# Patient Record
Sex: FEMALE | Race: WHITE | NOT HISPANIC OR LATINO | Employment: OTHER | URBAN - METROPOLITAN AREA
[De-identification: names, ages, dates, MRNs, and addresses within clinical notes are randomized per-mention and may not be internally consistent; named-entity substitution may affect disease eponyms.]

---

## 2017-10-26 ENCOUNTER — APPOINTMENT (EMERGENCY)
Dept: CT IMAGING | Facility: HOSPITAL | Age: 82
DRG: 066 | End: 2017-10-26
Payer: COMMERCIAL

## 2017-10-26 ENCOUNTER — APPOINTMENT (INPATIENT)
Dept: MRI IMAGING | Facility: HOSPITAL | Age: 82
DRG: 066 | End: 2017-10-26
Payer: COMMERCIAL

## 2017-10-26 ENCOUNTER — APPOINTMENT (EMERGENCY)
Dept: RADIOLOGY | Facility: HOSPITAL | Age: 82
DRG: 066 | End: 2017-10-26
Payer: COMMERCIAL

## 2017-10-26 ENCOUNTER — HOSPITAL ENCOUNTER (INPATIENT)
Facility: HOSPITAL | Age: 82
LOS: 4 days | Discharge: HOME | DRG: 066 | End: 2017-10-30
Attending: EMERGENCY MEDICINE | Admitting: INTERNAL MEDICINE
Payer: COMMERCIAL

## 2017-10-26 DIAGNOSIS — R29.810 FACIAL DROOP: Primary | ICD-10-CM

## 2017-10-26 DIAGNOSIS — R55 SYNCOPE: ICD-10-CM

## 2017-10-26 DIAGNOSIS — I63.81 ACUTE LACUNAR STROKE (HCC): ICD-10-CM

## 2017-10-26 PROBLEM — R53.1 ASTHENIA: Status: ACTIVE | Noted: 2017-10-26

## 2017-10-26 PROBLEM — I65.23 CAROTID ARTERY CALCIFICATION, BILATERAL: Status: ACTIVE | Noted: 2017-10-26

## 2017-10-26 LAB
ALBUMIN SERPL BCP-MCNC: 4.2 G/DL (ref 3.5–5)
ALP SERPL-CCNC: 86 U/L (ref 46–116)
ALT SERPL W P-5'-P-CCNC: 19 U/L (ref 12–78)
ANION GAP BLD CALC-SCNC: 18 MMOL/L (ref 4–13)
ANION GAP SERPL CALCULATED.3IONS-SCNC: 11 MMOL/L (ref 4–13)
APTT PPP: 28 SECONDS (ref 23–35)
AST SERPL W P-5'-P-CCNC: 16 U/L (ref 5–45)
BACTERIA UR QL AUTO: ABNORMAL /HPF
BASOPHILS # BLD AUTO: 0.02 THOUSANDS/ΜL (ref 0–0.1)
BASOPHILS NFR BLD AUTO: 0 % (ref 0–1)
BILIRUB SERPL-MCNC: 0.76 MG/DL (ref 0.2–1)
BILIRUB UR QL STRIP: NEGATIVE
BUN BLD-MCNC: 13 MG/DL (ref 5–25)
BUN SERPL-MCNC: 15 MG/DL (ref 5–25)
CA-I BLD-SCNC: 1.15 MMOL/L (ref 1.12–1.32)
CALCIUM SERPL-MCNC: 9.5 MG/DL (ref 8.3–10.1)
CHLORIDE BLD-SCNC: 98 MMOL/L (ref 100–108)
CHLORIDE SERPL-SCNC: 97 MMOL/L (ref 100–108)
CK SERPL-CCNC: 32 U/L (ref 26–192)
CLARITY UR: CLEAR
CO2 SERPL-SCNC: 26 MMOL/L (ref 21–32)
COLOR UR: YELLOW
CREAT BLD-MCNC: 1.1 MG/DL (ref 0.6–1.3)
CREAT SERPL-MCNC: 1.2 MG/DL (ref 0.6–1.3)
EOSINOPHIL # BLD AUTO: 0.01 THOUSAND/ΜL (ref 0–0.61)
EOSINOPHIL NFR BLD AUTO: 0 % (ref 0–6)
ERYTHROCYTE [DISTWIDTH] IN BLOOD BY AUTOMATED COUNT: 13.1 % (ref 11.6–15.1)
FINE GRAN CASTS URNS QL MICRO: ABNORMAL /LPF
GFR SERPL CREATININE-BSD FRML MDRD: 42 ML/MIN/1.73SQ M
GFR SERPL CREATININE-BSD FRML MDRD: 46 ML/MIN/1.73SQ M
GLUCOSE SERPL-MCNC: 159 MG/DL (ref 65–140)
GLUCOSE SERPL-MCNC: 160 MG/DL (ref 65–140)
GLUCOSE UR STRIP-MCNC: NEGATIVE MG/DL
HCT VFR BLD AUTO: 41.9 % (ref 34.8–46.1)
HCT VFR BLD CALC: 45 % (ref 34.8–46.1)
HGB BLD-MCNC: 14.5 G/DL (ref 11.5–15.4)
HGB BLDA-MCNC: 15.3 G/DL (ref 11.5–15.4)
HGB UR QL STRIP.AUTO: ABNORMAL
HYALINE CASTS #/AREA URNS LPF: ABNORMAL /LPF
INR PPP: 1.05 (ref 0.86–1.16)
KETONES UR STRIP-MCNC: NEGATIVE MG/DL
LEUKOCYTE ESTERASE UR QL STRIP: NEGATIVE
LYMPHOCYTES # BLD AUTO: 2.08 THOUSANDS/ΜL (ref 0.6–4.47)
LYMPHOCYTES NFR BLD AUTO: 25 % (ref 14–44)
MAGNESIUM SERPL-MCNC: 2 MG/DL (ref 1.6–2.6)
MCH RBC QN AUTO: 30 PG (ref 26.8–34.3)
MCHC RBC AUTO-ENTMCNC: 34.6 G/DL (ref 31.4–37.4)
MCV RBC AUTO: 87 FL (ref 82–98)
MONOCYTES # BLD AUTO: 0.88 THOUSAND/ΜL (ref 0.17–1.22)
MONOCYTES NFR BLD AUTO: 11 % (ref 4–12)
NEUTROPHILS # BLD AUTO: 5.31 THOUSANDS/ΜL (ref 1.85–7.62)
NEUTS SEG NFR BLD AUTO: 64 % (ref 43–75)
NITRITE UR QL STRIP: NEGATIVE
NON-SQ EPI CELLS URNS QL MICRO: ABNORMAL /HPF
PCO2 BLD: 25 MMOL/L (ref 21–32)
PH UR STRIP.AUTO: 7 [PH] (ref 4.5–8)
PLATELET # BLD AUTO: 275 THOUSANDS/UL (ref 149–390)
PMV BLD AUTO: 10.6 FL (ref 8.9–12.7)
POTASSIUM BLD-SCNC: 3.7 MMOL/L (ref 3.5–5.3)
POTASSIUM SERPL-SCNC: 3.7 MMOL/L (ref 3.5–5.3)
PROT SERPL-MCNC: 8.2 G/DL (ref 6.4–8.2)
PROT UR STRIP-MCNC: ABNORMAL MG/DL
PROTHROMBIN TIME: 13.7 SECONDS (ref 12.1–14.4)
RBC # BLD AUTO: 4.83 MILLION/UL (ref 3.81–5.12)
RBC #/AREA URNS AUTO: ABNORMAL /HPF
SODIUM BLD-SCNC: 135 MMOL/L (ref 136–145)
SODIUM SERPL-SCNC: 134 MMOL/L (ref 136–145)
SP GR UR STRIP.AUTO: 1.01 (ref 1–1.03)
SPECIMEN SOURCE: ABNORMAL
SPECIMEN SOURCE: NORMAL
TROPONIN I BLD-MCNC: 0.02 NG/ML (ref 0–0.08)
UROBILINOGEN UR QL STRIP.AUTO: 0.2 E.U./DL
WBC # BLD AUTO: 8.3 THOUSAND/UL (ref 4.31–10.16)
WBC #/AREA URNS AUTO: ABNORMAL /HPF

## 2017-10-26 PROCEDURE — 93005 ELECTROCARDIOGRAM TRACING: CPT | Performed by: EMERGENCY MEDICINE

## 2017-10-26 PROCEDURE — 82550 ASSAY OF CK (CPK): CPT | Performed by: EMERGENCY MEDICINE

## 2017-10-26 PROCEDURE — 96360 HYDRATION IV INFUSION INIT: CPT

## 2017-10-26 PROCEDURE — 99285 EMERGENCY DEPT VISIT HI MDM: CPT

## 2017-10-26 PROCEDURE — 70551 MRI BRAIN STEM W/O DYE: CPT

## 2017-10-26 PROCEDURE — 71020 HB CHEST X-RAY 2VW FRONTAL&LATL: CPT

## 2017-10-26 PROCEDURE — 70450 CT HEAD/BRAIN W/O DYE: CPT

## 2017-10-26 PROCEDURE — 80047 BASIC METABLC PNL IONIZED CA: CPT

## 2017-10-26 PROCEDURE — 85025 COMPLETE CBC W/AUTO DIFF WBC: CPT | Performed by: EMERGENCY MEDICINE

## 2017-10-26 PROCEDURE — 83735 ASSAY OF MAGNESIUM: CPT | Performed by: EMERGENCY MEDICINE

## 2017-10-26 PROCEDURE — 72125 CT NECK SPINE W/O DYE: CPT

## 2017-10-26 PROCEDURE — 80053 COMPREHEN METABOLIC PANEL: CPT | Performed by: EMERGENCY MEDICINE

## 2017-10-26 PROCEDURE — 81002 URINALYSIS NONAUTO W/O SCOPE: CPT | Performed by: EMERGENCY MEDICINE

## 2017-10-26 PROCEDURE — 84484 ASSAY OF TROPONIN QUANT: CPT

## 2017-10-26 PROCEDURE — 85610 PROTHROMBIN TIME: CPT | Performed by: EMERGENCY MEDICINE

## 2017-10-26 PROCEDURE — 81001 URINALYSIS AUTO W/SCOPE: CPT

## 2017-10-26 PROCEDURE — 85014 HEMATOCRIT: CPT

## 2017-10-26 PROCEDURE — 36415 COLL VENOUS BLD VENIPUNCTURE: CPT | Performed by: EMERGENCY MEDICINE

## 2017-10-26 PROCEDURE — 85730 THROMBOPLASTIN TIME PARTIAL: CPT | Performed by: EMERGENCY MEDICINE

## 2017-10-26 RX ORDER — ACETAMINOPHEN 325 MG/1
650 TABLET ORAL EVERY 4 HOURS PRN
Status: DISCONTINUED | OUTPATIENT
Start: 2017-10-26 | End: 2017-10-30 | Stop reason: HOSPADM

## 2017-10-26 RX ORDER — HEPARIN SODIUM 5000 [USP'U]/ML
5000 INJECTION, SOLUTION INTRAVENOUS; SUBCUTANEOUS EVERY 8 HOURS SCHEDULED
Status: DISCONTINUED | OUTPATIENT
Start: 2017-10-26 | End: 2017-10-29

## 2017-10-26 RX ORDER — CIPROFLOXACIN 250 MG/1
250 TABLET, FILM COATED ORAL DAILY
COMMUNITY
End: 2018-09-24 | Stop reason: HOSPADM

## 2017-10-26 RX ORDER — ATORVASTATIN CALCIUM 20 MG/1
20 TABLET, FILM COATED ORAL DAILY
COMMUNITY

## 2017-10-26 RX ORDER — DONEPEZIL HYDROCHLORIDE 5 MG/1
10 TABLET, FILM COATED ORAL
Status: DISCONTINUED | OUTPATIENT
Start: 2017-10-26 | End: 2017-10-30 | Stop reason: HOSPADM

## 2017-10-26 RX ORDER — CALCIUM CARBONATE 200(500)MG
1000 TABLET,CHEWABLE ORAL DAILY PRN
Status: DISCONTINUED | OUTPATIENT
Start: 2017-10-26 | End: 2017-10-30 | Stop reason: HOSPADM

## 2017-10-26 RX ORDER — MEMANTINE HYDROCHLORIDE 10 MG/1
10 TABLET ORAL 2 TIMES DAILY
COMMUNITY

## 2017-10-26 RX ORDER — CIPROFLOXACIN 250 MG/1
250 TABLET, FILM COATED ORAL DAILY
Status: DISCONTINUED | OUTPATIENT
Start: 2017-10-26 | End: 2017-10-30 | Stop reason: HOSPADM

## 2017-10-26 RX ORDER — HYDRALAZINE HYDROCHLORIDE 20 MG/ML
10 INJECTION INTRAMUSCULAR; INTRAVENOUS EVERY 6 HOURS PRN
Status: DISCONTINUED | OUTPATIENT
Start: 2017-10-26 | End: 2017-10-27

## 2017-10-26 RX ORDER — MEMANTINE HYDROCHLORIDE 5 MG/1
10 TABLET ORAL 2 TIMES DAILY
Status: DISCONTINUED | OUTPATIENT
Start: 2017-10-26 | End: 2017-10-30 | Stop reason: HOSPADM

## 2017-10-26 RX ORDER — DONEPEZIL HYDROCHLORIDE 10 MG/1
10 TABLET, FILM COATED ORAL
COMMUNITY

## 2017-10-26 RX ORDER — ASPIRIN 81 MG/1
81 TABLET, CHEWABLE ORAL DAILY
Status: DISCONTINUED | OUTPATIENT
Start: 2017-10-26 | End: 2017-10-30 | Stop reason: HOSPADM

## 2017-10-26 RX ORDER — ATORVASTATIN CALCIUM 40 MG/1
40 TABLET, FILM COATED ORAL
Status: DISCONTINUED | OUTPATIENT
Start: 2017-10-26 | End: 2017-10-30 | Stop reason: HOSPADM

## 2017-10-26 RX ORDER — ONDANSETRON 2 MG/ML
4 INJECTION INTRAMUSCULAR; INTRAVENOUS EVERY 6 HOURS PRN
Status: DISCONTINUED | OUTPATIENT
Start: 2017-10-26 | End: 2017-10-27

## 2017-10-26 RX ADMIN — MEMANTINE 10 MG: 5 TABLET ORAL at 18:06

## 2017-10-26 RX ADMIN — SODIUM CHLORIDE 1000 ML: 0.9 INJECTION, SOLUTION INTRAVENOUS at 11:12

## 2017-10-26 RX ADMIN — ASPIRIN 81 MG 81 MG: 81 TABLET ORAL at 16:24

## 2017-10-26 RX ADMIN — DONEPEZIL HYDROCHLORIDE 10 MG: 5 TABLET, FILM COATED ORAL at 22:09

## 2017-10-26 RX ADMIN — HEPARIN SODIUM 5000 UNITS: 5000 INJECTION, SOLUTION INTRAVENOUS; SUBCUTANEOUS at 22:08

## 2017-10-26 RX ADMIN — HEPARIN SODIUM 5000 UNITS: 5000 INJECTION, SOLUTION INTRAVENOUS; SUBCUTANEOUS at 16:24

## 2017-10-26 RX ADMIN — ATORVASTATIN CALCIUM 40 MG: 40 TABLET, FILM COATED ORAL at 16:24

## 2017-10-26 RX ADMIN — CIPROFLOXACIN HYDROCHLORIDE 250 MG: 250 TABLET, FILM COATED ORAL at 16:24

## 2017-10-26 NOTE — PLAN OF CARE
DISCHARGE PLANNING     Discharge to home or other facility with appropriate resources Progressing        INFECTION - ADULT     Absence or prevention of progression during hospitalization Progressing     Absence of fever/infection during neutropenic period Progressing        Knowledge Deficit     Patient/family/caregiver demonstrates understanding of disease process, treatment plan, medications, and discharge instructions Progressing        Neurological Deficit     Neurological status is stable or improving Progressing        PAIN - ADULT     Verbalizes/displays adequate comfort level or baseline comfort level Progressing        Potential for Falls     Patient will remain free of falls Progressing        SAFETY ADULT     Maintain or return to baseline ADL function Progressing     Maintain or return mobility status to optimal level Progressing     Patient will remain free of falls Progressing

## 2017-10-26 NOTE — H&P
History and Physical - [facility] Internal Medicine    Patient Information: Alejandra Chris 80 y o  female MRN: [id number]  Unit/Bed#: ED 18 Encounter: [id number]  Admitting Physician: Marta Khan PA-C  PCP: Best Mills DO  Date of Admission:  10/26/17    Assessment/Plan  Patient is a pleasantly demented 28-year-old female who lives with her family however there is multiple cameras in the home to keep an eye on her  The patient was last seen normal at 5:30 a m  when she received her morning meds  A family member chest in honor and video at 8:30 a m  and she was not seen in her room in her bed was only USP made  They thought she was in the bathroom  45 minutes later she was not seen again and they went to the house and found her laying on the floor  She was initially difficult to arouse and when they did so she seemed weak on her right side and had a right facial droop  She was brought to the ER by EMS    Upon arrival in the ER the patient's vital signs were stable  Her labs were notable for sodium of 134 random glucose of 160, total CK 32, CBC within normal limits  Troponin negative  CT of the head did not show any acute intracranial however there is chronic lacunar right basal ganglia and chronic infarct in the left frontal region  CT of the C-spine did not show any fracture or malalignment, however there was sclerotic calcifications seen within the right carotid artery and moderate to severe atherosclerotic calcification in the left carotid artery EKG was 70 beats per minute normal sinus rhythm      Hospital Problem List:     Principal Problem:    Facial droop  Active Problems:    History of stroke    Alzheimer disease    Dyslipidemia    Asthenia    Carotid artery calcification, bilateral      Plan for the Primary Problem(s):  1  Right facial droop-resolving there is still some right facial a stream a tree with her smile however forehead is preserved    CT of the head did not show any acute intracranial abnormality or signs of a stroke there are evidence of chronic infarcts  She also does have bilateral carotid calcification  Will follow the stroke pathway with an MRI get an echocardiogram and a carotid ultrasound  The patient is on an aspirin check a lipid profile and add a statin  Also check a TSH and A1c  Consult Neurology  Plan for Additional Problems:   2  Alzheimer's disease-continue Aricept and Namenda  3  Asthenia-PT OT consult  4  Chronic urinary tract infections-the patient is on suppressive Cipro and cranberry since August 5   Hypertensive urgency-on my exam the patient's blood pressure was 642 systolic to 710 systolic  Patient had complained of a frontal headache  Will add hydralazine p r n     The patient is not on any antihypertensive pre-hospital   Allow permissive for the 1st 24-48 hours      VTE Prophylaxis: Heparin   Code Status:  Full code    Anticipated Length of Stay:  Patient will be admitted on an Inpatient basis with an anticipated length of stay of  Greater than 2 midnights  Total Time for Visit, including Counseling / Coordination of Care: 45 minutes  Greater than 50% of this total time spent on direct patient counseling and coordination of care  Chief Complaint:     Found down, R facial droop    History of Present Illness:    David Vazquez is a 80 y o  female who presents after an episode of being found down for at least an hour and a half  The patient was last seen at 5:30 a m  and was not seen and total after 9:00 a m  Radha Brandt She was found down by her bed  She was not initially conscious  And when she did come through she had facial asymmetry  The patient is poorly verbal at baseline  She does comprehend and can follow commands  She did complain of a frontal headache  But she had no other acute complaints  Review of systems was limited secondary to the patient's baseline dementia    The patient's daughter at bedside tells me that she was in her usual state of health yesterday    Review of Systems:  A 12-point review of systems was done  Please see the HPI for the full details  All other systems negative  Past Medical and Surgical History:     Past Medical History:   Diagnosis Date    Alzheimer disease     Diverticulosis     Dyslipidemia     History of stroke     UTI (urinary tract infection)        Past Surgical History:   Procedure Laterality Date    CHOLECYSTECTOMY         Meds/Allergies:    Current Facility-Administered Medications   Medication Dose Route Frequency Provider Last Rate Last Dose    sodium chloride 0 9 % bolus 1,000 mL  1,000 mL Intravenous Once Carmita De Jesus DO 1,000 mL/hr at 10/26/17 1112 1,000 mL at 10/26/17 1112     Current Outpatient Prescriptions   Medication Sig Dispense Refill    atorvastatin (LIPITOR) 20 mg tablet Take 20 mg by mouth daily      ciprofloxacin (CIPRO) 250 mg tablet Take 500 mg by mouth daily      Cranberry 250 MG TABS Take 500 mg by mouth daily      donepezil (ARICEPT) 10 mg tablet Take 10 mg by mouth daily at bedtime      memantine (NAMENDA) 10 mg tablet Take 10 mg by mouth 2 (two) times a day       No Known Allergies  History:     Marital Status:      Substance Use History:   History   Alcohol Use No     History   Smoking Status    Former Smoker   Smokeless Tobacco    Never Used     History   Drug Use No       Family History:    History reviewed  No pertinent family history      Physical Exam:   Vitals:   Blood Pressure: 138/59 (10/26/17 1115)  Pulse: 66 (10/26/17 1115)  Temperature: (!) 96 6 °F (35 9 °C) (10/26/17 1035)  Temp Source: Temporal (10/26/17 1035)  Respirations: 12 (10/26/17 1115)  Weight - Scale: 71 5 kg (157 lb 10 1 oz) (10/26/17 1027)  SpO2: 99 % (10/26/17 1115)    General Appearance:    Alert, cooperative, no distress, appears older than stated age   HEENT:    Atraumatic, EOMs intact, Mucous membranes dry without   exudates   Neck:   Supple, symmetrical, trachea midline, no adenopathy;     thyroid:  no enlargement/tenderness/nodules; no carotid    bruit or JVD   Lungs:     Clear to auscultation bilaterally, respirations unlabored   Chest Wall:    No tenderness or deformity    Heart:    Regular rate and rhythm, S1 and S2 normal, no murmur, rub    or gallop   Abdomen:     Soft, non-tender, bowel sounds active all four quadrants,     no masses, no organomegaly   Extremities:   Extremities normal, atraumatic, no cyanosis or edema   Pulses:   2+ and symmetric all extremities   Skin:   Pale dry poor turgor good cap refill, no rashes or lesions   Lymph nodes:   Cervical, supraclavicular, and axillary nodes normal   Neurologic:   CNII-XII intact except  R facial droop/smile asmetry,  4/5 strength throughout, cerebellar intact to heel shin/finger nose       Lab Results: I have personally reviewed pertinent reports  Results from last 7 days  Lab Units 10/26/17  1029 10/26/17  1019   WBC Thousand/uL  --  8 30   HEMOGLOBIN g/dL  --  14 5   I STAT HEMOGLOBIN g/dl 15 3  --    HEMATOCRIT %  --  41 9   PLATELETS Thousands/uL  --  275   NEUTROS PCT %  --  64   LYMPHS PCT %  --  25   MONOS PCT %  --  11   EOS PCT %  --  0       Results from last 7 days  Lab Units 10/26/17  1029 10/26/17  1019   SODIUM mmol/L  --  134*   POTASSIUM mmol/L  --  3 7   CHLORIDE mmol/L  --  97*   CO2 mmol/L  --  26   BUN mg/dL  --  15   CREATININE mg/dL  --  1 20   CALCIUM mg/dL  --  9 5   TOTAL PROTEIN g/dL  --  8 2   BILIRUBIN TOTAL mg/dL  --  0 76   ALK PHOS U/L  --  86   ALT U/L  --  19   AST U/L  --  16   GLUCOSE RANDOM mg/dL  --  160*   GLUCOSE, ISTAT mg/dl 159*  --        Results from last 7 days  Lab Units 10/26/17  1019   INR  1 05     Trop 0 02    Imaging: I have personally reviewed pertinent reports  Xr Chest 2 Views    Result Date: 10/26/2017  Narrative: CHEST INDICATION:  Syncope  COMPARISON:  Chest radiographs November 12, 2012   VIEWS:  Frontal and lateral projections IMAGES:  2 FINDINGS: Heart shadow appears unremarkable  Atherosclerotic vascular calcifications are noted  Lung markings are crowded secondary to low lung volumes  Within limitations of this examination there is no focal airspace opacity to suggest pneumonia  No pneumothorax or pleural effusion  Age-appropriate degenerative changes are noted in the spine  Osseous structures appear otherwise within normal limits  Impression: No active pulmonary disease on examination which is somewhat limited secondary to low lung volumes  Workstation performed: [id number]     Ct Head Without Contrast    Result Date: 10/26/2017  Narrative: CT BRAIN - WITHOUT CONTRAST INDICATION:  Headache right facial droop COMPARISON:  August 16, 2015 TECHNIQUE:  CT examination of the brain was performed  In addition to axial images, coronal reformatted images were created and submitted for interpretation  Radiation dose length product (DLP) for this visit:  1026 mGy-cm   This examination, like all CT scans performed in the [facility], was performed utilizing techniques to minimize radiation dose exposure, including the use of iterative reconstruction and automated exposure control  IMAGE QUALITY:  Diagnostic  FINDINGS:  PARENCHYMA:  No intracranial mass, mass effect or midline shift  No CT signs of acute infarction  There is no parenchymal hemorrhage  Moderate to severe periventricular and white matter hypodensity seen likely due to chronic small vessel ischemic changes A chronic lacunae seen in the right globus pallidus  Chronic infarct noted in the left frontal region VENTRICLES AND EXTRA-AXIAL SPACES:  Mild dilation of the ventricle system, unchanged from the previous study VISUALIZED ORBITS AND PARANASAL SINUSES:  Mild the course of thickening in the left maxillary sinus    Soft tissue density seen in the both external auditory canals may be due to cerumen CALVARIUM AND EXTRACRANIAL SOFT TISSUES:   Normal      Impression: No acute intracranial hemorrhage seen Chronic lacune in the right basal ganglionic region and chronic infarct left frontal region No CT signs of acute territorial infarction Workstation performed: [id number]     Ct Spine Cervical Without Contrast    Result Date: 10/26/2017  Narrative: CT CERVICAL SPINE - WITHOUT CONTRAST INDICATION: Neck pain COMPARISON: None  TECHNIQUE:  CT examination of the cervical spine was performed without intravenous contrast   Contiguous axial images were obtained  Sagittal and coronal reconstructions were performed  Radiation dose length product (DLP) for this visit:  375 mGy-cm   This examination, like all CT scans performed in the [facility], was performed utilizing techniques to minimize radiation dose exposure, including the use of iterative reconstruction and automated exposure control  IMAGE QUALITY:  Diagnostic  FINDINGS: ALIGNMENT:  Normal alignment of the cervical spine  No subluxation  VERTEBRAL BODIES:  No acute compression collapse of the vertebra seen DEGENERATIVE CHANGES:  C2-3 level demonstrates minimal protrusion with no significant central canal narrowing of foraminal narrowing C3-4 level demonstrates sufficient joint disease with uncovertebral spurring with mild left and no significant right foraminal narrowing C4-5 demonstrates sufficient joint disease with mild left and no significant right foraminal narrowing At C5-6 level there is a bridging with the mild bilateral foraminal narrowing and no significant central canal narrowing C6-7 level demonstrates uncovertebral spurring with mild left and no significant right foraminal narrowing C7-T1 level appear unremarkable PREVERTEBRAL AND PARASPINAL SOFT TISSUES: Normal         THORACIC INLET:  Normal   Atherosclerotic calcification seen within the right carotid artery    Moderate to severe atherosclerotic calcification seen within the left carotid artery     Impression: No acute compression collapse of the vertebra seen Sclerotic calcification is seen within the right carotid artery and moderate to severe atherosclerotic calcification  seen within the left carotid artery  Ultrasound of the carotids may be considered to evaluate for carotid stenosis  Workstation performed: [id number]       EKG, Pathology, and Other Studies Reviewed on Admission:   70bpm NSR no st changes    Allscripts Records Reviewed: Yes     This note has been constructed using a voice recognition system

## 2017-10-26 NOTE — ED PROVIDER NOTES
History  Chief Complaint   Patient presents with    CVA/TIA-like Symptoms     Pt with h/o Alzheimer's and CVA  Last seen at 5:30 am (5 hours ago)  Daughter was watching for her on a video monitor and didn't see her for a few hours so she went to check on her  She found pt on the floor unconscious, but came to on her own  Pt unable to give history 2/2 Alzheimer which daughter states is baseline  Daughter and EMS noticed a right facial droop and generalized weakness  Pt is complaining of a frontal HA  Pt does not take blood thinners and daughter notes they are [employer] witnesses and she can't receive blood  History provided by:  Relative  History limited by:  Dementia   used: No        Prior to Admission Medications   Prescriptions Last Dose Informant Patient Reported? Taking? Cranberry 250 MG TABS 10/25/2017 at Unknown time  Yes Yes   Sig: Take 500 mg by mouth daily   atorvastatin (LIPITOR) 20 mg tablet 10/25/2017 at Unknown time  Yes Yes   Sig: Take 20 mg by mouth daily   ciprofloxacin (CIPRO) 250 mg tablet 10/26/2017 at Unknown time  Yes Yes   Sig: Take 500 mg by mouth daily   donepezil (ARICEPT) 10 mg tablet 10/25/2017 at Unknown time  Yes Yes   Sig: Take 10 mg by mouth daily at bedtime   memantine (NAMENDA) 10 mg tablet 10/26/2017 at Unknown time  Yes Yes   Sig: Take 10 mg by mouth 2 (two) times a day      Facility-Administered Medications: None       Past Medical History:   Diagnosis Date    Alzheimer disease     Diverticulosis     Dyslipidemia     History of stroke     UTI (urinary tract infection)        Past Surgical History:   Procedure Laterality Date    CHOLECYSTECTOMY         History reviewed  No pertinent family history  I have reviewed and agree with the history as documented      Social History   Substance Use Topics    Smoking status: Former Smoker    Smokeless tobacco: Never Used    Alcohol use No        Review of Systems   Unable to perform ROS: Dementia       Physical Exam  ED Triage Vitals   Temperature Pulse Respirations Blood Pressure SpO2   10/26/17 1035 10/26/17 1027 10/26/17 1027 10/26/17 1027 10/26/17 1027   (!) 96 6 °F (35 9 °C) 77 (!) 24 135/61 99 %      Temp Source Heart Rate Source Patient Position - Orthostatic VS BP Location FiO2 (%)   10/26/17 1035 10/26/17 1218 10/26/17 1027 10/26/17 1027 --   Temporal Monitor Lying Right arm       Pain Score       10/26/17 1027       8           Orthostatic Vital Signs  Vitals:    10/26/17 1027 10/26/17 1115 10/26/17 1218   BP: 135/61 138/59 169/72   Pulse: 77 66 74   Patient Position - Orthostatic VS: Lying  Lying       Physical Exam   Constitutional: Vital signs are normal  She appears well-developed and well-nourished  Non-toxic appearance  She does not have a sickly appearance  She does not appear ill  No distress  HENT:   Head: Normocephalic and atraumatic  Right Ear: Tympanic membrane normal    Left Ear: Tympanic membrane normal    Nose: Nose normal    Mouth/Throat: Oropharynx is clear and moist and mucous membranes are normal  No oropharyngeal exudate  Eyes: Conjunctivae and EOM are normal  Pupils are equal, round, and reactive to light  Neck: Normal range of motion and full passive range of motion without pain  Neck supple  No neck rigidity  No Brudzinski's sign and no Kernig's sign noted  Cardiovascular: Normal rate, regular rhythm, normal heart sounds and intact distal pulses  Exam reveals no gallop and no friction rub  No murmur heard  Pulmonary/Chest: Effort normal and breath sounds normal  No respiratory distress  She has no wheezes  She has no rales  Abdominal: Soft  Bowel sounds are normal  She exhibits no distension  There is no tenderness  There is no rigidity, no rebound and no guarding  Lymphadenopathy:     She has no cervical adenopathy  Neurological: She is alert  She has normal strength  No cranial nerve deficit or sensory deficit     Pt generally weak in all extremities  Skin: Skin is warm and dry  No ecchymosis, no petechiae and no rash noted  She is not diaphoretic  No pallor  Nursing note and vitals reviewed        ED Medications  Medications   sodium chloride 0 9 % bolus 1,000 mL (0 mL Intravenous Stopped 10/26/17 1305)       Diagnostic Studies  Results Reviewed     Procedure Component Value Units Date/Time    Urine Microscopic [[id number]]  (Abnormal) Collected:  10/26/17 1336    Lab Status:  Final result Specimen:  Urine from Urine, Other Updated:  10/26/17 1338     RBC, UA 0-1 (A) /hpf      WBC, UA 1-2 (A) /hpf      Epithelial Cells Occasional /hpf      Bacteria, UA Moderate (A) /hpf      Hyaline Casts, UA 2-4 (A) /lpf      Fine granular casts 2-3 /lpf     POCT urinalysis dipstick [[id number]]  (Abnormal) Resulted:  10/26/17 1323    Lab Status:  Final result Specimen:  Urine Updated:  10/26/17 1323    ED Urine Macroscopic [[id number]]  (Abnormal) Collected:  10/26/17 1336    Lab Status:  Final result Specimen:  Urine Updated:  10/26/17 1322     Color, UA Yellow     Clarity, UA Clear     pH, UA 7 0     Leukocytes, UA Negative     Nitrite, UA Negative     Protein,  (2+) (A) mg/dl      Glucose, UA Negative mg/dl      Ketones, UA Negative mg/dl      Urobilinogen, UA 0 2 E U /dl      Bilirubin, UA Negative     Blood, UA Trace (A)     Specific [place], UA 1 015    Narrative:       CLINITEK RESULT    Comprehensive metabolic panel [[id number]]  (Abnormal) Collected:  10/26/17 1019    Lab Status:  Final result Specimen:  Blood from Line, Venous Updated:  10/26/17 1111     Sodium 134 (L) mmol/L      Potassium 3 7 mmol/L      Chloride 97 (L) mmol/L      CO2 26 mmol/L      Anion Gap 11 mmol/L      BUN 15 mg/dL      Creatinine 1 20 mg/dL      Glucose 160 (H) mg/dL      Calcium 9 5 mg/dL      AST 16 U/L      ALT 19 U/L      Alkaline Phosphatase 86 U/L      Total Protein 8 2 g/dL      Albumin 4 2 g/dL      Total Bilirubin 0 76 mg/dL      eGFR 42 ml/min/1 73sq m     Narrative: National Kidney Disease Education Program recommendations are as follows:  GFR calculation is accurate only with a steady state creatinine  Chronic Kidney disease less than 60 ml/min/1 73 sq  meters  Kidney failure less than 15 ml/min/1 73 sq  meters  CK Total with Reflex CKMB [[id number]]  (Normal) Collected:  10/26/17 1019    Lab Status:  Final result Specimen:  Blood from Line, Venous Updated:  10/26/17 1111     Total CK 32 U/L     Magnesium [[id number]]  (Normal) Collected:  10/26/17 1019    Lab Status:  Final result Specimen:  Blood from Line, Venous Updated:  10/26/17 1111     Magnesium 2 0 mg/dL     Protime-INR [[id number]]  (Normal) Collected:  10/26/17 1019    Lab Status:  Final result Specimen:  Blood from Arm, Left Updated:  10/26/17 1104     Protime 13 7 seconds      INR 1 05    APTT [[id number]]  (Normal) Collected:  10/26/17 1019    Lab Status:  Final result Specimen:  Blood from Arm, Left Updated:  10/26/17 1104     PTT 28 seconds     Narrative:          Therapeutic Heparin Range = 60-90 seconds    CBC and differential [[id number]]  (Normal) Collected:  10/26/17 1019    Lab Status:  Final result Specimen:  Blood from Line, Venous Updated:  10/26/17 1047     WBC 8 30 Thousand/uL      RBC 4 83 Million/uL      Hemoglobin 14 5 g/dL      Hematocrit 41 9 %      MCV 87 fL      MCH 30 0 pg      MCHC 34 6 g/dL      RDW 13 1 %      MPV 10 6 fL      Platelets 705 Thousands/uL      Neutrophils Relative 64 %      Lymphocytes Relative 25 %      Monocytes Relative 11 %      Eosinophils Relative 0 %      Basophils Relative 0 %      Neutrophils Absolute 5 31 Thousands/µL      Lymphocytes Absolute 2 08 Thousands/µL      Monocytes Absolute 0 88 Thousand/µL      Eosinophils Absolute 0 01 Thousand/µL      Basophils Absolute 0 02 Thousands/µL     POCT troponin [[id number]]  (Normal) Collected:  10/26/17 1027    Lab Status:  Final result Updated:  10/26/17 1040     POC Troponin I 0 02 ng/ml      Specimen Type VENOUS Narrative:         [employer] i-Stat handheld analyzer 99% cutoff is > 0 08ng/mL in network Emergency Departments    o cTnI 99% cutoff is useful only when applied to patients in the clinical setting of myocardial ischemia  o cTnI 99% cutoff should be interpreted in the context of clinical history, ECG findings and possibly cardiac imaging to establish correct diagnosis  o cTnI 99% cutoff may be suggestive but clearly not indicative of a coronary event without the clinical setting of myocardial ischemia  POCT Chem 8+ [[id number]]  (Abnormal) Collected:  10/26/17 1029    Lab Status:  Final result Updated:  10/26/17 1034     SODIUM, I-STAT 135 (L) mmol/l      Potassium, i-STAT 3 7 mmol/L      Chloride, istat 98 (L) mmol/L      CO2, i-STAT 25 mmol/L      Anion Gap, Istat 18 (H) mmol/L      Calcium, Ionized i-STAT 1 15 mmol/L      BUN, I-STAT 13 mg/dl      Creatinine, i-STAT 1 1 mg/dl      eGFR 46 ml/min/1 73sq m      Glucose, i-STAT 159 (H) mg/dl      Hct, i-STAT 45 %      Hgb, i-STAT 15 3 g/dl      Specimen Type VENOUS                 CT spine cervical without contrast   Final Result by Stephanie Blanton MD (10/26 1129)      No acute compression collapse of the vertebra seen      Sclerotic calcification is seen within the right carotid artery and moderate to severe atherosclerotic calcification  seen within the left carotid artery    Ultrasound of the carotids may be considered to evaluate for carotid stenosis                      Workstation performed: [id number]         CT head without contrast   Final Result by Stephanie Blanton MD (10/26 1120)      No acute intracranial hemorrhage seen      Chronic lacune in the right basal ganglionic region and chronic infarct left frontal region      No CT signs of acute territorial infarction         Workstation performed: [id number]         XR chest 2 views   ED Interpretation by Kimi Rogel 24, DO (10/26 1113)   No acute abnormalities      Final Result by Mickey Gamez DO (10/26 1100)      No active pulmonary disease on examination which is somewhat limited secondary to low lung volumes  Workstation performed: [id number]                    Procedures  ECG 12 Lead Documentation  Date/Time: 10/26/2017 10:36 AM  Performed by: Shilpa Roblero by: Georgia Mosquera     Indications / Diagnosis:  Syncope/stroke  ECG reviewed by me, the ED Provider: yes    Patient location:  Bedside  Previous ECG:     Previous ECG:  Compared to current    Comparison ECG info:  3/3/2010  Rate:     ECG rate:  70    ECG rate assessment: normal    Rhythm:     Rhythm: sinus rhythm    Ectopy:     Ectopy: none    QRS:     QRS axis:  Normal    QRS intervals:  Normal  ST segments:     ST segments:  Normal  T waves:     T waves: normal             Phone Contacts  ED Phone Contact    ED Course  ED Course as of Oct 26 1347   Thu Oct 26, 2017   1140 Went to speak with daughter, who is no longer in room, to inform her of results  SLIM paged for admission  MDM  CritCare Time    Disposition  Final diagnoses:   Facial droop - Right   Syncope - Possible     Time reflects when diagnosis was documented in both MDM as applicable and the Disposition within this note     Time User Action Codes Description Comment    10/26/2017 11:34 AM Carmita De Jesus Add [R29 810] Facial droop     10/26/2017 11:34 AM Neal, [facility] Modify [R29 810] Facial droop Right    10/26/2017 11:34 AM Carmita De Jesus Add [R55] Syncope     10/26/2017 11:34 AM Carmita De Jesus Modify [R55] Syncope Possible      ED Disposition     ED Disposition Condition Comment    Admit  Case was discussed with Laura Do and the patient's admission status was agreed to be Admission Status: inpatient status to the service of Dr Kavitha Finnegan  Follow-up Information    None       Patient's Medications   Discharge Prescriptions    No medications on file     No discharge procedures on file      ED Provider  Electronically Signed by           Kimi Rogel 24, DO  10/26/17 8776

## 2017-10-26 NOTE — ED NOTES
Daughter reports found pt  unresponsive laying on the floor  Pt  lives alone, last seen by a family member was at 36 today       Gage Rudd RN  10/26/17 5851

## 2017-10-27 ENCOUNTER — APPOINTMENT (INPATIENT)
Dept: NON INVASIVE DIAGNOSTICS | Facility: HOSPITAL | Age: 82
DRG: 066 | End: 2017-10-27
Payer: COMMERCIAL

## 2017-10-27 LAB
ANION GAP SERPL CALCULATED.3IONS-SCNC: 10 MMOL/L (ref 4–13)
ATRIAL RATE: 170 BPM
BUN SERPL-MCNC: 21 MG/DL (ref 5–25)
CALCIUM SERPL-MCNC: 8.8 MG/DL (ref 8.3–10.1)
CHLORIDE SERPL-SCNC: 103 MMOL/L (ref 100–108)
CHOLEST SERPL-MCNC: 107 MG/DL (ref 50–200)
CO2 SERPL-SCNC: 23 MMOL/L (ref 21–32)
CREAT SERPL-MCNC: 0.73 MG/DL (ref 0.6–1.3)
EST. AVERAGE GLUCOSE BLD GHB EST-MCNC: 111 MG/DL
GFR SERPL CREATININE-BSD FRML MDRD: 76 ML/MIN/1.73SQ M
GLUCOSE SERPL-MCNC: 97 MG/DL (ref 65–140)
HBA1C MFR BLD: 5.5 % (ref 4.2–6.3)
HDLC SERPL-MCNC: 40 MG/DL (ref 40–60)
LDLC SERPL CALC-MCNC: 57 MG/DL (ref 0–100)
PLATELET # BLD AUTO: 212 THOUSANDS/UL (ref 149–390)
PMV BLD AUTO: 10.9 FL (ref 8.9–12.7)
POTASSIUM SERPL-SCNC: 3.3 MMOL/L (ref 3.5–5.3)
QRS AXIS: 46 DEGREES
QRSD INTERVAL: 78 MS
QT INTERVAL: 424 MS
QTC INTERVAL: 457 MS
SODIUM SERPL-SCNC: 136 MMOL/L (ref 136–145)
T WAVE AXIS: 88 DEGREES
TRIGL SERPL-MCNC: 48 MG/DL
TSH SERPL DL<=0.05 MIU/L-ACNC: 1.45 UIU/ML (ref 0.36–3.74)
VENTRICULAR RATE: 70 BPM

## 2017-10-27 PROCEDURE — G8989 SELF CARE D/C STATUS: HCPCS

## 2017-10-27 PROCEDURE — 80048 BASIC METABOLIC PNL TOTAL CA: CPT | Performed by: PHYSICIAN ASSISTANT

## 2017-10-27 PROCEDURE — 85049 AUTOMATED PLATELET COUNT: CPT | Performed by: PHYSICIAN ASSISTANT

## 2017-10-27 PROCEDURE — G8988 SELF CARE GOAL STATUS: HCPCS

## 2017-10-27 PROCEDURE — G8978 MOBILITY CURRENT STATUS: HCPCS

## 2017-10-27 PROCEDURE — G8987 SELF CARE CURRENT STATUS: HCPCS

## 2017-10-27 PROCEDURE — 83036 HEMOGLOBIN GLYCOSYLATED A1C: CPT | Performed by: PHYSICIAN ASSISTANT

## 2017-10-27 PROCEDURE — 97163 PT EVAL HIGH COMPLEX 45 MIN: CPT

## 2017-10-27 PROCEDURE — 97166 OT EVAL MOD COMPLEX 45 MIN: CPT

## 2017-10-27 PROCEDURE — 80061 LIPID PANEL: CPT | Performed by: PHYSICIAN ASSISTANT

## 2017-10-27 PROCEDURE — 93306 TTE W/DOPPLER COMPLETE: CPT

## 2017-10-27 PROCEDURE — 84443 ASSAY THYROID STIM HORMONE: CPT | Performed by: PHYSICIAN ASSISTANT

## 2017-10-27 PROCEDURE — 93880 EXTRACRANIAL BILAT STUDY: CPT

## 2017-10-27 PROCEDURE — G8979 MOBILITY GOAL STATUS: HCPCS

## 2017-10-27 RX ORDER — WARFARIN SODIUM 5 MG/1
5 TABLET ORAL
Status: DISCONTINUED | OUTPATIENT
Start: 2017-10-27 | End: 2017-10-30 | Stop reason: HOSPADM

## 2017-10-27 RX ADMIN — ASPIRIN 81 MG 81 MG: 81 TABLET ORAL at 09:40

## 2017-10-27 RX ADMIN — CIPROFLOXACIN HYDROCHLORIDE 250 MG: 250 TABLET, FILM COATED ORAL at 10:13

## 2017-10-27 RX ADMIN — ATORVASTATIN CALCIUM 40 MG: 40 TABLET, FILM COATED ORAL at 17:40

## 2017-10-27 RX ADMIN — HEPARIN SODIUM 5000 UNITS: 5000 INJECTION, SOLUTION INTRAVENOUS; SUBCUTANEOUS at 05:33

## 2017-10-27 RX ADMIN — HEPARIN SODIUM 5000 UNITS: 5000 INJECTION, SOLUTION INTRAVENOUS; SUBCUTANEOUS at 13:09

## 2017-10-27 RX ADMIN — HEPARIN SODIUM 5000 UNITS: 5000 INJECTION, SOLUTION INTRAVENOUS; SUBCUTANEOUS at 21:33

## 2017-10-27 RX ADMIN — DONEPEZIL HYDROCHLORIDE 10 MG: 5 TABLET, FILM COATED ORAL at 21:32

## 2017-10-27 RX ADMIN — MEMANTINE 10 MG: 5 TABLET ORAL at 17:40

## 2017-10-27 RX ADMIN — MEMANTINE 10 MG: 5 TABLET ORAL at 09:40

## 2017-10-27 RX ADMIN — WARFARIN SODIUM 5 MG: 5 TABLET ORAL at 17:40

## 2017-10-27 NOTE — CONSULTS
Consultation - Neurology   Kamala Powell 80 y o  female MRN: [id number]  Unit/Bed#: E4 -01 Encounter: [id number]    Assessment/Plan   1)  Right frontal region of restriction, consistent with acute stroke - etiology most likely secondary to cardioembolic with history of atrial fibrillation and not on anticoagulation, alternate diagnosis this is from a vessel to vessel atheroma  2)  History of dementia at baseline     3)  History of chronic urinary tract infections    4)  Hypertension    5)  Hx of prior strokes    -  Stroke protocol   -  MRI of brain with out contrast reviewed as below  -  Carotid ultrasound pending  -  Will review with attending need for CTA of head and neck, will await carotid ultrasound findings  -  Echocardiogram ordered  -  Await further testing and telemetry findings  -  Lipid panel wnl, hemoglobin A1c pending  -  Secondary stroke risk factor modification  -- High dose statin medication initiated for secondary stroke prevention  -  Continue Antiplatelet - ASA  -  With hx of atrial fibrillation need to weigh risk benefit of anticoagulation, with hx of GI bleed and unable to get blood products  Chads vascular score calculated to be 7 points 11 2% stroke risk, has - bled score 8 9%  The patient's coumadin was stopped secondary to GI bleed in past from diverticulosis  We may need to have GI weigh in on decision, with inability to recieve blood products as factor  Family appears interested in considering anticoagulation in the event of this new stroke     -  On Namenda and Aricept  -  Permissive blood pressures post acute stoke, goal blood pressure [phone] systolically  -  Keep euvolemic, normothermic  -  Treat high blood sugars  -  Dysphagia screen  -  Therapies to include PT/OT/ST  -  PMR consultation  -  DVT prevention  -  Frequent neurological check and notify neurology with any changes in neurological condition  -  Continue to monitor for infectious and metabolic derangements and treat as arises  -  Attending to see and advise    History of Present Illness     Reason for Consult / Principal Problem: Stroke    HPI: Nathan Olivo is a 80 y o  female who presents with a found laying on the floor by her family  Patient's history of prior stroke dementia presumed Alzheimer's, she cannot provide much history  Per record review, apparently family has multiple cameras in the home to watch over her, she was not seen on the camera  A family member came to the house found her lying on the floor  There is reports that she was difficult to arouse and appeared the patient may been weaker on the right side with right facial droop  Family member in room, endorses above  She reports patient does have a history of atrial fibrillation and stroke, was on Coumadin aspirin at one point, however, this was stopped secondary to GI bleed  She reports diverticulitis  The patient is unable to get blood products secondary to [place]  At that point the patient was not resumed on anticoagulation or antiplatelet, knowing the risk of stroke per family  The patient has history of dementia - they report she was seen at [facility] 3 years ago and was diagnosed with possible vascular dementia, but unsure  The patient has been on Aricept and Namenda  They report this has been fairly stable over the last few years  Patient has history of prior strokes - followed with Neurology  Neurology was asked to assess the patient regards this, MRI of the brain confirmed diffusion restriction in the right frontal region, with severe periventricular FLAIR changes most likely related to small vessel disease      Inpatient consult to Neurology  Consult performed by: Yobany Albarran ordered by: Carlos Castellon        Review of Systems   Unable to perform ROS: Dementia   She denies complaints    Historical Information   Past Medical History:   Diagnosis Date    Alzheimer disease     Diverticulosis     Dyslipidemia     History of stroke     UTI (urinary tract infection)      Past Surgical History:   Procedure Laterality Date    CHOLECYSTECTOMY       Social History   History   Alcohol Use No     History   Drug Use No     History   Smoking Status    Former Smoker   Smokeless Tobacco    Never Used     Family History: History reviewed  No pertinent family history  Meds/Allergies   all current active meds have been reviewed, current meds:   Current Facility-Administered Medications   Medication Dose Route Frequency    acetaminophen (TYLENOL) tablet 650 mg  650 mg Oral Q4H PRN    aspirin chewable tablet 81 mg  81 mg Oral Daily    atorvastatin (LIPITOR) tablet 40 mg  40 mg Oral Daily With Dinner    calcium carbonate (TUMS) chewable tablet 1,000 mg  1,000 mg Oral Daily PRN    ciprofloxacin (CIPRO) tablet 250 mg  250 mg Oral Daily    donepezil (ARICEPT) tablet 10 mg  10 mg Oral HS    heparin (porcine) subcutaneous injection 5,000 Units  5,000 Units Subcutaneous Q8H [address]    hydrALAZINE (APRESOLINE) injection 10 mg  10 mg Intravenous Q6H PRN    memantine (NAMENDA) tablet 10 mg  10 mg Oral BID    ondansetron (ZOFRAN) injection 4 mg  4 mg Intravenous Q6H PRN    and PTA meds:   Prior to Admission Medications   Prescriptions Last Dose Informant Patient Reported? Taking?    Cranberry 250 MG TABS 10/25/2017 at Unknown time  Yes Yes   Sig: Take 500 mg by mouth daily   atorvastatin (LIPITOR) 20 mg tablet 10/25/2017 at Unknown time  Yes Yes   Sig: Take 20 mg by mouth daily   ciprofloxacin (CIPRO) 250 mg tablet 10/26/2017 at Unknown time  Yes Yes   Sig: Take 500 mg by mouth daily   donepezil (ARICEPT) 10 mg tablet 10/25/2017 at Unknown time  Yes Yes   Sig: Take 10 mg by mouth daily at bedtime   memantine (NAMENDA) 10 mg tablet 10/26/2017 at Unknown time  Yes Yes   Sig: Take 10 mg by mouth 2 (two) times a day      Facility-Administered Medications: None       No Known Allergies    Objective   Vitals:Blood pressure 152/78, pulse 70, temperature 97 9 °F (36 6 °C), temperature source Temporal, resp  rate 18, height 5' 3" (1 6 m), weight 70 3 kg (155 lb), SpO2 98 %  ,Body mass index is 27 46 kg/m²  Intake/Output Summary (Last 24 hours) at 10/27/17 0978  Last data filed at 10/26/17 1305   Gross per 24 hour   Intake             1000 ml   Output                0 ml   Net             1000 ml     Invasive Devices: Invasive Devices          No matching active lines, drains, or airways        Physical Exam   Constitutional: She appears well-developed and well-nourished  No distress  HENT:   Head: Normocephalic and atraumatic  Eyes: Conjunctivae and EOM are normal  Pupils are equal, round, and reactive to light  Right eye exhibits no discharge  Left eye exhibits no discharge  No scleral icterus  Neck: Normal range of motion  Neck supple  Cardiovascular: Normal rate and regular rhythm  Exam reveals no friction rub  No murmur heard  Pulmonary/Chest: Effort normal  No respiratory distress  She has no wheezes  Abdominal: Soft  She exhibits no distension  There is no tenderness  Musculoskeletal: She exhibits no edema  Neurological: She has normal strength  She has a normal Finger-Nose-Finger Test and a normal Heel to [employer]    Reflex Scores:       Tricep reflexes are 1+ on the right side and 1+ on the left side  Bicep reflexes are 1+ on the right side and 1+ on the left side  Brachioradialis reflexes are 1+ on the right side and 1+ on the left side  Patellar reflexes are 1+ on the right side and 1+ on the left side  Skin: No rash noted  She is not diaphoretic  Psychiatric: Her speech is normal      Neurologic Exam     Mental Status   Registration: recalls 3 of 3 objects  Recall of objects at 5 minutes: 0/3  Attention: decreased  Concentration: decreased  Speech: speech is normal   Level of consciousness: alert  She is awake and alert, there is minimal verbal output unless spoke too    She does track to examiner or pay attention to examiner when in room, talking with daughter  There is no evidence of dysarthria, as stated before she had limited verbal output  She was able to read, she is able to name objects, he is able to do simple calculations, she was able to repeat, she was unable to tell me the season, year, president, exact hospital location - but able to me in hospital, she was able to tell me her name  She was able follow simple commands however had more problems with complex commands  She had 3 of 3 immediate recall 0 of 3 remote recall  Cranial Nerves     CN II   Visual fields full to confrontation  CN III, IV, VI   Pupils are equal, round, and reactive to light  Extraocular motions are normal    Nystagmus: none   Diplopia: none  Ophthalmoparesis: none    CN V   Facial sensation intact  CN VII   Facial expression full, symmetric  Left facial weakness: central    CN VIII   Hearing: impaired    CN IX, X   CN IX normal    CN X normal      CN XI   CN XI normal      CN XII   CN XII normal    Extraocular movements intact except there was restricted upgaze     Motor Exam   Muscle bulk: normal  Overall muscle tone: normal (Mild paratonia)  Right arm pronator drift: absent  Left arm pronator drift: absent    Strength   Strength 5/5 throughout       Sensory Exam   Light touch normal    Vibration normal    Normal sensation to cold in the uppers and lower extremities without any lateralizing features     Gait, Coordination, and Reflexes     Gait  Gait: (Not tested)    Coordination   Finger to nose coordination: normal  Heel to shin coordination: normal    Tremor   Resting tremor: absent  Intention tremor: absent    Reflexes   Right brachioradialis: 1+  Left brachioradialis: 1+  Right biceps: 1+  Left biceps: 1+  Right triceps: 1+  Left triceps: 1+  Right patellar: 1+  Left patellar: 1+  Right plantar: normal  Left plantar: normal  Right Serna: absent  Left Serna: absent  Right ankle clonus: absent  Left ankle clonus: absent    Lab Results:    Recent Results (from the past 24 hour(s))   CBC and differential    Collection Time: 10/26/17 10:19 AM   Result Value Ref Range    WBC 8 30 4 31 - 10 16 Thousand/uL    RBC 4 83 3 81 - 5 12 Million/uL    Hemoglobin 14 5 11 5 - 15 4 g/dL    Hematocrit 41 9 34 8 - 46 1 %    MCV 87 82 - 98 fL    MCH 30 0 26 8 - 34 3 pg    MCHC 34 6 31 4 - 37 4 g/dL    RDW 13 1 11 6 - 15 1 %    MPV 10 6 8 9 - 12 7 fL    Platelets 795 296 - 594 Thousands/uL    Neutrophils Relative 64 43 - 75 %    Lymphocytes Relative 25 14 - 44 %    Monocytes Relative 11 4 - 12 %    Eosinophils Relative 0 0 - 6 %    Basophils Relative 0 0 - 1 %    Neutrophils Absolute 5 31 1 85 - 7 62 Thousands/µL    Lymphocytes Absolute 2 08 0 60 - 4 47 Thousands/µL    Monocytes Absolute 0 88 0 17 - 1 22 Thousand/µL    Eosinophils Absolute 0 01 0 00 - 0 61 Thousand/µL    Basophils Absolute 0 02 0 00 - 0 10 Thousands/µL   Comprehensive metabolic panel    Collection Time: 10/26/17 10:19 AM   Result Value Ref Range    Sodium 134 (L) 136 - 145 mmol/L    Potassium 3 7 3 5 - 5 3 mmol/L    Chloride 97 (L) 100 - 108 mmol/L    CO2 26 21 - 32 mmol/L    Anion Gap 11 4 - 13 mmol/L    BUN 15 5 - 25 mg/dL    Creatinine 1 20 0 60 - 1 30 mg/dL    Glucose 160 (H) 65 - 140 mg/dL    Calcium 9 5 8 3 - 10 1 mg/dL    AST 16 5 - 45 U/L    ALT 19 12 - 78 U/L    Alkaline Phosphatase 86 46 - 116 U/L    Total Protein 8 2 6 4 - 8 2 g/dL    Albumin 4 2 3 5 - 5 0 g/dL    Total Bilirubin 0 76 0 20 - 1 00 mg/dL    eGFR 42 ml/min/1 73sq m   CK Total with Reflex CKMB    Collection Time: 10/26/17 10:19 AM   Result Value Ref Range    Total CK 32 26 - 192 U/L   Magnesium    Collection Time: 10/26/17 10:19 AM   Result Value Ref Range    Magnesium 2 0 1 6 - 2 6 mg/dL   Protime-INR    Collection Time: 10/26/17 10:19 AM   Result Value Ref Range    Protime 13 7 12 1 - 14 4 seconds    INR 1 05 0 86 - 1 16   APTT    Collection Time: 10/26/17 10:19 AM Result Value Ref Range    PTT 28 23 - 35 seconds   POCT troponin    Collection Time: 10/26/17 10:27 AM   Result Value Ref Range    POC Troponin I 0 02 0 00 - 0 08 ng/ml    Specimen Type VENOUS    POCT Chem 8+    Collection Time: 10/26/17 10:29 AM   Result Value Ref Range    SODIUM, I-STAT 135 (L) 136 - 145 mmol/l    Potassium, i-STAT 3 7 3 5 - 5 3 mmol/L    Chloride, istat 98 (L) 100 - 108 mmol/L    CO2, i-STAT 25 21 - 32 mmol/L    Anion Gap, Istat 18 (H) 4 - 13 mmol/L    Calcium, Ionized i-STAT 1 15 1 12 - 1 32 mmol/L    BUN, I-STAT 13 5 - 25 mg/dl    Creatinine, i-STAT 1 1 0 6 - 1 3 mg/dl    eGFR 46 ml/min/1 73sq m    Glucose, i-STAT 159 (H) 65 - 140 mg/dl    Hct, i-STAT 45 34 8 - 46 1 %    Hgb, i-STAT 15 3 11 5 - 15 4 g/dl    Specimen Type VENOUS    ED Urine Macroscopic    Collection Time: 10/26/17  1:36 PM   Result Value Ref Range    Color, UA Yellow     Clarity, UA Clear     pH, UA 7 0 4 5 - 8 0    Leukocytes, UA Negative Negative    Nitrite, UA Negative Negative    Protein,  (2+) (A) Negative mg/dl    Glucose, UA Negative Negative mg/dl    Ketones, UA Negative Negative mg/dl    Urobilinogen, UA 0 2 0 2, 1 0 E U /dl E U /dl    Bilirubin, UA Negative Negative    Blood, UA Trace (A) Negative    Specific Gravity, UA 1 015 1 003 - 1 030   Urine Microscopic    Collection Time: 10/26/17  1:36 PM   Result Value Ref Range    RBC, UA 0-1 (A) None Seen, 0-5 /hpf    WBC, UA 1-2 (A) None Seen, 0-5, 5-55, 5-65 /hpf    Epithelial Cells Occasional None Seen, Occasional /hpf    Bacteria, UA Moderate (A) None Seen, Occasional /hpf    Hyaline Casts, UA 2-4 (A) (none) /lpf    Fine granular casts 2-3 /lpf   Platelet count    Collection Time: 10/27/17  5:35 AM   Result Value Ref Range    Platelets 969 779 - 244 Thousands/uL    MPV 10 9 8 9 - 12 7 fL   Lipid Panel with Direct LDL reflex    Collection Time: 10/27/17  5:35 AM   Result Value Ref Range    Cholesterol 107 50 - 200 mg/dL    Triglycerides 48 <=150 mg/dL    HDL, Direct 40 40 - 60 mg/dL    LDL Calculated 57 0 - 100 mg/dL   Basic metabolic panel    Collection Time: 10/27/17  5:35 AM   Result Value Ref Range    Sodium 136 136 - 145 mmol/L    Potassium 3 3 (L) 3 5 - 5 3 mmol/L    Chloride 103 100 - 108 mmol/L    CO2 23 21 - 32 mmol/L    Anion Gap 10 4 - 13 mmol/L    BUN 21 5 - 25 mg/dL    Creatinine 0 73 0 60 - 1 30 mg/dL    Glucose 97 65 - 140 mg/dL    Calcium 8 8 8 3 - 10 1 mg/dL    eGFR 76 ml/min/1 73sq m   TSH, 3rd generation with T4 reflex    Collection Time: 10/27/17  5:35 AM   Result Value Ref Range    TSH 3RD GENERATON 1 446 0 358 - 3 740 uIU/mL     Imaging Studies:    Per radiology interpretation -    "  Xr Chest 2 Views    Result Date: 10/26/2017  Narrative: CHEST INDICATION:  Syncope  COMPARISON:  Chest radiographs November 12, 2012  VIEWS:  Frontal and lateral projections IMAGES:  2 FINDINGS:     Heart shadow appears unremarkable  Atherosclerotic vascular calcifications are noted  Lung markings are crowded secondary to low lung volumes  Within limitations of this examination there is no focal airspace opacity to suggest pneumonia  No pneumothorax or pleural effusion  Age-appropriate degenerative changes are noted in the spine  Osseous structures appear otherwise within normal limits  Impression: No active pulmonary disease on examination which is somewhat limited secondary to low lung volumes  Workstation performed: [id number]     Ct Head Without Contrast    Result Date: 10/26/2017  Narrative: CT BRAIN - WITHOUT CONTRAST INDICATION:  Headache right facial droop COMPARISON:  August 16, 2015 TECHNIQUE:  CT examination of the brain was performed  In addition to axial images, coronal reformatted images were created and submitted for interpretation  Radiation dose length product (DLP) for this visit:  1026 mGy-cm     This examination, like all CT scans performed in the [facility], was performed utilizing techniques to minimize radiation dose exposure, including the use of iterative reconstruction and automated exposure control  IMAGE QUALITY:  Diagnostic  FINDINGS:  PARENCHYMA:  No intracranial mass, mass effect or midline shift  No CT signs of acute infarction  There is no parenchymal hemorrhage  Moderate to severe periventricular and white matter hypodensity seen likely due to chronic small vessel ischemic changes A chronic lacunae seen in the right globus pallidus  Chronic infarct noted in the left frontal region VENTRICLES AND EXTRA-AXIAL SPACES:  Mild dilation of the ventricle system, unchanged from the previous study VISUALIZED ORBITS AND PARANASAL SINUSES:  Mild the course of thickening in the left maxillary sinus  Soft tissue density seen in the both external auditory canals may be due to cerumen CALVARIUM AND EXTRACRANIAL SOFT TISSUES:   Normal      Impression: No acute intracranial hemorrhage seen Chronic lacune in the right basal ganglionic region and chronic infarct left frontal region No CT signs of acute territorial infarction Workstation performed: [id number]     Ct Spine Cervical Without Contrast    Result Date: 10/26/2017  Narrative: CT CERVICAL SPINE - WITHOUT CONTRAST INDICATION: Neck pain COMPARISON: None  TECHNIQUE:  CT examination of the cervical spine was performed without intravenous contrast   Contiguous axial images were obtained  Sagittal and coronal reconstructions were performed  Radiation dose length product (DLP) for this visit:  375 mGy-cm   This examination, like all CT scans performed in the [facility], was performed utilizing techniques to minimize radiation dose exposure, including the use of iterative reconstruction and automated exposure control  IMAGE QUALITY:  Diagnostic  FINDINGS: ALIGNMENT:  Normal alignment of the cervical spine  No subluxation   VERTEBRAL BODIES:  No acute compression collapse of the vertebra seen DEGENERATIVE CHANGES:  C2-3 level demonstrates minimal protrusion with no significant central canal narrowing of foraminal narrowing C3-4 level demonstrates sufficient joint disease with uncovertebral spurring with mild left and no significant right foraminal narrowing C4-5 demonstrates sufficient joint disease with mild left and no significant right foraminal narrowing At C5-6 level there is a bridging with the mild bilateral foraminal narrowing and no significant central canal narrowing C6-7 level demonstrates uncovertebral spurring with mild left and no significant right foraminal narrowing C7-T1 level appear unremarkable PREVERTEBRAL AND PARASPINAL SOFT TISSUES: Normal         THORACIC INLET:  Normal   Atherosclerotic calcification seen within the right carotid artery  Moderate to severe atherosclerotic calcification seen within the left carotid artery     Impression: No acute compression collapse of the vertebra seen Sclerotic calcification is seen within the right carotid artery and moderate to severe atherosclerotic calcification  seen within the left carotid artery  Ultrasound of the carotids may be considered to evaluate for carotid stenosis  Workstation performed: [id number]     Mri Brain Wo Contrast    Result Date: 10/27/2017  Narrative: MRI BRAIN WITHOUT CONTRAST INDICATION:  Right facial drop COMPARISON:   November 13, 2012 TECHNIQUE:  Sagittal T1, axial T2, axial FLAIR, axial T1, axial [place] and axial diffusion imaging  IMAGE QUALITY:  Diagnostic  FINDINGS: BRAIN PARENCHYMA:  There is a focal area of diffusion flexion in the right frontal region in the precentral gyrus suggestive small lacune     There are severe periventricular and white matter T2 hyperintensities related to chronic small vessel ischemic changes A chronic lacunae seen in the posterior limb of the right internal capsule Chronic infarct left frontal region seen There is no focal area of foot hemorrhage VENTRICLES:  Moderate atrophy seen SELLA AND PITUITARY GLAND:  Normal  ORBITS:  Normal  PARANASAL SINUSES:  Mild pleural thickening seen in the ethmoidal air cells and left medially sinus VASCULATURE:  Evaluation of the major intracranial vasculature demonstrates appropriate flow voids  CALVARIUM AND SKULL BASE:  Normal  EXTRACRANIAL SOFT TISSUES:  Normal      Impression: Small 5 mm focal area of diffusion restriction in the right frontal region in the precentral gyrus compatible with small acute lacune Severe periventricular and white matter T2 hyperintensities related to chronic small vessel ischemic changes ##phoslh##phoslh ##cfslh I personally discussed this result with Dr Giorgi Decker on 10/27/2017 8:36 AM  ##  Workstation performed: [id number]   "  VTE Prophylaxis: Heparin    Code Status: Level 1 - Full Code    Counseling / Coordination of Care  Total time spent today 60 minutes  Greater than 50% of total time was spent with the patient and / or family counseling and / or coordination of care  A description of the counseling / coordination of care: floor time,reviewing records, discussing with family, decision making

## 2017-10-27 NOTE — PROGRESS NOTES
Progress Note - Renate Nicole 80 y o  female MRN: [id number]    Unit/Bed#: E4 -01 Encounter: [id number]    Assessment/Plan:    Acute CVA   MRI reveals focal area of diffusion flexion in the right frontal region suggest ends acute small lacunar infarct right frontal, reviewed Neurology consult recommendation warfarin, with possible right frontal embolic CVA, and continue statin    Hypertension   blood pressure currently stable 271 systolic status post stroke    Dyslipidemia   continue statin for LDL control    Alzheimer's dementia  continue Namenda and Aricept    Chronic UTIs   continue cranberry supplement and Cipro    Hypokalemia   provide 40 milliequivalents of potassium p  o  to supplement    Subjective:   Feels good just feels lightheaded no other specifics able to provide but denies chest pain shortness of breath nausea vomiting diarrhea no fevers chills appetite good    Objective:     Vitals: Blood pressure 152/78, pulse 70, temperature 97 9 °F (36 6 °C), temperature source Temporal, resp  rate 18, height 5' 3" (1 6 m), weight 70 3 kg (155 lb), SpO2 98 %  ,Body mass index is 27 46 kg/m²          Results from last 7 days  Lab Units 10/27/17  0535 10/26/17  1029 10/26/17  1019   WBC Thousand/uL  --   --  8 30   HEMOGLOBIN g/dL  --   --  14 5   I STAT HEMOGLOBIN g/dl  --  15 3  --    HEMATOCRIT %  --   --  41 9   PLATELETS Thousands/uL 212  --  275   INR   --   --  1 05       Results from last 7 days  Lab Units 10/27/17  0535  10/26/17  1019   SODIUM mmol/L 136  --  134*   POTASSIUM mmol/L 3 3*  --  3 7   CHLORIDE mmol/L 103  --  97*   CO2 mmol/L 23  --  26   BUN mg/dL 21  --  15   CREATININE mg/dL 0 73  --  1 20   CALCIUM mg/dL 8 8  --  9 5   TOTAL PROTEIN g/dL  --   --  8 2   BILIRUBIN TOTAL mg/dL  --   --  0 76   ALK PHOS U/L  --   --  86   ALT U/L  --   --  19   AST U/L  --   --  16   GLUCOSE RANDOM mg/dL 97  --  160*   GLUCOSE, ISTAT   --   < >  --    < > = values in this interval not displayed  Scheduled Meds:    aspirin 81 mg Oral Daily   atorvastatin 40 mg Oral Daily With Dinner   ciprofloxacin 250 mg Oral Daily   donepezil 10 mg Oral HS   heparin (porcine) 5,000 Units Subcutaneous Q8H [facility]   memantine 10 mg Oral BID       Continuous Infusions:     Physical exam:  General appearance:  Alert no distress interaction appropriate   Head/Eyes:  Nonicteric PERRL EOMI  Neck:  Supple  Lungs: CTA bilateral no wheezing rhonchi or rales  Heart: normal S1 S2 regular  Abdomen: Soft nontender with bowel sounds  Extremities: no edema  Skin: no rash    Invasive Devices          No matching active lines, drains, or airways            VTE Pharmacologic Prophylaxis:  heparin   VTE Mechanical Prophylaxis:  SCDs                     Counseling / Coordination of Care  Total floor / unit time spent today  30   minutes  Greater than 50% of total time was spent with the patient and / or family counseling and / or coordination of care    A description of the counseling / coordination of care:

## 2017-10-27 NOTE — PHYSICAL THERAPY NOTE
PT EVALUATION  Patient Active Problem List   Diagnosis    History of stroke    Alzheimer disease    Dyslipidemia    Facial droop    Asthenia    Carotid artery calcification, bilateral     Past Medical History:   Diagnosis Date    Alzheimer disease     Diverticulosis     Dyslipidemia     History of stroke     UTI (urinary tract infection)      Past Surgical History:   Procedure Laterality Date    CHOLECYSTECTOMY          10/27/17 1041   Note Type   Note type Eval only   Pain Assessment   Pain Assessment No/denies pain   Home Living   Type of Home Apartment  (in-law apt; attached to son's house )   [facility] to live on main level with bedroom/bathroom;Stairs to enter with rails   Home Equipment Other (Comment)  (none)   Prior Function   Level of [place] Independent with ADLs and functional mobility   Lives With [employer] Help From Family   ADL Assistance Independent   Falls in the last 6 months 1 to 4  (2x)   Comments pt home alone during the day but has video monitoring from home   Restrictions/Precautions   Weight Bearing Precautions Per Order No   Other Precautions Fall Risk;Cognitive;Telemetry   General   Additional Pertinent History baseline alzheimer's   Family/Caregiver Present Yes  (daughter)   Cognition   Overall Cognitive Status Impaired   Arousal/Participation Alert   Attention Attends with cues to redirect   Orientation Level Oriented to person   Following Commands Follows one step commands without difficulty   RUE Assessment   RUE Assessment (defer to OT)   LUE Assessment   LUE Assessment (defer to OT)   RLE Assessment   RLE Assessment WFL   Strength RLE   RLE Overall Strength 4+/5   LLE Assessment   LLE Assessment WFL   Strength LLE   LLE Overall Strength 4+/5   Bed Mobility   Additional Comments unable to assess as pt OOB w/ OT upon my arrival; please see OT eval for details   Transfers   Sit to Stand 5  Supervision   Stand to Sit 5  Supervision   Ambulation/Elevation Gait pattern Decreased foot clearance   Gait Assistance 4  Minimal assist   Additional items Assist x 1   Assistive Device None   Distance 80'x2   Balance   Static Sitting Normal   Static Standing Fair +   Ambulatory Fair -   Endurance Deficit   Endurance Deficit Yes   Endurance Deficit Description dizziness   Activity Tolerance   Activity Tolerance Other (Comment)  (limited to dizziness)   Nurse Made Aware yes   Assessment   Prognosis Good   Problem List Impaired balance;Decreased cognition; Impaired judgement;Decreased safety awareness   Assessment Pt  84 y  o female presented after syncopal episode at home  Initially noted w/ R facial droop but has now resolved  MRI showed small 5 mm focal area of diffusion restriction in the right frontal region in the precentral gyrus compatible with small acute lacune  Pt referred to PT for mobility assessment & D/C planning  Pt has baseline Alzheimer's hence all info gathered from daughter, who was present during PT eval  PTA, pt's daughter reports pt being fairly I w/o AD; family monitors pt through video cameras t/o home  On eval, pt demonstrate dec balance, endurance & amb possibly due to dec activity here in hospital  Pt require S for bed mobility (per OT) & transfers however require minAx1 for amb w/o AD for safety as pt reports dizziness during amb  Dec foot clearance during amb but no gross LOB noted  BP as follows: 162/73 sitting/ after initial amb; 148/77 sitting/end of session  Pt's daughter feels that pt is back to her normal baseline function  Will continue skilled PT to improve function & safety  At this time, will anticipate good progress in PT for safe D/C to home  Offered HHPT at D/C but daughter declined  Pt tolerated OOB in chair at end of session w/o issues  Call bell in reach  Nsg & family may ambulate in unit as tolerated to prevent further decline in function  Nsg notified      Barriers to Discharge Decreased caregiver support   Goals   Patient Goals go home   STG Expiration Date 11/03/17   Short Term Goal #1 Goals to be met in 7 days: 1) inc strength & balance by 1/2 grade; 2) inc functional mobility to I; 3) inc amb approx   >80' w/ S; 4) inc barthel score to 80; 5) negotiate stairs w/ S; 6) pt/caregiver ed   Treatment Day 0   Plan   Treatment/Interventions Endurance training;Patient/family training;Gait training;Spoke to nursing;OT;Family   PT Frequency 2-3x/wk   Recommendation   Recommendation Home with family support   PT - OK to Discharge Yes  (when medically cleared)   Barthel Index   Feeding 10   Bathing 0   Grooming Score 5   Dressing Score 5   Bladder Score 5   Bowels Score 10   Toilet Use Score 5   Transfers (Bed/Chair) Score 10   Mobility (Level Surface) Score 10   Stairs Score 0   Barthel Index Score 60   Hx/personal factors: co-morbidities; fall risk; currently functioning below baseline; inaccessible home; dec caregiver; telemetry; baseline dementia  Examination: dec balance, dec endurance, dec amb, high risk for falls, dec cognition  Clinical: unpredictable (ongoing medical status; abnormal lab values; fall risk)  Complexity: high    Ivan Grout, PT

## 2017-10-27 NOTE — PROGRESS NOTES
Pts IV dressing was changed because it was bloody  Pt asked the IV be taken out completely and was told that if it was taken out a new one needed to be put in  Pt was adamant about the removal of the IV and refused a new one  Pt takes all meds PO with exception of PRN hydralazine   RRNP was notified and asked to be notified if BP is above 624'Y systolic

## 2017-10-27 NOTE — SPEECH THERAPY NOTE
Assessment/Plan  Patient is a pleasantly demented 80-year-old female who lives with her family however there is multiple cameras in the home to keep an eye on her  The patient was last seen normal at 5:30 a m  when she received her morning meds  A family member chest in honor and video at 8:30 a m  and she was not seen in her room in her bed was only retirement made  They thought she was in the bathroom  45 minutes later she was not seen again and they went to the house and found her laying on the floor  She was initially difficult to arouse and when they did so she seemed weak on her right side and had a right facial droop  She was brought to the ER by EMS  Upon arrival in the ER the patient's vital signs were stable  Her labs were notable for sodium of 134 random glucose of 160, total CK 32, CBC within normal limits  Troponin negative  CT of the head did not show any acute intracranial however there is chronic lacunar right basal ganglia and chronic infarct in the left frontal region  CT of the C-spine did not show any fracture or malalignment, however there was sclerotic calcifications seen within the right carotid artery and moderate to severe atherosclerotic calcification in the left carotid artery EKG was 70 beats per minute normal sinus rhythm  MRI:  IMPRESSION:  Small 5 mm focal area of diffusion restriction in the right frontal region in the precentral gyrus compatible with small acute lacune  Severe periventricular and white matter T2 hyperintensities related to chronic small vessel ischemic changes    Pt screened  No ST needs identified  15:26  D/c ST

## 2017-10-27 NOTE — PROGRESS NOTES
Patient was reported this am to have no IV access and was refusing to allow nursing to replace the IV  Discussed the issue with Dr Leida Quintana who [address] leaving it out at this time

## 2017-10-27 NOTE — CASE MANAGEMENT
Initial Clinical Review    St  [address] [facility] in the [facility] by Lupillo Zaidi for 2017  Network Utilization Review Department  Phone: [phone]; Fax [phone]  ATTENTION: The Network Utilization Review Department is now centralized for our 7 Facilities  Make a note that we have a new phone and fax numbers for our Department  Please call with any questions or concerns to [phone] and carefully follow the prompts so that you are directed to the right person  All voicemails are confidential  Fax any determinations, approvals, denials, and requests for initial or continue stay review clinical to [phone]  Due to HIGH CALL volume, it would be easier if you could please send faxed requests to expedite your requests and in part, help us provide discharge notifications faster  Admission: Date/Time/Statement: 10/26/17 @ 1144     Orders Placed This Encounter   Procedures    Inpatient Admission (expected length of stay for this patient is greater than two midnights)     Standing Status:   Standing     Number of Occurrences:   1     Order Specific Question:   Admitting Physician     Answer:   Monet Rodas     Order Specific Question:   Level of Care     Answer:   Med Surg [16]     Order Specific Question:   Estimated length of stay     Answer:   More than 2 Midnights     Order Specific Question:   Certification     Answer:   I certify that inpatient services are medically necessary for this patient for a duration of greater than two midnights  See H&P and MD Progress Notes for additional information about the patient's course of treatment         ED: Date/Time/Mode of Arrival:   ED Arrival Information     Expected Arrival Acuity Means of Arrival Escorted By Service Admission Type    - 10/26/2017 10:20 Emergent Ambulance 2020 [facility] Emergency    Arrival Complaint    SYNCOPE          Chief Complaint:   Chief Complaint Patient presents with    CVA/TIA-like Symptoms       History of Illness: 80-year-old female who lives with her family however there is multiple cameras in the home to keep an eye on her  The patient was last seen normal at 5:30 a m  when she received her morning meds  A family member checked in on her and video at 8:30 a m  and she was not seen in her room & her bed was only intermediate made  They thought she was in the bathroom  45 minutes later she was not seen again and they went to the house and found her laying on the floor  She was initially difficult to arouse and when they did so she seemed weak on her right side and had a right facial droop  She was brought to the ER by [facility]    ED Vital Signs:   ED Triage Vitals   Temperature Pulse Respirations Blood Pressure SpO2   10/26/17 1035 10/26/17 1027 10/26/17 1027 10/26/17 1027 10/26/17 1027   (!) 96 6 °F (35 9 °C) 77 (!) 24 135/61 99 %      Temp Source Heart Rate Source Patient Position - Orthostatic VS BP Location FiO2 (%)   10/26/17 1035 10/26/17 1218 10/26/17 1027 10/26/17 1027 --   Temporal Monitor Lying Right arm       Pain Score       10/26/17 1027       8        Wt Readings from Last 1 Encounters:   10/26/17 70 3 kg (155 lb)       Vital Signs:  10/26 0701  10/27 0700 10/27 0701  10/27 0938  Most Recent     Temperature (°F) 96 699 5    97 9 (36 6)    Pulse 6683    70    Respirations 1224    18    Blood Pressure 135/61181/69    152/78    SpO2 (%) 9499    98        Abnormal Labs/Diagnostic Test Results: Sodium 134, Chloride 97, Glucose 160  CXR -- No active pulmonary disease on examination which is somewhat limited secondary to low lung volumes  Ct head -- No acute intracranial hemorrhage seen  Chronic lacune in the right basal ganglionic region and chronic infarct left frontal region No CT signs of acute territorial infarction     CT C-spine -- No acute compression collapse of the vertebra seen Sclerotic calcification is seen within the right carotid artery and moderate to severe atherosclerotic calcification seen within the left carotid artery  Ultrasound of the carotids may be considered to evaluate for carotid stenosis  EKG -- NSR, no ST changes      ED Treatment:   Medication Administration from 10/26/2017 1020 to 10/26/2017 1715       Date/Time Order Dose Route Action Action by Comments                10/26/2017 1112 sodium chloride 0 9 % bolus 1,000 mL 1,000 mL Intravenous New Bag Gerri Roland RN      10/26/2017 1624 ciprofloxacin (CIPRO) tablet 250 mg 250 mg Oral Given Tiana Tim RN      10/26/2017 1624 atorvastatin (LIPITOR) tablet 40 mg 40 mg Oral Given Tiana Tim RN      10/26/2017 1624 heparin (porcine) subcutaneous injection 5,000 Units 5,000 Units Subcutaneous Given Tiana Tim RN      10/26/2017 1624 aspirin chewable tablet 81 mg 81 mg Oral Given Tiana Tim RN           Past Medical/Surgical History:   Past Medical History:   Diagnosis Date    Alzheimer disease     Diverticulosis     Dyslipidemia     History of stroke     UTI (urinary tract infection)        Admitting Diagnosis: Syncope [R55]  Facial droop [R29 810]    Age/Sex: 80 y o  female    Assessment/Plan:   Principal Problem:    Facial droop  Active Problems:    History of stroke    Alzheimer disease    Dyslipidemia    Asthenia    Carotid artery calcification, bilateral        Plan for the Primary Problem(s):  1  Right facial droop-resolving there is still some right facial asymmetry with her smile however forehead is preserved  CT of the head did not show any acute intracranial abnormality or signs of a stroke, there are evidence of chronic infarcts  She also does have bilateral carotid calcification  Will follow the stroke pathway with an MRI get an echocardiogram and a carotid ultrasound  The patient is on an aspirin check a lipid profile and add a statin  Also check a TSH and A1c  Consult Neurology      Plan for Additional Problems:   2  Alzheimer's disease-continue Aricept and Namenda  3  Dysthymia-PT OT consult  4  Chronic urinary tract infections-the patient is on suppressive Cipro and cranberry since August 5   Hypertensive urgency-on my exam the patient's blood pressure was 177 systolic to 074 systolic  Patient had complained of a frontal headache  Will add hydralazine p r n     The patient is not on any antihypertensive pre-hospital   Allow permissive for the 1st 24-48 hours       Anticipated Length of Stay:  Patient will be admitted on an Inpatient basis with an anticipated length of stay of  Greater than 2 midnights             Admission Orders:  M/S/Tele unit  Telem  Consult neurology  Nursing dysphagia assessment prior to po ---> cardiac diet  PT/OT/Speech evals  Neuro checks q 4h  Echo  MRI brain  Incentive spirometry q1h [facility] barb      Scheduled Meds:   aspirin 81 mg Oral Daily   atorvastatin 40 mg Oral Daily With Dinner   ciprofloxacin 250 mg Oral Daily   donepezil 10 mg Oral HS   heparin (porcine) 5,000 Units Subcutaneous Q8H [address]   memantine 10 mg Oral BID     Continuous Infusions:    PRN Meds:   acetaminophen    calcium carbonate    hydrALAZINE    ondansetron

## 2017-10-27 NOTE — CONSULTS
Patient is not meeting needs per interview with daughter  She ate <25% of her dinner last night consisting of a sandwich with chicken noodle soup and saltines  While I was interviewing the patients daughter, I observed her breakfast tray which consisted of prune juice, pancakes, banana bread, and a banana  Daughter stated that patient sometimes drinks Boost at home  I told her daughter that if they decided they would like to add a supplement to the patients diet to help her meet her energy needs, that we can add Ensure  We will continue to monitor the patients intake during her stay in the hospital to make sure she is meeting her energy needs

## 2017-10-27 NOTE — OCCUPATIONAL THERAPY NOTE
OccupationalTherapy Evaluation(time=3955-1040)     Patient Name: Glenroy Durant  Today's Date: 10/27/2017  Problem List  Patient Active Problem List   Diagnosis    History of stroke    Alzheimer disease    Dyslipidemia    Facial droop    Asthenia    Carotid artery calcification, bilateral     Past Medical History  Past Medical History:   Diagnosis Date    Alzheimer disease     Diverticulosis     Dyslipidemia     History of stroke     UTI (urinary tract infection)      Past Surgical History  Past Surgical History:   Procedure Laterality Date    CHOLECYSTECTOMY             10/27/17 1040   Note Type   Note type Eval only  (Simultaneous filing  User may not have seen previous data )   Restrictions/Precautions   Weight Bearing Precautions Per Order No   Other Precautions Fall Risk;Cognitive; Bed Alarm  (Simultaneous filing  User may not have seen previous data )   Pain Assessment   Pain Assessment No/denies pain   Pain Score No Pain   Home Living   Type of Home Apartment  (in-law apt; attached to son's house Simultaneous filing  User may not have seen previous data )   Home Layout Two level; Able to live on main level with bedroom/bathroom  (Simultaneous filing  User may not have seen previous data )   Home Equipment (denies)   Prior Function   Level of [place] Independent with ADLs and functional mobility   Lives With Alone  (Simultaneous filing   User may not have seen previous data )   Receives Help From Family   ADL Assistance Independent   Falls in the last 6 months 1 to 4  (2x)   Comments pt home alone during the day but has video monitoring from home   Lifestyle   Aut[employer] PTA family(i e dtr) states pt was indepenedent with her ADLs, transfers, ambulation--w/o device; supervision with showers; +falls=2   Reciprocal Relationships 3 children   Service to Others worked as a    Intrinsic Gratification spending time with family   Psychosocial   Psychosocial (WDL) WDL   Subjective Subjective "I don't know why I am here "   ADL   Where Assessed Edge of bed   Eating Assistance 5  Supervision/Setup   Grooming Assistance 5  Supervision/Setup   UB Bathing Assistance 5  Supervision/Setup   LB Bathing Assistance 5  Supervision/Setup   UB Dressing Assistance 5  Supervision/Setup   LB Dressing Assistance 5  Supervision/Setup   Bed Mobility   Rolling R 5  Supervision   Rolling L 5  Supervision   Supine to Sit 5  Supervision   Transfers   Sit to Stand 5  Supervision   Stand to Sit 5  Supervision   Functional Mobility   Functional Mobility 5  Supervision   Additional items (w/o device)   Balance   Static Sitting Normal   Dynamic Sitting Good   Static Standing Fair +   Dynamic Standing Fair   Activity Tolerance   Activity Tolerance Patient limited by fatigue   Medical Staff Made Aware nsg, P T     RUE Assessment   RUE Assessment WFL  (Simultaneous filing  User may not have seen previous data )   RUE Strength   RUE Overall Strength Within Functional Limits - able to perform ADL tasks with strength   LUE Assessment   LUE Assessment WFL  (Simultaneous filing   User may not have seen previous data )   LUE Strength   LUE Overall Strength Within Functional Limits - able to perform ADL tasks with strength   Hand Function   Gross Motor Coordination Functional   Fine Motor Coordination Functional   Sensation   Light Touch No apparent deficits   Proprioception   Proprioception No apparent deficits   Vision-Basic Assessment   Current Vision (glasses)   Vision - Complex Assessment   Acuity Able to read clock/calendar on wall without difficulty   Perception   Inattention/Neglect Appears intact   Cognition   Overall Cognitive Status Impaired   Arousal/Participation Alert   Attention Attends with cues to redirect   Orientation Level Oriented to person   Memory Decreased short term memory;Decreased recall of recent events;Decreased recall of precautions   Following Commands Follows one step commands without difficulty Comments hx Alzheimer's   Assessment   Limitation Decreased ADL status; Decreased cognition;Decreased endurance;Decreased high-level ADLs   Prognosis Fair   Assessment Pt is a 81y/o female admitted to the hospital after being found on the floor in her apt  Pt noted with R sided weakness and facial droop  Pt + acute lacune infarct R frontal region  PTA family(i e dtr) states pt was indepenedent with her ADLs, transfers, ambulation--w/o device; supervision with showers; +falls=2  During initial eval, pt demonstrated deficits with her functional balance, functional mobility, ADL status, activity tolerance(currently fair=15-20mins), and cognition(i e memory, orientation, problem-solving, judgement/safety)  Per family(i e dtr) pt is currently at her functional and physical baseline and states no concerns about her going back to her prior environment  Curently, pt would best benefit from an environment that provides 24hr supervision 2* cognitive/home-safety deficits  Acute OT tx not indicated at this time 2* pt's limited deviation from her functional baseline      Goals   Patient Goals "to go home "   Plan   OT Frequency Eval only   Recommendation   OT Discharge Recommendation 24 hour supervision/assist   Barthel Index   Feeding 10   Bathing 0   Grooming Score 0   Dressing Score 5   Bladder Score 5   Bowels Score 5   Toilet Use Score 5   Transfers (Bed/Chair) Score 10   Mobility (Level Surface) Score 10   Stairs Score 0   Barthel Index Score 50   Glenna Quintana, OT

## 2017-10-27 NOTE — PLAN OF CARE
Problem: PHYSICAL THERAPY ADULT  Goal: Performs mobility at highest level of function for planned discharge setting  See evaluation for individualized goals  Treatment/Interventions: Endurance training, Patient/family training, Gait training, Spoke to nursing, OT, Family          See flowsheet documentation for full assessment, interventions and recommendations  Outcome: Progressing  Prognosis: Good  Problem List: Impaired balance, Decreased cognition, Impaired judgement, Decreased safety awareness  Assessment: Pt  80 y  o female presented after syncopal episode at home  Initially noted w/ R facial droop but has now resolved  MRI showed small 5 mm focal area of diffusion restriction in the right frontal region in the precentral gyrus compatible with small acute lacune  Pt referred to PT for mobility assessment & D/C planning  Pt has baseline Alzheimer's hence all info gathered from daughter, who was present during PT eval  PTA, pt's daughter reports pt being fairly I w/o AD; family monitors pt through video cameras t/o home  On eval, pt demonstrate dec balance, endurance & amb possibly due to dec activity here in hospital  Pt require S for bed mobility (per OT) & transfers however require minAx1 for amb w/o AD for safety as pt reports dizziness during amb  Dec foot clearance during amb but no gross LOB noted  BP as follows: 162/73 sitting/ after initial amb; 148/77 sitting/end of session  Pt's daughter feels that pt is back to her normal baseline function  Will continue skilled PT to improve function & safety  At this time, will anticipate good progress in PT for safe D/C to home  Offered HHPT at D/C but daughter declined  Pt tolerated OOB in chair at end of session w/o issues  Call bell in reach  Nsg & family may ambulate in unit as tolerated to prevent further decline in function  Nsg notified     Barriers to Discharge: Decreased caregiver support     Recommendation: Home with family support     PT - OK to Discharge: Yes (when medically cleared)    See flowsheet documentation for full assessment

## 2017-10-28 LAB
INR PPP: 1.07 (ref 0.86–1.16)
PROTHROMBIN TIME: 13.9 SECONDS (ref 12.1–14.4)

## 2017-10-28 PROCEDURE — 85610 PROTHROMBIN TIME: CPT | Performed by: INTERNAL MEDICINE

## 2017-10-28 RX ADMIN — HEPARIN SODIUM 5000 UNITS: 5000 INJECTION, SOLUTION INTRAVENOUS; SUBCUTANEOUS at 13:25

## 2017-10-28 RX ADMIN — MEMANTINE 10 MG: 5 TABLET ORAL at 08:36

## 2017-10-28 RX ADMIN — ASPIRIN 81 MG 81 MG: 81 TABLET ORAL at 08:35

## 2017-10-28 RX ADMIN — ATORVASTATIN CALCIUM 40 MG: 40 TABLET, FILM COATED ORAL at 17:12

## 2017-10-28 RX ADMIN — CIPROFLOXACIN HYDROCHLORIDE 250 MG: 250 TABLET, FILM COATED ORAL at 08:36

## 2017-10-28 RX ADMIN — MEMANTINE 10 MG: 5 TABLET ORAL at 17:12

## 2017-10-28 RX ADMIN — WARFARIN SODIUM 5 MG: 5 TABLET ORAL at 17:12

## 2017-10-28 RX ADMIN — HEPARIN SODIUM 5000 UNITS: 5000 INJECTION, SOLUTION INTRAVENOUS; SUBCUTANEOUS at 05:03

## 2017-10-28 NOTE — PROGRESS NOTES
Progress Note - Gudelia Rousseau 80 y o  female MRN: [id number]    Unit/Bed#: E4 -01 Encounter: [id number]    Assessment/Plan:    Acute CVA   possible embolic per Neurology recommendations started warfarin  with history AFib, continue blood pressure control and statin  Continue working with PT OT reviewed echo and carotid artery duplex no significant stenosis     Hypertension   maintain systolics less than 022 status post acute CVA    Dyslipidemia   continue statin for LDL control    Dementia   continue Namenda and Aricept with family support    Chronic UTI   continue Cipro and   cranberry supplements    Hyponatremia   Mild resolved with IVF    Subjective:   Feels good offers no complaints denies chest pain shortness of breath nausea vomiting diarrhea no fevers chills appetite is okay    Objective:     Vitals: Blood pressure 152/78, pulse 66, temperature (!) 97 2 °F (36 2 °C), temperature source Tympanic, resp  rate 18, height 5' 3" (1 6 m), weight 70 3 kg (155 lb), SpO2 97 %  ,Body mass index is 27 46 kg/m²  Results from last 7 days  Lab Units 10/28/17  0530 10/27/17  0535 10/26/17  1029 10/26/17  1019   WBC Thousand/uL  --   --   --  8 30   HEMOGLOBIN g/dL  --   --   --  14 5   I STAT HEMOGLOBIN g/dl  --   --  15 3  --    HEMATOCRIT %  --   --   --  41 9   PLATELETS Thousands/uL  --  212  --  275   INR  1 07  --   --  1 05       Results from last 7 days  Lab Units 10/27/17  0535  10/26/17  1019   SODIUM mmol/L 136  --  134*   POTASSIUM mmol/L 3 3*  --  3 7   CHLORIDE mmol/L 103  --  97*   CO2 mmol/L 23  --  26   BUN mg/dL 21  --  15   CREATININE mg/dL 0 73  --  1 20   CALCIUM mg/dL 8 8  --  9 5   TOTAL PROTEIN g/dL  --   --  8 2   BILIRUBIN TOTAL mg/dL  --   --  0 76   ALK PHOS U/L  --   --  86   ALT U/L  --   --  19   AST U/L  --   --  16   GLUCOSE RANDOM mg/dL 97  --  160*   GLUCOSE, ISTAT   --   < >  --    < > = values in this interval not displayed      Scheduled Meds:    aspirin 81 mg Oral Daily atorvastatin 40 mg Oral Daily With Dinner   ciprofloxacin 250 mg Oral Daily   donepezil 10 mg Oral HS   heparin (porcine) 5,000 Units Subcutaneous Q8H [facility]   memantine 10 mg Oral BID   warfarin 5 mg Oral Daily (warfarin)       Continuous Infusions:     Physical exam:  General appearance:  Alert no distress interaction appropriate   Head/Eyes:  Nonicteric PERRL EOMI  Neck:  Supple  Lungs: CTA bilateral no wheezing rhonchi or rales  Heart: normal S1 S2 regular  Abdomen: Soft nontender with bowel sounds  Extremities: no edema  Skin: no rash    Invasive Devices          No matching active lines, drains, or airways            VTE Pharmacologic Prophylaxis:  warfarin   VTE Mechanical Prophylaxis:  warfarin                     Counseling / Coordination of Care  Total floor / unit time spent today  30   minutes  Greater than 50% of total time was spent with the patient and / or family counseling and / or coordination of care    A description of the counseling / coordination of care:  Discussed with daughter

## 2017-10-28 NOTE — PROGRESS NOTES
Patient removed her telemetry monitor and refused to allow nursing to replace it  Patient was educated that because of her history of Afib and recent CVA she should be on the monitor  Discussed this with the RRNP who explained that because of her noncompliance, she should be monitored closely  Pts room is near nurses station and will continue to monitor

## 2017-10-28 NOTE — PLAN OF CARE
DISCHARGE PLANNING     Discharge to home or other facility with appropriate resources Progressing        INFECTION - ADULT     Absence or prevention of progression during hospitalization Progressing     Absence of fever/infection during neutropenic period Progressing        Knowledge Deficit     Patient/family/caregiver demonstrates understanding of disease process, treatment plan, medications, and discharge instructions Progressing        Neurological Deficit     Neurological status is stable or improving Progressing        Nutrition/Hydration-ADULT     Nutrient/Hydration intake appropriate for improving, restoring or maintaining nutritional needs Progressing        PAIN - ADULT     Verbalizes/displays adequate comfort level or baseline comfort level Progressing        Potential for Falls     Patient will remain free of falls Progressing        SAFETY ADULT     Maintain or return to baseline ADL function Progressing     Maintain or return mobility status to optimal level Progressing     Patient will remain free of falls Progressing

## 2017-10-29 PROBLEM — I48.0 PAROXYSMAL ATRIAL FIBRILLATION (HCC): Status: ACTIVE | Noted: 2017-10-29

## 2017-10-29 PROBLEM — I63.81 ACUTE LACUNAR STROKE (HCC): Status: ACTIVE | Noted: 2017-10-26

## 2017-10-29 LAB
ANION GAP SERPL CALCULATED.3IONS-SCNC: 10 MMOL/L (ref 4–13)
BUN SERPL-MCNC: 12 MG/DL (ref 5–25)
CALCIUM SERPL-MCNC: 8.9 MG/DL (ref 8.3–10.1)
CHLORIDE SERPL-SCNC: 103 MMOL/L (ref 100–108)
CO2 SERPL-SCNC: 23 MMOL/L (ref 21–32)
CREAT SERPL-MCNC: 0.67 MG/DL (ref 0.6–1.3)
GFR SERPL CREATININE-BSD FRML MDRD: 81 ML/MIN/1.73SQ M
GLUCOSE SERPL-MCNC: 104 MG/DL (ref 65–140)
INR PPP: 1.48 (ref 0.86–1.16)
POTASSIUM SERPL-SCNC: 4.4 MMOL/L (ref 3.5–5.3)
PROTHROMBIN TIME: 18 SECONDS (ref 12.1–14.4)
SODIUM SERPL-SCNC: 136 MMOL/L (ref 136–145)

## 2017-10-29 PROCEDURE — 85610 PROTHROMBIN TIME: CPT | Performed by: INTERNAL MEDICINE

## 2017-10-29 PROCEDURE — 80048 BASIC METABOLIC PNL TOTAL CA: CPT | Performed by: INTERNAL MEDICINE

## 2017-10-29 RX ADMIN — METOPROLOL TARTRATE 12.5 MG: 25 TABLET ORAL at 18:03

## 2017-10-29 RX ADMIN — ATORVASTATIN CALCIUM 40 MG: 40 TABLET, FILM COATED ORAL at 17:19

## 2017-10-29 RX ADMIN — HEPARIN SODIUM 5000 UNITS: 5000 INJECTION, SOLUTION INTRAVENOUS; SUBCUTANEOUS at 05:45

## 2017-10-29 RX ADMIN — MEMANTINE 10 MG: 5 TABLET ORAL at 17:20

## 2017-10-29 RX ADMIN — CIPROFLOXACIN HYDROCHLORIDE 250 MG: 250 TABLET, FILM COATED ORAL at 08:58

## 2017-10-29 RX ADMIN — DONEPEZIL HYDROCHLORIDE 10 MG: 5 TABLET, FILM COATED ORAL at 22:30

## 2017-10-29 RX ADMIN — WARFARIN SODIUM 5 MG: 5 TABLET ORAL at 17:20

## 2017-10-29 RX ADMIN — MEMANTINE 10 MG: 5 TABLET ORAL at 08:58

## 2017-10-29 RX ADMIN — ASPIRIN 81 MG 81 MG: 81 TABLET ORAL at 08:57

## 2017-10-29 NOTE — PROGRESS NOTES
[address] [facility] Progress Note  Patient: Rita Pickett 80 y o  female   MRN: [id number]  PCP: Cristal Bynum DO  Unit/Bed#: E4 -01 Encounter: [id number]  Date Of Visit: 10/29/17    Assessment  Principal Problem:    Acute lacunar stroke [facility])  Active Problems:    Alzheimer disease    Dyslipidemia    Asthenia    Carotid artery calcification, bilateral    Paroxysmal atrial fibrillation ([address] )  Resolved Problems:    * No resolved hospital problems  *        Plan  1  Acute right lacunar stroke  Thought to be cardioembolic per Neurology given history of paroxysmal atrial fibrillation  Symptoms have resolved  Previously on warfarin which was held due to ?gastrointestinal bleeding  Continue to monitor hemoglobin while warfarin has been restarted in-house  Reviewed physical therapy notes, no needs at time of discharge  2  Alzheimer's dementia  Stable on donepezil and memantine  3  Paroxysmal atrial fibrillation  Warfarin restarted  4  Chronic urinary tract infections  Ciprofloxacin  5  Dyslipidemia  Statin      VTE Prophylaxis:  Will discontinue heparin as anticipate warfarin to be therapeutic next 24 dash 48 hours  Education and Discussions:  Discussed with patient's daughter  Discharge Plan:  Hopefully next 24 hours  Time Spent for Care:  30 Mins  More than 50% of total time spent on counseling and coordination of care as described above    ______________________________________________________________________________    Subjective:   Patient seen and examined  No new complaints  Daughter at bedside  Objective:   Vitals: Blood pressure 150/66, pulse 64, temperature 98 3 °F (36 8 °C), temperature source Temporal, resp  rate 17, height 5' 3" (1 6 m), weight 70 3 kg (155 lb), SpO2 97 %      Physical Exam:   General appearance: alert, appears stated age and cooperative  Head: Normocephalic, without obvious abnormality, atraumatic  Lungs: clear to auscultation bilaterally  Heart: regular rate and rhythm  Abdomen: soft, non-tender; bowel sounds normal; no masses,  no organomegaly  Back: negative, range of motion normal  Extremities: extremities normal, atraumatic, no cyanosis or edema  Neurologic: Grossly normal    Additional Data:   Labs:    Results from last 7 days  Lab Units 10/29/17  0621 10/28/17  0530 10/27/17  0535 10/26/17  1029 10/26/17  1019   WBC Thousand/uL  --   --   --   --  8 30   HEMOGLOBIN g/dL  --   --   --   --  14 5   I STAT HEMOGLOBIN g/dl  --   --   --  15 3  --    HEMATOCRIT %  --   --   --   --  41 9   MCV fL  --   --   --   --  87   PLATELETS Thousands/uL  --   --  212  --  275   INR  1 48* 1 07  --   --  1 05       Results from last 7 days  Lab Units 10/29/17  0621 10/27/17  0535 10/26/17  1029 10/26/17  1019   SODIUM mmol/L 136 136  --  134*   POTASSIUM mmol/L 4 4 3 3*  --  3 7   CHLORIDE mmol/L 103 103  --  97*   CO2 mmol/L 23 23  --  26   ANION GAP mmol/L 10 10  --  11   BUN mg/dL 12 21  --  15   CREATININE mg/dL 0 67 0 73  --  1 20   CALCIUM mg/dL 8 9 8 8  --  9 5   ALBUMIN g/dL  --   --   --  4 2   BILIRUBIN TOTAL mg/dL  --   --   --  0 76   ALK PHOS U/L  --   --   --  86   ALT U/L  --   --   --  19   AST U/L  --   --   --  16   EGFR ml/min/1 73sq m 81 76 46 42   GLUCOSE RANDOM mg/dL 104 97  --  160*   GLUCOSE, ISTAT mg/dl  --   --  159*  --        Results from last 7 days  Lab Units 10/26/17  1019   MAGNESIUM mg/dL 2 0       Results from last 7 days  Lab Units 10/26/17  1019   CK TOTAL U/L 32                    Results from last 7 days  Lab Units 10/27/17  0535   HEMOGLOBIN A1C % 5 5       Results from last 7 days  Lab Units 10/27/17  0535   TSH 3RD GENERATON uIU/mL 1 446     * I Have Reviewed All Lab Data Listed Above      Cultures:           Imaging:  Imaging Reports Reviewed Today Include:       Mri Brain Wo Contrast  Result Date: 10/27/2017  Impression: Small 5 mm focal area of diffusion restriction in the right frontal region in the precentral gyrus compatible with small acute lacune Severe periventricular and white matter T2 hyperintensities related to chronic small vessel ischemic changes ##phoslh##phoslh ##cfslh I personally discussed this result with Dr Mike Bella on 10/27/2017 8:36 AM  ##  Workstation performed: [id number]     Vas Carotid Complete Study  Result Date: 10/27/2017  RIGHT: There is <50% stenosis noted in the internal carotid artery  Plaque is heterogenous  Vertebral artery flow is antegrade  There is no significant subclavian artery disease  LEFT: There is <50% stenosis noted in the internal carotid artery  Plaque is heterogenous  Vertebral artery flow is antegrade  There is no significant subclavian artery disease  In comparison to the study of 11/13/2012, there is no significant interval change in the disease process bilaterally  Internal carotid artery stenosis determination by consensus criteria from: oziel Abbott al  Carotid Artery Stenosis: Gray-Scale and Doppler US Diagnosis - Society of Radiologists in [address] [place] [address], Radiology 2003; [phone]      SIGNATURE: Electronically Signed by: Jacklyn Perez MD on 2017-10-27 09:29:07 PM      Scheduled Meds:  aspirin 81 mg Oral Daily   atorvastatin 40 mg Oral Daily With Dinner   ciprofloxacin 250 mg Oral Daily   donepezil 10 mg Oral HS   heparin (porcine) 5,000 Units Subcutaneous Q8H [address]   memantine 10 mg Oral BID   warfarin 5 mg Oral Daily (warfarin)     Continuous Infusions:   PRN Meds:    acetaminophen    calcium carbonate     Pat Aschoff, DO

## 2017-10-30 VITALS
HEART RATE: 65 BPM | TEMPERATURE: 97.7 F | WEIGHT: 155 LBS | RESPIRATION RATE: 18 BRPM | SYSTOLIC BLOOD PRESSURE: 156 MMHG | HEIGHT: 63 IN | OXYGEN SATURATION: 95 % | DIASTOLIC BLOOD PRESSURE: 65 MMHG | BODY MASS INDEX: 27.46 KG/M2

## 2017-10-30 LAB
ANION GAP SERPL CALCULATED.3IONS-SCNC: 7 MMOL/L (ref 4–13)
BUN SERPL-MCNC: 10 MG/DL (ref 5–25)
CALCIUM SERPL-MCNC: 8.6 MG/DL (ref 8.3–10.1)
CHLORIDE SERPL-SCNC: 103 MMOL/L (ref 100–108)
CO2 SERPL-SCNC: 26 MMOL/L (ref 21–32)
CREAT SERPL-MCNC: 0.75 MG/DL (ref 0.6–1.3)
ERYTHROCYTE [DISTWIDTH] IN BLOOD BY AUTOMATED COUNT: 13.2 % (ref 11.6–15.1)
GFR SERPL CREATININE-BSD FRML MDRD: 73 ML/MIN/1.73SQ M
GLUCOSE SERPL-MCNC: 100 MG/DL (ref 65–140)
HCT VFR BLD AUTO: 35.9 % (ref 34.8–46.1)
HGB BLD-MCNC: 12.3 G/DL (ref 11.5–15.4)
INR PPP: 2.32 (ref 0.86–1.16)
MCH RBC QN AUTO: 29.9 PG (ref 26.8–34.3)
MCHC RBC AUTO-ENTMCNC: 34.3 G/DL (ref 31.4–37.4)
MCV RBC AUTO: 87 FL (ref 82–98)
PLATELET # BLD AUTO: 223 THOUSANDS/UL (ref 149–390)
PMV BLD AUTO: 10.9 FL (ref 8.9–12.7)
POTASSIUM SERPL-SCNC: 3.8 MMOL/L (ref 3.5–5.3)
PROTHROMBIN TIME: 25.7 SECONDS (ref 12.1–14.4)
RBC # BLD AUTO: 4.11 MILLION/UL (ref 3.81–5.12)
SODIUM SERPL-SCNC: 136 MMOL/L (ref 136–145)
WBC # BLD AUTO: 6.05 THOUSAND/UL (ref 4.31–10.16)

## 2017-10-30 PROCEDURE — 85027 COMPLETE CBC AUTOMATED: CPT | Performed by: INTERNAL MEDICINE

## 2017-10-30 PROCEDURE — 80048 BASIC METABOLIC PNL TOTAL CA: CPT | Performed by: INTERNAL MEDICINE

## 2017-10-30 PROCEDURE — 85610 PROTHROMBIN TIME: CPT | Performed by: INTERNAL MEDICINE

## 2017-10-30 RX ORDER — WARFARIN SODIUM 3 MG/1
3 TABLET ORAL
Qty: 30 TABLET | Refills: 1 | Status: SHIPPED | OUTPATIENT
Start: 2017-10-30 | End: 2019-07-05 | Stop reason: ALTCHOICE

## 2017-10-30 RX ADMIN — ASPIRIN 81 MG 81 MG: 81 TABLET ORAL at 08:49

## 2017-10-30 RX ADMIN — CIPROFLOXACIN HYDROCHLORIDE 250 MG: 250 TABLET, FILM COATED ORAL at 08:50

## 2017-10-30 RX ADMIN — METOPROLOL TARTRATE 12.5 MG: 25 TABLET ORAL at 08:49

## 2017-10-30 RX ADMIN — MEMANTINE 10 MG: 5 TABLET ORAL at 08:50

## 2017-10-30 NOTE — DISCHARGE SUMMARY
Discharge Summary - [address] [facility]    Patient Information: Heidi Johnson 80 y o  female MRN: [id number]  Unit/Bed#: E4 -01 Encounter: [id number]    Discharging Physician / Practitioner: Darrin Vigil MD  PCP: Bruna Canseco DO  Admission Date: 10/26/2017  Discharge Date: 10/30/17    Reason for Admission:  Found down     Discharge Diagnoses:     Principal Problem:    Acute lacunar stroke [facility])  Active Problems:    Alzheimer disease    Dyslipidemia    Asthenia    Carotid artery calcification, bilateral    Paroxysmal atrial fibrillation ([address] )  Resolved Problems:    * No resolved hospital problems  *      Consultations During Hospital Stay:  · Neurology Dr Sima Santiago     Procedures Performed:      · None    Significant Findings:     · MRI brain-5 mm acute right frontal lacunar stroke    Incidental Findings:   · Chronic lacune in the right basal ganglia and chronic infarct left frontal region    Test Results Pending at Discharge (will require follow up): · None     Outpatient Tests Requested:  · INR in 2 days     Complications:  none    Hospital Course:     Heidi Johnson is a 80 y o  female patient who originally presented to the hospital on 10/26/2017 due to being found down at home for at least an hour and a half  She has underlying dementia and is constantly monitored by her family with in-house cameras  The patient was last seen at 5:30 a m  and was not seen until after 9:00 a m  on the day of admission  Review of the in house camera showed that she walked to her bathroom at around 8:30 a m  and did not come back  When family checked up on her she was noted to have right-sided weakness and right facial droop  The patient was admitted for suspected stroke and underwent a stroke pathway  Initial CT of her brain showed no acute infarct and no intracranial hemorrhage  CT of the spine showed no fracture    Confirmatory MRI showed small acute lacunar stroke in the right frontal region and chronic lacune in the basal ganglia and left frontal region  She has underlying paroxysmal atrial fibrillation and her acute stroke was felt to be embolic in origin due to this  She was started on warfarin  Her INR was 2 3 on discharge and she will be discharged on 3 mg daily  I spoke with Dr Lucas Trores and discussed her diagnosis and plan of care  She will have a repeat INR on 11/01/2017  She also was started on low-dose metoprolol for blood pressure control  She was seen by Physical therapy who recommended home with family support  I spoke with her daughter and discussed her condition and plan of care       Condition at Discharge: good     Discharge Day Visit / Exam:     Subjective:  I feel fine  I do not know why I am still here  Edwina Gerardo to go home  Vitals: Blood Pressure: 156/65 (10/30/17 0753)  Pulse: 65 (10/30/17 0753)  Temperature: 97 7 °F (36 5 °C) (10/30/17 0753)  Temp Source: Temporal (10/30/17 0753)  Respirations: 18 (10/30/17 0753)  Height: 5' 3" (160 cm) (10/26/17 1500)  Weight - Scale: 70 3 kg (155 lb) (10/26/17 1500)  SpO2: 95 % (10/30/17 0753)  Exam:   Physical Exam   Constitutional: She appears well-developed  No distress  HENT:   Head: Normocephalic  Mouth/Throat: No oropharyngeal exudate  Eyes: No scleral icterus  Neck: No JVD present  Cardiovascular:   Irregular heart rate   Pulmonary/Chest: Effort normal and breath sounds normal  No stridor  No respiratory distress  She has no wheezes  Abdominal: Soft  She exhibits no distension  There is no tenderness  Musculoskeletal: She exhibits no edema  Neurological: She is alert  Skin: Skin is warm and dry  She is not diaphoretic  Psychiatric: She has a normal mood and affect  Discharge instructions/Information to patient and family:   See after visit summary for information provided to patient and family        Provisions for Follow-Up Care:  See after visit summary for information related to follow-up care and any pertinent home health orders  Disposition:     Home    For Discharges to [facility] SNF:   · Not Applicable to this Patient - Not Applicable to this Patient    Planned Readmission:  None     Discharge Statement:  I spent >30 minutes discharging the patient  This time was spent on the day of discharge  I had direct contact with the patient on the day of discharge  Greater than 50% of the total time was spent examining patient, answering all patient questions, arranging and discussing plan of care with patient as well as directly providing post-discharge instructions  Additional time then spent on discharge activities  Discharge Medications:  See after visit summary for reconciled discharge medications provided to patient and family  ** Please Note: Dragon 360 Dictation voice to text software may have been used in the creation of this document   **

## 2017-10-30 NOTE — SOCIAL WORK
LOS 4 days, pt is not a bundle or readmission  Pt is retired and  lives in [place] [address] alone in apartment with 2 steps to enter  Pt's son and family live next door  Pt is alert independent with ADLs and amb with no devices  Pt no longer drives a car  Pt was not open with [facility] prior to this admission  Pt stated her family get her meds and she did not know what pharmacy they use  Pt has no MH or D& A hx  Pt does not have a POA/AD and did not want information  Pt stated her family will transport her home when discharge   is dtr Paulette Kinsey at [phone]  PT stated patient refused home PT   PT is now recommending home with family support  TC to dtr Keri at [phone] to inform her about PT recommendation and she was not home  CM Will f/u with dtr about PT recommendation and what pharmacy pt uses

## 2017-10-30 NOTE — PLAN OF CARE
Problem: DISCHARGE PLANNING - CARE MANAGEMENT  Goal: Discharge to post-acute care or home with appropriate resources  INTERVENTIONS:  - Conduct assessment to determine patient/family and health care team treatment goals, and need for post-acute services based on payer coverage, community resources, and patient preferences, and barriers to discharge  - Address psychosocial, clinical, and financial barriers to discharge as identified in assessment in conjunction with the patient/family and health care team  - Arrange appropriate level of post-acute services according to patients   needs and preference and payer coverage in collaboration with the physician and health care team  - Communicate with and update the patient/family, physician, and health care team regarding progress on the discharge plan  - Arrange appropriate transportation to post-acute venues  Outcome: Progressing  LOS 4 days, pt is not a bundle or readmission  Pt is retired and  lives in [place] alone in apartment with 2 steps to enter  Pt's son and family live next door  Pt is alert independent with ADLs and amb with no devices  Pt no longer drives a car  Pt was not open with [facility] prior to this admission  Pt stated her family get her meds and she did not know what pharmacy they use  Pt has no MH or D& A hx  Pt does not have a POA/AD and did not want information  Pt stated her family will transport her home when discharge   is dtr Adore Briceño at [phone]  PT stated patient refused home PT   PT is now recommending home with family support  TC to dtr Keri at [phone] to inform her about PT recommendation and she was not home  CM Will f/u with dtr about PT recommendation and what pharmacy pt uses

## 2017-10-30 NOTE — DISCHARGE INSTRUCTIONS
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 with a small stroke in the right frontal brain   Take warfarin 3mg daily   Repeat INR in 3 days  Return to the ER if you develop any bleeding or black stools

## 2017-10-30 NOTE — PROGRESS NOTES
Neurology Progress Note    Following patient for: stroke     Overnight Events: None    Subjective: Patient feels  About the same today, doesn't know why she is in the hospital      Scheduled Meds:  aspirin 81 mg Oral Daily   atorvastatin 40 mg Oral Daily With Dinner   ciprofloxacin 250 mg Oral Daily   donepezil 10 mg Oral HS   memantine 10 mg Oral BID   metoprolol tartrate 12 5 mg Oral Q12H [facility]   warfarin 5 mg Oral Daily (warfarin)     Continuous Infusions:   PRN Meds:   acetaminophen    calcium carbonate      Objective  /65   Pulse 65   Temp 97 7 °F (36 5 °C) (Temporal)   Resp 18   Ht 5' 3" (1 6 m)   Wt 70 3 kg (155 lb)   SpO2 95%   BMI 27 46 kg/m²   GEN: Comfortable, NAD  HEENT: NC/AT  Anicteric  PPC  MMM   CVS: RRR  S1S2  No M/R/G    LUNGS: B/L entry, no wheezes, rhonchi or rales  EXT: B/L pulses, no edema  Mental Status: The patient was awake, alert, attentive, oriented to person but baseline demented and cannot tell me the month, year or why she is in the hospital  Simple one step commands OK  Cranial Nerves:   I: smell Not tested   II: Pupils equal, round, reactive to light with normal accomodation  III,IV,VI: extraocular muscles EOMI, no nystagmus   V: masseter and pterygoid strength  Sensation in the V1 through V3 distributions intact to pinprick and light touch bilaterally  VII: Face is symmetric with no weakness noted  VIII: Audition intact to finger rub bilaterally  IX/X: Uvula midline  Soft palate elevation symmetric  XI: Trapezius and SCM strength 5/5 B/L  XII: Tongue midline with no atrophy or fasciculations with appropriate movement  Motor Examination:   Full strength throughout  Reflexes:   1+ throughout  Toes down b/l  Coordination: No appendicular dysmetria noted with finger to nose testing and rapid foot tapping  Sensory: Normal sensation to light touch, pin prick and vibratory sensation throughout           Recent Results (from the past 24 hour(s))   CBC    Collection Time: 10/30/17  5:06 AM   Result Value Ref Range    WBC 6 05 4 31 - 10 16 Thousand/uL    RBC 4 11 3 81 - 5 12 Million/uL    Hemoglobin 12 3 11 5 - 15 4 g/dL    Hematocrit 35 9 34 8 - 46 1 %    MCV 87 82 - 98 fL    MCH 29 9 26 8 - 34 3 pg    MCHC 34 3 31 4 - 37 4 g/dL    RDW 13 2 11 6 - 15 1 %    Platelets 313 173 - 136 Thousands/uL    MPV 10 9 8 9 - 12 7 fL   Basic metabolic panel    Collection Time: 10/30/17  5:06 AM   Result Value Ref Range    Sodium 136 136 - 145 mmol/L    Potassium 3 8 3 5 - 5 3 mmol/L    Chloride 103 100 - 108 mmol/L    CO2 26 21 - 32 mmol/L    Anion Gap 7 4 - 13 mmol/L    BUN 10 5 - 25 mg/dL    Creatinine 0 75 0 60 - 1 30 mg/dL    Glucose 100 65 - 140 mg/dL    Calcium 8 6 8 3 - 10 1 mg/dL    eGFR 73 ml/min/1 73sq m   Protime-INR    Collection Time: 10/30/17  5:06 AM   Result Value Ref Range    Protime 25 7 (H) 12 1 - 14 4 seconds    INR 2 32 (H) 0 86 - 1 16         A/P  80 y o  demented female with cardioembolic stroke in the setting of A fib off anticoagulation  Already placed on coumadin and if stable on this may continue on coumadin  Stop aspirin given INR is in range today  Plan OK for now, if GI risk remains significant concern would transition to Eliquis given lower GI bleed risk  Outpatient f/u with vascular neurology  Please call with questions

## 2017-10-30 NOTE — PLAN OF CARE
Problem: DISCHARGE PLANNING - CARE MANAGEMENT  Goal: Discharge to post-acute care or home with appropriate resources  INTERVENTIONS:  - Conduct assessment to determine patient/family and health care team treatment goals, and need for post-acute services based on payer coverage, community resources, and patient preferences, and barriers to discharge  - Address psychosocial, clinical, and financial barriers to discharge as identified in assessment in conjunction with the patient/family and health care team  - Arrange appropriate level of post-acute services according to patients   needs and preference and payer coverage in collaboration with the physician and health care team  - Communicate with and update the patient/family, physician, and health care team regarding progress on the discharge plan  - Arrange appropriate transportation to post-acute venues   Outcome: Adequate for Discharge  Met with dtr Noel Ramachandran and informed her PT is recommending home with PT   Noel Ramachandran stated pt lives in apartment attached to son's house  Pt has family support daily with someone in and out  the home  Dtr also refused home PT   Dtr stated pt uses [facility] [address] and could afford meds  The discharge plan is home with family support  Dtr had no other issues or concerns

## 2017-10-31 NOTE — CASE MANAGEMENT
Notification of Discharge  This is a Notification of Discharge from our facility [address]  Please be advised that this patient has been discharge from our facility  Below you will find the admission and discharge date and time including the patients disposition  PRESENTATION DATE: 10/26/2017 10:22 AM  IP ADMISSION DATE: 10/26/17 1144  DISCHARGE DATE: 10/30/2017  1:00 PM  DISPOSITION: Home/Self Care    [facility] in the [facility] by Lupillo Zaidi for 2017  Network Utilization Review Department  Phone: [phone]; Fax [phone]  ATTENTION: The Network Utilization Review Department is now centralized for our 7 Facilities  Make a note that we have a new phone and fax numbers for our Department  Please call with any questions or concerns to [phone] and carefully follow the prompts so that you are directed to the right person  All voicemails are confidential  Fax any determinations, approvals, denials, and requests for initial or continue stay review clinical to [phone]  Due to HIGH CALL volume, it would be easier if you could please send faxed requests to expedite your requests and in part, help us provide discharge notifications faster

## 2017-10-31 NOTE — CASE MANAGEMENT
ATTN:  CLINICAL REVIEW  Leslee Szymanski RN Registered Nurse Signed   Case Management Date of Service: 10/27/2017  9:34 AM         []Hide copied text  Initial Clinical Review     St  [facility] in the [employer] by Lupillo Zaidi for 2017  Network Utilization Review Department  Phone: [phone]; Fax [phone]  ATTENTION: The Network Utilization Review Department is now centralized for our 7 Facilities  Make a note that we have a new phone and fax numbers for our Department  Please call with any questions or concerns to [phone] and carefully follow the prompts so that you are directed to the right person  All voicemails are confidential  Fax any determinations, approvals, denials, and requests for initial or continue stay review clinical to [phone]  Due to HIGH CALL volume, it would be easier if you could please send faxed requests to expedite your requests and in part, help us provide discharge notifications faster         Admission: Date/Time/Statement: 10/26/17 @ 1144            Orders Placed This Encounter   Procedures    Inpatient Admission (expected length of stay for this patient is greater than two midnights)       Standing Status:   Standing       Number of Occurrences:   1       Order Specific Question:   Admitting Physician       Answer:   Tiera Clancy       Order Specific Question:   Level of Care       Answer:   Med Surg [16]       Order Specific Question:   Estimated length of stay       Answer:   More than 2 Midnights       Order Specific Question:   Certification       Answer:   I certify that inpatient services are medically necessary for this patient for a duration of greater than two midnights   See H&P and MD Progress Notes for additional information about the patient's course of treatment          ED: Date/Time/Mode of Arrival:             ED Arrival Information      Expected Arrival Acuity Means of Arrival Escorted By Service Admission Type     - 10/26/2017 10:20 Emergent Ambulance [facility] General Medicine Emergency     Arrival Complaint     SYNCOPE             Chief Complaint:   Chief Complaint   Patient presents with    CVA/TIA-like Symptoms         History of Illness: 25-year-old female who lives with her family however there is multiple cameras in the home to keep an eye on her  Vinny Blades patient was last seen normal at 5:30 a m  when she received her morning meds   A family member checked in on her and video at 8:30 a m  and she was not seen in her room & her bed was only detention made  Tom Grimm thought she was in the bathroom   45 minutes later she was not seen again and they went to the house and found her laying on the floor   She was initially difficult to arouse and when they did so she seemed weak on her right side and had a right facial droop   She was brought to the ER by [facility]     ED Vital Signs:            ED Triage Vitals   Temperature Pulse Respirations Blood Pressure SpO2   10/26/17 1035 10/26/17 1027 10/26/17 1027 10/26/17 1027 10/26/17 1027   (!) 96 6 °F (35 9 °C) 77 (!) 24 135/61 99 %       Temp Source Heart Rate Source Patient Position - Orthostatic VS BP Location FiO2 (%)   10/26/17 1035 10/26/17 1218 10/26/17 1027 10/26/17 1027 --   Temporal Monitor Lying Right arm         Pain Score           10/26/17 1027           8                Wt Readings from Last 1 Encounters:   10/26/17 70 3 kg (155 lb)         Vital Signs:  10/26 0701  10/27 0700 10/27 0701  10/27 0938   Most Recent       Temperature (°F) 96 699 5     97 9 (36 6)     Pulse 6683     70     Respirations 1224     18     Blood Pressure 135/61181/69     152/78     SpO2 (%) 9499     98           Abnormal Labs/Diagnostic Test Results: Sodium 134, Chloride 97, Glucose 160  CXR -- No active pulmonary disease on examination which is somewhat limited secondary to low lung volumes  Ct head -- No acute intracranial hemorrhage seen   Chronic lacune in the right basal ganglionic region and chronic infarct left frontal region No CT signs of acute territorial infarction    CT C-spine -- No acute compression collapse of the vertebra seen Sclerotic calcification is seen within the right carotid artery and moderate to severe atherosclerotic calcification seen within the left carotid artery   Ultrasound of the carotids may be considered to evaluate for carotid stenosis  EKG -- NSR, no ST changes       ED Treatment:              Medication Administration from 10/26/2017 1020 to 10/26/2017 1715        Date/Time Order Dose Route Action Action by Comments                           10/26/2017 1112 sodium chloride 0 9 % bolus 1,000 mL 1,000 mL Intravenous New Bag Michelle Granados, FELICITA         10/26/2017 1624 ciprofloxacin (CIPRO) tablet 250 mg 250 mg Oral Given Tez Lopez RN         10/26/2017 1624 atorvastatin (LIPITOR) tablet 40 mg 40 mg Oral Given Tez Lopez RN         10/26/2017 1624 heparin (porcine) subcutaneous injection 5,000 Units 5,000 Units Subcutaneous Given Tez Lopez RN         10/26/2017 1624 aspirin chewable tablet 81 mg 81 mg Oral Given Tez Lopez RN               Past Medical/Surgical History:        Past Medical History:   Diagnosis Date    Alzheimer disease      Diverticulosis      Dyslipidemia      History of stroke      UTI (urinary tract infection)           Admitting Diagnosis: Syncope [R55]  Facial droop [R29 810]     Age/Sex: 80 y o  female     Assessment/Plan:   Principal Problem:    Facial droop  Active Problems:    History of stroke    Alzheimer disease    Dyslipidemia    Asthenia    Carotid artery calcification, bilateral        Plan for the Primary Problem(s):  1   Right facial droop-resolving there is still some right facial asymmetry with her smile however forehead is preserved   CT of the head did not show any acute intracranial abnormality or signs of a stroke, there are evidence of chronic infarcts   She also does have bilateral carotid calcification   Will follow the stroke pathway with an MRI get an echocardiogram and a carotid ultrasound   The patient is on an aspirin check a lipid profile and add a statin   Also check a TSH and A1c   Consult Neurology      Plan for Additional Problems:   2   Alzheimer's disease-continue Aricept and Namenda  3   Dysthymia-PT OT consult  4   Chronic urinary tract infections-the patient is on suppressive Cipro and cranberry since August 5   Hypertensive urgency-on my exam the patient's blood pressure was 625 systolic to 702 systolic   Patient had complained of a frontal headache   Will add hydralazine p r n  Carey Laureano patient is not on any antihypertensive pre-hospital   Allow permissive for the 1st 24-48 hours       Anticipated Length of Stay: Shen Conley will be admitted on an Inpatient basis with an anticipated length of stay of  Greater than 2 midnights               Admission Orders:  M/S/Tele unit  Telem  Consult neurology  Nursing dysphagia assessment prior to po ---> cardiac diet  PT/OT/Speech evals  Neuro checks q 4h  Echo  MRI brain  Incentive spirometry q1h [facility]        Scheduled Meds:   aspirin 81 mg Oral Daily   atorvastatin 40 mg Oral Daily With Dinner   ciprofloxacin 250 mg Oral Daily   donepezil 10 mg Oral HS   heparin (porcine) 5,000 Units Subcutaneous Q8H [facility]   memantine 10 mg Oral BID      Continuous Infusions:    PRN Meds:   acetaminophen    calcium carbonate    hydrALAZINE    ondansetron

## 2017-11-30 ENCOUNTER — ALLSCRIPTS OFFICE VISIT (OUTPATIENT)
Dept: OTHER | Facility: OTHER | Age: 82
End: 2017-11-30

## 2017-12-12 NOTE — PROGRESS NOTES
Assessment    1  Lacunar infarction (434 91) (I63 9)   2  Hypertension (401 9) (I10)   3  Hyperlipidemia (272 4) (E78 5)   4  AF (paroxysmal atrial fibrillation) (427 31) (I48 0)   5  Alzheimer's dementia (331 0) (G30 9)    Plan  AF (paroxysmal atrial fibrillation), Lacunar infarction    · Neurology Follow Up Evaluation and Treatment  Follow-up  Status: Hold For - Scheduling Requested for: [id number]   Ordered;AF (paroxysmal atrial fibrillation), Lacunar infarction; Ordered By: Eben Reynaga Performed:  Due: [id number]    Discussion/Summary  Discussion Summary:   1) Right frontal region of restriction, consistent with acute lacunar stroke - etiology most likely secondary to cardioembolic with history of atrial fibrillation and not on anticoagulation  symptoms resolved, patient doing well and at baseline  now on coumadin, goal INR 2-3, being monitored by PCP  On statin  no ASA, Hx of GI bleed  BP stable  nonsmoker, not diabeticHistory of dementia at baselineAricept/ Namenda  Hypertension, controlled on metoprololHx of prior strokesMRI of brain with out contrast reviewed as belowCarotid ultrasound stable-Lipid panel wnl, on statin medication for secondary stroke prevention  family has requested to be followed PRN, difficult to get pt out, Closely followed by PCP  I did ask that they follow up with her cardiologist as well  Signs and symptoms of a further stroke were reviewed, should she have any symptoms, she should return to the emergency room  Counseling Documentation With Imm: The patient, patient's family was counseled regarding diagnostic results,-- instructions for management,-- risk factor reductions,-- prognosis,-- patient and family education,-- impressions,-- risks and benefits of treatment options,-- importance of compliance with treatment  total time of encounter was 55 minutes-- and-- >50% minutes was spent counseling  Patient's Capacity to Self-Care: Patient is able to Self-Care   Patient agrees and allows to involve family/caregiver in development of care plan:   Medication SE Review and Pt Understands Tx: Possible side effects of new medications were reviewed with the patient/guardian today  The treatment plan was reviewed with the patient/guardian  The patient/guardian understands and agrees with the treatment plan   Patient Guardian understands agrees: The treatment plan was reviewed with the patient/guardian  The patient/guardian understands and agrees with the treatment plan   Stroke Patient Family Education [facility]:  The patient and patient's family was educated regarding: Goals: Exercise: 30 minutes of moderate-intensity physical exercise most days or supervised therapeutic exercise program if disabled, but-- Waist Circumference: Female<35 Male<40  Patient/Family was also educated regarding: Stroke Diagnosis (TIA,Ischemic Stroke, ICH, SAH),-- Need For Medical Follow Up,-- Medication Adherence,-- Anticoagulation,-- Personal Stroke/Cardiovascular Risk Factors-- and-- Signs And Symptoms Of Stroke/Call 911  Chief Complaint  Chief Complaint Free Text Note Form: Patient is here for follow up stroke  Hospital Follow up      History of Present Illness  HPI: Alejandra Chris is a 80 y o  female with past medical history significant for PAF, carotid artery stenosis, stroke, dementia  she is here for hospital follow-up from recent stroke  Unfortunately, she is unable to provide any significant history due to her presumed Alzheimer's  She is accompanied today by her daughter, who helped with her information  Per her daughter, she lives in part of the house with her son    She is constantly being monitored, the family has multiple cameras in the home to watch over her  The patient was last seen at 5:30 a m  on Oct 26 and was not seen until after 9:00 a m  on the day of admission  Review of the in house camera showed that she walked to her bathroom at around 8:30 a m  and did not come back   When family checked up on her she was on the floor of her bedroom, noted to have right-sided weakness and right facial droop  She was difficult to arouse  she was subsequently taken to the hospital, her blood pressure in the ER was noted at 135/61  Her labs were notable for sodium of 134 random glucose of 160, total CK 32, CBC within normal limits  Troponin negative  CT of the head did not show any acute intracranial however there is chronic lacunar right basal ganglia and chronic infarct in the left frontal region  CT of the C-spine did not show any fracture or malalignment, however there was sclerotic calcifications seen within the right carotid artery and moderate to severe atherosclerotic calcification in the left carotid artery EKG was 70 beats per minute normal sinus rhythm  Neurology was consulted, she subsequently underwent MRI showed small acute lacunar stroke in the right frontal region and chronic lacune in the basal ganglia and left frontal region  She has underlying paroxysmal atrial fibrillation and her acute stroke was felt to be embolic in origin due to this  The daughter reports patient does have a history of atrial fibrillation and stroke, was on Coumadin/ aspirin at one point, however, this was stopped about 2 years ago secondary to GI bleed  She reports diverticulitis  The patient is unable to get blood products secondary to Yazdanism  At that point the patient was not resumed on anticoagulation or antiplatelet  She had carotid ultrasound, There is <50% stenosis noted in the internal carotid arteries bilaterally  In comparison to the study of 11/13/2012, there is no significant interval change in the disease process bilaterally  her symptoms had resolved within 2 hours  She was totally at her baseline  She remained stable throughout her hospitalization  It was felt the risk for stroke higher than bleed  , cta head/neck in 2013 did not show significant atheroscelrosis  No stenting hx   after discussion with her family, the decision was made to place her back on Coumadin, and statin therapy    The patient has history of dementia - they report she was seen at [facility] 3 years ago and was diagnosed with possible vascular dementia, but unsure  The patient has been on Aricept and Namenda  They report this has been fairly stable over the last few years  patient presents today for follow-up  She was accompanied today by her daughter  Overall she has been quite stable since her hospitalization  According to her daughter she is at baseline  Patient is unable once again to provide any information  at present she has no complaints of pain  Her daughter denies any weakness, no difficulty with her speech  Patient denies any headaches, shortness of breath or swallowing issues  She continues on Coumadin, INRs are being monitored by her family doctor  Her last reported INR was 3 0, her Coumadin dose has been subsequently altered  She is on low-dose statin therapy  There been no signs and symptoms suggestive of a GI bleed  cognitively, she has remained unchanged, she continues on Aricept and Namenda  In speaking with her daughter, her P AF was apparently picked up several years ago on a Holter monitor  Last seen by cardiology 2014  to her knowledge, there has been no cardiac issues otherwise  She lives in close proximity to her family who check on her consistently, with again there are in home cameras  is quite difficult for them to get her to her appointments and doctor visits  Patient today has no significant complaints, she denies headaches, issues swallowing or chewing, no changes in her vision  She feels that her right side is at her baseline     ROS, SH, SAH, meds were all reviewed      Review of Systems  Neurological ROS:  Constitutional: no fever, no chills, no recent weight gain, no recent weight loss, no complaints of feeling tired, no changes in appetite    HEENT:  no sinus problems, not feeling congested, no blurred vision, no dryness of the eyes, no eye pain, no hearing loss, no tinnitus, no mouth sores, no sore throat, no hoarseness, no dysphagia, no masses, no bleeding  Cardiovascular:  no chest pain or pressure, no palpitations present, the heart rate was not rapid or irregular, no swelling in the arms or legs, no poor circulation  Respiratory:  no unusual or persistant cough, no shortness of breath with or without exertion  Gastrointestinal:  no nausea, no vomiting, no diarrhea, no abdominal pain, no changes in bowel habits, no melena, no loss of bowel control  Genitourinary:  no incontinence, no feelings of urinary urgency, no increase in frequency, no urinary hesitancy, no dysuria, no hematuria  Musculoskeletal:  no arthralgias, no myalgias, no immobility or loss of function, no head/neck/back pain, no pain while walking  Integumentary  no masses, no rash, no skin lesions, no livedo reticularis  Psychiatric:  no anxiety, no depression, no mood swings, no psychiatric hospitalizations, no sleep problems  Endocrine  no unusual weight loss or gain, no excessive urination, no excessive thirst, no hair loss or gain, no hot or cold intolerance, no menstrual period change or irregularity, no loss of sexual ability or drive, no erection difficulty, no nipple discharge  Hematologic/Lymphatic:  no unusual bleeding, no tendency for easy bruising, no clotting skin or lumps  Neurological General:  no headache, no nausea or vomiting, no lightheadedness, no convulsions, no blackouts, no syncope, no trauma, no photopsia, no increased sleepiness, no trouble falling asleep, no snoring, no awakening at night  Neurological Mental Status:  no confusion, no mood swings, no alteration or loss of consciousness, no difficulty expressing/understanding speech, no memory problems    Neurological Cranial Nerves:  no blurry or double vision, no loss of vision, no face drooping, no facial numbness or weakness, no taste or smell loss/changes, no hearing loss or ringing, no vertigo or dizziness, no dysphagia, no slurred speech  Neurological Motor findings include:  no tremor, no twitching, no cramping(pre/post exercise), no atrophy  Neurological Coordination:  no unsteadiness, no vertigo or dizziness, no clumsiness, no problems reaching for objects  Neurological Sensory:  no numbness, no pain, no tingling, does not fall when eyes closed or taking a shower  Neurological Gait:  no difficulty walking, not falling to one side, no sensation of being pushed, has not had falls  Active Problems  1  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)   2  Hyperlipidemia (272 4) (E78 5)   3  Hypertension (401 9) (I10)   4  Mitral regurgitation (424 0) (I34 0)   5  Pre-operative cardiovascular examination (V72 81) (Z01 810)   6  Stroke Syndrome (436)    Past Medical History  1  History of gastroesophageal reflux (GERD) (V12 79) (Z87 19)   2  History of Hyponatremia (276 1) (E87 1)   3  History of Urinary Tract Infection (V13 02)    Surgical History  1  History of Bladder Surgery   2  History of Cholecystectomy   3  History of Uterine Surgery    Family History  Father    1  Family history of Prior Myocardial Infarction  Brother    2  Family history of Prior Myocardial Infarction   3  Family history of Prior Myocardial Infarction    Social History     · Denied: History of Alcohol Use (History)   · Former smoker (V15 82) (B48 669)    Current Meds   1  Calcium Plus Vitamin D3 CAPS; 2 TABLETS DAILY; Therapy: (Recorded:09Ueh9708) to Recorded   2  Coumadin 3 MG Oral Tablet; Take 1 tablet daily Recorded   3  [employer] Aspirin 81 MG TBEC; Therapy: ([place]) to Recorded   4  Donepezil HCl - 10 MG Oral Tablet; Therapy: (Recorded:06Vis0572) to Recorded   5  Folic Acid 1 MG Oral Tablet; Take 1 tablet daily Recorded   6  Iron 325 (65 Fe) MG Oral Tablet; Therapy: (Recorded:07Tfi5001) to Recorded   7  Lipitor 20 MG Oral Tablet; TAKE 1 TABLET AT BEDTIME; Therapy: ([place]) to Recorded   8  Lipitor 20 MG Oral Tablet; Therapy: (Recorded:30Nov2017) to Recorded   9  Metoprolol Tartrate 25 MG Oral Tablet; TAKE 1 TABLET TWICE DAILY; Therapy: 29Aug2013 to (Evaluate:27Nov2013) Recorded   10  Namenda 10 MG Oral Tablet; Therapy: (Recorded:30Nov2017) to Recorded   11  NexIUM 40 MG Oral Capsule Delayed Release; TAKE 1 CAPSULE Daily Recorded   12  Omega 3 CAPS; TAKE 1 CAPSULE Daily Recorded   13  Vitamin B12 TABS; Therapy: (Recorded:06Efu1612) to Recorded    Allergies    1  No Known Drug Allergies    Vitals  Signs   Recorded: 25SNV3189 12:17PM   Heart Rate: 68  Systolic: 283  Diastolic: 72  Height: 5 ft 1 in  Weight: 148 lb 8 oz  BMI Calculated: 28 06  BSA Calculated: 1 66  O2 Saturation: 97    Physical Exam   Constitutional  General Appearance: Appears appropriate for age, healthy, well developed, appropriately groomed and appropriately dressed   Head and Face  Head and face: Atraumatic on inspection  Facial strength normal   Eyes  Ophthalmoscopic examination: Vision is grossly normal  Gross visual field testing by confrontation shows no abnormalities  EOMI in both eyes  Conjunctivae clear  Eyelids normal palpebral fissures equal  Orbits exhibit normal position  No discharge from the eyes  PERRL  -- difficulty to visualize fundi  Neck  Neck and thyroid: Normal to inspection and palpation  Pulmonary  Respiratory effort: Lungs are clear bilaterally  Cardiovascular  Auscultation of heart: Rate is regular  Rhythm is regular  The heart rate was normal  The rhythm was regular  Carotid pulses: Normal, 2+ bilaterally  no bruit heard over the right carotid-- and-- no bruit heard over the left carotid  Peripheral vascular exam: Normal pulses throughout, no tenderness, erythema or swelling  Musculoskeletal  Gait and Station: Walks with normal gait   Tandem walk test is normal  Romberg's test is negative -- (Ambulate independently, mild disequilibrium was noted, able to arise without assistance)  Muscle strength: Normal strength throughout  -- (Well preserved throughout)  Motor Strength:  no pronator drift on the right  -- no pronator drift on the left  Strength examination:  Muscle tone: No atrophy, abnormal movements, flaccidity, cogwheeling or spasticity  Neurologic  Orientation to person, place, and time: Abnormal  -- (She followed simple commands  She was unable to identify current month with out clues, she was aware of the year, she could not tell me her birth date) Mental Status: disoriented to person-- and-- disoriented to time, but-- no difficulty naming common objects  Recent and remote memory: Abnormal    Attention span and concentration: Normal thought process and attention span  Language: Names objects, able to repeat phrases and speaks spontaneously  Sensation: Intact sensation to pinprick, temperature, vibration, and proprioception in all four extremities  Reflexes: DTR's are normal and symmetric bilaterally  Babinski's reflex is negative bilaterally  No pathologic ankle clonus  -- ( diminished throughout)  Coordination: Cerebellum function intact  No involuntary movement or psychomotor activity  Judgment and insight: Abnormal    Motor System: No pronator drift  Cranial Nerve Exam  II: Normal with no deficit  III,IV, VI: Normal with no deficit  V: Normal with no deficit  VII: Normal with no deficit  VIII: Normal with no deficit  XI: Normal with no deficit  Mood and affect: Normal        Results/Data  Diagnostic Studies Reviewed: I personally reviewed the films/images/results in the office today  My interpretation follows  Diagnostic Review MRI Brain 10/26/17:Small 5 mm focal area of diffusion restriction in the right frontal region in the precentral gyrus compatible with small acute lacune Severe periventricular and white matter T2 hyperintensities related to chronic small vessel ischemic changes  USThere is <50% stenosis noted in the internal carotid artery   Plaque is heterogenous  Vertebral artery flow is antegrade  There is no significant subclavian artery disease  There is <50% stenosis noted in the internal carotid artery  Plaque isVertebral artery flow is antegrade  There is no significant subclavian artery disease  comparison to the study of 11/13/2012, there is no significant intervalin the disease process bilaterally  VENTRICLE: Systolic function was normal  Ejection fraction was estimated to be 60 %  There were no regional wall motion abnormalities  thickness was mildly increased  ATRIUM: The atrium was mildly dilated  VALVE:There was moderate annular calcification  There was mild to moderate regurgitation  VALVE: There was mild regurgitation  VALVE:There was mild to moderate regurgitation  VALVE: There was mild regurgitation  HEPATIC VEINS: Respirophasic changes were blunted (less than 50% variation)  triglycerides = 48, HDL = 40, LDL = 57,Hemoglobin A1C=5 5              Signatures   Electronically signed by : Yosef Fiore; Dec 10 2017  7:31PM EST                       (Author)    Electronically signed by : Trang Luna MD; Dec 11 2017 10:26AM EST                       (Co-author)

## 2018-01-13 VITALS
SYSTOLIC BLOOD PRESSURE: 163 MMHG | HEIGHT: 61 IN | HEART RATE: 68 BPM | WEIGHT: 148.5 LBS | OXYGEN SATURATION: 97 % | DIASTOLIC BLOOD PRESSURE: 72 MMHG | BODY MASS INDEX: 28.04 KG/M2

## 2018-02-13 ENCOUNTER — HOSPITAL ENCOUNTER (OUTPATIENT)
Facility: HOSPITAL | Age: 83
Setting detail: OBSERVATION
Discharge: HOME | End: 2018-02-15
Attending: EMERGENCY MEDICINE | Admitting: INTERNAL MEDICINE
Payer: COMMERCIAL

## 2018-02-13 ENCOUNTER — APPOINTMENT (EMERGENCY)
Dept: CT IMAGING | Facility: HOSPITAL | Age: 83
End: 2018-02-13
Payer: COMMERCIAL

## 2018-02-13 DIAGNOSIS — R55 SYNCOPE: ICD-10-CM

## 2018-02-13 DIAGNOSIS — I10 HTN (HYPERTENSION): ICD-10-CM

## 2018-02-13 DIAGNOSIS — R51.9 HEADACHE: Primary | ICD-10-CM

## 2018-02-13 DIAGNOSIS — R79.1 ELEVATED INR: ICD-10-CM

## 2018-02-13 LAB
ANION GAP BLD CALC-SCNC: 17 MMOL/L (ref 4–13)
ANION GAP SERPL CALCULATED.3IONS-SCNC: 12 MMOL/L (ref 4–13)
APTT PPP: 42 SECONDS (ref 23–35)
ATRIAL RATE: 75 BPM
BASOPHILS # BLD AUTO: 0.03 THOUSANDS/ΜL (ref 0–0.1)
BASOPHILS NFR BLD AUTO: 0 % (ref 0–1)
BUN BLD-MCNC: 13 MG/DL (ref 5–25)
BUN SERPL-MCNC: 13 MG/DL (ref 5–25)
CA-I BLD-SCNC: 1.11 MMOL/L (ref 1.12–1.32)
CALCIUM SERPL-MCNC: 9.3 MG/DL (ref 8.3–10.1)
CHLORIDE BLD-SCNC: 99 MMOL/L (ref 100–108)
CHLORIDE SERPL-SCNC: 100 MMOL/L (ref 100–108)
CO2 SERPL-SCNC: 24 MMOL/L (ref 21–32)
CREAT BLD-MCNC: 0.7 MG/DL (ref 0.6–1.3)
CREAT SERPL-MCNC: 0.72 MG/DL (ref 0.6–1.3)
EOSINOPHIL # BLD AUTO: 0.04 THOUSAND/ΜL (ref 0–0.61)
EOSINOPHIL NFR BLD AUTO: 0 % (ref 0–6)
ERYTHROCYTE [DISTWIDTH] IN BLOOD BY AUTOMATED COUNT: 13.1 % (ref 11.6–15.1)
GFR SERPL CREATININE-BSD FRML MDRD: 77 ML/MIN/1.73SQ M
GFR SERPL CREATININE-BSD FRML MDRD: 80 ML/MIN/1.73SQ M
GLUCOSE SERPL-MCNC: 101 MG/DL (ref 65–140)
GLUCOSE SERPL-MCNC: 104 MG/DL (ref 65–140)
HCT VFR BLD AUTO: 37.7 % (ref 34.8–46.1)
HCT VFR BLD CALC: 38 % (ref 34.8–46.1)
HGB BLD-MCNC: 12.8 G/DL (ref 11.5–15.4)
HGB BLDA-MCNC: 12.9 G/DL (ref 11.5–15.4)
INR PPP: 2.21 (ref 0.86–1.16)
LYMPHOCYTES # BLD AUTO: 3.25 THOUSANDS/ΜL (ref 0.6–4.47)
LYMPHOCYTES NFR BLD AUTO: 31 % (ref 14–44)
MCH RBC QN AUTO: 29.3 PG (ref 26.8–34.3)
MCHC RBC AUTO-ENTMCNC: 34 G/DL (ref 31.4–37.4)
MCV RBC AUTO: 86 FL (ref 82–98)
MONOCYTES # BLD AUTO: 0.84 THOUSAND/ΜL (ref 0.17–1.22)
MONOCYTES NFR BLD AUTO: 8 % (ref 4–12)
NEUTROPHILS # BLD AUTO: 6.36 THOUSANDS/ΜL (ref 1.85–7.62)
NEUTS SEG NFR BLD AUTO: 61 % (ref 43–75)
P AXIS: 101 DEGREES
PCO2 BLD: 26 MMOL/L (ref 21–32)
PLATELET # BLD AUTO: 276 THOUSANDS/UL (ref 149–390)
PMV BLD AUTO: 10.3 FL (ref 8.9–12.7)
POTASSIUM BLD-SCNC: 3.6 MMOL/L (ref 3.5–5.3)
POTASSIUM SERPL-SCNC: 3.6 MMOL/L (ref 3.5–5.3)
PR INTERVAL: 172 MS
PROTHROMBIN TIME: 24.8 SECONDS (ref 12.1–14.4)
QRS AXIS: 17 DEGREES
QRSD INTERVAL: 78 MS
QT INTERVAL: 400 MS
QTC INTERVAL: 446 MS
RBC # BLD AUTO: 4.37 MILLION/UL (ref 3.81–5.12)
SODIUM BLD-SCNC: 137 MMOL/L (ref 136–145)
SODIUM SERPL-SCNC: 136 MMOL/L (ref 136–145)
SPECIMEN SOURCE: ABNORMAL
T WAVE AXIS: 103 DEGREES
TROPONIN I SERPL-MCNC: <0.02 NG/ML
VENTRICULAR RATE: 75 BPM
WBC # BLD AUTO: 10.52 THOUSAND/UL (ref 4.31–10.16)

## 2018-02-13 PROCEDURE — 36415 COLL VENOUS BLD VENIPUNCTURE: CPT | Performed by: PODIATRIST

## 2018-02-13 PROCEDURE — 85730 THROMBOPLASTIN TIME PARTIAL: CPT | Performed by: PODIATRIST

## 2018-02-13 PROCEDURE — 93010 ELECTROCARDIOGRAM REPORT: CPT | Performed by: INTERNAL MEDICINE

## 2018-02-13 PROCEDURE — 93005 ELECTROCARDIOGRAM TRACING: CPT

## 2018-02-13 PROCEDURE — 85610 PROTHROMBIN TIME: CPT | Performed by: PODIATRIST

## 2018-02-13 PROCEDURE — 70498 CT ANGIOGRAPHY NECK: CPT

## 2018-02-13 PROCEDURE — 99220 PR INITIAL OBSERVATION CARE/DAY 70 MINUTES: CPT | Performed by: PHYSICIAN ASSISTANT

## 2018-02-13 PROCEDURE — 70450 CT HEAD/BRAIN W/O DYE: CPT

## 2018-02-13 PROCEDURE — 80048 BASIC METABOLIC PNL TOTAL CA: CPT | Performed by: EMERGENCY MEDICINE

## 2018-02-13 PROCEDURE — 80047 BASIC METABLC PNL IONIZED CA: CPT

## 2018-02-13 PROCEDURE — 85014 HEMATOCRIT: CPT

## 2018-02-13 PROCEDURE — 96374 THER/PROPH/DIAG INJ IV PUSH: CPT

## 2018-02-13 PROCEDURE — 85025 COMPLETE CBC W/AUTO DIFF WBC: CPT | Performed by: PODIATRIST

## 2018-02-13 PROCEDURE — 84484 ASSAY OF TROPONIN QUANT: CPT | Performed by: EMERGENCY MEDICINE

## 2018-02-13 PROCEDURE — 70496 CT ANGIOGRAPHY HEAD: CPT

## 2018-02-13 RX ORDER — ONDANSETRON 2 MG/ML
4 INJECTION INTRAMUSCULAR; INTRAVENOUS ONCE
Status: COMPLETED | OUTPATIENT
Start: 2018-02-13 | End: 2018-02-13

## 2018-02-13 RX ORDER — AMOXICILLIN 500 MG/1
500 CAPSULE ORAL EVERY 8 HOURS SCHEDULED
Status: DISCONTINUED | OUTPATIENT
Start: 2018-02-13 | End: 2018-02-15 | Stop reason: HOSPADM

## 2018-02-13 RX ORDER — WARFARIN SODIUM 3 MG/1
1.5 TABLET ORAL
Status: DISCONTINUED | OUTPATIENT
Start: 2018-02-14 | End: 2018-02-14

## 2018-02-13 RX ORDER — HYDRALAZINE HYDROCHLORIDE 20 MG/ML
5 INJECTION INTRAMUSCULAR; INTRAVENOUS EVERY 6 HOURS PRN
Status: DISCONTINUED | OUTPATIENT
Start: 2018-02-13 | End: 2018-02-15 | Stop reason: HOSPADM

## 2018-02-13 RX ORDER — DONEPEZIL HYDROCHLORIDE 5 MG/1
10 TABLET, FILM COATED ORAL
Status: DISCONTINUED | OUTPATIENT
Start: 2018-02-13 | End: 2018-02-15 | Stop reason: HOSPADM

## 2018-02-13 RX ORDER — ATORVASTATIN CALCIUM 20 MG/1
20 TABLET, FILM COATED ORAL
Status: DISCONTINUED | OUTPATIENT
Start: 2018-02-14 | End: 2018-02-15 | Stop reason: HOSPADM

## 2018-02-13 RX ORDER — MEMANTINE HYDROCHLORIDE 5 MG/1
10 TABLET ORAL 2 TIMES DAILY
Status: DISCONTINUED | OUTPATIENT
Start: 2018-02-13 | End: 2018-02-15 | Stop reason: HOSPADM

## 2018-02-13 RX ORDER — CIPROFLOXACIN 500 MG/1
500 TABLET, FILM COATED ORAL DAILY
Status: CANCELLED | OUTPATIENT
Start: 2018-02-14

## 2018-02-13 RX ORDER — SODIUM CHLORIDE 9 MG/ML
50 INJECTION, SOLUTION INTRAVENOUS CONTINUOUS
Status: DISCONTINUED | OUTPATIENT
Start: 2018-02-13 | End: 2018-02-15 | Stop reason: HOSPADM

## 2018-02-13 RX ORDER — OLANZAPINE 5 MG/1
10 TABLET, ORALLY DISINTEGRATING ORAL ONCE
Status: COMPLETED | OUTPATIENT
Start: 2018-02-13 | End: 2018-02-13

## 2018-02-13 RX ADMIN — IOHEXOL 85 ML: 350 INJECTION, SOLUTION INTRAVENOUS at 14:35

## 2018-02-13 RX ADMIN — ONDANSETRON 4 MG: 2 INJECTION INTRAMUSCULAR; INTRAVENOUS at 16:50

## 2018-02-13 RX ADMIN — MEMANTINE 10 MG: 5 TABLET ORAL at 23:24

## 2018-02-13 RX ADMIN — DONEPEZIL HYDROCHLORIDE 10 MG: 5 TABLET, FILM COATED ORAL at 23:25

## 2018-02-13 RX ADMIN — OLANZAPINE 10 MG: 5 TABLET, ORALLY DISINTEGRATING ORAL at 14:42

## 2018-02-13 RX ADMIN — SODIUM CHLORIDE 75 ML/HR: 0.9 INJECTION, SOLUTION INTRAVENOUS at 19:21

## 2018-02-13 RX ADMIN — AMOXICILLIN 500 MG: 500 CAPSULE ORAL at 23:25

## 2018-02-13 NOTE — ED ATTENDING ATTESTATION
Renaye Mcburney, MD, saw and evaluated the patient  I have discussed the patient with the resident/non-physician practitioner and agree with the resident's/non-physician practitioner's findings, Plan of Care, and MDM as documented in the resident's/non-physician practitioner's note, except where noted  All available labs and Radiology studies were reviewed  At this point I agree with the current assessment done in the Emergency Department  I have conducted an independent evaluation of this patient a history and physical is as follows:    51-year-old female presents for evaluation of sudden onset of headache after having a dental procedure done today  The sudden onset of a sharp headache that starts in her head and radiates into her neck  She rates it as severe, constant  Patient denies focal neuro deficits or weakness  Patient is inconsistent with her history secondary baseline dementia  Family is present at bedside and states she is currently her baseline mental status  Ten systems reviewed otherwise negative   On exam no acute distress, HEENT normal, neuro normal, neck normal, lungs normal, cardiac normal, abdomen normal  Medical decision making;-sudden onset of acute headache in setting hypertension-will do CTA of head and neck to rule out acute pathology, P labs, coags, p r n  pain medications, reassess  Critical Care Time  CritCare Time    Procedures

## 2018-02-13 NOTE — ED NOTES
While family was assisting patient with dressing self, patient synopsized, was unconscious for approx 2 min  Patient was pale and diaphoretic  Dr Yeimy Restrepo alerted, assessed patient, will placing additional orders  Casey Florence RN notified        Merced Bee RN  02/13/18 [id number]

## 2018-02-13 NOTE — ED NOTES
Pt's daughter refusing Zyprexa at current time  Explained the need for med and that it is different from [address]   Daughter agreed if next BP is high to allow med     Tyrell Zhong RN  02/13/18 2461

## 2018-02-13 NOTE — ED PROCEDURE NOTE
PROCEDURE  ECG 12 Lead Documentation  Date/Time: 2/13/2018 4:39 PM  Performed by: Reanna Christine by: Arlene Martínez     ECG reviewed by me, the ED Provider: yes    Patient location:  ED  Rate:     ECG rate:  75    ECG rate assessment: normal    Rhythm:     Rhythm: sinus rhythm    Ectopy:     Ectopy: none    QRS:     QRS axis:  Normal    QRS intervals:  Normal  Conduction:     Conduction: normal    ST segments:     ST segments:  Normal  T waves:     T waves: non-specific           Reba Hoffman MD  02/13/18 [id number]

## 2018-02-13 NOTE — ED PROVIDER NOTES
History  Chief Complaint   Patient presents with    Headache - New Onset or New Symptoms     pt on coumadin, developed sudden onset headache with dizziness, HTN enroute     Pt presents for evaluation of a sudden onset headache following a dental procedure today  This is constant severe, and radiates to her neck   She does take anticoagulants  She is demented at baseline  Family at bedside and states that this is her baseline  History obtained from the family in the room  Denies loss of consciousness  Prior to Admission Medications   Prescriptions Last Dose Informant Patient Reported? Taking? Cranberry 250 MG TABS   Yes Yes   Sig: Take 500 mg by mouth daily   atorvastatin (LIPITOR) 20 mg tablet   Yes Yes   Sig: Take 20 mg by mouth daily   ciprofloxacin (CIPRO) 250 mg tablet 2/13/2018 at Unknown time  Yes Yes   Sig: Take 500 mg by mouth daily   donepezil (ARICEPT) 10 mg tablet   Yes Yes   Sig: Take 10 mg by mouth daily at bedtime   memantine (NAMENDA) 10 mg tablet   Yes Yes   Sig: Take 10 mg by mouth 2 (two) times a day   metoprolol tartrate (LOPRESSOR) 25 mg tablet   No No   Sig: Take 0 5 tablets by mouth every 12 (twelve) hours for 30 days   warfarin (COUMADIN) 3 mg tablet   No Yes   Sig: Take 1 tablet by mouth daily   Patient taking differently: Take by mouth daily 1 5mg every other day  3mg every other day  Alternating       Facility-Administered Medications: None       Past Medical History:   Diagnosis Date    A-fib ([place] [address] )     Alzheimer disease     Diverticulosis     Dyslipidemia     History of stroke     Hyperlipidemia     Hypertension     Stroke ([place] [address] )     UTI (urinary tract infection)        Past Surgical History:   Procedure Laterality Date    CHOLECYSTECTOMY         History reviewed  No pertinent family history  I have reviewed and agree with the history as documented      Social History   Substance Use Topics    Smoking status: Former Smoker    Smokeless tobacco: Never Used    Alcohol use No        Review of Systems   Constitutional: Negative for activity change and fatigue  HENT: Positive for congestion  Eyes: Negative for discharge  Respiratory: Negative for chest tightness  Gastrointestinal: Negative for abdominal distention  Musculoskeletal: Positive for neck pain  Negative for joint swelling and myalgias  Neurological: Positive for dizziness and headaches  Negative for numbness  Psychiatric/Behavioral: Positive for agitation  All other systems reviewed and are negative  Physical Exam  ED Triage Vitals   Temperature Pulse Respirations Blood Pressure SpO2   02/13/18 1352 02/13/18 1345 02/13/18 1345 02/13/18 1345 02/13/18 1345   99 °F (37 2 °C) 69 (!) 26 153/88 97 %      Temp Source Heart Rate Source Patient Position - Orthostatic VS BP Location FiO2 (%)   02/13/18 1352 02/13/18 1345 02/13/18 1533 02/13/18 1459 --   Temporal Monitor Lying Right arm       Pain Score       02/13/18 1345       Worst Possible Pain           Orthostatic Vital Signs  Vitals:    02/13/18 1415 02/13/18 1445 02/13/18 1459 02/13/18 1533   BP: (!) 182/86  (!) 184/88 150/90   Pulse:  80  80   Patient Position - Orthostatic VS:    Lying       Physical Exam   Constitutional: She is oriented to person, place, and time  She appears well-developed and well-nourished  Eyes: EOM are normal  Pupils are equal, round, and reactive to light  Neck: Normal range of motion  Cardiovascular: Normal rate and regular rhythm  Pulmonary/Chest: Effort normal and breath sounds normal    Abdominal: Soft  Bowel sounds are normal  She exhibits no distension  Musculoskeletal: Normal range of motion  Neurological: She is alert and oriented to person, place, and time  No cranial nerve deficit  Nursing note and vitals reviewed        ED Medications  Medications   OLANZapine (ZyPREXA ZYDIS) dispersible tablet 10 mg (10 mg Oral Given 2/13/18 1442)   iohexol (OMNIPAQUE) 350 MG/ML injection (MULTI-DOSE) 85 mL (85 mL Intravenous Given 2/13/18 1435)       Diagnostic Studies  Results Reviewed     Procedure Component Value Units Date/Time    Protime-INR [[id number]]  (Abnormal) Collected:  02/13/18 1453    Lab Status:  Final result Specimen:  Blood from Arm, Right Updated:  02/13/18 1537     Protime 24 8 (H) seconds      INR 2 21 (H)    APTT [[id number]]  (Abnormal) Collected:  02/13/18 1453    Lab Status:  Final result Specimen:  Blood from Arm, Right Updated:  02/13/18 1537     PTT 42 (H) seconds     Narrative: Therapeutic Heparin Range = 60-90 seconds    Basic metabolic panel [[id number]] Collected:  02/13/18 1453    Lab Status:  Final result Specimen:  Blood from Arm, Right Updated:  02/13/18 1513     Sodium 136 mmol/L      Potassium 3 6 mmol/L      Chloride 100 mmol/L      CO2 24 mmol/L      Anion Gap 12 mmol/L      BUN 13 mg/dL      Creatinine 0 72 mg/dL      Glucose 101 mg/dL      Calcium 9 3 mg/dL      eGFR 77 ml/min/1 73sq m     Narrative:         National Kidney Disease Education Program recommendations are as follows:  GFR calculation is accurate only with a steady state creatinine  Chronic Kidney disease less than 60 ml/min/1 73 sq  meters  Kidney failure less than 15 ml/min/1 73 sq  meters      CBC and differential [[id number]]  (Abnormal) Collected:  02/13/18 1348    Lab Status:  Final result Specimen:  Blood from Arm, Right Updated:  02/13/18 1357     WBC 10 52 (H) Thousand/uL      RBC 4 37 Million/uL      Hemoglobin 12 8 g/dL      Hematocrit 37 7 %      MCV 86 fL      MCH 29 3 pg      MCHC 34 0 g/dL      RDW 13 1 %      MPV 10 3 fL      Platelets 561 Thousands/uL      Neutrophils Relative 61 %      Lymphocytes Relative 31 %      Monocytes Relative 8 %      Eosinophils Relative 0 %      Basophils Relative 0 %      Neutrophils Absolute 6 36 Thousands/µL      Lymphocytes Absolute 3 25 Thousands/µL      Monocytes Absolute 0 84 Thousand/µL      Eosinophils Absolute 0 04 Thousand/µL      Basophils Absolute 0 03 Thousands/µL                  CTA head and neck with and without contrast   ED Interpretation by Fracisco Loera MD (02/13 1510)   Mild atherosclerotic disease of the cervical and intracranial vasculature without significant stenosis   Unfortunately the bifurcations are slightly limited due to swallowing artifact   There appears to be mild narrowing of the distal left common carotid    artery and proximal cervical internal carotid artery   No occlusive disease  Noncontrast imaging of the brain demonstrates moderate chronic microangiopathic change more pronounced on the left   Old left frontal lobe infarctions   No hemorrhage or signs of acute territorial infarct  Mild patchy groundglass opacity within the upper lung fields with mediastinal and hilar adenopathy   Findings are nonspecific and may represent edema or inflammation  Final Result by Alexandra Osullivan DO (02/13 1504)      Mild atherosclerotic disease of the cervical and intracranial vasculature without significant stenosis  Unfortunately the bifurcations are slightly limited due to swallowing artifact  There appears to be mild narrowing of the distal left common carotid    artery and proximal cervical internal carotid artery  No occlusive disease  Noncontrast imaging of the brain demonstrates moderate chronic microangiopathic change more pronounced on the left  Old left frontal lobe infarctions  No hemorrhage or signs of acute territorial infarct  Mild patchy groundglass opacity within the upper lung fields with mediastinal and hilar adenopathy  Findings are nonspecific and may represent edema or inflammation  Workstation performed: [id number]               Procedures  Procedures      Phone Consults  ED Phone Contact    ED Course  ED Course                                MDM  Number of Diagnoses or Management Options  Headache:   Diagnosis management comments: 1   Sudden Onset headache  - in the setting of HTN,CTA of head and neck completed with baseline labs  - status improved, CTA of head and neck is negative    2  Syncope  - CT repeated of the head, remains normal  - troponin normal  - repeat labs normal  - INR in normal theraputic range  - patient remains somnolent and difficult to rouse  - will need admission for further work up       Amount and/or Complexity of Data Reviewed  Clinical lab tests: ordered and reviewed  Tests in the radiology section of CPT®: ordered and reviewed  Discuss the patient with other providers: yes      CritCare Time    Disposition  Final diagnoses:   None     ED Disposition     None      Follow-up Information    None       Patient's Medications   Discharge Prescriptions    No medications on file     No discharge procedures on file  ED Provider  Attending physically available and evaluated Amy Delgado I managed the patient along with the ED Attending      Electronically Signed by         Saman Gonsales  02/13/18 Loy Olson  02/13/18 1726       Saman Gonsales  02/13/18 1729

## 2018-02-13 NOTE — DISCHARGE INSTRUCTIONS
Acute Headache   WHAT YOU NEED TO KNOW:   An acute headache is pain or discomfort that starts suddenly and gets worse quickly  You may have an acute headache only when you feel stress or eat certain foods  Other acute headache pain can happen every day, and sometimes several times a day  DISCHARGE INSTRUCTIONS:   Return to the emergency department if:   · You have severe pain  · You have numbness or weakness on one side of your face or body  · You have a headache that occurs after a blow to the head, a fall, or other trauma  · You have a headache, are forgetful or confused, or have trouble speaking  · You have a headache, stiff neck, and a fever  Contact your healthcare provider if:   · You have a constant headache and are vomiting  · You have a headache each day that does not get better, even after treatment  · You have changes in your headaches, or new symptoms that occur when you have a headache  · You have questions or concerns about your condition or care  Medicines: You may need any of the following:  · Prescription pain medicine  may be given  The medicine your healthcare provider recommends will depend on the kind of headaches you have  You will need to take prescription headache medicines as directed to prevent a problem called rebound headache  These headaches happen with regular use of pain relievers for headache disorders  · NSAIDs , such as ibuprofen, help decrease swelling, pain, and fever  This medicine is available with or without a doctor's order  NSAIDs can cause stomach bleeding or kidney problems in certain people  If you take blood thinner medicine, always ask your healthcare provider if NSAIDs are safe for you  Always read the medicine label and follow directions  · Acetaminophen  decreases pain and fever  It is available without a doctor's order  Ask how much to take and how often to take it  Follow directions   Read the labels of all other medicines you are using to see if they also contain acetaminophen, or ask your doctor or pharmacist  Acetaminophen can cause liver damage if not taken correctly  Do not use more than 3 grams (3,000 milligrams) total of acetaminophen in one day  · Antidepressants  may be given for some kinds of headaches  · Take your medicine as directed  Contact your healthcare provider if you think your medicine is not helping or if you have side effects  Tell him or her if you are allergic to any medicine  Keep a list of the medicines, vitamins, and herbs you take  Include the amounts, and when and why you take them  Bring the list or the pill bottles to follow-up visits  Carry your medicine list with you in case of an emergency  Manage your symptoms:   · Apply heat or ice  on the headache area  Use a heat or ice pack  For an ice pack, you can also put crushed ice in a plastic bag  Cover the pack or bag with a towel before you apply it to your skin  Ice and heat both help decrease pain, and heat also helps decrease muscle spasms  Apply heat for 20 to 30 minutes every 2 hours  Apply ice for 15 to 20 minutes every hour  Apply heat or ice for as long and for as many days as directed  You may alternate heat and ice  · Relax your muscles  Lie down in a comfortable position and close your eyes  Relax your muscles slowly  Start at your toes and work your way up your body  · Keep a record of your headaches  Write down when your headaches start and stop  Include your symptoms and what you were doing when the headache began  Record what you ate or drank for 24 hours before the headache started  Describe the pain and where it hurts  Keep track of what you did to treat your headache and if it worked  Prevent an acute headache:   · Avoid anything that triggers an acute headache  Examples include exposure to chemicals, going to high altitude, or not getting enough sleep  Create a regular sleep routine   Go to sleep at the same time and wake up at the same time each day  Do not use electronic devices before bedtime  These may trigger a headache or prevent you from sleeping well  · Do not smoke  Nicotine and other chemicals in cigarettes and cigars can trigger an acute headache or make it worse  Ask your healthcare provider for information if you currently smoke and need help to quit  E-cigarettes or smokeless tobacco still contain nicotine  Talk to your healthcare provider before you use these products  · Limit alcohol as directed  Alcohol can trigger an acute headache or make it worse  If you have cluster headaches, do not drink alcohol during an episode  For other types of headaches, ask your healthcare provider if it is safe for you to drink alcohol  Ask how much is safe for you to drink, and how often  · Exercise as directed  Exercise can reduce tension and help with headache pain  Aim for 30 minutes of physical activity on most days of the week  Your healthcare provider can help you create an exercise plan  · Eat a variety of healthy foods  Healthy foods include fruits, vegetables, low-fat dairy products, lean meats, fish, whole grains, and cooked beans  Your healthcare provider or dietitian can help you create meals plans if you need to avoid foods that trigger headaches  Follow up with your healthcare provider as directed:  Bring your headache record with you when you see your healthcare provider  Write down your questions so you remember to ask them during your visits  © 2017 [facility] Information is for End User's use only and may not be sold, redistributed or otherwise used for commercial purposes  All illustrations and images included in CareNotes® are the copyrighted property of A D [employer]  or Lupillo Zaidi  The above information is an  only  It is not intended as medical advice for individual conditions or treatments   Talk to your doctor, nurse or pharmacist before following any medical regimen to see if it is safe and effective for you

## 2018-02-13 NOTE — ED NOTES
Pt failed dysphagia screen   Tolerated Orally Dissolving Tab, but failed with sip of water     Katherine Velazquez RN  02/13/18 7742

## 2018-02-14 LAB
ANION GAP SERPL CALCULATED.3IONS-SCNC: 8 MMOL/L (ref 4–13)
BUN SERPL-MCNC: 14 MG/DL (ref 5–25)
CALCIUM SERPL-MCNC: 8.5 MG/DL (ref 8.3–10.1)
CHLORIDE SERPL-SCNC: 105 MMOL/L (ref 100–108)
CO2 SERPL-SCNC: 25 MMOL/L (ref 21–32)
CREAT SERPL-MCNC: 0.72 MG/DL (ref 0.6–1.3)
ERYTHROCYTE [DISTWIDTH] IN BLOOD BY AUTOMATED COUNT: 13.7 % (ref 11.6–15.1)
GFR SERPL CREATININE-BSD FRML MDRD: 77 ML/MIN/1.73SQ M
GLUCOSE P FAST SERPL-MCNC: 110 MG/DL (ref 65–99)
GLUCOSE SERPL-MCNC: 110 MG/DL (ref 65–140)
HCT VFR BLD AUTO: 35.1 % (ref 34.8–46.1)
HGB BLD-MCNC: 11.8 G/DL (ref 11.5–15.4)
MCH RBC QN AUTO: 29.7 PG (ref 26.8–34.3)
MCHC RBC AUTO-ENTMCNC: 33.6 G/DL (ref 31.4–37.4)
MCV RBC AUTO: 88 FL (ref 82–98)
PLATELET # BLD AUTO: 237 THOUSANDS/UL (ref 149–390)
PMV BLD AUTO: 11.5 FL (ref 8.9–12.7)
POTASSIUM SERPL-SCNC: 3.6 MMOL/L (ref 3.5–5.3)
RBC # BLD AUTO: 3.97 MILLION/UL (ref 3.81–5.12)
SODIUM SERPL-SCNC: 138 MMOL/L (ref 136–145)
WBC # BLD AUTO: 11.41 THOUSAND/UL (ref 4.31–10.16)

## 2018-02-14 PROCEDURE — 80048 BASIC METABOLIC PNL TOTAL CA: CPT | Performed by: PHYSICIAN ASSISTANT

## 2018-02-14 PROCEDURE — 85027 COMPLETE CBC AUTOMATED: CPT | Performed by: PHYSICIAN ASSISTANT

## 2018-02-14 PROCEDURE — 99223 1ST HOSP IP/OBS HIGH 75: CPT | Performed by: PHYSICIAN ASSISTANT

## 2018-02-14 PROCEDURE — 99285 EMERGENCY DEPT VISIT HI MDM: CPT

## 2018-02-14 PROCEDURE — 99225 PR SBSQ OBSERVATION CARE/DAY 25 MINUTES: CPT | Performed by: INTERNAL MEDICINE

## 2018-02-14 RX ORDER — WARFARIN SODIUM 3 MG/1
3 TABLET ORAL EVERY OTHER DAY
Status: DISCONTINUED | OUTPATIENT
Start: 2018-02-14 | End: 2018-02-15 | Stop reason: HOSPADM

## 2018-02-14 RX ORDER — IBUPROFEN 600 MG/1
600 TABLET ORAL EVERY 6 HOURS PRN
Status: DISCONTINUED | OUTPATIENT
Start: 2018-02-14 | End: 2018-02-15 | Stop reason: HOSPADM

## 2018-02-14 RX ADMIN — AMOXICILLIN 500 MG: 500 CAPSULE ORAL at 09:10

## 2018-02-14 RX ADMIN — AMOXICILLIN 500 MG: 500 CAPSULE ORAL at 14:10

## 2018-02-14 RX ADMIN — WARFARIN SODIUM 3 MG: 3 TABLET ORAL at 16:55

## 2018-02-14 RX ADMIN — MEMANTINE 10 MG: 5 TABLET ORAL at 16:55

## 2018-02-14 RX ADMIN — DONEPEZIL HYDROCHLORIDE 10 MG: 5 TABLET, FILM COATED ORAL at 22:33

## 2018-02-14 RX ADMIN — SODIUM CHLORIDE 75 ML/HR: 0.9 INJECTION, SOLUTION INTRAVENOUS at 09:16

## 2018-02-14 RX ADMIN — AMOXICILLIN 500 MG: 500 CAPSULE ORAL at 23:51

## 2018-02-14 RX ADMIN — MEMANTINE 10 MG: 5 TABLET ORAL at 09:10

## 2018-02-14 RX ADMIN — ATORVASTATIN CALCIUM 20 MG: 20 TABLET, FILM COATED ORAL at 16:55

## 2018-02-14 NOTE — ED NOTES
Patient is resting comfortably  Family at bedside  VSS  No s/s of distress noted at this time       Clint Frank RN  02/13/18 1331

## 2018-02-14 NOTE — H&P
History and Physical - [facility] Internal Medicine    Patient Information: Claritza Ortiz 80 y o  female MRN: [id number]  Unit/Bed#: ED 18 Encounter: [id number]  Admitting Physician: Jeana Orellana PA-C  PCP: Stephanie Trejo DO  Date of Admission:  02/13/18      Assessment:    Vasovagal syncope    Plan:    Vasovagal syncope  · Will observe on telemetry  · One prior episode of syncope and August 2017 followed by a benign workup and discontinuation of metoprolol  · Will consult Neurology for evaluation    Paroxysmal atrial fibrillation  · Continue Coumadin 1 5 mg every other day with 3 mg every other day  · Daily INR; Today is 2 21    Alzheimer's dementia  · Continue donepezil, Namenda    History of lacunar infarct  · 2017, likely cardioembolic  · Currently anticoagulated with Coumadin    Hypertension  · Patient currently not on metoprolol  · Will add hydralazine IV p r n  during this hospital stay    Recent tooth extraction procedure  · Will continue on amoxicillin 500 mg b i d  Hyperlipidemia  · Continue statin    Asthenia  · Continue folic acid, iron, vitamin B12    HPI:   Perlita Lundberg is a 80 y o  female who underwent tooth extraction earlier today  Following the procedure, she was prescribed 600 mg of Motrin and was given a dose slightly before noon  Around the same time, she began complaining of a severe headache and her daughter reports she was screaming in pain because of the headache  She was brought to the emergency department  A CT scan of the head did not reveal any acute abnormalities  She does have a history of a lacunar infarct and a history of atrial fibrillation for which she is on chronic Coumadin  Her workup was relatively benign and as she was preparing for discharge, she had a syncopal event  Her repeat CT of the head was unchanged  She continues to complain of headache and dizziness  She denies chest pain, shortness of breath, abdominal pain      Her daughter reports that she had a syncopal episode in August of last year and the workup was relatively benign and it was attributed to her blood pressure  She was then taken off the metoprolol 12 5 mg daily  Denies: Chest pain, Shortness of Breath, Nausea, Vomiting, Diarrhea, Dysuria    ROS:  A 12-point review of systems was done  Please see the HPI for the full details  All other systems negative  PMH:  Principal Problem:    Vasovagal syncope  Active Problems:    History of stroke    Alzheimer disease    Asthenia    Carotid artery calcification, bilateral    Paroxysmal atrial fibrillation (HCC)    Hyperlipidemia    Hypertension    Headache      Past Medical History:   Diagnosis Date    A-fib ([place] [id number] )     Alzheimer disease     Diverticulosis     Dyslipidemia     History of stroke     Hyperlipidemia     Hypertension     Stroke ([place] [address] )     UTI (urinary tract infection)     Vasovagal syncope 2/13/2018     Past Surgical History:   Procedure Laterality Date    CHOLECYSTECTOMY       Social History     Social History    Marital status:      Spouse name: N/A    Number of children: N/A    Years of education: N/A     Social History Main Topics    Smoking status: Former Smoker    Smokeless tobacco: Never Used    Alcohol use No    Drug use: No    Sexual activity: Not Asked     Other Topics Concern    None     Social History Narrative    None     History reviewed  No pertinent family history      MED/ALLERGIES:  Current Facility-Administered Medications   Medication Dose Route Frequency Provider Last Rate Last Dose    sodium chloride 0 9 % infusion  75 mL/hr Intravenous Continuous Rony Randhawa DO 75 mL/hr at 02/13/18 1921 75 mL/hr at 02/13/18 1921     Current Outpatient Prescriptions   Medication Sig Dispense Refill    atorvastatin (LIPITOR) 20 mg tablet Take 20 mg by mouth daily      ciprofloxacin (CIPRO) 250 mg tablet Take 500 mg by mouth daily      Cranberry 250 MG TABS Take 500 mg by mouth daily      donepezil (ARICEPT) 10 mg tablet Take 10 mg by mouth daily at bedtime      memantine (NAMENDA) 10 mg tablet Take 10 mg by mouth 2 (two) times a day      warfarin (COUMADIN) 3 mg tablet Take 1 tablet by mouth daily (Patient taking differently: Take by mouth daily 1 5mg every other day  3mg every other day  Alternating ) 30 tablet 1    metoprolol tartrate (LOPRESSOR) 25 mg tablet Take 0 5 tablets by mouth every 12 (twelve) hours for 30 days 30 tablet 0     Allergies   Allergen Reactions    Ativan [Lorazepam]     Latuda [Lurasidone]        OBJECTIVE:    Current Vitals:   Blood Pressure: 148/64 (02/13/18 2013)  Pulse: 80 (02/13/18 2013)  Temperature: 99 °F (37 2 °C) (02/13/18 1352)  Temp Source: Temporal (02/13/18 1352)  Respirations: 12 (02/13/18 2013)  Height: 5' 1" (154 9 cm) (02/13/18 1345)  Weight - Scale: 69 kg (152 lb 1 9 oz) (02/13/18 1345)  SpO2: 96 % (02/13/18 2013)    No intake or output data in the 24 hours ending 02/13/18 2036    Invasive Devices     Peripheral Intravenous Line            Peripheral IV 02/13/18 Left Antecubital less than 1 day                  Physical Exam   Constitutional: She appears well-developed and well-nourished  She is sleeping and cooperative  No distress  HENT:   Head: Normocephalic and atraumatic  Right Ear: Hearing and external ear normal    Left Ear: Hearing and external ear normal    Nose: Nose normal    Mouth/Throat: Mucous membranes are not pale, not dry and not cyanotic  Eyes: Conjunctivae, EOM and lids are normal  No scleral icterus  Pt   Solemn not and not participatory with opening of eyes to facilitate thorough eye exam; Opens only enough to determine EOM normal   Neck: Trachea normal  Carotid bruit is not present  No edema present  No thyroid mass present  Cardiovascular: Normal rate, regular rhythm and normal heart sounds  No murmur heard  Pulmonary/Chest: Breath sounds normal  She has no decreased breath sounds  Abdominal: Soft   Bowel sounds are normal  There is no tenderness  Neurological: She is alert  She is disoriented  No cranial nerve deficit (minimal particpation from patient)  Skin: She is not diaphoretic  Lab Results:   Results from last 7 days  Lab Units 02/13/18  1349 02/13/18  1348   WBC Thousand/uL  --  10 52*   HEMOGLOBIN g/dL  --  12 8   I STAT HEMOGLOBIN g/dl 12 9  --    HEMATOCRIT %  --  37 7   PLATELETS Thousands/uL  --  276      Results from last 7 days  Lab Units 02/13/18  1453   SODIUM mmol/L 136   POTASSIUM mmol/L 3 6   CHLORIDE mmol/L 100   CO2 mmol/L 24   BUN mg/dL 13   CREATININE mg/dL 0 72   CALCIUM mg/dL 9 3   GLUCOSE RANDOM mg/dL 101     Lab Results   Component Value Date    CKTOTAL 32 10/26/2017    TROPONINI <0 02 02/13/2018         Imaging:  CT BRAIN - WITHOUT CONTRAST (following syncopal episode)     INDICATION:  80-year-old woman with syncopal episode and vomiting      COMPARISON:  Head CT 8 2/13/2018  Brain MR 10/26/2017      TECHNIQUE:  CT examination of the brain was performed  In addition to axial images, coronal reformatted images were created and submitted for interpretation        Radiation dose length product (DLP) for this visit:  1010 mGy-cm   This examination, like all CT scans performed in the [facility], was performed utilizing techniques to minimize radiation dose exposure, including the use of iterative   reconstruction and automated exposure control        IMAGE QUALITY:  Diagnostic      FINDINGS:     PARENCHYMA:  Decreased attenuation is noted in the supratentorial white matter demonstrating an appearance most consistent with severe microangiopathic change      No intracranial mass, mass effect or midline shift  No CT signs of acute infarction  There is no parenchymal hemorrhage    Intracranial vasculature is diffusely hyperdense, due to recent administration of intravenous contrast   Remote left frontal lobe   infarct is unchanged in appearance  Remote right basal ganglial lacune is unchanged in appearance      VENTRICLES AND EXTRA-AXIAL SPACES:  Enlargement of ventricles and extra-axial CSF spaces consistent with cerebral and cerebellar atrophy      VISUALIZED ORBITS AND PARANASAL SINUSES:  Unremarkable      CALVARIUM AND EXTRACRANIAL SOFT TISSUES:   Normal      IMPRESSION:  No acute intracranial abnormality  Microangiopathic changes  CTA NECK AND BRAIN WITH AND WITHOUT CONTRAST (initial study)     INDICATION: Headache  Sudden onset of headache with dizziness  Patient currently on Coumadin      COMPARISON:   10/26/2017     TECHNIQUE:  Routine CT imaging of the Brain without contrast   Post contrast imaging was performed after administration of iodinated contrast through the neck and brain  Post contrast axial 0 625 mm images timed to opacify the arterial system        3D rendering was performed on an independent workstation  MIP reconstructions performed  Coronal reconstructions were performed of the noncontrast portion of the brain        Radiation dose length product (DLP) for this visit:  0481 mGy-cm   This examination, like all CT scans performed in the [facility], was performed utilizing techniques to minimize radiation dose exposure, including the use of iterative   reconstruction and automated exposure control         IV Contrast:  85 mL of iohexol (OMNIPAQUE)     IMAGE QUALITY:   Diagnostic     FINDINGS:  NONCONTRAST BRAIN     PARENCHYMA:  Mild cerebral volume loss  Decreased attenuation is noted in the supratentorial white matter demonstrating an appearance most consistent with moderate microangiopathic change  Old infarction identified within the left posterior lateral   frontal lobe extending to the cortex  Small old left frontal lobe infarct adjacent to the frontal horn of the lateral ventricle      No intracranial mass, mass effect or midline shift  No acute intracranial hemorrhage   No CT signs of acute infarction      VENTRICLES AND EXTRA-AXIAL SPACES:  Mild ventriculomegaly consistent with the degree of cerebral volume loss and previous ischemic change  Left lateral ventricle slightly larger than right      VISUALIZED ORBITS AND PARANASAL SINUSES:  Unremarkable      CALVARIUM AND EXTRACRANIAL SOFT TISSUES:   Normal         CERVICAL VASCULATURE     AORTIC ARCH AND GREAT VESSELS:  Mild calcification of the aortic arch and proximal great vessels  Mild subclavian artery calcification bilaterally without significant stenosis      RIGHT VERTEBRAL ARTERY CERVICAL SEGMENT:  Normal origin  The vessel is normal in caliber throughout the neck      LEFT VERTEBRAL ARTERY CERVICAL SEGMENT:  Normal origin  The vessel is normal in caliber throughout the neck      RIGHT EXTRACRANIAL CAROTID SEGMENT:  Normal caliber common carotid artery  Normal bifurcation and cervical internal carotid artery  No stenosis or dissection      LEFT EXTRACRANIAL CAROTID SEGMENT:  Calcification of the distal common carotid artery and proximal cervical internal carotid artery  Unfortunately there is patient motion due to swallowing limiting evaluation of the bifurcation  There appears to be   mild, less than 50% narrowing at the origin of the internal carotid artery      NASCET criteria was used to determine the degree of internal carotid artery diameter stenosis      INTRACRANIAL VASCULATURE      INTERNAL CAROTID ARTERIES:  Slight calcification without stenosis  Normal ophthalmic artery origins      ANTERIOR CIRCULATION:  Symmetric A1 segments and anterior cerebral arteries with normal enhancement  Normal anterior communicating artery      MIDDLE CEREBRAL ARTERY CIRCULATION:  The M1 segments and middle cerebral artery branches are patent bilaterally  Suggestion of mild intracranial atherosclerotic disease      DISTAL VERTEBRAL ARTERIES:  Normal distal vertebral arteries    Posterior inferior cerebellar artery origins are normal  Normal vertebral basilar junction      BASILAR ARTERY:  Basilar artery is normal in caliber  Normal superior cerebellar arteries      POSTERIOR CEREBRAL ARTERIES: Both posterior cerebral arteries are patent  Hypoplastic left P1 segment with relatively large posterior communicating arteries      DURAL VENOUS SINUSES:  Normal         NON VASCULAR ANATOMY     BONY STRUCTURES:  No acute osseous abnormality  Osteomalacia of the maxilla and mandible  Mild cervical spondylitic degenerative change      SOFT TISSUES OF THE NECK:  Normal      THORACIC INLET:  Patchy groundglass opacity within the upper lung fields may represent edema or inflammation  Mediastinal and hilar nodes are present         IMPRESSION:     Mild atherosclerotic disease of the cervical and intracranial vasculature without significant stenosis  Unfortunately the bifurcations are slightly limited due to swallowing artifact  There appears to be mild narrowing of the distal left common carotid   artery and proximal cervical internal carotid artery  No occlusive disease      Noncontrast imaging of the brain demonstrates moderate chronic microangiopathic change more pronounced on the left  Old left frontal lobe infarctions  No hemorrhage or signs of acute territorial infarct      Mild patchy groundglass opacity within the upper lung fields with mediastinal and hilar adenopathy  Findings are nonspecific and may represent edema or inflammation  EKG - No ischemia; No ST elevation/depression; No Atrial Fib  VTE Prophylaxis: Warfarin (Coumadin) as per treatment for Parox  A  Fib  Code Status: FULL per daughter    Counseling / Coordination of Care: Total floor / unit time spent today 45 minutes  Anticipated Length of Stay will be: MORE THAN 2 (TWO) Midnights     Rosalva Greenfield PA-C    This note has been constructed using a voice recognition system

## 2018-02-14 NOTE — NURSING NOTE
Was unable to gather information on Navigator due to patients mental status  Patient is base line dementia

## 2018-02-14 NOTE — PROGRESS NOTES
[address] Internal Medicine Progress Note  Patient: Richard Mooney 80 y o  female   MRN: [id number]  PCP: Aries Taveras DO  Unit/Bed#: [facility] -01 Encounter: [id number]  Date Of Visit: 02/14/18    Assessment:    Principal Problem:    Vasovagal syncope  Active Problems:    History of stroke    Alzheimer disease    Asthenia    Carotid artery calcification, bilateral    Paroxysmal atrial fibrillation (HCC)    Hyperlipidemia    Hypertension    Headache      Plan:    · Vasovagal syncope probably precipitated by combination of headache and oral pain from recent dental extraction doubt CVA and no focal deficits noted/both presyncope in post syncope CT scans reviewed unremarkable  · Asthenia, she remains lethargic and somnolent unclear what her baseline is although family convince still not back to normal will watch overnight and have Neurology evaluate may need MRI obtain physical therapy assessment prior to home care  · Paroxysmal atrial fibrillation with rate control continue on will warfarin 1 5 mg alternating with 3 mg every other day no evidence of bleeding from recent dental extraction on oral exam  · Alzheimer's dementia continued donepezil and Namenda  · History of cardioembolic stroke she did not stop her warfarin prior to dental procedure and remains therapeutic on warfarin  · Essential hypertension on no meds prior with initial presentation of hypertension however at this has normalized will monitor      VTE Pharmacologic Prophylaxis:   Pharmacologic: Warfarin (Coumadin)  Mechanical VTE Prophylaxis in Place: Yes    Discussions with Specialists or Other Care Team Provider:  No    Time Spent for Care: 30 minutes  More than 50% of total time spent on counseling and coordination of care as described above        Subjective:   Still admits to headache although less than yesterday denies any nausea denies any visual disturbance denies any weakness although family convince she remains weak and somnolent more than usual    Objective:     Vitals:   Temp (24hrs), Av 3 °F (36 8 °C), Min:97 6 °F (36 4 °C), Max:99 °F (37 2 °C)    HR:  [68-96] 80  Resp:  [12-33] 17  BP: (138-184)/(64-94) 138/84  SpO2:  [94 %-99 %] 98 %  Body mass index is 28 74 kg/m²  Input and Output Summary (last 24 hours): Intake/Output Summary (Last 24 hours) at 18 1248  Last data filed at 18 0915   Gross per 24 hour   Intake             1000 ml   Output              250 ml   Net              750 ml       Physical Exam:     Physical Exam:  Somnolent  General appearance: alert, appears stated age and cooperative  Head: Normocephalic, without obvious abnormality, atraumatic  Lungs: clear to auscultation bilaterally  Heart: regular rate and rhythm  Abdomen: soft, non-tender; bowel sounds normal; no masses,  no organomegaly  Back: negative  Extremities: extremities normal, atraumatic, no cyanosis or edema  Neurologic: Alert and oriented X 3, normal strength and tone  Normal symmetric reflexes  Normal coordination and gait      Additional Data:     Labs:      Results from last 7 days  Lab Units 18  0523  18  1348   WBC Thousand/uL 11 41*  --  10 52*   HEMOGLOBIN g/dL 11 8  --  12 8   I STAT HEMOGLOBIN   --   < >  --    HEMATOCRIT % 35 1  --  37 7   PLATELETS Thousands/uL 237  --  276   NEUTROS PCT %  --   --  61   LYMPHS PCT %  --   --  31   MONOS PCT %  --   --  8   EOS PCT %  --   --  0   < > = values in this interval not displayed  Results from last 7 days  Lab Units 18  0523   SODIUM mmol/L 138   POTASSIUM mmol/L 3 6   CHLORIDE mmol/L 105   CO2 mmol/L 25   BUN mg/dL 14   CREATININE mg/dL 0 72   CALCIUM mg/dL 8 5   GLUCOSE RANDOM mg/dL 110       Results from last 7 days  Lab Units 18  1453   INR  2 21*       * I Have Reviewed All Lab Data Listed Above  * Additional Pertinent Lab Tests Reviewed:  [address] Admission Reviewed    Imaging:  Cta Head And Neck With And Without Contrast    Result Date: 2/13/2018  Narrative: CTA NECK AND BRAIN WITH AND WITHOUT CONTRAST INDICATION: Headache  Sudden onset of headache with dizziness  Patient currently on Coumadin  COMPARISON:   10/26/2017 TECHNIQUE:  Routine CT imaging of the Brain without contrast   Post contrast imaging was performed after administration of iodinated contrast through the neck and brain  Post contrast axial 0 625 mm images timed to opacify the arterial system  3D rendering was performed on an independent workstation  MIP reconstructions performed  Coronal reconstructions were performed of the noncontrast portion of the brain  Radiation dose length product (DLP) for this visit:  1499 mGy-cm   This examination, like all CT scans performed in the [facility], was performed utilizing techniques to minimize radiation dose exposure, including the use of iterative reconstruction and automated exposure control  IV Contrast:  85 mL of iohexol (OMNIPAQUE)  IMAGE QUALITY:   Diagnostic FINDINGS: NONCONTRAST BRAIN PARENCHYMA:  Mild cerebral volume loss  Decreased attenuation is noted in the supratentorial white matter demonstrating an appearance most consistent with moderate microangiopathic change  Old infarction identified within the left posterior lateral frontal lobe extending to the cortex  Small old left frontal lobe infarct adjacent to the frontal horn of the lateral ventricle  No intracranial mass, mass effect or midline shift  No acute intracranial hemorrhage  No CT signs of acute infarction  VENTRICLES AND EXTRA-AXIAL SPACES:  Mild ventriculomegaly consistent with the degree of cerebral volume loss and previous ischemic change  Left lateral ventricle slightly larger than right  VISUALIZED ORBITS AND PARANASAL SINUSES:  Unremarkable  CALVARIUM AND EXTRACRANIAL SOFT TISSUES:   Normal  CERVICAL VASCULATURE AORTIC ARCH AND GREAT VESSELS:  Mild calcification of the aortic arch and proximal great vessels    Mild subclavian artery calcification bilaterally without significant stenosis  RIGHT VERTEBRAL ARTERY CERVICAL SEGMENT:  Normal origin  The vessel is normal in caliber throughout the neck  LEFT VERTEBRAL ARTERY CERVICAL SEGMENT:  Normal origin  The vessel is normal in caliber throughout the neck  RIGHT EXTRACRANIAL CAROTID SEGMENT:  Normal caliber common carotid artery  Normal bifurcation and cervical internal carotid artery  No stenosis or dissection  LEFT EXTRACRANIAL CAROTID SEGMENT:  Calcification of the distal common carotid artery and proximal cervical internal carotid artery  Unfortunately there is patient motion due to swallowing limiting evaluation of the bifurcation  There appears to be mild, less than 50% narrowing at the origin of the internal carotid artery  NASCET criteria was used to determine the degree of internal carotid artery diameter stenosis  INTRACRANIAL VASCULATURE INTERNAL CAROTID ARTERIES:  Slight calcification without stenosis  Normal ophthalmic artery origins  ANTERIOR CIRCULATION:  Symmetric A1 segments and anterior cerebral arteries with normal enhancement  Normal anterior communicating artery  MIDDLE CEREBRAL ARTERY CIRCULATION:  The M1 segments and middle cerebral artery branches are patent bilaterally  Suggestion of mild intracranial atherosclerotic disease  DISTAL VERTEBRAL ARTERIES:  Normal distal vertebral arteries  Posterior inferior cerebellar artery origins are normal  Normal vertebral basilar junction  BASILAR ARTERY:  Basilar artery is normal in caliber  Normal superior cerebellar arteries  POSTERIOR CEREBRAL ARTERIES: Both posterior cerebral arteries are patent  Hypoplastic left P1 segment with relatively large posterior communicating arteries  DURAL VENOUS SINUSES:  Normal  NON VASCULAR ANATOMY BONY STRUCTURES:  No acute osseous abnormality  Osteomalacia of the maxilla and mandible  Mild cervical spondylitic degenerative change   SOFT TISSUES OF THE NECK:  Normal  THORACIC INLET:  Patchy groundglass opacity within the upper lung fields may represent edema or inflammation  Mediastinal and hilar nodes are present  Impression: Mild atherosclerotic disease of the cervical and intracranial vasculature without significant stenosis  Unfortunately the bifurcations are slightly limited due to swallowing artifact  There appears to be mild narrowing of the distal left common carotid  artery and proximal cervical internal carotid artery  No occlusive disease  Noncontrast imaging of the brain demonstrates moderate chronic microangiopathic change more pronounced on the left  Old left frontal lobe infarctions  No hemorrhage or signs of acute territorial infarct  Mild patchy groundglass opacity within the upper lung fields with mediastinal and hilar adenopathy  Findings are nonspecific and may represent edema or inflammation  Workstation performed: [id number]     Ct Head Without Contrast    Result Date: 2/13/2018  Narrative: CT BRAIN - WITHOUT CONTRAST INDICATION:  80-year-old woman with syncopal episode and vomiting  COMPARISON:  Head CT 8 2/13/2018  Brain MR 10/26/2017  TECHNIQUE:  CT examination of the brain was performed  In addition to axial images, coronal reformatted images were created and submitted for interpretation  Radiation dose length product (DLP) for this visit:  1010 mGy-cm   This examination, like all CT scans performed in the [facility], was performed utilizing techniques to minimize radiation dose exposure, including the use of iterative reconstruction and automated exposure control  IMAGE QUALITY:  Diagnostic  FINDINGS:  PARENCHYMA:  Decreased attenuation is noted in the supratentorial white matter demonstrating an appearance most consistent with severe microangiopathic change  No intracranial mass, mass effect or midline shift  No CT signs of acute infarction  There is no parenchymal hemorrhage    Intracranial vasculature is diffusely hyperdense, due to recent administration of intravenous contrast   Remote left frontal lobe infarct is unchanged in appearance  Remote right basal ganglial lacune is unchanged in appearance  VENTRICLES AND EXTRA-AXIAL SPACES:  Enlargement of ventricles and extra-axial CSF spaces consistent with cerebral and cerebellar atrophy  VISUALIZED ORBITS AND PARANASAL SINUSES:  Unremarkable  CALVARIUM AND EXTRACRANIAL SOFT TISSUES:   Normal      Impression: No acute intracranial abnormality  Microangiopathic changes  Workstation performed: [id number]     Imaging Reports Reviewed Today Include:   Imaging Personally Reviewed by Myself Includes:  CT of neck and brain reviewed  Procedure: Cta Head And Neck With And Without Contrast    Result Date: 2/13/2018  Narrative: CTA NECK AND BRAIN WITH AND WITHOUT CONTRAST INDICATION: Headache  Sudden onset of headache with dizziness  Patient currently on Coumadin  COMPARISON:   10/26/2017 TECHNIQUE:  Routine CT imaging of the Brain without contrast   Post contrast imaging was performed after administration of iodinated contrast through the neck and brain  Post contrast axial 0 625 mm images timed to opacify the arterial system  3D rendering was performed on an independent workstation  MIP reconstructions performed  Coronal reconstructions were performed of the noncontrast portion of the brain  Radiation dose length product (DLP) for this visit:  1499 mGy-cm   This examination, like all CT scans performed in the [facility], was performed utilizing techniques to minimize radiation dose exposure, including the use of iterative reconstruction and automated exposure control  IV Contrast:  85 mL of iohexol (OMNIPAQUE)  IMAGE QUALITY:   Diagnostic FINDINGS: NONCONTRAST BRAIN PARENCHYMA:  Mild cerebral volume loss  Decreased attenuation is noted in the supratentorial white matter demonstrating an appearance most consistent with moderate microangiopathic change    Old infarction identified within the left posterior lateral frontal lobe extending to the cortex  Small old left frontal lobe infarct adjacent to the frontal horn of the lateral ventricle  No intracranial mass, mass effect or midline shift  No acute intracranial hemorrhage  No CT signs of acute infarction  VENTRICLES AND EXTRA-AXIAL SPACES:  Mild ventriculomegaly consistent with the degree of cerebral volume loss and previous ischemic change  Left lateral ventricle slightly larger than right  VISUALIZED ORBITS AND PARANASAL SINUSES:  Unremarkable  CALVARIUM AND EXTRACRANIAL SOFT TISSUES:   Normal  CERVICAL VASCULATURE AORTIC ARCH AND GREAT VESSELS:  Mild calcification of the aortic arch and proximal great vessels  Mild subclavian artery calcification bilaterally without significant stenosis  RIGHT VERTEBRAL ARTERY CERVICAL SEGMENT:  Normal origin  The vessel is normal in caliber throughout the neck  LEFT VERTEBRAL ARTERY CERVICAL SEGMENT:  Normal origin  The vessel is normal in caliber throughout the neck  RIGHT EXTRACRANIAL CAROTID SEGMENT:  Normal caliber common carotid artery  Normal bifurcation and cervical internal carotid artery  No stenosis or dissection  LEFT EXTRACRANIAL CAROTID SEGMENT:  Calcification of the distal common carotid artery and proximal cervical internal carotid artery  Unfortunately there is patient motion due to swallowing limiting evaluation of the bifurcation  There appears to be mild, less than 50% narrowing at the origin of the internal carotid artery  NASCET criteria was used to determine the degree of internal carotid artery diameter stenosis  INTRACRANIAL VASCULATURE INTERNAL CAROTID ARTERIES:  Slight calcification without stenosis  Normal ophthalmic artery origins  ANTERIOR CIRCULATION:  Symmetric A1 segments and anterior cerebral arteries with normal enhancement  Normal anterior communicating artery   MIDDLE CEREBRAL ARTERY CIRCULATION:  The M1 segments and middle cerebral artery branches are patent bilaterally  Suggestion of mild intracranial atherosclerotic disease  DISTAL VERTEBRAL ARTERIES:  Normal distal vertebral arteries  Posterior inferior cerebellar artery origins are normal  Normal vertebral basilar junction  BASILAR ARTERY:  Basilar artery is normal in caliber  Normal superior cerebellar arteries  POSTERIOR CEREBRAL ARTERIES: Both posterior cerebral arteries are patent  Hypoplastic left P1 segment with relatively large posterior communicating arteries  DURAL VENOUS SINUSES:  Normal  NON VASCULAR ANATOMY BONY STRUCTURES:  No acute osseous abnormality  Osteomalacia of the maxilla and mandible  Mild cervical spondylitic degenerative change  SOFT TISSUES OF THE NECK:  Normal  THORACIC INLET:  Patchy groundglass opacity within the upper lung fields may represent edema or inflammation  Mediastinal and hilar nodes are present  Impression: Mild atherosclerotic disease of the cervical and intracranial vasculature without significant stenosis  Unfortunately the bifurcations are slightly limited due to swallowing artifact  There appears to be mild narrowing of the distal left common carotid  artery and proximal cervical internal carotid artery  No occlusive disease  Noncontrast imaging of the brain demonstrates moderate chronic microangiopathic change more pronounced on the left  Old left frontal lobe infarctions  No hemorrhage or signs of acute territorial infarct  Mild patchy groundglass opacity within the upper lung fields with mediastinal and hilar adenopathy  Findings are nonspecific and may represent edema or inflammation  Workstation performed: [id number]     Procedure: Ct Head Without Contrast    Result Date: 2/13/2018  Narrative: CT BRAIN - WITHOUT CONTRAST INDICATION:  66-year-old woman with syncopal episode and vomiting  COMPARISON:  Head CT 8 2/13/2018  Brain MR 10/26/2017  TECHNIQUE:  CT examination of the brain was performed    In addition to axial images, coronal reformatted images were created and submitted for interpretation  Radiation dose length product (DLP) for this visit:  1010 mGy-cm   This examination, like all CT scans performed in the [place], was performed utilizing techniques to minimize radiation dose exposure, including the use of iterative reconstruction and automated exposure control  IMAGE QUALITY:  Diagnostic  FINDINGS:  PARENCHYMA:  Decreased attenuation is noted in the supratentorial white matter demonstrating an appearance most consistent with severe microangiopathic change  No intracranial mass, mass effect or midline shift  No CT signs of acute infarction  There is no parenchymal hemorrhage  Intracranial vasculature is diffusely hyperdense, due to recent administration of intravenous contrast   Remote left frontal lobe infarct is unchanged in appearance  Remote right basal ganglial lacune is unchanged in appearance  VENTRICLES AND EXTRA-AXIAL SPACES:  Enlargement of ventricles and extra-axial CSF spaces consistent with cerebral and cerebellar atrophy  VISUALIZED ORBITS AND PARANASAL SINUSES:  Unremarkable  CALVARIUM AND EXTRACRANIAL SOFT TISSUES:   Normal      Impression: No acute intracranial abnormality  Microangiopathic changes   Workstation performed: [id number]        Recent Cultures (last 7 days):           Last 24 Hours Medication List:     Current Facility-Administered Medications:  amoxicillin 500 mg Oral [facility] Rosalva Shoulders, PA-C    atorvastatin 20 mg Oral Daily With [employer], PA-C    donepezil 10 mg Oral HS Rosalva Shoulders, PA-C    hydrALAZINE 5 mg Intravenous Q6H PRN Rosalva Shoulders, PA-C    memantine 10 mg Oral BID Rosalva Shoulders, PA-C    sodium chloride 75 mL/hr Intravenous Continuous Kennyth Bouche, DO Last Rate: 75 mL/hr (02/14/18 0916)   warfarin 1 5 mg Oral Daily (warfarin) Rosalva Shoulders, PA-C         Today, Patient Was Seen By: Jessica Chester MD    ** Please Note: Dragon 360 Dictation voice to text software may have been used in the creation of this document   **

## 2018-02-14 NOTE — PLAN OF CARE
CARDIOVASCULAR - ADULT     Maintains optimal cardiac output and hemodynamic stability Progressing     Absence of cardiac dysrhythmias or at baseline rhythm Progressing        DISCHARGE PLANNING     Discharge to home or other facility with appropriate resources Progressing        INFECTION - ADULT     Absence or prevention of progression during hospitalization Progressing        Knowledge Deficit     Patient/family/caregiver demonstrates understanding of disease process, treatment plan, medications, and discharge instructions Progressing        NEUROSENSORY - ADULT     Achieves stable or improved neurological status Progressing     Achieves maximal functionality and self care Progressing        Nutrition/Hydration-ADULT     Nutrient/Hydration intake appropriate for improving, restoring or maintaining nutritional needs Progressing        PAIN - ADULT     Verbalizes/displays adequate comfort level or baseline comfort level Progressing        Potential for Falls     Patient will remain free of falls Progressing        SAFETY ADULT     Maintain or return to baseline ADL function Progressing     Maintain or return mobility status to optimal level Progressing

## 2018-02-14 NOTE — CONSULTS
Consultation - Neurology   Tez Rodriguez 80 y o  female MRN: [id number]  Unit/Bed#: [facility] [address] [place] 220-01 Encounter: [id number]      Assessment/Plan   1)  Headache - resolved  Likely 2/2 to nerve irritation and muscle pain after tooth extraction   -Recommend motrin 600mg PO Q6H PRN pain  Use sparingly and with food as pt has hx of GI bleed and is on coumadin     -Recommend caution with use of narcotic pain medications in elderly patient   2)  Vasovagal syncope in setting of pain     -Recommend IV fluids and conservative management   3)  Hx of lacunar infarction - likely cardioembolic from Afib  Pt seen in Nov neurology    -Pt to follow up with neurology PRN per family request   4)  Paroxsymal Afib   -Continue Coumadin   5)  Alzheimer's Dementia    -Plan per primary team       History of Present Illness     Reason for Consult / Principal Problem: 1  Syncope  Hx and PE limited by: Baseline Dementia   HPI: Tez Rodriguez is a 80 y o   female with a PMH of PAF, carotid artery stenosis, stroke, and dementia who presents with a new onset severe bifrontal headache radiating into her neck after having 2 teeth pulled on 2/13/18  The pt was initially screaming in pt  No nausea or vomiting  The pt was evaluated in the [facility]  CTA negative for acute abnormality  The pt was about to be discharged and was being assisted with dressing when she suddenly became cold and clammy and syncopized for approx 2 min  No tongue bite, urinary incontinence, gaze deviation or tonic-clonic activity  On exam today, the pt reports that the headache has resolved  Pt denies fever, chills, CP, SOB, abdominal pain, N/V, difficulty with bowel or bladder, changes in vision, numbness, weakness, ataxia, slurred speech or difficulty with word finding  Neurological exam is non-focal with exception of some confusion from baseline dementia  Daughter in the room states that pt is at her baseline  No meningeal irritation      Inpatient consult to Neurology  Consult performed by: Marcy Gonzalez  Consult ordered by: Marylee Pin          Review of Systems   See HPI     Historical Information   Past Medical History:   Diagnosis Date    A-fib [facility])     Alzheimer disease     Diverticulosis     Dyslipidemia     History of stroke     Hyperlipidemia     Hypertension     [facility] [address] [facility]     UTI (urinary tract infection)     Vasovagal syncope 2/13/2018     Past Surgical History:   Procedure Laterality Date    CHOLECYSTECTOMY       Social History   History   Alcohol Use No     History   Drug Use No     History   Smoking Status    Former Smoker   Smokeless Tobacco    Never Used     Family History: non-contributory    Review of previous medical records was completed  Meds/Allergies   Scheduled Meds:  Current Facility-Administered Medications:  amoxicillin 500 mg Oral [facility] Pramod Patton PA-C    atorvastatin 20 mg Oral Daily With [employer], PA-CARLO    donepezil 10 mg Oral HS ASIM [place]field-CAROL    hydrALAZINE 5 mg Intravenous Q6H PRN Pramod Patton PA-C    memantine 10 mg Oral BID Prmaod Patton PA-C    sodium chloride 50 mL/hr Intravenous Continuous Brooklynn Corado MD Last Rate: 50 mL/hr (02/14/18 1314)   warfarin 1 5 mg Oral Daily (warfarin) Pramod Patton PA-C      Continuous Infusions:  sodium chloride 50 mL/hr Last Rate: 50 mL/hr (02/14/18 1314)     PRN Meds: hydrALAZINE      Allergies   Allergen Reactions    Ativan [Lorazepam]     Latuda [Lurasidone]        Objective   Vitals:Blood pressure 142/66, pulse 71, temperature 98 1 °F (36 7 °C), temperature source Tympanic, resp  rate 18, height 5' 1" (1 549 m), weight 69 kg (152 lb 1 9 oz), SpO2 96 %  ,Body mass index is 28 74 kg/m²  Intake/Output Summary (Last 24 hours) at 02/14/18 1458  Last data filed at 02/14/18 0915   Gross per 24 hour   Intake             1000 ml   Output              250 ml   Net              750 ml       Invasive Devices:    Invasive Devices     Peripheral Intravenous Line            Peripheral IV 02/13/18 Left Antecubital less than 1 day                Physical Exam   Constitutional: She appears well-developed  She appears cachectic  HENT:   Head: Normocephalic  Right Ear: External ear normal    Left Ear: External ear normal    Nose: Nose normal    Mouth/Throat: Oropharynx is clear and moist  No oropharyngeal exudate  Bruising noted L jaw and anterior neck   Eyes: Conjunctivae are normal  Right eye exhibits no discharge  Left eye exhibits no discharge  No scleral icterus  Neck: Normal range of motion  Neck supple  Pulmonary/Chest: Effort normal and breath sounds normal  No respiratory distress  She has no wheezes  She has no rales  She exhibits no tenderness  Musculoskeletal: Normal range of motion  She exhibits no edema, tenderness or deformity  Neurological: She has normal strength  She has a normal Finger-Nose-Finger Test    Reflex Scores:       Tricep reflexes are 1+ on the right side and 1+ on the left side  Bicep reflexes are 1+ on the right side and 1+ on the left side  Brachioradialis reflexes are 1+ on the right side and 1+ on the left side  Patellar reflexes are 1+ on the right side and 1+ on the left side  Achilles reflexes are 1+ on the right side and 1+ on the left side  Skin: Skin is warm and dry  No rash noted  No erythema  No pallor  Psychiatric: She has a normal mood and affect  Her speech is normal and behavior is normal    Nursing note and vitals reviewed  Neurologic Exam     Mental Status   Oriented to person  (Can state she in a hospital, unsure which one)  Disoriented to year, month and date  Follows 2 step commands  Attention: normal  Concentration: normal    Speech: speech is normal   Abnormal comprehension  Cranial Nerves   Cranial nerves II through XII intact       Could not visualize fundi      Motor Exam   Muscle bulk: normal  Overall muscle tone: normal    Strength   Strength 5/5 throughout  Strength adjusted for age      Sensory Exam   Light touch normal      Gait, Coordination, and Reflexes     Gait  Gait: (gait deferred for safety )    Coordination   Finger to nose coordination: normal    Tremor   Resting tremor: absent    Reflexes   Right brachioradialis: 1+  Left brachioradialis: 1+  Right biceps: 1+  Left biceps: 1+  Right triceps: 1+  Left triceps: 1+  Right patellar: 1+  Left patellar: 1+  Right achilles: 1+  Left achilles: 1+  Right : 1+  Left : 1+  Right plantar: normal  Left plantar: normal  Right ankle clonus: absent  Left ankle clonus: absent      Lab Results: I have personally reviewed pertinent reports       Recent Results (from the past 24 hour(s))   Troponin I    Collection Time: 02/13/18  4:30 PM   Result Value Ref Range    Troponin I <0 02 <=0 04 ng/mL   ECG 12 lead    Collection Time: 02/13/18  4:33 PM   Result Value Ref Range    Ventricular Rate 75 BPM    Atrial Rate 75 BPM    WI Interval 172 ms    QRSD Interval 78 ms    QT Interval 400 ms    QTC Interval 446 ms    P [place] 101 degrees    QRS Axis 17 degrees    T Wave Axis 103 degrees   Basic metabolic panel    Collection Time: 02/14/18  5:23 AM   Result Value Ref Range    Sodium 138 136 - 145 mmol/L    Potassium 3 6 3 5 - 5 3 mmol/L    Chloride 105 100 - 108 mmol/L    CO2 25 21 - 32 mmol/L    Anion Gap 8 4 - 13 mmol/L    BUN 14 5 - 25 mg/dL    Creatinine 0 72 0 60 - 1 30 mg/dL    Glucose 110 65 - 140 mg/dL    Glucose, Fasting 110 (H) 65 - 99 mg/dL    Calcium 8 5 8 3 - 10 1 mg/dL    eGFR 77 ml/min/1 73sq m   CBC (With Platelets)    Collection Time: 02/14/18  5:23 AM   Result Value Ref Range    WBC 11 41 (H) 4 31 - 10 16 Thousand/uL    RBC 3 97 3 81 - 5 12 Million/uL    Hemoglobin 11 8 11 5 - 15 4 g/dL    Hematocrit 35 1 34 8 - 46 1 %    MCV 88 82 - 98 fL    MCH 29 7 26 8 - 34 3 pg    MCHC 33 6 31 4 - 37 4 g/dL    RDW 13 7 11 6 - 15 1 %    Platelets 401 457 - 974 Thousands/uL    MPV 11 5 8 9 - 12 7 fL ][      Imaging Studies: I have personally reviewed pertinent reports  and I have personally reviewed pertinent films in PACS       EKG, Pathology, and Other Studies: I have personally reviewed pertinent reports      VTE Prophylaxis: Sequential compression device (Venodyne)  and Warfarin (Coumadin)    Code Status: Level 1 - Full Code  Advance Directive and Living Will:      Power of :    POLST:

## 2018-02-14 NOTE — CASE MANAGEMENT
Initial Clinical Review    Admission: Date/Time/Statement:  OBS 2/13 @ 1746    Orders Placed This Encounter   Procedures    Place in Observation (expected length of stay for this patient is less than two midnights)     Standing Status:   Standing     Number of Occurrences:   1     Order Specific Question:   Admitting Physician     Answer:   Sharee Fields [[id number]]     Order Specific Question:   Level of Care     Answer:   Med Surg [16]       ED: Date/Time/Mode of Arrival:   ED Arrival Information     Expected Arrival Acuity Means of Arrival Escorted By Service Admission Type    - 2/13/2018 13:41 Emergent Ambulance [address] [facility] Emergency    Arrival Complaint    Headache          Chief Complaint:   Chief Complaint   Patient presents with    Headache - New Onset or New Symptoms     pt on coumadin, developed sudden onset headache with dizziness, HTN enroute       History of Illness: Pt presents for evaluation of a sudden onset headache following a dental procedure today  This is constant severe, and radiates to her neck   She does take anticoagulants  She is demented at baseline  Family at bedside and states that this is her baseline  History obtained from the family in the room   Denies loss of consciousness         ED Vital Signs:   ED Triage Vitals   Temperature Pulse Respirations Blood Pressure SpO2   02/13/18 1352 02/13/18 1345 02/13/18 1345 02/13/18 1345 02/13/18 1345   99 °F (37 2 °C) 69 (!) 26 153/88 97 %      Temp Source Heart Rate Source Patient Position - Orthostatic VS BP Location FiO2 (%)   02/13/18 1352 02/13/18 1345 02/13/18 1533 02/13/18 1459 --   Temporal Monitor Lying Right arm       Pain Score       02/13/18 1345       Worst Possible Pain        Wt Readings from Last 1 Encounters:   02/13/18 69 kg (152 lb 1 9 oz)       Vital Signs (abnormal):   02/13/18 1830 -- 80 15  172/71 97 % -- EP   02/13/18 1630 -- 70 --  172/76 94 % -- EP   02/13/18 1611 -- 96 20 140/70 95 % -- KS 02/13/18 1533 -- 80 -- 150/90 98 % -- KS   02/13/18 1459 -- -- --  184/88 -- -- KS   02/13/18 1445 -- 80  33 -- 94 % -- KS   02/13/18 1415 -- -- --  182/86 -- -- KS   02/13/18 1400 -- 68  33  172/94 99 %         Abnormal Labs/Diagnostic Test Results:  PT 24 8,  PTT 42,  INR 2 21,    WBC's 10 52  CT Head:       No acute intracranial abnormality   Microangiopathic changes  CTA Head & Neck: Mild atherosclerotic disease of the cervical and intracranial vasculature without significant stenosis   Unfortunately the bifurcations are slightly limited due to swallowing artifact   There appears to be mild narrowing of the distal left common carotid   artery and proximal cervical internal carotid artery   No occlusive disease  Noncontrast imaging of the brain demonstrates moderate chronic microangiopathic change more pronounced on the left   Old left frontal lobe infarctions   No hemorrhage or signs of acute territorial infarct  Mild patchy groundglass opacity within the upper lung fields with mediastinal and hilar adenopathy   Findings are nonspecific and may represent edema or inflammation  EKG: Sinus rhythm with occasional Premature ventricular complexes   Nonspecific T wave abnormality    ED Treatment:   Medication Administration from 02/13/2018 1341 to 02/14/2018 0031       Date/Time Order Dose Route Action Action by Comments     02/13/2018 1442 OLANZapine (ZyPREXA ZYDIS) dispersible tablet 10 mg 10 mg Oral Given Anthony Barrow RN      02/13/2018 1435 iohexol (OMNIPAQUE) 350 MG/ML injection (MULTI-DOSE) 85 mL 85 mL Intravenous Given Ina Mark      02/13/2018 1650 ondansetron (ZOFRAN) injection 4 mg 4 mg Intravenous Given Anthony Barrow RN      02/13/2018 1921 sodium chloride 0 9 % infusion 75 mL/hr Intravenous New Bag Germaine Gallardo RN      02/13/2018 2325 donepezil (ARICEPT) tablet 10 mg 10 mg Oral Given Germaine Gallardo RN      02/13/2018 2324 memantine (NAMENDA) tablet 10 mg 10 mg Oral Given Cee Bui FELICITA Fields      02/13/2018 2325 amoxicillin (AMOXIL) capsule 500 mg 500 mg Oral Given Davon Rhoades RN           Past Medical/Surgical History: Active Ambulatory Problems     Diagnosis Date Noted    History of stroke     Alzheimer disease     Dyslipidemia     Acute lacunar stroke ([place] [address] ) 10/26/2017    Asthenia 10/26/2017    Carotid artery calcification, bilateral 10/26/2017    Paroxysmal atrial fibrillation ([place] [address] ) 10/29/2017    A-fib ([place] [address] )      Resolved Ambulatory Problems     Diagnosis Date Noted    No Resolved Ambulatory Problems     Past Medical History:   Diagnosis Date    A-fib ([place] [address] )     Alzheimer disease     Diverticulosis     Dyslipidemia     History of stroke     Hyperlipidemia     Hypertension     Stroke ([place] [address] )     UTI (urinary tract infection)     Vasovagal syncope 2/13/2018       Admitting Diagnosis: Syncope [R55]  HTN (hypertension) [I10]  Elevated INR [R79 1]  Headache [R51]  Headache [R51]    Age/Sex: 80 y o  female     Frank Merlos is a 80 y o  female who underwent tooth extraction earlier today  Following the procedure, she was prescribed 600 mg of Motrin and was given a dose slightly before noon  Around the same time, she began complaining of a severe headache and her daughter reports she was screaming in pain because of the headache      She was brought to the emergency department  A CT scan of the head did not reveal any acute abnormalities  She does have a history of a lacunar infarct and a history of atrial fibrillation for which she is on chronic Coumadin  Her workup was relatively benign and as she was preparing for discharge, she had a syncopal event  Her repeat CT of the head was unchanged      She continues to complain of headache and dizziness  She denies chest pain, shortness of breath, abdominal pain      Assessment/Plan:   Vasovagal syncope     Plan:  Vasovagal syncope  · Will observe on telemetry  · One prior episode of syncope and August 2017 followed by a benign workup and discontinuation of metoprolol  · Will consult Neurology for evaluation     Paroxysmal atrial fibrillation  · Continue Coumadin 1 5 mg every other day with 3 mg every other day  · Daily INR; Today is 2 21     Alzheimer's dementia  · Continue donepezil, Namenda     History of lacunar infarct  · 2017, likely cardioembolic  · Currently anticoagulated with Coumadin     Hypertension  · Patient currently not on metoprolol  · Will add hydralazine IV p r n  during this hospital stay     Recent tooth extraction procedure  · Will continue on amoxicillin 500 mg b i d      Hyperlipidemia  · Continue statin     Asthenia  · Continue folic acid, iron, vitamin B12  ·   Anticipated Length of Stay will be: MORE THAN 2 (TWO) Midnights     Admission Orders:  OBS  TELE  Up with assist  Cardiac 2 Gm NA Diet  SCD's    Scheduled Meds:   Current Facility-Administered Medications:  amoxicillin 500 mg Oral [facility] Rosalva Shoulders, PA-C    atorvastatin 20 mg Oral Daily With [employer], PA-C    donepezil 10 mg Oral HS Rosalva Shoulders, PA-C    hydrALAZINE 5 mg Intravenous Q6H PRN Rosalva Shoulders, PA-C    memantine 10 mg Oral BID Rosalva Shoulders, PA-C    sodium chloride 75 mL/hr Intravenous Continuous Belem Cherry DO Last Rate: 75 mL/hr (02/13/18 1921)   warfarin 1 5 mg Oral Daily (warfarin) Rosalva Shoulders, PA-C      Continuous Infusions:   sodium chloride 75 mL/hr Last Rate: 75 mL/hr (02/13/18 1921)     PRN Meds: hydrALAZINE    2/14:  97 6 - 80 - 17   138/84  FBS  110,   WBC's 11 41,       Thank you,  [facility] in the [facility] by Lupillo Zaidi for 2017  Network Utilization Review Department  Phone: [phone]; Fax [phone]  ATTENTION: The Network Utilization Review Department is now centralized for our 7 Facilities  Make a note that we have a new phone and fax numbers for our Department   Please call with any questions or concerns to [phone] and carefully follow the prompts so that you are directed to the right person  All voicemails are confidential  Fax any determinations, approvals, denials, and requests for initial or continue stay review clinical to [phone]  Due to HIGH CALL volume, it would be easier if you could please send faxed requests to expedite your requests and in part, help us provide discharge notifications faster

## 2018-02-15 VITALS
OXYGEN SATURATION: 98 % | HEIGHT: 61 IN | DIASTOLIC BLOOD PRESSURE: 71 MMHG | BODY MASS INDEX: 28.72 KG/M2 | TEMPERATURE: 97.2 F | WEIGHT: 152.12 LBS | SYSTOLIC BLOOD PRESSURE: 163 MMHG | HEART RATE: 90 BPM | RESPIRATION RATE: 19 BRPM

## 2018-02-15 LAB
GLUCOSE SERPL-MCNC: 107 MG/DL (ref 65–140)
INR PPP: 2.58 (ref 0.86–1.16)
PROTHROMBIN TIME: 28 SECONDS (ref 12.1–14.4)

## 2018-02-15 PROCEDURE — 85610 PROTHROMBIN TIME: CPT | Performed by: PHYSICIAN ASSISTANT

## 2018-02-15 PROCEDURE — G8978 MOBILITY CURRENT STATUS: HCPCS | Performed by: PHYSICAL THERAPIST

## 2018-02-15 PROCEDURE — G8980 MOBILITY D/C STATUS: HCPCS | Performed by: PHYSICAL THERAPIST

## 2018-02-15 PROCEDURE — 99217 PR OBSERVATION CARE DISCHARGE MANAGEMENT: CPT | Performed by: INTERNAL MEDICINE

## 2018-02-15 PROCEDURE — G8979 MOBILITY GOAL STATUS: HCPCS | Performed by: PHYSICAL THERAPIST

## 2018-02-15 PROCEDURE — 97163 PT EVAL HIGH COMPLEX 45 MIN: CPT | Performed by: PHYSICAL THERAPIST

## 2018-02-15 PROCEDURE — 82948 REAGENT STRIP/BLOOD GLUCOSE: CPT

## 2018-02-15 RX ADMIN — AMOXICILLIN 500 MG: 500 CAPSULE ORAL at 06:00

## 2018-02-15 RX ADMIN — MEMANTINE 10 MG: 5 TABLET ORAL at 09:04

## 2018-02-15 NOTE — PHYSICAL THERAPY NOTE
Physical Therapy Evaluation      Patient Active Problem List   Diagnosis    History of stroke    Alzheimer disease    Dyslipidemia    [facility]    Asthenia    Carotid artery calcification, bilateral    [facility]    Hyperlipidemia    Hypertension    A-fib ([facility] )    Headache    Vasovagal syncope       Past Medical History:   Diagnosis Date    A-fib ([facility] )     Alzheimer disease     Diverticulosis     Dyslipidemia     History of stroke     Hyperlipidemia     Hypertension     Stroke ([facility] )     UTI (urinary tract infection)     Vasovagal syncope 2/13/2018       Past Surgical History:   Procedure Laterality Date    CHOLECYSTECTOMY          02/15/18 0848   Note Type   Note type Eval only   Pain Assessment   Pain Assessment No/denies pain   Home Living   Type of Home House; Apartment  (in law suite with video monitoring)   Home Layout One level   Prior Function   Level of [place] Independent with ADLs and functional mobility; Needs assistance with IADLs   Lives With Family   Receives Help From Family   ADL Assistance Independent   IADLs Needs assistance   Falls in the last 6 months 1 to 4   Comments pt indep with out assistive device  has dementia  has home monitoring  prior syncopal episode, 2 prior falls per charting in 10/2017   Restrictions/Precautions   Other Precautions Agitated; Impulsive; Chair Alarm;Cognitive; Fall Risk  (pt is upset that she is here, more calm after session)   General   Family/Caregiver Present No   Cognition   Overall Cognitive Status Impaired   Orientation Level Oriented to person   RUE Assessment   RUE Assessment WFL   LUE Assessment   LUE Assessment WFL   RLE Assessment   RLE Assessment WNL   LLE Assessment   LLE Assessment WNL   Coordination   Movements are Fluid and Coordinated 1   Sensation WFL   Transfers   Sit to Stand 5  Supervision   Stand to Sit 5  Supervision   Stand pivot 5  Supervision   Ambulation/Elevation   Gait pattern WNL   Gait Assistance 5  Supervision   Assistive Device None   Distance 50'- pt refused farther   Balance   Static Sitting Normal   Dynamic Sitting Normal   Static Standing Normal   Dynamic Standing Good   Ambulatory Good   Higher level balance (pt able to squat and  object from floor)   Endurance Deficit   Endurance Deficit No   Activity Tolerance   Activity Tolerance Patient tolerated treatment well;Treatment limited secondary to agitation   Nurse Made Aware yes   Assessment   Assessment pt admitted with syncopal episode after having tooth pulled, felt to be related to pain  pt refered to PT  pt lives in inl suite [facility] to [facility]  pt is video monitored  pt only oriented to self and agitated that she is here, removed tele leads and per nursing ripped out IV  pt demonstrated indep amb but with poor judgement  pt was able to  item from floor without impairment  pt has functional deficits in self care, cognition, safety awareness and balance   pt will be able to return home with family, does not need skilled PT   Goals   Patient Goals go home   Recommendation   Recommendation Home with family support   PT - OK to Discharge Yes   Modified Judith Scale   Modified Judith Scale 1   Barthel Index   Feeding 10   Bathing 0   Grooming Score 5   Dressing Score 5   Bladder Score 10   Bowels Score 10   Toilet Use Score 5   Transfers (Bed/Chair) Score 10   Mobility (Level Surface) Score 10   Stairs Score 0   Barthel Index Score 65   History: co - morbidities, fall risk, , assist for adl's, cognition  Exam: impairments in locomotion, musculoskeletal, balance,cognition   Clinical: unstable/unpredictable  Complexity:high        David Cummings, PT

## 2018-02-15 NOTE — CASE MANAGEMENT
ATTN CLINICAL REVIEW:  PLEASE NOTE - INITIAL REVIEW is BELOW     Continued Stay Review  OBS    Date:  2/15/2018    Vital Signs: /71 (BP Location: Right arm)   Pulse 90   Temp (!) 97 2 °F (36 2 °C) (Tympanic)   Resp 19   Ht 5' 1" (1 549 m)   Wt 69 kg (152 lb 1 9 oz)   SpO2 98%   BMI 28 74 kg/m²     Medications:   Scheduled Meds:   Current Facility-Administered Medications:  amoxicillin 500 mg Oral [facility] Deion Up PA-C    atorvastatin 20 mg Oral Daily With [employer]MANAV    donepezil 10 mg Oral HS Deion Up PA-C    hydrALAZINE 5 mg Intravenous Q6H PRN Deion Up PA-C    ibuprofen 600 mg Oral Q6H PRN Nadeen Rosenberg PA-C    memantine 10 mg Oral BID Deion Up PA-C    sodium chloride 50 mL/hr Intravenous Continuous Terri Pennington MD Last Rate: 50 mL/hr (02/14/18 1314)   warfarin 3 mg Oral Every Other Day Terri Pennington MD      Continuous Infusions:   sodium chloride 50 mL/hr Last Rate: 50 mL/hr (02/14/18 1314)     PRN Meds: hydrALAZINE    ibuprofen    Abnormal Labs/Diagnostic Results:  None from today    Age/Sex: 80 y o  female   Still admits to headache although less than yesterday denies any nausea denies any visual disturbance denies any weakness although family convince she remains weak and somnolent more than usual  Assessment/Plan:     · Per ATTENDING NOTE  2/14:  Vasovagal syncope probably precipitated by combination of headache and oral pain from recent dental extraction doubt CVA and no focal deficits noted/both presyncope in post syncope CT scans reviewed unremarkable  · Asthenia, she remains lethargic and somnolent unclear what her baseline is although family convince still not back to normal will watch overnight and have Neurology evaluate may need MRI obtain physical therapy assessment prior to home care  · Paroxysmal atrial fibrillation with rate control continue on will warfarin 1 5 mg alternating with 3 mg every other day no evidence of bleeding from recent dental extraction on oral exam  · Alzheimer's dementia continued donepezil and Namenda  · History of cardioembolic stroke she did not stop her warfarin prior to dental procedure and remains therapeutic on warfarin  · Essential hypertension on no meds prior with initial presentation of hypertension however at this has normalized will monitor     Per Neurology:   )  Headache - resolved  Likely 2/2 to nerve irritation and muscle pain after tooth extraction              -Recommend motrin 600mg PO Q6H PRN pain  Use sparingly and with food as pt has hx of GI bleed and is on coumadin                -Recommend caution with use of narcotic pain medications in elderly patient   2)  Vasovagal syncope in setting of pain                -Recommend IV fluids and conservative management   3)  Hx of lacunar infarction - likely cardioembolic from Afib  Pt seen in Nov neurology               -Pt to follow up with neurology PRN per family request   4)  Paroxsymal Afib              -Continue Coumadin   5)  Alzheimer's Dementia               -Plan per primary team     Discharge Plan: To BE Determined    Thank you,  [facility] in the [facility] [address] for 2017  Network Utilization Review Department  Phone: [phone]; Fax [phone]  ATTENTION: The Network Utilization Review Department is now centralized for our 7 Facilities  Make a note that we have a new phone and fax numbers for our Department  Please call with any questions or concerns to [phone] and carefully follow the prompts so that you are directed to the right person  All voicemails are confidential  Fax any determinations, approvals, denials, and requests for initial or continue stay review clinical to [phone]  Due to HIGH CALL volume, it would be easier if you could please send faxed requests to expedite your requests and in part, help us provide discharge notifications faster

## 2018-02-15 NOTE — DISCHARGE SUMMARY
Discharge Summary - [facility]    Patient Information: Breanna Oh 80 y o  female MRN: [id number]  Unit/Bed#: Lucio Mullins [facility] [address] 218-01 Encounter: [id number]    Discharging Physician / Practitioner: Kal Solis MD  PCP: America Bañuelos DO  Admission Date: 2/13/2018  Discharge Date: 02/15/18    Reason for Admission:  Syncope    Discharge Diagnoses:  Vaso vagal syncope in relation to dental extraction pain    Principal Problem:    Vasovagal syncope  Active Problems:    History of stroke    Alzheimer disease    Asthenia    Carotid artery calcification, bilateral    Paroxysmal atrial fibrillation ([address] )    Hyperlipidemia    Hypertension    Headache  Resolved Problems:    * No resolved hospital problems  *      Consultations During Hospital Stay:  · Neurology    Procedures Performed:     Cta Head And Neck With And Without Contrast    Result Date: 2/13/2018  Narrative: CTA NECK AND BRAIN WITH AND WITHOUT CONTRAST INDICATION: Headache  Sudden onset of headache with dizziness  Patient currently on Coumadin  COMPARISON:   10/26/2017 TECHNIQUE:  Routine CT imaging of the Brain without contrast   Post contrast imaging was performed after administration of iodinated contrast through the neck and brain  Post contrast axial 0 625 mm images timed to opacify the arterial system  3D rendering was performed on an independent workstation  MIP reconstructions performed  Coronal reconstructions were performed of the noncontrast portion of the brain  Radiation dose length product (DLP) for this visit:  1499 mGy-cm   This examination, like all CT scans performed in the [facility], was performed utilizing techniques to minimize radiation dose exposure, including the use of iterative reconstruction and automated exposure control  IV Contrast:  85 mL of iohexol (OMNIPAQUE)  IMAGE QUALITY:   Diagnostic FINDINGS: NONCONTRAST BRAIN PARENCHYMA:  Mild cerebral volume loss    Decreased attenuation is noted in the supratentorial white matter demonstrating an appearance most consistent with moderate microangiopathic change  Old infarction identified within the left posterior lateral frontal lobe extending to the cortex  Small old left frontal lobe infarct adjacent to the frontal horn of the lateral ventricle  No intracranial mass, mass effect or midline shift  No acute intracranial hemorrhage  No CT signs of acute infarction  VENTRICLES AND EXTRA-AXIAL SPACES:  Mild ventriculomegaly consistent with the degree of cerebral volume loss and previous ischemic change  Left lateral ventricle slightly larger than right  VISUALIZED ORBITS AND PARANASAL SINUSES:  Unremarkable  CALVARIUM AND EXTRACRANIAL SOFT TISSUES:   Normal  CERVICAL VASCULATURE AORTIC ARCH AND GREAT VESSELS:  Mild calcification of the aortic arch and proximal great vessels  Mild subclavian artery calcification bilaterally without significant stenosis  RIGHT VERTEBRAL ARTERY CERVICAL SEGMENT:  Normal origin  The vessel is normal in caliber throughout the neck  LEFT VERTEBRAL ARTERY CERVICAL SEGMENT:  Normal origin  The vessel is normal in caliber throughout the neck  RIGHT EXTRACRANIAL CAROTID SEGMENT:  Normal caliber common carotid artery  Normal bifurcation and cervical internal carotid artery  No stenosis or dissection  LEFT EXTRACRANIAL CAROTID SEGMENT:  Calcification of the distal common carotid artery and proximal cervical internal carotid artery  Unfortunately there is patient motion due to swallowing limiting evaluation of the bifurcation  There appears to be mild, less than 50% narrowing at the origin of the internal carotid artery  NASCET criteria was used to determine the degree of internal carotid artery diameter stenosis  INTRACRANIAL VASCULATURE INTERNAL CAROTID ARTERIES:  Slight calcification without stenosis  Normal ophthalmic artery origins   ANTERIOR CIRCULATION:  Symmetric A1 segments and anterior cerebral arteries with normal enhancement  Normal anterior communicating artery  MIDDLE CEREBRAL ARTERY CIRCULATION:  The M1 segments and middle cerebral artery branches are patent bilaterally  Suggestion of mild intracranial atherosclerotic disease  DISTAL VERTEBRAL ARTERIES:  Normal distal vertebral arteries  Posterior inferior cerebellar artery origins are normal  Normal vertebral basilar junction  BASILAR ARTERY:  Basilar artery is normal in caliber  Normal superior cerebellar arteries  POSTERIOR CEREBRAL ARTERIES: Both posterior cerebral arteries are patent  Hypoplastic left P1 segment with relatively large posterior communicating arteries  DURAL VENOUS SINUSES:  Normal  NON VASCULAR ANATOMY BONY STRUCTURES:  No acute osseous abnormality  Osteomalacia of the maxilla and mandible  Mild cervical spondylitic degenerative change  SOFT TISSUES OF THE NECK:  Normal  THORACIC INLET:  Patchy groundglass opacity within the upper lung fields may represent edema or inflammation  Mediastinal and hilar nodes are present  Impression: Mild atherosclerotic disease of the cervical and intracranial vasculature without significant stenosis  Unfortunately the bifurcations are slightly limited due to swallowing artifact  There appears to be mild narrowing of the distal left common carotid  artery and proximal cervical internal carotid artery  No occlusive disease  Noncontrast imaging of the brain demonstrates moderate chronic microangiopathic change more pronounced on the left  Old left frontal lobe infarctions  No hemorrhage or signs of acute territorial infarct  Mild patchy groundglass opacity within the upper lung fields with mediastinal and hilar adenopathy  Findings are nonspecific and may represent edema or inflammation  Workstation performed: [id number]     Ct Head Without Contrast    Result Date: 2/13/2018  Narrative: CT BRAIN - WITHOUT CONTRAST INDICATION:  29-year-old woman with syncopal episode and vomiting   COMPARISON:  Head CT 8 2/13/2018  Brain MR 10/26/2017  TECHNIQUE:  CT examination of the brain was performed  In addition to axial images, coronal reformatted images were created and submitted for interpretation  Radiation dose length product (DLP) for this visit:  1010 mGy-cm   This examination, like all CT scans performed in the [facility], was performed utilizing techniques to minimize radiation dose exposure, including the use of iterative reconstruction and automated exposure control  IMAGE QUALITY:  Diagnostic  FINDINGS:  PARENCHYMA:  Decreased attenuation is noted in the supratentorial white matter demonstrating an appearance most consistent with severe microangiopathic change  No intracranial mass, mass effect or midline shift  No CT signs of acute infarction  There is no parenchymal hemorrhage  Intracranial vasculature is diffusely hyperdense, due to recent administration of intravenous contrast   Remote left frontal lobe infarct is unchanged in appearance  Remote right basal ganglial lacune is unchanged in appearance  VENTRICLES AND EXTRA-AXIAL SPACES:  Enlargement of ventricles and extra-axial CSF spaces consistent with cerebral and cerebellar atrophy  VISUALIZED ORBITS AND PARANASAL SINUSES:  Unremarkable  CALVARIUM AND EXTRACRANIAL SOFT TISSUES:   Normal      Impression: No acute intracranial abnormality  Microangiopathic changes  Workstation performed: [id number]         Significant Findings:     · 28-year-old female who presented with apparent episode of syncope after undergoing dental extraction on left lower mandible area  Complaining of severe bifrontal headache radiating into her neck and after procedure she became cold and clammy and syncopized approximately 2 minutes no tongue bite nor urine incontinence was noted  No tonic-clonic activity is noted or gaze deviation  · Admitted under observation status where her headache resolved she continued have dental pain but no focal deficits    She has a history of baseline dementia and had episodes of confusion during her stay presume in relation change environment  She also carries a history of chronic urinary tract infections and had been on ciprofloxacin chronically which was held at time of admission but will be reinstituted time of discharge she is also to complete a course of amoxicillin post dental extraction for another 2 days  · CT of the brain and CTA brain she noted no acute events with a history of lacunar infarcts unchanged likely cardioembolic from history of AFib she is on warfarin and properly anticoagulated  Neurology impression is vasovagal syncope in setting of pain from acute dental extraction with headache resolved with usage of Motrin which will be discontinued at time of discharge noting her chronic anticoagulation  · On interview with her daughter who is at bedside both yesterday and today he agree she is at her baseline but wanted to make sure her Cipro was continued at discharge  Incidental Findings:   · Remote right basal ganglion for lacunar infarct unchanged from previous  · Old left frontal infarct     Test Results Pending at Discharge (will require follow up): · None     Outpatient Tests Requested:  · None    Complications:  * none    Hospital Course:     Tone Garcia is a 80 y o  female patient who originally presented to the hospital on 2/13/2018 due to syncope  Please see above significant findings for hospital course and treatment plan      Condition at Discharge: good     Discharge Day Visit / Exam:     * Please refer to separate progress for these details *    Discharge instructions/Information to patient and family:   See after visit summary for information provided to patient and family  Provisions for Follow-Up Care:  See after visit summary for information related to follow-up care and any pertinent home health orders        Disposition:     Home    For Discharges to [facility] SNF:   · Not Applicable to this Patient - Not Applicable to this Patient      Discharge Statement:  I spent 45 minutes discharging the patient  This time was spent on the day of discharge  I had direct contact with the patient on the day of discharge  Greater than 50% of the total time was spent examining patient, answering all patient questions, arranging and discussing plan of care with patient as well as directly providing post-discharge instructions  Additional time then spent on discharge activities  Discharge Medications:  See after visit summary for reconciled discharge medications provided to patient and family  ** Please Note: Dragon 360 Dictation voice to text software may have been used in the creation of this document   **

## 2018-02-15 NOTE — PROGRESS NOTES
Patient extremely irritable around 0800  Patient ripped out IV and took off tele leads multiple times  Will attempt to place a new IV and tele leads when patient is less irritable

## 2018-02-15 NOTE — PLAN OF CARE
CARDIOVASCULAR - ADULT     Maintains optimal cardiac output and hemodynamic stability Adequate for Discharge     Absence of cardiac dysrhythmias or at baseline rhythm Adequate for Discharge        DISCHARGE PLANNING     Discharge to home or other facility with appropriate resources Adequate for Discharge        INFECTION - ADULT     Absence or prevention of progression during hospitalization Adequate for Discharge        Knowledge Deficit     Patient/family/caregiver demonstrates understanding of disease process, treatment plan, medications, and discharge instructions Adequate for Discharge        NEUROSENSORY - ADULT     Achieves stable or improved neurological status Adequate for Discharge     Achieves maximal functionality and self care Adequate for Discharge        Nutrition/Hydration-ADULT     Nutrient/Hydration intake appropriate for improving, restoring or maintaining nutritional needs Adequate for Discharge        PAIN - ADULT     Verbalizes/displays adequate comfort level or baseline comfort level Adequate for Discharge        Potential for Falls     Patient will remain free of falls Adequate for Discharge        SAFETY ADULT     Maintain or return to baseline ADL function Adequate for Discharge     Maintain or return mobility status to optimal level Adequate for Discharge

## 2018-09-18 ENCOUNTER — HOSPITAL ENCOUNTER (INPATIENT)
Facility: HOSPITAL | Age: 83
LOS: 6 days | Discharge: HOME | DRG: 308 | End: 2018-09-24
Attending: EMERGENCY MEDICINE | Admitting: INTERNAL MEDICINE
Payer: COMMERCIAL

## 2018-09-18 ENCOUNTER — APPOINTMENT (EMERGENCY)
Dept: CT IMAGING | Facility: HOSPITAL | Age: 83
DRG: 308 | End: 2018-09-18
Payer: COMMERCIAL

## 2018-09-18 DIAGNOSIS — N39.0 UTI (URINARY TRACT INFECTION): ICD-10-CM

## 2018-09-18 DIAGNOSIS — Z86.73 HISTORY OF STROKE: ICD-10-CM

## 2018-09-18 DIAGNOSIS — I48.91 A-FIB (HCC): ICD-10-CM

## 2018-09-18 DIAGNOSIS — I48.91 ATRIAL FIBRILLATION (HCC): ICD-10-CM

## 2018-09-18 DIAGNOSIS — R41.0 ACUTE DELIRIUM: Primary | ICD-10-CM

## 2018-09-18 DIAGNOSIS — R55 SYNCOPE: ICD-10-CM

## 2018-09-18 LAB
ALBUMIN SERPL BCP-MCNC: 3.4 G/DL (ref 3.5–5)
ALP SERPL-CCNC: 67 U/L (ref 46–116)
ALT SERPL W P-5'-P-CCNC: 19 U/L (ref 12–78)
ANION GAP SERPL CALCULATED.3IONS-SCNC: 9 MMOL/L (ref 4–13)
APTT PPP: 38 SECONDS (ref 24–36)
AST SERPL W P-5'-P-CCNC: 16 U/L (ref 5–45)
ATRIAL RATE: 120 BPM
ATRIAL RATE: 138 BPM
ATRIAL RATE: 77 BPM
BACTERIA UR QL AUTO: ABNORMAL /HPF
BASOPHILS # BLD AUTO: 0.05 THOUSANDS/ΜL (ref 0–0.1)
BASOPHILS NFR BLD AUTO: 1 % (ref 0–1)
BILIRUB SERPL-MCNC: 0.69 MG/DL (ref 0.2–1)
BILIRUB UR QL STRIP: NEGATIVE
BUN SERPL-MCNC: 12 MG/DL (ref 5–25)
CALCIUM SERPL-MCNC: 9.2 MG/DL (ref 8.3–10.1)
CHLORIDE SERPL-SCNC: 101 MMOL/L (ref 100–108)
CLARITY UR: ABNORMAL
CO2 SERPL-SCNC: 26 MMOL/L (ref 21–32)
COLOR UR: YELLOW
CREAT SERPL-MCNC: 0.76 MG/DL (ref 0.6–1.3)
EOSINOPHIL # BLD AUTO: 0.18 THOUSAND/ΜL (ref 0–0.61)
EOSINOPHIL NFR BLD AUTO: 3 % (ref 0–6)
ERYTHROCYTE [DISTWIDTH] IN BLOOD BY AUTOMATED COUNT: 13.9 % (ref 11.6–15.1)
GFR SERPL CREATININE-BSD FRML MDRD: 72 ML/MIN/1.73SQ M
GLUCOSE SERPL-MCNC: 106 MG/DL (ref 65–140)
GLUCOSE SERPL-MCNC: 139 MG/DL (ref 65–140)
GLUCOSE UR STRIP-MCNC: NEGATIVE MG/DL
HCT VFR BLD AUTO: 38.2 % (ref 34.8–46.1)
HGB BLD-MCNC: 12.8 G/DL (ref 11.5–15.4)
HGB UR QL STRIP.AUTO: NEGATIVE
IMM GRANULOCYTES # BLD AUTO: 0.02 THOUSAND/UL (ref 0–0.2)
IMM GRANULOCYTES NFR BLD AUTO: 0 % (ref 0–2)
INR PPP: 1.67 (ref 0.86–1.17)
KETONES UR STRIP-MCNC: NEGATIVE MG/DL
LEUKOCYTE ESTERASE UR QL STRIP: ABNORMAL
LYMPHOCYTES # BLD AUTO: 2.76 THOUSANDS/ΜL (ref 0.6–4.47)
LYMPHOCYTES NFR BLD AUTO: 39 % (ref 14–44)
MCH RBC QN AUTO: 28.7 PG (ref 26.8–34.3)
MCHC RBC AUTO-ENTMCNC: 33.5 G/DL (ref 31.4–37.4)
MCV RBC AUTO: 86 FL (ref 82–98)
MONOCYTES # BLD AUTO: 0.63 THOUSAND/ΜL (ref 0.17–1.22)
MONOCYTES NFR BLD AUTO: 9 % (ref 4–12)
NEUTROPHILS # BLD AUTO: 3.47 THOUSANDS/ΜL (ref 1.85–7.62)
NEUTS SEG NFR BLD AUTO: 48 % (ref 43–75)
NITRITE UR QL STRIP: POSITIVE
NON-SQ EPI CELLS URNS QL MICRO: ABNORMAL /HPF
NRBC BLD AUTO-RTO: 0 /100 WBCS
PH UR STRIP.AUTO: 7 [PH] (ref 4.5–8)
PLATELET # BLD AUTO: 263 THOUSANDS/UL (ref 149–390)
PMV BLD AUTO: 9.9 FL (ref 8.9–12.7)
POTASSIUM SERPL-SCNC: 3.6 MMOL/L (ref 3.5–5.3)
PROT SERPL-MCNC: 7.1 G/DL (ref 6.4–8.2)
PROT UR STRIP-MCNC: NEGATIVE MG/DL
PROTHROMBIN TIME: 19.8 SECONDS (ref 11.8–14.2)
QRS AXIS: 19 DEGREES
QRS AXIS: 36 DEGREES
QRS AXIS: 47 DEGREES
QRSD INTERVAL: 82 MS
QRSD INTERVAL: 84 MS
QRSD INTERVAL: 86 MS
QT INTERVAL: 330 MS
QT INTERVAL: 332 MS
QT INTERVAL: 388 MS
QTC INTERVAL: 438 MS
QTC INTERVAL: 453 MS
QTC INTERVAL: 464 MS
RBC # BLD AUTO: 4.46 MILLION/UL (ref 3.81–5.12)
RBC #/AREA URNS AUTO: ABNORMAL /HPF
SODIUM SERPL-SCNC: 136 MMOL/L (ref 136–145)
SP GR UR STRIP.AUTO: 1.01 (ref 1–1.03)
T WAVE AXIS: 108 DEGREES
T WAVE AXIS: 129 DEGREES
T WAVE AXIS: 88 DEGREES
TROPONIN I SERPL-MCNC: 0.03 NG/ML
TROPONIN I SERPL-MCNC: <0.02 NG/ML
TROPONIN I SERPL-MCNC: <0.02 NG/ML
UROBILINOGEN UR QL STRIP.AUTO: 0.2 E.U./DL
VENTRICULAR RATE: 105 BPM
VENTRICULAR RATE: 119 BPM
VENTRICULAR RATE: 82 BPM
WBC # BLD AUTO: 7.11 THOUSAND/UL (ref 4.31–10.16)
WBC #/AREA URNS AUTO: ABNORMAL /HPF

## 2018-09-18 PROCEDURE — 84484 ASSAY OF TROPONIN QUANT: CPT | Performed by: INTERNAL MEDICINE

## 2018-09-18 PROCEDURE — 93010 ELECTROCARDIOGRAM REPORT: CPT | Performed by: INTERNAL MEDICINE

## 2018-09-18 PROCEDURE — 93005 ELECTROCARDIOGRAM TRACING: CPT

## 2018-09-18 PROCEDURE — 85730 THROMBOPLASTIN TIME PARTIAL: CPT | Performed by: EMERGENCY MEDICINE

## 2018-09-18 PROCEDURE — 85025 COMPLETE CBC W/AUTO DIFF WBC: CPT | Performed by: EMERGENCY MEDICINE

## 2018-09-18 PROCEDURE — 70450 CT HEAD/BRAIN W/O DYE: CPT

## 2018-09-18 PROCEDURE — 87077 CULTURE AEROBIC IDENTIFY: CPT

## 2018-09-18 PROCEDURE — 96365 THER/PROPH/DIAG IV INF INIT: CPT

## 2018-09-18 PROCEDURE — 87186 SC STD MICRODIL/AGAR DIL: CPT

## 2018-09-18 PROCEDURE — 36415 COLL VENOUS BLD VENIPUNCTURE: CPT | Performed by: EMERGENCY MEDICINE

## 2018-09-18 PROCEDURE — 99285 EMERGENCY DEPT VISIT HI MDM: CPT

## 2018-09-18 PROCEDURE — 85610 PROTHROMBIN TIME: CPT | Performed by: EMERGENCY MEDICINE

## 2018-09-18 PROCEDURE — 81001 URINALYSIS AUTO W/SCOPE: CPT

## 2018-09-18 PROCEDURE — 87086 URINE CULTURE/COLONY COUNT: CPT

## 2018-09-18 PROCEDURE — 82948 REAGENT STRIP/BLOOD GLUCOSE: CPT

## 2018-09-18 PROCEDURE — 84484 ASSAY OF TROPONIN QUANT: CPT | Performed by: EMERGENCY MEDICINE

## 2018-09-18 PROCEDURE — 99223 1ST HOSP IP/OBS HIGH 75: CPT | Performed by: INTERNAL MEDICINE

## 2018-09-18 PROCEDURE — 80053 COMPREHEN METABOLIC PANEL: CPT | Performed by: EMERGENCY MEDICINE

## 2018-09-18 RX ORDER — CIPROFLOXACIN 250 MG/1
250 TABLET, FILM COATED ORAL DAILY
Status: DISCONTINUED | OUTPATIENT
Start: 2018-09-18 | End: 2018-09-20

## 2018-09-18 RX ORDER — GABAPENTIN 100 MG/1
100 CAPSULE ORAL EVERY 4 HOURS PRN
Status: DISCONTINUED | OUTPATIENT
Start: 2018-09-18 | End: 2018-09-18

## 2018-09-18 RX ORDER — SODIUM CHLORIDE, SODIUM LACTATE, POTASSIUM CHLORIDE, CALCIUM CHLORIDE 600; 310; 30; 20 MG/100ML; MG/100ML; MG/100ML; MG/100ML
75 INJECTION, SOLUTION INTRAVENOUS CONTINUOUS
Status: DISCONTINUED | OUTPATIENT
Start: 2018-09-18 | End: 2018-09-19

## 2018-09-18 RX ORDER — ACETAMINOPHEN 325 MG/1
650 TABLET ORAL ONCE
Status: COMPLETED | OUTPATIENT
Start: 2018-09-18 | End: 2018-09-18

## 2018-09-18 RX ORDER — ONDANSETRON 2 MG/ML
INJECTION INTRAMUSCULAR; INTRAVENOUS
Status: COMPLETED
Start: 2018-09-18 | End: 2018-09-18

## 2018-09-18 RX ORDER — ONDANSETRON 2 MG/ML
4 INJECTION INTRAMUSCULAR; INTRAVENOUS EVERY 4 HOURS PRN
Status: DISCONTINUED | OUTPATIENT
Start: 2018-09-18 | End: 2018-09-23

## 2018-09-18 RX ORDER — MEMANTINE HYDROCHLORIDE 5 MG/1
10 TABLET ORAL 2 TIMES DAILY
Status: DISCONTINUED | OUTPATIENT
Start: 2018-09-18 | End: 2018-09-24 | Stop reason: HOSPADM

## 2018-09-18 RX ORDER — WARFARIN SODIUM 3 MG/1
3 TABLET ORAL
Status: DISCONTINUED | OUTPATIENT
Start: 2018-09-18 | End: 2018-09-24 | Stop reason: HOSPADM

## 2018-09-18 RX ORDER — ACETAMINOPHEN 325 MG/1
650 TABLET ORAL 4 TIMES DAILY PRN
Status: DISCONTINUED | OUTPATIENT
Start: 2018-09-18 | End: 2018-09-24 | Stop reason: HOSPADM

## 2018-09-18 RX ORDER — ATORVASTATIN CALCIUM 10 MG/1
20 TABLET, FILM COATED ORAL
Status: DISCONTINUED | OUTPATIENT
Start: 2018-09-18 | End: 2018-09-24 | Stop reason: HOSPADM

## 2018-09-18 RX ORDER — DONEPEZIL HYDROCHLORIDE 10 MG/1
10 TABLET, FILM COATED ORAL
Status: DISCONTINUED | OUTPATIENT
Start: 2018-09-18 | End: 2018-09-24 | Stop reason: HOSPADM

## 2018-09-18 RX ADMIN — CIPROFLOXACIN HYDROCHLORIDE 250 MG: 250 TABLET, FILM COATED ORAL at 11:21

## 2018-09-18 RX ADMIN — ONDANSETRON 4 MG: 2 INJECTION INTRAMUSCULAR; INTRAVENOUS at 12:57

## 2018-09-18 RX ADMIN — MEMANTINE 10 MG: 5 TABLET ORAL at 11:21

## 2018-09-18 RX ADMIN — DILTIAZEM HYDROCHLORIDE 30 MG: 30 TABLET, FILM COATED ORAL at 11:21

## 2018-09-18 RX ADMIN — MEMANTINE 10 MG: 5 TABLET ORAL at 17:10

## 2018-09-18 RX ADMIN — ATORVASTATIN CALCIUM 20 MG: 10 TABLET, FILM COATED ORAL at 17:10

## 2018-09-18 RX ADMIN — SODIUM CHLORIDE, SODIUM LACTATE, POTASSIUM CHLORIDE, AND CALCIUM CHLORIDE 75 ML/HR: .6; .31; .03; .02 INJECTION, SOLUTION INTRAVENOUS at 13:11

## 2018-09-18 RX ADMIN — ACETAMINOPHEN 650 MG: 325 TABLET, FILM COATED ORAL at 13:12

## 2018-09-18 RX ADMIN — WARFARIN SODIUM 3 MG: 3 TABLET ORAL at 17:10

## 2018-09-18 RX ADMIN — ACETAMINOPHEN 650 MG: 325 TABLET, FILM COATED ORAL at 08:24

## 2018-09-18 RX ADMIN — CEFTRIAXONE 1000 MG: 1 INJECTION, POWDER, FOR SOLUTION INTRAMUSCULAR; INTRAVENOUS at 08:23

## 2018-09-18 NOTE — RAPID RESPONSE
Progress Note - Rapid Response   Mikael Argueta 80 y o  female MRN: [id number]    Time Called ( Time): 0124  Date Called: 9/18/2018  Level of Care: MS  Room#: 870  EFCGOPA Time ( Time): 1250  Event End Time ( Time): 1320  MEWS score at time of Rapid Response: unknown  Primary reason for call: Acute change in mental status  Interventions:  Airway/Breathing:  No Intervention  Circulation: IV Fluid Bolus  Other Treatments: EKG, zofran       Assessment:   1  Syncope possibly related to vasovagal response from pain due to headache  2  Headache  3  Nausea and vomiting    Plan:   · IV fluids at 75 ml/hr  · EKG: atrial fibrillation  · Tylenol prn for headache  · Monitor neuro status and blood pressure       HPI/Chief Complaint (Background/Situation):   Mikael Argueta is a 80y o  year old female who presented to the emergency department with headache, altered mental status and syncope  Her vitals signs, CTH and laboratory values were all within normal limits  Upon arrival to the floor the patient got up to the bathroom and became dizzy and passed out  She was placed back into the bed and became nauseous and vomited       Historical Information   Past Medical History:   Diagnosis Date    A-fib [facility])     Alzheimer disease     Diverticulosis     Dyslipidemia     History of stroke     Hyperlipidemia     Hypertension     [facility]     UTI (urinary tract infection)     Vasovagal syncope 2/13/2018     Past Surgical History:   Procedure Laterality Date    CHOLECYSTECTOMY       Social History   History   Alcohol Use No     History   Drug Use No     History   Smoking Status    Former Smoker   Smokeless Tobacco    Never Used     Family History: non-contributory    Meds/Allergies     Current Facility-Administered Medications:  acetaminophen 650 mg Oral 4x Daily PRN CARMELLA Solitario    atorvastatin 20 mg Oral After Bernabe Arriaga MD    ciprofloxacin 250 mg Oral Daily Vlad Almanzar MD diltiazem 30 mg Oral Q6H [address] Michelle Alfaro MD    donepezil 10 mg Oral HS Michelle Alfaro MD    lactated ringers 75 mL/hr Intravenous Continuous LorCARMELLA Kenney Last Rate: 75 mL/hr (09/18/18 1311)   memantine 10 mg Oral BID Michelle Alfaro MD    ondansetron 4 mg Intravenous Q4H PRN LorCARMELLA Kenney    warfarin 3 mg Oral Daily (warfarin) Michelle Alfaro MD          lactated ringers 75 mL/hr Last Rate: 75 mL/hr (09/18/18 1311)       Allergies   Allergen Reactions    Ativan [Lorazepam]     Latuda [Lurasidone]        ROS: Negative except headache, nausea, vomiting    Vitals: T 97 6 HR 82 R 20 /52 94%    Physical Exam:  Gen: mild distress  HEENT: NC/AT PERRLA EOMI oropharynx moist  Neck: supple, no JVD, trachea midline, no adenopathy  Chest: CTA  Cor: Clear S1 and S2  Abd: Soft NT/ND +BS  Ext: no edema  Neuro: alert, non-focal  Skin: W/D/I      Intake/Output Summary (Last 24 hours) at 09/18/18 1319  Last data filed at 09/18/18 0841   Gross per 24 hour   Intake               50 ml   Output              500 ml   Net             -450 ml       Respiratory    Lab Data (Last 4 hours)    None         O2/Vent Data (Last 4 hours)    None              Invasive Devices     Peripheral Intravenous Line            Peripheral IV 09/18/18 Left Antecubital less than 1 day                DIAGNOSTIC DATA:    Lab: I have personally reviewed pertinent lab results     CBC:     Results from last 7 days  Lab Units 09/18/18  0720   WBC Thousand/uL 7 11   HEMOGLOBIN g/dL 12 8   HEMATOCRIT % 38 2   PLATELETS Thousands/uL 263     CMP:     Results from last 7 days  Lab Units 09/18/18  0720   SODIUM mmol/L 136   POTASSIUM mmol/L 3 6   CHLORIDE mmol/L 101   CO2 mmol/L 26   BUN mg/dL 12   CREATININE mg/dL 0 76   CALCIUM mg/dL 9 2   ALK PHOS U/L 67   ALT U/L 19   AST U/L 16     PT/INR:   Lab Results   Component Value Date    INR 1 67 (H) 09/18/2018   ,   Magnesium: No results found for: MAG,   Phosphorous: No results found for: PHOS    Microbiology:  No results found for: Corinna Barriga SPUTUMCULTNOLA      OUTCOME:   Stayed in room   Family notified of transfer: family in room  Family member contacted:   Code Status: Level 1 - Full Code  Critical Care Time: Total Critical Care time spent 35 minutes excluding procedures, teaching and family updates

## 2018-09-18 NOTE — PLAN OF CARE
Problem: DISCHARGE PLANNING - CARE MANAGEMENT  Goal: Discharge to post-acute care or home with appropriate resources  INTERVENTIONS:  - Conduct assessment to determine patient/family and health care team treatment goals, and need for post-acute services based on payer coverage, community resources, and patient preferences, and barriers to discharge  - Address psychosocial, clinical, and financial barriers to discharge as identified in assessment in conjunction with the patient/family and health care team  -Patient d/c with appropriate resources once reaches medical stability     - Arrange appropriate level of post-acute services according to patient's   needs and preference and payer coverage in collaboration with the physician and health care team  - Communicate with and update the patient/family, physician, and health care team regarding progress on the discharge plan  - Arrange appropriate transportation to post-acute venues  Outcome: Progressing

## 2018-09-18 NOTE — NURSING NOTE
Daughter yelled for help at 1250, stating pt was unresponsive on the toilet and then began vomitting  Rapid response called, pt alert and transported to the bed  Vitals stable, blood sugar WNL  PT given 4mg of zofran, tylenol given for HA, started on LR at 75/hr  Currently on bedrest resting comfortably  Will continue to monitor closely, daughter at bed side

## 2018-09-18 NOTE — ED PROVIDER NOTES
History  Chief Complaint   Patient presents with    Headache     79 yo female brought by ambulance from her residence, which is an apartment off her son's home, for altered mental status and complaint of HA  Her daughter accompanied her to the hospital, after she went over to check on her, after apparently this morning her son found her in bed not responding normally, not able to get up, without clear description of deficit  Then apparently she was able to voice that she was experiencing a headache and was agitated  EMS was called and for them she was purposeful, agitated, c/o headache  VS reported were normal   On arrival in the ED she is alert, emotionally distressed, and does affirm headache "front to back", she will follow commands  She is verbalizing at her baseline  Per her daughter she has dementia at baseline that is manifests mainly as disorientation to time, forgetfulness, but otherwise oriented to people, place, recent events  During the interaction with triage, her daughter and myself, she seemed irritated, asked that we stop asking her questions, and delved into her native Kiswahili language  Her daughter says that is typical if she is frustrated  History provided by:  Patient and relative  History limited by:  Dementia      Prior to Admission Medications   Prescriptions Last Dose Informant Patient Reported? Taking?    Cranberry 250 MG TABS   Yes Yes   Sig: Take 500 mg by mouth daily   atorvastatin (LIPITOR) 20 mg tablet   Yes Yes   Sig: Take 20 mg by mouth daily   ciprofloxacin (CIPRO) 250 mg tablet   Yes Yes   Sig: Take 250 mg by mouth daily     donepezil (ARICEPT) 10 mg tablet   Yes Yes   Sig: Take 10 mg by mouth daily at bedtime   memantine (NAMENDA) 10 mg tablet   Yes Yes   Sig: Take 10 mg by mouth 2 (two) times a day   warfarin (COUMADIN) 3 mg tablet   No Yes   Sig: Take 1 tablet by mouth daily   Patient taking differently: Take by mouth daily 1 5mg every other day  3mg every other day  Alternating       Facility-Administered Medications: None       Past Medical History:   Diagnosis Date    A-fib ([facility] )     Alzheimer disease     Diverticulosis     Dyslipidemia     History of stroke     Hyperlipidemia     Hypertension     Stroke ([facility] )     UTI (urinary tract infection)     Vasovagal syncope 2/13/2018       Past Surgical History:   Procedure Laterality Date    CHOLECYSTECTOMY         History reviewed  No pertinent family history  I have reviewed and agree with the history as documented  Social History   Substance Use Topics    Smoking status: Former Smoker    Smokeless tobacco: Never Used    Alcohol use No        Review of Systems   Unable to perform ROS: Mental status change   Gastrointestinal: Negative for vomiting  Neurological: Positive for headaches  Physical Exam  Physical Exam   Constitutional: She is oriented to person, place, and time  Vital signs are normal  She appears well-developed and well-nourished  She appears listless  She appears distressed  HENT:   Head: Normocephalic and atraumatic  Right Ear: External ear normal    Left Ear: External ear normal    Nose: Nose normal    Mouth/Throat: Oropharynx is clear and moist    Eyes: Conjunctivae and EOM are normal  Pupils are equal, round, and reactive to light  Neck: Normal range of motion  Neck supple  Cardiovascular: Normal rate, regular rhythm and intact distal pulses  Exam reveals no decreased pulses  EKG corresponds to afib, not all beats counted by monitor   Pulmonary/Chest: Effort normal and breath sounds normal  No respiratory distress  Abdominal: Soft  She exhibits no distension  There is no tenderness  Musculoskeletal: Normal range of motion  Neurological: She is oriented to person, place, and time  She has normal strength  She appears listless  Gait normal  GCS eye subscore is 4  GCS verbal subscore is 5  GCS motor subscore is 6     No pronator drift, no LE drift, speech is mumbled, difficult to follow, but answered orientation questions, interspersed with some Chinese and according to her daughter is normal   Skin: Skin is warm and dry  Capillary refill takes less than 2 seconds  No rash noted  She is not diaphoretic  Psychiatric: Her mood appears anxious  Nursing note and vitals reviewed        Vital Signs  ED Triage Vitals   Temperature Pulse Respirations Blood Pressure SpO2   09/18/18 0706 09/18/18 0706 09/18/18 0706 09/18/18 0706 09/18/18 0706   97 5 °F (36 4 °C) (!) 50 16 126/77 99 %      Temp Source Heart Rate Source Patient Position - Orthostatic VS BP Location FiO2 (%)   09/18/18 0706 09/18/18 0800 09/18/18 0706 09/18/18 0706 --   Oral Monitor Lying Right arm       Pain Score       09/18/18 0706       5           Vitals:    09/18/18 2022 09/18/18 2304 09/18/18 2326 09/19/18 0700   BP: 101/51 (!) 96/47 (!) 96/47 129/61   Pulse: 70 62  64   Patient Position - Orthostatic VS: Lying Lying  Lying       Visual Acuity  Visual Acuity      Most Recent Value   L Pupil Size (mm)  2   R Pupil Size (mm)  2          ED Medications  Medications   atorvastatin (LIPITOR) tablet 20 mg (20 mg Oral Given 9/18/18 1710)   ciprofloxacin (CIPRO) tablet 250 mg (250 mg Oral Given 9/18/18 1121)   donepezil (ARICEPT) tablet 10 mg (10 mg Oral Not Given 9/18/18 2200)   memantine (NAMENDA) tablet 10 mg (10 mg Oral Given 9/18/18 1710)   warfarin (COUMADIN) tablet 3 mg (3 mg Oral Given 9/18/18 1710)   diltiazem (CARDIZEM) tablet 30 mg (30 mg Oral Not Given 9/19/18 0504)   lactated ringers infusion (0 mL/hr Intravenous Stopped 9/18/18 2150)   ondansetron (ZOFRAN) injection 4 mg (not administered)   acetaminophen (TYLENOL) tablet 650 mg (650 mg Oral Given 9/18/18 1312)   ceftriaxone (ROCEPHIN) 1 g/50 mL in dextrose IVPB (0 mg Intravenous Stopped 9/18/18 0841)   acetaminophen (TYLENOL) tablet 650 mg (650 mg Oral Given 9/18/18 6524)   ondansetron (ZOFRAN) 4 mg/2 mL injection **AcuDose Override Pull** (4 mg  Given 9/18/18 1257)       Diagnostic Studies  Results Reviewed     Procedure Component Value Units Date/Time    Urine culture [[id number]]  (Abnormal) Collected:  09/18/18 0752    Lab Status:  Preliminary result Specimen:  Urine from Urine, Clean Catch Updated:  09/19/18 0747     Urine Culture >100,000 cfu/ml Gram Negative Talon resembling Escherichia coli (A)    Basic metabolic panel [[id number]]     Lab Status:  No result Specimen:  Blood     CBC and differential [[id number]]     Lab Status:  No result Specimen:  Blood     Troponin I [[id number]]  (Normal) Collected:  09/18/18 1659    Lab Status:  Final result Specimen:  Blood from Arm, Right Updated:  09/18/18 1730     Troponin I 0 03 ng/mL     Troponin I [[id number]]  (Normal) Collected:  09/18/18 1100    Lab Status:  Final result Specimen:  Blood from Arm, Right Updated:  09/18/18 1210     Troponin I <0 02 ng/mL     Troponin I [[id number]]     Lab Status:  No result Specimen:  Blood     Urine Microscopic [[id number]]  (Abnormal) Collected:  09/18/18 0752    Lab Status:  Final result Specimen:  Urine from Urine, Clean Catch Updated:  09/18/18 0806     RBC, UA None Seen /hpf      WBC, UA 10-20 (A) /hpf      Epithelial Cells Occasional /hpf      Bacteria, UA Moderate (A) /hpf     APTT [[id number]]  (Abnormal) Collected:  09/18/18 0720    Lab Status:  Final result Specimen:  Blood from Arm, Left Updated:  09/18/18 0803     PTT 38 (H) seconds     Protime-INR [[id number]]  (Abnormal) Collected:  09/18/18 0720    Lab Status:  Final result Specimen:  Blood from Arm, Left Updated:  09/18/18 0803     Protime 19 8 (H) seconds      INR 1 67 (H)    Troponin I [[id number]]  (Normal) Collected:  09/18/18 0720    Lab Status:  Final result Specimen:  Blood from Arm, Left Updated:  09/18/18 0746     Troponin I <0 02 ng/mL     POCT urinalysis dipstick [[id number]]  (Abnormal) Resulted:  09/18/18 0744    Lab Status:  Final result Specimen:  Urine Updated:  09/18/18 0744    Comprehensive metabolic panel [[id number]]  (Abnormal) Collected:  09/18/18 0720    Lab Status:  Final result Specimen:  Blood from Arm, Left Updated:  09/18/18 0743     Sodium 136 mmol/L      Potassium 3 6 mmol/L      Chloride 101 mmol/L      CO2 26 mmol/L      ANION GAP 9 mmol/L      BUN 12 mg/dL      Creatinine 0 76 mg/dL      Glucose 106 mg/dL      Calcium 9 2 mg/dL      AST 16 U/L      ALT 19 U/L      Alkaline Phosphatase 67 U/L      Total Protein 7 1 g/dL      Albumin 3 4 (L) g/dL      Total Bilirubin 0 69 mg/dL      eGFR 72 ml/min/1 73sq m     Narrative:         National Kidney Disease Education Program recommendations are as follows:  GFR calculation is accurate only with a steady state creatinine  Chronic Kidney disease less than 60 ml/min/1 73 sq  meters  Kidney failure less than 15 ml/min/1 73 sq  meters      ED Urine Macroscopic [[id number]]  (Abnormal) Collected:  09/18/18 0752    Lab Status:  Final result Specimen:  Urine Updated:  09/18/18 0742     Color, UA Yellow     Clarity, UA Slightly Cloudy     pH, UA 7 0     Leukocytes, UA Small (A)     Nitrite, UA Positive (A)     Protein, UA Negative mg/dl      Glucose, UA Negative mg/dl      Ketones, UA Negative mg/dl      Urobilinogen, UA 0 2 E U /dl      Bilirubin, UA Negative     Blood, UA Negative     Specific Gravity, UA 1 010    Narrative:       CLINITEK RESULT    CBC and differential [[id number]] Collected:  09/18/18 0720    Lab Status:  Final result Specimen:  Blood from Arm, Left Updated:  09/18/18 0728     WBC 7 11 Thousand/uL      RBC 4 46 Million/uL      Hemoglobin 12 8 g/dL      Hematocrit 38 2 %      MCV 86 fL      MCH 28 7 pg      MCHC 33 5 g/dL      RDW 13 9 %      MPV 9 9 fL      Platelets 904 Thousands/uL      nRBC 0 /100 WBCs      Neutrophils Relative 48 %      Immat GRANS % 0 %      Lymphocytes Relative 39 %      Monocytes Relative 9 %      Eosinophils Relative 3 %      Basophils Relative 1 %      Neutrophils Absolute 3 47 Thousands/µL      Immature Grans Absolute 0 02 Thousand/uL      Lymphocytes Absolute 2 76 Thousands/µL      Monocytes Absolute 0 63 Thousand/µL      Eosinophils Absolute 0 18 Thousand/µL      Basophils Absolute 0 05 Thousands/µL                  CT head without contrast   Final Result by Miley Curtis MD (09/18 9558)      No acute intracranial process or significant interval change  No skull fracture  Chronic microangiopathy  Workstation performed: [id number]         MRI inpatient order    (Results Pending)              Procedures  ECG 12 Lead Documentation  Date/Time: 9/18/2018 7:15 AM  Performed by: Reji Hollingsworth  Authorized by: Reji Hollingsworth     Rate:     ECG rate:  105  Rhythm:     Rhythm: atrial fibrillation             Phone Contacts  ED Phone Contact    ED Course  ED Course as of Sep 19 0905   Tue Sep 18, 2018   0744 UA c/w UTI    0801 No hemorrhage CT head without contrast   0811 Called to bedside for acute altered mental status, staff took her to bathroom where she finished voiding, then became unresponsive as she was being transitioned, pale, diaphoretic, without loss of pulse, transitioned to bed and regained consciousness spontaneously, with tachycardia, elevated BP, repeated within minutes with return to baseline vital signs on arrival   I watched her go from pale to return of color, and she was damp  I suspect it was a vasovagal syncope  CT was just done and is negative, so with no new deficit or seizure liek activity, I do not feel it needs to be repeated at this time  ED workup is otherwise complete and she can be admitted  Initial Sepsis Screening     Row Name 09/18/18 0746                Is the patient's history suggestive of a new or worsening infection? (!)  Yes (Proceed)  -RM        Suspected source of infection urinary tract infection  -RM        Are two or more of the following signs & symptoms of infection both present and new to the patient?  No  -RM Indicate SIRS criteria          If the answer is yes to both questions, suspicion of sepsis is present          If severe sepsis is present AND tissue hypoperfusion perists in the hour after fluid resuscitation or lactate > 4, the patient meets criteria for SEPTIC SHOCK          Are any of the following organ dysfunction criteria present within 6 hours of suspected infection and SIRS criteria that are NOT considered to be chronic conditions?         Organ dysfunction          Date of presentation of severe sepsis          Time of presentation of severe sepsis          Tissue hypoperfusion persists in the hour after crystalloid fluid administration, evidenced, by either:          Was hypotension present within one hour of the conclusion of crystalloid fluid administration?         Date of presentation of septic shock          Time of presentation of septic shock            User Key  (r) = Recorded By, (t) = Taken By, (c) = Cosigned By    Initials Name Provider Type     Axel Dale MD Physician                  MDM  CritCare Time    Disposition  Final diagnoses:   Acute delirium   UTI (urinary tract infection)   Syncope   [facility] ([facility]     Time reflects when diagnosis was documented in both MDM as applicable and the Disposition within this note     Time User Action Codes Description Comment    9/18/2018  9:03 AM Chio Benders L Add [R41 0] Acute delirium     9/18/2018  9:03 AM Chio Benders L Add [N39 0] UTI (urinary tract infection)     9/18/2018  9:03 AM Chio Benders L Add [R55] Syncope     9/18/2018  9:03 AM Delona Shearing Add [I48 91] Atrial fibrillation ([facility] )     9/18/2018  5:14 PM Rei Leroy Add [Z86 73] History of stroke       ED Disposition     ED Disposition Condition Comment    Admit  Case was discussed with Dr Aston Goldman and the patient's admission status was agreed to be Admission Status: inpatient status to the service of Dr Aston Goldman           Follow-up Information    None         Current Discharge Medication List      CONTINUE these medications which have NOT CHANGED    Details   atorvastatin (LIPITOR) 20 mg tablet Take 20 mg by mouth daily      ciprofloxacin (CIPRO) 250 mg tablet Take 250 mg by mouth daily        Cranberry 250 MG TABS Take 500 mg by mouth daily      donepezil (ARICEPT) 10 mg tablet Take 10 mg by mouth daily at bedtime      memantine (NAMENDA) 10 mg tablet Take 10 mg by mouth 2 (two) times a day      warfarin (COUMADIN) 3 mg tablet Take 1 tablet by mouth daily  Qty: 30 tablet, Refills: 1           No discharge procedures on file      ED Provider  Electronically Signed by           Cody Blanc MD  09/19/18 0781

## 2018-09-18 NOTE — SOCIAL WORK
NERISSA met with patient and patient's daughter/POA for medical and finances  Keri ( who provided all information collected on this assessment  Patient is an 80years old  Who was admitted with the chief compmonty of chest pain  Patient was diagnosed with dementia 4 years ago, alert to person  Patient lives alone in a single 2 story, with 3 steps to reach main entrance  Patient's bedroom and bathroom located on the main floor  Patient's  Children are providing care, daily and up to 10 hours a day, sponsor by [employer], [facility], [place] DAVID  Keri informed this worker patient is monitor 24/7 security cameras installed on the main floor  Patient needs assistance /ssupervision dressing and bathing, totally dependent on family for meal preparation, cleaning and transportation  Patient has no DME  Patient has no psychiatric diagnosis and no use of illicit drugs and/or alcohol intake  Patient;s PCP is Dr Mere Buchanan and uses [facility] [address]    NERISSA reviewed d/c planning process including the following: identifying help at home, patient preference for d/c planning needs, Discharge Lounge, [facility] Meds to Bed program, availability of treatment team to discuss questions or concerns patient and/or family may have regarding understanding medications and recognizing signs and symptoms once discharged  SW also encouraged patient to follow up with all recommended appointments after discharge  Patient advised of importance for patient and family to participate in managing patients medical well being

## 2018-09-18 NOTE — ED NOTES
Pt  assisted to bathroom via wheelchair, upon returning from the bathroom via wheelchair, pt  had a syncopal episode, became unresponsive  Pt lifted onto the bed, vital signs checked and Dr Kt Mcclendon called to the room       Mary Beth Soler RN  09/18/18 2019

## 2018-09-18 NOTE — ED NOTES
Pt  becoming more responsive, responding to verbal stimuli       Becky Carrillo, FELICITA  09/18/18 9483

## 2018-09-18 NOTE — H&P
Unit/Bed#: E4 -01 Encounter: [id number]    Chief complaint:  Syncope    History of Present Illness     HPI:  Mikael Argueta is a 80 y o  female who presented to the emergency room with lethargy and headache  The patient has a history of dementia, stroke, and paroxysmal atrial fibrillation  She lives in an apartment adjacent to her son's home  Today, her family found that she was weak and had difficulty getting out of bed  She did seem is interactive as usual   Usually, despite her dementia, she is conversant and is able to walk with a walker  Because of her deterioration she was brought to the emergency room for further evaluation  While in the emergency room she was taken to the bathroom a wheelchair  When she returned from the bathroom she had a syncopal episode  This lasted less than a minute  She quickly regained her usual mental status  She was found to be in atrial fibrillation  At times her rate was rather rapid  She complains of headache  She had chest pain earlier in the day  Currently, she denies chest pain or difficulty breathing  She is admitted for further evaluation and care  Her daughter notes that when she had her last stroke her presenting symptoms were similar to her current symptoms           Historical Information   Past Medical History:   Diagnosis Date    A-fib ([place] [address] )     Alzheimer disease     Diverticulosis     Dyslipidemia     History of stroke     Hyperlipidemia     Hypertension     Stroke ([place] [address] )     UTI (urinary tract infection)     Vasovagal syncope 2/13/2018     Past Surgical History:   Procedure Laterality Date    CHOLECYSTECTOMY       The patient's medications at the time of admission include:   Atorvastatin 20 mg daily  Ciprofloxacin 250 mg daily  Cranberry 500 mg daily  Aricept 10 mg at bedtime  Namenda 10 mg twice daily  Warfarin 3 mg alternating with 1 5 mg every other day    The patient is allergic to latuda and lorazepam    Family History: Noncontributory    Social history reveals the patient is a   She lives alone in an apartment that is connected to her son's house  She does not smoke nor has she ever smoked  She does not imbibe ethanol or use illicit drugs  Review of Systems  A detailed 12 point review of systems was conducted and is negative apart from those mentioned in the HPI  The specificity of this effort was limited by the patient's mental status  Objective   Vitals: Blood pressure 99/69, pulse 104, temperature (!) 96 7 °F (35 9 °C), temperature source Tympanic, resp  rate 18, height 5' 3" (1 6 m), weight 67 4 kg (148 lb 9 4 oz), SpO2 93 %  Physical Exam the patient is a well-developed, well-nourished woman who appeared in no distress  Head is atraumatic and normocephalic  ENT examination is unrevealing  Eye examination showed the pupils to be equal, round, and reactive to light  Extraocular movements are intact  Neck is supple  Carotids are full without bruits  There is no lymphadenopathy or goiter  Lungs are clear to auscultation and percussion  There is no wheezing, rales, or rhonchi  Cardiac exam revealed rapid irregular rhythm  I could hear no murmur, gallop edematous off with active bowel bowel sounds  No mass, tenderness, or organomegaly  Extremities showed no clubbing, cyanosis, or edema  Peripheral pulses were  Neurologic examination revealed the patient be alert and conversant  She knew she was in the hospital though she was not sure which 1  She did not know the date  She was able to move all extremities equally      Lab Results:   Results for orders placed or performed during the hospital encounter of 09/18/18   CBC and differential   Result Value Ref Range    WBC 7 11 4 31 - 10 16 Thousand/uL    RBC 4 46 3 81 - 5 12 Million/uL    Hemoglobin 12 8 11 5 - 15 4 g/dL    Hematocrit 38 2 34 8 - 46 1 %    MCV 86 82 - 98 fL    MCH 28 7 26 8 - 34 3 pg    MCHC 33 5 31 4 - 37 4 g/dL    RDW 13 9 11 6 - 15 1 %    MPV 9 9 8 9 - 12 7 fL    Platelets 210 622 - 132 Thousands/uL    nRBC 0 /100 WBCs    Neutrophils Relative 48 43 - 75 %    Immat GRANS % 0 0 - 2 %    Lymphocytes Relative 39 14 - 44 %    Monocytes Relative 9 4 - 12 %    Eosinophils Relative 3 0 - 6 %    Basophils Relative 1 0 - 1 %    Neutrophils Absolute 3 47 1 85 - 7 62 Thousands/µL    Immature Grans Absolute 0 02 0 00 - 0 20 Thousand/uL    Lymphocytes Absolute 2 76 0 60 - 4 47 Thousands/µL    Monocytes Absolute 0 63 0 17 - 1 22 Thousand/µL    Eosinophils Absolute 0 18 0 00 - 0 61 Thousand/µL    Basophils Absolute 0 05 0 00 - 0 10 Thousands/µL   Comprehensive metabolic panel   Result Value Ref Range    Sodium 136 136 - 145 mmol/L    Potassium 3 6 3 5 - 5 3 mmol/L    Chloride 101 100 - 108 mmol/L    CO2 26 21 - 32 mmol/L    ANION GAP 9 4 - 13 mmol/L    BUN 12 5 - 25 mg/dL    Creatinine 0 76 0 60 - 1 30 mg/dL    Glucose 106 65 - 140 mg/dL    Calcium 9 2 8 3 - 10 1 mg/dL    AST 16 5 - 45 U/L    ALT 19 12 - 78 U/L    Alkaline Phosphatase 67 46 - 116 U/L    Total Protein 7 1 6 4 - 8 2 g/dL    Albumin 3 4 (L) 3 5 - 5 0 g/dL    Total Bilirubin 0 69 0 20 - 1 00 mg/dL    eGFR 72 ml/min/1 73sq m   Protime-INR   Result Value Ref Range    Protime 19 8 (H) 11 8 - 14 2 seconds    INR 1 67 (H) 0 86 - 1 17   APTT   Result Value Ref Range    PTT 38 (H) 24 - 36 seconds   Troponin I   Result Value Ref Range    Troponin I <0 02 <=0 04 ng/mL   Urine Microscopic   Result Value Ref Range    RBC, UA None Seen None Seen, 0-5 /hpf    WBC, UA 10-20 (A) None Seen, 0-5, 5-55, 5-65 /hpf    Epithelial Cells Occasional None Seen, Occasional /hpf    Bacteria, UA Moderate (A) None Seen, Occasional /hpf   Troponin I   Result Value Ref Range    Troponin I <0 02 <=0 04 ng/mL   Troponin I   Result Value Ref Range    Troponin I 0 03 <=0 04 ng/mL   ECG 12 lead   Result Value Ref Range    Ventricular Rate 105 BPM    Atrial Rate 138 BPM    CO Interval  ms    QRSD Interval 82 ms    QT Interval 332 ms    QTC Interval 438 ms    P Axis  degrees    QRS Axis 47 degrees    T Wave Axis 88 degrees   ECG 12 lead   Result Value Ref Range    Ventricular Rate 119 BPM    Atrial Rate 120 BPM    PA Interval  ms    QRSD Interval 84 ms    QT Interval 330 ms    QTC Interval 464 ms    P Axis  degrees    QRS Axis 36 degrees    T Wave Axis 129 degrees   ECG 12 lead   Result Value Ref Range    Ventricular Rate 82 BPM    Atrial Rate 77 BPM    PA Interval  ms    QRSD Interval 86 ms    QT Interval 388 ms    QTC Interval 453 ms    P Axis  degrees    QRS Axis 19 degrees    T Wave Axis 108 degrees   ED Urine Macroscopic   Result Value Ref Range    Color, UA Yellow     Clarity, UA Slightly Cloudy     pH, UA 7 0 4 5 - 8 0    Leukocytes, UA Small (A) Negative    Nitrite, UA Positive (A) Negative    Protein, UA Negative Negative mg/dl    Glucose, UA Negative Negative mg/dl    Ketones, UA Negative Negative mg/dl    Urobilinogen, UA 0 2 0 2, 1 0 E U /dl E U /dl    Bilirubin, UA Negative Negative    Blood, UA Negative Negative    Specific Gravity, UA 1 010 1 003 - 1 030   Fingerstick Glucose (POCT)   Result Value Ref Range    POC Glucose 139 65 - 140 mg/dl     Imaging:  Cranial CT showed no acute stroke  EKG rapid atrial fibrillation    Assessment/Plan     Assessment:  1  Syncope, most likely related to hemodynamic instability  2  Rapid atrial fibrillation  3  Hypertension  4  Hyperlipidemia  5  Dementia, thought to be vascular  6  Recurrent urinary tract infections  7  History of stroke    Plan:  The patient will be admitted and monitored carefully on telemetry  She will be started on low-dose diltiazem to better control her heart rate  Her blood pressure will be monitored carefully  I suspect that her syncope was related to labile blood pressure likely related to her underlying heart rhythm  She does have pyuria  A urine culture has been obtained  The patient's warfarin is subtherapeutic and her dose will be increased  Further intervention will depend on the patient's clinical course  Because of the similarity between her current symptoms and her past stroke, an MRI will be obtained  I discussed resuscitative measures with the patient and her daughter  They would like all available modalities employed on the patient's behalf in the event of a cardiac arrest   The patient's daughter does state that if the patient was in a vegetative state or badly disabled they would prefer withdrawal of aggressive measures  Considering the above information, I believe that it is evident that the patient's hospitalization will span 2 or more midnights  She is therefore admitted to inpatient status          Code Status: Level 1 - Full Code

## 2018-09-19 ENCOUNTER — APPOINTMENT (INPATIENT)
Dept: MRI IMAGING | Facility: HOSPITAL | Age: 83
DRG: 308 | End: 2018-09-19
Payer: COMMERCIAL

## 2018-09-19 PROCEDURE — 99232 SBSQ HOSP IP/OBS MODERATE 35: CPT | Performed by: INTERNAL MEDICINE

## 2018-09-19 PROCEDURE — 70551 MRI BRAIN STEM W/O DYE: CPT

## 2018-09-19 RX ADMIN — MEMANTINE 10 MG: 5 TABLET ORAL at 18:08

## 2018-09-19 RX ADMIN — ATORVASTATIN CALCIUM 20 MG: 10 TABLET, FILM COATED ORAL at 18:07

## 2018-09-19 RX ADMIN — DONEPEZIL HYDROCHLORIDE 10 MG: 10 TABLET, FILM COATED ORAL at 21:04

## 2018-09-19 RX ADMIN — DILTIAZEM HYDROCHLORIDE 30 MG: 30 TABLET, FILM COATED ORAL at 18:08

## 2018-09-19 RX ADMIN — CIPROFLOXACIN HYDROCHLORIDE 250 MG: 250 TABLET, FILM COATED ORAL at 09:08

## 2018-09-19 RX ADMIN — MEMANTINE 10 MG: 5 TABLET ORAL at 09:08

## 2018-09-19 RX ADMIN — WARFARIN SODIUM 3 MG: 3 TABLET ORAL at 18:08

## 2018-09-19 NOTE — PLAN OF CARE
CARDIOVASCULAR - ADULT     Maintains optimal cardiac output and hemodynamic stability Progressing     Absence of cardiac dysrhythmias or at baseline rhythm Progressing        CONFUSION/THOUGHT DISTURBANCE     Thought disturbances (confusion, delirium, depression, dementia or psychosis) are managed to maintain or return to baseline mental status and functional level Progressing        COPING     Pt/Family able to verbalize concerns and demonstrate effective coping strategies Progressing        DISCHARGE PLANNING     Discharge to home or other facility with appropriate resources Progressing        DISCHARGE PLANNING - CARE MANAGEMENT     Discharge to post-acute care or home with appropriate resources Progressing        GENITOURINARY - ADULT     Absence of urinary retention Progressing        INFECTION - ADULT     Absence or prevention of progression during hospitalization Progressing     Absence of fever/infection during neutropenic period Progressing        Knowledge Deficit     Patient/family/caregiver demonstrates understanding of disease process, treatment plan, medications, and discharge instructions Progressing        METABOLIC, FLUID AND ELECTROLYTES - ADULT     Electrolytes maintained within normal limits Progressing     Fluid balance maintained Progressing        MUSCULOSKELETAL - ADULT     Maintain or return mobility to safest level of function Progressing        PAIN - ADULT     Verbalizes/displays adequate comfort level or baseline comfort level Progressing        Potential for Falls     Patient will remain free of falls Progressing        Prexisting or High Potential for Compromised Skin Integrity     Skin integrity is maintained or improved Progressing        RESPIRATORY - ADULT     Achieves optimal ventilation and oxygenation Progressing        SAFETY ADULT     Maintain or return to baseline ADL function Progressing     Maintain or return mobility status to optimal level Progressing  Patient will remain free of falls Progressing        SKIN/TISSUE INTEGRITY - ADULT     Skin integrity remains intact Progressing

## 2018-09-19 NOTE — PROGRESS NOTES
Spoke to admitting SLIM MANAV about patient's combativeness, aggression and refusal of telemetry, an IV and IV fluids  Also made her aware of patient's daughter informing Charlene Garza RN about pt's adverse effects for mood stabilizing medications  No new orders placed at this time  Will monitor closely and keep bed alarm on

## 2018-09-19 NOTE — CONSULTS
Consult: pt not eating    Per pt's daughter pt has had poor po off and on over past year and worsening past couple months, but pt does crave sweets and will eat anything sweet, pt drinks boost at home but daughter explained not at each meal d/t being unsure if that was ok at each meal  Usually pt eats yogurt for B, pb&j sandwich for L and 1-2 bites of home made meal for D  Currently pt drinking 100% of ensure enlive at meals, which provides (60g pro, 1050cal), today for L ate 1/2 pb&j sandwich, 100% of pudding, 100% of ice cream and drank 100% of ensure enlive  Will continue to monitor po intake at other meals  Explained to daughter ok to give her supplements at meals to aid w/ meeting pro, griselda needs, also provided snack ideas  Continue regular diet and ensure enlive TID

## 2018-09-19 NOTE — NURSING NOTE
Pt  continues to be angry and agitated  Refuses all medical care (EKG, telemetry, blood work, medications, IV)  Yelling curse words and continuing to be combative  Pt consistently ambulates independently to the bathroom, slamming on the door, despite eduction on fall risk and our concerns over her current medical status  Attempted to call security two times with no answer  Charge nurse aware  Called HEATHER EM who was already aware of situation with recommendations to call patient's daughter  Called daughter to inform of the continued situation and will be arriving at bedside within the hour to attempt to calm the patient  Pt back in bed with bed alarm on and within close proximity to the nurse's station  Will continue to monitor closely

## 2018-09-19 NOTE — NURSING NOTE
Pt set bed alarm off  Became easily agitated and combative, throwing the call bell and hitting the staff  Pt removed IV and refused placement of a new one at this time  Daughter was called to ask if the pt takes anything at home to relax her, which daughter denies and states that any mood relaxant agitates the pt further  Daughter attempted to talk to the pt directly over the phone, and pt screamed at daughter, hung up on her, and threw the phone on the floor  Pt remains bed alarmed and within close proximity of the nurse's station  Will continue to monitor closely

## 2018-09-19 NOTE — NURSING NOTE
Pt ripped telemetry leads off stating, "my heart is fine " Nursing supervisor made aware of situation and attempted to educate pt on importance of telemetry and IV fluids  Pt refused all medical care at this time  Will continue to monitor with bed alarm on and room close to nurse's station

## 2018-09-19 NOTE — CASE MANAGEMENT
Initial Clinical Review    Admission: Date/Time/Statement: 9/18/18 @ 0904     Orders Placed This Encounter   Procedures    Inpatient Admission (expected length of stay for this patient is greater than two midnights)     Standing Status:   Standing     Number of Occurrences:   1     Order Specific Question:   Admitting Physician     Answer:   EARNESTINE FOWLER [[id number]]     Order Specific Question:   Level of Care     Answer:   Med Surg [[id number]]     Order Specific Question:   Estimated length of stay     Answer:   More than 2 Midnights     Order Specific Question:   Certification     Answer:   I certify that inpatient services are medically necessary for this patient for a duration of greater than two midnights  See H&P and MD Progress Notes for additional information about the patient's course of treatment  ED: Date/Time/Mode of Arrival:   ED Arrival Information     Expected Arrival Acuity Means of Arrival Escorted By Service Admission Type    - 9/18/2018 07:00 Urgent Ambulance [facility] General Medicine Elective    Arrival Complaint    Chest pain; headache        Chief Complaint:   Chief Complaint   Patient presents with    Headache     History of Illness: 81 yo female brought by ambulance from her residence, which is an apartment off her son's home, for altered mental status and complaint of HA  Her daughter accompanied her to the hospital, after she went over to check on her, after apparently this morning her son found her in bed not responding normally, not able to get up, without clear description of deficit  Then apparently she was able to voice that she was experiencing a headache and was agitated  EMS was called and for them she was purposeful, agitated, c/o headache  VS reported were normal   On arrival in the ED she is alert, emotionally distressed, and does affirm headache "front to back", she will follow commands  She is verbalizing at her baseline   Per her daughter she has dementia at baseline that is manifests mainly as disorientation to time, forgetfulness, but otherwise oriented to people, place, recent events  During the interaction with triage, her daughter and myself, she seemed irritated, asked that we stop asking her questions, and delved into her native [place] language  Her daughter says that is typical if she is frustrated  Episode of likely vasovagal syncope while in ED   ED Vital Signs:   ED Triage Vitals   Temperature Pulse Respirations Blood Pressure SpO2   09/18/18 0706 09/18/18 0706 09/18/18 0706 09/18/18 0706 09/18/18 0706   97 5 °F (36 4 °C) (!) 50 16 126/77 99 %      Temp Source Heart Rate Source Patient Position - Orthostatic VS BP Location FiO2 (%)   09/18/18 0706 09/18/18 0800 09/18/18 0706 09/18/18 0706 --   Oral Monitor Lying Right arm       Pain Score       09/18/18 0706       5        Wt Readings from Last 1 Encounters:   09/18/18 67 4 kg (148 lb 9 4 oz)       Vital Signs (abnormal):   09/18/18 0950 97 6 °F (36 4 °C)  106 20 104/66 94 % --    09/18/18 0915 --  130 -- 102/59 96 % -- [facility]   09/18/18 0915 -- -- -- -- -- 67 4 kg (148 lb 9 4 oz)    09/18/18 0845 97 7 °F (36 5 °C) 94 20 123/60 97 % -- [facility]   09/18/18 0804 --  127  32 161/92 100 % -- W   09/18/18 0800 --  140  32  198/115 98 % -- W   09/18/18 0706 97 5 °F (36 4 °C)  50 16 126/77 99 %         Abnormal Labs/Diagnostic Test Results:   INR 1 67  Urine:  Small leukocytes,  + nitrite,  10-20 wbc's,  Mod bacteria,  cx pending  CT Head: No acute intracranial process or significant interval change  No skull fracture  Chronic microangiopathy    EKG: Atrial fibrillation with rapid ventricular response ( vent rate 119)    ED Treatment:   Medication Administration from 09/18/2018 0700 to 09/18/2018 9901       Date/Time Order Dose Route Action Action by Comments     09/18/2018 0841 ceftriaxone (ROCEPHIN) 1 g/50 mL in dextrose IVPB 0 mg Intravenous Stopped       09/18/2018 0823 ceftriaxone (ROCEPHIN) 1 g/50 mL in dextrose IVPB 1,000 mg Intravenous New Bag       09/18/2018 0824 acetaminophen (TYLENOL) tablet 650 mg 650 mg Oral Given            Past Medical/Surgical History:   Past Medical History:   Diagnosis Date    [facility]fib ([facility] )     Alzheimer disease     Diverticulosis     Dyslipidemia     History of stroke     Hyperlipidemia     Hypertension     Stroke ([facility] )     UTI (urinary tract infection)     Vasovagal syncope 2/13/2018       Admitting Diagnosis: Atrial fibrillation (HCC) [I48 91]  Acute delirium [R41 0]  Syncope [R55]  UTI (urinary tract infection) [N39 0]  Chest pain [R07 9]    Age/Sex: 80 y o  female    Assessment/Plan: 80 y o  female who presented to the emergency room with lethargy and headache  family found that she was weak and had difficulty getting out of bed  While in the emergency room she was taken to the bathroom a wheelchair  When she returned from the bathroom she had a syncopal episode  This lasted less than a minute  She quickly regained her usual mental status  She was found to be in atrial fibrillation  At times her rate was rather rapid  She is admitted for further evaluation and care  Her daughter notes that when she had her last stroke her presenting symptoms were similar to her current symptoms     1   Syncope, most likely related to hemodynamic instability  2  Rapid atrial fibrillation  3  Hypertension  4  Hyperlipidemia  5  Dementia, thought to be vascular  6  Recurrent urinary tract infections  7  History of stroke  Monitor on Tele, start low-dose diltiazem to better control her heart rate  BP will be monitored carefully  I suspect that her syncope was related to labile blood pressure likely related to her underlying heart rhythm  She does have pyuria  A urine culture has been obtained  The patient's warfarin is subtherapeutic and her dose will be increased   Because of the similarity between her current symptoms and her past stroke, an MRI will be obtained  Admission Orders:  IP  TELE  Serial trops  CBC, BMP in am  MRI  Reg Diet    Scheduled Meds:   Current Facility-Administered Medications:          atorvastatin 20 mg Oral After Dinner     ciprofloxacin 250 mg Oral Daily     diltiazem 30 mg Oral Q6H [address]     donepezil 10 mg Oral HS             memantine 10 mg Oral BID             warfarin 3 mg Oral Daily (warfarin)       Continuous Infusions:   lactated ringers 75 mL/hr Last Rate: Stopped (09/18/18 2150)     PRN Meds:   Acetaminophen x 1    Ondansetron  98 4 - 62 - 16   96/47  easily agitated and combative  9/19  Urine cx:  >100,000 Gram neg rods resembling E Coli  MRI  pending    Thank you,  [facility]  Utilization Review Department  Phone: [phone]; Fax [phone]  ATTENTION: Please call with any questions or concerns to [phone]  and carefully follow the prompts so that you are directed to the right person  Send all requests for admission clinical reviews, approved or denied determinations and any other requests to fax [phone]   All voicemails are confidential

## 2018-09-19 NOTE — PROGRESS NOTES
Progress Note - Nathan Olivo 80 y o  female MRN: [id number]    Unit/Bed#: E4 -01 Encounter: [id number]      Subjective: The patient is feeling better  She has had no further dizziness or syncope  She has had no chest pain or shortness of breath  She denies palpitations  She slept well  Physical Exam:   Temp:  [97 1 °F (36 2 °C)-98 5 °F (36 9 °C)] 98 5 °F (36 9 °C)  HR:  [62-72] 72  Resp:  [16-18] 16  BP: ()/(47-61) 115/56    Gen:  Well-developed, well-nourished, in no distress  Neck:  Supple  No lymphadenopathy, goiter, or bruit  Heart:  Regular rhythm  No murmur, gallop, or rub  Lungs:  Clear to auscultation and percussion  No wheezing, rales, or rhonchi    Abd:  Soft with active bowel sounds  No mass, tenderness, or organomegaly  Extremities:  No clubbing, cyanosis, or edema  No calf tenderness  Neuro:  Alert and conversant  No focal sign  Skin:  Warm and dry      LABS:   No new labs  MRI revealed no stroke  Patient Active Problem List   Diagnosis    History of stroke    Alzheimer disease    [facility]    Carotid artery calcification, bilateral    Paroxysmal atrial fibrillation (HCC)    Hyperlipidemia    Hypertension    [facility]fib [facility]    Headache    Vasovagal syncope       Assessment/Plan:  1  Syncope, most likely related to rapid atrial fibrillation  2  Paroxysmal atrial fibrillation, now in sinus rhythm  3  Hypertension  4  Hyperlipidemia  5  Vascular dementia  6  History of stroke without evidence of recurrence  7  History of recurrent urinary tract infections    The patient is improved  She is back in sinus rhythm  She is tolerating diltiazem  Her urine culture grew greater than 100,000 colonies of E coli  Sensitivities are pending      VTE Pharmacologic Prophylaxis: Warfarin (Coumadin)  VTE Mechanical Prophylaxis: reason for no mechanical VTE prophylaxis Therapeutically anticoagulated

## 2018-09-20 LAB — BACTERIA UR CULT: ABNORMAL

## 2018-09-20 PROCEDURE — 99232 SBSQ HOSP IP/OBS MODERATE 35: CPT | Performed by: INTERNAL MEDICINE

## 2018-09-20 RX ORDER — TRAZODONE HYDROCHLORIDE 100 MG/1
100 TABLET ORAL ONCE
Status: COMPLETED | OUTPATIENT
Start: 2018-09-20 | End: 2018-09-20

## 2018-09-20 RX ORDER — OLANZAPINE 10 MG/1
5 INJECTION, POWDER, LYOPHILIZED, FOR SOLUTION INTRAMUSCULAR ONCE AS NEEDED
Status: DISCONTINUED | OUTPATIENT
Start: 2018-09-20 | End: 2018-09-20

## 2018-09-20 RX ORDER — CEFDINIR 300 MG/1
300 CAPSULE ORAL EVERY 12 HOURS SCHEDULED
Status: DISCONTINUED | OUTPATIENT
Start: 2018-09-20 | End: 2018-09-24 | Stop reason: HOSPADM

## 2018-09-20 RX ADMIN — DILTIAZEM HYDROCHLORIDE 30 MG: 30 TABLET, FILM COATED ORAL at 18:22

## 2018-09-20 RX ADMIN — MEMANTINE 10 MG: 5 TABLET ORAL at 18:22

## 2018-09-20 RX ADMIN — DILTIAZEM HYDROCHLORIDE 30 MG: 30 TABLET, FILM COATED ORAL at 05:26

## 2018-09-20 RX ADMIN — DILTIAZEM HYDROCHLORIDE 30 MG: 30 TABLET, FILM COATED ORAL at 00:45

## 2018-09-20 RX ADMIN — ACETAMINOPHEN 650 MG: 325 TABLET, FILM COATED ORAL at 18:21

## 2018-09-20 RX ADMIN — DILTIAZEM HYDROCHLORIDE 30 MG: 30 TABLET, FILM COATED ORAL at 23:26

## 2018-09-20 RX ADMIN — MEMANTINE 10 MG: 5 TABLET ORAL at 10:00

## 2018-09-20 RX ADMIN — TRAZODONE HYDROCHLORIDE 100 MG: 100 TABLET ORAL at 04:02

## 2018-09-20 RX ADMIN — DONEPEZIL HYDROCHLORIDE 10 MG: 10 TABLET, FILM COATED ORAL at 21:10

## 2018-09-20 RX ADMIN — CEFDINIR 300 MG: 300 CAPSULE ORAL at 21:10

## 2018-09-20 RX ADMIN — WARFARIN SODIUM 3 MG: 3 TABLET ORAL at 18:22

## 2018-09-20 RX ADMIN — ATORVASTATIN CALCIUM 20 MG: 10 TABLET, FILM COATED ORAL at 18:22

## 2018-09-20 RX ADMIN — CIPROFLOXACIN HYDROCHLORIDE 250 MG: 250 TABLET, FILM COATED ORAL at 10:00

## 2018-09-20 RX ADMIN — DILTIAZEM HYDROCHLORIDE 30 MG: 30 TABLET, FILM COATED ORAL at 14:00

## 2018-09-20 NOTE — CASE MANAGEMENT
Continued Stay Review / Additional info    Date:  9/19/2018    Vital Signs:  98 5 - 72 - 16   115/56    Medications:   Scheduled Meds:   Current Facility-Administered Medications:         atorvastatin 20 mg Oral After Dinner    ciprofloxacin 250 mg Oral Daily    diltiazem 30 mg Oral Q6H [facility]    donepezil 10 mg Oral HS    memantine 10 mg Oral BID    OLANZapine 5 mg Intramuscular Once PRN           warfarin 3 mg Oral Daily (warfarin)      Continuous Infusions:    PRN Meds:   acetaminophen    OLANZapine    ondansetron    Abnormal Labs/Diagnostic Results:   MRI revealed no stroke      Age/Sex: 80 y o  female     Tele - Sinus Rhythm  Agitated - wandering  Trazadone po x 1    Assessment/Plan:  Syncope, most likely related to rapid atrial fibrillation  Sinus Rhythm - is tolerating diltiazem  Her urine culture grew greater than 100,000 colonies of E coli  Sensitivities are pending  Discharge Plan: To be determined      Thank you,  [facility] Utilization Review Department  Phone: [phone]; Fax [phone]  ATTENTION: Please call with any questions or concerns to [phone]  and carefully follow the prompts so that you are directed to the right person  Send all requests for admission clinical reviews, approved or denied determinations and any other requests to fax [phone]   All voicemails are confidential

## 2018-09-20 NOTE — PROGRESS NOTES
Progress Note - Lindsey Martin 80 y o  female MRN: [id number]    Unit/Bed#: E4 -01 Encounter: [id number]      Subjective: The patient feels generally weak and lousy today  She is more confused than her baseline  She denies pain  She denies chest pain or shortness of breath  She has had no nausea or vomiting  Physical Exam:   Temp:  [97 5 °F (36 4 °C)-98 6 °F (37 °C)] 97 5 °F (36 4 °C)  HR:  [70-80] 75  Resp:  [16-20] 20  BP: (126-174)/(65-78) 146/65    Gen:  Well-developed, frail, in no distress  Neck:  Supple  No lymphadenopathy, goiter, or bruit  Heart:  Regular rhythm  No murmur, gallop, or rub  Lungs:  Clear to auscultation and percussion   Abd:  Soft with active bowel sounds  No mass, tenderness, or organomegaly  Extremities:  No clubbing, cyanosis, or edema  No calf tenderness  Neuro:  Not as alert today  Moves all limbs  Skin:  Warm and dry      LABS:  Urine culture grew greater than 100,000 colonies of E coli which is resistant to ciprofloxacin  Patient Active Problem List   Diagnosis    History of stroke    Alzheimer disease    [facility]    Carotid artery calcification, bilateral    Hyperlipidemia    Hypertension    [facility]    Headache    Vasovagal syncope       Assessment/Plan:  1  Syncope, likely related to hemodynamic instability from rapid atrial fibrillation  2  Paroxysmal atrial fibrillation  3  Probable urinary tract infection  4  Generalized weakness secondary to 3   5  Hypertension  6  Hypercholesterolemia  7  Vascular dementia  8  History of stroke    In light of the patient's clinical deterioration and her urine culture results, she will be started on ceftriaxone  Her current diltiazem dose will be continued  Her labs will be re-evaluated tomorrow        VTE Pharmacologic Prophylaxis: Warfarin (Coumadin)  VTE Mechanical Prophylaxis: sequential compression device

## 2018-09-21 ENCOUNTER — APPOINTMENT (INPATIENT)
Dept: RADIOLOGY | Facility: HOSPITAL | Age: 83
DRG: 308 | End: 2018-09-21
Payer: COMMERCIAL

## 2018-09-21 LAB
ANION GAP SERPL CALCULATED.3IONS-SCNC: 7 MMOL/L (ref 4–13)
BASOPHILS # BLD AUTO: 0.03 THOUSANDS/ΜL (ref 0–0.1)
BASOPHILS NFR BLD AUTO: 1 % (ref 0–1)
BUN SERPL-MCNC: 13 MG/DL (ref 5–25)
CALCIUM SERPL-MCNC: 8.7 MG/DL (ref 8.3–10.1)
CHLORIDE SERPL-SCNC: 103 MMOL/L (ref 100–108)
CO2 SERPL-SCNC: 26 MMOL/L (ref 21–32)
CREAT SERPL-MCNC: 0.75 MG/DL (ref 0.6–1.3)
EOSINOPHIL # BLD AUTO: 0.28 THOUSAND/ΜL (ref 0–0.61)
EOSINOPHIL NFR BLD AUTO: 4 % (ref 0–6)
ERYTHROCYTE [DISTWIDTH] IN BLOOD BY AUTOMATED COUNT: 14.3 % (ref 11.6–15.1)
GFR SERPL CREATININE-BSD FRML MDRD: 73 ML/MIN/1.73SQ M
GLUCOSE SERPL-MCNC: 98 MG/DL (ref 65–140)
HCT VFR BLD AUTO: 33 % (ref 34.8–46.1)
HGB BLD-MCNC: 10.8 G/DL (ref 11.5–15.4)
IMM GRANULOCYTES # BLD AUTO: 0.02 THOUSAND/UL (ref 0–0.2)
IMM GRANULOCYTES NFR BLD AUTO: 0 % (ref 0–2)
INR PPP: 2.6 (ref 0.86–1.17)
LYMPHOCYTES # BLD AUTO: 2.26 THOUSANDS/ΜL (ref 0.6–4.47)
LYMPHOCYTES NFR BLD AUTO: 35 % (ref 14–44)
MCH RBC QN AUTO: 29.2 PG (ref 26.8–34.3)
MCHC RBC AUTO-ENTMCNC: 32.7 G/DL (ref 31.4–37.4)
MCV RBC AUTO: 89 FL (ref 82–98)
MONOCYTES # BLD AUTO: 0.65 THOUSAND/ΜL (ref 0.17–1.22)
MONOCYTES NFR BLD AUTO: 10 % (ref 4–12)
NEUTROPHILS # BLD AUTO: 3.25 THOUSANDS/ΜL (ref 1.85–7.62)
NEUTS SEG NFR BLD AUTO: 50 % (ref 43–75)
NRBC BLD AUTO-RTO: 0 /100 WBCS
PLATELET # BLD AUTO: 246 THOUSANDS/UL (ref 149–390)
PMV BLD AUTO: 10 FL (ref 8.9–12.7)
POTASSIUM SERPL-SCNC: 3.8 MMOL/L (ref 3.5–5.3)
PROTHROMBIN TIME: 27.9 SECONDS (ref 11.8–14.2)
RBC # BLD AUTO: 3.7 MILLION/UL (ref 3.81–5.12)
SODIUM SERPL-SCNC: 136 MMOL/L (ref 136–145)
WBC # BLD AUTO: 6.49 THOUSAND/UL (ref 4.31–10.16)

## 2018-09-21 PROCEDURE — 85025 COMPLETE CBC W/AUTO DIFF WBC: CPT | Performed by: INTERNAL MEDICINE

## 2018-09-21 PROCEDURE — 71046 X-RAY EXAM CHEST 2 VIEWS: CPT

## 2018-09-21 PROCEDURE — 99232 SBSQ HOSP IP/OBS MODERATE 35: CPT | Performed by: INTERNAL MEDICINE

## 2018-09-21 PROCEDURE — 80048 BASIC METABOLIC PNL TOTAL CA: CPT | Performed by: INTERNAL MEDICINE

## 2018-09-21 PROCEDURE — 85610 PROTHROMBIN TIME: CPT | Performed by: INTERNAL MEDICINE

## 2018-09-21 PROCEDURE — 99221 1ST HOSP IP/OBS SF/LOW 40: CPT | Performed by: INTERNAL MEDICINE

## 2018-09-21 RX ORDER — METOPROLOL SUCCINATE 25 MG/1
25 TABLET, EXTENDED RELEASE ORAL DAILY
Status: DISCONTINUED | OUTPATIENT
Start: 2018-09-22 | End: 2018-09-24 | Stop reason: HOSPADM

## 2018-09-21 RX ADMIN — MEMANTINE 10 MG: 5 TABLET ORAL at 08:24

## 2018-09-21 RX ADMIN — CEFDINIR 300 MG: 300 CAPSULE ORAL at 20:49

## 2018-09-21 RX ADMIN — DONEPEZIL HYDROCHLORIDE 10 MG: 10 TABLET, FILM COATED ORAL at 21:02

## 2018-09-21 RX ADMIN — ACETAMINOPHEN 650 MG: 325 TABLET, FILM COATED ORAL at 11:08

## 2018-09-21 RX ADMIN — MEMANTINE 10 MG: 5 TABLET ORAL at 18:11

## 2018-09-21 RX ADMIN — DILTIAZEM HYDROCHLORIDE 30 MG: 30 TABLET, FILM COATED ORAL at 13:25

## 2018-09-21 RX ADMIN — ATORVASTATIN CALCIUM 20 MG: 10 TABLET, FILM COATED ORAL at 18:11

## 2018-09-21 RX ADMIN — DILTIAZEM HYDROCHLORIDE 30 MG: 30 TABLET, FILM COATED ORAL at 05:24

## 2018-09-21 RX ADMIN — WARFARIN SODIUM 3 MG: 3 TABLET ORAL at 18:11

## 2018-09-21 RX ADMIN — DILTIAZEM HYDROCHLORIDE 30 MG: 30 TABLET, FILM COATED ORAL at 18:12

## 2018-09-21 RX ADMIN — CEFDINIR 300 MG: 300 CAPSULE ORAL at 08:24

## 2018-09-21 NOTE — PLAN OF CARE
Problem: DISCHARGE PLANNING - CARE MANAGEMENT  Goal: Discharge to post-acute care or home with appropriate resources  INTERVENTIONS:  - Conduct assessment to determine patient/family and health care team treatment goals, and need for post-acute services based on payer coverage, community resources, and patient preferences, and barriers to discharge  - Address psychosocial, clinical, and financial barriers to discharge as identified in assessment in conjunction with the patient/family and health care team  -Patient d/c with appropriate resources once reaches medical stability     - Arrange appropriate level of post-acute services according to patient's   needs and preference and payer coverage in collaboration with the physician and health care team  - Communicate with and update the patient/family, physician, and health care team regarding progress on the discharge plan  - Arrange appropriate transportation to post-acute venues   INTERVENTIONS:  - Conduct assessment to determine patient/family and health care team treatment goals, and need for post-acute services based on payer coverage, community resources, and patient preferences, and barriers to discharge  - Address psychosocial, clinical, and financial barriers to discharge as identified in assessment in conjunction with the patient/family and health care team  - Arrange appropriate level of post-acute services according to patients   needs and preference and payer coverage in collaboration with the physician and health care team  - Communicate with and update the patient/family, physician, and health care team regarding progress on the discharge plan  - Arrange appropriate transportation to post-acute venues  Outcome: Adequate for Discharge  The discharge plan is for pt to return home

## 2018-09-21 NOTE — PROGRESS NOTES
Progress Note - Nathan Olivo 80 y o  female MRN: [id number]    Unit/Bed#: E4 -01 Encounter: [id number]      Subjective: The patient feels somewhat better than yesterday  However, her daughter notices dyspnea on exertion  She said that patient walked from the bathroom back to her bed, she became short of breath  The patient denies any shortness of breath at rest   She has no chest pain  She appears to be fairly comfortable lying flat  Her daughter thinks that she is less confused since she started on antibiotics yesterday  Physical Exam:   Temp:  [98 2 °F (36 8 °C)-98 3 °F (36 8 °C)] 98 3 °F (36 8 °C)  HR:  [68-78] 69  Resp:  [18-20] 18  BP: (110-142)/(43-83) 113/43    Gen:  Well-developed, well-nourished, in no distress  Neck:  Supple  No lymphadenopathy, goiter, or bruit  Heart:  Regular rhythm  No murmur, gallop, or rub  Lungs: Few rales at the bases bilaterally  No wheezing or rhonchi  Abd:  Soft with active bowel sounds  No mass, tenderness, or organomegaly  Extremities:  No clubbing, cyanosis, or edema  No calf tenderness  Neuro:  Alert and conversant    No focal sign  Skin:  Warm and dry      LABS:   CBC:   Lab Results   Component Value Date    WBC 6 49 09/21/2018    HGB 10 8 (L) 09/21/2018    HCT 33 0 (L) 09/21/2018    MCV 89 09/21/2018     09/21/2018    MCH 29 2 09/21/2018    MCHC 32 7 09/21/2018    RDW 14 3 09/21/2018    MPV 10 0 09/21/2018    NRBC 0 09/21/2018   , CMP:   Lab Results   Component Value Date     09/21/2018    K 3 8 09/21/2018     09/21/2018    CO2 26 09/21/2018    BUN 13 09/21/2018    CREATININE 0 75 09/21/2018    CALCIUM 8 7 09/21/2018    EGFR 73 09/21/2018   , PT/INR:   Lab Results   Component Value Date    INR 2 60 (H) 09/21/2018           Patient Active Problem List   Diagnosis    History of stroke    Alzheimer disease    [facility]    Carotid artery calcification, bilateral    Hyperlipidemia    Hypertension    [facility]fib ([facility] )    Headache    Vasovagal syncope       Assessment/Plan:  1  Syncope  2  Paroxysmal atrial fibrillation with rapid ventricular response on admission  3  Vascular dementia  4  Hypertension  5  Hyperlipidemia  6  Recurrent urinary tract infections  7  New onset dyspnea on exertion    Because of the patient's dyspnea on exertion, a chest x-ray and BNP will be obtained  Cardiology consultation has been requested  The patient remains on cefdinir for possible urinary tract infection      VTE Pharmacologic Prophylaxis: Warfarin (Coumadin)  VTE Mechanical Prophylaxis: reason for no mechanical VTE prophylaxis Therapeutically anticoagulated

## 2018-09-21 NOTE — SOCIAL WORK
TC to dtr to confirm the discharge plan is back home, which dtr confirmed  Dtr had no other issues or concerns at this time

## 2018-09-21 NOTE — PLAN OF CARE
CARDIOVASCULAR - ADULT     Maintains optimal cardiac output and hemodynamic stability Progressing     Absence of cardiac dysrhythmias or at baseline rhythm Progressing        CONFUSION/THOUGHT DISTURBANCE     Thought disturbances (confusion, delirium, depression, dementia or psychosis) are managed to maintain or return to baseline mental status and functional level Progressing        COPING     Pt/Family able to verbalize concerns and demonstrate effective coping strategies Progressing        DISCHARGE PLANNING     Discharge to home or other facility with appropriate resources Progressing        DISCHARGE PLANNING - CARE MANAGEMENT     Discharge to post-acute care or home with appropriate resources Progressing        GENITOURINARY - ADULT     Absence of urinary retention Progressing        INFECTION - ADULT     Absence or prevention of progression during hospitalization Progressing     Absence of fever/infection during neutropenic period Progressing        Knowledge Deficit     Patient/family/caregiver demonstrates understanding of disease process, treatment plan, medications, and discharge instructions Progressing        METABOLIC, FLUID AND ELECTROLYTES - ADULT     Electrolytes maintained within normal limits Progressing     Fluid balance maintained Progressing        MUSCULOSKELETAL - ADULT     Maintain or return mobility to safest level of function Progressing        Nutrition/Hydration-ADULT     Nutrient/Hydration intake appropriate for improving, restoring or maintaining nutritional needs Progressing        PAIN - ADULT     Verbalizes/displays adequate comfort level or baseline comfort level Progressing        Potential for Falls     Patient will remain free of falls Progressing        Prexisting or High Potential for Compromised Skin Integrity     Skin integrity is maintained or improved Progressing        RESPIRATORY - ADULT     Achieves optimal ventilation and oxygenation Progressing SAFETY ADULT     Maintain or return to baseline ADL function Progressing     Maintain or return mobility status to optimal level Progressing     Patient will remain free of falls Progressing        SKIN/TISSUE INTEGRITY - ADULT     Skin integrity remains intact Progressing

## 2018-09-21 NOTE — CONSULTS
Consult - Cardiology   Sariah Botello 80 y o  female MRN: [id number]  Unit/Bed#: E4 -01 Encounter: [id number]          Reason For Consult:  PAF    History Of Present Illness: The patient is an 80 yr old with a hx of dementia  She saw me for PACs in 2014  There was a suspicion of AFIB since she did have a stroke prior to that visit  She was on warfarin at that time and the decision was to keep her on warfarin in light of a strong suspicion of paroxysmal atrial fibrillation  She was admitted to the hospital yesterday for weakness  She has no recollection with happen yesterday and offers no complaints at this time including palpitations, chest pain, shortness of breath or edema  She is a limited historian  She was found to be in atrial fibrillation with a rapid ventricular response  She has since converted to sinus rhythm  She has had some syncope in the past   We are being consulted with the thought that perhaps she does not tolerate the rapid rates that well  She had been on metoprolol in the past but is not currently on the medication      Past Medical History:   Paroxysmal atrial fibrillation  Dementia  Borderline hypertension  Hyperlipidemia  Embolic stroke  Bladder surgery  Cholecystectomy  Uterine surgery      Allergy:  Allergies   Allergen Reactions    Ativan [Lorazepam]     Latuda [Lurasidone]        Medications:  Warfarin  Atorvastatin 20 mg daily  Aricept  Namenda    Family History:  Remarkable for CAD    Social History: Former smoker  No alcohol use      ROS:  Not obtainable due to cognitive decline    Exam:  132/61  Pulse 71  General:  Pleasant elderly female no acute distress  Eyes:  Sclera anicteric  Conjunctiva pink  ENT:  Oropharynx clear  Mucous membranes without erythema or exudate  Neck:  Supple without JVD or thyromegaly  Carotids +2 without bruits  Lungs:  Clear without wheezing rhonchi rales    Somewhat decreased breath sounds  Heart:  Regular with grade 1-2 systolic murmur at the base  No diastolic murmur rub or gallop  Abdomen:  Nontender without mass organomegaly  Extremities:  Trace edema with intact dorsalis pedal pulses    EKG:  Atrial fibrillation with nonspecific T-wave abnormalities  Troponin negative  Hemoglobin 12 8, white blood count 7 11, platelets [id number]  Potassium 3 6, BUN 12, creatinine 0 76  PT INR 2 6      ASSESSMENT AND PLAN:   1  PAF with known history of this  Tends to be modestly tachycardic in atrial fibrillation  Somewhat difficult management situation since she has had syncope which may be independent of rapid atrial fibrillation  Would prefer placing patient back on modest dose of beta-blocker since this is less vaso active to hopefully prevent syncope  She is anticoagulated with warfarin which I would continue in light of her embolic stroke  2   Hyperlipidemia  On statin therapy  3  Borderline hypertension  Currently controlled on diltiazem    Would stop this in favor of metoprolol in light of what is stated above      Will see patient prn  Can follow with PCP    Seema Carrizales MD

## 2018-09-22 LAB
ANION GAP SERPL CALCULATED.3IONS-SCNC: 7 MMOL/L (ref 4–13)
BUN SERPL-MCNC: 17 MG/DL (ref 5–25)
CALCIUM SERPL-MCNC: 8.6 MG/DL (ref 8.3–10.1)
CHLORIDE SERPL-SCNC: 101 MMOL/L (ref 100–108)
CO2 SERPL-SCNC: 29 MMOL/L (ref 21–32)
CREAT SERPL-MCNC: 0.77 MG/DL (ref 0.6–1.3)
GFR SERPL CREATININE-BSD FRML MDRD: 71 ML/MIN/1.73SQ M
GLUCOSE SERPL-MCNC: 96 MG/DL (ref 65–140)
NT-PROBNP SERPL-MCNC: 568 PG/ML
POTASSIUM SERPL-SCNC: 3.9 MMOL/L (ref 3.5–5.3)
SODIUM SERPL-SCNC: 137 MMOL/L (ref 136–145)

## 2018-09-22 PROCEDURE — 80048 BASIC METABOLIC PNL TOTAL CA: CPT | Performed by: INTERNAL MEDICINE

## 2018-09-22 PROCEDURE — 99232 SBSQ HOSP IP/OBS MODERATE 35: CPT | Performed by: INTERNAL MEDICINE

## 2018-09-22 PROCEDURE — 83880 ASSAY OF NATRIURETIC PEPTIDE: CPT | Performed by: INTERNAL MEDICINE

## 2018-09-22 RX ORDER — FUROSEMIDE 20 MG/1
20 TABLET ORAL ONCE
Status: COMPLETED | OUTPATIENT
Start: 2018-09-22 | End: 2018-09-22

## 2018-09-22 RX ADMIN — METOPROLOL SUCCINATE 25 MG: 25 TABLET, EXTENDED RELEASE ORAL at 08:12

## 2018-09-22 RX ADMIN — WARFARIN SODIUM 3 MG: 3 TABLET ORAL at 17:36

## 2018-09-22 RX ADMIN — CEFDINIR 300 MG: 300 CAPSULE ORAL at 21:06

## 2018-09-22 RX ADMIN — FUROSEMIDE 20 MG: 20 TABLET ORAL at 11:25

## 2018-09-22 RX ADMIN — MEMANTINE 10 MG: 5 TABLET ORAL at 08:12

## 2018-09-22 RX ADMIN — ACETAMINOPHEN 650 MG: 325 TABLET, FILM COATED ORAL at 08:12

## 2018-09-22 RX ADMIN — ATORVASTATIN CALCIUM 20 MG: 10 TABLET, FILM COATED ORAL at 17:36

## 2018-09-22 RX ADMIN — ACETAMINOPHEN 650 MG: 325 TABLET, FILM COATED ORAL at 17:36

## 2018-09-22 RX ADMIN — CEFDINIR 300 MG: 300 CAPSULE ORAL at 08:12

## 2018-09-22 RX ADMIN — MEMANTINE 10 MG: 5 TABLET ORAL at 17:36

## 2018-09-22 RX ADMIN — DONEPEZIL HYDROCHLORIDE 10 MG: 10 TABLET, FILM COATED ORAL at 21:06

## 2018-09-22 NOTE — PROGRESS NOTES
Progress Note - Glenroy Durant 80 y o  female MRN: [id number]    Unit/Bed#: E4 -01 Encounter: [id number]      Subjective: The patient feels mildly lightheaded  She denies chest pain, shortness of breath, or palpitations  She has no nausea or vomiting  Physical Exam:   Temp:  [97 6 °F (36 4 °C)-98 4 °F (36 9 °C)] 97 6 °F (36 4 °C)  HR:  [67-81] 67  Resp:  [18] 18  BP: (110-141)/(43-92) 114/53    Gen:  Well-developed, well-nourished, in no distress  Neck:  Supple  No lymphadenopathy, goiter, or bruit  Heart:  Regular rhythm  I heard no murmur, gallop, or rub  Lungs:  Rales at the right base  I heard no wheezing or rhonchi  Abd:  Soft with active bowel sounds  No mass, tenderness, or organomegaly  Extremities:  No clubbing, cyanosis, or edema  No calf tenderness  Neuro:  Alert and conversant  No focal sign  Skin:  Warm and dry      LABS:   CMP:   Lab Results   Component Value Date     09/22/2018    K 3 9 09/22/2018     09/22/2018    CO2 29 09/22/2018    BUN 17 09/22/2018    CREATININE 0 77 09/22/2018    CALCIUM 8 6 09/22/2018    EGFR 71 09/22/2018     BNP is greater than 500  Chest x-ray shows mild vascular congestion  Patient Active Problem List   Diagnosis    History of stroke    Alzheimer disease    [facility]    Carotid artery calcification, bilateral    Hyperlipidemia    Hypertension    [facility]fib [facility]    Headache    Vasovagal syncope       Assessment/Plan:  1  Syncope  2  Paroxysmal atrial fibrillation  3  Hypertension  4  Hypercholesterolemia  5  Mild acute diastolic congestive heart failure, most likely provoked by diltiazem  6  Vascular dementia  7  History of stroke  8  Urinary tract infection    Diltiazem has been withdrawn  The patient has been started on low-dose metoprolol  She will receive 1 dose of furosemide  I expect that this will resolve her current fluid overload    If so, and if she is feeling pretty well tomorrow, I anticipate she will be discharged        VTE Pharmacologic Prophylaxis: Warfarin (Coumadin)  VTE Mechanical Prophylaxis: reason for no mechanical VTE prophylaxis Therapeutically anticoagulated

## 2018-09-23 PROCEDURE — 99232 SBSQ HOSP IP/OBS MODERATE 35: CPT | Performed by: INTERNAL MEDICINE

## 2018-09-23 RX ORDER — FUROSEMIDE 40 MG/1
40 TABLET ORAL ONCE
Status: COMPLETED | OUTPATIENT
Start: 2018-09-23 | End: 2018-09-23

## 2018-09-23 RX ORDER — QUETIAPINE FUMARATE 25 MG/1
12.5 TABLET, FILM COATED ORAL ONCE
Status: COMPLETED | OUTPATIENT
Start: 2018-09-23 | End: 2018-09-23

## 2018-09-23 RX ADMIN — MEMANTINE 10 MG: 5 TABLET ORAL at 17:13

## 2018-09-23 RX ADMIN — MEMANTINE 10 MG: 5 TABLET ORAL at 08:19

## 2018-09-23 RX ADMIN — DONEPEZIL HYDROCHLORIDE 10 MG: 10 TABLET, FILM COATED ORAL at 21:49

## 2018-09-23 RX ADMIN — METOPROLOL SUCCINATE 25 MG: 25 TABLET, EXTENDED RELEASE ORAL at 08:19

## 2018-09-23 RX ADMIN — WARFARIN SODIUM 3 MG: 3 TABLET ORAL at 17:13

## 2018-09-23 RX ADMIN — ACETAMINOPHEN 650 MG: 325 TABLET, FILM COATED ORAL at 17:13

## 2018-09-23 RX ADMIN — CEFDINIR 300 MG: 300 CAPSULE ORAL at 21:49

## 2018-09-23 RX ADMIN — CEFDINIR 300 MG: 300 CAPSULE ORAL at 08:19

## 2018-09-23 RX ADMIN — ATORVASTATIN CALCIUM 20 MG: 10 TABLET, FILM COATED ORAL at 17:13

## 2018-09-23 RX ADMIN — QUETIAPINE FUMARATE 12.5 MG: 25 TABLET ORAL at 00:26

## 2018-09-23 RX ADMIN — FUROSEMIDE 40 MG: 40 TABLET ORAL at 16:08

## 2018-09-23 NOTE — PROGRESS NOTES
Progress Note - Anh Parekh 80 y o  female MRN: [id number]    Unit/Bed#: E4 -01 Encounter: [id number]      Subjective: The patient has been feeling weak today  According to her daughter she has had episodes of more profound weakness associated with pallor  She has had no chest pain, shortness of breath, or palpitations  She was somewhat agitated over night and was given a small dose of Seroquel  She was able to eat today  Her dyspnea on exertion has improved slightly  Physical Exam:   Temp:  [97 °F (36 1 °C)-98 °F (36 7 °C)] 98 °F (36 7 °C)  HR:  [66-83] 74  Resp:  [18] 18  BP: (133-182)/(60-86) 133/60    Gen:  Well-developed, well-nourished, in no distress  Neck:  Supple  No lymphadenopathy, goiter, or bruit  Heart:  Regular rhythm  I heard no murmur, gallop, or rub  Lungs:  Clear to auscultation and percussion  No wheezing, rales, or rhonchi    Abd:  Soft with active bowel sounds  No mass, tenderness, organomegaly  Extremities:  No clubbing, cyanosis, or edema  Neuro:  Alert and conversant  No focal sign  Skin:  Warm and dry      LABS:  No new labs      Patient Active Problem List   Diagnosis    History of stroke    Alzheimer disease    [facility]    Carotid artery calcification, bilateral    Hyperlipidemia    Hypertension    A-fib ([facility] )    Headache    Vasovagal syncope       Assessment/Plan:  1  Syncope  2  Paroxysmal atrial fibrillation  3  Hypertension  4  Hypercholesterolemia  5  Mild acute diastolic congestive heart failure, likely provoked by diltiazem  6  Vascular dementia  7  History of stroke  8  Urinary tract infection    The patient has had no further syncope  Her heart rhythm has remained stable  She may still be mildly fluid overloaded  She will be given 1 additional dose of Lasix and a BNP will be repeated tomorrow  Some of her current symptoms may be related to the aftermath of Seroquel administration    She remains on cefdinir for urinary tract infection      VTE Pharmacologic Prophylaxis: Warfarin (Coumadin)  VTE Mechanical Prophylaxis: reason for no mechanical VTE prophylaxis Therapeutically anticoagulated

## 2018-09-24 VITALS
DIASTOLIC BLOOD PRESSURE: 83 MMHG | RESPIRATION RATE: 18 BRPM | HEART RATE: 64 BPM | WEIGHT: 153.22 LBS | BODY MASS INDEX: 27.15 KG/M2 | SYSTOLIC BLOOD PRESSURE: 144 MMHG | OXYGEN SATURATION: 98 % | TEMPERATURE: 98.8 F | HEIGHT: 63 IN

## 2018-09-24 PROBLEM — R55 VASOVAGAL SYNCOPE: Status: RESOLVED | Noted: 2018-02-13 | Resolved: 2018-09-24

## 2018-09-24 LAB
ANION GAP SERPL CALCULATED.3IONS-SCNC: 10 MMOL/L (ref 4–13)
BUN SERPL-MCNC: 22 MG/DL (ref 5–25)
CALCIUM SERPL-MCNC: 9.2 MG/DL (ref 8.3–10.1)
CHLORIDE SERPL-SCNC: 99 MMOL/L (ref 100–108)
CO2 SERPL-SCNC: 27 MMOL/L (ref 21–32)
CREAT SERPL-MCNC: 0.85 MG/DL (ref 0.6–1.3)
GFR SERPL CREATININE-BSD FRML MDRD: 63 ML/MIN/1.73SQ M
GLUCOSE SERPL-MCNC: 100 MG/DL (ref 65–140)
INR PPP: 2.53 (ref 0.86–1.17)
NT-PROBNP SERPL-MCNC: 696 PG/ML
POTASSIUM SERPL-SCNC: 4 MMOL/L (ref 3.5–5.3)
PROTHROMBIN TIME: 27.3 SECONDS (ref 11.8–14.2)
SODIUM SERPL-SCNC: 136 MMOL/L (ref 136–145)

## 2018-09-24 PROCEDURE — 85610 PROTHROMBIN TIME: CPT | Performed by: INTERNAL MEDICINE

## 2018-09-24 PROCEDURE — 83880 ASSAY OF NATRIURETIC PEPTIDE: CPT | Performed by: INTERNAL MEDICINE

## 2018-09-24 PROCEDURE — 99239 HOSP IP/OBS DSCHRG MGMT >30: CPT | Performed by: INTERNAL MEDICINE

## 2018-09-24 PROCEDURE — 80048 BASIC METABOLIC PNL TOTAL CA: CPT | Performed by: INTERNAL MEDICINE

## 2018-09-24 RX ORDER — METOPROLOL SUCCINATE 25 MG/1
25 TABLET, EXTENDED RELEASE ORAL DAILY
Qty: 60 TABLET | Refills: 0 | Status: SHIPPED | OUTPATIENT
Start: 2018-09-25 | End: 2018-11-13

## 2018-09-24 RX ADMIN — CEFDINIR 300 MG: 300 CAPSULE ORAL at 08:29

## 2018-09-24 RX ADMIN — METOPROLOL SUCCINATE 25 MG: 25 TABLET, EXTENDED RELEASE ORAL at 08:30

## 2018-09-24 RX ADMIN — MEMANTINE 10 MG: 5 TABLET ORAL at 08:29

## 2018-09-24 RX ADMIN — ACETAMINOPHEN 650 MG: 325 TABLET, FILM COATED ORAL at 08:29

## 2018-09-24 NOTE — DISCHARGE SUMMARY
Discharge- Sariah Botello 1933, 80 y o  female MRN: [id number]    Unit/Bed#: E4 -01 Encounter: [id number]    Primary Care Provider: Shamika Davis DO   Date and time admitted to hospital: 9/18/2018  7:01 AM      Discharge Summary - [facility]    Patient Information: Sariah Botello 80 y o  female MRN: [id number]  Unit/Bed#: E4 -01 Encounter: [id number]    Discharging Physician / Practitioner: Sheba Davis PA-C  PCP: Shamika Davis DO  Admission Date: 9/18/2018  Discharge Date: 09/24/18    Disposition:     Home    Reason for Admission: syncope/afib    Discharge Diagnoses:     Principal Problem:    A-fib [facility])  Active Problems:    History of stroke    Alzheimer disease    Hyperlipidemia    Hypertension  Resolved Problems:    Vasovagal syncope      Consultations During Hospital Stay:  · cardiology    Procedures Performed:     ·     Significant Findings / Test Results:     MRI BRAIN WITHOUT CONTRAST     INDICATION: r/o stroke  History of headaches, dementia and syncopal episode in the emergency room        COMPARISON:   Head CT from September 18, 2018 and prior MRI study from October 26, 2017      TECHNIQUE:  Sagittal T1, axial T2, axial FLAIR, axial T1, axial T2* and axial diffusion imaging      IMAGE QUALITY:  Diagnostic      FINDINGS:     BRAIN PARENCHYMA:  There is no restricted diffusion to suggest acute infarction  Nonspecific patchy periventricular and deep cerebral white matter T2 prolongation is suggestive of moderate microangiopathic disease  There is focal cortical and   subcortical T2 prolongation in the left lateral frontal lobe consistent with remote infarction  There are also small chronic infarctions noted in the right cerebellum        VENTRICLES: There is moderate ventriculomegaly with a somewhat ballooned appearance to the corpus callosum  Turbulent flow artifact is noted in the 3rd ventricle    There is a flow void in the aqueduct on axial T2-weighted imaging  The degree of   ventricular dilatation is somewhat disproportionate to the degree of sulcal prominence  There is questionable mild transependymal flow in the occipital regions, unchanged from the prior study        SELLA AND PITUITARY GLAND:  Normal      ORBITS:  Normal      PARANASAL SINUSES:  Normal      VASCULATURE:  The right intradural flow-void is none as robust as the left  There is likely underlying atherosclerotic disease  This is unchanged when compared to the prior study  The left vertebral artery flow-void is unremarkable      CALVARIUM AND SKULL BASE:  Normal      EXTRACRANIAL SOFT TISSUES:  Normal      IMPRESSION:        1  No acute infarction  2   Moderate cerebral microangiopathic white matter disease, likely secondary to long-standing hypertension and/or diabetes  3   Disproportionate ventriculomegaly to sulcal prominence  In the appropriate clinical setting, consider NPH  The ventricular size is stable when compared to the prior MRI study of 2017  CT BRAIN - WITHOUT CONTRAST     INDICATION:   headache      COMPARISON:  CT head 2/13/2018     TECHNIQUE:  CT examination of the brain was performed  In addition to axial images, coronal 2D reformatted images were created and submitted for interpretation        Radiation dose length product (DLP) for this visit:  1031 mGy-cm   This examination, like all CT scans performed in the [facility], was performed utilizing techniques to minimize radiation dose exposure, including the use of iterative   reconstruction and automated exposure control        IMAGE QUALITY:  Diagnostic      FINDINGS:     PARENCHYMA:  No intracranial mass, mass effect or midline shift  No CT signs of acute infarction  No acute parenchymal hemorrhage  Periventricular, centrum semiovale, and subcortical white matter hypodensity is a nonspecific finding likely representing   chronic microangiopathy  Small old infarction left lateral frontal lobe  Calcification bilateral cavernous internal carotid arteries      VENTRICLES AND EXTRA-AXIAL SPACES:  No acute hydrocephalus  Mild prominence of CSF spaces diffusely attributed to generalized volume loss      VISUALIZED ORBITS AND PARANASAL SINUSES:  Changes of bilateral lens replacements noted  Otherwise unremarkable      CALVARIUM AND EXTRACRANIAL SOFT TISSUES:  Normal      IMPRESSION:     No acute intracranial process or significant interval change      No skull fracture      Chronic microangiopathy        CXR pa/lateral  INDICATION:   r/o chf      COMPARISON:  Chest radiographs October 26, 2017     EXAM PERFORMED/VIEWS:  XR CHEST PA & LATERAL        FINDINGS:     The heart is enlarged  Atherosclerotic changes in the aorta      Lung volumes diminished  Elevation of left hemidiaphragm  Niki and pulmonary vessels are enlarged  Bilateral perihilar infiltrates      Osseous structures appear within normal limits for patient age      IMPRESSION:  Mild vascular congestion  Bilateral perihilar infiltrates, likely cardiogenic in nature  Superimposed pneumonia not excluded  Follow-up recommended        Incidental Findings:   ·      Test Results Pending at Discharge (will require follow up):   ·      Outpatient Tests Requested:  ·     Complications:      Hospital Course:     Cr Mooney is a 80 y o  female patient who originally presented to the hospital on 9/18/2018 due to lethargy and headache  The patient has a history of dementia, stroke, and paroxysmal atrial fibrillation  She lives in an apartment adjacent to her son's home  Today, her family found that she was weak and had difficulty getting out of bed  She did seem is interactive as usual   Usually, despite her dementia, she is conversant and is able to walk with a walker  Because of her deterioration she was brought to the emergency room for further evaluation  While in the emergency room she was taken to the bathroom a wheelchair    When she returned from the bathroom she had a syncopal episode  This lasted less than a minute  She quickly regained her usual mental status  She was found to be in atrial fibrillation  At times her rate was rather rapid  She complains of headache  She had chest pain earlier in the day  Currently, she denies chest pain or difficulty breathing  She is admitted for further evaluation and care  Her daughter notes that when she had her last stroke her presenting symptoms were similar to her current symptoms  Patient did undergo evaluation on telemetry by Cardiology who recommended initiation of a beta-blocker and deferred aggressive/tight control due to her history of syncope which was felt to be noncardiogenic  The patient did have episodic agitation at night and a trial of low-dose Seroquel was initiated which the patient did not tolerate well  The patient did have episodic weakness described by her daughter without LOC but has been doing quite well and ambulating throughout the floor at night per nursing staff        Hospital stay by problem list below          Hyperlipidemia   Assessment & Plan    Continue statin        Alzheimer disease   Assessment & Plan    At baseline per daughter, continue Namenda, continue Aricept        History of stroke   Assessment & Plan    Continue Coumadin, continue statin        * A-fib [facility])   Assessment & Plan    Rate controlled on toprol 25mg on coumadin  Appreciate cardiology recommendations  Patient was initially placed on Cardizem now switched to Toprol, aggressive in tight control of heart rate difficult due to history of syncope which is suspected to be secondary to blood pressure lability/vasovagal etiology  Continue coumadin, toprol 25mg daily which is a new med          Vasovagal syncoperesolved as of 9/24/2018   Assessment & Plan    Now resolved  MRI was negative for acute infarction patient without focal neuro deficits  Patient does have episodic fatigue per daughter's description however no LOC, and ambulates well around the hospital  Patient's daughter resistant to rehab and patient is safely D/C home as they have can resume in her home and she lives with her son  Encouraged waiting at edge of bed and for changes in position              Condition at Discharge: good     Discharge Day Visit / Exam:     Subjective:  Patient seen and examined  No events overnight per nursing  Patient initially reports no complaints  She is hard of hearing  She does report that she is lightheaded  She denies chest pain or shortness of breath however and upon repeat evaluation reports that she is asymptomatic  She is rather upset about being in the hospital would like to go of her he her daughter reports that she does have underlying dementia and this is baseline  Her daughter reports that her mother has not had any further episodes of the weakness which he describes as her whole body becoming relaxed and the patient being somewhat more soft spoken w/o actual LOC  She does report that her mother appears somewhat more tired today but otherwise is in her normal state of health  No headache, no diarrhea  No n/v/f/c    Vitals: Blood Pressure: 144/83 (09/24/18 0739)  Pulse: 64 (09/24/18 0739)  Temperature: 98 8 °F (37 1 °C) (09/24/18 0739)  Temp Source: Tympanic (09/24/18 0739)  Respirations: 18 (09/24/18 0739)  Height: 5' 3" (160 cm) (09/18/18 0915)  Weight - Scale: 69 5 kg (153 lb 3 5 oz) (09/24/18 0600)  SpO2: 98 % (09/24/18 0739)  Exam:   Physical Exam   Constitutional: She appears well-developed  No distress  HENT:   Head: Normocephalic and atraumatic  Right Ear: External ear normal    Left Ear: External ear normal    Mouth/Throat: No oropharyngeal exudate  Eyes: Conjunctivae are normal  Right eye exhibits no discharge  Left eye exhibits no discharge  No scleral icterus  Neck: Normal range of motion  Cardiovascular: Normal rate and normal heart sounds  Irregularly irregular  No m/r/g   Pulmonary/Chest: Effort normal  No respiratory distress  She has no wheezes  She has rales (bilateral bibasilar rales  )  She exhibits no tenderness  Abdominal: Soft  She exhibits no distension  There is no tenderness  There is no rebound and no guarding  Musculoskeletal: She exhibits no edema (nonpitting edema at b/l ankles)  Neurological: She is alert  Skin: Skin is warm and dry  She is not diaphoretic  Vitals reviewed  (  Be Sure to Include Physical Exam: Delete this entire line when you have entered your exam)  Discussion with Family: disposition work up    Discharge instructions/Information to patient and family:   See after visit summary for information provided to patient and family  Provisions for Follow-Up Care:  See after visit summary for information related to follow-up care and any pertinent home health orders  Planned Readmission: none     Discharge Statement:  I spent 45 minutes discharging the patient  This time was spent on the day of discharge  I had direct contact with the patient on the day of discharge  Greater than 50% of the total time was spent examining patient, answering all patient questions, arranging and discussing plan of care with patient as well as directly providing post-discharge instructions  Additional time then spent on discharge activities  Discharge Medications:  See after visit summary for reconciled discharge medications provided to patient and family        ** Please Note: This note has been constructed using a voice recognition system **

## 2018-09-24 NOTE — ASSESSMENT & PLAN NOTE
Now resolved  MRI was negative for acute infarction patient without focal neuro deficits  Patient does have episodic fatigue per daughter's description however no LOC, and ambulates well around the hospital  Patient's daughter resistant to rehab and patient is safely D/C home as they have can resume in her home and she lives with her son  Encouraged waiting at edge of bed and for changes in position

## 2018-09-24 NOTE — ASSESSMENT & PLAN NOTE
Rate controlled on toprol 25mg on coumadin  Appreciate cardiology recommendations  Patient was initially placed on Cardizem now switched to Toprol, aggressive in tight control of heart rate difficult due to history of syncope which is suspected to be secondary to blood pressure lability/vasovagal etiology  Continue coumadin, toprol 25mg daily which is a new med

## 2018-09-24 NOTE — PLAN OF CARE
Problem: DISCHARGE PLANNING - CARE MANAGEMENT  Goal: Discharge to post-acute care or home with appropriate resources  INTERVENTIONS:  - Conduct assessment to determine patient/family and health care team treatment goals, and need for post-acute services based on payer coverage, community resources, and patient preferences, and barriers to discharge  - Address psychosocial, clinical, and financial barriers to discharge as identified in assessment in conjunction with the patient/family and health care team  -Patient d/c with appropriate resources once reaches medical stability     - Arrange appropriate level of post-acute services according to patient's   needs and preference and payer coverage in collaboration with the physician and health care team  - Communicate with and update the patient/family, physician, and health care team regarding progress on the discharge plan  - Arrange appropriate transportation to post-acute venues   INTERVENTIONS:  - Conduct assessment to determine patient/family and health care team treatment goals, and need for post-acute services based on payer coverage, community resources, and patient preferences, and barriers to discharge  - Address psychosocial, clinical, and financial barriers to discharge as identified in assessment in conjunction with the patient/family and health care team  - Arrange appropriate level of post-acute services according to patients   needs and preference and payer coverage in collaboration with the physician and health care team  - Communicate with and update the patient/family, physician, and health care team regarding progress on the discharge plan  - Arrange appropriate transportation to post-acute venues   Outcome: Adequate for Discharge  Home with family support when discharge

## 2018-09-24 NOTE — DISCHARGE INSTRUCTIONS
A-fib (Atrial Fibrillation)   WHAT YOU NEED TO KNOW:   A-fib may come and go, or it may be a long-term condition  A-fib can cause blood clots, stroke, or heart failure  These conditions may become life-threatening  It is important to treat and manage a-fib to help prevent a blood clot, stroke, or heart failure  DISCHARGE INSTRUCTIONS:   Call 911 for any of the following:   · You have any of the following signs of a heart attack:      ¨ Squeezing, pressure, or pain in your chest that lasts longer than 5 minutes or returns    ¨ Discomfort or pain in your back, neck, jaw, stomach, or arm     ¨ Trouble breathing    ¨ Nausea or vomiting    ¨ Lightheadedness or a sudden cold sweat, especially with chest pain or trouble breathing    · You have any of the following signs of a stroke:      ¨ Numbness or drooping on one side of your face     ¨ Weakness in an arm or leg    ¨ Confusion or difficulty speaking    ¨ Dizziness, a severe headache, or vision loss  Seek care immediately if:  You have any of the following signs of a blood clot:  · You feel lightheaded, are short of breath, and have chest pain  · You cough up blood  · You have swelling, redness, pain, or warmth in your arm or leg  Contact your cardiologist or healthcare provider if:   · Your heart rate is higher than your healthcare provider said it should be  · You have new or worsening swelling in your legs, feet, ankles, or abdomen  · You are short of breath, even at rest      · You have questions or concerns about your condition or care  Medicines: You may need any of the following:  · Heart medicines  help control your heart rate and rhythm  You may need more than one medicine to treat your symptoms  · Blood thinners    help prevent blood clots  Examples of blood thinners include heparin and warfarin  Clots can cause strokes, heart attacks, and death   The following are general safety guidelines to follow while you are taking a blood thinner:    ¨ Watch for bleeding and bruising while you take blood thinners  Watch for bleeding from your gums or nose  Watch for blood in your urine and bowel movements  Use a soft washcloth on your skin, and a soft toothbrush to brush your teeth  This can keep your skin and gums from bleeding  If you shave, use an electric shaver  Do not play contact sports  ¨ Tell your dentist and other healthcare providers that you take anticoagulants  Wear a bracelet or necklace that says you take this medicine  ¨ Do not start or stop any medicines unless your healthcare provider tells you to  Many medicines cannot be used with blood thinners  ¨ Tell your healthcare provider right away if you forget to take the medicine, or if you take too much  ¨ Warfarin  is a blood thinner that you may need to take  The following are things you should be aware of if you take warfarin  § Foods and medicines can affect the amount of warfarin in your blood  Do not make major changes to your diet while you take warfarin  Warfarin works best when you eat about the same amount of vitamin K every day  Vitamin K is found in green leafy vegetables and certain other foods  Ask for more information about what to eat when you are taking warfarin  § You will need to see your healthcare provider for follow-up visits when you are on warfarin  You will need regular blood tests  These tests are used to decide how much medicine you need  · Antiplatelets , such as aspirin, help prevent blood clots  Take your antiplatelet medicine exactly as directed  These medicines make it more likely for you to bleed or bruise  If you are told to take aspirin, do not take acetaminophen or ibuprofen instead  · Take your medicine as directed  Contact your healthcare provider if you think your medicine is not helping or if you have side effects  Tell him or her if you are allergic to any medicine   Keep a list of the medicines, vitamins, and herbs you take  Include the amounts, and when and why you take them  Bring the list or the pill bottles to follow-up visits  Carry your medicine list with you in case of an emergency  Follow up with your cardiologist as directed: You will need regular blood tests and monitoring  Write down your questions so you remember to ask them during your visits  Manage A-fib:   · Know your target heart rate  Learn how to take your pulse and monitor your heart rate  · Manage other health conditions  This includes high blood pressure, sleep apnea, thyroid disease, diabetes, and other heart conditions  Take medicine as directed and follow your treatment plan  · Limit or do not drink alcohol  Alcohol can make a-fib hard to manage  Ask your healthcare provider if it is safe for you to drink alcohol  A drink of alcohol is 12 ounces of beer, 5 ounces of wine, or 1½ ounces of liquor  · Do not smoke  Nicotine and other chemicals in cigarettes and cigars can cause heart and lung damage  Ask your healthcare provider for information if you currently smoke and need help to quit  E-cigarettes or smokeless tobacco still contain nicotine  Talk to your healthcare provider before you use these products  · Eat heart-healthy foods  Heart healthy foods will help keep your cholesterol low  These include fruits, vegetables, whole-grain breads, low-fat dairy products, beans, lean meats, and fish  Replace butter and margarine with heart-healthy oils such as olive oil and canola oil  · Maintain a healthy weight  Ask your healthcare provider how much you should weigh  Ask him to help you create a weight loss plan if you are overweight  · Exercise for 30 minutes  most days of the week  Ask your healthcare provider about the best exercise plan for you  © 2017 [employer] Information is for End User's use only and may not be sold, redistributed or otherwise used for commercial purposes   All illustrations and images included in CareNotes® are the copyrighted property of [employer]  or [employer]  The above information is an  only  It is not intended as medical advice for individual conditions or treatments  Talk to your doctor, nurse or pharmacist before following any medical regimen to see if it is safe and effective for you

## 2018-09-24 NOTE — PROGRESS NOTES
Patient refuses to wear telemetry and continues to remove and get verbally agressive when attempts were made to put back on

## 2018-09-26 RX ORDER — PREDNISONE 10 MG/1
TABLET ORAL
Refills: 0 | COMMUNITY
Start: 2018-08-24 | End: 2018-10-19

## 2018-10-03 ENCOUNTER — OFFICE VISIT (OUTPATIENT)
Dept: CARDIOLOGY CLINIC | Facility: CLINIC | Age: 83
End: 2018-10-03
Payer: COMMERCIAL

## 2018-10-03 VITALS
HEART RATE: 64 BPM | WEIGHT: 154 LBS | RESPIRATION RATE: 16 BRPM | HEIGHT: 63 IN | SYSTOLIC BLOOD PRESSURE: 136 MMHG | BODY MASS INDEX: 27.29 KG/M2 | DIASTOLIC BLOOD PRESSURE: 82 MMHG

## 2018-10-03 DIAGNOSIS — F02.80 LATE ONSET ALZHEIMER'S DISEASE WITHOUT BEHAVIORAL DISTURBANCE (HCC): ICD-10-CM

## 2018-10-03 DIAGNOSIS — I48.91 ATRIAL FIBRILLATION, UNSPECIFIED TYPE (HCC): Primary | ICD-10-CM

## 2018-10-03 DIAGNOSIS — G30.1 LATE ONSET ALZHEIMER'S DISEASE WITHOUT BEHAVIORAL DISTURBANCE (HCC): ICD-10-CM

## 2018-10-03 DIAGNOSIS — I10 ESSENTIAL HYPERTENSION: ICD-10-CM

## 2018-10-03 PROCEDURE — 99215 OFFICE O/P EST HI 40 MIN: CPT | Performed by: NURSE PRACTITIONER

## 2018-10-03 PROCEDURE — 93000 ELECTROCARDIOGRAM COMPLETE: CPT | Performed by: NURSE PRACTITIONER

## 2018-10-03 RX ORDER — CIPROFLOXACIN 250 MG/1
250 TABLET, FILM COATED ORAL EVERY 24 HOURS
COMMUNITY
End: 2019-07-09 | Stop reason: HOSPADM

## 2018-10-03 NOTE — PROGRESS NOTES
Cardiology Office Visit   Tone Garcia 80 y o  female MRN: [id number]    \[facility]\""  Ms Tone Garcia is an 80year old female with a known past medical history of CVA on coumadin  Patient Active Problem List   Diagnosis    History of stroke    Alzheimer disease    Acute [facility]unar stroke ([facility] [address] )    Carotid artery calcification, bilateral    Hyperlipidemia    Hypertension    A-fib ([facility] [address] )    Headache     Ms Tone Garcia was recently hospitalized at [place] [address] on -18 with atrial fibrillation  She presented with a HA and weakness  She was taken to the ED by her son for evaluation  She went to the [facility] and when returning had a syncopal episode < 1 minute  She was found to be in atrial fibrillation with RVR  She was started on low dose Cardizem  She converted to NSR BB  Discharge weight 153 pounds  Today She Maciel presents to our office for recent hospitalization follow-up  We she is accompanied by her daughter who she lives with  And denies chest pain palpitations lightheadedness dizziness dyspnea with normal moderate exertion  EKG reveals continue normal sinus rhythm  ROS- unable to obtain due to dementia         Objective:   Vitals: RUE sitting 118/70 HR 66 BPM,   Roomin/82 HR 64 BPM, RR 16, Weight 154 pounds  There were no vitals filed for this visit  There is no height or weight on file to calculate BMI      Wt Readings from Last 3 Encounters:   18 69 5 kg (153 lb 3 5 oz)   18 67 4 kg (148 lb 9 4 oz)   18 69 kg (152 lb 1 9 oz)         Physical Exam:  GEN: Tone Garcia appears well, alert, dis oriented, cooperative   HEENT: pupils equal, round, and reactive to light; extraocular muscles intact  NECK: supple, no carotid bruits   HEART: regular rhythm, normal S1 and S2, no murmurs, clicks, gallops or rubs, JVP is flat   LUNGS: clear to auscultation bilaterally; no wheezes, rales, or rhonchi   ABDOMEN: normal bowel sounds, soft, no tenderness, no distention  EXTREMITIES: peripheral pulses normal; no clubbing, cyanosis, or edema  NEURO: no focal findings   SKIN: normal without suspicious lesions on exposed skin      Current Outpatient Prescriptions:     atorvastatin (LIPITOR) 20 mg tablet, Take 20 mg by mouth daily, Disp: , Rfl:     donepezil (ARICEPT) 10 mg tablet, Take 10 mg by mouth daily at bedtime, Disp: , Rfl:     memantine (NAMENDA) 10 mg tablet, Take 10 mg by mouth 2 (two) times a day, Disp: , Rfl:     metoprolol succinate (TOPROL-XL) 25 mg 24 hr tablet, Take 1 tablet (25 mg total) by mouth daily, Disp: 60 tablet, Rfl: 0    predniSONE 10 mg tablet, , Disp: , Rfl: 0    warfarin (COUMADIN) 3 mg tablet, Take 1 tablet by mouth daily (Patient taking differently: Take by mouth daily 1 5mg every other day 3mg every other day Alternating ), Disp: 30 tablet, Rfl: 1        EKG personally reviewed by CARMELLA Barriga  Assessment/Plan:   1  Paroxysmal atrial fibrillation - continues in normal sinus rhythm, confirmed by EKG, continue metoprolol succinate 25 mg daily and Coumadin goal INR 2 0-3 0 currently managed by her PCP  2  Hypertension controlled on the Toprol succinate 25 mg daily daughter concerned this medication will drop her blood pressure instructed her to buy a blood pressure cuff and monitor her blood pressure 1 hr after taking metoprolol daily   Due to dementia conservative management    Follow up in our office in 6 months per daughter request      CARMELLA Barriga  10/2/2018  8:39 PM

## 2018-10-19 ENCOUNTER — APPOINTMENT (EMERGENCY)
Dept: RADIOLOGY | Facility: HOSPITAL | Age: 83
End: 2018-10-19
Payer: COMMERCIAL

## 2018-10-19 ENCOUNTER — HOSPITAL ENCOUNTER (EMERGENCY)
Facility: HOSPITAL | Age: 83
Discharge: HOME | End: 2018-10-20
Attending: EMERGENCY MEDICINE
Payer: COMMERCIAL

## 2018-10-19 ENCOUNTER — APPOINTMENT (EMERGENCY)
Dept: CT IMAGING | Facility: HOSPITAL | Age: 83
End: 2018-10-19
Payer: COMMERCIAL

## 2018-10-19 DIAGNOSIS — R53.83 FATIGUE: ICD-10-CM

## 2018-10-19 DIAGNOSIS — R11.0 NAUSEA: ICD-10-CM

## 2018-10-19 DIAGNOSIS — R51.9 HEADACHE: Primary | ICD-10-CM

## 2018-10-19 LAB
ALBUMIN SERPL BCP-MCNC: 3.4 G/DL (ref 3.5–5)
ALP SERPL-CCNC: 68 U/L (ref 46–116)
ALT SERPL W P-5'-P-CCNC: 17 U/L (ref 12–78)
ANION GAP SERPL CALCULATED.3IONS-SCNC: 7 MMOL/L (ref 4–13)
AST SERPL W P-5'-P-CCNC: 12 U/L (ref 5–45)
BASOPHILS # BLD AUTO: 0 THOUSANDS/ΜL (ref 0–0.1)
BASOPHILS NFR BLD AUTO: 0 % (ref 0–1)
BILIRUB SERPL-MCNC: 0.32 MG/DL (ref 0.2–1)
BUN SERPL-MCNC: 23 MG/DL (ref 5–25)
CALCIUM SERPL-MCNC: 9 MG/DL (ref 8.3–10.1)
CHLORIDE SERPL-SCNC: 95 MMOL/L (ref 100–108)
CO2 SERPL-SCNC: 28 MMOL/L (ref 21–32)
CREAT SERPL-MCNC: 0.83 MG/DL (ref 0.6–1.3)
EOSINOPHIL # BLD AUTO: 0 THOUSAND/ΜL (ref 0–0.61)
EOSINOPHIL NFR BLD AUTO: 0 % (ref 0–6)
ERYTHROCYTE [DISTWIDTH] IN BLOOD BY AUTOMATED COUNT: 13.9 % (ref 11.6–15.1)
GFR SERPL CREATININE-BSD FRML MDRD: 65 ML/MIN/1.73SQ M
GLUCOSE SERPL-MCNC: 137 MG/DL (ref 65–140)
HCT VFR BLD AUTO: 36.9 % (ref 34.8–46.1)
HGB BLD-MCNC: 12.2 G/DL (ref 11.5–15.4)
IMM GRANULOCYTES # BLD AUTO: 0.05 THOUSAND/UL (ref 0–0.2)
IMM GRANULOCYTES NFR BLD AUTO: 1 % (ref 0–2)
LYMPHOCYTES # BLD AUTO: 1.92 THOUSANDS/ΜL (ref 0.6–4.47)
LYMPHOCYTES NFR BLD AUTO: 19 % (ref 14–44)
MCH RBC QN AUTO: 28.8 PG (ref 26.8–34.3)
MCHC RBC AUTO-ENTMCNC: 33.1 G/DL (ref 31.4–37.4)
MCV RBC AUTO: 87 FL (ref 82–98)
MONOCYTES # BLD AUTO: 1.11 THOUSAND/ΜL (ref 0.17–1.22)
MONOCYTES NFR BLD AUTO: 11 % (ref 4–12)
NEUTROPHILS # BLD AUTO: 7.2 THOUSANDS/ΜL (ref 1.85–7.62)
NEUTS SEG NFR BLD AUTO: 69 % (ref 43–75)
NRBC BLD AUTO-RTO: 0 /100 WBCS
PLATELET # BLD AUTO: 285 THOUSANDS/UL (ref 149–390)
PMV BLD AUTO: 10.4 FL (ref 8.9–12.7)
POTASSIUM SERPL-SCNC: 4.4 MMOL/L (ref 3.5–5.3)
PROT SERPL-MCNC: 6.7 G/DL (ref 6.4–8.2)
RBC # BLD AUTO: 4.23 MILLION/UL (ref 3.81–5.12)
SODIUM SERPL-SCNC: 130 MMOL/L (ref 136–145)
TROPONIN I SERPL-MCNC: <0.02 NG/ML
WBC # BLD AUTO: 10.28 THOUSAND/UL (ref 4.31–10.16)

## 2018-10-19 PROCEDURE — 36415 COLL VENOUS BLD VENIPUNCTURE: CPT | Performed by: EMERGENCY MEDICINE

## 2018-10-19 PROCEDURE — 71046 X-RAY EXAM CHEST 2 VIEWS: CPT

## 2018-10-19 PROCEDURE — 99285 EMERGENCY DEPT VISIT HI MDM: CPT

## 2018-10-19 PROCEDURE — 70450 CT HEAD/BRAIN W/O DYE: CPT

## 2018-10-19 PROCEDURE — 85025 COMPLETE CBC W/AUTO DIFF WBC: CPT | Performed by: EMERGENCY MEDICINE

## 2018-10-19 PROCEDURE — 96374 THER/PROPH/DIAG INJ IV PUSH: CPT

## 2018-10-19 PROCEDURE — 93005 ELECTROCARDIOGRAM TRACING: CPT

## 2018-10-19 PROCEDURE — 84484 ASSAY OF TROPONIN QUANT: CPT | Performed by: EMERGENCY MEDICINE

## 2018-10-19 PROCEDURE — 80053 COMPREHEN METABOLIC PANEL: CPT | Performed by: EMERGENCY MEDICINE

## 2018-10-19 RX ORDER — ONDANSETRON 2 MG/ML
4 INJECTION INTRAMUSCULAR; INTRAVENOUS ONCE
Status: COMPLETED | OUTPATIENT
Start: 2018-10-19 | End: 2018-10-19

## 2018-10-19 RX ADMIN — ONDANSETRON 4 MG: 2 INJECTION INTRAMUSCULAR; INTRAVENOUS at 22:18

## 2018-10-20 VITALS
RESPIRATION RATE: 18 BRPM | DIASTOLIC BLOOD PRESSURE: 75 MMHG | BODY MASS INDEX: 27.34 KG/M2 | SYSTOLIC BLOOD PRESSURE: 165 MMHG | WEIGHT: 154.32 LBS | TEMPERATURE: 97.6 F | HEART RATE: 66 BPM | OXYGEN SATURATION: 97 %

## 2018-10-20 LAB
BACTERIA UR QL AUTO: ABNORMAL /HPF
BILIRUB UR QL STRIP: NEGATIVE
CLARITY UR: CLEAR
COLOR UR: YELLOW
GLUCOSE UR STRIP-MCNC: NEGATIVE MG/DL
HGB UR QL STRIP.AUTO: NEGATIVE
KETONES UR STRIP-MCNC: NEGATIVE MG/DL
LEUKOCYTE ESTERASE UR QL STRIP: ABNORMAL
NITRITE UR QL STRIP: NEGATIVE
NON-SQ EPI CELLS URNS QL MICRO: ABNORMAL /HPF
PH UR STRIP.AUTO: 7 [PH] (ref 4.5–8)
PROT UR STRIP-MCNC: NEGATIVE MG/DL
RBC #/AREA URNS AUTO: ABNORMAL /HPF
SP GR UR STRIP.AUTO: 1.01 (ref 1–1.03)
UROBILINOGEN UR QL STRIP.AUTO: 0.2 E.U./DL
WBC #/AREA URNS AUTO: ABNORMAL /HPF

## 2018-10-20 PROCEDURE — 81001 URINALYSIS AUTO W/SCOPE: CPT

## 2018-10-20 NOTE — DISCHARGE INSTRUCTIONS
Acute Nausea and Vomiting, Ambulatory Care   GENERAL INFORMATION:   Acute nausea and vomiting  starts suddenly, gets worse quickly, and lasts a short time  Nausea and vomiting may be caused by pregnancy, alcohol, infection, or medicines  Common related symptoms include the following:   · Fever    · Abdominal swelling    · Pain, tenderness, or a lump in the abdomen    · Splashing sounds heard in your stomach when you move  Seek immediate care for the following symptoms:   · Blood in your vomit or bowel movements    · Sudden, severe pain in your chest and upper abdomen after hard vomiting    · Dizziness, dry mouth, and thirst    · Urinating very little or not at all    · Muscle weakness, leg cramps, and trouble breathing    · A heart beat that is faster than normal    · Vomiting for more than 48 hours  Treatment for acute nausea and vomiting  may include medicines to calm your stomach and stop the vomiting  You may need IV fluids if you are dehydrated  Manage your nausea and vomiting:   · Drink liquids as directed to prevent dehydration  Ask how much liquid to drink each day and which liquids are best for you  You may need to drink an oral rehydration solution (ORS)  ORS contains water, salts, and sugar that are needed to replace the lost body fluids  Ask what kind of ORS to use, how much to drink, and where to get it  · Eat smaller meals, more often  Eat small amounts of food every 2 to 3 hours, even if you are not hungry  Food in your stomach may help decrease your nausea  · Avoid stress  Find ways to relax and manage your stress  Headaches due to stress may cause nausea and vomiting  Get more rest and sleep  Follow up with your healthcare provider as directed:  Write down your questions so you remember to ask them during your visits  CARE AGREEMENT:   You have the right to help plan your care  Learn about your health condition and how it may be treated   Discuss treatment options with your caregivers to decide what care you want to receive  You always have the right to refuse treatment  The above information is an  only  It is not intended as medical advice for individual conditions or treatments  Talk to your doctor, nurse or pharmacist before following any medical regimen to see if it is safe and effective for you  © 2014 [address] is for End User's use only and may not be sold, redistributed or otherwise used for commercial purposes  All illustrations and images included in CareNotes® are the copyrighted property of [employer]  or [employer]  Acute Headache   WHAT YOU NEED TO KNOW:   An acute headache is pain or discomfort that starts suddenly and gets worse quickly  You may have an acute headache only when you feel stress or eat certain foods  Other acute headache pain can happen every day, and sometimes several times a day  DISCHARGE INSTRUCTIONS:   Return to the emergency department if:   · You have severe pain  · You have numbness or weakness on one side of your face or body  · You have a headache that occurs after a blow to the head, a fall, or other trauma  · You have a headache, are forgetful or confused, or have trouble speaking  · You have a headache, stiff neck, and a fever  Contact your healthcare provider if:   · You have a constant headache and are vomiting  · You have a headache each day that does not get better, even after treatment  · You have changes in your headaches, or new symptoms that occur when you have a headache  · You have questions or concerns about your condition or care  Medicines: You may need any of the following:  · Prescription pain medicine  may be given  The medicine your healthcare provider recommends will depend on the kind of headaches you have  You will need to take prescription headache medicines as directed to prevent a problem called rebound headache   These headaches happen with regular use of pain relievers for headache disorders  · NSAIDs , such as ibuprofen, help decrease swelling, pain, and fever  This medicine is available with or without a doctor's order  NSAIDs can cause stomach bleeding or kidney problems in certain people  If you take blood thinner medicine, always ask your healthcare provider if NSAIDs are safe for you  Always read the medicine label and follow directions  · Acetaminophen  decreases pain and fever  It is available without a doctor's order  Ask how much to take and how often to take it  Follow directions  Read the labels of all other medicines you are using to see if they also contain acetaminophen, or ask your doctor or pharmacist  Acetaminophen can cause liver damage if not taken correctly  Do not use more than 3 grams (3,000 milligrams) total of acetaminophen in one day  · Antidepressants  may be given for some kinds of headaches  · Take your medicine as directed  Contact your healthcare provider if you think your medicine is not helping or if you have side effects  Tell him or her if you are allergic to any medicine  Keep a list of the medicines, vitamins, and herbs you take  Include the amounts, and when and why you take them  Bring the list or the pill bottles to follow-up visits  Carry your medicine list with you in case of an emergency  Manage your symptoms:   · Apply heat or ice  on the headache area  Use a heat or ice pack  For an ice pack, you can also put crushed ice in a plastic bag  Cover the pack or bag with a towel before you apply it to your skin  Ice and heat both help decrease pain, and heat also helps decrease muscle spasms  Apply heat for 20 to 30 minutes every 2 hours  Apply ice for 15 to 20 minutes every hour  Apply heat or ice for as long and for as many days as directed  You may alternate heat and ice  · Relax your muscles  Lie down in a comfortable position and close your eyes  Relax your muscles slowly   Start at your toes and work your way up your body  · Keep a record of your headaches  Write down when your headaches start and stop  Include your symptoms and what you were doing when the headache began  Record what you ate or drank for 24 hours before the headache started  Describe the pain and where it hurts  Keep track of what you did to treat your headache and if it worked  Prevent an acute headache:   · Avoid anything that triggers an acute headache  Examples include exposure to chemicals, going to high altitude, or not getting enough sleep  Create a regular sleep routine  Go to sleep at the same time and wake up at the same time each day  Do not use electronic devices before bedtime  These may trigger a headache or prevent you from sleeping well  · Do not smoke  Nicotine and other chemicals in cigarettes and cigars can trigger an acute headache or make it worse  Ask your healthcare provider for information if you currently smoke and need help to quit  E-cigarettes or smokeless tobacco still contain nicotine  Talk to your healthcare provider before you use these products  · Limit alcohol as directed  Alcohol can trigger an acute headache or make it worse  If you have cluster headaches, do not drink alcohol during an episode  For other types of headaches, ask your healthcare provider if it is safe for you to drink alcohol  Ask how much is safe for you to drink, and how often  · Exercise as directed  Exercise can reduce tension and help with headache pain  Aim for 30 minutes of physical activity on most days of the week  Your healthcare provider can help you create an exercise plan  · Eat a variety of healthy foods  Healthy foods include fruits, vegetables, low-fat dairy products, lean meats, fish, whole grains, and cooked beans  Your healthcare provider or dietitian can help you create meals plans if you need to avoid foods that trigger headaches    Follow up with your healthcare provider as directed:  Bring your headache record with you when you see your healthcare provider  Write down your questions so you remember to ask them during your visits  © 2017 [address] Information is for End User's use only and may not be sold, redistributed or otherwise used for commercial purposes  All illustrations and images included in CareNotes® are the copyrighted property of [employer]  or [employer] Za[employer]  The above information is an  only  It is not intended as medical advice for individual conditions or treatments  Talk to your doctor, nurse or pharmacist before following any medical regimen to see if it is safe and effective for you  Acute Nausea and Vomiting   WHAT YOU NEED TO KNOW:   Acute nausea and vomiting start suddenly, worsen quickly, and last a short time  DISCHARGE INSTRUCTIONS:   Return to the emergency department if:   · You see blood in your vomit or your bowel movements  · You have sudden, severe pain in your chest and upper abdomen after hard vomiting or retching  · You have swelling in your neck and chest      · You are dizzy, cold, and thirsty and your eyes and mouth are dry  · You are urinating very little or not at all  · You have muscle weakness, leg cramps, and trouble breathing  · Your heart is beating much faster than normal      · You continue to vomit for more than 48 hours  Contact your healthcare provider if:   · You have frequent dry heaves (vomiting but nothing comes out)  · Your nausea and vomiting does not get better or go away after you use medicine  · You have questions or concerns about your condition or treatment  Medicines: You may need any of the following:  · Medicines  may be given to calm your stomach and stop your vomiting  You may also need medicines to help you feel more relaxed or to stop nausea and vomiting caused by motion sickness      · Gastrointestinal stimulants  are used to help empty your stomach and bowels  This may help decrease nausea and vomiting  · Take your medicine as directed  Contact your healthcare provider if you think your medicine is not helping or if you have side effects  Tell him or her if you are allergic to any medicine  Keep a list of the medicines, vitamins, and herbs you take  Include the amounts, and when and why you take them  Bring the list or the pill bottles to follow-up visits  Carry your medicine list with you in case of an emergency  Prevent or manage acute nausea and vomiting:   · Do not drink alcohol  Alcohol may upset or irritate your stomach  Too much alcohol can also cause acute nausea and vomiting  · Control stress  Headaches due to stress may cause nausea and vomiting  Find ways to relax and manage your stress  Get more rest and sleep  · Drink more liquids as directed  Vomiting can lead to dehydration  It is important to drink more liquids to help replace lost body fluids  Ask your healthcare provider how much liquid to drink each day and which liquids are best for you  Your provider may recommend that you drink an oral rehydration solution (ORS)  ORS contains water, salts, and sugar that are needed to replace the lost body fluids  Ask what kind of ORS to use, how much to drink, and where to get it  · Eat smaller meals, more often  Eat small amounts of food every 2 to 3 hours, even if you are not hungry  Food in your stomach may decrease your nausea  · Talk to your healthcare provider before you take over-the-counter (OTC) medicines  These medicines can cause serious problems if you use certain other medicines, or you have a medical condition  You may have problems if you use too much or use them for longer than the label says  Follow directions on the label carefully  Follow up with your healthcare provider as directed:  Write down your questions so you remember to ask them during your follow-up visits    © 2017 [address] Information is for End User's use only and may not be sold, redistributed or otherwise used for commercial purposes  All illustrations and images included in CareNotes® are the copyrighted property of [employer]  or [employer] Zaidi  The above information is an  only  It is not intended as medical advice for individual conditions or treatments  Talk to your doctor, nurse or pharmacist before following any medical regimen to see if it is safe and effective for you  Fatigue   WHAT YOU NEED TO KNOW:   Fatigue is mental and physical exhaustion that does not get better with rest  Fatigue may make daily activities difficult or cause extreme sleepiness  It is normal to feel tired sometimes, but long-term fatigue may be a sign of serious illness  DISCHARGE INSTRUCTIONS:   Seek care immediately if:   · You have chest pain  · You have difficulty breathing  Contact your healthcare provider if:   · You have a cough that gets worse, or does not go away  · You see blood in your urine or bowel movement  · You have numbness or tingling around your mouth or in an arm or leg  · You faint, feel dizzy, or have vision changes  · You have swelling in your lymph nodes  · You are a woman and have vaginal bleeding that is not normal for you, or is not expected  · You lose weight without trying, or you have trouble eating  · You feel weak or have muscle pain  · You have pain or swelling in your joints  · You have questions or concerns about your condition or care  Follow up with your healthcare provider as directed: You may need more tests  Your healthcare provider may also refer you to a specialist  Write down your questions so you remember to ask them during your visits  Manage fatigue:   · Keep a fatigue diary  Include anything that makes you feel more tired or less tired  Bring the diary with you to follow-up visits with your provider  · Exercise as directed  Exercise can help you feel more alert  Exercise can also help you manage stress or relieve depression  Try to get at least 30 minutes of exercise most days of the week  · Keep a regular sleep schedule  Go to bed and wake up at the same times every day  Limit naps to 1 hour each day  A nap can improve fatigue, but a long nap may make it harder to go to sleep at night  · Plan and limit your activities  Limit the number of activities such as shopping and cleaning you do each day  If possible, try to spread out your trips throughout the week  Plan ahead so you are not rushing to get something done  Only do activities that you have the energy to complete  Take breaks between activities  Ask for help if you need it  Another person may be able to drive you or help with daily activities  · Eat a variety of healthy foods  Healthy foods include fruits, vegetables, whole-grain breads, low-fat dairy products, beans, lean meats, and fish  Good nutrition can help manage fatigue  · Limit caffeine and alcohol  These can make it difficult to fall or stay asleep  Women should limit alcohol to 1 drink a day  Men should limit alcohol to 2 drinks a day  A drink of alcohol is 12 ounces of beer, 5 ounces of wine, or 1½ ounces of liquor  Ask our healthcare provider how much caffeine is safe for you  · Do not smoke  Nicotine and other chemicals in cigarettes and cigars can cause lung damage and increase fatigue  Ask your healthcare provider for information if you currently smoke and need help to quit  E-cigarettes or smokeless tobacco still contain nicotine  Talk to your healthcare provider before you use these products  © 2017 [address]  Information is for End User's use only and may not be sold, redistributed or otherwise used for commercial purposes  All illustrations and images included in CareNotes® are the copyrighted property of [employer]  or [employer] Za[employer]    The above information is an  only  It is not intended as medical advice for individual conditions or treatments  Talk to your doctor, nurse or pharmacist before following any medical regimen to see if it is safe and effective for you

## 2018-10-20 NOTE — ED PROVIDER NOTES
History  Chief Complaint   Patient presents with    Weakness - Generalized     Per daughter pt has been feeling weak all day, c/o intermittent dizziness/nausea  Pt also c/o headache and sob w/ exertion  Pt denies chest pain at this time  Patient is an 54-year-old female that presents for 1 day of generalized weakness, nausea, dizziness and decreased appetite and activity  Patient was just recently in the hospital a few weeks ago for urinary tract infection with similar symptoms  Patient has headaches but this is chronic  She has had intermittent dizziness as well but it was worse today  Patient did just receive a steroid injection to her neck for chronic headaches  No recent falls but the daughter states that she was more off balance today than normal         History provided by:  Patient   used: No    Fatigue   Severity:  Moderate  Onset quality:  Gradual  Duration:  1 day  Timing:  Constant  Progression:  Worsening  Chronicity:  Recurrent  Context: recent infection    Relieved by:  Nothing  Ineffective treatments:  Medication  Associated symptoms: difficulty walking, dizziness, headaches, lethargy, nausea and near-syncope    Associated symptoms: no abdominal pain, no chest pain, no dysuria, no numbness in extremities, no falls, no fever, no loss of consciousness, no shortness of breath, no syncope and no vomiting        Prior to Admission Medications   Prescriptions Last Dose Informant Patient Reported? Taking?    Cranberry 500 MG TABS  Self Yes No   Sig: Take by mouth   atorvastatin (LIPITOR) 20 mg tablet  Self Yes No   Sig: Take 20 mg by mouth daily   ciprofloxacin (CIPRO) 250 mg tablet  Self Yes No   Sig: Take 250 mg by mouth every 24 hours   donepezil (ARICEPT) 10 mg tablet  Self Yes No   Sig: Take 10 mg by mouth daily at bedtime   memantine (NAMENDA) 10 mg tablet  Self Yes No   Sig: Take 10 mg by mouth 2 (two) times a day   metoprolol succinate (TOPROL-XL) 25 mg 24 hr tablet Self No No   Sig: Take 1 tablet (25 mg total) by mouth daily   warfarin (COUMADIN) 3 mg tablet  Self No No   Sig: Take 1 tablet by mouth daily   Patient taking differently: Take by mouth daily 1 5mg every other day  3mg every other day  Alternating       Facility-Administered Medications: None       Past Medical History:   Diagnosis Date    A-fib ([facility] )     Alzheimer disease     Diverticulosis     Dyslipidemia     History of stroke     Hyperlipidemia     Hypertension     UTI (urinary tract infection)     Vasovagal syncope 2/13/2018       Past Surgical History:   Procedure Laterality Date    CHOLECYSTECTOMY         History reviewed  No pertinent family history  I have reviewed and agree with the history as documented  Social History   Substance Use Topics    Smoking status: Former Smoker    Smokeless tobacco: Never Used    Alcohol use No        Review of Systems   Constitutional: Positive for activity change, appetite change and fatigue  Negative for fever  Respiratory: Negative for shortness of breath  Cardiovascular: Positive for near-syncope  Negative for chest pain and syncope  Gastrointestinal: Positive for nausea  Negative for abdominal pain and vomiting  Genitourinary: Negative for dysuria  Musculoskeletal: Positive for back pain (chronic)  Negative for falls  Skin: Negative for color change, rash and wound  Neurological: Positive for dizziness and headaches  Negative for loss of consciousness, syncope, facial asymmetry and speech difficulty  All other systems reviewed and are negative  Physical Exam  Physical Exam   Constitutional: She is oriented to person, place, and time  She appears well-developed and well-nourished  HENT:   Head: Normocephalic and atraumatic  Mouth/Throat: Oropharynx is clear and moist    Eyes: Pupils are equal, round, and reactive to light  Conjunctivae are normal    Neck: Normal range of motion  Neck supple     Cardiovascular: Normal rate, regular rhythm, normal heart sounds and intact distal pulses  Exam reveals no friction rub  No murmur heard  Pulmonary/Chest: Effort normal and breath sounds normal  No respiratory distress  She has no wheezes  She has no rales  Abdominal: Soft  She exhibits no distension  There is no tenderness  There is no rebound and no guarding  Musculoskeletal: Normal range of motion  She exhibits no edema, tenderness or deformity  Neurological: She is alert and oriented to person, place, and time  Skin: Skin is warm and dry  Psychiatric: She has a normal mood and affect  Nursing note and vitals reviewed        Vital Signs  ED Triage Vitals [10/19/18 2128]   Temperature Pulse Respirations Blood Pressure SpO2   97 6 °F (36 4 °C) 59 20 149/83 97 %      Temp Source Heart Rate Source Patient Position - Orthostatic VS BP Location FiO2 (%)   Oral Monitor Lying Left arm --      Pain Score       6           Vitals:    10/19/18 2128 10/20/18 0002   BP: 149/83 165/75   Pulse: 59 66   Patient Position - Orthostatic VS: Lying Lying       Visual Acuity      ED Medications  Medications   ondansetron (ZOFRAN) injection 4 mg (4 mg Intravenous Given 10/19/18 2218)       Diagnostic Studies  Results Reviewed     Procedure Component Value Units Date/Time    Urine Microscopic [[id number]] Collected:  10/20/18 0019    Lab Status:  No result Specimen:  Urine from Urine, Clean Catch     ED Urine Macroscopic [[id number]]  (Abnormal) Collected:  10/20/18 0019    Lab Status:  Final result Specimen:  Urine Updated:  10/20/18 0007     Color, UA Yellow     Clarity, UA Clear     pH, UA 7 0     Leukocytes, UA Trace (A)     Nitrite, UA Negative     Protein, UA Negative mg/dl      Glucose, UA Negative mg/dl      Ketones, UA Negative mg/dl      Urobilinogen, UA 0 2 E U /dl      Bilirubin, UA Negative     Blood, UA Negative     Specific Gravity, UA 1 010    Narrative:       CLINITEK RESULT    POCT urinalysis dipstick [[id number]]     Lab Status:  No result Specimen:  Urine     Troponin I [[id number]]  (Normal) Collected:  10/19/18 2140    Lab Status:  Final result Specimen:  Blood from Arm, Right Updated:  10/19/18 2206     Troponin I <0 02 ng/mL     Comprehensive metabolic panel [[id number]]  (Abnormal) Collected:  10/19/18 2140    Lab Status:  Final result Specimen:  Blood from Arm, Right Updated:  10/19/18 2204     Sodium 130 (L) mmol/L      Potassium 4 4 mmol/L      Chloride 95 (L) mmol/L      CO2 28 mmol/L      ANION GAP 7 mmol/L      BUN 23 mg/dL      Creatinine 0 83 mg/dL      Glucose 137 mg/dL      Calcium 9 0 mg/dL      AST 12 U/L      ALT 17 U/L      Alkaline Phosphatase 68 U/L      Total Protein 6 7 g/dL      Albumin 3 4 (L) g/dL      Total Bilirubin 0 32 mg/dL      eGFR 65 ml/min/1 73sq m     Narrative:         National Kidney Disease Education Program recommendations are as follows:  GFR calculation is accurate only with a steady state creatinine  Chronic Kidney disease less than 60 ml/min/1 73 sq  meters  Kidney failure less than 15 ml/min/1 73 sq  meters      CBC and differential [[id number]]  (Abnormal) Collected:  10/19/18 2140    Lab Status:  Final result Specimen:  Blood from Arm, Right Updated:  10/19/18 2148     WBC 10 28 (H) Thousand/uL      RBC 4 23 Million/uL      Hemoglobin 12 2 g/dL      Hematocrit 36 9 %      MCV 87 fL      MCH 28 8 pg      MCHC 33 1 g/dL      RDW 13 9 %      MPV 10 4 fL      Platelets 663 Thousands/uL      nRBC 0 /100 WBCs      Neutrophils Relative 69 %      Immat GRANS % 1 %      Lymphocytes Relative 19 %      Monocytes Relative 11 %      Eosinophils Relative 0 %      Basophils Relative 0 %      Neutrophils Absolute 7 20 Thousands/µL      Immature Grans Absolute 0 05 Thousand/uL      Lymphocytes Absolute 1 92 Thousands/µL      Monocytes Absolute 1 11 Thousand/µL      Eosinophils Absolute 0 00 Thousand/µL      Basophils Absolute 0 00 Thousands/µL                  CT head without contrast   Final Result by Marichuy Montoya MD (10/19 9215)      1  No acute intracranial hemorrhage, midline shift, or mass effect  2   Chronic small vessel ischemic changes  Workstation performed: [id number]         X-ray chest 2 views    (Results Pending)              Procedures  ECG 12 Lead Documentation  Date/Time: 10/19/2018 9:59 PM  Performed by: Anamaria Hinkle  Authorized by: Anamaria Hinkle     Indications / Diagnosis:  Dizziness  Patient location:  ED  Previous ECG:     Previous ECG:  Compared to current    Similarity:  Changes noted  Interpretation:     Interpretation: normal    Rate:     ECG rate:  57    ECG rate assessment: bradycardic    Rhythm:     Rhythm: sinus bradycardia    Ectopy:     Ectopy: none    QRS:     QRS intervals:  Normal  Conduction:     Conduction: normal    ST segments:     ST segments:  Normal  T waves:     T waves: normal             Phone Contacts  ED Phone Contact    ED Course                               MDM  Number of Diagnoses or Management Options  Fatigue: new and requires workup  Headache: new and requires workup  Nausea: new and requires workup  Diagnosis management comments: Patient is an 80-year-old female with a history of dementia that presents for fatigue, weakness, decreased appetite, decreased intake, nausea and headaches  Patient with a recent admission for UTI  Similar symptoms today  Will obtain a head CT, labs, try to encourage p o  Fluids after Zofran and reassess  12:05 AM  Labs noted  Probable mild dehydration since the patient had a decrease in p o  Intake  Head CT is negative for pathology  Patient ambulated to the bathroom well without ataxia  Patient wants to go home  Patient's daughter states that she has been little bit more agitated while she has been in the ER and it appears that her strength an attitude her back to normal   No indication for admission at this point  Will have her follow up with her PCP         Amount and/or Complexity of Data Reviewed  Clinical lab tests: ordered and reviewed  Tests in the radiology section of CPT®: ordered and reviewed  Tests in the medicine section of CPT®: reviewed and ordered  Review and summarize past medical records: yes    Patient Progress  Patient progress: stable    CritCare Time    Disposition  Final diagnoses:   Headache   Nausea   Fatigue     Time reflects when diagnosis was documented in both MDM as applicable and the Disposition within this note     Time User Action Codes Description Comment    10/20/2018 12:06 AM Ceil Desanctis Add [R51] Headache     10/20/2018 12:06 AM Dilma Nath Add [R11 0] Nausea     10/20/2018 12:06 AM Ceil Desanctis Add [R53 83] Fatigue       ED Disposition     ED Disposition Condition Comment    Discharge  Prosper Course discharge to home/self care  Condition at discharge: Good        Follow-up Information     Follow up With Specialties Details Why Contact Info    Humble Delgado DO Internal Medicine Call If symptoms worsen, To schedule an appointment as soon as you can [address] [place] [address]   [phone]            Patient's Medications   Discharge Prescriptions    No medications on file     No discharge procedures on file      ED Provider  Electronically Signed by           Monroe Garcia DO  10/20/18 0008

## 2018-10-23 LAB
ATRIAL RATE: 57 BPM
P AXIS: 52 DEGREES
PR INTERVAL: 144 MS
QRS AXIS: 59 DEGREES
QRSD INTERVAL: 78 MS
QT INTERVAL: 420 MS
QTC INTERVAL: 408 MS
T WAVE AXIS: 91 DEGREES
VENTRICULAR RATE: 57 BPM

## 2018-10-23 PROCEDURE — 93010 ELECTROCARDIOGRAM REPORT: CPT | Performed by: INTERNAL MEDICINE

## 2018-11-13 ENCOUNTER — HOSPITAL ENCOUNTER (EMERGENCY)
Facility: HOSPITAL | Age: 83
Discharge: HOME | End: 2018-11-13
Attending: EMERGENCY MEDICINE
Payer: COMMERCIAL

## 2018-11-13 ENCOUNTER — APPOINTMENT (EMERGENCY)
Dept: CT IMAGING | Facility: HOSPITAL | Age: 83
End: 2018-11-13
Payer: COMMERCIAL

## 2018-11-13 VITALS
BODY MASS INDEX: 27.73 KG/M2 | DIASTOLIC BLOOD PRESSURE: 67 MMHG | TEMPERATURE: 97.4 F | HEART RATE: 89 BPM | WEIGHT: 156.53 LBS | OXYGEN SATURATION: 94 % | SYSTOLIC BLOOD PRESSURE: 157 MMHG | RESPIRATION RATE: 18 BRPM
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
## 2018-11-13 DIAGNOSIS — F02.80 ALZHEIMER DISEASE (HCC): ICD-10-CM

## 2018-11-13 DIAGNOSIS — G30.9 ALZHEIMER DISEASE (HCC): ICD-10-CM

## 2018-11-13 DIAGNOSIS — I10 ESSENTIAL HYPERTENSION: ICD-10-CM

## 2018-11-13 DIAGNOSIS — Z79.01 ANTICOAGULATED ON COUMADIN: ICD-10-CM

## 2018-11-13 LAB
ANION GAP SERPL CALCULATED.3IONS-SCNC: 9 MMOL/L (ref 4–13)
APTT PPP: 34 SECONDS (ref 26–38)
BACTERIA UR QL AUTO: ABNORMAL /HPF
BASOPHILS # BLD AUTO: 0.02 THOUSANDS/ΜL (ref 0–0.1)
BASOPHILS NFR BLD AUTO: 0 % (ref 0–1)
BILIRUB UR QL STRIP: NEGATIVE
BUN SERPL-MCNC: 12 MG/DL (ref 5–25)
CALCIUM SERPL-MCNC: 8.7 MG/DL (ref 8.3–10.1)
CHLORIDE SERPL-SCNC: 99 MMOL/L (ref 100–108)
CLARITY UR: CLEAR
CO2 SERPL-SCNC: 27 MMOL/L (ref 21–32)
COLOR UR: YELLOW
CREAT SERPL-MCNC: 0.81 MG/DL (ref 0.6–1.3)
EOSINOPHIL # BLD AUTO: 0.12 THOUSAND/ΜL (ref 0–0.61)
EOSINOPHIL NFR BLD AUTO: 2 % (ref 0–6)
ERYTHROCYTE [DISTWIDTH] IN BLOOD BY AUTOMATED COUNT: 13.7 % (ref 11.6–15.1)
GFR SERPL CREATININE-BSD FRML MDRD: 66 ML/MIN/1.73SQ M
GLUCOSE SERPL-MCNC: 110 MG/DL (ref 65–140)
GLUCOSE UR STRIP-MCNC: NEGATIVE MG/DL
HCT VFR BLD AUTO: 33.1 % (ref 34.8–46.1)
HGB BLD-MCNC: 10.8 G/DL (ref 11.5–15.4)
HGB UR QL STRIP.AUTO: ABNORMAL
IMM GRANULOCYTES # BLD AUTO: 0.01 THOUSAND/UL (ref 0–0.2)
IMM GRANULOCYTES NFR BLD AUTO: 0 % (ref 0–2)
INR PPP: 1.6 (ref 0.86–1.17)
KETONES UR STRIP-MCNC: NEGATIVE MG/DL
LEUKOCYTE ESTERASE UR QL STRIP: ABNORMAL
LYMPHOCYTES # BLD AUTO: 1.32 THOUSANDS/ΜL (ref 0.6–4.47)
LYMPHOCYTES NFR BLD AUTO: 27 % (ref 14–44)
MCH RBC QN AUTO: 29.6 PG (ref 26.8–34.3)
MCHC RBC AUTO-ENTMCNC: 32.6 G/DL (ref 31.4–37.4)
MCV RBC AUTO: 91 FL (ref 82–98)
MONOCYTES # BLD AUTO: 0.52 THOUSAND/ΜL (ref 0.17–1.22)
MONOCYTES NFR BLD AUTO: 11 % (ref 4–12)
NEUTROPHILS # BLD AUTO: 2.95 THOUSANDS/ΜL (ref 1.85–7.62)
NEUTS SEG NFR BLD AUTO: 60 % (ref 43–75)
NITRITE UR QL STRIP: NEGATIVE
NON-SQ EPI CELLS URNS QL MICRO: ABNORMAL /HPF
NRBC BLD AUTO-RTO: 0 /100 WBCS
NT-PROBNP SERPL-MCNC: 786 PG/ML
PH UR STRIP.AUTO: 7 [PH] (ref 4.5–8)
PLATELET # BLD AUTO: 272 THOUSANDS/UL (ref 149–390)
PMV BLD AUTO: 10.1 FL (ref 8.9–12.7)
POTASSIUM SERPL-SCNC: 3.9 MMOL/L (ref 3.5–5.3)
PROT UR STRIP-MCNC: NEGATIVE MG/DL
PROTHROMBIN TIME: 19.1 SECONDS (ref 11.8–14.2)
RBC # BLD AUTO: 3.65 MILLION/UL (ref 3.81–5.12)
RBC #/AREA URNS AUTO: ABNORMAL /HPF
SODIUM SERPL-SCNC: 135 MMOL/L (ref 136–145)
SP GR UR STRIP.AUTO: 1.01 (ref 1–1.03)
TROPONIN I SERPL-MCNC: <0.02 NG/ML
UROBILINOGEN UR QL STRIP.AUTO: 0.2 E.U./DL
WBC # BLD AUTO: 4.94 THOUSAND/UL (ref 4.31–10.16)
WBC #/AREA URNS AUTO: ABNORMAL /HPF

## 2018-11-13 PROCEDURE — 83880 ASSAY OF NATRIURETIC PEPTIDE: CPT | Performed by: EMERGENCY MEDICINE

## 2018-11-13 PROCEDURE — 85730 THROMBOPLASTIN TIME PARTIAL: CPT | Performed by: EMERGENCY MEDICINE

## 2018-11-13 PROCEDURE — 85610 PROTHROMBIN TIME: CPT | Performed by: EMERGENCY MEDICINE

## 2018-11-13 PROCEDURE — 93005 ELECTROCARDIOGRAM TRACING: CPT

## 2018-11-13 PROCEDURE — 81001 URINALYSIS AUTO W/SCOPE: CPT

## 2018-11-13 PROCEDURE — 84484 ASSAY OF TROPONIN QUANT: CPT | Performed by: EMERGENCY MEDICINE

## 2018-11-13 PROCEDURE — 85025 COMPLETE CBC W/AUTO DIFF WBC: CPT | Performed by: EMERGENCY MEDICINE

## 2018-11-13 PROCEDURE — 72125 CT NECK SPINE W/O DYE: CPT

## 2018-11-13 PROCEDURE — 70450 CT HEAD/BRAIN W/O DYE: CPT

## 2018-11-13 PROCEDURE — 80048 BASIC METABOLIC PNL TOTAL CA: CPT | Performed by: EMERGENCY MEDICINE

## 2018-11-13 PROCEDURE — 99285 EMERGENCY DEPT VISIT HI MDM: CPT

## 2018-11-13 PROCEDURE — 36415 COLL VENOUS BLD VENIPUNCTURE: CPT | Performed by: EMERGENCY MEDICINE

## 2018-11-13 NOTE — ED PROVIDER NOTES
History  Chief Complaint   Patient presents with    Loss of Consciousness     Patient brought in via EMS, per daughter after patients shower heard a "thump" and patient found on floor, per daughter patient unconscious for at least 1 minute  Patient on coumadin, confused at baseline  Denies pain anywhere but feels dizzy    Fall     80-year-old demented female who is unable provide any meaningful history presents for evaluation after presumed fall  Patient's daughter heard with sound like a fallen found the patient sitting on the floor after taking a shower  Patient is amnestic to the fall on offers no complaints at this time  History provided by:  Patient and relative  History limited by:  Dementia      Prior to Admission Medications   Prescriptions Last Dose Informant Patient Reported? Taking? Cranberry 500 MG TABS  Self Yes Yes   Sig: Take 500 mg by mouth daily     atorvastatin (LIPITOR) 20 mg tablet  Self Yes Yes   Sig: Take 20 mg by mouth daily   ciprofloxacin (CIPRO) 250 mg tablet  Self Yes Yes   Sig: Take 250 mg by mouth every 24 hours   donepezil (ARICEPT) 10 mg tablet  Self Yes Yes   Sig: Take 10 mg by mouth daily at bedtime   memantine (NAMENDA) 10 mg tablet  Self Yes Yes   Sig: Take 10 mg by mouth 2 (two) times a day   warfarin (COUMADIN) 3 mg tablet  Self No Yes   Sig: Take 1 tablet by mouth daily   Patient taking differently: Take by mouth daily 1 5mg every other day  3mg every other day  Alternating       Facility-Administered Medications: None       Past Medical History:   Diagnosis Date    [facility]fib [facility] )     Alzheimer disease     Diverticulosis     Dyslipidemia     History of stroke     Hyperlipidemia     Hypertension     UTI (urinary tract infection)     Vasovagal syncope 2/13/2018       Past Surgical History:   Procedure Laterality Date    CHOLECYSTECTOMY         History reviewed  No pertinent family history  I have reviewed and agree with the history as documented      Social History   Substance Use Topics    Smoking status: Former Smoker    Smokeless tobacco: Never Used    Alcohol use No        Review of Systems   Unable to perform ROS: Dementia       Physical Exam  Physical Exam   Constitutional: She appears well-developed and well-nourished  HENT:   Normocephalic/atraumatic  There is no facial bone tenderness palpation  No facial bone tenderness  No norman sign, no raccoon eyes, no hemotympanum  Nose is atraumatic  No evidence of epistaxis  Eyes:   Patient has painless full range of motion in both her eyes  Normal visual fields  No hyphema noted  Neck: No tracheal deviation present  Patient is nontender palpation over her cervical, thoracic, lumbar spines  There is no step-offs, no deformities  Cardiovascular:   No JVD noted  Heart sounds are normal  Regular rate rate and rhythm  Symmetric strong distal pulses  Pulmonary/Chest:   Chest wall is stable and nontender palpation  There is no crepitus noted  Patient has symmetric chest wall expansion  No flail segment noted clear and equal breath sounds bilateral    Abdominal:   No external signs of trauma noted on the abdomen/pelvis  Patient is nontender palpation of the abdomen  There is no rebound, guarding, CVA tenderness  Abdomen is nondistended  Pelvis stable, nttp  Musculoskeletal:   Right upper extremity has full range of motion without pain  There is no tenderness palpation noted  Patient has no external signs of trauma  Patient is neurovascularly intact in this extremity  Left upper extremity has full range of motion without pain  There is no tenderness palpation noted  Patient has no external signs of trauma  Patient is neurovascularly intact in this extremity  Right lower extremity has full range of motion without pain  There is no tenderness palpation noted  Patient has no external signs of trauma  Patient is neurovascularly intact in this extremity  Left Lower  extremity has full range of motion without pain  There is no tenderness palpation noted  Patient has no external signs of trauma  Patient is neurovascularly intact in this extremity  Neurological:   Alert and oriented ×3  Normal mental status exam  Normal cranial nerves II through XII  Normal sensation and strength throughout  Normal cerebellar exam  GCS 15  Skin:   No external signs of trauma  Psychiatric: She has a normal mood and affect  Her behavior is normal  Judgment normal    Nursing note and vitals reviewed        Vital Signs  ED Triage Vitals   Temperature Pulse Respirations Blood Pressure SpO2   11/13/18 1140 11/13/18 1140 11/13/18 1140 11/13/18 1140 11/13/18 1140   (!) 97 4 °F (36 3 °C) 73 18 133/62 95 %      Temp Source Heart Rate Source Patient Position - Orthostatic VS BP Location FiO2 (%)   11/13/18 1140 11/13/18 1140 11/13/18 1243 11/13/18 1140 --   Temporal Monitor Lying - Orthostatic VS Left arm       Pain Score       11/13/18 1140       No Pain           Vitals:    11/13/18 1244 11/13/18 1245 11/13/18 1400 11/13/18 1508   BP: 113/57 120/58 157/67    Pulse: 60 83 78 89   Patient Position - Orthostatic VS: Sitting - Orthostatic VS Standing - Orthostatic VS         Visual Acuity  Visual Acuity      Most Recent Value   L Pupil Size (mm)  4   R Pupil Size (mm)  4          ED Medications  Medications - No data to display    Diagnostic Studies  Results Reviewed     Procedure Component Value Units Date/Time    Urine Microscopic [[id number]]  (Abnormal) Collected:  11/13/18 1442    Lab Status:  Final result Specimen:  Urine from Urine, Clean Catch Updated:  11/13/18 1452     RBC, UA 0-1 (A) /hpf      WBC, UA 4-10 (A) /hpf      Epithelial Cells Occasional /hpf      Bacteria, UA Occasional /hpf     POCT urinalysis dipstick [[id number]]  (Abnormal) Resulted:  11/13/18 1430    Lab Status:  Final result Specimen:  Urine Updated:  11/13/18 1430    ED Urine Macroscopic [[id number]]  (Abnormal) Collected:  11/13/18 1442    Lab Status:  Final result Specimen: Urine Updated:  11/13/18 1428     Color, UA Yellow     Clarity, UA Clear     pH, UA 7 0     Leukocytes, UA Small (A)     Nitrite, UA Negative     Protein, UA Negative mg/dl      Glucose, UA Negative mg/dl      Ketones, UA Negative mg/dl      Urobilinogen, UA 0 2 E U /dl      Bilirubin, UA Negative     Blood, UA Trace (A)     Specific [place], UA 1 010    Narrative:       CLINITEK RESULT    Basic metabolic panel [[id number]]  (Abnormal) Collected:  11/13/18 1237    Lab Status:  Final result Specimen:  Blood from Arm, Right Updated:  11/13/18 1314     Sodium 135 (L) mmol/L      Potassium 3 9 mmol/L      Chloride 99 (L) mmol/L      CO2 27 mmol/L      ANION GAP 9 mmol/L      BUN 12 mg/dL      Creatinine 0 81 mg/dL      Glucose 110 mg/dL      Calcium 8 7 mg/dL      eGFR 66 ml/min/1 73sq m     Narrative:         National Kidney Disease Education Program recommendations are as follows:  GFR calculation is accurate only with a steady state creatinine  Chronic Kidney disease less than 60 ml/min/1 73 sq  meters  Kidney failure less than 15 ml/min/1 73 sq  meters      B-type natriuretic peptide [[id number]]  (Abnormal) Collected:  11/13/18 1237    Lab Status:  Final result Specimen:  Blood from Arm, Right Updated:  11/13/18 1314     NT-proBNP 786 (H) pg/mL     Troponin I [[id number]]  (Normal) Collected:  11/13/18 1237    Lab Status:  Final result Specimen:  Blood from Arm, Right Updated:  11/13/18 1311     Troponin I <0 02 ng/mL     APTT [[id number]]  (Normal) Collected:  11/13/18 1237    Lab Status:  Final result Specimen:  Blood from Arm, Right Updated:  11/13/18 1259     PTT 34 seconds     Protime-INR [[id number]]  (Abnormal) Collected:  11/13/18 1237    Lab Status:  Final result Specimen:  Blood from Arm, Right Updated:  11/13/18 1258     Protime 19 1 (H) seconds      INR 1 60 (H)    CBC and differential [[id number]]  (Abnormal) Collected:  11/13/18 1237    Lab Status:  Final result Specimen:  Blood from Arm, Right Updated:  11/13/18 1247     WBC 4 94 Thousand/uL      RBC 3 65 (L) Million/uL      Hemoglobin 10 8 (L) g/dL      Hematocrit 33 1 (L) %      MCV 91 fL      MCH 29 6 pg      MCHC 32 6 g/dL      RDW 13 7 %      MPV 10 1 fL      Platelets 308 Thousands/uL      nRBC 0 /100 WBCs      Neutrophils Relative 60 %      Immat GRANS % 0 %      Lymphocytes Relative 27 %      Monocytes Relative 11 %      Eosinophils Relative 2 %      Basophils Relative 0 %      Neutrophils Absolute 2 95 Thousands/µL      Immature Grans Absolute 0 01 Thousand/uL      Lymphocytes Absolute 1 32 Thousands/µL      Monocytes Absolute 0 52 Thousand/µL      Eosinophils Absolute 0 12 Thousand/µL      Basophils Absolute 0 02 Thousands/µL                  CT cervical spine without contrast   ED Interpretation by Jose Moore MD (11/13 3166)   1   No cervical spine fracture or traumatic malalignment  2   Stable mosaic attenuation of the lung fields, nonspecific   This may be due to small airways disease or hypersensitivity pneumonitis  Final Result by Ld Bardales MD (11/13 2484)      1  No cervical spine fracture or traumatic malalignment  2   Stable mosaic attenuation of the lung fields, nonspecific  This may be due to small airways disease or hypersensitivity pneumonitis  Workstation performed: [id number]         CT head without contrast   ED Interpretation by Jose Moore MD (10/96 5052)   No acute intracranial abnormality   Microangiopathic changes  Final Result by Ld Bardales MD (13/25 8599)      No acute intracranial abnormality  Microangiopathic changes                    Workstation performed: [id number]                    Procedures  ECG 12 Lead Documentation  Date/Time: 11/13/2018 11:49 AM  Performed by: Jamie Spearing by: Emre Nguyen     ECG reviewed by me, the ED Provider: yes    Patient location:  ED  Rate:     ECG rate:  71    ECG rate assessment: normal    Rhythm:     Rhythm: sinus rhythm    Ectopy: Ectopy: none    QRS:     QRS axis:  Normal    QRS intervals:  Normal  Conduction:     Conduction: normal    ST segments:     ST segments:  Normal  T waves:     T waves: normal             Phone Contacts  ED Phone Contact    ED Course  ED Course as of Nov 13 1524 Tue Nov 13, 2018   1425 NT-proBNP: (!) 786   1433 Leukocytes, UA: (!) Small   1456 Patient only has small leukocytes, she has only 4-10 white blood cells with occasional epithelial cells and no symptoms of urinary tract infection  At this point time is likely secondary to contaminant  Patient will not be treated pending urine culture  1520 Patient is able to ambulate  Patient and family were offered admission to hospital for possible syncopal episode  They state they feel comfortable taking her mother home a do not wish to be admitted  Patient be discharged  MDM  Number of Diagnoses or Management Options  Diagnosis management comments: Unwitnessed fall-will CT head rule out CNS bleed/stroke, CT C-spine rule fracture dislocation, cardiac workup, urine dip, reassess for disposition    CritCare Time    Disposition  Final diagnoses:   Fall, initial encounter   Alzheimer disease   Anticoagulated on Coumadin   Essential hypertension     Time reflects when diagnosis was documented in both MDM as applicable and the Disposition within this note     Time User Action Codes Description Comment    11/13/2018  2:57 PM Maile Beach Add [H45  Bryan Ramal Fall, initial encounter     11/13/2018  2:58 PM Sandhya Ty Add [G30 9,  F02 80] Alzheimer disease     11/13/2018  2:58 PM Sandhya Ty Peer Add [Z51 81,  Z79 01] Anticoagulated on Coumadin     11/13/2018  2:58 PM Sandhya Ty Add [I10] Essential hypertension       ED Disposition     ED Disposition Condition Comment    Discharge  Geraldine Puente discharge to home/self care      Condition at discharge: Good        Follow-up Information     Follow up With Specialties Details Why Contact Info Patricia Guerrero, DO Internal Medicine Schedule an appointment as soon as possible for a visit in 2 days  [address] [address]  [phone]            Patient's Medications   Discharge Prescriptions    No medications on file     No discharge procedures on file      ED Provider  Electronically Signed by           Georgette Rodrigues MD  11/13/18 2304

## 2018-11-13 NOTE — ED NOTES
Patient assisted onto commode to void, even steady gait noted, patient denied any dizziness when standing       Vania Meyer RN  11/13/18 5644

## 2018-11-13 NOTE — ED NOTES
Pt ambulated to end of hallway and back with even, steady, gait  Upon returning to the room, Pt states, "I'm feeling dizzy "  Pt assisted back into bed and pulse ox and cardiac monitoring continued         Cristian Swanson  11/13/18 3127

## 2018-11-13 NOTE — DISCHARGE INSTRUCTIONS
Syncope   AMBULATORY CARE:   Syncope  is also called fainting or passing out  Syncope is a sudden, temporary loss of consciousness, followed by a fall from a standing or sitting position  Syncope ranges from not serious to a sign of a more serious condition that needs to be treated  You can control some health conditions that cause syncope  Your healthcare providers can help you create a plan to manage syncope and prevent episodes  Signs and symptoms that may occur before syncope include the following:   · Cold, clammy, and sweaty skin     · Fast breathing and a racing, pounding heartbeat     · Feeling more tired than usual     · Nausea, a warm feeling, and sweating    · A headache, or feeling lightheaded or dizzy    · Tingling sensation or numbness     · Spots in front of your eyes, blurred vision, or double vision  Seek care immediately if:   · You are bleeding because you hit your head when you fainted  · You suddenly have double vision, difficulty speaking, numbness, and cannot move your arms or legs  · You have chest pain and trouble breathing  · You vomit blood or material that looks like coffee grounds  · You see blood in your bowel movement  Contact your healthcare provider if:   · You have new or worsening symptoms  · You have another syncope episode  · You have a headache, fast heartbeat, or feel too dizzy to stand up  · You have questions or concerns about your condition or care  Treatment for syncope  depends on the cause of your syncope  To prevent syncope from happening again, you may  need any of the following:  · Medicines  may be needed to treat any medical conditions that are causing your syncope  These may include medicines to help your heart pump strongly and regularly  Your healthcare provider may also make changes to any medicines that are causing syncope  · Tilt training  involves training yourself to stand for 10 to 30 minutes each day against a wall   This helps your body decrease the effects of posture changes and reduces the number of fainting spells  Manage syncope:   · Keep a record of your syncope episodes  Include your symptoms and your activity before and after the episode  The record can help your healthcare provider find the cause of your syncope and help you manage episodes  · Sit or lie down when needed  This includes when you feel dizzy, your throat is getting tight, and your vision changes  Raise your legs above the level of your heart  · Take slow, deep breaths if you start to breathe faster with anxiety or fear  This can help decrease dizziness and the feeling that you might faint  · Check your blood pressure often  This is important if you take medicine to lower your blood pressure  Check your blood pressure when you are lying down and when you are standing  Ask how often to check during the day  Keep a record of your blood pressure numbers  Your healthcare provider may use the record to help plan your treatment  Prevent a syncope episode:   · Move slowly and let yourself get used to one position before you move to another position  This is very important when you change from a lying or sitting position to a standing position  Take some deep breaths before you stand up from a lying position  Stand up slowly  Sudden movements may cause a fainting spell  Sit on the side of the bed or couch for a few minutes before you stand up  If you are on bedrest, try to be upright for about 2 hours each day, or as directed  Do not lock your legs if you are standing for a long period of time  Move your legs and bend your knees to keep blood flowing  · Follow your healthcare provider's recommendations  Your provider may  recommend that you drink more liquids to prevent dehydration  You may also need to have more salt to keep your blood pressure from dropping too low and causing syncope   Your provider will tell you how much liquid and sodium to have each day  · Watch for signs of low blood sugar  These include hunger, nervousness, sweating, and fast or fluttery heartbeats  Talk with your healthcare provider about ways to keep your blood sugar level steady  · Do not strain if you are constipated  You may faint if you strain to have a bowel movement  Walking is the best way to get your bowels moving  Eat foods high in fiber to make it easier to have a bowel movement  Good examples are high-fiber cereals, beans, vegetables, and whole-grain breads  Prune juice may help make bowel movements softer  · Be careful in hot weather  Heat can cause a syncope episode  Limit activity done outside on hot days  Physical activity in hot weather can lead to dehydration  This can cause an episode  Follow up with your healthcare provider as directed:  Write down your questions so you remember to ask them during your visits  © 2017 [address] Information is for End User's use only and may not be sold, redistributed or otherwise used for commercial purposes  All illustrations and images included in CareNotes® are the copyrighted property of A D A M , Inc  or Lupillo Zaidi  The above information is an  only  It is not intended as medical advice for individual conditions or treatments  Talk to your doctor, nurse or pharmacist before following any medical regimen to see if it is safe and effective for you

## 2018-11-15 LAB
ATRIAL RATE: 70 BPM
P AXIS: 108 DEGREES
PR INTERVAL: 148 MS
QRS AXIS: 25 DEGREES
QRSD INTERVAL: 82 MS
QT INTERVAL: 392 MS
QTC INTERVAL: 423 MS
T WAVE AXIS: 76 DEGREES
VENTRICULAR RATE: 70 BPM

## 2018-11-15 PROCEDURE — 93010 ELECTROCARDIOGRAM REPORT: CPT | Performed by: INTERNAL MEDICINE

## 2019-05-13 ENCOUNTER — HOSPITAL ENCOUNTER (INPATIENT)
Facility: HOSPITAL | Age: 84
LOS: 4 days | Discharge: HOME | DRG: 378 | End: 2019-05-17
Attending: EMERGENCY MEDICINE | Admitting: FAMILY MEDICINE
Payer: COMMERCIAL

## 2019-05-13 DIAGNOSIS — Z92.29 HISTORY OF ANTICOAGULANT THERAPY: ICD-10-CM

## 2019-05-13 DIAGNOSIS — K29.01 GASTROINTESTINAL HEMORRHAGE ASSOCIATED WITH ACUTE GASTRITIS: ICD-10-CM

## 2019-05-13 DIAGNOSIS — K92.2 GI BLEED: Primary | ICD-10-CM

## 2019-05-13 DIAGNOSIS — R51.9 HEADACHE: ICD-10-CM

## 2019-05-13 PROBLEM — N39.0 CHRONIC UTI: Status: ACTIVE | Noted: 2019-05-13

## 2019-05-13 PROBLEM — Z79.01 CHRONIC ANTICOAGULATION: Status: ACTIVE | Noted: 2019-05-13

## 2019-05-13 PROBLEM — Z78.9 PATIENT IS JEHOVAH'S WITNESS: Status: ACTIVE | Noted: 2019-05-13

## 2019-05-13 PROBLEM — K62.5 RECTAL BLEED: Status: ACTIVE | Noted: 2019-05-13

## 2019-05-13 PROBLEM — G44.89 OTHER HEADACHE SYNDROME: Status: ACTIVE | Noted: 2018-02-13

## 2019-05-13 PROBLEM — E87.1 HYPONATREMIA: Status: ACTIVE | Noted: 2019-05-13

## 2019-05-13 LAB
ABO GROUP BLD: NORMAL
ANION GAP SERPL CALCULATED.3IONS-SCNC: 7 MMOL/L (ref 4–13)
APTT PPP: 26 SECONDS (ref 26–38)
BASOPHILS # BLD AUTO: 0 THOUSANDS/ΜL (ref 0–0.1)
BASOPHILS # BLD AUTO: 0 THOUSANDS/ΜL (ref 0–0.1)
BASOPHILS # BLD AUTO: 0.01 THOUSANDS/ΜL (ref 0–0.1)
BASOPHILS NFR BLD AUTO: 0 % (ref 0–1)
BLD GP AB SCN SERPL QL: NEGATIVE
BUN SERPL-MCNC: 26 MG/DL (ref 5–25)
CALCIUM SERPL-MCNC: 9.2 MG/DL (ref 8.3–10.1)
CHLORIDE SERPL-SCNC: 99 MMOL/L (ref 100–108)
CO2 SERPL-SCNC: 27 MMOL/L (ref 21–32)
CREAT SERPL-MCNC: 0.88 MG/DL (ref 0.6–1.3)
EOSINOPHIL # BLD AUTO: 0 THOUSAND/ΜL (ref 0–0.61)
EOSINOPHIL # BLD AUTO: 0 THOUSAND/ΜL (ref 0–0.61)
EOSINOPHIL # BLD AUTO: 0.01 THOUSAND/ΜL (ref 0–0.61)
EOSINOPHIL NFR BLD AUTO: 0 % (ref 0–6)
ERYTHROCYTE [DISTWIDTH] IN BLOOD BY AUTOMATED COUNT: 13.6 % (ref 11.6–15.1)
ERYTHROCYTE [DISTWIDTH] IN BLOOD BY AUTOMATED COUNT: 13.7 % (ref 11.6–15.1)
ERYTHROCYTE [DISTWIDTH] IN BLOOD BY AUTOMATED COUNT: 13.9 % (ref 11.6–15.1)
GFR SERPL CREATININE-BSD FRML MDRD: 60 ML/MIN/1.73SQ M
GLUCOSE SERPL-MCNC: 106 MG/DL (ref 65–140)
HCT VFR BLD AUTO: 32.1 % (ref 34.8–46.1)
HCT VFR BLD AUTO: 34.7 % (ref 34.8–46.1)
HCT VFR BLD AUTO: 34.9 % (ref 34.8–46.1)
HGB BLD-MCNC: 10.3 G/DL (ref 11.5–15.4)
HGB BLD-MCNC: 11.3 G/DL (ref 11.5–15.4)
HGB BLD-MCNC: 11.4 G/DL (ref 11.5–15.4)
IMM GRANULOCYTES # BLD AUTO: 0.05 THOUSAND/UL (ref 0–0.2)
IMM GRANULOCYTES # BLD AUTO: 0.07 THOUSAND/UL (ref 0–0.2)
IMM GRANULOCYTES # BLD AUTO: 0.07 THOUSAND/UL (ref 0–0.2)
IMM GRANULOCYTES NFR BLD AUTO: 1 % (ref 0–2)
INR PPP: 1.58 (ref 0.86–1.17)
LYMPHOCYTES # BLD AUTO: 1.44 THOUSANDS/ΜL (ref 0.6–4.47)
LYMPHOCYTES # BLD AUTO: 1.8 THOUSANDS/ΜL (ref 0.6–4.47)
LYMPHOCYTES # BLD AUTO: 1.92 THOUSANDS/ΜL (ref 0.6–4.47)
LYMPHOCYTES NFR BLD AUTO: 16 % (ref 14–44)
LYMPHOCYTES NFR BLD AUTO: 17 % (ref 14–44)
LYMPHOCYTES NFR BLD AUTO: 20 % (ref 14–44)
MCH RBC QN AUTO: 28.7 PG (ref 26.8–34.3)
MCH RBC QN AUTO: 29.2 PG (ref 26.8–34.3)
MCH RBC QN AUTO: 29.3 PG (ref 26.8–34.3)
MCHC RBC AUTO-ENTMCNC: 32.1 G/DL (ref 31.4–37.4)
MCHC RBC AUTO-ENTMCNC: 32.4 G/DL (ref 31.4–37.4)
MCHC RBC AUTO-ENTMCNC: 32.9 G/DL (ref 31.4–37.4)
MCV RBC AUTO: 89 FL (ref 82–98)
MCV RBC AUTO: 89 FL (ref 82–98)
MCV RBC AUTO: 90 FL (ref 82–98)
MONOCYTES # BLD AUTO: 0.83 THOUSAND/ΜL (ref 0.17–1.22)
MONOCYTES # BLD AUTO: 0.86 THOUSAND/ΜL (ref 0.17–1.22)
MONOCYTES # BLD AUTO: 0.99 THOUSAND/ΜL (ref 0.17–1.22)
MONOCYTES NFR BLD AUTO: 11 % (ref 4–12)
MONOCYTES NFR BLD AUTO: 8 % (ref 4–12)
MONOCYTES NFR BLD AUTO: 9 % (ref 4–12)
NEUTROPHILS # BLD AUTO: 6.7 THOUSANDS/ΜL (ref 1.85–7.62)
NEUTROPHILS # BLD AUTO: 6.76 THOUSANDS/ΜL (ref 1.85–7.62)
NEUTROPHILS # BLD AUTO: 7.93 THOUSANDS/ΜL (ref 1.85–7.62)
NEUTS SEG NFR BLD AUTO: 70 % (ref 43–75)
NEUTS SEG NFR BLD AUTO: 72 % (ref 43–75)
NEUTS SEG NFR BLD AUTO: 74 % (ref 43–75)
NRBC BLD AUTO-RTO: 0 /100 WBCS
PLATELET # BLD AUTO: 275 THOUSANDS/UL (ref 149–390)
PLATELET # BLD AUTO: 278 THOUSANDS/UL (ref 149–390)
PLATELET # BLD AUTO: 292 THOUSANDS/UL (ref 149–390)
PMV BLD AUTO: 10.4 FL (ref 8.9–12.7)
PMV BLD AUTO: 10.5 FL (ref 8.9–12.7)
PMV BLD AUTO: 10.6 FL (ref 8.9–12.7)
POTASSIUM SERPL-SCNC: 4.6 MMOL/L (ref 3.5–5.3)
PROTHROMBIN TIME: 19 SECONDS (ref 11.8–14.2)
RBC # BLD AUTO: 3.59 MILLION/UL (ref 3.81–5.12)
RBC # BLD AUTO: 3.86 MILLION/UL (ref 3.81–5.12)
RBC # BLD AUTO: 3.91 MILLION/UL (ref 3.81–5.12)
RH BLD: POSITIVE
SODIUM SERPL-SCNC: 133 MMOL/L (ref 136–145)
SPECIMEN EXPIRATION DATE: NORMAL
WBC # BLD AUTO: 10.64 THOUSAND/UL (ref 4.31–10.16)
WBC # BLD AUTO: 9.27 THOUSAND/UL (ref 4.31–10.16)
WBC # BLD AUTO: 9.53 THOUSAND/UL (ref 4.31–10.16)

## 2019-05-13 PROCEDURE — 96361 HYDRATE IV INFUSION ADD-ON: CPT

## 2019-05-13 PROCEDURE — 99223 1ST HOSP IP/OBS HIGH 75: CPT | Performed by: FAMILY MEDICINE

## 2019-05-13 PROCEDURE — 86901 BLOOD TYPING SEROLOGIC RH(D): CPT | Performed by: EMERGENCY MEDICINE

## 2019-05-13 PROCEDURE — 85025 COMPLETE CBC W/AUTO DIFF WBC: CPT | Performed by: EMERGENCY MEDICINE

## 2019-05-13 PROCEDURE — 80048 BASIC METABOLIC PNL TOTAL CA: CPT | Performed by: EMERGENCY MEDICINE

## 2019-05-13 PROCEDURE — 85025 COMPLETE CBC W/AUTO DIFF WBC: CPT | Performed by: FAMILY MEDICINE

## 2019-05-13 PROCEDURE — 86850 RBC ANTIBODY SCREEN: CPT | Performed by: EMERGENCY MEDICINE

## 2019-05-13 PROCEDURE — 96374 THER/PROPH/DIAG INJ IV PUSH: CPT

## 2019-05-13 PROCEDURE — 85025 COMPLETE CBC W/AUTO DIFF WBC: CPT | Performed by: INTERNAL MEDICINE

## 2019-05-13 PROCEDURE — 99222 1ST HOSP IP/OBS MODERATE 55: CPT | Performed by: INTERNAL MEDICINE

## 2019-05-13 PROCEDURE — 86900 BLOOD TYPING SEROLOGIC ABO: CPT | Performed by: EMERGENCY MEDICINE

## 2019-05-13 PROCEDURE — 85730 THROMBOPLASTIN TIME PARTIAL: CPT | Performed by: EMERGENCY MEDICINE

## 2019-05-13 PROCEDURE — 99283 EMERGENCY DEPT VISIT LOW MDM: CPT | Performed by: EMERGENCY MEDICINE

## 2019-05-13 PROCEDURE — 36415 COLL VENOUS BLD VENIPUNCTURE: CPT | Performed by: EMERGENCY MEDICINE

## 2019-05-13 PROCEDURE — 85610 PROTHROMBIN TIME: CPT | Performed by: EMERGENCY MEDICINE

## 2019-05-13 PROCEDURE — 99285 EMERGENCY DEPT VISIT HI MDM: CPT

## 2019-05-13 RX ORDER — SODIUM CHLORIDE 9 MG/ML
100 INJECTION, SOLUTION INTRAVENOUS CONTINUOUS
Status: DISCONTINUED | OUTPATIENT
Start: 2019-05-13 | End: 2019-05-16

## 2019-05-13 RX ORDER — METOCLOPRAMIDE HYDROCHLORIDE 5 MG/ML
10 INJECTION INTRAMUSCULAR; INTRAVENOUS ONCE
Status: COMPLETED | OUTPATIENT
Start: 2019-05-13 | End: 2019-05-13

## 2019-05-13 RX ORDER — MEMANTINE HYDROCHLORIDE 5 MG/1
10 TABLET ORAL 2 TIMES DAILY
Status: DISCONTINUED | OUTPATIENT
Start: 2019-05-13 | End: 2019-05-17 | Stop reason: HOSPADM

## 2019-05-13 RX ORDER — ACETAMINOPHEN 325 MG/1
650 TABLET ORAL EVERY 6 HOURS PRN
Status: DISCONTINUED | OUTPATIENT
Start: 2019-05-13 | End: 2019-05-17 | Stop reason: HOSPADM

## 2019-05-13 RX ORDER — PANTOPRAZOLE SODIUM 40 MG/1
40 INJECTION, POWDER, FOR SOLUTION INTRAVENOUS EVERY 12 HOURS SCHEDULED
Status: DISCONTINUED | OUTPATIENT
Start: 2019-05-13 | End: 2019-05-13

## 2019-05-13 RX ORDER — PANTOPRAZOLE SODIUM 40 MG/1
40 TABLET, DELAYED RELEASE ORAL DAILY
COMMUNITY

## 2019-05-13 RX ORDER — CIPROFLOXACIN 250 MG/1
250 TABLET, FILM COATED ORAL EVERY 24 HOURS
Status: DISCONTINUED | OUTPATIENT
Start: 2019-05-13 | End: 2019-05-17 | Stop reason: HOSPADM

## 2019-05-13 RX ORDER — ONDANSETRON 2 MG/ML
4 INJECTION INTRAMUSCULAR; INTRAVENOUS EVERY 6 HOURS PRN
Status: DISCONTINUED | OUTPATIENT
Start: 2019-05-13 | End: 2019-05-17 | Stop reason: HOSPADM

## 2019-05-13 RX ORDER — ATORVASTATIN CALCIUM 40 MG/1
20 TABLET, FILM COATED ORAL
Status: DISCONTINUED | OUTPATIENT
Start: 2019-05-13 | End: 2019-05-17 | Stop reason: HOSPADM

## 2019-05-13 RX ORDER — QUETIAPINE FUMARATE 25 MG/1
12.5 TABLET, FILM COATED ORAL
Status: DISCONTINUED | OUTPATIENT
Start: 2019-05-13 | End: 2019-05-17 | Stop reason: HOSPADM

## 2019-05-13 RX ORDER — DONEPEZIL HYDROCHLORIDE 10 MG/1
10 TABLET, FILM COATED ORAL
Status: DISCONTINUED | OUTPATIENT
Start: 2019-05-13 | End: 2019-05-17 | Stop reason: HOSPADM

## 2019-05-13 RX ORDER — PANTOPRAZOLE SODIUM 40 MG/1
40 TABLET, DELAYED RELEASE ORAL
Status: DISCONTINUED | OUTPATIENT
Start: 2019-05-13 | End: 2019-05-17 | Stop reason: HOSPADM

## 2019-05-13 RX ORDER — LOPERAMIDE HYDROCHLORIDE 2 MG/1
2 CAPSULE ORAL 4 TIMES DAILY PRN
Status: DISCONTINUED | OUTPATIENT
Start: 2019-05-13 | End: 2019-05-13

## 2019-05-13 RX ADMIN — DONEPEZIL HYDROCHLORIDE 10 MG: 10 TABLET, FILM COATED ORAL at 21:30

## 2019-05-13 RX ADMIN — MEMANTINE 10 MG: 5 TABLET ORAL at 17:02

## 2019-05-13 RX ADMIN — SODIUM CHLORIDE 100 ML/HR: 0.9 INJECTION, SOLUTION INTRAVENOUS at 21:30

## 2019-05-13 RX ADMIN — SODIUM CHLORIDE 100 ML/HR: 0.9 INJECTION, SOLUTION INTRAVENOUS at 10:13

## 2019-05-13 RX ADMIN — PANTOPRAZOLE SODIUM 40 MG: 40 TABLET, DELAYED RELEASE ORAL at 17:01

## 2019-05-13 RX ADMIN — QUETIAPINE FUMARATE 12.5 MG: 25 TABLET ORAL at 21:31

## 2019-05-13 RX ADMIN — METOCLOPRAMIDE 10 MG: 5 INJECTION, SOLUTION INTRAMUSCULAR; INTRAVENOUS at 07:27

## 2019-05-13 RX ADMIN — ATORVASTATIN CALCIUM 20 MG: 40 TABLET, FILM COATED ORAL at 17:02

## 2019-05-13 RX ADMIN — SODIUM CHLORIDE 500 ML: 0.9 INJECTION, SOLUTION INTRAVENOUS at 07:26

## 2019-05-14 PROBLEM — E87.1 HYPONATREMIA: Status: RESOLVED | Noted: 2019-05-13 | Resolved: 2019-05-14

## 2019-05-14 LAB
ANION GAP SERPL CALCULATED.3IONS-SCNC: 10 MMOL/L (ref 4–13)
BASOPHILS # BLD AUTO: 0 THOUSANDS/ΜL (ref 0–0.1)
BASOPHILS NFR BLD AUTO: 0 % (ref 0–1)
BUN SERPL-MCNC: 16 MG/DL (ref 5–25)
CALCIUM SERPL-MCNC: 8.1 MG/DL (ref 8.3–10.1)
CHLORIDE SERPL-SCNC: 105 MMOL/L (ref 100–108)
CO2 SERPL-SCNC: 23 MMOL/L (ref 21–32)
CREAT SERPL-MCNC: 0.66 MG/DL (ref 0.6–1.3)
EOSINOPHIL # BLD AUTO: 0.01 THOUSAND/ΜL (ref 0–0.61)
EOSINOPHIL NFR BLD AUTO: 0 % (ref 0–6)
ERYTHROCYTE [DISTWIDTH] IN BLOOD BY AUTOMATED COUNT: 14 % (ref 11.6–15.1)
GFR SERPL CREATININE-BSD FRML MDRD: 81 ML/MIN/1.73SQ M
GLUCOSE SERPL-MCNC: 110 MG/DL (ref 65–140)
HCT VFR BLD AUTO: 31.8 % (ref 34.8–46.1)
HGB BLD-MCNC: 10.3 G/DL (ref 11.5–15.4)
IMM GRANULOCYTES # BLD AUTO: 0.06 THOUSAND/UL (ref 0–0.2)
IMM GRANULOCYTES NFR BLD AUTO: 1 % (ref 0–2)
LYMPHOCYTES # BLD AUTO: 1.63 THOUSANDS/ΜL (ref 0.6–4.47)
LYMPHOCYTES NFR BLD AUTO: 20 % (ref 14–44)
MCH RBC QN AUTO: 29.3 PG (ref 26.8–34.3)
MCHC RBC AUTO-ENTMCNC: 32.4 G/DL (ref 31.4–37.4)
MCV RBC AUTO: 90 FL (ref 82–98)
MONOCYTES # BLD AUTO: 0.61 THOUSAND/ΜL (ref 0.17–1.22)
MONOCYTES NFR BLD AUTO: 8 % (ref 4–12)
NEUTROPHILS # BLD AUTO: 5.68 THOUSANDS/ΜL (ref 1.85–7.62)
NEUTS SEG NFR BLD AUTO: 71 % (ref 43–75)
NRBC BLD AUTO-RTO: 0 /100 WBCS
PLATELET # BLD AUTO: 272 THOUSANDS/UL (ref 149–390)
PMV BLD AUTO: 10.2 FL (ref 8.9–12.7)
POTASSIUM SERPL-SCNC: 4.1 MMOL/L (ref 3.5–5.3)
RBC # BLD AUTO: 3.52 MILLION/UL (ref 3.81–5.12)
SODIUM SERPL-SCNC: 138 MMOL/L (ref 136–145)
WBC # BLD AUTO: 7.99 THOUSAND/UL (ref 4.31–10.16)

## 2019-05-14 PROCEDURE — 97163 PT EVAL HIGH COMPLEX 45 MIN: CPT

## 2019-05-14 PROCEDURE — 99232 SBSQ HOSP IP/OBS MODERATE 35: CPT | Performed by: INTERNAL MEDICINE

## 2019-05-14 PROCEDURE — G8987 SELF CARE CURRENT STATUS: HCPCS

## 2019-05-14 PROCEDURE — G8978 MOBILITY CURRENT STATUS: HCPCS

## 2019-05-14 PROCEDURE — G8988 SELF CARE GOAL STATUS: HCPCS

## 2019-05-14 PROCEDURE — 0DJD8ZZ INSPECTION OF LOWER INTESTINAL TRACT, VIA NATURAL OR ARTIFICIAL OPENING ENDOSCOPIC: ICD-10-PCS | Performed by: INTERNAL MEDICINE

## 2019-05-14 PROCEDURE — 99232 SBSQ HOSP IP/OBS MODERATE 35: CPT | Performed by: FAMILY MEDICINE

## 2019-05-14 PROCEDURE — 97166 OT EVAL MOD COMPLEX 45 MIN: CPT

## 2019-05-14 PROCEDURE — 80048 BASIC METABOLIC PNL TOTAL CA: CPT | Performed by: FAMILY MEDICINE

## 2019-05-14 PROCEDURE — G8979 MOBILITY GOAL STATUS: HCPCS

## 2019-05-14 PROCEDURE — 85025 COMPLETE CBC W/AUTO DIFF WBC: CPT | Performed by: INTERNAL MEDICINE

## 2019-05-14 RX ORDER — OLANZAPINE 10 MG/1
2.5 INJECTION, POWDER, LYOPHILIZED, FOR SOLUTION INTRAMUSCULAR ONCE
Status: COMPLETED | OUTPATIENT
Start: 2019-05-14 | End: 2019-05-14

## 2019-05-14 RX ORDER — POLYETHYLENE GLYCOL 3350 17 G/17G
238 POWDER, FOR SOLUTION ORAL ONCE
Status: COMPLETED | OUTPATIENT
Start: 2019-05-15 | End: 2019-05-15

## 2019-05-14 RX ADMIN — QUETIAPINE FUMARATE 12.5 MG: 25 TABLET ORAL at 21:21

## 2019-05-14 RX ADMIN — SODIUM CHLORIDE 100 ML/HR: 0.9 INJECTION, SOLUTION INTRAVENOUS at 20:24

## 2019-05-14 RX ADMIN — PANTOPRAZOLE SODIUM 40 MG: 40 TABLET, DELAYED RELEASE ORAL at 07:30

## 2019-05-14 RX ADMIN — CIPROFLOXACIN HYDROCHLORIDE 250 MG: 250 TABLET, FILM COATED ORAL at 09:37

## 2019-05-14 RX ADMIN — DONEPEZIL HYDROCHLORIDE 10 MG: 10 TABLET, FILM COATED ORAL at 21:21

## 2019-05-14 RX ADMIN — OLANZAPINE 2.5 MG: 10 INJECTION, POWDER, FOR SOLUTION INTRAMUSCULAR at 06:36

## 2019-05-14 RX ADMIN — PANTOPRAZOLE SODIUM 40 MG: 40 TABLET, DELAYED RELEASE ORAL at 17:00

## 2019-05-14 RX ADMIN — MEMANTINE 10 MG: 5 TABLET ORAL at 17:07

## 2019-05-14 RX ADMIN — MEMANTINE 10 MG: 5 TABLET ORAL at 09:37

## 2019-05-14 RX ADMIN — ATORVASTATIN CALCIUM 20 MG: 40 TABLET, FILM COATED ORAL at 17:00

## 2019-05-15 ENCOUNTER — ANESTHESIA EVENT (INPATIENT)
Dept: GASTROENTEROLOGY | Facility: HOSPITAL | Age: 84
DRG: 378 | End: 2019-05-15
Payer: COMMERCIAL

## 2019-05-15 PROCEDURE — 99232 SBSQ HOSP IP/OBS MODERATE 35: CPT | Performed by: FAMILY MEDICINE

## 2019-05-15 RX ORDER — MAGNESIUM CARB/ALUMINUM HYDROX 105-160MG
296 TABLET,CHEWABLE ORAL ONCE
Status: COMPLETED | OUTPATIENT
Start: 2019-05-15 | End: 2019-05-15

## 2019-05-15 RX ADMIN — PANTOPRAZOLE SODIUM 40 MG: 40 TABLET, DELAYED RELEASE ORAL at 06:07

## 2019-05-15 RX ADMIN — MEMANTINE 10 MG: 5 TABLET ORAL at 09:45

## 2019-05-15 RX ADMIN — MEMANTINE 10 MG: 5 TABLET ORAL at 17:32

## 2019-05-15 RX ADMIN — CIPROFLOXACIN HYDROCHLORIDE 250 MG: 250 TABLET, FILM COATED ORAL at 09:45

## 2019-05-15 RX ADMIN — BISACODYL 10 MG: 5 TABLET, COATED ORAL at 06:07

## 2019-05-15 RX ADMIN — POLYETHYLENE GLYCOL 3350 238 G: 17 POWDER, FOR SOLUTION ORAL at 09:45

## 2019-05-15 RX ADMIN — QUETIAPINE FUMARATE 12.5 MG: 25 TABLET ORAL at 22:26

## 2019-05-15 RX ADMIN — PANTOPRAZOLE SODIUM 40 MG: 40 TABLET, DELAYED RELEASE ORAL at 17:00

## 2019-05-15 RX ADMIN — ATORVASTATIN CALCIUM 20 MG: 40 TABLET, FILM COATED ORAL at 17:00

## 2019-05-15 RX ADMIN — DONEPEZIL HYDROCHLORIDE 10 MG: 10 TABLET, FILM COATED ORAL at 22:25

## 2019-05-15 RX ADMIN — MAGNESIUM CITRATE 296 ML: 1.75 LIQUID ORAL at 17:42

## 2019-05-16 ENCOUNTER — ANESTHESIA (INPATIENT)
Dept: GASTROENTEROLOGY | Facility: HOSPITAL | Age: 84
DRG: 378 | End: 2019-05-16
Payer: COMMERCIAL

## 2019-05-16 ENCOUNTER — APPOINTMENT (INPATIENT)
Dept: GASTROENTEROLOGY | Facility: HOSPITAL | Age: 84
DRG: 378 | End: 2019-05-16
Payer: COMMERCIAL

## 2019-05-16 LAB
ANION GAP SERPL CALCULATED.3IONS-SCNC: 10 MMOL/L (ref 4–13)
BASOPHILS # BLD AUTO: 0.01 THOUSANDS/ΜL (ref 0–0.1)
BASOPHILS NFR BLD AUTO: 0 % (ref 0–1)
BUN SERPL-MCNC: 10 MG/DL (ref 5–25)
CALCIUM SERPL-MCNC: 9.3 MG/DL (ref 8.3–10.1)
CHLORIDE SERPL-SCNC: 100 MMOL/L (ref 100–108)
CO2 SERPL-SCNC: 26 MMOL/L (ref 21–32)
CREAT SERPL-MCNC: 0.81 MG/DL (ref 0.6–1.3)
EOSINOPHIL # BLD AUTO: 0.1 THOUSAND/ΜL (ref 0–0.61)
EOSINOPHIL NFR BLD AUTO: 1 % (ref 0–6)
ERYTHROCYTE [DISTWIDTH] IN BLOOD BY AUTOMATED COUNT: 13.7 % (ref 11.6–15.1)
GFR SERPL CREATININE-BSD FRML MDRD: 66 ML/MIN/1.73SQ M
GLUCOSE SERPL-MCNC: 106 MG/DL (ref 65–140)
HCT VFR BLD AUTO: 30.6 % (ref 34.8–46.1)
HCT VFR BLD AUTO: 37.1 % (ref 34.8–46.1)
HGB BLD-MCNC: 11.7 G/DL (ref 11.5–15.4)
HGB BLD-MCNC: 9.9 G/DL (ref 11.5–15.4)
IMM GRANULOCYTES # BLD AUTO: 0.1 THOUSAND/UL (ref 0–0.2)
IMM GRANULOCYTES NFR BLD AUTO: 1 % (ref 0–2)
LYMPHOCYTES # BLD AUTO: 3.48 THOUSANDS/ΜL (ref 0.6–4.47)
LYMPHOCYTES NFR BLD AUTO: 23 % (ref 14–44)
MCH RBC QN AUTO: 28.6 PG (ref 26.8–34.3)
MCHC RBC AUTO-ENTMCNC: 31.5 G/DL (ref 31.4–37.4)
MCV RBC AUTO: 91 FL (ref 82–98)
MONOCYTES # BLD AUTO: 1.32 THOUSAND/ΜL (ref 0.17–1.22)
MONOCYTES NFR BLD AUTO: 9 % (ref 4–12)
NEUTROPHILS # BLD AUTO: 9.97 THOUSANDS/ΜL (ref 1.85–7.62)
NEUTS SEG NFR BLD AUTO: 66 % (ref 43–75)
NRBC BLD AUTO-RTO: 0 /100 WBCS
PLATELET # BLD AUTO: 337 THOUSANDS/UL (ref 149–390)
PMV BLD AUTO: 10.2 FL (ref 8.9–12.7)
POTASSIUM SERPL-SCNC: 3.9 MMOL/L (ref 3.5–5.3)
RBC # BLD AUTO: 4.09 MILLION/UL (ref 3.81–5.12)
SODIUM SERPL-SCNC: 136 MMOL/L (ref 136–145)
WBC # BLD AUTO: 14.98 THOUSAND/UL (ref 4.31–10.16)

## 2019-05-16 PROCEDURE — 45378 DIAGNOSTIC COLONOSCOPY: CPT | Performed by: INTERNAL MEDICINE

## 2019-05-16 PROCEDURE — 80048 BASIC METABOLIC PNL TOTAL CA: CPT | Performed by: PHYSICIAN ASSISTANT

## 2019-05-16 PROCEDURE — 85014 HEMATOCRIT: CPT | Performed by: PHYSICIAN ASSISTANT

## 2019-05-16 PROCEDURE — 99232 SBSQ HOSP IP/OBS MODERATE 35: CPT | Performed by: FAMILY MEDICINE

## 2019-05-16 PROCEDURE — 85018 HEMOGLOBIN: CPT | Performed by: PHYSICIAN ASSISTANT

## 2019-05-16 PROCEDURE — 85025 COMPLETE CBC W/AUTO DIFF WBC: CPT | Performed by: PHYSICIAN ASSISTANT

## 2019-05-16 RX ORDER — SUCRALFATE ORAL 1 G/10ML
1000 SUSPENSION ORAL 3 TIMES DAILY
Status: DISCONTINUED | OUTPATIENT
Start: 2019-05-16 | End: 2019-05-17 | Stop reason: HOSPADM

## 2019-05-16 RX ORDER — PROPOFOL 10 MG/ML
INJECTION, EMULSION INTRAVENOUS AS NEEDED
Status: DISCONTINUED | OUTPATIENT
Start: 2019-05-16 | End: 2019-05-16 | Stop reason: SURG

## 2019-05-16 RX ADMIN — ATORVASTATIN CALCIUM 20 MG: 40 TABLET, FILM COATED ORAL at 16:58

## 2019-05-16 RX ADMIN — PROPOFOL 50 MG: 10 INJECTION, EMULSION INTRAVENOUS at 14:37

## 2019-05-16 RX ADMIN — QUETIAPINE FUMARATE 12.5 MG: 25 TABLET ORAL at 21:12

## 2019-05-16 RX ADMIN — DONEPEZIL HYDROCHLORIDE 10 MG: 10 TABLET, FILM COATED ORAL at 21:12

## 2019-05-16 RX ADMIN — ACETAMINOPHEN 650 MG: 325 TABLET ORAL at 06:32

## 2019-05-16 RX ADMIN — SUCRALFATE 1000 MG: 1 SUSPENSION ORAL at 21:12

## 2019-05-16 RX ADMIN — MEMANTINE 10 MG: 5 TABLET ORAL at 09:32

## 2019-05-16 RX ADMIN — PANTOPRAZOLE SODIUM 40 MG: 40 TABLET, DELAYED RELEASE ORAL at 16:58

## 2019-05-16 RX ADMIN — CIPROFLOXACIN HYDROCHLORIDE 250 MG: 250 TABLET, FILM COATED ORAL at 09:33

## 2019-05-16 RX ADMIN — SUCRALFATE 1000 MG: 1 SUSPENSION ORAL at 16:58

## 2019-05-16 RX ADMIN — PROPOFOL 30 MG: 10 INJECTION, EMULSION INTRAVENOUS at 14:39

## 2019-05-16 RX ADMIN — PANTOPRAZOLE SODIUM 40 MG: 40 TABLET, DELAYED RELEASE ORAL at 06:11

## 2019-05-16 RX ADMIN — MEMANTINE 10 MG: 5 TABLET ORAL at 17:01

## 2019-05-16 RX ADMIN — SODIUM CHLORIDE 100 ML/HR: 0.9 INJECTION, SOLUTION INTRAVENOUS at 08:09

## 2019-05-17 ENCOUNTER — TELEPHONE (OUTPATIENT)
Dept: GASTROENTEROLOGY | Facility: CLINIC | Age: 84
End: 2019-05-17

## 2019-05-17 VITALS
TEMPERATURE: 97.6 F | HEART RATE: 70 BPM | DIASTOLIC BLOOD PRESSURE: 70 MMHG | OXYGEN SATURATION: 97 % | RESPIRATION RATE: 18 BRPM | BODY MASS INDEX: 28.4 KG/M2 | HEIGHT: 62 IN | SYSTOLIC BLOOD PRESSURE: 149 MMHG | WEIGHT: 154.32 LBS

## 2019-05-17 LAB
ANION GAP SERPL CALCULATED.3IONS-SCNC: 9 MMOL/L (ref 4–13)
BASOPHILS # BLD AUTO: 0.01 THOUSANDS/ΜL (ref 0–0.1)
BASOPHILS NFR BLD AUTO: 0 % (ref 0–1)
BUN SERPL-MCNC: 10 MG/DL (ref 5–25)
CALCIUM SERPL-MCNC: 8.7 MG/DL (ref 8.3–10.1)
CHLORIDE SERPL-SCNC: 104 MMOL/L (ref 100–108)
CO2 SERPL-SCNC: 25 MMOL/L (ref 21–32)
CREAT SERPL-MCNC: 0.8 MG/DL (ref 0.6–1.3)
EOSINOPHIL # BLD AUTO: 0.09 THOUSAND/ΜL (ref 0–0.61)
EOSINOPHIL NFR BLD AUTO: 1 % (ref 0–6)
ERYTHROCYTE [DISTWIDTH] IN BLOOD BY AUTOMATED COUNT: 13.9 % (ref 11.6–15.1)
GFR SERPL CREATININE-BSD FRML MDRD: 67 ML/MIN/1.73SQ M
GLUCOSE SERPL-MCNC: 99 MG/DL (ref 65–140)
HCT VFR BLD AUTO: 32.5 % (ref 34.8–46.1)
HGB BLD-MCNC: 10.7 G/DL (ref 11.5–15.4)
IMM GRANULOCYTES # BLD AUTO: 0.04 THOUSAND/UL (ref 0–0.2)
IMM GRANULOCYTES NFR BLD AUTO: 0 % (ref 0–2)
LYMPHOCYTES # BLD AUTO: 2.11 THOUSANDS/ΜL (ref 0.6–4.47)
LYMPHOCYTES NFR BLD AUTO: 23 % (ref 14–44)
MCH RBC QN AUTO: 29.7 PG (ref 26.8–34.3)
MCHC RBC AUTO-ENTMCNC: 32.9 G/DL (ref 31.4–37.4)
MCV RBC AUTO: 90 FL (ref 82–98)
MONOCYTES # BLD AUTO: 1.01 THOUSAND/ΜL (ref 0.17–1.22)
MONOCYTES NFR BLD AUTO: 11 % (ref 4–12)
NEUTROPHILS # BLD AUTO: 5.92 THOUSANDS/ΜL (ref 1.85–7.62)
NEUTS SEG NFR BLD AUTO: 65 % (ref 43–75)
NRBC BLD AUTO-RTO: 0 /100 WBCS
PLATELET # BLD AUTO: 308 THOUSANDS/UL (ref 149–390)
PMV BLD AUTO: 10.5 FL (ref 8.9–12.7)
POTASSIUM SERPL-SCNC: 3.7 MMOL/L (ref 3.5–5.3)
RBC # BLD AUTO: 3.6 MILLION/UL (ref 3.81–5.12)
SODIUM SERPL-SCNC: 138 MMOL/L (ref 136–145)
WBC # BLD AUTO: 9.18 THOUSAND/UL (ref 4.31–10.16)

## 2019-05-17 PROCEDURE — 99239 HOSP IP/OBS DSCHRG MGMT >30: CPT | Performed by: FAMILY MEDICINE

## 2019-05-17 PROCEDURE — 80048 BASIC METABOLIC PNL TOTAL CA: CPT | Performed by: FAMILY MEDICINE

## 2019-05-17 PROCEDURE — 85025 COMPLETE CBC W/AUTO DIFF WBC: CPT | Performed by: FAMILY MEDICINE

## 2019-05-17 RX ORDER — METOCLOPRAMIDE 10 MG/1
10 TABLET ORAL ONCE
Status: COMPLETED | OUTPATIENT
Start: 2019-05-17 | End: 2019-05-17

## 2019-05-17 RX ADMIN — CIPROFLOXACIN HYDROCHLORIDE 250 MG: 250 TABLET, FILM COATED ORAL at 09:40

## 2019-05-17 RX ADMIN — SUCRALFATE 1000 MG: 1 SUSPENSION ORAL at 09:40

## 2019-05-17 RX ADMIN — MEMANTINE 10 MG: 5 TABLET ORAL at 09:40

## 2019-05-17 RX ADMIN — METOCLOPRAMIDE HYDROCHLORIDE 10 MG: 10 TABLET ORAL at 12:42

## 2019-05-17 RX ADMIN — ACETAMINOPHEN 650 MG: 325 TABLET ORAL at 09:40

## 2019-05-17 RX ADMIN — PANTOPRAZOLE SODIUM 40 MG: 40 TABLET, DELAYED RELEASE ORAL at 06:23

## 2019-07-01 ENCOUNTER — TELEPHONE (OUTPATIENT)
Dept: GASTROENTEROLOGY | Facility: CLINIC | Age: 84
End: 2019-07-01

## 2019-07-05 ENCOUNTER — APPOINTMENT (EMERGENCY)
Dept: CT IMAGING | Facility: HOSPITAL | Age: 84
DRG: 123 | End: 2019-07-05
Payer: COMMERCIAL

## 2019-07-05 ENCOUNTER — APPOINTMENT (EMERGENCY)
Dept: RADIOLOGY | Facility: HOSPITAL | Age: 84
DRG: 123 | End: 2019-07-05
Payer: COMMERCIAL

## 2019-07-05 ENCOUNTER — HOSPITAL ENCOUNTER (INPATIENT)
Facility: HOSPITAL | Age: 84
LOS: 4 days | Discharge: HOME HEALTH | DRG: 123 | End: 2019-07-09
Attending: EMERGENCY MEDICINE | Admitting: HOSPITALIST
Payer: COMMERCIAL

## 2019-07-05 ENCOUNTER — APPOINTMENT (INPATIENT)
Dept: MRI IMAGING | Facility: HOSPITAL | Age: 84
DRG: 123 | End: 2019-07-05
Payer: COMMERCIAL

## 2019-07-05 DIAGNOSIS — G30.9 ALZHEIMER DISEASE (HCC): ICD-10-CM

## 2019-07-05 DIAGNOSIS — F02.80 ALZHEIMER DISEASE (HCC): ICD-10-CM

## 2019-07-05 DIAGNOSIS — H34.12 CENTRAL RETINAL ARTERY OCCLUSION OF LEFT EYE: ICD-10-CM

## 2019-07-05 DIAGNOSIS — I48.91 A-FIB (HCC): ICD-10-CM

## 2019-07-05 DIAGNOSIS — Z86.73 HISTORY OF STROKE: ICD-10-CM

## 2019-07-05 DIAGNOSIS — G44.89 OTHER HEADACHE SYNDROME: ICD-10-CM

## 2019-07-05 DIAGNOSIS — I63.9 CVA (CEREBRAL VASCULAR ACCIDENT) (HCC): Primary | ICD-10-CM

## 2019-07-05 DIAGNOSIS — I10 HYPERTENSION: ICD-10-CM

## 2019-07-05 DIAGNOSIS — H54.7 LOSS OF VISION: ICD-10-CM

## 2019-07-05 LAB
ABO GROUP BLD: NORMAL
ANION GAP SERPL CALCULATED.3IONS-SCNC: 12 MMOL/L (ref 4–13)
APTT PPP: 31 SECONDS (ref 23–37)
BLD GP AB SCN SERPL QL: NEGATIVE
BUN BLD-MCNC: 11 MG/DL (ref 5–25)
BUN SERPL-MCNC: 11 MG/DL (ref 5–25)
CA-I BLD-SCNC: 1.25 MMOL/L (ref 1.12–1.32)
CALCIUM SERPL-MCNC: 9.5 MG/DL (ref 8.3–10.1)
CHLORIDE BLD-SCNC: >120 MMOL/L (ref 100–108)
CHLORIDE SERPL-SCNC: 97 MMOL/L (ref 100–108)
CO2 SERPL-SCNC: 27 MMOL/L (ref 21–32)
CREAT BLD-MCNC: 0.7 MG/DL (ref 0.6–1.3)
CREAT SERPL-MCNC: 0.76 MG/DL (ref 0.6–1.3)
ERYTHROCYTE [DISTWIDTH] IN BLOOD BY AUTOMATED COUNT: 13.9 % (ref 11.6–15.1)
GFR SERPL CREATININE-BSD FRML MDRD: 72 ML/MIN/1.73SQ M
GFR SERPL CREATININE-BSD FRML MDRD: 79 ML/MIN/1.73SQ M
GLUCOSE SERPL-MCNC: 113 MG/DL (ref 65–140)
GLUCOSE SERPL-MCNC: 121 MG/DL (ref 65–140)
GLUCOSE SERPL-MCNC: 126 MG/DL (ref 65–140)
HCT VFR BLD AUTO: 33.4 % (ref 34.8–46.1)
HCT VFR BLD CALC: 33 % (ref 34.8–46.1)
HGB BLD-MCNC: 10.8 G/DL (ref 11.5–15.4)
HGB BLDA-MCNC: 11.2 G/DL (ref 11.5–15.4)
INR PPP: 1.03 (ref 0.84–1.19)
MCH RBC QN AUTO: 28.2 PG (ref 26.8–34.3)
MCHC RBC AUTO-ENTMCNC: 32.3 G/DL (ref 31.4–37.4)
MCV RBC AUTO: 87 FL (ref 82–98)
PCO2 BLD: 27 MMOL/L (ref 21–32)
PLATELET # BLD AUTO: 321 THOUSANDS/UL (ref 149–390)
PMV BLD AUTO: 9.9 FL (ref 8.9–12.7)
POTASSIUM BLD-SCNC: 4.8 MMOL/L (ref 3.5–5.3)
POTASSIUM SERPL-SCNC: 4 MMOL/L (ref 3.5–5.3)
PROTHROMBIN TIME: 13.6 SECONDS (ref 11.6–14.5)
RBC # BLD AUTO: 3.83 MILLION/UL (ref 3.81–5.12)
RH BLD: POSITIVE
SODIUM BLD-SCNC: 174 MMOL/L (ref 136–145)
SODIUM SERPL-SCNC: 136 MMOL/L (ref 136–145)
SPECIMEN EXPIRATION DATE: NORMAL
SPECIMEN SOURCE: ABNORMAL
SPECIMEN SOURCE: NORMAL
TROPONIN I BLD-MCNC: 0.01 NG/ML (ref 0–0.08)
WBC # BLD AUTO: 8.41 THOUSAND/UL (ref 4.31–10.16)

## 2019-07-05 PROCEDURE — 96374 THER/PROPH/DIAG INJ IV PUSH: CPT

## 2019-07-05 PROCEDURE — 99285 EMERGENCY DEPT VISIT HI MDM: CPT

## 2019-07-05 PROCEDURE — 93005 ELECTROCARDIOGRAM TRACING: CPT

## 2019-07-05 PROCEDURE — 85014 HEMATOCRIT: CPT

## 2019-07-05 PROCEDURE — 84484 ASSAY OF TROPONIN QUANT: CPT

## 2019-07-05 PROCEDURE — 86850 RBC ANTIBODY SCREEN: CPT | Performed by: EMERGENCY MEDICINE

## 2019-07-05 PROCEDURE — 96375 TX/PRO/DX INJ NEW DRUG ADDON: CPT

## 2019-07-05 PROCEDURE — 80047 BASIC METABLC PNL IONIZED CA: CPT

## 2019-07-05 PROCEDURE — 85027 COMPLETE CBC AUTOMATED: CPT | Performed by: EMERGENCY MEDICINE

## 2019-07-05 PROCEDURE — 70496 CT ANGIOGRAPHY HEAD: CPT

## 2019-07-05 PROCEDURE — 99285 EMERGENCY DEPT VISIT HI MDM: CPT | Performed by: EMERGENCY MEDICINE

## 2019-07-05 PROCEDURE — 82948 REAGENT STRIP/BLOOD GLUCOSE: CPT

## 2019-07-05 PROCEDURE — 70498 CT ANGIOGRAPHY NECK: CPT

## 2019-07-05 PROCEDURE — 80048 BASIC METABOLIC PNL TOTAL CA: CPT | Performed by: EMERGENCY MEDICINE

## 2019-07-05 PROCEDURE — 99223 1ST HOSP IP/OBS HIGH 75: CPT | Performed by: HOSPITALIST

## 2019-07-05 PROCEDURE — 36415 COLL VENOUS BLD VENIPUNCTURE: CPT | Performed by: EMERGENCY MEDICINE

## 2019-07-05 PROCEDURE — 70551 MRI BRAIN STEM W/O DYE: CPT

## 2019-07-05 PROCEDURE — 99232 SBSQ HOSP IP/OBS MODERATE 35: CPT | Performed by: PSYCHIATRY & NEUROLOGY

## 2019-07-05 PROCEDURE — 71045 X-RAY EXAM CHEST 1 VIEW: CPT

## 2019-07-05 PROCEDURE — 85610 PROTHROMBIN TIME: CPT | Performed by: EMERGENCY MEDICINE

## 2019-07-05 PROCEDURE — 86901 BLOOD TYPING SEROLOGIC RH(D): CPT | Performed by: EMERGENCY MEDICINE

## 2019-07-05 PROCEDURE — 85730 THROMBOPLASTIN TIME PARTIAL: CPT | Performed by: EMERGENCY MEDICINE

## 2019-07-05 PROCEDURE — 86900 BLOOD TYPING SEROLOGIC ABO: CPT | Performed by: EMERGENCY MEDICINE

## 2019-07-05 RX ORDER — MAGNESIUM SULFATE HEPTAHYDRATE 40 MG/ML
2 INJECTION, SOLUTION INTRAVENOUS
Status: COMPLETED | OUTPATIENT
Start: 2019-07-05 | End: 2019-07-07

## 2019-07-05 RX ORDER — ASPIRIN 81 MG/1
324 TABLET, CHEWABLE ORAL ONCE
Status: COMPLETED | OUTPATIENT
Start: 2019-07-05 | End: 2019-07-05

## 2019-07-05 RX ORDER — HEPARIN SODIUM 5000 [USP'U]/ML
5000 INJECTION, SOLUTION INTRAVENOUS; SUBCUTANEOUS EVERY 8 HOURS SCHEDULED
Status: DISCONTINUED | OUTPATIENT
Start: 2019-07-05 | End: 2019-07-08

## 2019-07-05 RX ORDER — ONDANSETRON 2 MG/ML
4 INJECTION INTRAMUSCULAR; INTRAVENOUS EVERY 6 HOURS PRN
Status: DISCONTINUED | OUTPATIENT
Start: 2019-07-05 | End: 2019-07-09 | Stop reason: HOSPADM

## 2019-07-05 RX ORDER — DONEPEZIL HYDROCHLORIDE 10 MG/1
10 TABLET, FILM COATED ORAL
Status: DISCONTINUED | OUTPATIENT
Start: 2019-07-05 | End: 2019-07-09 | Stop reason: HOSPADM

## 2019-07-05 RX ORDER — ASPIRIN 81 MG/1
81 TABLET, CHEWABLE ORAL DAILY
Status: DISCONTINUED | OUTPATIENT
Start: 2019-07-06 | End: 2019-07-08

## 2019-07-05 RX ORDER — KETOROLAC TROMETHAMINE 30 MG/ML
15 INJECTION, SOLUTION INTRAMUSCULAR; INTRAVENOUS ONCE
Status: COMPLETED | OUTPATIENT
Start: 2019-07-05 | End: 2019-07-05

## 2019-07-05 RX ORDER — QUETIAPINE FUMARATE 25 MG/1
50 TABLET, FILM COATED ORAL ONCE
Status: COMPLETED | OUTPATIENT
Start: 2019-07-05 | End: 2019-07-05

## 2019-07-05 RX ORDER — PANTOPRAZOLE SODIUM 40 MG/1
40 TABLET, DELAYED RELEASE ORAL
Status: DISCONTINUED | OUTPATIENT
Start: 2019-07-06 | End: 2019-07-09 | Stop reason: HOSPADM

## 2019-07-05 RX ORDER — METOCLOPRAMIDE HYDROCHLORIDE 5 MG/ML
10 INJECTION INTRAMUSCULAR; INTRAVENOUS EVERY 8 HOURS SCHEDULED
Status: DISCONTINUED | OUTPATIENT
Start: 2019-07-06 | End: 2019-07-05

## 2019-07-05 RX ORDER — ACETAMINOPHEN 325 MG/1
650 TABLET ORAL EVERY 6 HOURS PRN
Status: DISCONTINUED | OUTPATIENT
Start: 2019-07-05 | End: 2019-07-09 | Stop reason: HOSPADM

## 2019-07-05 RX ORDER — DIPHENHYDRAMINE HYDROCHLORIDE 50 MG/ML
10 INJECTION INTRAMUSCULAR; INTRAVENOUS EVERY 8 HOURS
Status: DISCONTINUED | OUTPATIENT
Start: 2019-07-06 | End: 2019-07-05

## 2019-07-05 RX ORDER — ATORVASTATIN CALCIUM 40 MG/1
40 TABLET, FILM COATED ORAL
Status: DISCONTINUED | OUTPATIENT
Start: 2019-07-05 | End: 2019-07-06

## 2019-07-05 RX ORDER — METOCLOPRAMIDE HYDROCHLORIDE 5 MG/ML
10 INJECTION INTRAMUSCULAR; INTRAVENOUS ONCE
Status: COMPLETED | OUTPATIENT
Start: 2019-07-05 | End: 2019-07-05

## 2019-07-05 RX ORDER — MEMANTINE HYDROCHLORIDE 5 MG/1
10 TABLET ORAL 2 TIMES DAILY
Status: DISCONTINUED | OUTPATIENT
Start: 2019-07-05 | End: 2019-07-09 | Stop reason: HOSPADM

## 2019-07-05 RX ORDER — LIDOCAINE 50 MG/G
1 PATCH TOPICAL DAILY
Status: DISCONTINUED | OUTPATIENT
Start: 2019-07-06 | End: 2019-07-09 | Stop reason: HOSPADM

## 2019-07-05 RX ADMIN — MAGNESIUM SULFATE HEPTAHYDRATE 2 G: 40 INJECTION, SOLUTION INTRAVENOUS at 20:38

## 2019-07-05 RX ADMIN — HEPARIN SODIUM 5000 UNITS: 5000 INJECTION INTRAVENOUS; SUBCUTANEOUS at 22:05

## 2019-07-05 RX ADMIN — METOCLOPRAMIDE 10 MG: 5 INJECTION, SOLUTION INTRAMUSCULAR; INTRAVENOUS at 16:43

## 2019-07-05 RX ADMIN — KETOROLAC TROMETHAMINE 15 MG: 30 INJECTION, SOLUTION INTRAMUSCULAR at 16:06

## 2019-07-05 RX ADMIN — DONEPEZIL HYDROCHLORIDE 10 MG: 10 TABLET, FILM COATED ORAL at 22:05

## 2019-07-05 RX ADMIN — ASPIRIN 81 MG 324 MG: 81 TABLET ORAL at 16:10

## 2019-07-05 RX ADMIN — QUETIAPINE FUMARATE 50 MG: 25 TABLET ORAL at 22:05

## 2019-07-05 RX ADMIN — IODIXANOL 85 ML: 320 INJECTION, SOLUTION INTRAVASCULAR at 15:25

## 2019-07-05 NOTE — ED ATTENDING ATTESTATION
Niki Villeda MD, saw and evaluated the patient  I have discussed the patient with the resident/non-physician practitioner and agree with the resident's/non-physician practitioner's findings, Plan of Care, and MDM as documented in the resident's/non-physician practitioner's note, except where noted  All available labs and Radiology studies were reviewed  I was present for key portions of any procedure(s) performed by the resident/non-physician practitioner and I was immediately available to provide assistance  At this point I agree with the current assessment done in the Emergency Department  I have conducted an independent evaluation of this patient a history and physical is as follows:  79 yo female with h/o HTN, CVAx2, most recent 3 years ago, 5 years prior to that, c/o sudden onset painless left visual loss, about 13:45  She was with her daughter all day not complaining of this, she dropped her off about 13:30  She is very distressed, about it, denies pain, denies HA, denies HA  Visual loss is objective on the left by confrontational exam   She does not appear to be having any focal weakness  Right face seems drooped  Stroke alert based on initial evaluation    Will d/w Neurology     15:46 Dr Daniel Sung at bedside discussing risk/benefit of TPA which she is a candidate for based on symptoms and exam   NIH 4     Critical Care Time  Procedures

## 2019-07-05 NOTE — ASSESSMENT & PLAN NOTE
With [id number] score of 6 previously on Coumadin which was not restarted after episode of gib  HR controlled off chronotropic agents    Monitor on telemetry

## 2019-07-05 NOTE — ASSESSMENT & PLAN NOTE
Right hemianopsia w/acute onset concerning for cva  Patient not a tPA candidate given minimal symptoms and recent GI bleed per Neurology  CTA head neck negative for any acute stroke or any significant large vessel disease notably some right markedly vertebral artery stenosis in  carotid artery calcification at bifurcation bilaterally  EKG demonstrates  Admitted stroke pathway, allow permissive hypertension  Check MRI brain without contrast 2D echo and follow up FLP hemoglobin A1c for secondary stroke prevention  Received full aspirin in the ER will continue with baby aspirin thereafter  Appreciate Neurology recommendations

## 2019-07-05 NOTE — ED PROVIDER NOTES
History  Chief Complaint   Patient presents with    Loss of Vision     per daughter pt called stating she couldn't see from left eye  symptoms started 1hr 15min ago  hx strokes     79 y/o female with history of atrial fibrillation, diverticulosis, hypertension, Alzheimer's, hyperlipidemia, 2 previous strokes,  recently weaned from warfarin approximately 5 weeks ago secondary to diverticulosis with a lower GI bleed  Patient presents the emergency department with acute loss of vision in her left eye approximately 0 5 hours before arrival in the emergency department  Patient's daughter denies significant deficits from the patient's 2 previous strokes  Patient has a slight right-sided facial droop today that the daughter states is not baseline for the patient  Patient was at home and called her daughter and reported the onset of symptoms  Patient is not alert and oriented to age or year at her baseline  Eye Problem   Location:  Left eye  Quality: loss of vision  Severity:  Severe  Onset quality:  Sudden  Timing:  Constant  Progression:  Unchanged  Chronicity:  New      Prior to Admission Medications   Prescriptions Last Dose Informant Patient Reported? Taking?    Cranberry 500 MG TABS 7/5/2019 at Unknown time Self Yes Yes   Sig: Take 500 mg by mouth daily     atorvastatin (LIPITOR) 20 mg tablet 7/5/2019 at Unknown time Self Yes Yes   Sig: Take 20 mg by mouth daily   ciprofloxacin (CIPRO) 250 mg tablet 7/5/2019 at Unknown time Self Yes Yes   Sig: Take 250 mg by mouth every 24 hours   donepezil (ARICEPT) 10 mg tablet 7/4/2019 at Unknown time Self Yes Yes   Sig: Take 10 mg by mouth daily at bedtime   memantine (NAMENDA) 10 mg tablet 7/5/2019 at Unknown time Self Yes Yes   Sig: Take 10 mg by mouth 2 (two) times a day   pantoprazole (PROTONIX) 40 mg tablet 7/5/2019 at Unknown time Child Yes Yes   Sig: Take 40 mg by mouth daily      Facility-Administered Medications: None       Past Medical History: Diagnosis Date    [facility]-fib [facility])     Alzheimer disease     Diverticulosis     Dyslipidemia     History of stroke     Hyperlipidemia     Hypertension     UTI (urinary tract infection)     Vasovagal syncope 2/13/2018       Past Surgical History:   Procedure Laterality Date    CHOLECYSTECTOMY         History reviewed  No pertinent family history  I have reviewed and agree with the history as documented  Social History     Tobacco Use    Smoking status: Former Smoker    Smokeless tobacco: Never Used   Substance Use Topics    Alcohol use: No    Drug use: No        Review of Systems   HENT:        Positive for change in vision   All other systems reviewed and are negative  Physical Exam  ED Triage Vitals   Temperature Pulse Respirations Blood Pressure SpO2   07/05/19 1507 07/05/19 1502 07/05/19 1502 07/05/19 1502 07/05/19 1502   98 3 °F (36 8 °C) 80 18 (!) 201/89 99 %      Temp Source Heart Rate Source Patient Position - Orthostatic VS BP Location FiO2 (%)   07/05/19 1507 07/05/19 1502 07/05/19 1502 07/05/19 1502 --   Temporal Monitor Sitting Right arm       Pain Score       07/05/19 1600       Worst Possible Pain             Orthostatic Vital Signs  Vitals:    07/05/19 1844 07/05/19 1945 07/05/19 2045 07/05/19 2145   BP: (!) 225/91  158/80 160/76   Pulse: 85 77 85 80   Patient Position - Orthostatic VS: Sitting  Lying Lying       Physical Exam   Constitutional: She appears well-developed and well-nourished  No distress  HENT:   Head: Atraumatic  Right Ear: External ear normal    Left Ear: External ear normal    Slight right-sided facial droop   Eyes: Conjunctivae and EOM are normal    No response to threat with the left eye  Patient is unable to see lights shining in her left eye  Neck: No JVD present  No tracheal deviation present  Cardiovascular: Normal rate, regular rhythm, normal heart sounds and intact distal pulses  No murmur heard    Pulmonary/Chest: Breath sounds normal  No stridor  No respiratory distress  She has no wheezes  She has no rales  Abdominal: Soft  Bowel sounds are normal  She exhibits no distension and no mass  There is no tenderness  There is no rebound and no guarding  Genitourinary:   Genitourinary Comments: Deferred   Musculoskeletal: She exhibits no edema, tenderness or deformity  Neurological: She is alert  No sensory deficit  She exhibits normal muscle tone  Coordination normal    NIH stroke score of 4 with a baseline score of 2 secondary to dementia  Adjusted score is 2 per my examination  Negative pronator drift  Muscular strength 5/5 in upper lower extremities bilaterally  Patient intact light sensation in the upper lower extremities bilaterally  Patient is unable to see light in her left eye  Skin: Skin is warm and dry  Capillary refill takes less than 2 seconds  No rash noted  She is not diaphoretic  No erythema  No pallor  Psychiatric: She has a normal mood and affect  Her behavior is normal  Judgment and thought content normal    Nursing note and vitals reviewed        ED Medications  Medications   atorvastatin (LIPITOR) tablet 40 mg (40 mg Oral Not Given 7/5/19 1949)   donepezil (ARICEPT) tablet 10 mg (10 mg Oral Given 7/5/19 2205)   memantine (NAMENDA) tablet 10 mg (10 mg Oral Not Given 7/5/19 1948)   pantoprazole (PROTONIX) EC tablet 40 mg (has no administration in time range)   ondansetron (ZOFRAN) injection 4 mg (has no administration in time range)   aspirin chewable tablet 81 mg (has no administration in time range)   heparin (porcine) subcutaneous injection 5,000 Units (5,000 Units Subcutaneous Given 7/5/19 2205)   magnesium sulfate 2 g/50 mL IVPB (premix) 2 g (2 g Intravenous New Bag 7/5/19 2038)   iodixanol (VISIPAQUE) 320 MG/ML injection 85 mL (85 mL Intravenous Given 7/5/19 1525)   aspirin chewable tablet 324 mg (324 mg Oral Given 7/5/19 1610)   metoclopramide (REGLAN) injection 10 mg (10 mg Intravenous Given 7/5/19 1643) ketorolac (TORADOL) injection 15 mg (15 mg Intravenous Given 7/5/19 1606)   QUEtiapine (SEROquel) tablet 50 mg (50 mg Oral Given 7/5/19 2205)       Diagnostic Studies  Results Reviewed     Procedure Component Value Units Date/Time    Platelet count [[id number]]     Lab Status:  No result Specimen:  Blood     Fingerstick Glucose (POCT) [[id number]]  (Normal) Collected:  07/05/19 1545    Lab Status:  Final result Updated:  07/05/19 1546     POC Glucose 113 mg/dl     POCT troponin [[id number]] Collected:  07/05/19 1512    Lab Status:  Final result Specimen:  Venous Updated:  07/05/19 1526     POC Troponin I 0 01 ng/ml      Specimen Type VENOUS    Narrative:       [employer] i-Stat handheld analyzer 99% cutoff is > 0 08ng/mL in network Emergency Departments    o cTnI 99% cutoff is useful only when applied to patients in the clinical setting of myocardial ischemia  o cTnI 99% cutoff should be interpreted in the context of clinical history, ECG findings and possibly cardiac imaging to establish correct diagnosis  o cTnI 99% cutoff may be suggestive but clearly not indicative of a coronary event without the clinical setting of myocardial ischemia        Basic metabolic panel [[id number]]  (Abnormal) Collected:  07/05/19 1511    Lab Status:  Final result Specimen:  Blood from Arm, Left Updated:  07/05/19 1524     Sodium 136 mmol/L      Potassium 4 0 mmol/L      Chloride 97 mmol/L      CO2 27 mmol/L      ANION GAP 12 mmol/L      BUN 11 mg/dL      Creatinine 0 76 mg/dL      Glucose 126 mg/dL      Calcium 9 5 mg/dL      eGFR 72 ml/min/1 73sq m     Narrative:       [place] guidelines for Chronic Kidney Disease (CKD):     Stage 1 with normal or high GFR (GFR > 90 mL/min/1 73 square meters)    Stage 2 Mild CKD (GFR = 60-89 mL/min/1 73 square meters)    Stage 3A Moderate CKD (GFR = 45-59 mL/min/1 73 square meters)    Stage 3B Moderate CKD (GFR = 30-44 mL/min/1 73 square meters)    Stage 4 Severe CKD (GFR = 15-29 mL/min/1 73 square meters)    Stage 5 End Stage CKD (GFR <15 mL/min/1 73 square meters)  Note: GFR calculation is accurate only with a steady state creatinine    Protime-INR [[id number]]  (Normal) Collected:  07/05/19 1511    Lab Status:  Final result Specimen:  Blood from Arm, Left Updated:  07/05/19 1523     Protime 13 6 seconds      INR 1 03    APTT [[id number]]  (Normal) Collected:  07/05/19 1511    Lab Status:  Final result Specimen:  Blood from Arm, Left Updated:  07/05/19 1523     PTT 31 seconds     POCT Chem 8+ [[id number]]  (Abnormal) Collected:  07/05/19 1512    Lab Status:  Final result Specimen:  Venous Updated:  07/05/19 1518     SODIUM, I-STAT 174 mmol/l      Potassium, i-STAT 4 8 mmol/L      Chloride, istat >120 mmol/L      CO2, i-STAT 27 mmol/L      Anion Gap, i-STAT -- mmol/L      Calcium, Ionized i-STAT 1 25 mmol/L      BUN, I-STAT 11 mg/dl      Creatinine, i-STAT 0 7 mg/dl      eGFR 79 ml/min/1 73sq m      Glucose, i-STAT 121 mg/dl      Hct, i-STAT 33 %      Hgb, i-STAT 11 2 g/dl      Specimen Type VENOUS    Narrative:       National Kidney Disease Foundation guidelines for Chronic Kidney Disease (CKD):     Stage 1 with normal or high GFR (GFR > 90 mL/min/1 73 square meters)    Stage 2 Mild CKD (GFR = 60-89 mL/min/1 73 square meters)    Stage 3A Moderate CKD (GFR = 45-59 mL/min/1 73 square meters)    Stage 3B Moderate CKD (GFR = 30-44 mL/min/1 73 square meters)    Stage 4 Severe CKD (GFR = 15-29 mL/min/1 73 square meters)    Stage 5 End Stage CKD (GFR <15 mL/min/1 73 square meters)  Note: GFR calculation is accurate only with a steady state creatinine    CBC and Platelet [[id number]]  (Abnormal) Collected:  07/05/19 1511    Lab Status:  Final result Specimen:  Blood from Arm, Left Updated:  07/05/19 1515     WBC 8 41 Thousand/uL      RBC 3 83 Million/uL      Hemoglobin 10 8 g/dL      Hematocrit 33 4 %      MCV 87 fL      MCH 28 2 pg      MCHC 32 3 g/dL      RDW 13 9 %      Platelets 224 Thousands/uL      MPV 9 9 fL                  XR stroke alert portable chest   Final Result by Rozetta Nissen, MD (07/05 1545)      No acute cardiopulmonary disease  Workstation performed: [id number]         CTA stroke alert (head/neck)   Final Result by Cheikh Montgomery MD (07/05 1548)      Moderate to marked right vertebral artery origin stenosis at the origin  Fluid attenuating material in the proximal esophagus suggesting gastric esophageal reflux with wall thickening suggesting esophagitis  Findings were directly discussed/tiger with Meka Byers on 7/5/2019 3:34 PM                      Workstation performed: [id number]         CT stroke alert brain   Final Result by Cheikh Montgomery MD (07/05 1534)      No acute intracranial abnormality  Microangiopathic changes  Findings were directly discussed with Steve JARRELL on 7/5/2019 3:30 PM       Workstation performed: [id number]         MRI brain wo contrast    (Results Pending)         Procedures  Procedures        ED Course  ED Course as of Jul 05 2337 Fri Jul 05, 2019   1604 Patient is not going to be treated with tPA per neurology  Goal systolic blood pressure less than 190 per neurology physician  Will treat headache and reassess pressure  MDM  Number of Diagnoses or Management Options  CVA (cerebral vascular accident) [facility]): new and requires workup  History of stroke: established and worsening  Diagnosis management comments: This an 77-year-old female with previous medical history of to ischemic strokes, atrial fibrillation no longer anticoagulated after a GI bleed approximately 6 weeks ago presenting emergency department with acute onset of left eye vision loss and slight right-sided facial droop  Neurological G examination revealed no other deficits  I called a stroke alert on the patient    Patient be evaluated for CVA with CT head with and without contrast   Patient was also evaluated with a chest x-ray, CBC, BMP  No acute abnormalities noted other than the patient's baseline anemia  After discussion with the patient, her daughter and the neurology team it was decided to not administer tPA to the patient as it was felt that the risks outweighed the benefits in this patient that does not accept blood transfusions, had a recent GI bleed and has a relatively low stroke score  Patient will be admitted to Medicine         Amount and/or Complexity of Data Reviewed  Clinical lab tests: ordered and reviewed  Tests in the radiology section of CPT®: ordered and reviewed  Decide to obtain previous medical records or to obtain history from someone other than the patient: yes  Review and summarize past medical records: yes  Independent visualization of images, tracings, or specimens: yes    Risk of Complications, Morbidity, and/or Mortality  Presenting problems: high  Diagnostic procedures: high  Management options: high    Patient Progress  Patient progress: stable      Disposition  Final diagnoses:   History of stroke   CVA (cerebral vascular accident) ([facility] 75 )     Time reflects when diagnosis was documented in both MDM as applicable and the Disposition within this note     Time User Action Codes Description Comment    7/5/2019  3:08 PM Tiffanie Fuentes Add [Z86 73] History of stroke     7/5/2019  3:08 PM Yola Wilson, [address] Modify [Z86 73] History of stroke     7/5/2019  3:08 PM Evette Sanchez [facility] 108 [Z86 73] History of stroke     7/5/2019  3:08 PM Yola Wilson [address] Add [Z86 73] History of stroke     7/5/2019  3:08 PM Tiffanie Fuentes Modify [Q64 12] History of stroke     7/5/2019 11:33 PM Connor Scott Add [I63 9] [facility]ce[facility]     7/5/2019 11:33 PM Nikolas Duncan Modify [Z86 73] History of stroke     7/5/2019 11:33 PM Connor Scott Modify [I63 9] CVA (cerebral vascular accident) [facility])       ED Disposition     ED Disposition Condition Date/Time Comment    Admit Stable Fri Jul 5, 2019  4:43 PM Case was discussed with SLIM PA and the patient's admission status was agreed to be Admission Status: inpatient status to the service of Dr Logan Carmona   Follow-up Information     Follow up With Specialties Details Why Contact Info Additional Dannie Gordon Neurology [facility] Neurology Follow up Please call for appt with stroke clinic [facility] [address] [facility], [address] [place], [place], [id number]          Current Discharge Medication List      CONTINUE these medications which have NOT CHANGED    Details   atorvastatin (LIPITOR) 20 mg tablet Take 20 mg by mouth daily      ciprofloxacin (CIPRO) 250 mg tablet Take 250 mg by mouth every 24 hours      Cranberry 500 MG TABS Take 500 mg by mouth daily        donepezil (ARICEPT) 10 mg tablet Take 10 mg by mouth daily at bedtime      memantine (NAMENDA) 10 mg tablet Take 10 mg by mouth 2 (two) times a day      pantoprazole (PROTONIX) 40 mg tablet Take 40 mg by mouth daily           No discharge procedures on file  ED Provider  Attending physically available and evaluated Rita Pickett  I managed the patient along with the ED Attending      Electronically Signed by         Tally Leventhal, DO  07/05/19 469 [facility], DO  07/05/19 6913

## 2019-07-05 NOTE — CONSULTS
Consultation - Neurology   Liz Crew 80 y o  female MRN: [id number]  Unit/Bed#: ED 21 Encounter: [id number]    Assessment/Plan    · The patient is a 49-year-old female with past medical history listed below presents to [address] as a stroke alert, the patient was this morning completing errands with her daughter  At 1:15 this afternoon, she was dropped off ,normal state of health, last seen normal at 1:15, she called her daughter around 2:00 p m  Mavis Francois noticing that she was having difficulty with her vision  She was brought to the ED for evaluation as stroke alert was activated, secondary to right VF cut and facial droop  NIH was a 4, appears secondary to the dementia / orientation questions are chronic deficits    CT of head no acute intracranial pathology noted  CTA of head and neck no large vessel occlusion noted - please see full report below  Reviewed personally by attending  -- As per attending, no tpa secondary to hx of GIB - off anticoagulation as well as low NIH, risk benefit weighed at bedside with family in agreement       --Suspect left hemispheric CVA etiology - most likely cardio-embolic with hx of afib not on anticoagulation, need to rule out vessel to vessel atheroma    · HTN on arrival  · Hx of AFIB off of anticoagulation - secondary to recent GI bleed  · Hx of CVA x 2 no residual weakness, after last stroke memory loss with chief residual deficit  · Hx of GIB - off anticoagulation  · Cervicogenic pain with tension type headache syndrome/ Cervical neck arthritis    -  Stroke protocol, non tpa protocol - admit to [facility]  -  MRI of brain with out contrast if able  -  CTA of head and neck with and with out contrast completed - see below for details  -  Echocardiogram  -  Telemetry  -  Check Lipid panel and hemoglobin A1c  -  Secondary stroke / TIA  risk factor modification  -- Statin  -- Blood pressure - 663  939 systolic, avoid hypotension / cerebral perfusion  -  ASA load, ASA daily  -  Need to address safety of anticoagulation with GI, hx of stroke - afib - increased chads vascular score - risk benefit  This is held in the setting of acute stroke and risk of hemorrhagic conversion   -  Keep euvolemic, normothermic  -  Dysphagia screen  -  Therapies to include PT/OT/ST  -  PMR consultation  -  DVT prevention  -  Frequent neurological check and notify neurology with any changes in neurological condition  -  Continue to monitor for infectious and metabolic derangements and treat as arises  -  Toradol prn for cervicogenic pain, Toradol ordered in ED    History of Present Illness     Reason for Consult / Principal Problem:  Stroke alert    HPI: Mayco Flores is a 80 y o   female with past medical history of prior strokes x 2 - leading her to no residual focal weakness or sensory deficits, however reported after the last stroke - deficits with memory, history of atrial fibrillation off anticoagulation secondary to GI bleed/Diverticulosis, memory loss, cervicogenic pain/arthritis requiring injections of cervical spine, HTN, and hyperlipidemia  The patient presented [facility] as a stroke alert, the patient was running errands most of the morning with her daughter, the patient was dropped off at 1:15 a m  in normal state health with no deficits, at around 2:00 a m  the patient called the daughter stating that she is having problems with her vision  The patient was brought to the ED and was found to have a or right visual field cut as well as a right facial droop, stroke alert was activated  Stroke alert was activated at 3:12 p m  Neurology arrival time was around 3: 17 p m , arrived to patient in [place] [id number]  NIH was calculated to be a 4, with some points for chronic deficits    Last seen normal at 1:15 p m  today    After discussion with the family about risks and benefits of tPA, patient also has a recent GI bleed from diverticulosis, it was determined that the patient would not get tPA - secondary to risk of bleeding and stroke scale of 4, with chronic deficits noted true scale of a 3  No large vessel occulusion seen on imaging - for thrombectomy  The patient's family was agreement with this plan or care, after full discussion  The patient's  blood pressure was elevated on arrival     CT of the head showed no acute intracranial abnormality - there was microangiopathic changes  CTA of the head and neck showed no large vessel occlusion - moderate to marked right vertebral artery origin stenosis at origin  Moderate ascvd at the arch  Inpatient consult to Neurology  Consult performed by: Timoteo Overton PA-C  Consult ordered by: Severiano Cable, MD          Review of Systems   As per HPI    Historical Information   Past Medical History:   Diagnosis Date    [facility]-fib [facility])     Alzheimer disease     Diverticulosis     Dyslipidemia     History of stroke     Hyperlipidemia     Hypertension     UTI (urinary tract infection)     Vasovagal syncope 2/13/2018     Past Surgical History:   Procedure Laterality Date    CHOLECYSTECTOMY       Social History   Social History     Substance and Sexual Activity   Alcohol Use No     Social History     Substance and Sexual Activity   Drug Use No     Social History     Tobacco Use   Smoking Status Former Smoker   Smokeless Tobacco Never Used     Family History: History reviewed  No pertinent family history  Review of previous medical records was noted  Patient's discharge from hospital on May of 2019 - please see discharge summary - at that time patient did have a rectal bleed status post colonoscopy - it was noted patient had severe diverticulosis and internal hemorrhoids  There is evidence of recent bleed but no acute bleeding noted, the patient was taken off anticoagulation with a plan to reinitiate anticoagulation - once stools normalized  Please see dc summary for complete details       Meds/Allergies   all current active meds have been reviewed, current meds:   Current Facility-Administered Medications   Medication Dose Route Frequency    aspirin chewable tablet 324 mg  324 mg Oral Once    ketorolac (TORADOL) injection 15 mg  15 mg Intravenous Once    metoclopramide (REGLAN) injection 10 mg  10 mg Intravenous Once    and PTA meds:   Prior to Admission Medications   Prescriptions Last Dose Informant Patient Reported? Taking? Cranberry 500 MG TABS  Self Yes No   Sig: Take 500 mg by mouth daily     atorvastatin (LIPITOR) 20 mg tablet  Self Yes No   Sig: Take 20 mg by mouth daily   ciprofloxacin (CIPRO) 250 mg tablet  Self Yes No   Sig: Take 250 mg by mouth every 24 hours   donepezil (ARICEPT) 10 mg tablet  Self Yes No   Sig: Take 10 mg by mouth daily at bedtime   memantine (NAMENDA) 10 mg tablet  Self Yes No   Sig: Take 10 mg by mouth 2 (two) times a day   pantoprazole (PROTONIX) 40 mg tablet  Child Yes No   Sig: Take 40 mg by mouth daily   warfarin (COUMADIN) 3 mg tablet  Self No No   Sig: Take 1 tablet by mouth daily   Patient taking differently: Take by mouth daily 1 5mg every other day  3mg every other day  Alternating       Facility-Administered Medications: None     Allergies   Allergen Reactions    Ativan [Lorazepam]     Latuda [Lurasidone]      Objective   Vitals:Blood pressure 169/71, pulse 80, temperature 98 3 °F (36 8 °C), temperature source Temporal, resp  rate 20, SpO2 99 %  ,There is no height or weight on file to calculate BMI  No intake or output data in the 24 hours ending 07/05/19 1549    Invasive Devices: Invasive Devices     Peripheral Intravenous Line            Peripheral IV 07/05/19 Left Antecubital less than 1 day    Peripheral IV 07/05/19 Right Antecubital less than 1 day              Physical Exam   Constitutional: She appears well-developed  Appeared in pain, + distres   HENT:   Head: Normocephalic and atraumatic  Eyes: Pupils are equal, round, and reactive to light   EOM are normal  Right eye exhibits no discharge  Left eye exhibits no discharge  Right VF cut was noted to testing   Neck: No tracheal deviation present  Cardiovascular: Normal rate  Pulmonary/Chest: No respiratory distress  Musculoskeletal: She exhibits no edema  Neurological: She has a normal Finger-Nose-Finger Test and a normal Heel to [employer]    Vitals reviewed  Neurologic Exam     Mental Status   Please see NIH below     Cranial Nerves   Cranial nerves II through XII intact  CN III, IV, VI   Pupils are equal, round, and reactive to light  Extraocular motions are normal    Except for right facial droop, as well as right sided VF cut  Motor Exam No drift x all 4 extremities, no dense lateralizing features noted  Sensory Exam   Non focal to touch, no dense neglect was seen  Gait, Coordination, and Reflexes     Coordination   Finger to nose coordination: normal  Heel to shin coordination: normal    Tremor   Resting tremor: absent  Intention tremor: absent    NIHSS:    1a Level of Consciousness: 0 = Alert   1b  LOC Questions: 1 = Answers one correctly   1c  LOC Commands: 0 = Obeys both correctly   2  Best Gaze: 0 = Normal   3  Visual: 2 = Complete hemianopia   4  Facial Palsy: 1=Minor paralysis (flattened nasolabial fold, asymmetric on smiling)   5a  Motor Right Arm: 0=No drift, limb holds 90 (or 45) degrees for full 10 seconds   5b  Motor Left Arm: 0=No drift, limb holds 90 (or 45) degrees for full 10 seconds   6a  Motor Right Le=No drift, limb holds 90 (or 45) degrees for full 10 seconds   6b  Motor Left Le=No drift, limb holds 90 (or 45) degrees for full 10 seconds   7  Limb Ataxia:  0   8  Sensory: 0=Normal; no sensory loss   9  Best Language:  0=No aphasia, normal   10  Dysarthria: 0=Normal articulation   11   Extinction and Inattention (formerly Neglect): 0=No abnormality   Total Score: 4 - daughter reports LOC she would not get at baseline - 3     Lab Results:   I have personally reviewed pertinent reports  , CBC:   Results from last 7 days   Lab Units 07/05/19  1512 07/05/19  1511   WBC Thousand/uL  --  8 41   RBC Million/uL  --  3 83   HEMOGLOBIN g/dL  --  10 8*   I STAT HEMOGLOBIN g/dl 11 2*  --    HEMATOCRIT %  --  33 4*   HEMATOCRIT, ISTAT % 33*  --    MCV fL  --  87   PLATELETS Thousands/uL  --  321   , BMP/CMP:   Results from last 7 days   Lab Units 07/05/19  1512 07/05/19  1511   SODIUM mmol/L  --  136   POTASSIUM mmol/L  --  4 0   CHLORIDE mmol/L  --  97*   CO2 mmol/L  --  27   CO2, I-STAT mmol/L 27  --    BUN mg/dL  --  11   CREATININE mg/dL  --  0 76   GLUCOSE, ISTAT mg/dl 121  --    CALCIUM mg/dL  --  9 5   EGFR ml/min/1 73sq m 79 72   , Vitamin B12:   , HgBA1C:   , TSH:   , Coagulation:   Results from last 7 days   Lab Units 07/05/19  1511   INR  1 03   , Lipid Profile:   , Ammonia:   , Urinalysis:       Invalid input(s): URIBILINOGEN, Drug Screen:   , Medication Drug Levels:       Invalid input(s): CARBAMAZEPINE,  PHENOBARB, LACOSAMIDE, OXCARBAZEPINE     Per radiology interpretation -    "  Procedure: Xr Stroke Alert Portable Chest    Result Date: 7/5/2019  Narrative: CHEST INDICATION:   Stroke  COMPARISON:  10/19/2018 EXAM PERFORMED/VIEWS:  XR STROKE ALERT PORTABLE CHEST FINDINGS: Heart shadow appears stable with possible mild cardiac enlargement  Atherosclerotic vascular calcifications are noted  The lungs are clear  No pneumothorax or pleural effusion  Osseous structures appear within normal limits for patient age  Impression: No acute cardiopulmonary disease  Workstation performed: [id number]     Procedure: Ct Stroke Alert Brain    Result Date: 7/5/2019  Narrative: CT BRAIN - STROKE ALERT PROTOCOL INDICATION:   Stroke  COMPARISON:  None  TECHNIQUE:  CT examination of the brain was performed  In addition to axial images, coronal reformatted images were created and submitted for interpretation  Radiation dose length product (DLP) for this visit:  902 mGy-cm     This examination, like all CT scans performed in the [facility], was performed utilizing techniques to minimize radiation dose exposure, including the use of iterative reconstruction and automated exposure control  IMAGE QUALITY:  Diagnostic  FINDINGS:  PARENCHYMA:  Decreased attenuation is noted in the supratentorial white matter demonstrating an appearance most consistent with moderate microangiopathic change  No intracranial mass, mass effect or midline shift  No acute intracranial hemorrhage  No CT signs of acute infarction  Old left frontoparietal infarct  Old lacunar infarcts  Normal intracranial vasculature  VENTRICLES AND EXTRA-AXIAL SPACES:  Normal for patient's age  VISUALIZED ORBITS AND PARANASAL SINUSES:  Unremarkable  CALVARIUM AND EXTRACRANIAL SOFT TISSUES:   Normal      Impression: No acute intracranial abnormality  Microangiopathic changes  Findings were directly discussed with Orly Milligan on 7/5/2019 3:30 PM  Workstation performed: [id number]     Procedure: Cta Stroke Alert (head/neck)    Result Date: 7/5/2019  Narrative: CTA NECK AND BRAIN WITH CONTRAST INDICATION: Vision loss, Neuro deficit(s), subacute COMPARISON:   None  TECHNIQUE:   Post contrast imaging was performed after administration of iodinated contrast through the neck and brain  Post contrast axial 0 625 mm images timed to opacify the arterial system  3D rendering was performed on an independent workstation  MIP reconstructions performed  Coronal reconstructions were performed of the noncontrast portion of the brain  Radiation dose length product (DLP) for this visit:  395 mGy-cm   This examination, like all CT scans performed in the [facility], was performed utilizing techniques to minimize radiation dose exposure, including the use of iterative reconstruction and automated exposure control     IV Contrast:  85 mL of iodixanol (VISIPAQUE)  IMAGE QUALITY:   Diagnostic FINDINGS: CERVICAL VASCULATURE AORTIC ARCH AND GREAT VESSELS:  Moderate ahteroschlerotic disease of the arch and great vessels  RIGHT VERTEBRAL ARTERY CERVICAL SEGMENT:  Moderate to marked stenosis at the origin  The vessel is normal in caliber throughout the neck  LEFT VERTEBRAL ARTERY CERVICAL SEGMENT:  Normal origin  The vessel is normal in caliber throughout the neck  RIGHT EXTRACRANIAL CAROTID SEGMENT:  Moderate atherosclerotic disease of the bifurcation  LEFT EXTRACRANIAL CAROTID SEGMENT:  Moderate atherosclerotic disease of the bifurcation  NASCET criteria was used to determine the degree of internal carotid artery diameter stenosis  INTRACRANIAL VASCULATURE INTERNAL CAROTID ARTERIES:  Normal enhancement of the intracranial portions of the internal carotid arteries  Normal ophthalmic artery origins  Normal ICA terminus  ANTERIOR CIRCULATION:  Symmetric A1 segments and anterior cerebral arteries with normal enhancement  Normal anterior communicating artery  MIDDLE CEREBRAL ARTERY CIRCULATION:  M1 segment and middle cerebral artery branches demonstrate normal enhancement bilaterally  DISTAL VERTEBRAL ARTERIES:  Normal distal vertebral arteries  Posterior inferior cerebellar artery origins are normal  Normal vertebral basilar junction  BASILAR ARTERY:  Basilar artery is normal in caliber  Normal superior cerebellar arteries  POSTERIOR CEREBRAL ARTERIES: The right posterior cerebral artery arises from the basilar tip  There is fetal origin of the left posterior cerebral artery  Both demonstrate no focal stenosis  Normal posterior communicating arteries  DURAL VENOUS SINUSES:  Normal  NON VASCULAR ANATOMY BONY STRUCTURES:  No acute osseous abnormality  SOFT TISSUES OF THE NECK:  Fluid attenuating material in the proximal esophagus suggesting gastric esophageal reflux with wall thickening suggesting esophagitis  THORACIC INLET:  Groundglass regions in the lungs suggesting small airways disease       Impression: Moderate to marked right vertebral artery origin stenosis at the origin  Fluid attenuating material in the proximal esophagus suggesting gastric esophageal reflux with wall thickening suggesting esophagitis  Findings were directly discussed/tiger with Cynthia Garcia on 7/5/2019 3:34 PM  Workstation performed: [id number]   "    Imaging Studies: I have personally reviewed pertinent reports  and I have personally reviewed pertinent films in PACS, attending reviewed films at time of stroke alert  Code Status: Prior    Counseling / Coordination of Care  Stroke alert responded by attending and stroke team, hx and physical, decision making per attending physician at bedside

## 2019-07-05 NOTE — ED NOTES
Family wishes to hold reglan at this time   Daughter would like to see if toradol improves headache first       Marla Zelaya RN  07/05/19 2968

## 2019-07-05 NOTE — ASSESSMENT & PLAN NOTE
Moderate bilateral extracranial carotid artery calcification at bifurcation by CTA head and neck  Continue aspirin and statin

## 2019-07-05 NOTE — H&P
History and Physical - [facility] Internal Medicine    Patient Information: Claudette Portland 80 y o  female MRN: [id number]  Unit/Bed#: E4 -01 Encounter: [id number]  Admitting Provider: Carol Tello PA-C  PCP: Kezia Perez DO  Date of Admission:  07/05/19    Assessment/Plan:    Hospital Problem List:     Principal Problem:    Loss of vision  Active Problems:    Alzheimer disease    Carotid artery calcification, bilateral    Hyperlipidemia    Hypertension    A-fib ([facility], [place] [address] )    Other headache syndrome    Patient is 44 [facility] for the Primary Problem(s):    1  Stroke-like sxs  · Sudden onset L vision loss, frontal HA - last known well at 1:15pm    · NIH 4 in ED, CT head negative and CTA head/neck negative for large vessel thrombus  No tPA given hx of GIB  · Stroke alert called  Neurology evaluated pt in ED and suspect L hemispheric CVA etiology - most likely cardio-embolic with hx of Afib and not on AC  · Recommend continued stroke protocol and get MRI brain WO contrast and 2D echo completed  · Will monitor pt on telemetry and check HgbA1C and lipid panel  · Maintain cerebral perfusion with BPs in 944-539 systolic  · Continue statin therapy  · She received full dose ASA in the ED, will continue with baby ASA on admission  · Dysphagia screen  PT/OT/ST consults  · Appreciate neurology input  2  Loss of vision  · See primary diagnosis    3  Headache  · See primary diagnosis  · Pt did receive toradol injection while in ED, which improved sxs  4  Carotid artery calcification, bilateral  · As noted on CTA today, there is moderate atherosclerotic disease at the bifurcation b/l  · Continue statin therapy and baby ASA as mentioned in primary dx  5  HTN  · Maintain cerebral perfusion with BPs in [id number] systolic  · No chronic antihypertensives at home  6  HLD  · Continue statin therapy while inpatient - lipitor 20mg  · Pending lipid panel - may need to adjust dosing       7  AFib  · Pt currently rate controlled in Afib without chronic AC due to GIB  · She received full dose ASA in ED, will continue with baby ASA daily  · Will obtain 2d echo  · Pt will likely need chronic anticoagulation given chads 2 vasc score of 6     8  Alzheimer's disease    · Continue home regiment on namenda and aricept  VTE Prophylaxis: Pharmacologic VTE Prophylaxis contraindicated due to hx of GIB  / sequential compression device   Code Status: unknown    Anticipated Length of Stay:  Patient will be admitted on an Inpatient basis with an anticipated length of stay of greater 2 midnights  Justification for Hospital Stay: neurologic event    Total Time for Visit, including Counseling / Coordination of Care: 45 minutes  Greater than 50% of this total time spent on direct patient counseling and coordination of care  Chief Complaint:   Headache, loss of vision    History of Present Illness:    Radha Carbajal is a 80 y o  female who presents to the ED with her daughters c/o sudden onset L sided vision loss that occurred at approximately 1:30pm earlier today  Pt's daughters, at bedside, report they spent the majority of the morning with their mother and noted she was in her usual state of health  Pt describes the vision loss as "black" over the entirety of her visual field in the L eye  Denies any redness, drainage or itching of eyes  She also c/o frontal b/l headache that occurred since coming to the hospital and chronic b/l neck pain  Denies any dizziness, CP, SOB, abdominal pain, N/V/D, numbness/tingling or focal weakness  Pt's daughter reports hx of TIA 2 years prior and a hx of CVA 5-6 years prior; notes sxs today are "completely different" from previous stroke-like behavior  Pt was previously on coumadin for Afib, but 6 wks prior was taken off the [facility] due to GIB 2/2 diverticulosis  Denies taking ASA daily   Pt has had an MRI in the past and tolerated well, per daughters, but they request anxiolytic prior to study to prevent excessive movement  Denies hx of AICD or metal within her body  Stroke alert was called on the pt on arrival to ED at approximately 3:12pm  CT head without acute intracranial abnormality  CTA head/neck showing no large vessel occlusion but moderate to marked right vertebral artery stenosis  tPA deferred at this time given pt's age and hx of GIB  Review of Systems:    Review of Systems   Constitutional: Negative for activity change, appetite change, chills and fever  HENT: Negative for ear pain, rhinorrhea, sinus pressure, sinus pain and sore throat  Eyes: Positive for visual disturbance  Negative for pain, discharge, redness and itching  L vision loss   Respiratory: Negative for cough, chest tightness and shortness of breath  Cardiovascular: Negative for chest pain and palpitations  Gastrointestinal: Negative for abdominal pain, blood in stool, constipation, diarrhea, nausea and vomiting  Genitourinary: Negative for difficulty urinating, dysuria, frequency and hematuria  Musculoskeletal: Positive for neck pain  Negative for back pain  Skin: Negative for rash  Neurological: Positive for light-headedness  Psychiatric/Behavioral: Positive for confusion (baseline per daughters)  Negative for behavioral problems  All other systems reviewed and are negative  Past Medical and Surgical History:     Past Medical History:   Diagnosis Date    A-fib [facility])     Alzheimer disease     Diverticulosis     Dyslipidemia     History of stroke     Hyperlipidemia     Hypertension     UTI (urinary tract infection)     Vasovagal syncope 2/13/2018       Past Surgical History:   Procedure Laterality Date    CHOLECYSTECTOMY         Meds/Allergies:    Prior to Admission medications    Medication Sig Start Date End Date Taking?  Authorizing Provider   atorvastatin (LIPITOR) 20 mg tablet Take 20 mg by mouth daily   Yes Historical Provider, MD   ciprofloxacin (CIPRO) 250 mg tablet Take 250 mg by mouth every 24 hours   Yes Historical Provider, MD   Cranberry 500 MG TABS Take 500 mg by mouth daily     Yes Historical Provider, MD   donepezil (ARICEPT) 10 mg tablet Take 10 mg by mouth daily at bedtime   Yes Historical Provider, MD   memantine (NAMENDA) 10 mg tablet Take 10 mg by mouth 2 (two) times a day   Yes Historical Provider, MD   pantoprazole (PROTONIX) 40 mg tablet Take 40 mg by mouth daily   Yes Historical Provider, MD   warfarin (COUMADIN) 3 mg tablet Take 1 tablet by mouth daily  Patient taking differently: Take by mouth daily 1 5mg every other day  3mg every other day  Alternating  10/30/17 7/5/19  Willy Norton MD     I have reviewed home medications with patient family member  Allergies: Allergies   Allergen Reactions    Ativan [Lorazepam]    Spencer Genoveva [Lurasidone]        Social History:     Marital Status:    Patient Pre-hospital Living Situation: attached apartment next to son  Patient Pre-hospital Level of Mobility: independent  Substance Use History:   Social History     Substance and Sexual Activity   Alcohol Use No     Social History     Tobacco Use   Smoking Status Former Smoker   Smokeless Tobacco Never Used     Social History     Substance and Sexual Activity   Drug Use No       Family History:    non-contributory    Physical Exam:     Vitals:   Blood Pressure: 161/76 (07/05/19 1700)  Pulse: 78 (07/05/19 1700)  Temperature: 98 3 °F (36 8 °C) (07/05/19 1507)  Temp Source: Temporal (07/05/19 1507)  Respirations: 16 (07/05/19 1700)  SpO2: 98 % (07/05/19 1700)    Physical Exam   Constitutional: She appears well-developed and well-nourished  No distress  HENT:   Head: Normocephalic and atraumatic  Right Ear: External ear normal    Left Ear: External ear normal    Nose: Nose normal    Mouth/Throat: Oropharynx is clear and moist    Eyes: Pupils are equal, round, and reactive to light  Conjunctivae and EOM are normal    Neck: Normal range of motion  Neck supple  Cardiovascular: Normal rate, normal heart sounds and intact distal pulses  An irregularly irregular rhythm present  Exam reveals no gallop and no friction rub  No murmur heard  Pulmonary/Chest: Effort normal and breath sounds normal  No stridor  No respiratory distress  She has no wheezes  She has no rales  Abdominal: Soft  Bowel sounds are normal  She exhibits no distension  There is no tenderness  Musculoskeletal: Normal range of motion  She exhibits no edema or tenderness  Neurological: She is alert  She has normal strength  She is disoriented  No sensory deficit  Alert and oriented to person, place, time  Disoriented to events leading to ER visit  Complete L visual field deficit, pt describes as "seeing black" and slight R sided facial droop, but remainder of CN intact  Motor strength 5/5 b/l upper and lower extremities  Sensory to light touch intact to b/l upper and lower extremities  Normal finger to nose  No pronator drift  Skin: Skin is warm and dry  No rash noted  She is not diaphoretic  Psychiatric: She has a normal mood and affect  Her behavior is normal    Nursing note and vitals reviewed  Additional Data:     Lab Results: I have personally reviewed pertinent reports  Results from last 7 days   Lab Units 07/05/19  1512 07/05/19  1511   WBC Thousand/uL  --  8 41   HEMOGLOBIN g/dL  --  10 8*   I STAT HEMOGLOBIN g/dl 11 2*  --    HEMATOCRIT %  --  33 4*   HEMATOCRIT, ISTAT % 33*  --    PLATELETS Thousands/uL  --  321     Results from last 7 days   Lab Units 07/05/19  1512 07/05/19  1511   POTASSIUM mmol/L  --  4 0   CHLORIDE mmol/L  --  97*   CO2 mmol/L  --  27   CO2, I-STAT mmol/L 27  --    BUN mg/dL  --  11   CREATININE mg/dL  --  0 76   CALCIUM mg/dL  --  9 5   GLUCOSE, ISTAT mg/dl 121  --      Results from last 7 days   Lab Units 07/05/19  1511   INR  1 03       Imaging: I have personally reviewed pertinent reports        Xr Stroke Alert Portable Chest    Result Date: 7/5/2019  Narrative: CHEST INDICATION:   Stroke  COMPARISON:  10/19/2018 EXAM PERFORMED/VIEWS:  XR STROKE ALERT PORTABLE CHEST FINDINGS: Heart shadow appears stable with possible mild cardiac enlargement  Atherosclerotic vascular calcifications are noted  The lungs are clear  No pneumothorax or pleural effusion  Osseous structures appear within normal limits for patient age  Impression: No acute cardiopulmonary disease  Workstation performed: [id number]     Ct Stroke Alert Brain    Result Date: 7/5/2019  Narrative: CT BRAIN - STROKE ALERT PROTOCOL INDICATION:   Stroke  COMPARISON:  None  TECHNIQUE:  CT examination of the brain was performed  In addition to axial images, coronal reformatted images were created and submitted for interpretation  Radiation dose length product (DLP) for this visit:  902 mGy-cm   This examination, like all CT scans performed in the [facility], was performed utilizing techniques to minimize radiation dose exposure, including the use of iterative reconstruction and automated exposure control  IMAGE QUALITY:  Diagnostic  FINDINGS:  PARENCHYMA:  Decreased attenuation is noted in the supratentorial white matter demonstrating an appearance most consistent with moderate microangiopathic change  No intracranial mass, mass effect or midline shift  No acute intracranial hemorrhage  No CT signs of acute infarction  Old left frontoparietal infarct  Old lacunar infarcts  Normal intracranial vasculature  VENTRICLES AND EXTRA-AXIAL SPACES:  Normal for patient's age  VISUALIZED ORBITS AND PARANASAL SINUSES:  Unremarkable  CALVARIUM AND EXTRACRANIAL SOFT TISSUES:   Normal      Impression: No acute intracranial abnormality  Microangiopathic changes   Findings were directly discussed with Gayathri Hastings on 7/5/2019 3:30 PM  Workstation performed: [id number]     Cta Stroke Alert (head/neck)    Result Date: 7/5/2019  Narrative: CTA NECK AND BRAIN WITH CONTRAST INDICATION: Vision loss, Neuro deficit(s), subacute COMPARISON:   None  TECHNIQUE:   Post contrast imaging was performed after administration of iodinated contrast through the neck and brain  Post contrast axial 0 625 mm images timed to opacify the arterial system  3D rendering was performed on an independent workstation  MIP reconstructions performed  Coronal reconstructions were performed of the noncontrast portion of the brain  Radiation dose length product (DLP) for this visit:  395 mGy-cm   This examination, like all CT scans performed in the [facility], was performed utilizing techniques to minimize radiation dose exposure, including the use of iterative reconstruction and automated exposure control  IV Contrast:  85 mL of iodixanol (VISIPAQUE)  IMAGE QUALITY:   Diagnostic FINDINGS: CERVICAL VASCULATURE AORTIC ARCH AND GREAT VESSELS:  Moderate ahteroschlerotic disease of the arch and great vessels  RIGHT VERTEBRAL ARTERY CERVICAL SEGMENT:  Moderate to marked stenosis at the origin  The vessel is normal in caliber throughout the neck  LEFT VERTEBRAL ARTERY CERVICAL SEGMENT:  Normal origin  The vessel is normal in caliber throughout the neck  RIGHT EXTRACRANIAL CAROTID SEGMENT:  Moderate atherosclerotic disease of the bifurcation  LEFT EXTRACRANIAL CAROTID SEGMENT:  Moderate atherosclerotic disease of the bifurcation  NASCET criteria was used to determine the degree of internal carotid artery diameter stenosis  INTRACRANIAL VASCULATURE INTERNAL CAROTID ARTERIES:  Normal enhancement of the intracranial portions of the internal carotid arteries  Normal ophthalmic artery origins  Normal ICA terminus  ANTERIOR CIRCULATION:  Symmetric A1 segments and anterior cerebral arteries with normal enhancement  Normal anterior communicating artery  MIDDLE CEREBRAL ARTERY CIRCULATION:  M1 segment and middle cerebral artery branches demonstrate normal enhancement bilaterally   DISTAL VERTEBRAL ARTERIES:  Normal distal vertebral arteries  Posterior inferior cerebellar artery origins are normal  Normal vertebral basilar junction  BASILAR ARTERY:  Basilar artery is normal in caliber  Normal superior cerebellar arteries  POSTERIOR CEREBRAL ARTERIES: The right posterior cerebral artery arises from the basilar tip  There is fetal origin of the left posterior cerebral artery  Both demonstrate no focal stenosis  Normal posterior communicating arteries  DURAL VENOUS SINUSES:  Normal  NON VASCULAR ANATOMY BONY STRUCTURES:  No acute osseous abnormality  SOFT TISSUES OF THE NECK:  Fluid attenuating material in the proximal esophagus suggesting gastric esophageal reflux with wall thickening suggesting esophagitis  THORACIC INLET:  Groundglass regions in the lungs suggesting small airways disease  Impression: Moderate to marked right vertebral artery origin stenosis at the origin  Fluid attenuating material in the proximal esophagus suggesting gastric esophageal reflux with wall thickening suggesting esophagitis  Findings were directly discussed/tiger with Mere Estrada on 7/5/2019 3:34 PM  Workstation performed: [id number]       EKG, Pathology, and Other Studies Reviewed on Admission:   · EKG: unknown    Allscripts / Epic Records Reviewed: Yes     GLORIA Sandy  7/5/2019  6:01 PM      ** Please Note: This note has been constructed using a voice recognition system   **

## 2019-07-05 NOTE — H&P
H&P- Renate Nicole 1933, 80 y o  female MRN: [id number]    Unit/Bed#: ED 21 Encounter: [id number]    Primary Care Provider: Natividad Adrian DO   Date and time admitted to hospital: 7/5/2019  2:56 PM    History and Physical - [facility]    Patient Information: Renate Nicole 80 y o  female MRN: [id number]  Unit/Bed#: E4 -01 Encounter: [id number]  Admitting Physician: Tommy Copeland PA-C  PCP: Ntaividad Adrian DO  Date of Admission:  07/05/19    Assessment/Plan:    Hospital Problem List:     Principal Problem:    Loss of vision  Active Problems:    Alzheimer disease    Carotid artery calcification, bilateral    Hyperlipidemia    Hypertension    A-fib ([address] )    Other headache syndrome    Patient is [employer]        * Loss of vision  Assessment & Plan  With facial droop concerning for stroke  Patient not a tPA candidate given minimal symptoms and recent GI bleed per Neurology  CTA head neck negative for any acute stroke or any significant large vessel disease notably some right markedly vertebral artery stenosis in  carotid artery calcification at bifurcation bilaterally  EKG demonstrates  Admitted stroke pathway, allow permissive hypertension  Check MRI brain without contrast 2D echo and follow up FLP hemoglobin A1c for secondary stroke prevention  Received full aspirin in the ER will continue with baby aspirin thereafter  [address] Neurology recommendations    Patient is [place]  No blood transfusions    Other headache syndrome  Assessment & Plan  W/chronic cervicalgia requiring steroid injections      A-fib ([address] )  Assessment & Plan  With [id number] score of 6 previously on Coumadin which was not restarted after episode of gib  HR controlled off chronotropic agents    Monitor on telemetry    Hypertension  Assessment & Plan  Not on any agents at home  Allow permissive htn x48H    Hyperlipidemia  Assessment & Plan  F/u flp uptitrate lipitor to 40mg qhs    Carotid artery calcification, bilateral  Assessment & Plan  Moderate bilateral extracranial carotid artery calcification at bifurcation by CTA head and neck  Continue aspirin and statin    Alzheimer disease  Assessment & Plan  Continue namenda/aricept              VTE Prophylaxis: Heparin  / sequential compression device   Code Status: level 3 dni/dnr  POLST: There is no POLST form on file for this patient (pre-hospital)    Anticipated Length of Stay:  Patient will be admitted on an Inpatient basis with an anticipated length of stay of  Greater than 2 midnights  Justification for Hospital Stay: cva    Total Time for Visit, including Counseling / Coordination of Care: 45 minutes  Greater than 50% of this total time spent on direct patient counseling and coordination of care  Chief Complaint:   Blurry vision/headache    History of Present Illness:    Heidi Johnson is a 80 y o  female who presents with pmh of hx of cva, dementia, htn/hld/afib, htn, carotid artery stenosis, headache coming to hospital for acute onset vision loss  She is accompanied by her daughters who are at bedside  Pt reports acute onset blurry vision at approximately 1pm per daughter in her left eye  Today otherwise she was in her normal state of health  She also noted b/l frontal HA which is atypical for her as she typically gets cervicalgia requiring steroid injections per daughter  She she was seen as a stroke alert in the ED  TPA was not administered as patient had a recent GI bleed about a month and a half prior due to diverticular bleed while on Coumadin for her AFib  We are asked to admit the patient for acute stroke  Review of Systems:    Review of Systems   Constitutional: Negative for appetite change, chills and fever  Eyes: Negative for photophobia and visual disturbance  Respiratory: Negative for cough and shortness of breath  Cardiovascular: Negative for chest pain and palpitations     Gastrointestinal: Negative for abdominal pain, nausea and vomiting  Musculoskeletal: Positive for neck pain  Neurological: Positive for headaches  Negative for speech difficulty, weakness and light-headedness  All other systems reviewed and are negative  Past Medical and Surgical History:     Past Medical History:   Diagnosis Date    A-fib [facility])     Alzheimer disease     Diverticulosis     Dyslipidemia     History of stroke     Hyperlipidemia     Hypertension     UTI (urinary tract infection)     Vasovagal syncope 2/13/2018       Past Surgical History:   Procedure Laterality Date    CHOLECYSTECTOMY         Meds/Allergies:    Prior to Admission medications    Medication Sig Start Date End Date Taking? Authorizing Provider   atorvastatin (LIPITOR) 20 mg tablet Take 20 mg by mouth daily   Yes Historical Provider, MD   ciprofloxacin (CIPRO) 250 mg tablet Take 250 mg by mouth every 24 hours   Yes Historical Provider, MD   Cranberry 500 MG TABS Take 500 mg by mouth daily     Yes Historical Provider, MD   donepezil (ARICEPT) 10 mg tablet Take 10 mg by mouth daily at bedtime   Yes Historical Provider, MD   memantine (NAMENDA) 10 mg tablet Take 10 mg by mouth 2 (two) times a day   Yes Historical Provider, MD   pantoprazole (PROTONIX) 40 mg tablet Take 40 mg by mouth daily   Yes Historical Provider, MD   warfarin (COUMADIN) 3 mg tablet Take 1 tablet by mouth daily  Patient taking differently: Take by mouth daily 1 5mg every other day  3mg every other day  Alternating  10/30/17 7/5/19  Gisele Alvarez MD     I have reviewed home medications with patient personally  Allergies: Allergies   Allergen Reactions    Ativan [Lorazepam]    [place] [Lurasidone]        Social History:     Marital Status:     Occupation:   Patient Pre-hospital Living Situation:   Patient Pre-hospital Level of Mobility:   Patient Pre-hospital Diet Restrictions:   Substance Use History:   Social History     Substance and Sexual Activity Alcohol Use No     Social History     Tobacco Use   Smoking Status Former Smoker   Smokeless Tobacco Never Used     Social History     Substance and Sexual Activity   Drug Use No       Family History:  History reviewed  No pertinent family history  Physical Exam:     Vitals:   Blood Pressure: 161/76 (07/05/19 1700)  Pulse: 78 (07/05/19 1700)  Temperature: 98 3 °F (36 8 °C) (07/05/19 1507)  Temp Source: Temporal (07/05/19 1507)  Respirations: 16 (07/05/19 1700)  SpO2: 98 % (07/05/19 1700)    Physical Exam   Constitutional: She appears well-developed and well-nourished  No distress  HENT:   Head: Normocephalic and atraumatic  Right Ear: External ear normal    Left Ear: External ear normal    Mouth/Throat: No oropharyngeal exudate  Eyes: Conjunctivae are normal    Neck: Normal range of motion  Cardiovascular: Normal rate, regular rhythm and normal heart sounds  Exam reveals no gallop and no friction rub  No murmur heard  Pulmonary/Chest: Effort normal  No respiratory distress  She has no wheezes  She has no rales  Abdominal: Soft  She exhibits no distension and no mass  There is no tenderness  There is no rebound and no guarding  Musculoskeletal: She exhibits no edema  Neurological: She is alert  A cranial nerve deficit is present  No sensory deficit  She exhibits normal muscle tone  No pronator deficit   Skin: Skin is warm and dry  She is not diaphoretic  Psychiatric: She has a normal mood and affect  Vitals reviewed  CN II/III PERRL  VIsual field deficits with confrontation from right most pronounced on examination of left eye    CN III/IV/VI EOMs intact no palsy or ptosis  CN V crude sensation intact b/l, perioral strength intact  CN VII mild left facial droop w/smile  CN VIII grossly equal  CN IX/X/XII good soft palate rise no tongue deviation on wag or uvula deviation  CN XI Scm strength equal b/l w/good shoulder shrug      Additional Data:     Lab Results: I have personally reviewed pertinent reports  Results from last 7 days   Lab Units 07/05/19  1512 07/05/19  1511   WBC Thousand/uL  --  8 41   HEMOGLOBIN g/dL  --  10 8*   I STAT HEMOGLOBIN g/dl 11 2*  --    HEMATOCRIT %  --  33 4*   HEMATOCRIT, ISTAT % 33*  --    PLATELETS Thousands/uL  --  321     Results from last 7 days   Lab Units 07/05/19  1512 07/05/19  1511   POTASSIUM mmol/L  --  4 0   CHLORIDE mmol/L  --  97*   CO2 mmol/L  --  27   CO2, I-STAT mmol/L 27  --    BUN mg/dL  --  11   CREATININE mg/dL  --  0 76   CALCIUM mg/dL  --  9 5   GLUCOSE, ISTAT mg/dl 121  --      Results from last 7 days   Lab Units 07/05/19  1511   INR  1 03       Imaging: I have personally reviewed pertinent reports  Xr Stroke Alert Portable Chest    Result Date: 7/5/2019  Narrative: CHEST INDICATION:   Stroke  COMPARISON:  10/19/2018 EXAM PERFORMED/VIEWS:  XR STROKE ALERT PORTABLE CHEST FINDINGS: Heart shadow appears stable with possible mild cardiac enlargement  Atherosclerotic vascular calcifications are noted  The lungs are clear  No pneumothorax or pleural effusion  Osseous structures appear within normal limits for patient age  Impression: No acute cardiopulmonary disease  Workstation performed: [id number]     Ct Stroke Alert Brain    Result Date: 7/5/2019  Narrative: CT BRAIN - STROKE ALERT PROTOCOL INDICATION:   Stroke  COMPARISON:  None  TECHNIQUE:  CT examination of the brain was performed  In addition to axial images, coronal reformatted images were created and submitted for interpretation  Radiation dose length product (DLP) for this visit:  902 mGy-cm   This examination, like all CT scans performed in the [facility], was performed utilizing techniques to minimize radiation dose exposure, including the use of iterative reconstruction and automated exposure control  IMAGE QUALITY:  Diagnostic   FINDINGS:  PARENCHYMA:  Decreased attenuation is noted in the supratentorial white matter demonstrating an appearance most consistent with moderate microangiopathic change  No intracranial mass, mass effect or midline shift  No acute intracranial hemorrhage  No CT signs of acute infarction  Old left frontoparietal infarct  Old lacunar infarcts  Normal intracranial vasculature  VENTRICLES AND EXTRA-AXIAL SPACES:  Normal for patient's age  VISUALIZED ORBITS AND PARANASAL SINUSES:  Unremarkable  CALVARIUM AND EXTRACRANIAL SOFT TISSUES:   Normal      Impression: No acute intracranial abnormality  Microangiopathic changes  Findings were directly discussed with Meka Byers on 7/5/2019 3:30 PM  Workstation performed: [id number]     Cta Stroke Alert (head/neck)    Result Date: 7/5/2019  Narrative: CTA NECK AND BRAIN WITH CONTRAST INDICATION: Vision loss, Neuro deficit(s), subacute COMPARISON:   None  TECHNIQUE:   Post contrast imaging was performed after administration of iodinated contrast through the neck and brain  Post contrast axial 0 625 mm images timed to opacify the arterial system  3D rendering was performed on an independent workstation  MIP reconstructions performed  Coronal reconstructions were performed of the noncontrast portion of the brain  Radiation dose length product (DLP) for this visit:  395 mGy-cm   This examination, like all CT scans performed in the [facility], was performed utilizing techniques to minimize radiation dose exposure, including the use of iterative reconstruction and automated exposure control  IV Contrast:  85 mL of iodixanol (VISIPAQUE)  IMAGE QUALITY:   Diagnostic FINDINGS: CERVICAL VASCULATURE AORTIC ARCH AND GREAT VESSELS:  Moderate ahteroschlerotic disease of the arch and great vessels  RIGHT VERTEBRAL ARTERY CERVICAL SEGMENT:  Moderate to marked stenosis at the origin  The vessel is normal in caliber throughout the neck  LEFT VERTEBRAL ARTERY CERVICAL SEGMENT:  Normal origin  The vessel is normal in caliber throughout the neck   RIGHT EXTRACRANIAL CAROTID SEGMENT:  Moderate atherosclerotic disease of the bifurcation  LEFT EXTRACRANIAL CAROTID SEGMENT:  Moderate atherosclerotic disease of the bifurcation  NASCET criteria was used to determine the degree of internal carotid artery diameter stenosis  INTRACRANIAL VASCULATURE INTERNAL CAROTID ARTERIES:  Normal enhancement of the intracranial portions of the internal carotid arteries  Normal ophthalmic artery origins  Normal ICA terminus  ANTERIOR CIRCULATION:  Symmetric A1 segments and anterior cerebral arteries with normal enhancement  Normal anterior communicating artery  MIDDLE CEREBRAL ARTERY CIRCULATION:  M1 segment and middle cerebral artery branches demonstrate normal enhancement bilaterally  DISTAL VERTEBRAL ARTERIES:  Normal distal vertebral arteries  Posterior inferior cerebellar artery origins are normal  Normal vertebral basilar junction  BASILAR ARTERY:  Basilar artery is normal in caliber  Normal superior cerebellar arteries  POSTERIOR CEREBRAL ARTERIES: The right posterior cerebral artery arises from the basilar tip  There is fetal origin of the left posterior cerebral artery  Both demonstrate no focal stenosis  Normal posterior communicating arteries  DURAL VENOUS SINUSES:  Normal  NON VASCULAR ANATOMY BONY STRUCTURES:  No acute osseous abnormality  SOFT TISSUES OF THE NECK:  Fluid attenuating material in the proximal esophagus suggesting gastric esophageal reflux with wall thickening suggesting esophagitis  THORACIC INLET:  Groundglass regions in the lungs suggesting small airways disease  Impression: Moderate to marked right vertebral artery origin stenosis at the origin  Fluid attenuating material in the proximal esophagus suggesting gastric esophageal reflux with wall thickening suggesting esophagitis   Findings were directly discussed/tiger with Mere Estrada on 7/5/2019 3:34 PM  Workstation performed: [id number]       EKG, Pathology, and Other Studies Reviewed on Admission:   · EKG: nsr no ectopy noted    Allscripts / Epic Records Reviewed: Yes     ** Please Note: This note has been constructed using a voice recognition system   **

## 2019-07-05 NOTE — ASSESSMENT & PLAN NOTE
W/chronic cervicalgia requiring steroid injections  No improvement with toradol reglan   D/w neurology okay for reglan/benadryl/solumedrol

## 2019-07-06 ENCOUNTER — APPOINTMENT (INPATIENT)
Dept: NON INVASIVE DIAGNOSTICS | Facility: HOSPITAL | Age: 84
DRG: 123 | End: 2019-07-06
Payer: COMMERCIAL

## 2019-07-06 LAB
CHOLEST SERPL-MCNC: 104 MG/DL (ref 50–200)
CRP SERPL QL: 7.7 MG/L
ERYTHROCYTE [SEDIMENTATION RATE] IN BLOOD: 34 MM/HOUR (ref 0–20)
EST. AVERAGE GLUCOSE BLD GHB EST-MCNC: 111 MG/DL
HBA1C MFR BLD: 5.5 % (ref 4.2–6.3)
HDLC SERPL-MCNC: 38 MG/DL (ref 40–60)
LDLC SERPL CALC-MCNC: 53 MG/DL (ref 0–100)
TRIGL SERPL-MCNC: 67 MG/DL

## 2019-07-06 PROCEDURE — 99232 SBSQ HOSP IP/OBS MODERATE 35: CPT | Performed by: PSYCHIATRY & NEUROLOGY

## 2019-07-06 PROCEDURE — 99232 SBSQ HOSP IP/OBS MODERATE 35: CPT | Performed by: PHYSICIAN ASSISTANT

## 2019-07-06 PROCEDURE — 93306 TTE W/DOPPLER COMPLETE: CPT

## 2019-07-06 PROCEDURE — 83036 HEMOGLOBIN GLYCOSYLATED A1C: CPT | Performed by: PHYSICIAN ASSISTANT

## 2019-07-06 PROCEDURE — 80061 LIPID PANEL: CPT | Performed by: PHYSICIAN ASSISTANT

## 2019-07-06 PROCEDURE — 85652 RBC SED RATE AUTOMATED: CPT | Performed by: PHYSICIAN ASSISTANT

## 2019-07-06 PROCEDURE — 86140 C-REACTIVE PROTEIN: CPT | Performed by: PHYSICIAN ASSISTANT

## 2019-07-06 RX ORDER — ATORVASTATIN CALCIUM 10 MG/1
20 TABLET, FILM COATED ORAL
Status: DISCONTINUED | OUTPATIENT
Start: 2019-07-06 | End: 2019-07-09 | Stop reason: HOSPADM

## 2019-07-06 RX ORDER — QUETIAPINE FUMARATE 25 MG/1
50 TABLET, FILM COATED ORAL ONCE
Status: COMPLETED | OUTPATIENT
Start: 2019-07-06 | End: 2019-07-06

## 2019-07-06 RX ADMIN — PANTOPRAZOLE SODIUM 40 MG: 40 TABLET, DELAYED RELEASE ORAL at 06:02

## 2019-07-06 RX ADMIN — HEPARIN SODIUM 5000 UNITS: 5000 INJECTION INTRAVENOUS; SUBCUTANEOUS at 06:02

## 2019-07-06 RX ADMIN — ATORVASTATIN CALCIUM 20 MG: 10 TABLET, FILM COATED ORAL at 18:58

## 2019-07-06 RX ADMIN — HEPARIN SODIUM 5000 UNITS: 5000 INJECTION INTRAVENOUS; SUBCUTANEOUS at 15:00

## 2019-07-06 RX ADMIN — ASPIRIN 81 MG 81 MG: 81 TABLET ORAL at 11:11

## 2019-07-06 RX ADMIN — ACETAMINOPHEN 650 MG: 325 TABLET ORAL at 00:02

## 2019-07-06 RX ADMIN — DONEPEZIL HYDROCHLORIDE 10 MG: 10 TABLET, FILM COATED ORAL at 20:36

## 2019-07-06 RX ADMIN — MEMANTINE 10 MG: 5 TABLET ORAL at 11:12

## 2019-07-06 RX ADMIN — HEPARIN SODIUM 5000 UNITS: 5000 INJECTION INTRAVENOUS; SUBCUTANEOUS at 20:37

## 2019-07-06 RX ADMIN — MEMANTINE 10 MG: 5 TABLET ORAL at 18:59

## 2019-07-06 RX ADMIN — MAGNESIUM SULFATE HEPTAHYDRATE 2 G: 40 INJECTION, SOLUTION INTRAVENOUS at 11:11

## 2019-07-06 RX ADMIN — QUETIAPINE FUMARATE 50 MG: 25 TABLET ORAL at 20:36

## 2019-07-06 NOTE — PROGRESS NOTES
Progress Note - Carrie Nephew 1933, 80 y o  female MRN: [id number]    Unit/Bed#: E4 -01 Encounter: [id number]    Primary Care Provider: Tia Alvarenga DO   Date and time admitted to hospital: 7/5/2019  2:56 PM        * Loss of vision  Assessment & Plan  Left unilateral anopsia  Was with b/l frontal ha  Headache has improved but vision has not  Admitted on stroke pathway however MRI negative for acute stroke  CTA head neck negative for any acute stroke or any significant large vessel disease notably some right markedly vertebral artery stenosis in  carotid artery calcification at bifurcation bilaterally  No jaw claudication or unilateral headache or hx of pmr  Check esr/crp  Consult ophthalmology given concern for possible embolism to left retinal artery    Patient is [employer]  Assessment & Plan  No blood transfusions    Other headache syndrome  Assessment & Plan  W/chronic cervicalgia requiring steroid injections  Headache has since improved  W/toradol reglan  Pt's family declined solumedrol/reglan/benadryl  Continue magnesium for prophylaxis    [address]-fib [address] [address] )  Assessment & Plan  With [id number] score of 6 previously on Coumadin which was not restarted after episode of gib  HR controlled off chronotropic agents  Monitor on telemetry    Hypertension  Assessment & Plan  Continue to monitor off antihypertensives    Can d/c permissive htn    Hyperlipidemia  Assessment & Plan  LDL well controlled given hx of cva in 46s  Continue lipitor at 20mg qhs as no evidence of acute cva    Carotid artery calcification, bilateral  Assessment & Plan  Moderate bilateral extracranial carotid artery calcification at bifurcation by CTA head and neck  Continue aspirin and statin    Alzheimer disease  Assessment & Plan  Continue namenda/aricept      VTE Pharmacologic Prophylaxis:   Pharmacologic: Heparin  Mechanical VTE Prophylaxis in Place: Yes    Patient Centered Rounds: I have performed bedside rounds with nursing staff today  Discussions with Specialists or Other Care Team Provider: neuro paCheriec    Education and Discussions with Family / Patient: dispo workup with family    Time Spent for Care: 20 minutes  More than 50% of total time spent on counseling and coordination of care as described above  Current Length of Stay: 1 day(s)    Current Patient Status: Inpatient   Certification Statement: The patient will continue to require additional inpatient hospital stay due to unilateral anopsia    Discharge Plan:     Code Status: Level 1 - Full Code      Subjective:   Patient seen examined  Per nursing overnight patient vision had slightly improved in left eye, however this morning she has again continued vision loss across all fields in left  Headache he continues to be improved and she has no chest pain or shortness of breath numbness tingling or weakness  Her appetite is intact  Objective:     Vitals:   Temp (24hrs), Av 7 °F (36 5 °C), Min:97 1 °F (36 2 °C), Max:98 7 °F (37 1 °C)    Temp:  [97 1 °F (36 2 °C)-98 7 °F (37 1 °C)] 97 8 °F (36 6 °C)  HR:  [63-89] 68  Resp:  [16-39] 18  BP: (132-225)/(63-91) 137/63  SpO2:  [91 %-99 %] 93 %  Body mass index is 25 63 kg/m²  Input and Output Summary (last 24 hours):     No intake or output data in the 24 hours ending 19 1215    Physical Exam:     Physical Exam   Constitutional: She appears well-developed  No distress  HENT:   Head: Normocephalic and atraumatic  Right Ear: External ear normal    Left Ear: External ear normal    Eyes: Pupils are equal, round, and reactive to light  Conjunctivae and EOM are normal    Visual field cut in all fields of left eye on confrontation   Neck: Normal range of motion  Cardiovascular: Normal rate and normal heart sounds  Exam reveals no gallop and no friction rub  No murmur heard  Pulmonary/Chest: Effort normal  No stridor  No respiratory distress  She has no wheezes  Abdominal: Soft   She exhibits no distension and no mass  There is no tenderness  There is no rebound and no guarding  Musculoskeletal: She exhibits no edema  Neurological: She is alert  Skin: Skin is warm and dry  She is not diaphoretic  Psychiatric: She has a normal mood and affect  Vitals reviewed  (  Be Sure to Include Physical Exam: Delete this entire line when you have entered your exam)    Additional Data:     Labs:    Results from last 7 days   Lab Units 07/05/19  1512 07/05/19  1511   WBC Thousand/uL  --  8 41   HEMOGLOBIN g/dL  --  10 8*   I STAT HEMOGLOBIN g/dl 11 2*  --    HEMATOCRIT %  --  33 4*   HEMATOCRIT, ISTAT % 33*  --    PLATELETS Thousands/uL  --  321     Results from last 7 days   Lab Units 07/05/19  1512 07/05/19  1511   SODIUM mmol/L  --  136   POTASSIUM mmol/L  --  4 0   CHLORIDE mmol/L  --  97*   CO2 mmol/L  --  27   CO2, I-STAT mmol/L 27  --    BUN mg/dL  --  11   CREATININE mg/dL  --  0 76   ANION GAP mmol/L  --  12   CALCIUM mg/dL  --  9 5   GLUCOSE RANDOM mg/dL  --  126     Results from last 7 days   Lab Units 07/05/19  1511   INR  1 03     Results from last 7 days   Lab Units 07/05/19  1545   POC GLUCOSE mg/dl 113                   * I Have Reviewed All Lab Data Listed Above  * Additional Pertinent Lab Tests Reviewed:  Stacey 66 Admission Reviewed    Imaging:    Imaging Reports Reviewed Today Include:   Imaging Personally Reviewed by Myself Includes:    Recent Cultures (last 7 days):           Last 24 Hours Medication List:     Current Facility-Administered Medications:  acetaminophen 650 mg Oral Q6H PRN Clive Boswell PA-C   aspirin 81 mg Oral Daily Bonnie Ang PA-C   atorvastatin 20 mg Oral Daily With Kim[employer] MANAV Ang   donepezil 10 mg Oral HS Bonnie Ang PA-C   heparin (porcine) 5,000 Units Subcutaneous Q8H 3 [facility] ROGER Ang PA-C   lidocaine 1 patch Topical Daily Clive Boswell PA-C   magnesium sulfate 2 g Intravenous Q24H 3 [facility], MANAV   memantine 10 mg Oral BID Bonnie Ang PA-C   ondansetron 4 mg Intravenous Q6H PRN Bonnie Ang PA-C   pantoprazole 40 mg Oral Daily Before Breakfast Bonnie Polanco PA-C        Today, Patient Was Seen By: Linus Ramirez PA-C    ** Please Note: Dictation voice to text software may have been used in the creation of this document   **

## 2019-07-06 NOTE — ASSESSMENT & PLAN NOTE
Left unilateral anopsia  Was with b/l frontal ha  Headache has improved but vision has not  Admitted on stroke pathway however MRI negative for acute stroke  CTA head neck negative for any acute stroke or any significant large vessel disease notably some right markedly vertebral artery stenosis in  carotid artery calcification at bifurcation bilaterally  No jaw claudication or unilateral headache or hx of pmr  Check esr/crp    Consult ophthalmology given concern for possible embolism to left retinal artery

## 2019-07-06 NOTE — PLAN OF CARE
Problem: Prexisting or High Potential for Compromised Skin Integrity  Goal: Skin integrity is maintained or improved  Description  INTERVENTIONS:  - Identify patients at risk for skin breakdown  - Assess and monitor skin integrity  - Assess and monitor nutrition and hydration status  - Monitor labs (i e  albumin)  - Assess for incontinence   - Turn and reposition patient  - Assist with mobility/ambulation  - Relieve pressure over bony prominences  - Avoid friction and shearing  - Provide appropriate hygiene as needed including keeping skin clean and dry  - Evaluate need for skin moisturizer/barrier cream  - Collaborate with interdisciplinary team (i e  Nutrition, Rehabilitation, etc )   - Patient/family teaching  Outcome: Progressing     Problem: PAIN - ADULT  Goal: Verbalizes/displays adequate comfort level or baseline comfort level  Description  Interventions:  - Encourage patient to monitor pain and request assistance  - Assess pain using appropriate pain scale  - Administer analgesics based on type and severity of pain and evaluate response  - Implement non-pharmacological measures as appropriate and evaluate response  - Consider cultural and social influences on pain and pain management  - Notify physician/advanced practitioner if interventions unsuccessful or patient reports new pain  Outcome: Progressing     Problem: SAFETY ADULT  Goal: Patient will remain free of falls  Description  INTERVENTIONS:  - Assess patient frequently for physical needs  -  Identify cognitive and physical deficits and behaviors that affect risk of falls    -  [place] fall precautions as indicated by assessment   - Educate patient/family on patient safety including physical limitations  - Instruct patient to call for assistance with activity based on assessment  - Modify environment to reduce risk of injury  - Consider OT/PT consult to assist with strengthening/mobility  Outcome: Progressing  Goal: Maintain or return to baseline ADL function  Description  INTERVENTIONS:  -  Assess patient's ability to carry out ADLs; assess patient's baseline for ADL function and identify physical deficits which impact ability to perform ADLs (bathing, care of mouth/teeth, toileting, grooming, dressing, etc )  - Assess/evaluate cause of self-care deficits   - Assess range of motion  - Assess patient's mobility; develop plan if impaired  - Assess patient's need for assistive devices and provide as appropriate  - Encourage maximum independence but intervene and supervise when necessary  ¯ Involve family in performance of ADLs  ¯ Assess for home care needs following discharge   ¯ Request OT consult to assist with ADL evaluation and planning for discharge  ¯ Provide patient education as appropriate  Outcome: Progressing  Goal: Maintain or return mobility status to optimal level  Description  INTERVENTIONS:  - Assess patient's baseline mobility status (ambulation, transfers, stairs, etc )    - Identify cognitive and physical deficits and behaviors that affect mobility  - Identify mobility aids required to assist with transfers and/or ambulation (gait belt, sit-to-stand, lift, walker, cane, etc )  - [place] fall precautions as indicated by assessment  - Record patient progress and toleration of activity level on Mobility SBAR; progress patient to next Phase/Stage  - Instruct patient to call for assistance with activity based on assessment  - Request Rehabilitation consult to assist with strengthening/weightbearing, etc   Outcome: Progressing     Problem: DISCHARGE PLANNING  Goal: Discharge to home or other facility with appropriate resources  Description  INTERVENTIONS:  - Identify barriers to discharge w/patient and caregiver  - Arrange for needed discharge resources and transportation as appropriate  - Identify discharge learning needs (meds, wound care, etc )  - Arrange for interpretive services to assist at discharge as needed  - Refer to Case Management Department for coordinating discharge planning if the patient needs post-hospital services based on physician/advanced practitioner order or complex needs related to functional status, cognitive ability, or social support system  Outcome: Progressing     Problem: Knowledge Deficit  Goal: Patient/family/caregiver demonstrates understanding of disease process, treatment plan, medications, and discharge instructions  Description  Complete learning assessment and assess knowledge base  Interventions:  - Provide teaching at level of understanding  - Provide teaching via preferred learning methods  Outcome: Progressing     Problem: Neurological Deficit  Goal: Neurological status is stable or improving  Description  Interventions:  - Monitor and assess patient's level of consciousness, motor function, sensory function, and level of assistance needed for ADLs  - Monitor and report changes from baseline  Collaborate with interdisciplinary team to initiate plan and implement interventions as ordered  - Provide and maintain a safe environment  - Utilize seizure precautions  - Utilize fall precautions  - Utilize aspiration precautions  - Utilize bleeding precautions    Outcome: Completed

## 2019-07-06 NOTE — ASSESSMENT & PLAN NOTE
With [id number] score of 6 previously on Coumadin which was not restarted after episode of gib  HR controlled off chronotropic agents    Monitor on telemetry

## 2019-07-06 NOTE — PLAN OF CARE
Problem: Prexisting or High Potential for Compromised Skin Integrity  Goal: Skin integrity is maintained or improved  Description  INTERVENTIONS:  - Identify patients at risk for skin breakdown  - Assess and monitor skin integrity  - Assess and monitor nutrition and hydration status  - Monitor labs (i e  albumin)  - Assess for incontinence   - Turn and reposition patient  - Assist with mobility/ambulation  - Relieve pressure over bony prominences  - Avoid friction and shearing  - Provide appropriate hygiene as needed including keeping skin clean and dry  - Evaluate need for skin moisturizer/barrier cream  - Collaborate with interdisciplinary team (i e  Nutrition, Rehabilitation, etc )   - Patient/family teaching  Outcome: Progressing     Problem: PAIN - ADULT  Goal: Verbalizes/displays adequate comfort level or baseline comfort level  Description  Interventions:  - Encourage patient to monitor pain and request assistance  - Assess pain using appropriate pain scale  - Administer analgesics based on type and severity of pain and evaluate response  - Implement non-pharmacological measures as appropriate and evaluate response  - Consider cultural and social influences on pain and pain management  - Notify physician/advanced practitioner if interventions unsuccessful or patient reports new pain  Outcome: Progressing     Problem: INFECTION - ADULT  Goal: Absence or prevention of progression during hospitalization  Description  INTERVENTIONS:  - Assess and monitor for signs and symptoms of infection  - Monitor lab/diagnostic results  - Monitor all insertion sites, i e  indwelling lines, tubes, and drains  - Monitor endotracheal (as able) and nasal secretions for changes in amount and color  - [place] appropriate cooling/warming therapies per order  - Administer medications as ordered  - Instruct and encourage patient and family to use good hand hygiene technique  - Identify and instruct in appropriate isolation precautions for identified infection/condition  Outcome: Progressing     Problem: SAFETY ADULT  Goal: Patient will remain free of falls  Description  INTERVENTIONS:  - Assess patient frequently for physical needs  -  Identify cognitive and physical deficits and behaviors that affect risk of falls    -  [place] fall precautions as indicated by assessment   - Educate patient/family on patient safety including physical limitations  - Instruct patient to call for assistance with activity based on assessment  - Modify environment to reduce risk of injury  - Consider OT/PT consult to assist with strengthening/mobility  Outcome: Progressing  Goal: Maintain or return to baseline ADL function  Description  INTERVENTIONS:  -  Assess patient's ability to carry out ADLs; assess patient's baseline for ADL function and identify physical deficits which impact ability to perform ADLs (bathing, care of mouth/teeth, toileting, grooming, dressing, etc )  - Assess/evaluate cause of self-care deficits   - Assess range of motion  - Assess patient's mobility; develop plan if impaired  - Assess patient's need for assistive devices and provide as appropriate  - Encourage maximum independence but intervene and supervise when necessary  ¯ Involve family in performance of ADLs  ¯ Assess for home care needs following discharge   ¯ Request OT consult to assist with ADL evaluation and planning for discharge  ¯ Provide patient education as appropriate  Outcome: Progressing  Goal: Maintain or return mobility status to optimal level  Description  INTERVENTIONS:  - Assess patient's baseline mobility status (ambulation, transfers, stairs, etc )    - Identify cognitive and physical deficits and behaviors that affect mobility  - Identify mobility aids required to assist with transfers and/or ambulation (gait belt, sit-to-stand, lift, walker, cane, etc )  - [place] fall precautions as indicated by assessment  - Record patient progress and toleration of activity level on Mobility SBAR; progress patient to next Phase/Stage  - Instruct patient to call for assistance with activity based on assessment  - Request Rehabilitation consult to assist with strengthening/weightbearing, etc   Outcome: Progressing     Problem: DISCHARGE PLANNING  Goal: Discharge to home or other facility with appropriate resources  Description  INTERVENTIONS:  - Identify barriers to discharge w/patient and caregiver  - Arrange for needed discharge resources and transportation as appropriate  - Identify discharge learning needs (meds, wound care, etc )  - Arrange for interpretive services to assist at discharge as needed  - Refer to Case Management Department for coordinating discharge planning if the patient needs post-hospital services based on physician/advanced practitioner order or complex needs related to functional status, cognitive ability, or social support system  Outcome: Progressing     Problem: Knowledge Deficit  Goal: Patient/family/caregiver demonstrates understanding of disease process, treatment plan, medications, and discharge instructions  Description  Complete learning assessment and assess knowledge base  Interventions:  - Provide teaching at level of understanding  - Provide teaching via preferred learning methods  Outcome: Progressing     Problem: Neurological Deficit  Goal: Neurological status is stable or improving  Description  Interventions:  - Monitor and assess patient's level of consciousness, motor function, sensory function, and level of assistance needed for ADLs  - Monitor and report changes from baseline  Collaborate with interdisciplinary team to initiate plan and implement interventions as ordered  - Provide and maintain a safe environment  - Utilize seizure precautions  - Utilize fall precautions  - Utilize aspiration precautions  - Utilize bleeding precautions  Outcome: Progressing     Problem:  Activity Intolerance/Impaired Mobility  Goal: Mobility/activity is maintained at optimum level for patient  Description  Interventions:  - Assess and monitor patient  barriers to mobility and need for assistive/adaptive devices  - Assess patient's emotional response to limitations  - Collaborate with interdisciplinary team and initiate plans and interventions as ordered  - Encourage independent activity per ability   - Maintain proper body alignment  - Perform active/passive rom as tolerated/ordered  - Plan activities to conserve energy   - Turn patient  Outcome: Progressing     Problem: Communication Impairment  Goal: Ability to express needs and understand communication  Description  Assess patient's communication skills and ability to understand information  Patient will demonstrate use of effective communication techniques, alternative methods of communication and understanding even if not able to speak  - Encourage communication and provide alternate methods of communication as needed  - Collaborate with case management/ for discharge needs  - Include patient/family/caregiver in decisions related to communication  Outcome: Progressing     Problem: Potential for Aspiration  Goal: Non-ventilated patient's risk of aspiration is minimized  Description  Assess and monitor vital signs, respiratory status, and labs (WBC)  Monitor for signs of aspiration (tachypnea, cough, rales, wheezing, cyanosis, fever)  - Assess and monitor patient's ability to swallow  - Place patient up in chair to eat if possible  - HOB up at 90 degrees to eat if unable to get patient up into chair   - Supervise patient during oral intake  - Instruct patient to take small bites  - Instruct patient to take small single sips when taking liquids    - Follow patient-specific strategies generated by speech pathologist   Outcome: Progressing     Problem: Nutrition  Goal: Nutrition/Hydration status is improving  Description  Monitor and assess patient's nutrition/hydration status for malnutrition (ex- brittle hair, bruises, dry skin, pale skin and conjunctiva, muscle wasting, smooth red tongue, and disorientation)  Collaborate with interdisciplinary team and initiate plan and interventions as ordered  Monitor patient's weight and dietary intake as ordered or per policy  Utilize nutrition screening tool and intervene per policy  Determine patient's food preferences and provide high-protein, high-caloric foods as appropriate  - Assist patient with eating   - Allow adequate time for meals   - Encourage patient to take dietary supplement as ordered  - Collaborate with clinical nutritionist   - Include patient/family/caregiver in decisions related to nutrition  Outcome: Progressing     Problem: CARDIOVASCULAR - ADULT  Goal: Maintains optimal cardiac output and hemodynamic stability  Description  INTERVENTIONS:  - Monitor I/O, vital signs and rhythm  - Monitor for S/S and trends of decreased cardiac output i e  bleeding, hypotension  - Administer and titrate ordered vasoactive medications to optimize hemodynamic stability  - Assess quality of pulses, skin color and temperature  - Assess for signs of decreased coronary artery perfusion - ex   Angina  - Instruct patient to report change in severity of symptoms  Outcome: Progressing  Goal: Absence of cardiac dysrhythmias or at baseline rhythm  Description  INTERVENTIONS:  - Continuous cardiac monitoring, monitor vital signs, obtain 12 lead EKG if indicated  - Administer antiarrhythmic and heart rate control medications as ordered  - Monitor electrolytes and administer replacement therapy as ordered  Outcome: Progressing     Problem: MUSCULOSKELETAL - ADULT  Goal: Maintain or return mobility to safest level of function  Description  INTERVENTIONS:  - Assess patient's ability to carry out ADLs; assess patient's baseline for ADL function and identify physical deficits which impact ability to perform ADLs (bathing, care of mouth/teeth, toileting, grooming, dressing, etc )  - Assess/evaluate cause of self-care deficits   - Assess range of motion  - Assess patient's mobility; develop plan if impaired  - Assess patient's need for assistive devices and provide as appropriate  - Encourage maximum independence but intervene and supervise when necessary  - Involve family in performance of ADLs  - Assess for home care needs following discharge   - Request OT consult to assist with ADL evaluation and planning for discharge  - Provide patient education as appropriate  Outcome: Progressing     Problem: METABOLIC, FLUID AND ELECTROLYTES - ADULT  Goal: Electrolytes maintained within normal limits  Description  INTERVENTIONS:  - Monitor labs and assess patient for signs and symptoms of electrolyte imbalances  - Administer electrolyte replacement as ordered  - Monitor response to electrolyte replacements, including repeat lab results as appropriate  - Instruct patient on fluid and nutrition as appropriate  Outcome: Progressing     Problem: HEMATOLOGIC - ADULT  Goal: Maintains hematologic stability  Description  INTERVENTIONS  - Assess for signs and symptoms of bleeding or hemorrhage  - Monitor labs  - Administer supportive blood products/factors as ordered and appropriate  Outcome: Progressing

## 2019-07-06 NOTE — ASSESSMENT & PLAN NOTE
W/chronic cervicalgia requiring steroid injections  Headache has since improved  W/toradol reglan    Pt's family declined solumedrol/reglan/benadryl  Continue magnesium for prophylaxis

## 2019-07-06 NOTE — PROGRESS NOTES
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   -echo pending   -HbA1c pending, lipid profile within normal limits   -consult Ophthalmology   -will recommend initiation of aspirin 81 mg p o  Daily in setting of patient's history of GI bleeding, and low likelihood of this event being caused by atrial fibrillation, may continue to defer anticoagulation at this time   -supportive care and medication management per primary team   -no additional acute neurologic recommendations at this time, please call with questions   -patient to follow-up in Stroke Clinic, appointment requested       Subjective:   No acute events overnight  MRI brain was completed and demonstrates no acute infarction or FLAIR signal abnormality  Chronic, advanced microangiopathic changes noted  On exam today, the patient is resting comfortably in bed in no acute distress  She reports that she has no vision out of her left eye  She is able to perceive light  Discussed with primary team, will consult Ophthalmology  Patient's daughters counseled extensively in the room       ROS:  See subjective    Medications:  Scheduled Meds:  Current Facility-Administered Medications:  acetaminophen 650 mg Oral Q6H PRN Brii Dave PA-C   aspirin 81 mg Oral Daily Bonnie Ang PA-C   atorvastatin 40 mg Oral Daily With Kimmy Brothers MANAV Ang   donepezil 10 mg Oral HS Bonnie Ang PA-C   heparin (porcine) 5,000 Units Subcutaneous [facility] Bonnie Ang PA-C   lidocaine 1 patch Topical Daily Brii Dave PA-C   magnesium sulfate 2 g Intravenous Q24H [address] Bonnie Pickens MANAV   memantine 10 mg Oral BID Bonnie Ang PA-C   ondansetron 4 mg Intravenous Q6H PRN Bonnie Ang PA-C   pantoprazole 40 mg Oral Daily Before Breakfast Bonnie Ang PA-C     Continuous Infusions:   PRN Meds:   acetaminophen    ondansetron      Vitals: Blood pressure 149/65, pulse 79, temperature 98 7 °F (37 1 °C), temperature source Temporal, resp  rate 18, height 5' 4" (1 626 m), weight 67 7 kg (149 lb 4 8 oz), SpO2 97 %  ,Body mass index is 25 63 kg/m²  Physical Exam:   Physical Exam   Constitutional: She appears well-developed and well-nourished  No distress  HENT:   Head: Normocephalic and atraumatic  Right Ear: External ear normal    Left Ear: External ear normal    Mouth/Throat: No oropharyngeal exudate  Neck: Normal range of motion  Neck supple  Pulmonary/Chest: Effort normal  No respiratory distress  Musculoskeletal: Normal range of motion  Skin: Skin is warm and dry  No rash noted  She is not diaphoretic  No erythema  No pallor  Psychiatric: She has a normal mood and affect  Her behavior is normal    Nursing note and vitals reviewed  Neurologic Exam     Mental Status   Oriented to person  Oriented to place  Level of consciousness: alert    Cranial Nerves     CN 2 through 12 grossly intact with exception of left eye visual deficit, patient is able to perceive light     Motor Exam Moves all extremities equally antigravity     Gait, Coordination, and Reflexes     Gait  Gait: (Deferred for safety)    Tremor   Resting tremor: absent      Lab, Imaging and other studies: I have personally reviewed pertinent reports      I personally reviewed pertinent imaging in PACs  Recent Results (from the past 24 hour(s))   Basic metabolic panel    Collection Time: 07/05/19  3:11 PM   Result Value Ref Range    Sodium 136 136 - 145 mmol/L    Potassium 4 0 3 5 - 5 3 mmol/L    Chloride 97 (L) 100 - 108 mmol/L    CO2 27 21 - 32 mmol/L    ANION GAP 12 4 - 13 mmol/L    BUN 11 5 - 25 mg/dL    Creatinine 0 76 0 60 - 1 30 mg/dL    Glucose 126 65 - 140 mg/dL    Calcium 9 5 8 3 - 10 1 mg/dL    eGFR 72 ml/min/1 73sq m   CBC and Platelet    Collection Time: 07/05/19  3:11 PM   Result Value Ref Range    WBC 8 41 4 31 - 10 16 Thousand/uL    RBC 3 83 3 81 - 5 12 Million/uL    Hemoglobin 10 8 (L) 11 5 - 15 4 g/dL    Hematocrit 33 4 (L) 34 8 - 46 1 %    MCV 87 82 - 98 fL    MCH 28 2 26 8 - 34 3 pg    MCHC 32 3 31 4 - 37 4 g/dL    RDW 13 9 11 6 - 15 1 %    Platelets 304 942 - 979 Thousands/uL    MPV 9 9 8 9 - 12 7 fL   Protime-INR    Collection Time: 07/05/19  3:11 PM   Result Value Ref Range    Protime 13 6 11 6 - 14 5 seconds    INR 1 03 0 84 - 1 19   APTT    Collection Time: 07/05/19  3:11 PM   Result Value Ref Range    PTT 31 23 - 37 seconds   Type and screen    Collection Time: 07/05/19  3:11 PM   Result Value Ref Range    ABO Grouping O     Rh Factor Positive     Antibody Screen Negative     Specimen Expiration Date [id number]    POCT Chem 8+    Collection Time: 07/05/19  3:12 PM   Result Value Ref Range    SODIUM, I-STAT 174 (HH) 136 - 145 mmol/l    Potassium, i-STAT 4 8 3 5 - 5 3 mmol/L    Chloride, istat >120 (H) 100 - 108 mmol/L    CO2, i-STAT 27 21 - 32 mmol/L    Anion Gap, i-STAT  4 - 13 mmol/L    Calcium, Ionized i-STAT 1 25 1 12 - 1 32 mmol/L    BUN, I-STAT 11 5 - 25 mg/dl    Creatinine, i-STAT 0 7 0 6 - 1 3 mg/dl    eGFR 79 ml/min/1 73sq m    Glucose, i-STAT 121 65 - 140 mg/dl    Hct, i-STAT 33 (L) 34 8 - 46 1 %    Hgb, i-STAT 11 2 (L) 11 5 - 15 4 g/dl    Specimen Type VENOUS    POCT troponin    Collection Time: 07/05/19  3:12 PM   Result Value Ref Range    POC Troponin I 0 01 0 00 - 0 08 ng/ml    Specimen Type VENOUS    Fingerstick Glucose (POCT)    Collection Time: 07/05/19  3:45 PM   Result Value Ref Range    POC Glucose 113 65 - 140 mg/dl   Lipid Panel with Direct LDL reflex    Collection Time: 07/06/19  5:58 AM   Result Value Ref Range    Cholesterol 104 50 - 200 mg/dL    Triglycerides 67 <=150 mg/dL    HDL, Direct 38 (L) 40 - 60 mg/dL    LDL Calculated 53 0 - 100 mg/dL   ]    VTE Prophylaxis: Sequential compression device (Venodyne)  and Heparin SQ    Counseling / Coordination of Care  Total time spent today 45 minutes  Greater than 50% of total time was spent with the patient and / or family counseling and / or coordination of care  A description of the counseling / coordination of care: [facility] The pt was seen and examined by myself and the attending physician  The chart was reviewed thoroughly, including laboratory values and imaging studies  The pt's family was counseled extensively in the room, including reviewing imaging studies

## 2019-07-06 NOTE — ASSESSMENT & PLAN NOTE
LDL well controlled given hx of cva in 50s  Continue lipitor at 20mg qhs as no evidence of acute cva

## 2019-07-06 NOTE — UTILIZATION REVIEW
Initial Clinical Review    Admission: Date/Time/Statement: inpatient 7/5/19 @ 1644  Acute stroke    Orders Placed This Encounter   Procedures    Inpatient Admission     Standing Status:   Standing     Number of Occurrences:   1     Order Specific Question:   Admitting Physician     Answer:   Bhupinder Beltran [[id number]]     Order Specific Question:   Level of Care     Answer:   Med Surg [16]     Order Specific Question:   Estimated length of stay     Answer:   More than 2 Midnights     Order Specific Question:   Certification     Answer:   I certify that inpatient services are medically necessary for this patient for a duration of greater than two midnights  See H&P and MD Progress Notes for additional information about the patient's course of treatment  ED Arrival Information     Expected Arrival Acuity Means of Arrival Escorted By Service Admission Type    - 7/5/2019 14:54 Emergent Walk-In Family Member General Medicine Emergency    Arrival Complaint    -        Chief Complaint   Patient presents with    Loss of Vision     per daughter pt called stating she couldn't see from left eye  symptoms started 1hr 15min ago  hx strokes     Assessment/Plan: 81 yo fem to ED from home admitted as inpatient due to acute CVA  Presents with acute OS blurry vision and inability to see from the OS associated w/ bifrontal headache and facial droop that began approx 2 hrs pta  Unable to see light in OS  Stroke alert called on arrival, no TPA due to GI bleed a month ago, while on coumadin for a fib  Visual field deficit with confrontation from R, most pronounced OS  Slight R facial droop  Brain imaging unremarkable  GCS 14 to 15  Stroke workup in progress, neuro consulted, MRI brain pending  7/5 per neuro: suspect L hemispheric CVA, likely cardio embolic, need to r/o vessel athermoa  Dense R homonymous hemianopsia  Continue neuro checks and stroke workup  NIH stroke score 4       ED Triage Vitals   Temperature Pulse Respirations Blood Pressure SpO2   07/05/19 1507 07/05/19 1502 07/05/19 1502 07/05/19 1502 07/05/19 1502   98 3 °F (36 8 °C) 80 18 (!) 201/89 99 %      Temp Source Heart Rate Source Patient Position - Orthostatic VS BP Location FiO2 (%)   07/05/19 1507 07/05/19 1502 07/05/19 1502 07/05/19 1502 --   Temporal Monitor Sitting Right arm       Pain Score       07/05/19 1600       Worst Possible Pain        Wt Readings from Last 1 Encounters:   07/05/19 67 7 kg (149 lb 4 8 oz)     Additional Vital Signs:   All on room air  Date/Time  Temp Pulse Resp BP SpO2   07/06/19 1156  97 8 °F (36 6 °C) 68 18 137/63 93 %   07/06/19 0808  98 7 °F (37 1 °C) 79 18 149/65 97 %   07/06/19 0445  97 5 °F (36 4 °C) 63 18 132/70 97 %   07/06/19 0245  97 5 °F (36 4 °C) 68 18 144/68 96 %   07/06/19 0045  97 6 °F (36 4 °C) 68 18 154/70 96 %   07/05/19 2345  97 1 °F (36 2 °C)Abnormal  89 18 168/76 97 %   07/05/19 2245  97 5 °F (36 4 °C) 82 18 164/74 98 %   07/05/19 2145  97 7 °F (36 5 °C) 80 18 160/76 98 %   07/05/19 2045  97 9 °F (36 6 °C) 85 18 158/80 99 %   07/05/19 1945  97 7 °F (36 5 °C) 77 18  96 %   07/05/19 1844  97 4 °F (36 3 °C)Abnormal  85 18 225/91Abnormal  91 %   07/05/19 1700   78 16 161/76 98 %   07/05/19 1600   80 16 179/76Abnormal  98 %   07/05/19 15:43:49   78 35Abnormal   98 %   07/05/19 15:35:50   81 39 197/74Abnormal  98 %   07/05/19 1515   76 20 189/74Abnormal  97 %     Date and Time Eye Opening Best Verbal Response Best Motor Response [place] Coma Scale Score   07/06/19 0445 4 4 6 14   07/06/19 0245 4 4 6 14   07/06/19 0045 4 4 6 14   07/05/19 2245 4 4 6 14   07/05/19 2145 4 4 6 14   07/05/19 2045 4 4 6 14   07/05/19 1945 4 4 6 14   07/05/19 1900 4 4 6 14   07/05/19 1700 4 5 6 15   07/05/19 1600 4 5 6 15   07/05/19 1546 4 5 6 15   07/05/19 1530 4 5 6 15   07/05/19 1505 4 5 6 15       Pertinent Labs/Diagnostic Test Results:   Results from last 7 days   Lab Units 07/05/19  1512 07/05/19  1511   WBC Thousand/uL  --  8 41 HEMOGLOBIN g/dL  --  10 8*   I STAT HEMOGLOBIN g/dl 11 2*  --    HEMATOCRIT %  --  33 4*   HEMATOCRIT, ISTAT % 33*  --    PLATELETS Thousands/uL  --  321     Results from last 7 days   Lab Units 07/05/19  1512 07/05/19  1511   SODIUM mmol/L  --  136   POTASSIUM mmol/L  --  4 0   CHLORIDE mmol/L  --  97*   CO2 mmol/L  --  27   CO2, I-STAT mmol/L 27  --    ANION GAP mmol/L  --  12   BUN mg/dL  --  11   CREATININE mg/dL  --  0 76   EGFR ml/min/1 73sq m 79 72   CALCIUM mg/dL  --  9 5   CALCIUM, IONIZED, ISTAT mmol/L 1 25  --      Results from last 7 days   Lab Units 07/05/19  1545   POC GLUCOSE mg/dl 113     Results from last 7 days   Lab Units 07/05/19  1511   GLUCOSE RANDOM mg/dL 126     Results from last 7 days   Lab Units 07/06/19  0558   HEMOGLOBIN A1C % 5 5   EAG mg/dl 111     Results from last 7 days   Lab Units 07/05/19  1511   PROTIME seconds 13 6   INR  1 03   PTT seconds 31     7/5 CTA head: Moderate to marked right vertebral artery origin stenosis at the origin  Fluid attenuating material in the proximal esophagus suggesting gastric esophageal reflux with wall thickening suggesting esophagitis    7/5 CT brain: nothing acute  7/5 PCXR: nothing acute  7/6 MRI brain: nothing acute, advance disease  7/6 echo: pending  No EKG found    ED Treatment:   Medication Administration from 07/05/2019 1454 to 07/05/2019 1759       Date/Time Order Dose Route Action     07/05/2019 1610 aspirin chewable tablet 324 mg 324 mg Oral Given     07/05/2019 1643 metoclopramide (REGLAN) injection 10 mg 10 mg Intravenous Given     07/05/2019 1606 ketorolac (TORADOL) injection 15 mg 15 mg Intravenous Given        Past Medical History:   Diagnosis Date    A-fib ([facility] [address] )     Alzheimer disease     Diverticulosis     Dyslipidemia     History of stroke     Hyperlipidemia     Hypertension     UTI (urinary tract infection)     Vasovagal syncope 2/13/2018     Present on Admission:   Alzheimer disease   Hyperlipidemia   Hypertension   A-fib ([facility] [address] [facility]   Other headache syndrome   Patient is [employer]   Carotid artery calcification, bilateral      Admitting Diagnosis: Loss of vision [H54 7]  History of stroke [Z86 73]  Age/Sex: 80 y o  female  Admission Orders:    Current Facility-Administered Medications:  acetaminophen 650 mg Oral Q6H PRN   aspirin 81 mg Oral Daily   atorvastatin 20 mg Oral Daily With Dinner   donepezil 10 mg Oral HS   heparin (porcine) 5,000 Units Subcutaneous Q8H [address]   lidocaine 1 patch Topical Daily   magnesium sulfate 2 g Intravenous Q24H DANIELLE   memantine 10 mg Oral BID   ondansetron 4 mg Intravenous Q6H PRN   pantoprazole 40 mg Oral Daily Before Breakfast     Tele  SCD's  Echo  Dysphagia eval, no risk-House diet  Neuro checks Every 1 hour x 4 hours, then every 2 hours x 8 hours, then every 4 hours x 72 hours  IP CONSULT TO NEUROLOGY  IP CONSULT TO BLOOD MANAGEMENT  IP CONSULT TO OPHTHALMOLOGY      Network Utilization Review Department  Phone: [phone]; Fax [phone]  [email or web address]  ATTENTION: Please call with any questions or concerns to [phone]  and carefully listen to the prompts so that you are directed to the right person  Send all requests for admission clinical reviews, approved or denied determinations and any other requests to fax [phone]   All voicemails are confidential

## 2019-07-07 LAB
ATRIAL RATE: 75 BPM
P AXIS: 79 DEGREES
PR INTERVAL: 164 MS
QRS AXIS: 44 DEGREES
QRSD INTERVAL: 78 MS
QT INTERVAL: 368 MS
QTC INTERVAL: 410 MS
T WAVE AXIS: 70 DEGREES
VENTRICULAR RATE: 75 BPM

## 2019-07-07 PROCEDURE — G8979 MOBILITY GOAL STATUS: HCPCS

## 2019-07-07 PROCEDURE — 97163 PT EVAL HIGH COMPLEX 45 MIN: CPT

## 2019-07-07 PROCEDURE — 93010 ELECTROCARDIOGRAM REPORT: CPT | Performed by: INTERNAL MEDICINE

## 2019-07-07 PROCEDURE — 93306 TTE W/DOPPLER COMPLETE: CPT | Performed by: INTERNAL MEDICINE

## 2019-07-07 PROCEDURE — G8978 MOBILITY CURRENT STATUS: HCPCS

## 2019-07-07 PROCEDURE — 99232 SBSQ HOSP IP/OBS MODERATE 35: CPT | Performed by: PHYSICIAN ASSISTANT

## 2019-07-07 RX ADMIN — MAGNESIUM SULFATE HEPTAHYDRATE 2 G: 40 INJECTION, SOLUTION INTRAVENOUS at 08:06

## 2019-07-07 RX ADMIN — DONEPEZIL HYDROCHLORIDE 10 MG: 10 TABLET, FILM COATED ORAL at 22:51

## 2019-07-07 RX ADMIN — MEMANTINE 10 MG: 5 TABLET ORAL at 17:01

## 2019-07-07 RX ADMIN — PANTOPRAZOLE SODIUM 40 MG: 40 TABLET, DELAYED RELEASE ORAL at 06:54

## 2019-07-07 RX ADMIN — ASPIRIN 81 MG 81 MG: 81 TABLET ORAL at 08:06

## 2019-07-07 RX ADMIN — MEMANTINE 10 MG: 5 TABLET ORAL at 08:06

## 2019-07-07 RX ADMIN — HEPARIN SODIUM 5000 UNITS: 5000 INJECTION INTRAVENOUS; SUBCUTANEOUS at 17:01

## 2019-07-07 RX ADMIN — ATORVASTATIN CALCIUM 20 MG: 10 TABLET, FILM COATED ORAL at 17:01

## 2019-07-07 NOTE — PHYSICAL THERAPY NOTE
PT EVALUATION    Pt  Name: Anupam Dumont  Pt  Age: 80 y o  MRN: [id number]  LENGTH OF STAY: 2    Patient Active Problem List   Diagnosis    History of stroke    Alzheimer disease    [facility] [address] [facility]    Carotid artery calcification, bilateral    Hyperlipidemia    Hypertension    A-fib (HCC)    Other headache syndrome    Chronic anticoagulation    Chronic UTI    Patient is [employer]    Rectal bleed    Loss of vision       Admitting Diagnoses:   Loss of vision [H54 7]  History of stroke [Z86 73]    Past Medical History:   Diagnosis Date    A-fib ([facility] [address] )     Alzheimer disease     Diverticulosis     Dyslipidemia     History of stroke     Hyperlipidemia     Hypertension     UTI (urinary tract infection)     Vasovagal syncope 2/13/2018       Past Surgical History:   Procedure Laterality Date    CHOLECYSTECTOMY         Imaging Studies:  MRI brain wo contrast   Final Result by Michael Queen MD (07/06 0049)         1  No evidence of acute infarct, hemorrhage or mass  2   Advanced microangiopathic disease  Workstation performed: [id number]         XR stroke alert portable chest   Final Result by Christina Manrique MD (07/05 4005)      No acute cardiopulmonary disease  Workstation performed: [id number]         CTA stroke alert (head/neck)   Final Result by Oscar Robert MD (07/05 5137)      Moderate to marked right vertebral artery origin stenosis at the origin  Fluid attenuating material in the proximal esophagus suggesting gastric esophageal reflux with wall thickening suggesting esophagitis  Findings were directly discussed/tiger with Dotty Gould on 7/5/2019 3:34 PM                      Workstation performed: [id number]         CT stroke alert brain   Final Result by Oscar Robert MD (07/05 5264)      No acute intracranial abnormality  Microangiopathic changes         Findings were directly discussed with Dotty Gould on 7/5/2019 3:30 PM       Workstation performed: [id number]              07/07/19 3926   Note Type   Note type Eval only   Pain Assessment   Pain Score No Pain   Home Living   Type of Home Apartment  (apartment/ in-law suite attached to son's house)   Home Layout One level;Stairs to enter with rails  (3 [place] w/ B/L railing )   Bathroom Equipment Shower chair;Grab bars in [facility] Other (Comment)  (no DME)   Additional Comments Pt poor historian; Info above gathered from previous PT eval in 5/17/19 which was provided by daughter at that time   Prior Function   Level of [place] Independent with ADLs and functional mobility   Lives With Alone   Receives Help From Family;Personal care attendant   ADL Assistance Independent   Falls in the last 6 months 0   Comments Pt poor historian; Info above gathered from previous PT eval in 5/17/19 which was provided by daughter at that time-> pt lives in her in-law suite at her son's house; per daughter, someone is always home at son's house & that they have cameras set-up in her apartment to monitor pt   Restrictions/Precautions   Weight Bearing Precautions Per Order No   Other Precautions Cognitive; Chair Alarm; Bed Alarm;Multiple lines; Fall Risk;Hard of hearing  (irritable)   General   Additional Pertinent History baseline Alzheimer's dementia   Family/Caregiver Present No   Cognition   Overall Cognitive Status Impaired   Arousal/Participation Arousable   Orientation Level Oriented to person;Oriented to place; Disoriented to time;Disoriented to situation   Following Commands Follows one step commands with increased time or repetition   RUE Assessment   RUE Assessment WFL   RUE Strength   RUE Overall Strength Unable to assess  (pt refused to cooperate w/ testing)   LUE Assessment   LUE Assessment WFL   LUE Strength   LUE Overall Strength Unable to assess  (pt refused to cooperate w/ testing)   RLE Assessment   RLE Assessment WFL   Strength RLE   RLE Overall Strength (unable to assess, pt refused to cooperate w/ testing)   LLE Assessment   LLE Assessment WFL   Strength LLE   LLE Overall Strength   (unable to assess, pt refused to cooperate w/ testing)   Bed Mobility   Supine to Sit 5  Supervision   Additional items HOB elevated; Increased time required   Sit to Supine 5  Supervision   Additional items Increased time required   Transfers   Sit to Stand 5  Supervision   Additional items Bedrails; Increased time required;Verbal cues   Stand to Sit 5  Supervision   Additional items Bedrails; Increased time required;Verbal cues   Toilet transfer 5  Supervision   Additional items Increased time required;Verbal cues;Standard toilet; Other  (grab bar)   Ambulation/Elevation   Gait pattern Decreased foot clearance; Excessively slow  (unsteady)   Gait Assistance 4  Minimal assist   Additional items Assist x 1;Verbal cues; Tactile cues   Assistive Device None  (pt refused to use any)   Distance 15'x2   Balance   Static Sitting Good   Static Standing Fair   Ambulatory Poor +   Endurance Deficit   Endurance Deficit Yes   Endurance Deficit Description fatigue   Activity Tolerance   Activity Tolerance Patient limited by fatigue   Nurse Made Aware RN Darling   Assessment   Prognosis Fair   Problem List Decreased endurance; Impaired balance;Decreased mobility; Decreased cognition; Impaired judgement;Decreased safety awareness; Impaired hearing; Impaired vision   Assessment  Pt  85 y  o female admitted for  Loss of vision to L eye, suspect BRAO per neurology  MRI negative for acute stroke  Pt referred to PT for mobility assessment & D/C planning w/ orders of up & OOB as tolerated  Pt poor historian & irritable t/o session, unable to gather accurate info re: home set up, caregiver support & PLOF   Per previous PT eval on 5/17/19 which was gathered from daughter -> pt lives in her in-law suite at her son's house; per daughter, someone is always home at son's house & that they have cameras set-up in her apartment to monitor pt; pt does not use AD for amb  On eval, pt demonstrate dec mobility, balance, endurance & amb 2* to L eye visual loss  Pt require S for bed mobility & transfers however require minAx1 for amb w/o AD + cues for techniques  Gait deviations as above, slow w/ dec foot clearance & strides but no gross LOB noted  Pt refused to use any AD during amb but noted to be grabbing onto walls, furniture & IV pole during amb  Pt refused to ambulate farther & requested to get back in bed at end of session  No dizziness reported t/o session  Nsg staff most recent vital signs as follows: /73 (BP Location: Left arm)   Pulse 80   Temp 97 8 °F (36 6 °C) (Temporal)   Resp 18   Ht 5' 4" (1 626 m)   Wt 67 7 kg (149 lb 4 8 oz)   SpO2 94%   BMI 25 63 kg/m²   At end of session, pt back in bed w/o issues, call bell & phone in reach, bed alarm activated  Fall precautions reinforced  Pt functioning below baseline hence will continue skilled PT to improve function & safety  From PT standpoint, pt may return home w/ HHPT if 24hr S is available, when medically cleared  Otherwise pt may need STR at D/C  CM to follow  Nsg staff to continue to mobilized pt (OOB in chair for all meals & ambulate in room/unit) as tolerated to prevent further decline in function  Nsg notified      Barriers to Discharge Inaccessible home environment   Barriers to Discharge Comments (+) [place]   Goals   Patient Goals to go home   STG Expiration Date 07/21/19   Short Term Goal #1 Goals to be met in 14 days; pt will be able to: 1) inc strength & balance by 1/2 grade to improve overall functional mobility & dec fall risk; 2) inc bed mobility to modified I for pt to be able to get in/OOB safely w/ proper techniques 100% of the time, to dec caregiver assistance & safely function at home; 3) inc transfers to modified I for pt to transition safely from one surface to another w/o % of the time, to dec caregiver assistance & safely function at home; 4) inc amb w/ appropriate AD approx  >80' w/ S for pt to ambulate household distances w/o any % of the time, to dec caregiver assistance & safely function at home; 5) inc barthel score to 70 to decrease overall risk for falls; 6) negotiate stairs w/ S for inc safety during stair mgt inside/outside of home & dec caregiver assistance; 7) pt/caregiver ed   Treatment Day 0   Plan   Treatment/Interventions Functional transfer training;LE strengthening/ROM; Elevations; Therapeutic exercise; Endurance training;Patient/family training;Bed mobility;Gait training;Spoke to nursing   PT Frequency Other (Comment)  (3-5x/wk)   Recommendation   Recommendation Home PT;24 hour supervision/assist;Home with family support; Short-term skilled PT  (pending progress; may need STR if 24hr S not available)   Barthel Index   Feeding 10   Bathing 0   Grooming Score 5   Dressing Score 5   Bladder Score 5   Bowels Score 10   Toilet Use Score 5   Transfers (Bed/Chair) Score 10   Mobility (Level Surface) Score 0   Stairs Score 0   Barthel Index Score 50   Hx/personal factors: co-morbidities, inaccessible home, advanced age, mutliple lines, telemetry, dec cognition, fall risk and assist w/ ADL's  Examination: dec mobility, dec balance, dec endurance, dec amb, moderate fall risk, dec cognition, L eye visual loss  Clinical: unpredictable (ongoing medical status, abnormal lab values and moderate fall risk)  Complexity: high     Jony Bell, PT

## 2019-07-07 NOTE — PLAN OF CARE
Problem: Prexisting or High Potential for Compromised Skin Integrity  Goal: Skin integrity is maintained or improved  Description  INTERVENTIONS:  - Identify patients at risk for skin breakdown  - Assess and monitor skin integrity  - Assess and monitor nutrition and hydration status  - Monitor labs (i e  albumin)  - Assess for incontinence   - Turn and reposition patient  - Assist with mobility/ambulation  - Relieve pressure over bony prominences  - Avoid friction and shearing  - Provide appropriate hygiene as needed including keeping skin clean and dry  - Evaluate need for skin moisturizer/barrier cream  - Collaborate with interdisciplinary team (i e  Nutrition, Rehabilitation, etc )   - Patient/family teaching  Outcome: Progressing     Problem: PAIN - ADULT  Goal: Verbalizes/displays adequate comfort level or baseline comfort level  Description  Interventions:  - Encourage patient to monitor pain and request assistance  - Assess pain using appropriate pain scale  - Administer analgesics based on type and severity of pain and evaluate response  - Implement non-pharmacological measures as appropriate and evaluate response  - Consider cultural and social influences on pain and pain management  - Notify physician/advanced practitioner if interventions unsuccessful or patient reports new pain  Outcome: Progressing     Problem: INFECTION - ADULT  Goal: Absence or prevention of progression during hospitalization  Description  INTERVENTIONS:  - Assess and monitor for signs and symptoms of infection  - Monitor lab/diagnostic results  - Monitor all insertion sites, i e  indwelling lines, tubes, and drains  - Monitor endotracheal (as able) and nasal secretions for changes in amount and color  - [place] appropriate cooling/warming therapies per order  - Administer medications as ordered  - Instruct and encourage patient and family to use good hand hygiene technique  - Identify and instruct in appropriate isolation precautions for identified infection/condition  Outcome: Progressing     Problem: SAFETY ADULT  Goal: Patient will remain free of falls  Description  INTERVENTIONS:  - Assess patient frequently for physical needs  -  Identify cognitive and physical deficits and behaviors that affect risk of falls    -  Clyde fall precautions as indicated by assessment   - Educate patient/family on patient safety including physical limitations  - Instruct patient to call for assistance with activity based on assessment  - Modify environment to reduce risk of injury  - Consider OT/PT consult to assist with strengthening/mobility  Outcome: Progressing  Goal: Maintain or return to baseline ADL function  Description  INTERVENTIONS:  -  Assess patient's ability to carry out ADLs; assess patient's baseline for ADL function and identify physical deficits which impact ability to perform ADLs (bathing, care of mouth/teeth, toileting, grooming, dressing, etc )  - Assess/evaluate cause of self-care deficits   - Assess range of motion  - Assess patient's mobility; develop plan if impaired  - Assess patient's need for assistive devices and provide as appropriate  - Encourage maximum independence but intervene and supervise when necessary  ¯ Involve family in performance of ADLs  ¯ Assess for home care needs following discharge   ¯ Request OT consult to assist with ADL evaluation and planning for discharge  ¯ Provide patient education as appropriate  Outcome: Progressing  Goal: Maintain or return mobility status to optimal level  Description  INTERVENTIONS:  - Assess patient's baseline mobility status (ambulation, transfers, stairs, etc )    - Identify cognitive and physical deficits and behaviors that affect mobility  - Identify mobility aids required to assist with transfers and/or ambulation (gait belt, sit-to-stand, lift, walker, cane, etc )  - [place]de fall precautions as indicated by assessment  - Record patient progress and toleration of activity level on Mobility SBAR; progress patient to next Phase/Stage  - Instruct patient to call for assistance with activity based on assessment  - Request Rehabilitation consult to assist with strengthening/weightbearing, etc   Outcome: Progressing     Problem: DISCHARGE PLANNING  Goal: Discharge to home or other facility with appropriate resources  Description  INTERVENTIONS:  - Identify barriers to discharge w/patient and caregiver  - Arrange for needed discharge resources and transportation as appropriate  - Identify discharge learning needs (meds, wound care, etc )  - Arrange for interpretive services to assist at discharge as needed  - Refer to Case Management Department for coordinating discharge planning if the patient needs post-hospital services based on physician/advanced practitioner order or complex needs related to functional status, cognitive ability, or social support system  Outcome: Progressing     Problem: Knowledge Deficit  Goal: Patient/family/caregiver demonstrates understanding of disease process, treatment plan, medications, and discharge instructions  Description  Complete learning assessment and assess knowledge base  Interventions:  - Provide teaching at level of understanding  - Provide teaching via preferred learning methods  Outcome: Progressing     Problem: CARDIOVASCULAR - ADULT  Goal: Maintains optimal cardiac output and hemodynamic stability  Description  INTERVENTIONS:  - Monitor I/O, vital signs and rhythm  - Monitor for S/S and trends of decreased cardiac output i e  bleeding, hypotension  - Administer and titrate ordered vasoactive medications to optimize hemodynamic stability  - Assess quality of pulses, skin color and temperature  - Assess for signs of decreased coronary artery perfusion - ex   Angina  - Instruct patient to report change in severity of symptoms  Outcome: Progressing  Goal: Absence of cardiac dysrhythmias or at baseline rhythm  Description  INTERVENTIONS:  - Continuous cardiac monitoring, monitor vital signs, obtain 12 lead EKG if indicated  - Administer antiarrhythmic and heart rate control medications as ordered  - Monitor electrolytes and administer replacement therapy as ordered  Outcome: Progressing     Problem: MUSCULOSKELETAL - ADULT  Goal: Maintain or return mobility to safest level of function  Description  INTERVENTIONS:  - Assess patient's ability to carry out ADLs; assess patient's baseline for ADL function and identify physical deficits which impact ability to perform ADLs (bathing, care of mouth/teeth, toileting, grooming, dressing, etc )  - Assess/evaluate cause of self-care deficits   - Assess range of motion  - Assess patient's mobility; develop plan if impaired  - Assess patient's need for assistive devices and provide as appropriate  - Encourage maximum independence but intervene and supervise when necessary  - Involve family in performance of ADLs  - Assess for home care needs following discharge   - Request OT consult to assist with ADL evaluation and planning for discharge  - Provide patient education as appropriate  Outcome: Progressing     Problem: METABOLIC, FLUID AND ELECTROLYTES - ADULT  Goal: Electrolytes maintained within normal limits  Description  INTERVENTIONS:  - Monitor labs and assess patient for signs and symptoms of electrolyte imbalances  - Administer electrolyte replacement as ordered  - Monitor response to electrolyte replacements, including repeat lab results as appropriate  - Instruct patient on fluid and nutrition as appropriate  Outcome: Progressing     Problem: HEMATOLOGIC - ADULT  Goal: Maintains hematologic stability  Description  INTERVENTIONS  - Assess for signs and symptoms of bleeding or hemorrhage  - Monitor labs  - Administer supportive blood products/factors as ordered and appropriate  Outcome: Progressing

## 2019-07-07 NOTE — PLAN OF CARE
Problem: Prexisting or High Potential for Compromised Skin Integrity  Goal: Skin integrity is maintained or improved  Description  INTERVENTIONS:  - Identify patients at risk for skin breakdown  - Assess and monitor skin integrity  - Assess and monitor nutrition and hydration status  - Monitor labs (i e  albumin)  - Assess for incontinence   - Turn and reposition patient  - Assist with mobility/ambulation  - Relieve pressure over bony prominences  - Avoid friction and shearing  - Provide appropriate hygiene as needed including keeping skin clean and dry  - Evaluate need for skin moisturizer/barrier cream  - Collaborate with interdisciplinary team (i e  Nutrition, Rehabilitation, etc )   - Patient/family teaching  Outcome: Progressing     Problem: PAIN - ADULT  Goal: Verbalizes/displays adequate comfort level or baseline comfort level  Description  Interventions:  - Encourage patient to monitor pain and request assistance  - Assess pain using appropriate pain scale  - Administer analgesics based on type and severity of pain and evaluate response  - Implement non-pharmacological measures as appropriate and evaluate response  - Consider cultural and social influences on pain and pain management  - Notify physician/advanced practitioner if interventions unsuccessful or patient reports new pain  Outcome: Progressing     Problem: INFECTION - ADULT  Goal: Absence or prevention of progression during hospitalization  Description  INTERVENTIONS:  - Assess and monitor for signs and symptoms of infection  - Monitor lab/diagnostic results  - Monitor all insertion sites, i e  indwelling lines, tubes, and drains  - Monitor endotracheal (as able) and nasal secretions for changes in amount and color  - [place] appropriate cooling/warming therapies per order  - Administer medications as ordered  - Instruct and encourage patient and family to use good hand hygiene technique  - Identify and instruct in appropriate isolation precautions for identified infection/condition  Outcome: Progressing     Problem: SAFETY ADULT  Goal: Patient will remain free of falls  Description  INTERVENTIONS:  - Assess patient frequently for physical needs  -  Identify cognitive and physical deficits and behaviors that affect risk of falls    -  [place]onson fall precautions as indicated by assessment   - Educate patient/family on patient safety including physical limitations  - Instruct patient to call for assistance with activity based on assessment  - Modify environment to reduce risk of injury  - Consider OT/PT consult to assist with strengthening/mobility  Outcome: Progressing  Goal: Maintain or return to baseline ADL function  Description  INTERVENTIONS:  -  Assess patient's ability to carry out ADLs; assess patient's baseline for ADL function and identify physical deficits which impact ability to perform ADLs (bathing, care of mouth/teeth, toileting, grooming, dressing, etc )  - Assess/evaluate cause of self-care deficits   - Assess range of motion  - Assess patient's mobility; develop plan if impaired  - Assess patient's need for assistive devices and provide as appropriate  - Encourage maximum independence but intervene and supervise when necessary  ¯ Involve family in performance of ADLs  ¯ Assess for home care needs following discharge   ¯ Request OT consult to assist with ADL evaluation and planning for discharge  ¯ Provide patient education as appropriate  Outcome: Progressing  Goal: Maintain or return mobility status to optimal level  Description  INTERVENTIONS:  - Assess patient's baseline mobility status (ambulation, transfers, stairs, etc )    - Identify cognitive and physical deficits and behaviors that affect mobility  - Identify mobility aids required to assist with transfers and/or ambulation (gait belt, sit-to-stand, lift, walker, cane, etc )  - [place]onson fall precautions as indicated by assessment  - Record patient progress and toleration of activity level on Mobility SBAR; progress patient to next Phase/Stage  - Instruct patient to call for assistance with activity based on assessment  - Request Rehabilitation consult to assist with strengthening/weightbearing, etc   Outcome: Progressing     Problem: DISCHARGE PLANNING  Goal: Discharge to home or other facility with appropriate resources  Description  INTERVENTIONS:  - Identify barriers to discharge w/patient and caregiver  - Arrange for needed discharge resources and transportation as appropriate  - Identify discharge learning needs (meds, wound care, etc )  - Arrange for interpretive services to assist at discharge as needed  - Refer to Case Management Department for coordinating discharge planning if the patient needs post-hospital services based on physician/advanced practitioner order or complex needs related to functional status, cognitive ability, or social support system  Outcome: Progressing     Problem: Knowledge Deficit  Goal: Patient/family/caregiver demonstrates understanding of disease process, treatment plan, medications, and discharge instructions  Description  Complete learning assessment and assess knowledge base  Interventions:  - Provide teaching at level of understanding  - Provide teaching via preferred learning methods  Outcome: Progressing     Problem: CARDIOVASCULAR - ADULT  Goal: Maintains optimal cardiac output and hemodynamic stability  Description  INTERVENTIONS:  - Monitor I/O, vital signs and rhythm  - Monitor for S/S and trends of decreased cardiac output i e  bleeding, hypotension  - Administer and titrate ordered vasoactive medications to optimize hemodynamic stability  - Assess quality of pulses, skin color and temperature  - Assess for signs of decreased coronary artery perfusion - ex   Angina  - Instruct patient to report change in severity of symptoms  Outcome: Progressing  Goal: Absence of cardiac dysrhythmias or at baseline rhythm  Description  INTERVENTIONS:  - Continuous cardiac monitoring, monitor vital signs, obtain 12 lead EKG if indicated  - Administer antiarrhythmic and heart rate control medications as ordered  - Monitor electrolytes and administer replacement therapy as ordered  Outcome: Progressing     Problem: MUSCULOSKELETAL - ADULT  Goal: Maintain or return mobility to safest level of function  Description  INTERVENTIONS:  - Assess patient's ability to carry out ADLs; assess patient's baseline for ADL function and identify physical deficits which impact ability to perform ADLs (bathing, care of mouth/teeth, toileting, grooming, dressing, etc )  - Assess/evaluate cause of self-care deficits   - Assess range of motion  - Assess patient's mobility; develop plan if impaired  - Assess patient's need for assistive devices and provide as appropriate  - Encourage maximum independence but intervene and supervise when necessary  - Involve family in performance of ADLs  - Assess for home care needs following discharge   - Request OT consult to assist with ADL evaluation and planning for discharge  - Provide patient education as appropriate  Outcome: Progressing     Problem: METABOLIC, FLUID AND ELECTROLYTES - ADULT  Goal: Electrolytes maintained within normal limits  Description  INTERVENTIONS:  - Monitor labs and assess patient for signs and symptoms of electrolyte imbalances  - Administer electrolyte replacement as ordered  - Monitor response to electrolyte replacements, including repeat lab results as appropriate  - Instruct patient on fluid and nutrition as appropriate  Outcome: Progressing     Problem: HEMATOLOGIC - ADULT  Goal: Maintains hematologic stability  Description  INTERVENTIONS  - Assess for signs and symptoms of bleeding or hemorrhage  - Monitor labs  - Administer supportive blood products/factors as ordered and appropriate  Outcome: Progressing

## 2019-07-07 NOTE — ASSESSMENT & PLAN NOTE
2* retinal artery occlusion likely 2* Afib off AC from prior GIB  Admitted for cva pathway, but MRI negative for acute stroke  Pt had frontal ha on admission which is now resolved, it was not typical for GCA     ESR/CRP mildly elevated  Appreciate ophthalmology recommendations, no further interventions at this time but recommended esr/crp repeat levels and if worsening w/ESR>90 consider empiric steroids  Pt recommending STR vs home w/24 H care    D/w daughter at length likely will need rehab placement but is stable for d/c if repeat esr/crp are not significantly worsened

## 2019-07-07 NOTE — ASSESSMENT & PLAN NOTE
W/chronic cervicalgia requiring steroid injections  Headache has since improved  W/toradol reglan and magnesium   Change to mag oxide 400mg daily

## 2019-07-07 NOTE — PROGRESS NOTES
Progress Note - Nathan Olivo 1933, 80 y o  female MRN: [id number]    Unit/Bed#: E4 -01 Encounter: [id number]    Primary Care Provider: Liz Cramer DO   Date and time admitted to hospital: 7/5/2019  2:56 PM        * Loss of vision  Assessment & Plan  2* retinal artery occlusion likely 2* Afib off AC from prior GIB  Admitted for cva pathway, but MRI negative for acute stroke  Pt had frontal ha on admission which is now resolved, it was not typical for [facility]     ESR/CRP mildly elevated  Appreciate ophthalmology recommendations, no further interventions at this time but recommended esr/crp repeat levels and if worsening w/ESR>90 consider empiric steroids  Pt recommending STR vs home w/24 H care  D/w daughter at length likely will need rehab placement but is stable for d/c if repeat esr/crp are not significantly worsened    Patient is Yarsanism  Assessment & Plan  No blood transfusions    Other headache syndrome  Assessment & Plan  W/chronic cervicalgia requiring steroid injections  Headache has since improved  W/toradol reglan and magnesium   Change to mag oxide 400mg daily    A-fib (HCC)  Assessment & Plan  With [id number] score of 6 previously on Coumadin which was not restarted after episode of gib  -HR controlled off chronotropic agents  -Given retinal artery occlusion had d/w daughter at bedside  HAS-BLED risk is 3, pt has had 2 episodes of gib prior on AC  She has moderately-severe diverticulosis   -given cognitive issues and above risk factors the decision has been made to continue w/ASA given risk benefit ratios  D/w attending      Hypertension  Assessment & Plan  Continue to monitor off antihypertensives    Can d/c permissive htn    Hyperlipidemia  Assessment & Plan  LDL well controlled given hx of cva in 46s  Continue lipitor at 20mg qhs as no evidence of acute cva    Carotid artery calcification, bilateral  Assessment & Plan  Moderate bilateral extracranial carotid artery calcification at bifurcation by CTA head and neck  Continue aspirin and statin    Alzheimer disease  Assessment & Plan  Continue namenda/aricept      VTE Pharmacologic Prophylaxis:   Pharmacologic: Heparin  Mechanical VTE Prophylaxis in Place: Yes    Patient Centered Rounds: I have performed bedside rounds with nursing staff today  Discussions with Specialists or Other Care Team Provider:     Education and Discussions with Family / Patient: lengthy discussion regarding R/B of ac vs baby asa, dispo    Time Spent for Care: 30 minutes  More than 50% of total time spent on counseling and coordination of care as described above  Current Length of Stay: 2 day(s)    Current Patient Status: Inpatient   Certification Statement: The patient will continue to require additional inpatient hospital stay due to retinal artery occlusion, left    Discharge Plan: likely to rehab, will need further discussion w/CM regarding options    Code Status: Level 1 - Full Code      Subjective:   Pt seen and examined  No events overnight  No cp/sob/n/v/f/c  Nursing thought pt's vision had been improving however upon evaluation it appears that 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  She is very frustrated and agitated per daughter  Objective:     Vitals:   Temp (24hrs), Av 9 °F (36 6 °C), Min:97 5 °F (36 4 °C), Max:98 5 °F (36 9 °C)    Temp:  [97 5 °F (36 4 °C)-98 5 °F (36 9 °C)] 97 8 °F (36 6 °C)  HR:  [76-92] 80  Resp:  [18] 18  BP: (129-137)/(57-73) 137/73  SpO2:  [93 %-97 %] 94 %  Body mass index is 25 63 kg/m²  Input and Output Summary (last 24 hours): Intake/Output Summary (Last 24 hours) at 2019 1433  Last data filed at 2019 1247  Gross per 24 hour   Intake 510 ml   Output 400 ml   Net 110 ml       Physical Exam:     Physical Exam   Constitutional: She appears well-developed  No distress  HENT:   Head: Normocephalic and atraumatic     Right Ear: External ear normal    Left Ear: External ear normal  Eyes: Conjunctivae are normal    Neck: Normal range of motion  Cardiovascular: Normal rate and regular rhythm  Murmur heard  Pulmonary/Chest: No stridor  No respiratory distress  She has no wheezes  She has no rales  Abdominal: Soft  She exhibits no distension and no mass  There is no tenderness  There is no guarding  Neurological: She is alert  Skin: Skin is warm and dry  She is not diaphoretic  Psychiatric: She has a normal mood and affect  Vitals reviewed  (  Be Sure to Include Physical Exam: Delete this entire line when you have entered your exam)    Additional Data:     Labs:    Results from last 7 days   Lab Units 07/05/19  1512 07/05/19  1511   WBC Thousand/uL  --  8 41   HEMOGLOBIN g/dL  --  10 8*   I STAT HEMOGLOBIN g/dl 11 2*  --    HEMATOCRIT %  --  33 4*   HEMATOCRIT, ISTAT % 33*  --    PLATELETS Thousands/uL  --  321     Results from last 7 days   Lab Units 07/05/19  1512 07/05/19  1511   SODIUM mmol/L  --  136   POTASSIUM mmol/L  --  4 0   CHLORIDE mmol/L  --  97*   CO2 mmol/L  --  27   CO2, I-STAT mmol/L 27  --    BUN mg/dL  --  11   CREATININE mg/dL  --  0 76   ANION GAP mmol/L  --  12   CALCIUM mg/dL  --  9 5   GLUCOSE RANDOM mg/dL  --  126     Results from last 7 days   Lab Units 07/05/19  1511   INR  1 03     Results from last 7 days   Lab Units 07/05/19  1545   POC GLUCOSE mg/dl 113     Results from last 7 days   Lab Units 07/06/19  0558   HEMOGLOBIN A1C % 5 5               * I Have Reviewed All Lab Data Listed Above  * Additional Pertinent Lab Tests Reviewed:  All Labs Within Last 24 Hours Reviewed    Imaging:    Imaging Reports Reviewed Today Include:   Imaging Personally Reviewed by Myself Includes:      Recent Cultures (last 7 days):           Last 24 Hours Medication List:     Current Facility-Administered Medications:  acetaminophen 650 mg Oral Q6H PRN Dora Petersen PA-C   aspirin 81 mg Oral Daily Bonnie Ang PA-C   atorvastatin 20 mg Oral Daily With [employer] Radha Anderson PA-C   donepezil 10 mg Oral HS Bonnie Ang PA-C   heparin (porcine) 5,000 Units Subcutaneous [facility] Bonnie Ang PA-C   lidocaine 1 patch Topical Daily Dany Godinez PA-C   [START ON 7/8/2019] magnesium oxide 400 mg Oral Daily Bonnie Ang PA-C   memantine 10 mg Oral BID Bonnie Ang PA-C   ondansetron 4 mg Intravenous Q6H PRN Bonnie Ang PA-C   pantoprazole 40 mg Oral Daily Before Breakfast Bonnie Hui PA-C        Today, Patient Was Seen By: Radha Anderson PA-C    ** Please Note: Dictation voice to text software may have been used in the creation of this document   **

## 2019-07-07 NOTE — ASSESSMENT & PLAN NOTE
With [id number] score of 6 previously on Coumadin which was not restarted after episode of gib  -HR controlled off chronotropic agents  -Given retinal artery occlusion had d/w daughter at bedside  HAS-BLED risk is 3, pt has had 2 episodes of gib prior on AC  She has moderately-severe diverticulosis   -given cognitive issues and above risk factors the decision has been made to continue w/ASA given risk benefit ratios    D/w attending

## 2019-07-07 NOTE — PLAN OF CARE
Problem: PHYSICAL THERAPY ADULT  Goal: Performs mobility at highest level of function for planned discharge setting  See evaluation for individualized goals  Description  Treatment/Interventions: Functional transfer training, LE strengthening/ROM, Elevations, Therapeutic exercise, Endurance training, Patient/family training, Bed mobility, Gait training, Spoke to nursing          See flowsheet documentation for full assessment, interventions and recommendations  Note:   Prognosis: Fair  Problem List: Decreased endurance, Impaired balance, Decreased mobility, Decreased cognition, Impaired judgement, Decreased safety awareness, Impaired hearing, Impaired vision  Assessment:  Pt  85 y  o female admitted for  Loss of vision to L eye, suspect BRAO per neurology  MRI negative for acute stroke  Pt referred to PT for mobility assessment & D/C planning w/ orders of up & OOB as tolerated  Pt poor historian & irritable t/o session, unable to gather accurate info re: home set up, caregiver support & PLOF  Per previous PT eval on 5/17/19 which was gathered from daughter -> pt lives in her in-law suite at her son's house; per daughter, someone is always home at son's house & that they have cameras set-up in her apartment to monitor pt; pt does not use AD for amb  On eval, pt demonstrate dec mobility, balance, endurance & amb 2* to L eye visual loss  Pt require S for bed mobility & transfers however require minAx1 for amb w/o AD + cues for techniques  Gait deviations as above, slow w/ dec foot clearance & strides but no gross LOB noted  Pt refused to use any AD during amb but noted to be grabbing onto walls, furniture & IV pole during amb  Pt refused to ambulate farther & requested to get back in bed at end of session  No dizziness reported t/o session   Nsg staff most recent vital signs as follows: /73 (BP Location: Left arm)   Pulse 80   Temp 97 8 °F (36 6 °C) (Temporal)   Resp 18   Ht 5' 4" (1 626 m)   Wt 67 7 kg (149 lb 4 8 oz)   SpO2 94%   BMI 25 63 kg/m²   At end of session, pt back in bed w/o issues, call bell & phone in reach, bed alarm activated  Fall precautions reinforced  Pt functioning below baseline hence will continue skilled PT to improve function & safety  From PT standpoint, pt may return home w/ HHPT if 24hr S is available, when medically cleared  Otherwise pt may need STR at D/C  CM to follow  Nsg staff to continue to mobilized pt (OOB in chair for all meals & ambulate in room/unit) as tolerated to prevent further decline in function  Nsg notified  Barriers to Discharge: Inaccessible home environment  Barriers to Discharge Comments: (+) [place]  Recommendation: Home PT, 24 hour supervision/assist, Home with family support, Short-term skilled PT(pending progress; may need STR if 24hr S not available)          See flowsheet documentation for full assessment

## 2019-07-08 PROBLEM — H34.12 CENTRAL RETINAL ARTERY OCCLUSION OF LEFT EYE: Status: ACTIVE | Noted: 2019-07-05

## 2019-07-08 LAB
CRP SERPL QL: 10.2 MG/L
ERYTHROCYTE [SEDIMENTATION RATE] IN BLOOD: 46 MM/HOUR (ref 0–20)

## 2019-07-08 PROCEDURE — G8987 SELF CARE CURRENT STATUS: HCPCS

## 2019-07-08 PROCEDURE — 85652 RBC SED RATE AUTOMATED: CPT | Performed by: PHYSICIAN ASSISTANT

## 2019-07-08 PROCEDURE — 97166 OT EVAL MOD COMPLEX 45 MIN: CPT

## 2019-07-08 PROCEDURE — 86140 C-REACTIVE PROTEIN: CPT | Performed by: PHYSICIAN ASSISTANT

## 2019-07-08 PROCEDURE — 99233 SBSQ HOSP IP/OBS HIGH 50: CPT | Performed by: PHYSICIAN ASSISTANT

## 2019-07-08 PROCEDURE — G8988 SELF CARE GOAL STATUS: HCPCS

## 2019-07-08 RX ORDER — DABIGATRAN ETEXILATE 150 MG/1
150 CAPSULE, COATED PELLETS ORAL EVERY 12 HOURS SCHEDULED
Status: DISCONTINUED | OUTPATIENT
Start: 2019-07-08 | End: 2019-07-09 | Stop reason: HOSPADM

## 2019-07-08 RX ADMIN — MEMANTINE 10 MG: 5 TABLET ORAL at 08:58

## 2019-07-08 RX ADMIN — MAGNESIUM OXIDE TAB 400 MG (241.3 MG ELEMENTAL MG) 400 MG: 400 (241.3 MG) TAB at 08:57

## 2019-07-08 RX ADMIN — PANTOPRAZOLE SODIUM 40 MG: 40 TABLET, DELAYED RELEASE ORAL at 04:23

## 2019-07-08 RX ADMIN — ATORVASTATIN CALCIUM 20 MG: 10 TABLET, FILM COATED ORAL at 17:51

## 2019-07-08 RX ADMIN — ASPIRIN 81 MG 81 MG: 81 TABLET ORAL at 08:57

## 2019-07-08 RX ADMIN — ACETAMINOPHEN 650 MG: 325 TABLET ORAL at 17:51

## 2019-07-08 RX ADMIN — DABIGATRAN ETEXILATE MESYLATE 150 MG: 150 CAPSULE ORAL at 20:42

## 2019-07-08 RX ADMIN — DONEPEZIL HYDROCHLORIDE 10 MG: 10 TABLET, FILM COATED ORAL at 20:42

## 2019-07-08 RX ADMIN — MEMANTINE 10 MG: 5 TABLET ORAL at 17:52

## 2019-07-08 RX ADMIN — HEPARIN SODIUM 5000 UNITS: 5000 INJECTION INTRAVENOUS; SUBCUTANEOUS at 13:01

## 2019-07-08 RX ADMIN — ACETAMINOPHEN 650 MG: 325 TABLET ORAL at 04:23

## 2019-07-08 RX ADMIN — HEPARIN SODIUM 5000 UNITS: 5000 INJECTION INTRAVENOUS; SUBCUTANEOUS at 06:28

## 2019-07-08 NOTE — PROGRESS NOTES
Progress Note - Siri Sawyer 1933, 80 y o  female MRN: [id number]    Unit/Bed#: E4 -01 Encounter: [id number]    Primary Care Provider: Jennifer Thomas DO   Date and time admitted to hospital: 7/5/2019  2:56 PM        * Central retinal artery occlusion of left eye  Assessment & Plan  2* retinal artery occlusion likely 2* Afib off AC from prior GIB  -Admitted for cva pathway, but MRI negative for acute stroke  -Pt had frontal ha on admission which is now resolved, it was not typical for GCA  -ESR/CRP mildly elevated but stable and not suggestive of GCA  -Appreciate ophthalmology recommendations  -Pt recommending STR vs home w/24 H care  Daughters are interested in home w/pt as they believe they can adequately provide 24H supervision and notably have several measures setup to ensure their mother's well being  -Pt's daughter initially opted for baby asa but has since changed her mind and would prefer coumadin if possible  Had lengthy discussion regarding treatment modalities risks and benefits  Given blindness, neurocognitive deficits, Buddhist status and known hx of gib x2, had long discussion regarding [id number] score vs has-bled score   Agree will kindly ask neuro for their input regarding further stroke provention as well as consideration for possible heparin bridge to coumadin vs continued asa 81mg    Other headache syndrome  Assessment & Plan  W/chronic cervicalgia requiring steroid injections  Headache has since improved  W/toradol reglan and magnesium   Change to mag oxide 400mg daily    A-fib (HCC)  Assessment & Plan  With [id number] score of 6 previously on Coumadin which was not restarted after episode of gib  -HR controlled off chronotropic agents  -Given retinal artery occlusion had d/w daughter at bedside  HAS-BLED risk is 3, pt has had 2 episodes of gib prior on AC   She has moderately-severe diverticulosis   -given cognitive issues and above risk factors the decision has been made to continue w/ASA given risk benefit ratios although pt's family have since changed their mind and are worried about the potential for further strokes and in their minds this outweighs her risk of bleed  They are asking neurology to revisit her further stroke provention mgmt options with them as they would like to restart coumadin      Hypertension  Assessment & Plan  Continue to monitor off antihypertensives  Can d/c permissive htn    Hyperlipidemia  Assessment & Plan  LDL well controlled given hx of cva in 46s  Continue lipitor at 20mg qhs as no evidence of acute cva    Carotid artery calcification, bilateral  Assessment & Plan  Moderate bilateral extracranial carotid artery calcification at bifurcation by CTA head and neck  Continue aspirin and statin    Alzheimer disease  Assessment & Plan  Continue namenda/aricept      VTE Pharmacologic Prophylaxis:   Pharmacologic: Heparin  Mechanical VTE Prophylaxis in Place: Yes    Patient Centered Rounds: I have performed bedside rounds with nursing staff today  Discussions with Specialists or Other Care Team Provider: AP for neuro    Education and Discussions with Family / Patient: long conversation regarding MDM, prognostics and risks and benefits of treatment options with family  Time Spent for Care: 45 minutes  More than 50% of total time spent on counseling and coordination of care as described above  Current Length of Stay: 3 day(s)    Current Patient Status: Inpatient   Certification Statement: The patient will continue to require additional inpatient hospital stay due to central retinal artery occlusion    Discharge Plan: to home w/home pt and 24H care pending possible heparin bridge to coumadin    Code Status: Level 1 - Full Code      Subjective:   Pt seen and eamined  No events oernight per nursing  Pt's daughters note she has minimal complaint of ha this morning  She has no cp/sob/ n/v/f/c    She is less agitated compared to yesterday after receiving diagnosis of central retinal artery occlusion  They are highly concerned about risk of further stroke  Discussed with them that this is high, but the unilateral vision loss, cognitive declines and prior cvas make her at higher risk of bleed especially as her awareness of her deficits  may wax/wane  They are requesting neurology revisiting the case and discussing r/b of full AC vs AP therapy for mgmt of further stroke provention    Objective:     Vitals:   Temp (24hrs), Av 1 °F (36 7 °C), Min:97 6 °F (36 4 °C), Max:98 9 °F (37 2 °C)    Temp:  [97 6 °F (36 4 °C)-98 9 °F (37 2 °C)] 97 6 °F (36 4 °C)  HR:  [78-86] 81  Resp:  [18] 18  BP: (146-181)/(65-81) 146/73  SpO2:  [96 %-97 %] 97 %  Body mass index is 25 63 kg/m²  Input and Output Summary (last 24 hours): Intake/Output Summary (Last 24 hours) at 2019 1426  Last data filed at 2019 1930  Gross per 24 hour   Intake 240 ml   Output 100 ml   Net 140 ml       Physical Exam:     Physical Exam   Constitutional: She appears well-developed  No distress  HENT:   Head: Normocephalic and atraumatic  Right Ear: External ear normal    Left Ear: External ear normal    Eyes: Conjunctivae are normal    Neck: Normal range of motion  Cardiovascular: Normal rate, regular rhythm and intact distal pulses  No murmur heard  Presently in nsr   Pulmonary/Chest: Effort normal and breath sounds normal  No respiratory distress  She has no wheezes  She has no rales  Abdominal: She exhibits no distension and no mass  There is no tenderness  There is no rebound and no guarding  Neurological: She is alert  Skin: Skin is warm and dry  She is not diaphoretic  Psychiatric: She has a normal mood and affect  Vitals reviewed      (  Be Sure to Include Physical Exam: Delete this entire line when you have entered your exam)    Additional Data:     Labs:    Results from last 7 days   Lab Units 19  1512 19  1511   WBC Thousand/uL  -- 8 41   HEMOGLOBIN g/dL  --  10 8*   I STAT HEMOGLOBIN g/dl 11 2*  --    HEMATOCRIT %  --  33 4*   HEMATOCRIT, ISTAT % 33*  --    PLATELETS Thousands/uL  --  321     Results from last 7 days   Lab Units 07/05/19  1512 07/05/19  1511   SODIUM mmol/L  --  136   POTASSIUM mmol/L  --  4 0   CHLORIDE mmol/L  --  97*   CO2 mmol/L  --  27   CO2, I-STAT mmol/L 27  --    BUN mg/dL  --  11   CREATININE mg/dL  --  0 76   ANION GAP mmol/L  --  12   CALCIUM mg/dL  --  9 5   GLUCOSE RANDOM mg/dL  --  126     Results from last 7 days   Lab Units 07/05/19  1511   INR  1 03     Results from last 7 days   Lab Units 07/05/19  1545   POC GLUCOSE mg/dl 113     Results from last 7 days   Lab Units 07/06/19  0558   HEMOGLOBIN A1C % 5 5               * I Have Reviewed All Lab Data Listed Above  * Additional Pertinent Lab Tests Reviewed: All Labs Within Last 24 Hours Reviewed    Imaging:    Imaging Reports Reviewed Today Include:   Imaging Personally Reviewed by Myself Includes:      Recent Cultures (last 7 days):           Last 24 Hours Medication List:     Current Facility-Administered Medications:  acetaminophen 650 mg Oral Q6H PRN Scott Isaac PA-C   aspirin 81 mg Oral Daily Bonnie Ang PA-C   atorvastatin 20 mg Oral Daily With [employer] ROGER Ang PA-C   donepezil 10 mg Oral HS Bonnie Ang PA-C   heparin (porcine) 5,000 Units Subcutaneous Q8H 3 [facility] ROGER Ang PA-C   lidocaine 1 patch Topical Daily Scott Isaac PA-C   magnesium oxide 400 mg Oral Daily Bonnie Ang PA-C   memantine 10 mg Oral BID Bonnie Ang PA-C   ondansetron 4 mg Intravenous Q6H PRN Bonnie Ang PA-C   pantoprazole 40 mg Oral Daily Before Breakfast Bonnie Castro PA-C        Today, Patient Was Seen By: Deepthi Valle PA-C    ** Please Note: Dictation voice to text software may have been used in the creation of this document   **

## 2019-07-08 NOTE — ASSESSMENT & PLAN NOTE
2* retinal artery occlusion likely 2* Afib off AC from prior GIB  -Admitted for cva pathway, but MRI negative for acute stroke  -Pt had frontal ha on admission which is now resolved, it was not typical for GCA  -ESR/CRP mildly elevated but stable and not suggestive of GCA  -Appreciate ophthalmology recommendations  -Pt recommending STR vs home w/24 H care  Daughters are interested in home w/pt as they believe they can adequately provide 24H supervision and notably have several measures setup to ensure their mother's well being  -Pt's daughter initially opted for baby asa but has since changed her mind and would prefer coumadin if possible  Had lengthy discussion regarding treatment modalities risks and benefits   Given blindness, neurocognitive deficits, Catholic status and known hx of gib x2, had long discussion regarding [id number] score vs has-bled score   Agree will kindly ask neuro for their input regarding further stroke provention as well as consideration for possible heparin bridge to coumadin vs continued asa 81mg

## 2019-07-08 NOTE — OCCUPATIONAL THERAPY NOTE
OccupationalTherapy Evaluation(nzif=[id number])     Patient Name: Lazaro Solomon  Today's Date: 7/8/2019  Problem List  Patient Active Problem List   Diagnosis    History of stroke    Alzheimer disease    [facility] [address] [facility]    Carotid artery calcification, bilateral    Hyperlipidemia    Hypertension    A-fib ([facility] [address] [facility]    Other headache syndrome    Chronic anticoagulation    Chronic UTI    Patient is Sabianist    Rectal bleed    Central retinal artery occlusion of left eye     Past Medical History  Past Medical History:   Diagnosis Date    A-fib ([facility] [address] [facility]     Alzheimer disease     Diverticulosis     Dyslipidemia     History of stroke     Hyperlipidemia     Hypertension     UTI (urinary tract infection)     Vasovagal syncope 2/13/2018     Past Surgical History  Past Surgical History:   Procedure Laterality Date    CHOLECYSTECTOMY           07/08/19 1800   Note Type   Note type Eval only   Restrictions/Precautions   Weight Bearing Precautions Per Order No   Other Precautions Fall Risk;Hard of hearing;Visual impairment;Pain;Cognitive   Pain Assessment   Pain Assessment FLACC   Pain Rating: FLACC (Rest) - Face 1   Pain Rating: FLACC (Rest) - Legs 1   Pain Rating: FLACC (Rest) - Activity 1   Pain Rating: FLACC (Rest) - Cry 1   Pain Rating: FLACC (Rest) - Consolability 2   Score: FLACC (Rest) 6   Home Living   Type of Home Apartment  (in-law suite)   Bathroom Equipment Shower chair;Grab bars in shower   Prior Function   Lives With Alone   Falls in the last 6 months 0   Lifestyle   Autonomy PTA family(i e dtrs)states pt was independent with her ADLs, transfers, ambulation--w/o device   Reciprocal Relationships 3 children   Service to Others worked as a    Intrinsic Gratification watching TV   Psychosocial   Psychosocial (WDL) X   Patient Behaviors/Mood Irritable   Subjective   Subjective "I have a headache and can't do anything NOW!"   ADL   Where Assessed Supine, bed Eating Assistance 5  Supervision/Setup   Grooming Assistance 5  Supervision/Setup   UB Bathing Assistance 5  Supervision/Setup   LB Bathing Assistance 4  Minimal Assistance   UB Dressing Assistance 5  Supervision/Setup   LB Dressing Assistance 4  Minimal Assistance   Bed Mobility   Rolling R 4  Minimal assistance   Additional items Assist x 1; Increased time required;Verbal cues;LE management   Rolling L 4  Minimal assistance   Additional items Assist x 1; Increased time required;Verbal cues;LE management   Transfers   Sit to Stand Unable to assess   Functional Mobility   Functional Mobility   (pt declining functional mobility)   Activity Tolerance   Activity Tolerance Patient limited by fatigue;Patient limited by pain   Medical Staff Made Aware nsg   RUE Assessment   RUE Assessment WFL   RUE Strength   RUE Overall Strength Within Functional Limits - able to perform ADL tasks with strength  (3+/5 throughout--at least, resistive to MMT)   LUE Assessment   LUE Assessment WFL   LUE Strength   LUE Overall Strength Within Functional Limits - able to perform ADL tasks with strength  (3+/5 throughout--at least, resistive to MMT)   Hand Function   Gross Motor Coordination Functional   Fine Motor Coordination Functional   Sensation   Light Touch No apparent deficits   Proprioception   Proprioception No apparent deficits   Vision-Basic Assessment   Current Vision   (glasses)   Vision - Complex Assessment   Acuity   (impaired)   Perception   Inattention/Neglect Appears intact   Cognition   Overall Cognitive Status Impaired   Arousal/Participation Alert   Attention Difficulty attending to directions   Orientation Level Oriented to person   Memory Decreased recall of precautions;Decreased short term memory   Following Commands Follows one step commands with increased time or repetition   Comments pt with hx Alzheimer's   Assessment   Limitation Decreased ADL status; Decreased UE strength;Decreased Safe judgement during ADL;Decreased cognition;Decreased endurance;Visual deficit; Decreased high-level ADLs   Prognosis Fair   Assessment Pt is a 86y/o female admitted to the hospital 2* symptoms of HA and L eye vision loss  Pt noted with central retinal artery occlusion, L eye  Pt with PMH Alzheimer's, CVA, and chronic pain  PTA family(i e dtrs)states pt was independent with her ADLs, transfers, ambulation--w/o device  During initial eval, pt demonstrated deficits with her functional mobility, activity tolerance(currently poor=5-10mins), ADL status, b/l UE strength, and cognition(i e problem-solving, judgement/safety)  Currently functional mobility assess limited 2* pt's refusal to get OOB  Family(i e dtrs) states pt appears close to her functional baseline and states no concerns about going directly home  If pt is able to demonstrate participation with tx, pt would benefit from continued OT tx for the above deficits  2-3xwk/1-2wks  Goals   Patient Goals "less pain"   STG Time Frame 3-5   Short Term Goal #1 Pt will tolerate continued functional mobility assessment and appropriate goals will be established  Short Term Goal #2 Pt will demonstrate Jennifer with their bed mobility to facilitate EOB ADLs  Short Term Goal  Pt will demonstrate improved activity tolerance to good(20-30mins) and standing tolerance to 3-5mins to assist with ADLs  LTG Time Frame   (5-10days)   Long Term Goal #1 Pt will demonstrate proper walker/transfer safety 100% of the time  Long Term Goal #2 Pt will demonstrate mod I with their UE and LE bathing/dresssing  Long Term Goal Pt will demonstrate improved b/l UE strength by 1/2 MM grade to assist with ADLs/transfers  Plan   Treatment Interventions ADL retraining;Functional transfer training;UE strengthening/ROM; Endurance training;Cognitive reorientation;Patient/family training;Equipment evaluation/education; Compensatory technique education;Continued evaluation   Goal Expiration Date 07/19/19 Treatment Day 0   OT Frequency 2-3x/wk   Recommendation   OT Discharge Recommendation   (continue P T  )   Barthel Index   Feeding 5   Bathing 0   Grooming Score 0   Dressing Score 5   Bladder Score 5   Bowels Score 10   Toilet Use Score 5   Transfers (Bed/Chair) Score 0   Mobility (Level Surface) Score 0   Stairs Score 0   Barthel Index Score 30   Katelyn Montano OT

## 2019-07-08 NOTE — PLAN OF CARE
Problem: Prexisting or High Potential for Compromised Skin Integrity  Goal: Skin integrity is maintained or improved  Description  INTERVENTIONS:  - Identify patients at risk for skin breakdown  - Assess and monitor skin integrity  - Assess and monitor nutrition and hydration status  - Monitor labs (i e  albumin)  - Assess for incontinence   - Turn and reposition patient  - Assist with mobility/ambulation  - Relieve pressure over bony prominences  - Avoid friction and shearing  - Provide appropriate hygiene as needed including keeping skin clean and dry  - Evaluate need for skin moisturizer/barrier cream  - Collaborate with interdisciplinary team (i e  Nutrition, Rehabilitation, etc )   - Patient/family teaching  Outcome: Progressing     Problem: PAIN - ADULT  Goal: Verbalizes/displays adequate comfort level or baseline comfort level  Description  Interventions:  - Encourage patient to monitor pain and request assistance  - Assess pain using appropriate pain scale  - Administer analgesics based on type and severity of pain and evaluate response  - Implement non-pharmacological measures as appropriate and evaluate response  - Consider cultural and social influences on pain and pain management  - Notify physician/advanced practitioner if interventions unsuccessful or patient reports new pain  Outcome: Progressing     Problem: INFECTION - ADULT  Goal: Absence or prevention of progression during hospitalization  Description  INTERVENTIONS:  - Assess and monitor for signs and symptoms of infection  - Monitor lab/diagnostic results  - Monitor all insertion sites, i e  indwelling lines, tubes, and drains  - Monitor endotracheal (as able) and nasal secretions for changes in amount and color  - Reading appropriate cooling/warming therapies per order  - Administer medications as ordered  - Instruct and encourage patient and family to use good hand hygiene technique  - Identify and instruct in appropriate isolation precautions for identified infection/condition  Outcome: Progressing     Problem: SAFETY ADULT  Goal: Patient will remain free of falls  Description  INTERVENTIONS:  - Assess patient frequently for physical needs  -  Identify cognitive and physical deficits and behaviors that affect risk of falls    -  [place] fall precautions as indicated by assessment   - Educate patient/family on patient safety including physical limitations  - Instruct patient to call for assistance with activity based on assessment  - Modify environment to reduce risk of injury  - Consider OT/PT consult to assist with strengthening/mobility  Outcome: Progressing  Goal: Maintain or return to baseline ADL function  Description  INTERVENTIONS:  -  Assess patient's ability to carry out ADLs; assess patient's baseline for ADL function and identify physical deficits which impact ability to perform ADLs (bathing, care of mouth/teeth, toileting, grooming, dressing, etc )  - Assess/evaluate cause of self-care deficits   - Assess range of motion  - Assess patient's mobility; develop plan if impaired  - Assess patient's need for assistive devices and provide as appropriate  - Encourage maximum independence but intervene and supervise when necessary  ¯ Involve family in performance of ADLs  ¯ Assess for home care needs following discharge   ¯ Request OT consult to assist with ADL evaluation and planning for discharge  ¯ Provide patient education as appropriate  Outcome: Progressing  Goal: Maintain or return mobility status to optimal level  Description  INTERVENTIONS:  - Assess patient's baseline mobility status (ambulation, transfers, stairs, etc )    - Identify cognitive and physical deficits and behaviors that affect mobility  - Identify mobility aids required to assist with transfers and/or ambulation (gait belt, sit-to-stand, lift, walker, cane, etc )  - [place] fall precautions as indicated by assessment  - Record patient progress and toleration of activity level on Mobility SBAR; progress patient to next Phase/Stage  - Instruct patient to call for assistance with activity based on assessment  - Request Rehabilitation consult to assist with strengthening/weightbearing, etc   Outcome: Progressing     Problem: DISCHARGE PLANNING  Goal: Discharge to home or other facility with appropriate resources  Description  INTERVENTIONS:  - Identify barriers to discharge w/patient and caregiver  - Arrange for needed discharge resources and transportation as appropriate  - Identify discharge learning needs (meds, wound care, etc )  - Arrange for interpretive services to assist at discharge as needed  - Refer to Case Management Department for coordinating discharge planning if the patient needs post-hospital services based on physician/advanced practitioner order or complex needs related to functional status, cognitive ability, or social support system  Outcome: Progressing     Problem: Knowledge Deficit  Goal: Patient/family/caregiver demonstrates understanding of disease process, treatment plan, medications, and discharge instructions  Description  Complete learning assessment and assess knowledge base  Interventions:  - Provide teaching at level of understanding  - Provide teaching via preferred learning methods  Outcome: Progressing     Problem: CARDIOVASCULAR - ADULT  Goal: Maintains optimal cardiac output and hemodynamic stability  Description  INTERVENTIONS:  - Monitor I/O, vital signs and rhythm  - Monitor for S/S and trends of decreased cardiac output i e  bleeding, hypotension  - Administer and titrate ordered vasoactive medications to optimize hemodynamic stability  - Assess quality of pulses, skin color and temperature  - Assess for signs of decreased coronary artery perfusion - ex   Angina  - Instruct patient to report change in severity of symptoms  Outcome: Progressing  Goal: Absence of cardiac dysrhythmias or at baseline rhythm  Description  INTERVENTIONS:  - Continuous cardiac monitoring, monitor vital signs, obtain 12 lead EKG if indicated  - Administer antiarrhythmic and heart rate control medications as ordered  - Monitor electrolytes and administer replacement therapy as ordered  Outcome: Progressing     Problem: MUSCULOSKELETAL - ADULT  Goal: Maintain or return mobility to safest level of function  Description  INTERVENTIONS:  - Assess patient's ability to carry out ADLs; assess patient's baseline for ADL function and identify physical deficits which impact ability to perform ADLs (bathing, care of mouth/teeth, toileting, grooming, dressing, etc )  - Assess/evaluate cause of self-care deficits   - Assess range of motion  - Assess patient's mobility; develop plan if impaired  - Assess patient's need for assistive devices and provide as appropriate  - Encourage maximum independence but intervene and supervise when necessary  - Involve family in performance of ADLs  - Assess for home care needs following discharge   - Request OT consult to assist with ADL evaluation and planning for discharge  - Provide patient education as appropriate  Outcome: Progressing     Problem: METABOLIC, FLUID AND ELECTROLYTES - ADULT  Goal: Electrolytes maintained within normal limits  Description  INTERVENTIONS:  - Monitor labs and assess patient for signs and symptoms of electrolyte imbalances  - Administer electrolyte replacement as ordered  - Monitor response to electrolyte replacements, including repeat lab results as appropriate  - Instruct patient on fluid and nutrition as appropriate  Outcome: Progressing     Problem: HEMATOLOGIC - ADULT  Goal: Maintains hematologic stability  Description  INTERVENTIONS  - Assess for signs and symptoms of bleeding or hemorrhage  - Monitor labs  - Administer supportive blood products/factors as ordered and appropriate  Outcome: Progressing     Problem: Potential for Falls  Goal: Patient will remain free of falls  Description  INTERVENTIONS:  - Assess patient frequently for physical needs  -  Identify cognitive and physical deficits and behaviors that affect risk of falls    -  [place] fall precautions as indicated by assessment   - Educate patient/family on patient safety including physical limitations  - Instruct patient to call for assistance with activity based on assessment  - Modify environment to reduce risk of injury  - Consider OT/PT consult to assist with strengthening/mobility  Outcome: Progressing

## 2019-07-08 NOTE — UTILIZATION REVIEW
Initial Clinical Review    Admission: Date/Time/Statement: inpatient 7/5/19 @ 1644  Acute stroke    Orders Placed This Encounter   Procedures    Inpatient Admission     Standing Status:   Standing     Number of Occurrences:   1     Order Specific Question:   Admitting Physician     Answer:   Missael Hercules [[id number]]     Order Specific Question:   Level of Care     Answer:   Med Surg [[id number]]     Order Specific Question:   Estimated length of stay     Answer:   More than 2 Midnights     Order Specific Question:   Certification     Answer:   I certify that inpatient services are medically necessary for this patient for a duration of greater than two midnights  See H&P and MD Progress Notes for additional information about the patient's course of treatment  ED Arrival Information     Expected Arrival Acuity Means of Arrival Escorted By Service Admission Type    - 7/5/2019 14:54 Emergent Walk-In Family Member General Medicine Emergency    Arrival Complaint    -        Chief Complaint   Patient presents with    Loss of Vision     per daughter pt called stating she couldn't see from left eye  symptoms started 1hr 15min ago  hx strokes     Assessment/Plan: 81 yo fem to ED from home admitted as inpatient due to acute CVA  Presents with acute OS blurry vision and inability to see from the OS associated w/ bifrontal headache and facial droop that began approx 2 hrs pta  Unable to see light in OS  Stroke alert called on arrival, no TPA due to GI bleed a month ago, while on coumadin for a fib  Visual field deficit with confrontation from R, most pronounced OS  Slight R facial droop  Brain imaging unremarkable  GCS 14 to 15  Stroke workup in progress, neuro consulted, MRI brain pending  7/5 per neuro: suspect L hemispheric CVA, likely cardio embolic, need to r/o vessel athermoa  Dense R homonymous hemianopsia  Continue neuro checks and stroke workup  NIH stroke score 4       ED Triage Vitals   Temperature Pulse Respirations Blood Pressure SpO2   07/05/19 1507 07/05/19 1502 07/05/19 1502 07/05/19 1502 07/05/19 1502   98 3 °F (36 8 °C) 80 18 (!) 201/89 99 %      Temp Source Heart Rate Source Patient Position - Orthostatic VS BP Location FiO2 (%)   07/05/19 1507 07/05/19 1502 07/05/19 1502 07/05/19 1502 --   Temporal Monitor Sitting Right arm       Pain Score       07/05/19 1600       Worst Possible Pain          Wt Readings from Last 1 Encounters:   07/05/19 67 7 kg (149 lb 4 8 oz)     Additional Vital Signs:   All on room air  Date/Time  Temp Pulse Resp BP SpO2   07/06/19 1156  97 8 °F (36 6 °C) 68 18 137/63 93 %   07/06/19 0808  98 7 °F (37 1 °C) 79 18 149/65 97 %   07/06/19 0445  97 5 °F (36 4 °C) 63 18 132/70 97 %   07/06/19 0245  97 5 °F (36 4 °C) 68 18 144/68 96 %   07/06/19 0045  97 6 °F (36 4 °C) 68 18 154/70 96 %   07/05/19 2345  97 1 °F (36 2 °C)Abnormal  89 18 168/76 97 %   07/05/19 2245  97 5 °F (36 4 °C) 82 18 164/74 98 %   07/05/19 2145  97 7 °F (36 5 °C) 80 18 160/76 98 %   07/05/19 2045  97 9 °F (36 6 °C) 85 18 158/80 99 %   07/05/19 1945  97 7 °F (36 5 °C) 77 18  96 %   07/05/19 1844  97 4 °F (36 3 °C)Abnormal  85 18 225/91Abnormal  91 %   07/05/19 1700   78 16 161/76 98 %   07/05/19 1600   80 16 179/76Abnormal  98 %   07/05/19 15:43:49   78 35Abnormal   98 %   07/05/19 15:35:50   81 39 197/74Abnormal  98 %   07/05/19 1515   76 20 189/74Abnormal  97 %     Date and Time Eye Opening Best Verbal Response Best Motor Response [place] Coma Scale Score   07/06/19 0445 4 4 6 14   07/06/19 0245 4 4 6 14   07/06/19 0045 4 4 6 14   07/05/19 2245 4 4 6 14   07/05/19 2145 4 4 6 14   07/05/19 2045 4 4 6 14   07/05/19 1945 4 4 6 14   07/05/19 1900 4 4 6 14   07/05/19 1700 4 5 6 15   07/05/19 1600 4 5 6 15   07/05/19 1546 4 5 6 15   07/05/19 1530 4 5 6 15   07/05/19 1505 4 5 6 15       Pertinent Labs/Diagnostic Test Results:   Results from last 7 days   Lab Units 07/05/19  1512 07/05/19  1511   WBC Thousand/uL  -- 8 41   HEMOGLOBIN g/dL  --  10 8*   I STAT HEMOGLOBIN g/dl 11 2*  --    HEMATOCRIT %  --  33 4*   HEMATOCRIT, ISTAT % 33*  --    PLATELETS Thousands/uL  --  321     Results from last 7 days   Lab Units 07/05/19  1512 07/05/19  1511   SODIUM mmol/L  --  136   POTASSIUM mmol/L  --  4 0   CHLORIDE mmol/L  --  97*   CO2 mmol/L  --  27   CO2, I-STAT mmol/L 27  --    ANION GAP mmol/L  --  12   BUN mg/dL  --  11   CREATININE mg/dL  --  0 76   EGFR ml/min/1 73sq m 79 72   CALCIUM mg/dL  --  9 5   CALCIUM, IONIZED, ISTAT mmol/L 1 25  --      Results from last 7 days   Lab Units 07/05/19  1545   POC GLUCOSE mg/dl 113     Results from last 7 days   Lab Units 07/05/19  1511   GLUCOSE RANDOM mg/dL 126     Results from last 7 days   Lab Units 07/06/19  0558   HEMOGLOBIN A1C % 5 5   EAG mg/dl 111     Results from last 7 days   Lab Units 07/05/19  1511   PROTIME seconds 13 6   INR  1 03   PTT seconds 31     7/5 CTA head: Moderate to marked right vertebral artery origin stenosis at the origin  Fluid attenuating material in the proximal esophagus suggesting gastric esophageal reflux with wall thickening suggesting esophagitis    7/5 CT brain: nothing acute  7/5 PCXR: nothing acute  7/6 MRI brain: nothing acute, advance disease  7/6 echo: pending  No EKG found    ED Treatment:   Medication Administration from 07/05/2019 1454 to 07/05/2019 1759       Date/Time Order Dose Route Action     07/05/2019 1610 aspirin chewable tablet 324 mg 324 mg Oral Given     07/05/2019 1643 metoclopramide (REGLAN) injection 10 mg 10 mg Intravenous Given     07/05/2019 1606 ketorolac (TORADOL) injection 15 mg 15 mg Intravenous Given        Past Medical History:   Diagnosis Date    A-fib ([facility] [address] )     Alzheimer disease     Diverticulosis     Dyslipidemia     History of stroke     Hyperlipidemia     Hypertension     UTI (urinary tract infection)     Vasovagal syncope 2/13/2018     Present on Admission:   Alzheimer disease   Hyperlipidemia   Hypertension   [facility]fib [facility] [address] )   Other headache syndrome   Patient is Zoroastrianism   Carotid artery calcification, bilateral      Admitting Diagnosis: Loss of vision [H54 7]  History of stroke [Z86 73]  Age/Sex: 80 y o  female  Admission Orders:    Current Facility-Administered Medications:  acetaminophen 650 mg Oral Q6H PRN   aspirin 81 mg Oral Daily   atorvastatin 20 mg Oral Daily With Dinner   donepezil 10 mg Oral HS   heparin (porcine) 5,000 Units Subcutaneous Q8H [address]   lidocaine 1 patch Topical Daily   magnesium sulfate 2 g Intravenous Q24H DANIELLE   memantine 10 mg Oral BID   ondansetron 4 mg Intravenous Q6H PRN   pantoprazole 40 mg Oral Daily Before Breakfast     Tele  SCD's  Echo  Dysphagia eval, no risk-House diet  Neuro checks Every 1 hour x 4 hours, then every 2 hours x 8 hours, then every 4 hours x 72 hours  IP CONSULT TO NEUROLOGY  IP CONSULT TO BLOOD MANAGEMENT  IP CONSULT TO OPHTHALMOLOGY      Network Utilization Review Department  Phone: [phone]; Fax [phone]  [email or web address]  ATTENTION: Please call with any questions or concerns to [phone]  and carefully listen to the prompts so that you are directed to the right person  Send all requests for admission clinical reviews, approved or denied determinations and any other requests to fax [phone]   All voicemails are confidential

## 2019-07-08 NOTE — PLAN OF CARE
Problem: OCCUPATIONAL THERAPY ADULT  Goal: Performs self-care activities at highest level of function for planned discharge setting  See evaluation for individualized goals  Description  Treatment Interventions: ADL retraining, Functional transfer training, UE strengthening/ROM, Endurance training, Cognitive reorientation, Patient/family training, Equipment evaluation/education, Compensatory technique education, Continued evaluation          See flowsheet documentation for full assessment, interventions and recommendations  Note:   Limitation: Decreased ADL status, Decreased UE strength, Decreased Safe judgement during ADL, Decreased cognition, Decreased endurance, Visual deficit, Decreased high-level ADLs  Prognosis: Fair  Assessment: Pt is a 84y/o female admitted to the hospital 2* symptoms of HA and L eye vision loss  Pt noted with central retinal artery occlusion, L eye  Pt with PMH Alzheimer's, CVA, and chronic pain  PTA family(i e dtrs)states pt was independent with her ADLs, transfers, ambulation--w/o device  During initial eval, pt demonstrated deficits with her functional mobility, activity tolerance(currently poor=5-10mins), ADL status, b/l UE strength, and cognition(i e problem-solving, judgement/safety)  Currently functional mobility assess limited 2* pt's refusal to get OOB  Family(i e dtrs) states pt appears close to her functional baseline and states no concerns about going directly home  If pt is able to demonstrate participation with tx, pt would benefit from continued OT tx for the above deficits  2-3xwk/1-2wks        OT Discharge Recommendation: (continue P T  )

## 2019-07-08 NOTE — ASSESSMENT & PLAN NOTE
With [id number] score of 6 previously on Coumadin which was not restarted after episode of gib  -HR controlled off chronotropic agents  -Given retinal artery occlusion had d/w daughter at bedside  HAS-BLED risk is 3, pt has had 2 episodes of gib prior on AC  She has moderately-severe diverticulosis   -given cognitive issues and above risk factors the decision has been made to continue w/ASA given risk benefit ratios although pt's family have since changed their mind and are worried about the potential for further strokes and in their minds this outweighs her risk of bleed    They are asking neurology to revisit her further stroke provention mgmt options with them as they would like to restart coumadin

## 2019-07-08 NOTE — CONSULTS
[place] [address] [place] [address]  Patient name Lazaro Solomon  Age: 80 y o   : 1933  MRN: [id number]    Hospital:     [facility]  Date of Consultation: 2019  Referred by: Josefa Parham, [facility]  Reason for Consult: Loss of vision  Chief complaint: Blurred vision left eye  History of illness: I was asked to see this 80year old female for left vision loss  Patient is a poor historian due to dementia, but spoke to her daughter who relayed the information that the patient called her a day earlier with complaint of blurred vision in the left eye and was brought to the [facility] ED for evaluation  Her ocular history is significant for bilateral cataract surgeries  She is presently on no ocular medication and there is no history of vision loss  Relevant past ocular history/ Eye  Meds: None  Relevant chart review findings from below:   PMH/Chart reviewed in Medical record:   Past Medical History:   Diagnosis Date    A-fib ([address] )     Alzheimer disease     Diverticulosis     Dyslipidemia     History of stroke     Hyperlipidemia     Hypertension     UTI (urinary tract infection)     Vasovagal syncope 2018     Past Surgical History:   Procedure Laterality Date    CHOLECYSTECTOMY        Social History    Social History     Substance and Sexual Activity   Alcohol Use No     Social History     Tobacco Use   Smoking Status Former Smoker   Smokeless Tobacco Never Used     Social History     Substance and Sexual Activity   Drug Use No     [email or web address]  Allergies: Allergies   Allergen Reactions    Ativan [Lorazepam]     Latuda [Lurasidone]      Ros: Reviewed in Medical Record  S  H shoulder  F H  History reviewed  No pertinent family history    Dilated: 1% Mydriacyl    Mental status: Patient is mildly sedated and confused    Radiologic studies:  MRI of the brain showed no acute stroke  CTA of brain showed no significant stenosis      Va    Right 20/30           Left    20/ CF @ 1 "  T      15   / 15    Anterior Segment:    External Normal   Pupils Round Reactive   Motility Versions Full   Anterior Segment: Conjunctiva Normal   Sclera Intact   Cornea (epithel,stroma,endothel) Clear   Anterior chamber Deep and Clear  lens IOL in place   Posterior Segment:    Vitreous Clear   Optic nerve:  Disc sharp, 0 3 cup  ou  Macular drusen OD, Macular edema 1+ OS  Vessels Normal ou, no embolus noted  Periphery Normal ou    Dx:  Central retinal artery occlusion Left eye  Age related macular degeneration  Both eyes  Pseudophakia Both eyes    Rx:  Sed rate not in the range of GCA but needs follow up   No intervention helpful at this time  Anti-coagulation as per medicine  She will need follow up in the office       Follow up:  call office [phone]   after discharge  Sherrie Arndt MD

## 2019-07-09 ENCOUNTER — TELEPHONE (OUTPATIENT)
Dept: NEUROLOGY | Facility: CLINIC | Age: 84
End: 2019-07-09

## 2019-07-09 VITALS
BODY MASS INDEX: 25.49 KG/M2 | RESPIRATION RATE: 20 BRPM | DIASTOLIC BLOOD PRESSURE: 80 MMHG | SYSTOLIC BLOOD PRESSURE: 185 MMHG | HEART RATE: 80 BPM | OXYGEN SATURATION: 97 % | WEIGHT: 149.3 LBS | HEIGHT: 64 IN | TEMPERATURE: 97.5 F

## 2019-07-09 PROCEDURE — 99239 HOSP IP/OBS DSCHRG MGMT >30: CPT | Performed by: PHYSICIAN ASSISTANT

## 2019-07-09 RX ORDER — DABIGATRAN ETEXILATE 150 MG/1
150 CAPSULE, COATED PELLETS ORAL EVERY 12 HOURS SCHEDULED
Refills: 0
Start: 2019-07-09

## 2019-07-09 RX ORDER — AMLODIPINE BESYLATE 2.5 MG/1
2.5 TABLET ORAL DAILY
Qty: 30 TABLET | Refills: 0 | Status: SHIPPED | OUTPATIENT
Start: 2019-07-09 | End: 2019-08-09 | Stop reason: ALTCHOICE

## 2019-07-09 RX ADMIN — MEMANTINE 10 MG: 5 TABLET ORAL at 08:08

## 2019-07-09 RX ADMIN — DABIGATRAN ETEXILATE MESYLATE 150 MG: 150 CAPSULE ORAL at 08:08

## 2019-07-09 RX ADMIN — MAGNESIUM OXIDE TAB 400 MG (241.3 MG ELEMENTAL MG) 400 MG: 400 (241.3 MG) TAB at 08:08

## 2019-07-09 RX ADMIN — PANTOPRAZOLE SODIUM 40 MG: 40 TABLET, DELAYED RELEASE ORAL at 06:19

## 2019-07-09 RX ADMIN — ACETAMINOPHEN 650 MG: 325 TABLET ORAL at 06:19

## 2019-07-09 NOTE — ASSESSMENT & PLAN NOTE
Moderate bilateral extracranial carotid artery calcification at bifurcation by CTA head and neck  Continue statin no asa

## 2019-07-09 NOTE — ASSESSMENT & PLAN NOTE
With [id number] score of 6 previously on Coumadin which was not restarted after episode of gib  -HR controlled off chronotropic agents  -Given retinal artery occlusion had d/w daughter at bedside  HAS-BLED risk is 3, pt has had 2 episodes of gib prior on AC  She has moderately-severe diverticulosis   -given cognitive issues and above risk factors the decision has been made to continue w/ASA given risk benefit ratios although pt's family have since changed their mind and are worried about the potential for further strokes and in their minds this outweighs her risk of bleed    They are asking neurology to revisit her further stroke provention mgmt options and have opted for pradaxa as above

## 2019-07-09 NOTE — ASSESSMENT & PLAN NOTE
2* retinal artery occlusion likely 2* Afib off AC from prior GIB  -Admitted for cva pathway, but MRI negative for acute stroke  -Pt had frontal ha on admission which is now resolved, it was not typical for GCA  -ESR/CRP mildly elevated but stable and not suggestive of GCA  -Appreciate ophthalmology recommendations  -Pt recommending STR vs home w/24 H care  Daughters are interested in home w/pt as they believe they can adequately provide 24H supervision and notably have several measures setup to ensure their mother's well being  -Pt's daughter initially opted for baby asa but has since changed her mind and would prefer coumadin if possible  Had lengthy discussion regarding treatment modalities risks and benefits  Given blindness, neurocognitive deficits, Pentec[employer]al status and known hx of gib x2, had long discussion regarding [id number] score vs has-bled score  -Appreciate Neurology recommendations  They have cautiously agreed with the patient's family to initiate Pradaxa 150 mg b i d  There is no co-pay per Case Management for this medication  Strongly emphasized with daughter at bedside to avoid NSAIDs and if the patient starts to have any falls to further discuss the risk and benefits of this medication with her primary care provider   She is being d/c'd home 24H supervision provided by family and home pt

## 2019-07-09 NOTE — NURSING NOTE
Reviewed and discussed pt's d/c instructions  RN educated family d/t pt's hx of dementia the s/s of [facility] reviewed and discussed pt's medications does and times due

## 2019-07-09 NOTE — DISCHARGE SUMMARY
Discharge- Danette Mazariegos 1933, 80 y o  female MRN: [id number]    Unit/Bed#: E4 -01 Encounter: [id number]    Primary Care Provider: Jason Young DO   Date and time admitted to hospital: 7/5/2019  2:56 PM      Discharge Summary - [facility]    Patient Information: Danette Mazariegos 80 y o  female MRN: [id number]  Unit/Bed#: E4 -01 Encounter: [id number]    Discharging Physician / Practitioner: Kishore Penaloza PA-C  PCP: Jason Young DO  Admission Date: 7/5/2019  Discharge Date: 07/09/19    Disposition:     Home with VNA Services (Reminder: Complete face to face encounter)    Reason for Admission: vision loss-retinal artery occlusion    Discharge Diagnoses:     Principal Problem:    Central retinal artery occlusion of left eye  Active Problems:    Alzheimer disease    Carotid artery calcification, bilateral    Hyperlipidemia    Hypertension    A-fib ([address] )    Other headache syndrome    Patient is Yarsani  Resolved Problems:    * No resolved hospital problems  *      Consultations During Hospital Stay:  · Neurology  · Pt  · ot  · ophthalmology    Procedures Performed:     ·     Significant Findings / Test Results:     · Mri brain w/o contrast-no acute stroke/hemorrhage/mass   · cta stroke alert head/neck no significant vascular disease-moderate vertebral artery stenosis of right verteberal artery  · tte w/lvef 48% grade 2 diastolic dysfunction and mild pulm htn and moderate pulm regurgitation    Incidental Findings:   ·      Test Results Pending at Discharge (will require follow up):   ·      Outpatient Tests Requested:  ·     Complications:      Hospital Course:     Danette Mazariegos is a 80 y o  female patient who originally presented to the hospital on 7/5/2019 presents with pmh of hx of cva, dementia, htn/hld/afib, htn, carotid artery stenosis, headache coming to hospital for acute onset vision loss  She is accompanied by her daughters who are at bedside    Pt reports acute onset blurry vision at approximately 1pm per daughter in her left eye  Today otherwise she was in her normal state of health  She also noted b/l frontal HA which is atypical for her as she typically gets cervicalgia requiring steroid injections per daughter  She she was seen as a stroke alert in the ED  TPA was not administered as patient had a recent GI bleed about a month and a half prior due to diverticular bleed while on Coumadin for her AFib        We are asked to admit the patient for acute stroke    Hospital stay by problem list below  _________________________________________________________  * Central retinal artery occlusion of left eye  Assessment & Plan  2* retinal artery occlusion likely 2* Afib off AC from prior GIB  -Admitted for cva pathway, but MRI negative for acute stroke  -Pt had frontal ha on admission which is now resolved, it was not typical for GCA  -ESR/CRP mildly elevated but stable and not suggestive of GCA  -Appreciate ophthalmology recommendations  -Pt recommending STR vs home w/24 H care  Daughters are interested in home w/pt as they believe they can adequately provide 24H supervision and notably have several measures setup to ensure their mother's well being  -Pt's daughter initially opted for baby asa but has since changed her mind and would prefer coumadin if possible  Had lengthy discussion regarding treatment modalities risks and benefits  Given blindness, neurocognitive deficits, Rastafari status and known hx of gib x2, had long discussion regarding [id number] score vs has-bled score  -Appreciate Neurology recommendations  They have cautiously agreed with the patient's family to initiate Pradaxa 150 mg b i d  There is no co-pay per Case Management for this medication    Strongly emphasized with daughter at bedside to avoid NSAIDs and if the patient starts to have any falls to further discuss the risk and benefits of this medication with her primary care provider  She is being d/c'd home 24H supervision provided by family and home pt    Other headache syndrome  Assessment & Plan  W/chronic cervicalgia requiring steroid injections  Headache has since improved  W/toradol reglan and magnesium   Change to mag oxide 400mg daily  D/w daughter to avoid all nsaids thereafter    A-fib [facility])  Assessment & Plan  With [id number] score of 6 previously on Coumadin which was not restarted after episode of gib  -HR controlled off chronotropic agents  -Given retinal artery occlusion had d/w daughter at bedside  HAS-BLED risk is 3, pt has had 2 episodes of gib prior on AC  She has moderately-severe diverticulosis   -given cognitive issues and above risk factors the decision has been made to continue w/ASA given risk benefit ratios although pt's family have since changed their mind and are worried about the potential for further strokes and in their minds this outweighs her risk of bleed  They are asking neurology to revisit her further stroke provention mgmt options and have opted for pradaxa as above    Hypertension  Assessment & Plan  sbp 140-180 here routinely over last 2 days  Start norvasc 2 5mg po daily and check bp at home w/f/u with pcp    Hyperlipidemia  Assessment & Plan  LDL well controlled given hx of cva in 46s  Continue lipitor at 20mg qhs as no evidence of acute cva    Carotid artery calcification, bilateral  Assessment & Plan  Moderate bilateral extracranial carotid artery calcification at bifurcation by CTA head and neck  Continue statin no asa    Alzheimer disease  Assessment & Plan  Continue namenda/aricept          Condition at Discharge: fair     Discharge Day Visit / Exam:     Subjective:  Patient seen examined  No events overnight per nursing  Patient has had no complaints of headache per daughter at bedside  They are anxious to go home  No new focal neuro deficits    Patient is comfortable and without complaint  Vitals: Blood Pressure: (!) 185/80 (07/09/19 6860)  Pulse: 80 (07/09/19 0742)  Temperature: 97 5 °F (36 4 °C) (07/09/19 0742)  Temp Source: Tympanic (07/09/19 0742)  Respirations: 20 (07/09/19 0742)  Height: 5' 4" (162 6 cm) (07/05/19 1844)  Weight - Scale: 67 7 kg (149 lb 4 8 oz) (07/05/19 1844)  SpO2: 97 % (07/09/19 0742)  Exam:   Physical Exam   Constitutional: She appears well-developed  No distress  HENT:   Head: Normocephalic and atraumatic  Right Ear: External ear normal    Left Ear: External ear normal    Eyes: Conjunctivae are normal    Neck: Normal range of motion  Cardiovascular: Normal rate and regular rhythm  Exam reveals no gallop and no friction rub  No murmur heard  Pulmonary/Chest: No stridor  No respiratory distress  She has no wheezes  She has no rales  Abdominal: She exhibits no distension and no mass  There is no tenderness  There is no guarding  Neurological: She is alert  Skin: Skin is warm and dry  She is not diaphoretic  Psychiatric: She has a normal mood and affect  (  Be Sure to Include Physical Exam: Delete this entire line when you have entered your exam)  Discussion with Family: discussed w/daughter at bedside follow up needs at length    Discharge instructions/Information to patient and family:   See after visit summary for information provided to patient and family  Provisions for Follow-Up Care:  See after visit summary for information related to follow-up care and any pertinent home health orders  Planned Readmission:  none     Discharge Statement:  I spent 45 minutes discharging the patient  This time was spent on the day of discharge  I had direct contact with the patient on the day of discharge  Greater than 50% of the total time was spent examining patient, answering all patient questions, arranging and discussing plan of care with patient as well as directly providing post-discharge instructions  Additional time then spent on discharge activities      Discharge Medications:  See after visit summary for reconciled discharge medications provided to patient and family        ** Please Note: This note has been constructed using a voice recognition system **

## 2019-07-09 NOTE — PLAN OF CARE
Problem: Prexisting or High Potential for Compromised Skin Integrity  Goal: Skin integrity is maintained or improved  Description  INTERVENTIONS:  - Identify patients at risk for skin breakdown  - Assess and monitor skin integrity  - Assess and monitor nutrition and hydration status  - Monitor labs (i e  albumin)  - Assess for incontinence   - Turn and reposition patient  - Assist with mobility/ambulation  - Relieve pressure over bony prominences  - Avoid friction and shearing  - Provide appropriate hygiene as needed including keeping skin clean and dry  - Evaluate need for skin moisturizer/barrier cream  - Collaborate with interdisciplinary team (i e  Nutrition, Rehabilitation, etc )   - Patient/family teaching  7/9/2019 1428 by Jzamín Luis RN  Outcome: Adequate for Discharge  7/9/2019 0744 by Jazmín Luis RN  Outcome: Progressing     Problem: PAIN - ADULT  Goal: Verbalizes/displays adequate comfort level or baseline comfort level  Description  Interventions:  - Encourage patient to monitor pain and request assistance  - Assess pain using appropriate pain scale  - Administer analgesics based on type and severity of pain and evaluate response  - Implement non-pharmacological measures as appropriate and evaluate response  - Consider cultural and social influences on pain and pain management  - Notify physician/advanced practitioner if interventions unsuccessful or patient reports new pain  7/9/2019 1428 by Jazmín Luis RN  Outcome: Adequate for Discharge  7/9/2019 0744 by Jazmín Luis RN  Outcome: Progressing     Problem: INFECTION - ADULT  Goal: Absence or prevention of progression during hospitalization  Description  INTERVENTIONS:  - Assess and monitor for signs and symptoms of infection  - Monitor lab/diagnostic results  - Monitor all insertion sites, i e  indwelling lines, tubes, and drains  - Monitor endotracheal (as able) and nasal secretions for changes in amount and color  - [place] appropriate cooling/warming therapies per order  - Administer medications as ordered  - Instruct and encourage patient and family to use good hand hygiene technique  - Identify and instruct in appropriate isolation precautions for identified infection/condition  7/9/2019 1428 by Stevie Sesay RN  Outcome: Adequate for Discharge  7/9/2019 0744 by Stevie Sesay RN  Outcome: Progressing     Problem: SAFETY ADULT  Goal: Patient will remain free of falls  Description  INTERVENTIONS:  - Assess patient frequently for physical needs  -  Identify cognitive and physical deficits and behaviors that affect risk of falls    -  [place] fall precautions as indicated by assessment   - Educate patient/family on patient safety including physical limitations  - Instruct patient to call for assistance with activity based on assessment  - Modify environment to reduce risk of injury  - Consider OT/PT consult to assist with strengthening/mobility  7/9/2019 1428 by Stevie Sesay RN  Outcome: Adequate for Discharge  7/9/2019 0744 by Stevie Sesay RN  Outcome: Progressing  Goal: Maintain or return to baseline ADL function  Description  INTERVENTIONS:  -  Assess patient's ability to carry out ADLs; assess patient's baseline for ADL function and identify physical deficits which impact ability to perform ADLs (bathing, care of mouth/teeth, toileting, grooming, dressing, etc )  - Assess/evaluate cause of self-care deficits   - Assess range of motion  - Assess patient's mobility; develop plan if impaired  - Assess patient's need for assistive devices and provide as appropriate  - Encourage maximum independence but intervene and supervise when necessary  ¯ Involve family in performance of ADLs  ¯ Assess for home care needs following discharge   ¯ Request OT consult to assist with ADL evaluation and planning for discharge  ¯ Provide patient education as appropriate  7/9/2019 1428 by Stevie Sesay RN  Outcome: Adequate for Discharge  7/9/2019 0744 by Sigrid Smith RN  Outcome: Progressing  Goal: Maintain or return mobility status to optimal level  Description  INTERVENTIONS:  - Assess patient's baseline mobility status (ambulation, transfers, stairs, etc )    - Identify cognitive and physical deficits and behaviors that affect mobility  - Identify mobility aids required to assist with transfers and/or ambulation (gait belt, sit-to-stand, lift, walker, cane, etc )  - [place] fall precautions as indicated by assessment  - Record patient progress and toleration of activity level on Mobility SBAR; progress patient to next Phase/Stage  - Instruct patient to call for assistance with activity based on assessment  - Request Rehabilitation consult to assist with strengthening/weightbearing, etc   7/9/2019 1428 by Sigrid Smith RN  Outcome: Adequate for Discharge  7/9/2019 0744 by Sigrid Smith RN  Outcome: Progressing     Problem: DISCHARGE PLANNING  Goal: Discharge to home or other facility with appropriate resources  Description  INTERVENTIONS:  - Identify barriers to discharge w/patient and caregiver  - Arrange for needed discharge resources and transportation as appropriate  - Identify discharge learning needs (meds, wound care, etc )  - Arrange for interpretive services to assist at discharge as needed  - Refer to Case Management Department for coordinating discharge planning if the patient needs post-hospital services based on physician/advanced practitioner order or complex needs related to functional status, cognitive ability, or social support system  7/9/2019 1428 by Sigrid Smith RN  Outcome: Adequate for Discharge  7/9/2019 0744 by Sigrid Smith RN  Outcome: Progressing     Problem: Knowledge Deficit  Goal: Patient/family/caregiver demonstrates understanding of disease process, treatment plan, medications, and discharge instructions  Description  Complete learning assessment and assess knowledge base   Interventions:  - Provide teaching at level of understanding  - Provide teaching via preferred learning methods  7/9/2019 1428 by Ritika Glez RN  Outcome: Adequate for Discharge  7/9/2019 0744 by Ritika Glez RN  Outcome: Progressing     Problem: CARDIOVASCULAR - ADULT  Goal: Maintains optimal cardiac output and hemodynamic stability  Description  INTERVENTIONS:  - Monitor I/O, vital signs and rhythm  - Monitor for S/S and trends of decreased cardiac output i e  bleeding, hypotension  - Administer and titrate ordered vasoactive medications to optimize hemodynamic stability  - Assess quality of pulses, skin color and temperature  - Assess for signs of decreased coronary artery perfusion - ex   Angina  - Instruct patient to report change in severity of symptoms  7/9/2019 1428 by Ritika Glez RN  Outcome: Adequate for Discharge  7/9/2019 0744 by Riitka Glez RN  Outcome: Progressing  Goal: Absence of cardiac dysrhythmias or at baseline rhythm  Description  INTERVENTIONS:  - Continuous cardiac monitoring, monitor vital signs, obtain 12 lead EKG if indicated  - Administer antiarrhythmic and heart rate control medications as ordered  - Monitor electrolytes and administer replacement therapy as ordered  7/9/2019 1428 by Ritika Glez RN  Outcome: Adequate for Discharge  7/9/2019 0744 by Ritika Glez RN  Outcome: Progressing     Problem: MUSCULOSKELETAL - ADULT  Goal: Maintain or return mobility to safest level of function  Description  INTERVENTIONS:  - Assess patient's ability to carry out ADLs; assess patient's baseline for ADL function and identify physical deficits which impact ability to perform ADLs (bathing, care of mouth/teeth, toileting, grooming, dressing, etc )  - Assess/evaluate cause of self-care deficits   - Assess range of motion  - Assess patient's mobility; develop plan if impaired  - Assess patient's need for assistive devices and provide as appropriate  - Encourage maximum independence but intervene and supervise when necessary  - Involve family in performance of ADLs  - Assess for home care needs following discharge   - Request OT consult to assist with ADL evaluation and planning for discharge  - Provide patient education as appropriate  7/9/2019 1428 by Arnaud Knowles RN  Outcome: Adequate for Discharge  7/9/2019 0744 by Arnaud Knowles RN  Outcome: Progressing     Problem: METABOLIC, FLUID AND ELECTROLYTES - ADULT  Goal: Electrolytes maintained within normal limits  Description  INTERVENTIONS:  - Monitor labs and assess patient for signs and symptoms of electrolyte imbalances  - Administer electrolyte replacement as ordered  - Monitor response to electrolyte replacements, including repeat lab results as appropriate  - Instruct patient on fluid and nutrition as appropriate  7/9/2019 1428 by Arnaud Knowles RN  Outcome: Adequate for Discharge  7/9/2019 0744 by Arnaud Knowles RN  Outcome: Progressing     Problem: HEMATOLOGIC - ADULT  Goal: Maintains hematologic stability  Description  INTERVENTIONS  - Assess for signs and symptoms of bleeding or hemorrhage  - Monitor labs  - Administer supportive blood products/factors as ordered and appropriate  7/9/2019 1428 by Arnaud Knowles RN  Outcome: Adequate for Discharge  7/9/2019 0744 by Arnaud Knowles RN  Outcome: Progressing     Problem: Potential for Falls  Goal: Patient will remain free of falls  Description  INTERVENTIONS:  - Assess patient frequently for physical needs  -  Identify cognitive and physical deficits and behaviors that affect risk of falls    -  [place] fall precautions as indicated by assessment   - Educate patient/family on patient safety including physical limitations  - Instruct patient to call for assistance with activity based on assessment  - Modify environment to reduce risk of injury  - Consider OT/PT consult to assist with strengthening/mobility  7/9/2019 1428 by Arnaud Knowles RN  Outcome: Adequate for Discharge  7/9/2019 0744 by Sigrid Smith RN  Outcome: Progressing     Problem: DISCHARGE PLANNING - CARE MANAGEMENT  Goal: Discharge to post-acute care or home with appropriate resources  Description  INTERVENTIONS:    [facility] for RN and PT      - Conduct assessment to determine patient/family and health care team treatment goals, and need for post-acute services based on payer coverage, community resources, and patient preferences, and barriers to discharge  - Address psychosocial, clinical, and financial barriers to discharge as identified in assessment in conjunction with the patient/family and health care team  - Arrange appropriate level of post-acute services according to patient's   needs and preference and payer coverage in collaboration with the physician and health care team  - Communicate with and update the patient/family, physician, and health care team regarding progress on the discharge plan  - Arrange appropriate transportation to post-acute venues   Outcome: Adequate for Discharge

## 2019-07-09 NOTE — ASSESSMENT & PLAN NOTE
sbp 140-180 here routinely over last 2 days  Start norvasc 2 5mg po daily and check bp at home w/f/u with pcp

## 2019-07-09 NOTE — DISCHARGE INSTRUCTIONS
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 with left eye central retinal artery occlusion  This was diagnosed after an MRI of your brain did not find any evidence of stroke within the brain itself  This is an 'embolic' event from the history of atrial fibrillation, which is the act of clot being formed from the irregularity of the a fib and then moving from the heart up into the main artery of the left eye  Unfortunately the prognosis for recovery is typically very limited and blindness that occurs does not routinely improve  You were recommended to follow up with the eye doctor for this after your hospital stay so they can continue to monitor the pressure she has in her left eye  You were started on pradaxa which is a blood thinner to prevent blood clots with A fib  Please note that there is risk of bleeding from trauma such as falls etc   You are recommended not to keep taking toradol or motrin/ibuprofen, aspirin, advil or naprosyn with this medication as this is increases even further your risk of bleed, particularly in the gastrointestinal tract  You were started on a very low dose of a blood pressure medication as your blood pressure (systolic) was between 010-342 routinely over the last several days of your hospitalization  This may improve or may persist and need further adjustment to provide blood pressure control out of unsafe ranges  Please check your blood pressure at home at least daily and discuss further treatment with your family doctor  Please follow up with neurology as well as listed from your history of stroke and dementia/cognitive decline  Below is general information on a fib       ______________________________________________________________________________________________________________________  A-fib (Atrial Fibrillation)   WHAT YOU NEED TO KNOW:   A-fib may come and go, or it may be a long-term condition  A-fib can cause blood clots, stroke, or heart failure   These conditions may become life-threatening  It is important to treat and manage a-fib to help prevent a blood clot, stroke, or heart failure  DISCHARGE INSTRUCTIONS:   Call 911 for any of the following:   · You have any of the following signs of a heart attack:      ¨ Squeezing, pressure, or pain in your chest that lasts longer than 5 minutes or returns    ¨ Discomfort or pain in your back, neck, jaw, stomach, or arm     ¨ Trouble breathing    ¨ Nausea or vomiting    ¨ Lightheadedness or a sudden cold sweat, especially with chest pain or trouble breathing    · You have any of the following signs of a stroke:      ¨ Numbness or drooping on one side of your face     ¨ Weakness in an arm or leg    ¨ Confusion or difficulty speaking    ¨ Dizziness, a severe headache, or vision loss  Seek care immediately if:  You have any of the following signs of a blood clot:  · You feel lightheaded, are short of breath, and have chest pain  · You cough up blood  · You have swelling, redness, pain, or warmth in your arm or leg  Contact your cardiologist or healthcare provider if:   · Your heart rate is higher than your healthcare provider said it should be  · You have new or worsening swelling in your legs, feet, ankles, or abdomen  · You are short of breath, even at rest      · You have questions or concerns about your condition or care  Medicines: You may need any of the following:  · Heart medicines  help control your heart rate and rhythm  You may need more than one medicine to treat your symptoms  · Blood thinners    help prevent blood clots  Examples of blood thinners include heparin and warfarin  Clots can cause strokes, heart attacks, and death  The following are general safety guidelines to follow while you are taking a blood thinner:    ¨ Watch for bleeding and bruising while you take blood thinners  Watch for bleeding from your gums or nose  Watch for blood in your urine and bowel movements   Use a soft washcloth on your skin, and a soft toothbrush to brush your teeth  This can keep your skin and gums from bleeding  If you shave, use an electric shaver  Do not play contact sports  ¨ Tell your dentist and other healthcare providers that you take anticoagulants  Wear a bracelet or necklace that says you take this medicine  ¨ Do not start or stop any medicines unless your healthcare provider tells you to  Many medicines cannot be used with blood thinners  ¨ Tell your healthcare provider right away if you forget to take the medicine, or if you take too much  ¨ Warfarin  is a blood thinner that you may need to take  The following are things you should be aware of if you take warfarin  § Foods and medicines can affect the amount of warfarin in your blood  Do not make major changes to your diet while you take warfarin  Warfarin works best when you eat about the same amount of vitamin K every day  Vitamin K is found in green leafy vegetables and certain other foods  Ask for more information about what to eat when you are taking warfarin  § You will need to see your healthcare provider for follow-up visits when you are on warfarin  You will need regular blood tests  These tests are used to decide how much medicine you need  · Antiplatelets , such as aspirin, help prevent blood clots  Take your antiplatelet medicine exactly as directed  These medicines make it more likely for you to bleed or bruise  If you are told to take aspirin, do not take acetaminophen or ibuprofen instead  · Take your medicine as directed  Contact your healthcare provider if you think your medicine is not helping or if you have side effects  Tell him or her if you are allergic to any medicine  Keep a list of the medicines, vitamins, and herbs you take  Include the amounts, and when and why you take them  Bring the list or the pill bottles to follow-up visits  Carry your medicine list with you in case of an emergency    Follow up with your cardiologist as directed: You will need regular blood tests and monitoring  Write down your questions so you remember to ask them during your visits  Manage A-fib:   · Know your target heart rate  Learn how to take your pulse and monitor your heart rate  · Manage other health conditions  This includes high blood pressure, sleep apnea, thyroid disease, diabetes, and other heart conditions  Take medicine as directed and follow your treatment plan  · Limit or do not drink alcohol  Alcohol can make a-fib hard to manage  Ask your healthcare provider if it is safe for you to drink alcohol  A drink of alcohol is 12 ounces of beer, 5 ounces of wine, or 1½ ounces of liquor  · Do not smoke  Nicotine and other chemicals in cigarettes and cigars can cause heart and lung damage  Ask your healthcare provider for information if you currently smoke and need help to quit  E-cigarettes or smokeless tobacco still contain nicotine  Talk to your healthcare provider before you use these products  · Eat heart-healthy foods  Heart healthy foods will help keep your cholesterol low  These include fruits, vegetables, whole-grain breads, low-fat dairy products, beans, lean meats, and fish  Replace butter and margarine with heart-healthy oils such as olive oil and canola oil  · Maintain a healthy weight  Ask your healthcare provider how much you should weigh  Ask him to help you create a weight loss plan if you are overweight  · Exercise for 30 minutes  most days of the week  Ask your healthcare provider about the best exercise plan for you  © 2017 [employer] Information is for End User's use only and may not be sold, redistributed or otherwise used for commercial purposes  All illustrations and images included in CareNotes® are the copyrighted property of [employer] , [employer]  or [employer]  The above information is an  only   It is not intended as medical advice for individual conditions or treatments  Talk to your doctor, nurse or pharmacist before following any medical regimen to see if it is safe and effective for you

## 2019-07-09 NOTE — QUICK NOTE
Neurology Progress Note - Renate Nicole 1933, 80 y o  female   MRN: [id number] Unit/Bed#: E4 -01 Encounter: [id number]    This note was dictated on July 9th a conversation occurred at the end of the day with this patient's 2 daughters on July 8th approximately 5:00 p m       Asked to discuss with patient's family the consideration of moving to an anticoagulant as opposed to 81 mg aspirin which had previously been recommended by the Neurology service over the weekend  We had a discussion with this patient has 2 adult daughters at the bedside, specifically miss Stanton  She had clear concerns regarding her mother being discharged on aspirin on after she had discussed with the rest of the family  She is concerned about her mother's increased stroke risk because of her history approximately atrial fibrillation  She is clearly a wherein she did indicate that she initially had suggested that she was agreeable to the aspirin because of her mother's history of GI bleeding and their [employer] beliefs as to hold his witnesses that she could not or would not take blood  We discussed with her various risk factors concerning strokes as opposed to the benefits conferred by aspirin  They both, 2 daughters, had several questions all of which we answered I believe to their satisfaction talking about the risks and benefits of each medication  After an approximate the 20-25 minutes discussion it was agreed that the patient was restarted on Pradaxa  The daughter's of felt that it would be covered by their insurance  They were somewhat reassured that there is a reversal agent for Pradaxa as opposed to either Eliquis or Xarelto  They clearly indicated that although the mother had been on Coumadin before and that she apparently was a stable candidate with Coumadin they did not on her placed back on Coumadin at this time    At the end of the discussion was agreed that she would be given a evening dose of Pradaxa yesterday and and a m  Dose today before discharge to assess her tolerance  They clearly were concerned about side effects and we reported to them that fatigue is sometimes a noticeable side effect in a younger cognitively active population but given her mother's age of 80 and her cognitive impairment related to her dementia that she would be unlikely to be able to appreciate the most likely side effect fatigue  Again at the conclusion of our discussion they are satisfied they had questions in the goal of care was to move forward with discharge on July 9th most likely after she had tolerated to doses Pradaxa

## 2019-07-09 NOTE — PLAN OF CARE
Problem: Prexisting or High Potential for Compromised Skin Integrity  Goal: Skin integrity is maintained or improved  Description  INTERVENTIONS:  - Identify patients at risk for skin breakdown  - Assess and monitor skin integrity  - Assess and monitor nutrition and hydration status  - Monitor labs (i e  albumin)  - Assess for incontinence   - Turn and reposition patient  - Assist with mobility/ambulation  - Relieve pressure over bony prominences  - Avoid friction and shearing  - Provide appropriate hygiene as needed including keeping skin clean and dry  - Evaluate need for skin moisturizer/barrier cream  - Collaborate with interdisciplinary team (i e  Nutrition, Rehabilitation, etc )   - Patient/family teaching  Outcome: Progressing     Problem: PAIN - ADULT  Goal: Verbalizes/displays adequate comfort level or baseline comfort level  Description  Interventions:  - Encourage patient to monitor pain and request assistance  - Assess pain using appropriate pain scale  - Administer analgesics based on type and severity of pain and evaluate response  - Implement non-pharmacological measures as appropriate and evaluate response  - Consider cultural and social influences on pain and pain management  - Notify physician/advanced practitioner if interventions unsuccessful or patient reports new pain  Outcome: Progressing     Problem: INFECTION - ADULT  Goal: Absence or prevention of progression during hospitalization  Description  INTERVENTIONS:  - Assess and monitor for signs and symptoms of infection  - Monitor lab/diagnostic results  - Monitor all insertion sites, i e  indwelling lines, tubes, and drains  - Monitor endotracheal (as able) and nasal secretions for changes in amount and color  - [place] appropriate cooling/warming therapies per order  - Administer medications as ordered  - Instruct and encourage patient and family to use good hand hygiene technique  - Identify and instruct in appropriate isolation precautions for identified infection/condition  Outcome: Progressing     Problem: SAFETY ADULT  Goal: Patient will remain free of falls  Description  INTERVENTIONS:  - Assess patient frequently for physical needs  -  Identify cognitive and physical deficits and behaviors that affect risk of falls    -  [place] fall precautions as indicated by assessment   - Educate patient/family on patient safety including physical limitations  - Instruct patient to call for assistance with activity based on assessment  - Modify environment to reduce risk of injury  - Consider OT/PT consult to assist with strengthening/mobility  Outcome: Progressing  Goal: Maintain or return to baseline ADL function  Description  INTERVENTIONS:  -  Assess patient's ability to carry out ADLs; assess patient's baseline for ADL function and identify physical deficits which impact ability to perform ADLs (bathing, care of mouth/teeth, toileting, grooming, dressing, etc )  - Assess/evaluate cause of self-care deficits   - Assess range of motion  - Assess patient's mobility; develop plan if impaired  - Assess patient's need for assistive devices and provide as appropriate  - Encourage maximum independence but intervene and supervise when necessary  ¯ Involve family in performance of ADLs  ¯ Assess for home care needs following discharge   ¯ Request OT consult to assist with ADL evaluation and planning for discharge  ¯ Provide patient education as appropriate  Outcome: Progressing  Goal: Maintain or return mobility status to optimal level  Description  INTERVENTIONS:  - Assess patient's baseline mobility status (ambulation, transfers, stairs, etc )    - Identify cognitive and physical deficits and behaviors that affect mobility  - Identify mobility aids required to assist with transfers and/or ambulation (gait belt, sit-to-stand, lift, walker, cane, etc )  - [place] fall precautions as indicated by assessment  - Record patient progress and toleration of activity level on Mobility SBAR; progress patient to next Phase/Stage  - Instruct patient to call for assistance with activity based on assessment  - Request Rehabilitation consult to assist with strengthening/weightbearing, etc   Outcome: Progressing     Problem: DISCHARGE PLANNING  Goal: Discharge to home or other facility with appropriate resources  Description  INTERVENTIONS:  - Identify barriers to discharge w/patient and caregiver  - Arrange for needed discharge resources and transportation as appropriate  - Identify discharge learning needs (meds, wound care, etc )  - Arrange for interpretive services to assist at discharge as needed  - Refer to Case Management Department for coordinating discharge planning if the patient needs post-hospital services based on physician/advanced practitioner order or complex needs related to functional status, cognitive ability, or social support system  Outcome: Progressing     Problem: Knowledge Deficit  Goal: Patient/family/caregiver demonstrates understanding of disease process, treatment plan, medications, and discharge instructions  Description  Complete learning assessment and assess knowledge base  Interventions:  - Provide teaching at level of understanding  - Provide teaching via preferred learning methods  Outcome: Progressing     Problem: CARDIOVASCULAR - ADULT  Goal: Maintains optimal cardiac output and hemodynamic stability  Description  INTERVENTIONS:  - Monitor I/O, vital signs and rhythm  - Monitor for S/S and trends of decreased cardiac output i e  bleeding, hypotension  - Administer and titrate ordered vasoactive medications to optimize hemodynamic stability  - Assess quality of pulses, skin color and temperature  - Assess for signs of decreased coronary artery perfusion - ex   Angina  - Instruct patient to report change in severity of symptoms  Outcome: Progressing  Goal: Absence of cardiac dysrhythmias or at baseline rhythm  Description  INTERVENTIONS:  - Continuous cardiac monitoring, monitor vital signs, obtain 12 lead EKG if indicated  - Administer antiarrhythmic and heart rate control medications as ordered  - Monitor electrolytes and administer replacement therapy as ordered  Outcome: Progressing     Problem: MUSCULOSKELETAL - ADULT  Goal: Maintain or return mobility to safest level of function  Description  INTERVENTIONS:  - Assess patient's ability to carry out ADLs; assess patient's baseline for ADL function and identify physical deficits which impact ability to perform ADLs (bathing, care of mouth/teeth, toileting, grooming, dressing, etc )  - Assess/evaluate cause of self-care deficits   - Assess range of motion  - Assess patient's mobility; develop plan if impaired  - Assess patient's need for assistive devices and provide as appropriate  - Encourage maximum independence but intervene and supervise when necessary  - Involve family in performance of ADLs  - Assess for home care needs following discharge   - Request OT consult to assist with ADL evaluation and planning for discharge  - Provide patient education as appropriate  Outcome: Progressing     Problem: METABOLIC, FLUID AND ELECTROLYTES - ADULT  Goal: Electrolytes maintained within normal limits  Description  INTERVENTIONS:  - Monitor labs and assess patient for signs and symptoms of electrolyte imbalances  - Administer electrolyte replacement as ordered  - Monitor response to electrolyte replacements, including repeat lab results as appropriate  - Instruct patient on fluid and nutrition as appropriate  Outcome: Progressing     Problem: HEMATOLOGIC - ADULT  Goal: Maintains hematologic stability  Description  INTERVENTIONS  - Assess for signs and symptoms of bleeding or hemorrhage  - Monitor labs  - Administer supportive blood products/factors as ordered and appropriate  Outcome: Progressing     Problem: Potential for Falls  Goal: Patient will remain free of falls  Description  INTERVENTIONS:  - Assess patient frequently for physical needs  -  Identify cognitive and physical deficits and behaviors that affect risk of falls    -  [place] fall precautions as indicated by assessment   - Educate patient/family on patient safety including physical limitations  - Instruct patient to call for assistance with activity based on assessment  - Modify environment to reduce risk of injury  - Consider OT/PT consult to assist with strengthening/mobility  Outcome: Progressing

## 2019-07-09 NOTE — ASSESSMENT & PLAN NOTE
W/chronic cervicalgia requiring steroid injections  Headache has since improved  W/toradol reglan and magnesium   Change to mag oxide 400mg daily    D/w daughter to avoid all nsaids thereafter

## 2019-07-09 NOTE — PLAN OF CARE
Problem: Prexisting or High Potential for Compromised Skin Integrity  Goal: Skin integrity is maintained or improved  Description  INTERVENTIONS:  - Identify patients at risk for skin breakdown  - Assess and monitor skin integrity  - Assess and monitor nutrition and hydration status  - Monitor labs (i e  albumin)  - Assess for incontinence   - Turn and reposition patient  - Assist with mobility/ambulation  - Relieve pressure over bony prominences  - Avoid friction and shearing  - Provide appropriate hygiene as needed including keeping skin clean and dry  - Evaluate need for skin moisturizer/barrier cream  - Collaborate with interdisciplinary team (i e  Nutrition, Rehabilitation, etc )   - Patient/family teaching  Outcome: Progressing     Problem: PAIN - ADULT  Goal: Verbalizes/displays adequate comfort level or baseline comfort level  Description  Interventions:  - Encourage patient to monitor pain and request assistance  - Assess pain using appropriate pain scale  - Administer analgesics based on type and severity of pain and evaluate response  - Implement non-pharmacological measures as appropriate and evaluate response  - Consider cultural and social influences on pain and pain management  - Notify physician/advanced practitioner if interventions unsuccessful or patient reports new pain  Outcome: Progressing     Problem: INFECTION - ADULT  Goal: Absence or prevention of progression during hospitalization  Description  INTERVENTIONS:  - Assess and monitor for signs and symptoms of infection  - Monitor lab/diagnostic results  - Monitor all insertion sites, i e  indwelling lines, tubes, and drains  - Monitor endotracheal (as able) and nasal secretions for changes in amount and color  - [place] appropriate cooling/warming therapies per order  - Administer medications as ordered  - Instruct and encourage patient and family to use good hand hygiene technique  - Identify and instruct in appropriate isolation precautions for identified infection/condition  Outcome: Progressing     Problem: SAFETY ADULT  Goal: Patient will remain free of falls  Description  INTERVENTIONS:  - Assess patient frequently for physical needs  -  Identify cognitive and physical deficits and behaviors that affect risk of falls    -  [place] fall precautions as indicated by assessment   - Educate patient/family on patient safety including physical limitations  - Instruct patient to call for assistance with activity based on assessment  - Modify environment to reduce risk of injury  - Consider OT/PT consult to assist with strengthening/mobility  Outcome: Progressing  Goal: Maintain or return to baseline ADL function  Description  INTERVENTIONS:  -  Assess patient's ability to carry out ADLs; assess patient's baseline for ADL function and identify physical deficits which impact ability to perform ADLs (bathing, care of mouth/teeth, toileting, grooming, dressing, etc )  - Assess/evaluate cause of self-care deficits   - Assess range of motion  - Assess patient's mobility; develop plan if impaired  - Assess patient's need for assistive devices and provide as appropriate  - Encourage maximum independence but intervene and supervise when necessary  ¯ Involve family in performance of ADLs  ¯ Assess for home care needs following discharge   ¯ Request OT consult to assist with ADL evaluation and planning for discharge  ¯ Provide patient education as appropriate  Outcome: Progressing  Goal: Maintain or return mobility status to optimal level  Description  INTERVENTIONS:  - Assess patient's baseline mobility status (ambulation, transfers, stairs, etc )    - Identify cognitive and physical deficits and behaviors that affect mobility  - Identify mobility aids required to assist with transfers and/or ambulation (gait belt, sit-to-stand, lift, walker, cane, etc )  - [place] fall precautions as indicated by assessment  - Record patient progress and toleration of activity level on Mobility SBAR; progress patient to next Phase/Stage  - Instruct patient to call for assistance with activity based on assessment  - Request Rehabilitation consult to assist with strengthening/weightbearing, etc   Outcome: Progressing     Problem: DISCHARGE PLANNING  Goal: Discharge to home or other facility with appropriate resources  Description  INTERVENTIONS:  - Identify barriers to discharge w/patient and caregiver  - Arrange for needed discharge resources and transportation as appropriate  - Identify discharge learning needs (meds, wound care, etc )  - Arrange for interpretive services to assist at discharge as needed  - Refer to Case Management Department for coordinating discharge planning if the patient needs post-hospital services based on physician/advanced practitioner order or complex needs related to functional status, cognitive ability, or social support system  Outcome: Progressing     Problem: Knowledge Deficit  Goal: Patient/family/caregiver demonstrates understanding of disease process, treatment plan, medications, and discharge instructions  Description  Complete learning assessment and assess knowledge base  Interventions:  - Provide teaching at level of understanding  - Provide teaching via preferred learning methods  Outcome: Progressing     Problem: CARDIOVASCULAR - ADULT  Goal: Maintains optimal cardiac output and hemodynamic stability  Description  INTERVENTIONS:  - Monitor I/O, vital signs and rhythm  - Monitor for S/S and trends of decreased cardiac output i e  bleeding, hypotension  - Administer and titrate ordered vasoactive medications to optimize hemodynamic stability  - Assess quality of pulses, skin color and temperature  - Assess for signs of decreased coronary artery perfusion - ex   Angina  - Instruct patient to report change in severity of symptoms  Outcome: Progressing  Goal: Absence of cardiac dysrhythmias or at baseline rhythm  Description  INTERVENTIONS:  - Continuous cardiac monitoring, monitor vital signs, obtain 12 lead EKG if indicated  - Administer antiarrhythmic and heart rate control medications as ordered  - Monitor electrolytes and administer replacement therapy as ordered  Outcome: Progressing     Problem: MUSCULOSKELETAL - ADULT  Goal: Maintain or return mobility to safest level of function  Description  INTERVENTIONS:  - Assess patient's ability to carry out ADLs; assess patient's baseline for ADL function and identify physical deficits which impact ability to perform ADLs (bathing, care of mouth/teeth, toileting, grooming, dressing, etc )  - Assess/evaluate cause of self-care deficits   - Assess range of motion  - Assess patient's mobility; develop plan if impaired  - Assess patient's need for assistive devices and provide as appropriate  - Encourage maximum independence but intervene and supervise when necessary  - Involve family in performance of ADLs  - Assess for home care needs following discharge   - Request OT consult to assist with ADL evaluation and planning for discharge  - Provide patient education as appropriate  Outcome: Progressing     Problem: METABOLIC, FLUID AND ELECTROLYTES - ADULT  Goal: Electrolytes maintained within normal limits  Description  INTERVENTIONS:  - Monitor labs and assess patient for signs and symptoms of electrolyte imbalances  - Administer electrolyte replacement as ordered  - Monitor response to electrolyte replacements, including repeat lab results as appropriate  - Instruct patient on fluid and nutrition as appropriate  Outcome: Progressing     Problem: HEMATOLOGIC - ADULT  Goal: Maintains hematologic stability  Description  INTERVENTIONS  - Assess for signs and symptoms of bleeding or hemorrhage  - Monitor labs  - Administer supportive blood products/factors as ordered and appropriate  Outcome: Progressing     Problem: Potential for Falls  Goal: Patient will remain free of falls  Description  INTERVENTIONS:  - Assess patient frequently for physical needs  -  Identify cognitive and physical deficits and behaviors that affect risk of falls    -  [place] fall precautions as indicated by assessment   - Educate patient/family on patient safety including physical limitations  - Instruct patient to call for assistance with activity based on assessment  - Modify environment to reduce risk of injury  - Consider OT/PT consult to assist with strengthening/mobility  Outcome: Progressing

## 2019-07-09 NOTE — SOCIAL WORK
LOS 4 days, GLOS 3 days, no bundle and no 30 day readmission  PCP is Dr Yuly Miller  CM reviewed previous admission notes  Pt lives in [place] in an in-law suite attached to her son's home with 24/7 security cameras inside; [address] [place]  Pt needs assistance with ADLs and dtrs provide care  Pt amb with no devices  Pt has no DME, was not open with [facility] or [facility]  Pt uses [facility]  Pt has no hx of MH or D&A   is dtr Magalie Camargo  Family will transport home  PT is recommending home PT vs Short stay  PA-C would like to discharge today and check Pradaxa  Script and face sheet was fax to [facility] to check co-pays  Met with dtr and she is agreeable to [facility] for RN and PT  Referral made and they have accepted the case  Pt has no co-pay for Pradaxa  RN and PA-C is aware of this  Script is in binder

## 2019-08-07 ENCOUNTER — OFFICE VISIT (OUTPATIENT)
Dept: NEUROLOGY | Facility: CLINIC | Age: 84
End: 2019-08-07
Payer: COMMERCIAL

## 2019-08-07 ENCOUNTER — TELEPHONE (OUTPATIENT)
Dept: NEUROLOGY | Facility: CLINIC | Age: 84
End: 2019-08-07

## 2019-08-07 VITALS
SYSTOLIC BLOOD PRESSURE: 130 MMHG | DIASTOLIC BLOOD PRESSURE: 62 MMHG | BODY MASS INDEX: 25.75 KG/M2 | WEIGHT: 150 LBS | HEART RATE: 80 BPM

## 2019-08-07 DIAGNOSIS — Z86.73 HISTORY OF STROKE: ICD-10-CM

## 2019-08-07 DIAGNOSIS — F02.81 LATE ONSET ALZHEIMER'S DISEASE WITH BEHAVIORAL DISTURBANCE (HCC): ICD-10-CM

## 2019-08-07 DIAGNOSIS — I10 ESSENTIAL HYPERTENSION: ICD-10-CM

## 2019-08-07 DIAGNOSIS — E78.2 MIXED HYPERLIPIDEMIA: ICD-10-CM

## 2019-08-07 DIAGNOSIS — H34.12 CENTRAL RETINAL ARTERY OCCLUSION OF LEFT EYE: Primary | ICD-10-CM

## 2019-08-07 DIAGNOSIS — G30.1 LATE ONSET ALZHEIMER'S DISEASE WITH BEHAVIORAL DISTURBANCE (HCC): ICD-10-CM

## 2019-08-07 DIAGNOSIS — I65.01 STENOSIS OF RIGHT VERTEBRAL ARTERY: ICD-10-CM

## 2019-08-07 PROCEDURE — 99215 OFFICE O/P EST HI 40 MIN: CPT | Performed by: PHYSICIAN ASSISTANT

## 2019-08-07 NOTE — TELEPHONE ENCOUNTER
MSW was able to reach patient's daughter/POA this afternoon at [phone]  Patient's daughter/POA stated that family has been providing 24/7 care  Patient's daughter/POA stated that patient get "agitated and upset" if anyone else is brought it to assist with her care besides those that she knows  Patient's daughter/POA questioning if there are any options to help with this  Patient's daughter stated that she is aware that the patient's disease will progress so her level of awareness may change which may permit patient's family to hire caregivers in the future  MSW advised that while this writer could not change patient's agreeableness to having outside help, that perhaps family could attempt to locate caregivers that would have an understanding of how to handle dementia patients  MSW did advise that there is an agency called [employer], which is "the homecare marketplace"  MSW stated that a representative from [facility] may be able to direct them to the agency that best meets patient's needs  MSW will mail patient's daugther/POA a brochure for [place] [facility]  MSW inquired if patient/family was open to any medications to help treat agitation  Patient's daughter/POA stated that they prefer not to add any additional medications at this time  MSW advised that this writer can provide caregiver tipsheets on dealing with agitation/aggression and other challenging behaviors  MSW also informed patient's daughter/POA about services offered by the [employer] - 24/7 Helpline, educational classes, caregiver support groups,  Etc  MSW will mail caregiver tips sheets and a listing of programs/services offered by the [employer]   Patient's daugther/POA requested that they be mailed to her at:    [address] [facility] [address], [address] [facility]    MSW also reviewed community resources such as adult day programs (safe environment for patient, and offers caregiver respite) and referral to the [address] [place] [address] on Aging for when patient requires physical assistance  MSW will mail patient's daughter/POA information about these resources for future needs

## 2019-08-07 NOTE — TELEPHONE ENCOUNTER
----- Message from Alf Morin PA-C sent at 8/7/2019  9:35 AM EDT -----  Regarding: caregiver need  This pt needs 24 hour care and daughter Vibha Madrigal, also POA, is wondering if she could have assistance with finding a reputable source  Thank you

## 2019-08-07 NOTE — LETTER
August 7, 2019     DO Blanka Bartholomew  [address] [place] [id number]    Patient: Keyona Olson   YOB: 1933   Date of Visit: 8/7/2019       To Whom It May Concern:    Ms Keyona Olson requires 24-hour caregiver due to dementia; she requires assistance with all her ADLs  Her daughter Yeyo Mejia will facilitate this  If you have questions, please do not hesitate to call me           Sincerely,        Sabra Schirmer, PA-C        CC: No Recipients
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
## 2019-08-08 PROBLEM — I65.01 STENOSIS OF RIGHT VERTEBRAL ARTERY: Status: ACTIVE | Noted: 2019-08-08

## 2019-08-08 NOTE — TELEPHONE ENCOUNTER
Resources mailed to patient's daughter at her home address  MSW included this writer's business card and will be available to patient as needed

## 2019-08-09 ENCOUNTER — HOSPITAL ENCOUNTER (INPATIENT)
Facility: HOSPITAL | Age: 84
LOS: 3 days | Discharge: HOME | DRG: 690 | End: 2019-08-12
Attending: EMERGENCY MEDICINE | Admitting: INTERNAL MEDICINE
Payer: COMMERCIAL

## 2019-08-09 ENCOUNTER — APPOINTMENT (EMERGENCY)
Dept: CT IMAGING | Facility: HOSPITAL | Age: 84
DRG: 690 | End: 2019-08-09
Payer: COMMERCIAL

## 2019-08-09 ENCOUNTER — APPOINTMENT (EMERGENCY)
Dept: RADIOLOGY | Facility: HOSPITAL | Age: 84
DRG: 690 | End: 2019-08-09
Payer: COMMERCIAL

## 2019-08-09 DIAGNOSIS — N39.0 CHRONIC UTI: ICD-10-CM

## 2019-08-09 DIAGNOSIS — G30.1 LATE ONSET ALZHEIMER'S DISEASE WITH BEHAVIORAL DISTURBANCE (HCC): ICD-10-CM

## 2019-08-09 DIAGNOSIS — N39.0 UTI (URINARY TRACT INFECTION): ICD-10-CM

## 2019-08-09 DIAGNOSIS — R55 SYNCOPE: Primary | ICD-10-CM

## 2019-08-09 DIAGNOSIS — F02.81 LATE ONSET ALZHEIMER'S DISEASE WITH BEHAVIORAL DISTURBANCE (HCC): ICD-10-CM

## 2019-08-09 DIAGNOSIS — I48.0 PAROXYSMAL ATRIAL FIBRILLATION (HCC): ICD-10-CM

## 2019-08-09 DIAGNOSIS — Z78.9 PATIENT IS JEHOVAH'S WITNESS: ICD-10-CM

## 2019-08-09 LAB
ALBUMIN SERPL BCP-MCNC: 3.4 G/DL (ref 3.5–5)
ALP SERPL-CCNC: 73 U/L (ref 46–116)
ALT SERPL W P-5'-P-CCNC: 14 U/L (ref 12–78)
ANION GAP SERPL CALCULATED.3IONS-SCNC: 11 MMOL/L (ref 4–13)
AST SERPL W P-5'-P-CCNC: 19 U/L (ref 5–45)
ATRIAL RATE: 65 BPM
BACTERIA UR QL AUTO: ABNORMAL /HPF
BASOPHILS # BLD AUTO: 0.03 THOUSANDS/ΜL (ref 0–0.1)
BASOPHILS NFR BLD AUTO: 0 % (ref 0–1)
BILIRUB SERPL-MCNC: 0.22 MG/DL (ref 0.2–1)
BILIRUB UR QL STRIP: NEGATIVE
BUN SERPL-MCNC: 9 MG/DL (ref 5–25)
CALCIUM SERPL-MCNC: 8.9 MG/DL (ref 8.3–10.1)
CHLORIDE SERPL-SCNC: 102 MMOL/L (ref 100–108)
CLARITY UR: ABNORMAL
CO2 SERPL-SCNC: 24 MMOL/L (ref 21–32)
COLOR UR: YELLOW
CREAT SERPL-MCNC: 0.82 MG/DL (ref 0.6–1.3)
EOSINOPHIL # BLD AUTO: 0.1 THOUSAND/ΜL (ref 0–0.61)
EOSINOPHIL NFR BLD AUTO: 1 % (ref 0–6)
ERYTHROCYTE [DISTWIDTH] IN BLOOD BY AUTOMATED COUNT: 14.8 % (ref 11.6–15.1)
GFR SERPL CREATININE-BSD FRML MDRD: 65 ML/MIN/1.73SQ M
GLUCOSE SERPL-MCNC: 143 MG/DL (ref 65–140)
GLUCOSE UR STRIP-MCNC: NEGATIVE MG/DL
HCT VFR BLD AUTO: 33.6 % (ref 34.8–46.1)
HGB BLD-MCNC: 10.9 G/DL (ref 11.5–15.4)
HGB UR QL STRIP.AUTO: ABNORMAL
IMM GRANULOCYTES # BLD AUTO: 0.03 THOUSAND/UL (ref 0–0.2)
IMM GRANULOCYTES NFR BLD AUTO: 0 % (ref 0–2)
KETONES UR STRIP-MCNC: NEGATIVE MG/DL
LEUKOCYTE ESTERASE UR QL STRIP: ABNORMAL
LYMPHOCYTES # BLD AUTO: 2.62 THOUSANDS/ΜL (ref 0.6–4.47)
LYMPHOCYTES NFR BLD AUTO: 30 % (ref 14–44)
MCH RBC QN AUTO: 27.7 PG (ref 26.8–34.3)
MCHC RBC AUTO-ENTMCNC: 32.4 G/DL (ref 31.4–37.4)
MCV RBC AUTO: 86 FL (ref 82–98)
MONOCYTES # BLD AUTO: 0.65 THOUSAND/ΜL (ref 0.17–1.22)
MONOCYTES NFR BLD AUTO: 7 % (ref 4–12)
NEUTROPHILS # BLD AUTO: 5.39 THOUSANDS/ΜL (ref 1.85–7.62)
NEUTS SEG NFR BLD AUTO: 62 % (ref 43–75)
NITRITE UR QL STRIP: POSITIVE
NON-SQ EPI CELLS URNS QL MICRO: ABNORMAL /HPF
NRBC BLD AUTO-RTO: 0 /100 WBCS
P AXIS: 94 DEGREES
PH UR STRIP.AUTO: 7.5 [PH]
PLATELET # BLD AUTO: 312 THOUSANDS/UL (ref 149–390)
PMV BLD AUTO: 10.2 FL (ref 8.9–12.7)
POTASSIUM SERPL-SCNC: 3.8 MMOL/L (ref 3.5–5.3)
PR INTERVAL: 158 MS
PROT SERPL-MCNC: 6.7 G/DL (ref 6.4–8.2)
PROT UR STRIP-MCNC: NEGATIVE MG/DL
QRS AXIS: 42 DEGREES
QRSD INTERVAL: 78 MS
QT INTERVAL: 410 MS
QTC INTERVAL: 426 MS
RBC # BLD AUTO: 3.93 MILLION/UL (ref 3.81–5.12)
RBC #/AREA URNS AUTO: ABNORMAL /HPF
SODIUM SERPL-SCNC: 137 MMOL/L (ref 136–145)
SP GR UR STRIP.AUTO: 1.01 (ref 1–1.03)
T WAVE AXIS: 95 DEGREES
TROPONIN I SERPL-MCNC: <0.02 NG/ML
UROBILINOGEN UR QL STRIP.AUTO: 0.2 E.U./DL
VENTRICULAR RATE: 65 BPM
WBC # BLD AUTO: 8.82 THOUSAND/UL (ref 4.31–10.16)
WBC #/AREA URNS AUTO: ABNORMAL /HPF

## 2019-08-09 PROCEDURE — 93010 ELECTROCARDIOGRAM REPORT: CPT | Performed by: INTERNAL MEDICINE

## 2019-08-09 PROCEDURE — 36415 COLL VENOUS BLD VENIPUNCTURE: CPT

## 2019-08-09 PROCEDURE — 81001 URINALYSIS AUTO W/SCOPE: CPT | Performed by: EMERGENCY MEDICINE

## 2019-08-09 PROCEDURE — 87077 CULTURE AEROBIC IDENTIFY: CPT | Performed by: EMERGENCY MEDICINE

## 2019-08-09 PROCEDURE — 71046 X-RAY EXAM CHEST 2 VIEWS: CPT

## 2019-08-09 PROCEDURE — 72125 CT NECK SPINE W/O DYE: CPT

## 2019-08-09 PROCEDURE — 87086 URINE CULTURE/COLONY COUNT: CPT | Performed by: EMERGENCY MEDICINE

## 2019-08-09 PROCEDURE — 99223 1ST HOSP IP/OBS HIGH 75: CPT | Performed by: INTERNAL MEDICINE

## 2019-08-09 PROCEDURE — 93005 ELECTROCARDIOGRAM TRACING: CPT

## 2019-08-09 PROCEDURE — 85025 COMPLETE CBC W/AUTO DIFF WBC: CPT | Performed by: EMERGENCY MEDICINE

## 2019-08-09 PROCEDURE — 70450 CT HEAD/BRAIN W/O DYE: CPT

## 2019-08-09 PROCEDURE — 99285 EMERGENCY DEPT VISIT HI MDM: CPT

## 2019-08-09 PROCEDURE — 99285 EMERGENCY DEPT VISIT HI MDM: CPT | Performed by: EMERGENCY MEDICINE

## 2019-08-09 PROCEDURE — 84484 ASSAY OF TROPONIN QUANT: CPT | Performed by: EMERGENCY MEDICINE

## 2019-08-09 PROCEDURE — 87186 SC STD MICRODIL/AGAR DIL: CPT | Performed by: EMERGENCY MEDICINE

## 2019-08-09 PROCEDURE — 80053 COMPREHEN METABOLIC PANEL: CPT | Performed by: EMERGENCY MEDICINE

## 2019-08-09 RX ORDER — PANTOPRAZOLE SODIUM 40 MG/1
40 TABLET, DELAYED RELEASE ORAL
Status: DISCONTINUED | OUTPATIENT
Start: 2019-08-10 | End: 2019-08-12 | Stop reason: HOSPADM

## 2019-08-09 RX ORDER — QUETIAPINE FUMARATE 25 MG/1
25 TABLET, FILM COATED ORAL
Status: DISCONTINUED | OUTPATIENT
Start: 2019-08-09 | End: 2019-08-12 | Stop reason: HOSPADM

## 2019-08-09 RX ORDER — DOCUSATE SODIUM 100 MG/1
100 CAPSULE, LIQUID FILLED ORAL 2 TIMES DAILY PRN
Status: DISCONTINUED | OUTPATIENT
Start: 2019-08-09 | End: 2019-08-12 | Stop reason: HOSPADM

## 2019-08-09 RX ORDER — ATORVASTATIN CALCIUM 20 MG/1
20 TABLET, FILM COATED ORAL
Status: DISCONTINUED | OUTPATIENT
Start: 2019-08-09 | End: 2019-08-12 | Stop reason: HOSPADM

## 2019-08-09 RX ORDER — SODIUM CHLORIDE 9 MG/ML
75 INJECTION, SOLUTION INTRAVENOUS CONTINUOUS
Status: DISCONTINUED | OUTPATIENT
Start: 2019-08-09 | End: 2019-08-10

## 2019-08-09 RX ORDER — DABIGATRAN ETEXILATE 150 MG/1
150 CAPSULE, COATED PELLETS ORAL EVERY 12 HOURS SCHEDULED
Status: DISCONTINUED | OUTPATIENT
Start: 2019-08-09 | End: 2019-08-12 | Stop reason: HOSPADM

## 2019-08-09 RX ORDER — MUSCLE RUB CREAM 100; 150 MG/G; MG/G
CREAM TOPICAL 4 TIMES DAILY PRN
Status: DISCONTINUED | OUTPATIENT
Start: 2019-08-09 | End: 2019-08-12 | Stop reason: HOSPADM

## 2019-08-09 RX ORDER — BUTALBITAL, ACETAMINOPHEN AND CAFFEINE 50; 325; 40 MG/1; MG/1; MG/1
1 TABLET ORAL
Status: DISCONTINUED | OUTPATIENT
Start: 2019-08-09 | End: 2019-08-12 | Stop reason: HOSPADM

## 2019-08-09 RX ORDER — DONEPEZIL HYDROCHLORIDE 10 MG/1
10 TABLET, FILM COATED ORAL
Status: DISCONTINUED | OUTPATIENT
Start: 2019-08-09 | End: 2019-08-12 | Stop reason: HOSPADM

## 2019-08-09 RX ORDER — CIPROFLOXACIN 250 MG/1
250 TABLET, FILM COATED ORAL DAILY
COMMUNITY
End: 2019-08-12 | Stop reason: HOSPADM

## 2019-08-09 RX ORDER — ACETAMINOPHEN 325 MG/1
650 TABLET ORAL EVERY 6 HOURS PRN
Status: DISCONTINUED | OUTPATIENT
Start: 2019-08-09 | End: 2019-08-11

## 2019-08-09 RX ORDER — MEMANTINE HYDROCHLORIDE 5 MG/1
10 TABLET ORAL 2 TIMES DAILY
Status: DISCONTINUED | OUTPATIENT
Start: 2019-08-09 | End: 2019-08-12 | Stop reason: HOSPADM

## 2019-08-09 RX ADMIN — SODIUM CHLORIDE 75 ML/HR: 0.9 INJECTION, SOLUTION INTRAVENOUS at 16:13

## 2019-08-09 RX ADMIN — ATORVASTATIN CALCIUM 20 MG: 20 TABLET, FILM COATED ORAL at 16:18

## 2019-08-09 RX ADMIN — DABIGATRAN ETEXILATE MESYLATE 150 MG: 150 CAPSULE ORAL at 17:13

## 2019-08-09 RX ADMIN — CEFEPIME HYDROCHLORIDE 1000 MG: 1 INJECTION, POWDER, FOR SOLUTION INTRAMUSCULAR; INTRAVENOUS at 23:55

## 2019-08-09 RX ADMIN — DONEPEZIL HYDROCHLORIDE 10 MG: 10 TABLET, FILM COATED ORAL at 21:06

## 2019-08-09 RX ADMIN — QUETIAPINE FUMARATE 25 MG: 25 TABLET ORAL at 21:06

## 2019-08-09 RX ADMIN — CEFEPIME HYDROCHLORIDE 2000 MG: 2 INJECTION, POWDER, FOR SOLUTION INTRAVENOUS at 13:29

## 2019-08-09 RX ADMIN — BUTALBITAL, ACETAMINOPHEN AND CAFFEINE 1 TABLET: 50; 325; 40 TABLET ORAL at 21:11

## 2019-08-09 RX ADMIN — MEMANTINE 10 MG: 5 TABLET ORAL at 17:13

## 2019-08-09 NOTE — ASSESSMENT & PLAN NOTE
Chronic UTI previously on Keflex now on ciprofloxacin  Will hold both while on cefepime  Of note last urine culture 09/2018 demonstrates resistances to both classes

## 2019-08-09 NOTE — ED PROVIDER NOTES
Pt Name: Nathan Olivo  MRN: [id number]  [place] 1933  Age/Sex: 80 y o  female  Date of evaluation: 8/9/2019  PCP: Liz Cramer DO    CHIEF COMPLAINT    Chief Complaint   Patient presents with    Syncope     Pt's daughter reports heard a, "Thud" in the bathroom  Daughter states, "She didn't fall to the ground " Pt has hx of dementia, unable to describe events  Pt reports headache  Daughter states, "She complains of a headache every day, but the intensity is worse " Pt's daughter was concerned because she was "Out longer than normal "          HPI    Lexus Maya presents to the Emergency Department complaining of syncope this morning  According to her daughter- she had a headache and neck pain this morning and was given tylenol  She then went to the bathroom and had a bowel movement and her daughter heard a thud  She went to find her still on the toilet but passed out with her head against the wall  This has happened in the past but she took longer than usual to come around this time  Currently she has no complaints  Also- she has recurrent UTIs and is on daily cipro  HPI      Past Medical and Surgical History    Past Medical History:   Diagnosis Date    A-fib [facility])     Alzheimer disease     Chronic UTI     Diverticulosis     Dyslipidemia     History of stroke     Hyperlipidemia     Hypertension     UTI (urinary tract infection)     Vasovagal syncope 2/13/2018       Past Surgical History:   Procedure Laterality Date    CHOLECYSTECTOMY         History reviewed  No pertinent family history  Social History     Tobacco Use    Smoking status: Former Smoker    Smokeless tobacco: Never Used   Substance Use Topics    Alcohol use: No    Drug use: No              Allergies    Allergies   Allergen Reactions    Ativan [Lorazepam]     Latuda [Lurasidone]        Home Medications    Prior to Admission medications    Medication Sig Start Date End Date Taking?  Authorizing Provider   amLODIPine (NORVASC) 2 5 mg tablet Take 1 tablet (2 5 mg total) by mouth daily 7/9/19   Bonnie Ang PA-C   atorvastatin (LIPITOR) 20 mg tablet Take 20 mg by mouth daily    Historical Provider, MD   Cranberry 500 MG TABS Take 500 mg by mouth daily      Historical Provider, MD   dabigatran etexilate (PRADAXA) 150 mg capsu Take 1 capsule (150 mg total) by mouth every 12 (twelve) hours 7/9/19   Alix Ragsdale PA-C   donepezil (ARICEPT) 10 mg tablet Take 10 mg by mouth daily at bedtime    Historical Provider, MD   magnesium oxide (MAG-OX) 400 mg Take 1 tablet (400 mg total) by mouth daily 7/10/19   Bonnie Ang PA-C   memantine (NAMENDA) 10 mg tablet Take 10 mg by mouth 2 (two) times a day    Historical Provider, MD   pantoprazole (PROTONIX) 40 mg tablet Take 40 mg by mouth daily    Historical Provider, MD           Review of Systems    Review of Systems   Unable to perform ROS: Dementia       Physical Exam      ED Triage Vitals   Temperature Pulse Respirations Blood Pressure SpO2   08/09/19 0918 08/09/19 0918 08/09/19 0918 08/09/19 0918 08/09/19 0918   97 5 °F (36 4 °C) 56 16 100/52 96 %      Temp Source Heart Rate Source Patient Position - Orthostatic VS BP Location FiO2 (%)   08/09/19 0918 08/09/19 0918 08/09/19 0918 08/09/19 0918 --   Oral Monitor Lying Right arm       Pain Score       08/09/19 1027       No Pain               Physical Exam   Constitutional: She appears well-developed and well-nourished  No distress  HENT:   Head: Normocephalic and atraumatic  Nose: Nose normal    Mouth/Throat: Oropharynx is clear and moist    Eyes: Pupils are equal, round, and reactive to light  Conjunctivae, EOM and lids are normal    Neck: Normal range of motion  Neck supple  Cardiovascular: Normal rate, regular rhythm and normal heart sounds  Exam reveals no gallop and no friction rub  No murmur heard  Pulmonary/Chest: Effort normal and breath sounds normal  No accessory muscle usage  No respiratory distress   She has no wheezes  She has no rales  Abdominal: Soft  She exhibits no distension  There is no tenderness  There is no rebound and no guarding  Neurological: She is disoriented  No cranial nerve deficit or sensory deficit  Skin: Skin is warm and dry  No rash noted  She is not diaphoretic  No erythema  Psychiatric: She has a normal mood and affect  Her speech is normal and behavior is normal  Judgment and thought content normal    Nursing note and vitals reviewed  Assessment and Plan    Mayco Flores is a 80 y o  female who presents with syncope  Physical examination unremarkable  Differential diagnosis (not completely inclusive) includes DDX includes but is not limited to: Dehydration, electrolyte abnormality, deconditioning, CVA/TIA, infection, anemia, thyroid disorder, cardiac disorder, dysrhythmia  Plan will be to perform diagnostic testing and treat symptomatically  MDM    Diagnostic Results    EKG:  normal EKG, normal sinus rhythm    EKG INTERPRETATION  EKG Interpretation    Rate: 65 BPM  Rhythm: sinus  Axis: normal  Intervals: Normal, no blocks,   T waves: normal  ST segments: normal    Impression: normal EKG  EKG for comparison: not immediately available  EKG interpreted by me  Interpretation by Eladio Lange DO  EKG reviewed and interpreted independently      Labs:    Results for orders placed or performed during the hospital encounter of 08/09/19   CBC and differential   Result Value Ref Range    WBC 8 82 4 31 - 10 16 Thousand/uL    RBC 3 93 3 81 - 5 12 Million/uL    Hemoglobin 10 9 (L) 11 5 - 15 4 g/dL    Hematocrit 33 6 (L) 34 8 - 46 1 %    MCV 86 82 - 98 fL    MCH 27 7 26 8 - 34 3 pg    MCHC 32 4 31 4 - 37 4 g/dL    RDW 14 8 11 6 - 15 1 %    MPV 10 2 8 9 - 12 7 fL    Platelets 032 693 - 649 Thousands/uL    nRBC 0 /100 WBCs    Neutrophils Relative 62 43 - 75 %    Immat GRANS % 0 0 - 2 %    Lymphocytes Relative 30 14 - 44 %    Monocytes Relative 7 4 - 12 %    Eosinophils Relative 1 0 - 6 %    Basophils Relative 0 0 - 1 %    Neutrophils Absolute 5 39 1 85 - 7 62 Thousands/µL    Immature Grans Absolute 0 03 0 00 - 0 20 Thousand/uL    Lymphocytes Absolute 2 62 0 60 - 4 47 Thousands/µL    Monocytes Absolute 0 65 0 17 - 1 22 Thousand/µL    Eosinophils Absolute 0 10 0 00 - 0 61 Thousand/µL    Basophils Absolute 0 03 0 00 - 0 10 Thousands/µL   Comprehensive metabolic panel   Result Value Ref Range    Sodium 137 136 - 145 mmol/L    Potassium 3 8 3 5 - 5 3 mmol/L    Chloride 102 100 - 108 mmol/L    CO2 24 21 - 32 mmol/L    ANION GAP 11 4 - 13 mmol/L    BUN 9 5 - 25 mg/dL    Creatinine 0 82 0 60 - 1 30 mg/dL    Glucose 143 (H) 65 - 140 mg/dL    Calcium 8 9 8 3 - 10 1 mg/dL    AST 19 5 - 45 U/L    ALT 14 12 - 78 U/L    Alkaline Phosphatase 73 46 - 116 U/L    Total Protein 6 7 6 4 - 8 2 g/dL    Albumin 3 4 (L) 3 5 - 5 0 g/dL    Total Bilirubin 0 22 0 20 - 1 00 mg/dL    eGFR 65 ml/min/1 73sq m   Troponin I   Result Value Ref Range    Troponin I <0 02 <=0 04 ng/mL   UA w Reflex to Microscopic w Reflex to Culture   Result Value Ref Range    Color, UA Yellow     Clarity, UA Slightly Cloudy     Specific [place], UA 1 015 1 003 - 1 030    pH, UA 7 5 4 5, 5 0, 5 5, 6 0, 6 5, 7 0, 7 5, 8 0    Leukocytes, UA Large (A) Negative    Nitrite, UA Positive (A) Negative    Protein, UA Negative Negative mg/dl    Glucose, UA Negative Negative mg/dl    Ketones, UA Negative Negative mg/dl    Urobilinogen, UA 0 2 0 2, 1 0 E U /dl E U /dl    Bilirubin, UA Negative Negative    Blood, UA Trace-Intact (A) Negative   Urine Microscopic   Result Value Ref Range    RBC, UA None Seen None Seen, 0-5 /hpf    WBC, UA Innumerable (A) None Seen, 0-5, 5-55, 5-65 /hpf    Epithelial Cells Occasional None Seen, Occasional /hpf    Bacteria, UA Innumerable (A) None Seen, Occasional /hpf       All labs reviewed and utilized in the medical decision making process    Radiology:    CT head without contrast   Final Result      No acute intracranial abnormality  Microangiopathic changes  Workstation performed: [id number]         CT spine cervical without contrast   Final Result      No acute compression collapse of the vertebra  Degenerative disc disease at C5-6, C6/7   Mild Right foraminal narrowing at C5-6  Mild bilateral foraminal narrowing at C6-7                Workstation performed: [id number]         XR chest 2 views   Final Result   No active pulmonary disease on examination which is somewhat limited secondary to low lung volumes  Workstation performed: [id number]             All radiology studies independently viewed by me and interpreted by the radiologist     Procedure    Procedures    [facility]      ED Course of Care and Re-Assessments      Medications   cefepime (MAXIPIME) 2 g/50 mL dextrose IVPB (2,000 mg Intravenous New Bag 8/9/19 1329)           FINAL IMPRESSION    Final diagnoses:   Syncope   UTI (urinary tract infection)         DISPOSITION/PLAN    Time reflects when diagnosis was documented in both MDM as applicable and the Disposition within this note     Time User Action Codes Description Comment    8/9/2019  1:07 PM Lila GUILLORY Add [R55] Syncope     8/9/2019  1:07 PM Lila GUILLORY Add [N39 0] UTI (urinary tract infection)       ED Disposition     ED Disposition Condition Date/Time Comment    Admit Stable Fri Aug 9, 2019  1:08 PM Case was discussed with HEATHER and the patient's admission status was agreed to be Admission Status: inpatient status to the service of Dr Teodora Mullins   Follow-up Information    None           PATIENT REFERRED TO:    No follow-up provider specified      DISCHARGE MEDICATIONS:    Current Discharge Medication List      CONTINUE these medications which have NOT CHANGED    Details   atorvastatin (LIPITOR) 20 mg tablet Take 20 mg by mouth daily      BUTALBITAL-ACETAMINOPHEN PO Take 1 tablet by mouth daily at bedtime      ciprofloxacin (CIPRO) 250 mg tablet Take 250 mg by mouth daily      Cranberry 500 MG TABS Take 500 mg by mouth daily        dabigatran etexilate (PRADAXA) 150 mg capsu Take 1 capsule (150 mg total) by mouth every 12 (twelve) hours  Refills: 0    Associated Diagnoses: A-fib (HCC)      donepezil (ARICEPT) 10 mg tablet Take 10 mg by mouth daily at bedtime      memantine (NAMENDA) 10 mg tablet Take 10 mg by mouth 2 (two) times a day      pantoprazole (PROTONIX) 40 mg tablet Take 40 mg by mouth daily             No discharge procedures on file           Jose J Viera, [address] [place] [address],   08/09/19 0576

## 2019-08-09 NOTE — ASSESSMENT & PLAN NOTE
Syncope and collapse while on toilet  Likely secondary to urinary tract infection vs vasovagal   Patient does have atrial fibrillation; will also place on telemetry to rule out dysrhythmias  Patient does have chronic UTIs previously on keflex now on ciprofloxacin however last urine culture 09/2018 demonstrates resistance is to both classes  Continue cefepime for now follow up on cultures  Have PT evaluate

## 2019-08-09 NOTE — ASSESSMENT & PLAN NOTE
Alzheimer's dementia  Continue donepezil and memantine  Will add quetiapine as patient tends to sundown during hospitalizations

## 2019-08-09 NOTE — H&P
H&P- Corinne Huff 1933, 80 y o  female MRN: [id number]  Unit/Bed#: ED 08 Encounter: [id number]  Primary Care Provider: Romeo aNik DO   Date and time admitted to hospital: 8/9/2019  9:12 AM        Assessment and Plan  * Syncope and collapse  Assessment & Plan  Syncope and collapse while on toilet  Likely secondary to urinary tract infection vs vasovagal   Patient does have atrial fibrillation; will also place on telemetry to rule out dysrhythmias  Patient does have chronic UTIs previously on keflex now on ciprofloxacin however last urine culture 09/2018 demonstrates resistance is to both classes  Continue cefepime for now follow up on cultures  Have PT evaluate  Patient is Christian  Assessment & Plan  Patient is a Christian    Chronic UTI  Assessment & Plan  Chronic UTI previously on Keflex now on ciprofloxacin  Will hold both while on cefepime  Of note last urine culture 09/2018 demonstrates resistances to both classes  Other headache syndrome  Assessment & Plan  Chronic headache on fioricet q h s  A-fib [facility])  Assessment & Plan  Paroxysmal atrial fibrillation anticoagulated on pradaxa    Hypertension  Assessment & Plan  Essential hypertension previously on amlodipine  Monitor blood pressures  Hyperlipidemia  Assessment & Plan  Hyperlipidemia continue atorvastatin    Alzheimer disease  Assessment & Plan  Alzheimer's dementia  Continue donepezil and memantine  Will add quetiapine as patient tends to sundown during hospitalizations  History of stroke  Assessment & Plan  History of stroke on Pradaxa    VTE Prophylaxis: Dabigatran (Pradaxa)  Code Status: Level 3 - DNAR and DNI  Anticipated Length of Stay:  Patient will be admitted on an Inpatient basis with an anticipated length of stay of  greater than 2 midnights  Justification for Hospital Stay: Syncope and collapse  Total Time for Visit, including Counseling / Coordination of Care: 45 mins   Greater than 50% of this total time spent on direct patient counseling and coordination of care  Chief Complaint:     Chief Complaint   Patient presents with    Syncope     Pt's daughter reports heard a, "Thud" in the bathroom  Daughter states, "She didn't fall to the ground " Pt has hx of dementia, unable to describe events  Pt reports headache  Daughter states, "She complains of a headache every day, but the intensity is worse " Pt's daughter was concerned because she was "Out longer than normal "      History of Present Illness:    Mayco Flores is a 80 y o  female who presents with syncope and collapse  Patient lives with daughter  Was recently hospitalized here for retinal artery occlusion of left eye and has been tolerating Pradaxa  She has been doing well at home without need of any assistive devices  This morning she did ambulate to bathroom  While on attended daughter heard a thump  The patient was slumped over and had syncopized backwards between toilet and wall  She is demented and cannot provide any further history  She does have paroxysmal atrial fibrillation and chronic urinary tract infections on ciprofloxacin daily  No other changes in medications with exception of the addition of Pradaxa  In the emergency department she was found have urinary tract infection and admission was requested for this      Review of Systems:  History obtained from chart review and the patient  General ROS: negative for - chills or fever  Psychological ROS: negative for - anxiety or depression  Ophthalmic ROS: negative for - loss of vision  Respiratory ROS: negative for - cough or shortness of breath  Cardiovascular ROS: negative for - chest pain  Gastrointestinal ROS: negative for - melena  Genito-Urinary ROS: negative for - dysuria  Musculoskeletal ROS: negative for - muscle pain  Neurological ROS: positive for - memory loss  Otherwise, all other 12 point review of systems normal     Past Medical and Surgical History:   Past Medical History:   Diagnosis Date    [facility]-fib [facility])     Alzheimer disease     Chronic UTI     Diverticulosis     Dyslipidemia     History of stroke     Hyperlipidemia     Hypertension     UTI (urinary tract infection)     Vasovagal syncope 2/13/2018     Past Surgical History:   Procedure Laterality Date    CHOLECYSTECTOMY       Meds/Allergies: Allergies: Allergies   Allergen Reactions    Ativan [Lorazepam]     Latuda [Lurasidone]      Prior to Admission Medications   Prescriptions Last Dose Informant Patient Reported? Taking? BUTALBITAL-ACETAMINOPHEN PO 8/8/2019 at Unknown time  Yes Yes   Sig: Take 1 tablet by mouth daily at bedtime   Cranberry 500 MG TABS 8/8/2019 at Unknown time Self Yes Yes   Sig: Take 500 mg by mouth daily     atorvastatin (LIPITOR) 20 mg tablet 8/8/2019 at Unknown time Self Yes Yes   Sig: Take 20 mg by mouth daily   ciprofloxacin (CIPRO) 250 mg tablet   Yes Yes   Sig: Take 250 mg by mouth daily   dabigatran etexilate (PRADAXA) 150 mg capsu 8/9/2019 at 1800  No Yes   Sig: Take 1 capsule (150 mg total) by mouth every 12 (twelve) hours   donepezil (ARICEPT) 10 mg tablet 8/8/2019 at Unknown time Self Yes Yes   Sig: Take 10 mg by mouth daily at bedtime   memantine (NAMENDA) 10 mg tablet 8/9/2019 at Unknown time Self Yes Yes   Sig: Take 10 mg by mouth 2 (two) times a day   pantoprazole (PROTONIX) 40 mg tablet 8/9/2019 at Unknown time Child Yes Yes   Sig: Take 40 mg by mouth daily      Facility-Administered Medications: None     Social History:     Social History     Socioeconomic History    Marital status:       Spouse name: Not on file    Number of children: Not on file    Years of education: Not on file    Highest education level: Not on file   Occupational History    Not on file   Social Needs    Financial resource strain: Not on file    Food insecurity:     Worry: Not on file     Inability: Not on file    Transportation needs:     Medical: Not on file     Non-medical: Not on file   Tobacco Use    Smoking status: Former Smoker    Smokeless tobacco: Never Used   Substance and Sexual Activity    Alcohol use: No    Drug use: No    Sexual activity: Not on file   Lifestyle    Physical activity:     Days per week: Not on file     Minutes per session: Not on file    Stress: Not on file   Relationships    Social connections:     Talks on phone: Not on file     Gets together: Not on file     Attends [employer] service: Not on file     Active member of club or organization: Not on file     Attends meetings of clubs or organizations: Not on file     Relationship status: Not on file    Intimate partner violence:     Fear of current or ex partner: Not on file     Emotionally abused: Not on file     Physically abused: Not on file     Forced sexual activity: Not on file   Other Topics Concern    Not on file   Social History Narrative    Not on file     Patient Pre-hospital Living Situation:  Lives with daughter  Patient Pre-hospital Level of Mobility:  No assistive devices  Patient Pre-hospital Diet Restrictions:     Family History:  History reviewed  No pertinent family history  Physical Exam:   Vitals:   Blood Pressure: 138/62 (08/09/19 1350)  Pulse: 69 (08/09/19 1350)  Temperature: (!) 97 °F (36 1 °C) (08/09/19 1350)  Temp Source: Temporal (08/09/19 1350)  Respirations: 18 (08/09/19 1350)  SpO2: 100 % (08/09/19 1350)    General appearance: alert, appears stated age and cooperative  Skin: Skin color, texture, turgor normal  No rashes or lesions  Head: Normocephalic, without obvious abnormality, atraumatic  Eyes: conjunctivae/corneas clear  PERRL, EOM's intact    Lungs: clear to auscultation bilaterally  Heart: regular rate and rhythm and ++ murmur  Abdomen: Soft nontender positive bowel sounds  Back: range of motion normal  Extremities: extremities normal, atraumatic, no cyanosis or edema  Neurologic:  Grossly intact; pleasantly demented  Psychiatric:  Appropriate mood    Lab Results: I have personally reviewed pertinent reports  Results from last 7 days   Lab Units 08/09/19  0924   WBC Thousand/uL 8 82   HEMOGLOBIN g/dL 10 9*   HEMATOCRIT % 33 6*   PLATELETS Thousands/uL 312   NEUTROS PCT % 62   LYMPHS PCT % 30   MONOS PCT % 7   EOS PCT % 1     Results from last 7 days   Lab Units 08/09/19  0924   SODIUM mmol/L 137   POTASSIUM mmol/L 3 8   CHLORIDE mmol/L 102   CO2 mmol/L 24   ANION GAP mmol/L 11   BUN mg/dL 9   CREATININE mg/dL 0 82   CALCIUM mg/dL 8 9   ALBUMIN g/dL 3 4*   TOTAL BILIRUBIN mg/dL 0 22   ALK PHOS U/L 73   ALT U/L 14   AST U/L 19   EGFR ml/min/1 73sq m 65   GLUCOSE RANDOM mg/dL 143*         Results from last 7 days   Lab Units 08/09/19  0924   TROPONIN I ng/mL <0 02            Results from last 7 days   Lab Units 08/09/19  1205   COLOR UA  Yellow   CLARITY UA  Slightly Cloudy   SPEC GRAV UA  1 015   PH UA  7 5   LEUKOCYTES UA  Large*   NITRITE UA  Positive*   GLUCOSE UA mg/dl Negative   KETONES UA mg/dl Negative   BILIRUBIN UA  Negative   BLOOD UA  Trace-Intact*      Results from last 7 days   Lab Units 08/09/19  1205   RBC UA /hpf None Seen   WBC UA /hpf Innumerable*   EPITHELIAL CELLS WET PREP /hpf Occasional   BACTERIA UA /hpf Innumerable*      Imaging: I have personally reviewed pertinent reports  Xr Chest 2 Views  Result Date: 8/9/2019  Impression: No active pulmonary disease on examination which is somewhat limited secondary to low lung volumes  Workstation performed: [id number]     Ct Head Without Contrast  Result Date: 8/9/2019  Impression: No acute intracranial abnormality  Microangiopathic changes  Workstation performed: [id number]     Ct Spine Cervical Without Contrast  Result Date: 8/9/2019  Impression: No acute compression collapse of the vertebra  Degenerative disc disease at C5-6, C6/7 Mild Right foraminal narrowing at C5-6    Mild bilateral foraminal narrowing at C6-7  Workstation performed: [id number]       EKG, Pathology, and Other Studies Reviewed on Admission:   EKG  Result Date: 08/09/19  Personally reviewed strips with impression of:  Normal sinus rhythm 65 bpm    Allscripts/ Epic Records Reviewed: Yes    ** Please Note: This note has been constructed using a voice recognition system   **

## 2019-08-09 NOTE — PLAN OF CARE
Problem: Potential for Falls  Goal: Patient will remain free of falls  Description  INTERVENTIONS:  - Assess patient frequently for physical needs  -  Identify cognitive and physical deficits and behaviors that affect risk of falls    -  [place] fall precautions as indicated by assessment   - Educate patient/family on patient safety including physical limitations  - Instruct patient to call for assistance with activity based on assessment  - Modify environment to reduce risk of injury  - Consider OT/PT consult to assist with strengthening/mobility  Outcome: Progressing     Problem: Prexisting or High Potential for Compromised Skin Integrity  Goal: Skin integrity is maintained or improved  Description  INTERVENTIONS:  - Identify patients at risk for skin breakdown  - Assess and monitor skin integrity  - Assess and monitor nutrition and hydration status  - Monitor labs (i e  albumin)  - Assess for incontinence   - Turn and reposition patient  - Assist with mobility/ambulation  - Relieve pressure over bony prominences  - Avoid friction and shearing  - Provide appropriate hygiene as needed including keeping skin clean and dry  - Evaluate need for skin moisturizer/barrier cream  - Collaborate with interdisciplinary team (i e  Nutrition, Rehabilitation, etc )   - Patient/family teaching  Outcome: Progressing

## 2019-08-10 LAB
ALBUMIN SERPL BCP-MCNC: 3.1 G/DL (ref 3.5–5)
ALP SERPL-CCNC: 65 U/L (ref 46–116)
ALT SERPL W P-5'-P-CCNC: 9 U/L (ref 12–78)
ANION GAP SERPL CALCULATED.3IONS-SCNC: 10 MMOL/L (ref 4–13)
AST SERPL W P-5'-P-CCNC: 13 U/L (ref 5–45)
BILIRUB SERPL-MCNC: 0.24 MG/DL (ref 0.2–1)
BUN SERPL-MCNC: 11 MG/DL (ref 5–25)
CALCIUM SERPL-MCNC: 8.7 MG/DL (ref 8.3–10.1)
CHLORIDE SERPL-SCNC: 104 MMOL/L (ref 100–108)
CO2 SERPL-SCNC: 25 MMOL/L (ref 21–32)
CREAT SERPL-MCNC: 0.74 MG/DL (ref 0.6–1.3)
ERYTHROCYTE [DISTWIDTH] IN BLOOD BY AUTOMATED COUNT: 14.9 % (ref 11.6–15.1)
GFR SERPL CREATININE-BSD FRML MDRD: 74 ML/MIN/1.73SQ M
GLUCOSE SERPL-MCNC: 91 MG/DL (ref 65–140)
HCT VFR BLD AUTO: 33.1 % (ref 34.8–46.1)
HGB BLD-MCNC: 10.4 G/DL (ref 11.5–15.4)
MCH RBC QN AUTO: 27.2 PG (ref 26.8–34.3)
MCHC RBC AUTO-ENTMCNC: 31.4 G/DL (ref 31.4–37.4)
MCV RBC AUTO: 86 FL (ref 82–98)
PLATELET # BLD AUTO: 319 THOUSANDS/UL (ref 149–390)
PMV BLD AUTO: 10.3 FL (ref 8.9–12.7)
POTASSIUM SERPL-SCNC: 4 MMOL/L (ref 3.5–5.3)
PROT SERPL-MCNC: 6.2 G/DL (ref 6.4–8.2)
RBC # BLD AUTO: 3.83 MILLION/UL (ref 3.81–5.12)
SODIUM SERPL-SCNC: 139 MMOL/L (ref 136–145)
WBC # BLD AUTO: 6.23 THOUSAND/UL (ref 4.31–10.16)

## 2019-08-10 PROCEDURE — 85027 COMPLETE CBC AUTOMATED: CPT | Performed by: INTERNAL MEDICINE

## 2019-08-10 PROCEDURE — 80053 COMPREHEN METABOLIC PANEL: CPT | Performed by: INTERNAL MEDICINE

## 2019-08-10 PROCEDURE — 99233 SBSQ HOSP IP/OBS HIGH 50: CPT | Performed by: INTERNAL MEDICINE

## 2019-08-10 RX ADMIN — CEFEPIME HYDROCHLORIDE 1000 MG: 1 INJECTION, POWDER, FOR SOLUTION INTRAMUSCULAR; INTRAVENOUS at 11:46

## 2019-08-10 RX ADMIN — QUETIAPINE FUMARATE 25 MG: 25 TABLET ORAL at 21:27

## 2019-08-10 RX ADMIN — MEMANTINE 10 MG: 5 TABLET ORAL at 17:05

## 2019-08-10 RX ADMIN — DONEPEZIL HYDROCHLORIDE 10 MG: 10 TABLET, FILM COATED ORAL at 21:27

## 2019-08-10 RX ADMIN — PANTOPRAZOLE SODIUM 40 MG: 40 TABLET, DELAYED RELEASE ORAL at 05:45

## 2019-08-10 RX ADMIN — SODIUM CHLORIDE 75 ML/HR: 0.9 INJECTION, SOLUTION INTRAVENOUS at 08:28

## 2019-08-10 RX ADMIN — ATORVASTATIN CALCIUM 20 MG: 20 TABLET, FILM COATED ORAL at 17:05

## 2019-08-10 RX ADMIN — DABIGATRAN ETEXILATE MESYLATE 150 MG: 150 CAPSULE ORAL at 05:45

## 2019-08-10 RX ADMIN — MEMANTINE 10 MG: 5 TABLET ORAL at 08:26

## 2019-08-10 RX ADMIN — DABIGATRAN ETEXILATE MESYLATE 150 MG: 150 CAPSULE ORAL at 17:05

## 2019-08-10 NOTE — NURSING NOTE
Pt refusing telemetry at this time and becoming agitated, will notify physician and will reapproach at a later time

## 2019-08-10 NOTE — ASSESSMENT & PLAN NOTE
History of recurrent UTIs, continue cefepime pending cultures, patient can likely be treated with a short 3 to 5 day course of antibiotic therapy

## 2019-08-10 NOTE — PLAN OF CARE
Problem: Potential for Falls  Goal: Patient will remain free of falls  Description  INTERVENTIONS:  - Assess patient frequently for physical needs  -  Identify cognitive and physical deficits and behaviors that affect risk of falls    -  [place] fall precautions as indicated by assessment   - Educate patient/family on patient safety including physical limitations  - Instruct patient to call for assistance with activity based on assessment  - Modify environment to reduce risk of injury  - Consider OT/PT consult to assist with strengthening/mobility  Outcome: Progressing     Problem: Prexisting or High Potential for Compromised Skin Integrity  Goal: Skin integrity is maintained or improved  Description  INTERVENTIONS:  - Identify patients at risk for skin breakdown  - Assess and monitor skin integrity  - Assess and monitor nutrition and hydration status  - Monitor labs (i e  albumin)  - Assess for incontinence   - Turn and reposition patient  - Assist with mobility/ambulation  - Relieve pressure over bony prominences  - Avoid friction and shearing  - Provide appropriate hygiene as needed including keeping skin clean and dry  - Evaluate need for skin moisturizer/barrier cream  - Collaborate with interdisciplinary team (i e  Nutrition, Rehabilitation, etc )   - Patient/family teaching  Outcome: Progressing     Problem: SAFETY ADULT  Goal: Maintain or return to baseline ADL function  Description  INTERVENTIONS:  -  Assess patient's ability to carry out ADLs; assess patient's baseline for ADL function and identify physical deficits which impact ability to perform ADLs (bathing, care of mouth/teeth, toileting, grooming, dressing, etc )  - Assess/evaluate cause of self-care deficits   - Assess range of motion  - Assess patient's mobility; develop plan if impaired  - Assess patient's need for assistive devices and provide as appropriate  - Encourage maximum independence but intervene and supervise when necessary  ¯ Involve family in performance of ADLs  ¯ Assess for home care needs following discharge   ¯ Request OT consult to assist with ADL evaluation and planning for discharge  ¯ Provide patient education as appropriate  Outcome: Progressing  Goal: Maintain or return mobility status to optimal level  Description  INTERVENTIONS:  - Assess patient's baseline mobility status (ambulation, transfers, stairs, etc )    - Identify cognitive and physical deficits and behaviors that affect mobility  - Identify mobility aids required to assist with transfers and/or ambulation (gait belt, sit-to-stand, lift, walker, cane, etc )  - [place] fall precautions as indicated by assessment  - Record patient progress and toleration of activity level on Mobility SBAR; progress patient to next Phase/Stage  - Instruct patient to call for assistance with activity based on assessment  - Request Rehabilitation consult to assist with strengthening/weightbearing, etc   Outcome: Progressing     Problem: Knowledge Deficit  Goal: Patient/family/caregiver demonstrates understanding of disease process, treatment plan, medications, and discharge instructions  Description  Complete learning assessment and assess knowledge base    Interventions:  - Provide teaching at level of understanding  - Provide teaching via preferred learning methods  Outcome: Progressing

## 2019-08-10 NOTE — ASSESSMENT & PLAN NOTE
Syncope and collapse while on toilet  Likely secondary to urinary tract infection vs vasovagal     Patient does have atrial fibrillation; discontinue telemetry  Patient does have chronic UTIs previously on keflex now on ciprofloxacin however last urine culture 09/2018 demonstrates resistance is to both classes  Continue cefepime for now follow up on cultures  Have PT evaluate

## 2019-08-10 NOTE — PLAN OF CARE
Problem: Potential for Falls  Goal: Patient will remain free of falls  Description  INTERVENTIONS:  - Assess patient frequently for physical needs  -  Identify cognitive and physical deficits and behaviors that affect risk of falls    -  [place] fall precautions as indicated by assessment   - Educate patient/family on patient safety including physical limitations  - Instruct patient to call for assistance with activity based on assessment  - Modify environment to reduce risk of injury  - Consider OT/PT consult to assist with strengthening/mobility  Outcome: Progressing     Problem: Prexisting or High Potential for Compromised Skin Integrity  Goal: Skin integrity is maintained or improved  Description  INTERVENTIONS:  - Identify patients at risk for skin breakdown  - Assess and monitor skin integrity  - Assess and monitor nutrition and hydration status  - Monitor labs (i e  albumin)  - Assess for incontinence   - Turn and reposition patient  - Assist with mobility/ambulation  - Relieve pressure over bony prominences  - Avoid friction and shearing  - Provide appropriate hygiene as needed including keeping skin clean and dry  - Evaluate need for skin moisturizer/barrier cream  - Collaborate with interdisciplinary team (i e  Nutrition, Rehabilitation, etc )   - Patient/family teaching  Outcome: Progressing     Problem: SAFETY ADULT  Goal: Maintain or return to baseline ADL function  Description  INTERVENTIONS:  -  Assess patient's ability to carry out ADLs; assess patient's baseline for ADL function and identify physical deficits which impact ability to perform ADLs (bathing, care of mouth/teeth, toileting, grooming, dressing, etc )  - Assess/evaluate cause of self-care deficits   - Assess range of motion  - Assess patient's mobility; develop plan if impaired  - Assess patient's need for assistive devices and provide as appropriate  - Encourage maximum independence but intervene and supervise when necessary  ¯ Involve family in performance of ADLs  ¯ Assess for home care needs following discharge   ¯ Request OT consult to assist with ADL evaluation and planning for discharge  ¯ Provide patient education as appropriate  Outcome: Progressing  Goal: Maintain or return mobility status to optimal level  Description  INTERVENTIONS:  - Assess patient's baseline mobility status (ambulation, transfers, stairs, etc )    - Identify cognitive and physical deficits and behaviors that affect mobility  - Identify mobility aids required to assist with transfers and/or ambulation (gait belt, sit-to-stand, lift, walker, cane, etc )  - [place] fall precautions as indicated by assessment  - Record patient progress and toleration of activity level on Mobility SBAR; progress patient to next Phase/Stage  - Instruct patient to call for assistance with activity based on assessment  - Request Rehabilitation consult to assist with strengthening/weightbearing, etc   Outcome: Progressing     Problem: Knowledge Deficit  Goal: Patient/family/caregiver demonstrates understanding of disease process, treatment plan, medications, and discharge instructions  Description  Complete learning assessment and assess knowledge base    Interventions:  - Provide teaching at level of understanding  - Provide teaching via preferred learning methods  Outcome: Progressing

## 2019-08-10 NOTE — PROGRESS NOTES
Progress Note - Hannah Verma 1933, 80 y o  female MRN: [id number]    Unit/Bed#: [facility] [address] [id number] Encounter: [id number]    Primary Care Provider: Darren Pagan DO   Date and time admitted to hospital: 8/9/2019  9:12 AM        * Syncope and collapse  Assessment & Plan  Syncope and collapse while on toilet  Likely secondary to urinary tract infection vs vasovagal     Patient does have atrial fibrillation; discontinue telemetry  Patient does have chronic UTIs previously on keflex now on ciprofloxacin however last urine culture 09/2018 demonstrates resistance is to both classes  Continue cefepime for now follow up on cultures  Have PT evaluate  Patient is Mu-ism  Assessment & Plan  Patient is a Mu-ism    Chronic UTI  Assessment & Plan  History of recurrent UTIs, continue cefepime pending cultures, patient can likely be treated with a short 3 to 5 day course of antibiotic therapy        Other headache syndrome  Assessment & Plan  Chronic headache on fioricet q h s  [facility])  Assessment & Plan  Paroxysmal atrial fibrillation anticoagulated on pradaxa    Hypertension  Assessment & Plan  Essential hypertension previously on amlodipine  Monitor blood pressures  Hyperlipidemia  Assessment & Plan  Hyperlipidemia continue atorvastatin    Alzheimer disease  Assessment & Plan  Family reports history of vascular dementia, continue medical management  Palliative care discussed with patient and family, recommend outpatient formal palliative care visit  History of stroke  Assessment & Plan  History of stroke on Pradaxa      VTE Pharmacologic Prophylaxis:   Pharmacologic: Dabigatran (Pradaxa)  Mechanical VTE Prophylaxis in Place: Yes    Patient Centered Rounds: I have performed bedside rounds with nursing staff today        Education and Discussions with Family / Patient:  Plan of care discussed extensively with the patient's family at bedside, x-ray had 2 separate bedside conversations, 1 conversation at length with the patient's son, then a returned and again spoke with the patient's daughter later that day  Time Spent for Care: 1 hour  More than 50% of total time spent on counseling and coordination of care as described above  Current Length of Stay: 1 day(s)    Current Patient Status: Inpatient   Certification Statement: The patient will continue to require additional inpatient hospital stay due to IV cefepime will be given pending urine culture  Code Status: Level 3 - DNAR and DNI      Subjective:   No pain, patient is pleasantly confused, she is oriented to person only, she think she is at her apartment  Objective:     Vitals:   Temp (24hrs), Av 1 °F (36 2 °C), Min:97 °F (36 1 °C), Max:97 2 °F (36 2 °C)    Temp:  [97 °F (36 1 °C)-97 2 °F (36 2 °C)] 97 2 °F (36 2 °C)  HR:  [69-77] 77  Resp:  [18-20] 20  BP: (138-162)/(70-78) 138/78  SpO2:  [98 %-100 %] 98 %  There is no height or weight on file to calculate BMI  Input and Output Summary (last 24 hours): Intake/Output Summary (Last 24 hours) at 8/10/2019 1804  Last data filed at 8/10/2019 1346  Gross per 24 hour   Intake 1870 ml   Output    Net 1870 ml       Physical Exam:     Physical Exam   Constitutional: She is oriented to person, place, and time  She appears well-developed and well-nourished  No distress  Cardiovascular: Normal rate, regular rhythm, normal heart sounds and intact distal pulses  Pulmonary/Chest: Effort normal and breath sounds normal  No stridor  No respiratory distress  Abdominal: Soft  Bowel sounds are normal  She exhibits no distension  There is no tenderness  Neurological: She is alert and oriented to person, place, and time  Coordination normal    Skin: Skin is warm and dry  She is not diaphoretic  No erythema  No pallor  Nursing note and vitals reviewed        Additional Data:     Labs:    Results from last 7 days   Lab Units 08/10/19  0548 19  8153 WBC Thousand/uL 6 23 8 82   HEMOGLOBIN g/dL 10 4* 10 9*   HEMATOCRIT % 33 1* 33 6*   PLATELETS Thousands/uL 319 312   NEUTROS PCT %  --  62   LYMPHS PCT %  --  30   MONOS PCT %  --  7   EOS PCT %  --  1     Results from last 7 days   Lab Units 08/10/19  0548   POTASSIUM mmol/L 4 0   CHLORIDE mmol/L 104   CO2 mmol/L 25   BUN mg/dL 11   CREATININE mg/dL 0 74   CALCIUM mg/dL 8 7   ALK PHOS U/L 65   ALT U/L 9*   AST U/L 13           * I Have Reviewed All Lab Data Listed Above  * Additional Pertinent Lab Tests Reviewed:  Stacey Bhat Admission Reviewed      Recent Cultures (last 7 days):           Last 24 Hours Medication List:     Current Facility-Administered Medications:  acetaminophen 650 mg Oral Q6H PRN Meg Lester [place], DO    atorvastatin 20 mg Oral Daily With Vyyo, DO    butalbital-acetaminophen-caffeine 1 tablet Oral HS Juan Ornelas, DO    cefepime 1,000 mg Intravenous Q12H Juan Ornelas DO Last Rate: Stopped (08/10/19 1216)   dabigatran etexilate 150 mg Oral Q12H [facility] Juan Ornelas,     docusate sodium 100 mg Oral BID PRN Meg Lester [place], DO    donepezil 10 mg Oral HS Juan Ornelas, DO    magnesium hydroxide 30 mL Oral BID PRN Meg Lester [place], DO    memantine 10 mg Oral BID Meg Lester [place], DO    menthol-methyl salicylate  Apply externally 4x Daily PRN Meg Lester [place], DO    pantoprazole 40 mg Oral Early Morning Juan Ornelas, DO    QUEtiapine 25 mg Oral HS Meg Lester [place], DO         Today, Patient Was Seen By: Jalen Kang DO

## 2019-08-10 NOTE — ASSESSMENT & PLAN NOTE
Family reports history of vascular dementia, continue medical management  Palliative care discussed with patient and family, recommend outpatient formal palliative care visit

## 2019-08-10 NOTE — PLAN OF CARE
Problem: Potential for Falls  Goal: Patient will remain free of falls  Description  INTERVENTIONS:  - Assess patient frequently for physical needs  -  Identify cognitive and physical deficits and behaviors that affect risk of falls    -  [place] fall precautions as indicated by assessment   - Educate patient/family on patient safety including physical limitations  - Instruct patient to call for assistance with activity based on assessment  - Modify environment to reduce risk of injury  - Consider OT/PT consult to assist with strengthening/mobility  Outcome: Progressing     Problem: Prexisting or High Potential for Compromised Skin Integrity  Goal: Skin integrity is maintained or improved  Description  INTERVENTIONS:  - Identify patients at risk for skin breakdown  - Assess and monitor skin integrity  - Assess and monitor nutrition and hydration status  - Monitor labs (i e  albumin)  - Assess for incontinence   - Turn and reposition patient  - Assist with mobility/ambulation  - Relieve pressure over bony prominences  - Avoid friction and shearing  - Provide appropriate hygiene as needed including keeping skin clean and dry  - Evaluate need for skin moisturizer/barrier cream  - Collaborate with interdisciplinary team (i e  Nutrition, Rehabilitation, etc )   - Patient/family teaching  Outcome: Progressing     Problem: SAFETY ADULT  Goal: Maintain or return to baseline ADL function  Description  INTERVENTIONS:  -  Assess patient's ability to carry out ADLs; assess patient's baseline for ADL function and identify physical deficits which impact ability to perform ADLs (bathing, care of mouth/teeth, toileting, grooming, dressing, etc )  - Assess/evaluate cause of self-care deficits   - Assess range of motion  - Assess patient's mobility; develop plan if impaired  - Assess patient's need for assistive devices and provide as appropriate  - Encourage maximum independence but intervene and supervise when necessary  ¯ Involve family in performance of ADLs  ¯ Assess for home care needs following discharge   ¯ Request OT consult to assist with ADL evaluation and planning for discharge  ¯ Provide patient education as appropriate  Outcome: Progressing  Goal: Maintain or return mobility status to optimal level  Description  INTERVENTIONS:  - Assess patient's baseline mobility status (ambulation, transfers, stairs, etc )    - Identify cognitive and physical deficits and behaviors that affect mobility  - Identify mobility aids required to assist with transfers and/or ambulation (gait belt, sit-to-stand, lift, walker, cane, etc )  - [place] fall precautions as indicated by assessment  - Record patient progress and toleration of activity level on Mobility SBAR; progress patient to next Phase/Stage  - Instruct patient to call for assistance with activity based on assessment  - Request Rehabilitation consult to assist with strengthening/weightbearing, etc   Outcome: Progressing     Problem: Knowledge Deficit  Goal: Patient/family/caregiver demonstrates understanding of disease process, treatment plan, medications, and discharge instructions  Description  Complete learning assessment and assess knowledge base    Interventions:  - Provide teaching at level of understanding  - Provide teaching via preferred learning methods  Outcome: Progressing

## 2019-08-10 NOTE — UTILIZATION REVIEW
Initial Clinical Review    Admission: Date/Time/Statement: 8/9/19 @ 1308    Orders Placed This Encounter   Procedures    Inpatient Admission (expected length of stay for this patient Order details is greater than two midnights)     Standing Status:   Standing     Number of Occurrences:   1     Order Specific Question:   Admitting Physician     Answer:   Marya Haji [[id number]]     Order Specific Question:   Level of Care     Answer:   Med Surg [[id number]]     Order Specific Question:   Estimated length of stay     Answer:   More than 2 Midnights     Order Specific Question:   Certification     Answer:   I certify that inpatient services are medically necessary for this patient for a duration of greater than two midnights  See H&P and MD Progress Notes for additional information about the patient's course of treatment  ED Arrival Information     Expected Arrival Acuity Means of Arrival Escorted By Service Admission Type    - [facility]    Arrival Complaint    Syncope        Chief Complaint   Patient presents with    Syncope     Pt's daughter reports heard a, "Thud" in the bathroom  Daughter states, "She didn't fall to the ground " Pt has hx of dementia, unable to describe events  Pt reports headache  Daughter states, "She complains of a headache every day, but the intensity is worse " Pt's daughter was concerned because she was "Out longer than normal "      Assessment/Plan: [address] [facility] AS INPATIENT ADMISSION FOR SYNCOPE  PER DAUGHTER HEARD A THUD IN THE BATHROOM AND FOUND HER MOTHERS HEAD AGAINST THE WALL  PATIENT  HAS BEEN COMPLAINING OF HEADACHES THAT HAS BEEN INCREASING FEW SEVERAL DAYS  PATIENT HAS HX OF RECURRENT UTI ON PO CIPRO  PMH S-FIB CHRONIC UTI HTN HYPERLIPIDEMIA, ALZHEIMER    PLAN IV ANTIBX   AND SUPPORTIVE CARE      Assessment and Plan  * Syncope and collapse  Assessment & Plan  Syncope and collapse while on toilet  Likely secondary to urinary tract infection vs vasovagal   Patient does have atrial fibrillation; will also place on telemetry to rule out dysrhythmias  Patient does have chronic UTIs previously on keflex now on ciprofloxacin however last urine culture 09/2018 demonstrates resistance is to both classes  Continue cefepime for now follow up on cultures  Have PT evaluate     Chronic UTI  Assessment & Plan  Chronic UTI previously on Keflex now on ciprofloxacin  Will hold both while on cefepime    Of note last urine culture 09/2018 demonstrates resistances to both classes    ED Triage Vitals   Temperature Pulse Respirations Blood Pressure SpO2   08/09/19 0918 08/09/19 0918 08/09/19 0918 08/09/19 0918 08/09/19 0918   97 5 °F (36 4 °C) 56 16 100/52 96 %      Temp Source Heart Rate Source Patient Position - Orthostatic VS BP Location FiO2 (%)   08/09/19 0918 08/09/19 0918 08/09/19 0918 08/09/19 0918 --   Oral Monitor Lying Right arm       Pain Score       08/09/19 1027       No Pain        Wt Readings from Last 1 Encounters:   08/07/19 68 kg (150 lb)     Additional Vital Signs:   08/10/19 0704  97 1 °F (36 2 °C)Abnormal   76  20  162/78  98 %  None (Room air)  Lying   08/09/19 2310  97 °F (36 1 °C)Abnormal   69  18  160/70  100 %  None (Room air)  Lying   08/09/19 1350  97 °F (36 1 °C)Abnormal   69  18  138/62  100 %  None (Room air)  Lying   08/09/19 1202    70  16  156/65  99 %  None (Room air)  Lying   08/09/19 1027    62  16  118/56  99 %  None (Room air)  Lying       Pertinent Labs/Diagnostic Test Results:   Results from last 7 days   Lab Units 08/10/19  0548 08/09/19  0924   WBC Thousand/uL 6 23 8 82   HEMOGLOBIN g/dL 10 4* 10 9*   HEMATOCRIT % 33 1* 33 6*   PLATELETS Thousands/uL 319 312   NEUTROS ABS Thousands/µL  --  5 39     Results from last 7 days   Lab Units 08/10/19  0548 08/09/19  0924   SODIUM mmol/L 139 137   POTASSIUM mmol/L 4 0 3 8   CHLORIDE mmol/L 104 102   CO2 mmol/L 25 24   ANION GAP mmol/L 10 11   BUN mg/dL 11 9   CREATININE mg/dL 0 74 0 82   EGFR ml/min/1 73sq m 74 65   CALCIUM mg/dL 8 7 8 9     Results from last 7 days   Lab Units 08/10/19  0548 08/09/19  0924   AST U/L 13 19   ALT U/L 9* 14   ALK PHOS U/L 65 73   TOTAL PROTEIN g/dL 6 2* 6 7   ALBUMIN g/dL 3 1* 3 4*   TOTAL BILIRUBIN mg/dL 0 24 0 22         Results from last 7 days   Lab Units 08/10/19  0548 08/09/19  0924   GLUCOSE RANDOM mg/dL 91 143*     Results from last 7 days   Lab Units 08/09/19  0924   TROPONIN I ng/mL <0 02     Results from last 7 days   Lab Units 08/09/19  1205   CLARITY UA  Slightly Cloudy   COLOR UA  Yellow   SPEC GRAV UA  1 015   PH UA  7 5   GLUCOSE UA mg/dl Negative   KETONES UA mg/dl Negative   BLOOD UA  Trace-Intact*   PROTEIN UA mg/dl Negative   NITRITE UA  Positive*   BILIRUBIN UA  Negative   UROBILINOGEN UA E U /dl 0 2   LEUKOCYTES UA  Large*   WBC UA /hpf Innumerable*   RBC UA /hpf None Seen   BACTERIA UA /hpf Innumerable*   EPITHELIAL CELLS WET PREP /hpf Occasional       CT HEAD 08-09-19  No acute intracranial abnormality   Microangiopathic changes  CT C-SPINE 08-09-19  No acute compression collapse of the vertebra  Degenerative disc disease at C5-6, C6/7  Mild Right foraminal narrowing at C5-6   Mild bilateral foraminal narrowing at C6-7    CXR 08-09-19  No active pulmonary disease on examination which is somewhat limited secondary to low lung volumes        ED Treatment:   Medication Administration from 08/09/2019 0912 to 08/09/2019 1339       Date/Time Order Dose Route Action     08/09/2019 1329 cefepime (MAXIPIME) 2 g/50 mL dextrose IVPB 2,000 mg Intravenous New Bag        Past Medical History:   Diagnosis Date    [facility]-fib [facility])     Alzheimer disease     Chronic UTI     Diverticulosis     Dyslipidemia     History of stroke     Hyperlipidemia     Hypertension     UTI (urinary tract infection)     Vasovagal syncope 2/13/2018     Present on Admission:   Alzheimer disease   Chronic UTI   Hypertension   Hyperlipidemia   Patient is Sabianist   Other headache syndrome   Syncope and collapse   A-fib (HCC)      Admitting Diagnosis: Syncope [R55]  UTI (urinary tract infection) [N39 0]  Age/Sex: 80 y o  female  Admission Orders:    Current Facility-Administered Medications:  acetaminophen 650 mg Oral Q6H PRN   atorvastatin 20 mg Oral Daily With Dinner   butalbital-acetaminophen-caffeine 1 tablet Oral HS   cefepime 1,000 mg Intravenous Q12H   dabigatran etexilate 150 mg Oral Q12H DANIELLE   docusate sodium 100 mg Oral BID PRN   donepezil 10 mg Oral HS   magnesium hydroxide 30 mL Oral BID PRN   memantine 10 mg Oral BID   menthol-methyl salicylate  Apply externally 4x Daily PRN   pantoprazole 40 mg Oral Early Morning   QUEtiapine 25 mg Oral HS     PT      Network Utilization Review Department  Phone: [phone]; Fax [phone]  [email or web address]  ATTENTION: Please call with any questions or concerns to [phone]  and carefully listen to the prompts so that you are directed to the right person  Send all requests for admission clinical reviews, approved or denied determinations and any other requests to fax [phone]   All voicemails are confidential

## 2019-08-11 LAB — BACTERIA UR CULT: ABNORMAL

## 2019-08-11 PROCEDURE — 99232 SBSQ HOSP IP/OBS MODERATE 35: CPT | Performed by: INTERNAL MEDICINE

## 2019-08-11 RX ORDER — NITROFURANTOIN 25; 75 MG/1; MG/1
100 CAPSULE ORAL 2 TIMES DAILY WITH MEALS
Status: DISCONTINUED | OUTPATIENT
Start: 2019-08-11 | End: 2019-08-12 | Stop reason: HOSPADM

## 2019-08-11 RX ORDER — BUTALBITAL, ACETAMINOPHEN AND CAFFEINE 50; 325; 40 MG/1; MG/1; MG/1
1 TABLET ORAL EVERY 4 HOURS PRN
Status: DISCONTINUED | OUTPATIENT
Start: 2019-08-11 | End: 2019-08-12 | Stop reason: HOSPADM

## 2019-08-11 RX ADMIN — DABIGATRAN ETEXILATE MESYLATE 150 MG: 150 CAPSULE ORAL at 06:01

## 2019-08-11 RX ADMIN — DONEPEZIL HYDROCHLORIDE 10 MG: 10 TABLET, FILM COATED ORAL at 21:04

## 2019-08-11 RX ADMIN — QUETIAPINE FUMARATE 25 MG: 25 TABLET ORAL at 21:04

## 2019-08-11 RX ADMIN — BUTALBITAL, ACETAMINOPHEN AND CAFFEINE 1 TABLET: 50; 325; 40 TABLET ORAL at 21:04

## 2019-08-11 RX ADMIN — ACETAMINOPHEN 650 MG: 325 TABLET ORAL at 08:24

## 2019-08-11 RX ADMIN — NITROFURANTOIN (MONOHYDRATE/MACROCRYSTALS) 100 MG: 75; 25 CAPSULE ORAL at 17:18

## 2019-08-11 RX ADMIN — ATORVASTATIN CALCIUM 20 MG: 20 TABLET, FILM COATED ORAL at 17:18

## 2019-08-11 RX ADMIN — ACETAMINOPHEN 650 MG: 325 TABLET ORAL at 00:15

## 2019-08-11 RX ADMIN — PANTOPRAZOLE SODIUM 40 MG: 40 TABLET, DELAYED RELEASE ORAL at 06:01

## 2019-08-11 RX ADMIN — CEFEPIME HYDROCHLORIDE 1000 MG: 1 INJECTION, POWDER, FOR SOLUTION INTRAMUSCULAR; INTRAVENOUS at 11:34

## 2019-08-11 RX ADMIN — CEFEPIME HYDROCHLORIDE 1000 MG: 1 INJECTION, POWDER, FOR SOLUTION INTRAMUSCULAR; INTRAVENOUS at 01:32

## 2019-08-11 RX ADMIN — MEMANTINE 10 MG: 5 TABLET ORAL at 17:18

## 2019-08-11 RX ADMIN — DABIGATRAN ETEXILATE MESYLATE 150 MG: 150 CAPSULE ORAL at 17:18

## 2019-08-11 RX ADMIN — MEMANTINE 10 MG: 5 TABLET ORAL at 08:21

## 2019-08-11 RX ADMIN — BUTALBITAL, ACETAMINOPHEN AND CAFFEINE 1 TABLET: 50; 325; 40 TABLET ORAL at 12:02

## 2019-08-11 NOTE — ASSESSMENT & PLAN NOTE
Family reports history of "vascular dementia", continue medical management  Palliative care discussed with patient and family, recommend outpatient formal palliative care visit

## 2019-08-11 NOTE — PLAN OF CARE
Problem: Potential for Falls  Goal: Patient will remain free of falls  Description  INTERVENTIONS:  - Assess patient frequently for physical needs  -  Identify cognitive and physical deficits and behaviors that affect risk of falls    -  [place] fall precautions as indicated by assessment   - Educate patient/family on patient safety including physical limitations  - Instruct patient to call for assistance with activity based on assessment  - Modify environment to reduce risk of injury  - Consider OT/PT consult to assist with strengthening/mobility  Outcome: Progressing     Problem: Prexisting or High Potential for Compromised Skin Integrity  Goal: Skin integrity is maintained or improved  Description  INTERVENTIONS:  - Identify patients at risk for skin breakdown  - Assess and monitor skin integrity  - Assess and monitor nutrition and hydration status  - Monitor labs (i e  albumin)  - Assess for incontinence   - Turn and reposition patient  - Assist with mobility/ambulation  - Relieve pressure over bony prominences  - Avoid friction and shearing  - Provide appropriate hygiene as needed including keeping skin clean and dry  - Evaluate need for skin moisturizer/barrier cream  - Collaborate with interdisciplinary team (i e  Nutrition, Rehabilitation, etc )   - Patient/family teaching  Outcome: Progressing     Problem: SAFETY ADULT  Goal: Maintain or return to baseline ADL function  Description  INTERVENTIONS:  -  Assess patient's ability to carry out ADLs; assess patient's baseline for ADL function and identify physical deficits which impact ability to perform ADLs (bathing, care of mouth/teeth, toileting, grooming, dressing, etc )  - Assess/evaluate cause of self-care deficits   - Assess range of motion  - Assess patient's mobility; develop plan if impaired  - Assess patient's need for assistive devices and provide as appropriate  - Encourage maximum independence but intervene and supervise when necessary  ¯ Involve family in performance of ADLs  ¯ Assess for home care needs following discharge   ¯ Request OT consult to assist with ADL evaluation and planning for discharge  ¯ Provide patient education as appropriate  Outcome: Progressing  Goal: Maintain or return mobility status to optimal level  Description  INTERVENTIONS:  - Assess patient's baseline mobility status (ambulation, transfers, stairs, etc )    - Identify cognitive and physical deficits and behaviors that affect mobility  - Identify mobility aids required to assist with transfers and/or ambulation (gait belt, sit-to-stand, lift, walker, cane, etc )  - [place] fall precautions as indicated by assessment  - Record patient progress and toleration of activity level on Mobility SBAR; progress patient to next Phase/Stage  - Instruct patient to call for assistance with activity based on assessment  - Request Rehabilitation consult to assist with strengthening/weightbearing, etc   Outcome: Progressing     Problem: Knowledge Deficit  Goal: Patient/family/caregiver demonstrates understanding of disease process, treatment plan, medications, and discharge instructions  Description  Complete learning assessment and assess knowledge base    Interventions:  - Provide teaching at level of understanding  - Provide teaching via preferred learning methods  Outcome: Progressing

## 2019-08-11 NOTE — PROGRESS NOTES
Progress Note - Liz Crew 1933, 80 y o  female MRN: [id number]    Unit/Bed#: [facility] [address] 212-01 Encounter: [id number]    Primary Care Provider: Danna Franco DO   Date and time admitted to hospital: 8/9/2019  9:12 AM        * Syncope and collapse  Assessment & Plan  Syncope and collapse while on toilet  Likely secondary to urinary tract infection vs vasovagal     History of atrial fibrillation, patient was on telemetry for 24 hours, telemetry was discontinued, no significant events  Patient does have chronic UTIs previously on keflex now on chronic ciprofloxacin however last urine culture 09/2018 demonstrates resistance is to both classes  Continue cefepime for now follow up on cultures  Follow up PT eval    Chronic UTI  Assessment & Plan  History of recurrent UTIs, continue cefepime pending cultures, patient can likely be treated with a short 3 to 5 day course of antibiotic therapy        Other headache syndrome  Assessment & Plan  Chronic headache on fioricet q h s  A-fib [facility])  Assessment & Plan  Paroxysmal atrial fibrillation anticoagulated on pradaxa    Hypertension  Assessment & Plan  Essential hypertension previously on amlodipine  Monitor blood pressures  Hyperlipidemia  Assessment & Plan  Hyperlipidemia continue atorvastatin    Alzheimer disease  Assessment & Plan  Family reports history of "vascular dementia", continue medical management  Palliative care discussed with patient and family, recommend outpatient formal palliative care visit  History of stroke  Assessment & Plan  History of stroke on Pradaxa      VTE Pharmacologic Prophylaxis:   Pharmacologic: Dabigatran (Pradaxa)  Mechanical VTE Prophylaxis in Place: Yes    Patient Centered Rounds: I have performed bedside rounds with nursing staff today  Education and Discussions with Family / Patient:  Plan of care discussed with patient's son today at bedside    Time Spent for Care: 20 minutes    More than 50% of total time spent on counseling and coordination of care as described above  Current Length of Stay: 2 day(s)    Current Patient Status: Inpatient   Certification Statement: The patient will continue to require additional inpatient hospital stay due to IV antibiotics pending urine culture    Discharge Plan / Estimated Discharge Date:  Likely discharge on 2019 or 2019 pending urine culture      Code Status: Level 3 - DNAR and DNI      Subjective:   No pain, patient is not a reliable historian, son at bedside to assist with history, patient continues with poor p o  Intake  Objective:     Vitals:   Temp (24hrs), Av 2 °F (36 2 °C), Min:97 °F (36 1 °C), Max:97 6 °F (36 4 °C)    Temp:  [97 °F (36 1 °C)-97 6 °F (36 4 °C)] 97 °F (36 1 °C)  HR:  [72-78] 72  Resp:  [16-20] 16  BP: (138-160)/(61-79) 142/61  SpO2:  [94 %-98 %] 97 %  There is no height or weight on file to calculate BMI  Input and Output Summary (last 24 hours): Intake/Output Summary (Last 24 hours) at 2019 1143  Last data filed at 8/10/2019 1346  Gross per 24 hour   Intake 360 ml   Output    Net 360 ml       Physical Exam:     Physical Exam   Constitutional: She is oriented to person, place, and time  She appears well-developed and well-nourished  No distress  HENT:   Head: Normocephalic and atraumatic  Right Ear: External ear normal    Left Ear: External ear normal    Cardiovascular: Normal rate, regular rhythm, normal heart sounds and intact distal pulses  Pulmonary/Chest: Effort normal and breath sounds normal  No stridor  No respiratory distress  Abdominal: Soft  Bowel sounds are normal  She exhibits no distension  There is no tenderness  Neurological: She is alert and oriented to person, place, and time  No cranial nerve deficit  Coordination normal    Skin: She is not diaphoretic  Nursing note and vitals reviewed        Additional Data:     Labs:    Results from last 7 days   Lab Units 08/10/19  0218 08/09/19  0924   WBC Thousand/uL 6 23 8 82   HEMOGLOBIN g/dL 10 4* 10 9*   HEMATOCRIT % 33 1* 33 6*   PLATELETS Thousands/uL 319 312   NEUTROS PCT %  --  62   LYMPHS PCT %  --  30   MONOS PCT %  --  7   EOS PCT %  --  1     Results from last 7 days   Lab Units 08/10/19  0548   POTASSIUM mmol/L 4 0   CHLORIDE mmol/L 104   CO2 mmol/L 25   BUN mg/dL 11   CREATININE mg/dL 0 74   CALCIUM mg/dL 8 7   ALK PHOS U/L 65   ALT U/L 9*   AST U/L 13           * I Have Reviewed All Lab Data Listed Above  * Additional Pertinent Lab Tests Reviewed: [facility]        Recent Cultures (last 7 days):     Results from last 7 days   Lab Units 08/09/19  1205   URINE CULTURE  Culture results to follow         Last 24 Hours Medication List:     Current Facility-Administered Medications:  acetaminophen 650 mg Oral Q6H PRN Lala Bile, DO    atorvastatin 20 mg Oral Daily With Infinia, DO    butalbital-acetaminophen-caffeine 1 tablet Oral HS Juan Ornelas, DO    cefepime 1,000 mg Intravenous Q12H Juan Ornelas,  Last Rate: Stopped (08/11/19 1204)   dabigatran etexilate 150 mg Oral Q12H [address] Juan Johnson, DO    docusate sodium 100 mg Oral BID PRN Lala Bile, DO    donepezil 10 mg Oral HS Juan Ornelas, DO    magnesium hydroxide 30 mL Oral BID PRN Lala Bile, DO    memantine 10 mg Oral BID Llaa Bile, DO    menthol-methyl salicylate  Apply externally 4x Daily PRN Lala Bile, DO    pantoprazole 40 mg Oral Early Morning Juan Ornelas, DO    QUEtiapine 25 mg Oral HS Lala Bile, DO         Today, Patient Was Seen By: Kaylin Montoya DO

## 2019-08-11 NOTE — ASSESSMENT & PLAN NOTE
Syncope and collapse while on toilet  Likely secondary to urinary tract infection vs vasovagal     History of atrial fibrillation, patient was on telemetry for 24 hours, telemetry was discontinued, no significant events  Patient does have chronic UTIs previously on keflex now on chronic ciprofloxacin however last urine culture 09/2018 demonstrates resistance is to both classes  Continue cefepime for now follow up on cultures    Follow up PT eval

## 2019-08-12 VITALS
DIASTOLIC BLOOD PRESSURE: 76 MMHG | RESPIRATION RATE: 20 BRPM | OXYGEN SATURATION: 96 % | TEMPERATURE: 96.8 F | HEART RATE: 68 BPM | SYSTOLIC BLOOD PRESSURE: 150 MMHG

## 2019-08-12 PROCEDURE — 99239 HOSP IP/OBS DSCHRG MGMT >30: CPT | Performed by: INTERNAL MEDICINE

## 2019-08-12 RX ORDER — CEPHALEXIN 500 MG/1
500 CAPSULE ORAL EVERY 12 HOURS SCHEDULED
Status: DISCONTINUED | OUTPATIENT
Start: 2019-08-12 | End: 2019-08-12 | Stop reason: HOSPADM

## 2019-08-12 RX ORDER — QUETIAPINE FUMARATE 25 MG/1
25 TABLET, FILM COATED ORAL
Qty: 30 TABLET | Refills: 0 | Status: ON HOLD | OUTPATIENT
Start: 2019-08-12 | End: 2020-02-18 | Stop reason: SDUPTHER

## 2019-08-12 RX ORDER — CEPHALEXIN 500 MG/1
500 CAPSULE ORAL EVERY 12 HOURS SCHEDULED
Qty: 10 CAPSULE | Refills: 0 | Status: SHIPPED | OUTPATIENT
Start: 2019-08-12 | End: 2019-08-17

## 2019-08-12 RX ADMIN — NITROFURANTOIN (MONOHYDRATE/MACROCRYSTALS) 100 MG: 75; 25 CAPSULE ORAL at 08:30

## 2019-08-12 RX ADMIN — MEMANTINE 10 MG: 5 TABLET ORAL at 08:29

## 2019-08-12 RX ADMIN — PANTOPRAZOLE SODIUM 40 MG: 40 TABLET, DELAYED RELEASE ORAL at 05:28

## 2019-08-12 RX ADMIN — CEPHALEXIN 500 MG: 500 CAPSULE ORAL at 08:29

## 2019-08-12 RX ADMIN — DABIGATRAN ETEXILATE MESYLATE 150 MG: 150 CAPSULE ORAL at 05:28

## 2019-08-12 NOTE — PHYSICAL THERAPY NOTE
PHYSICAL THERAPY NOTE          Patient Name: Nathan Olivo  [id number]'[id number] Date: 8/12/2019     PT consult received  Chart reviewed  MattrIke Innocent at bedside to report no PT concerns  Feels mobility at baseline and family provides 25* supervision  Will d/c PT given family reporting no PT needs at this time warranted        Dimitry Zhao, PT

## 2019-08-12 NOTE — PLAN OF CARE
Problem: Potential for Falls  Goal: Patient will remain free of falls  Description  INTERVENTIONS:  - Assess patient frequently for physical needs  -  Identify cognitive and physical deficits and behaviors that affect risk of falls    -  [place] fall precautions as indicated by assessment   - Educate patient/family on patient safety including physical limitations  - Instruct patient to call for assistance with activity based on assessment  - Modify environment to reduce risk of injury  - Consider OT/PT consult to assist with strengthening/mobility  Outcome: Progressing     Problem: Prexisting or High Potential for Compromised Skin Integrity  Goal: Skin integrity is maintained or improved  Description  INTERVENTIONS:  - Identify patients at risk for skin breakdown  - Assess and monitor skin integrity  - Assess and monitor nutrition and hydration status  - Monitor labs (i e  albumin)  - Assess for incontinence   - Turn and reposition patient  - Assist with mobility/ambulation  - Relieve pressure over bony prominences  - Avoid friction and shearing  - Provide appropriate hygiene as needed including keeping skin clean and dry  - Evaluate need for skin moisturizer/barrier cream  - Collaborate with interdisciplinary team (i e  Nutrition, Rehabilitation, etc )   - Patient/family teaching  Outcome: Progressing     Problem: SAFETY ADULT  Goal: Maintain or return to baseline ADL function  Description  INTERVENTIONS:  -  Assess patient's ability to carry out ADLs; assess patient's baseline for ADL function and identify physical deficits which impact ability to perform ADLs (bathing, care of mouth/teeth, toileting, grooming, dressing, etc )  - Assess/evaluate cause of self-care deficits   - Assess range of motion  - Assess patient's mobility; develop plan if impaired  - Assess patient's need for assistive devices and provide as appropriate  - Encourage maximum independence but intervene and supervise when necessary  ¯ Involve family in performance of ADLs  ¯ Assess for home care needs following discharge   ¯ Request OT consult to assist with ADL evaluation and planning for discharge  ¯ Provide patient education as appropriate  Outcome: Progressing  Goal: Maintain or return mobility status to optimal level  Description  INTERVENTIONS:  - Assess patient's baseline mobility status (ambulation, transfers, stairs, etc )    - Identify cognitive and physical deficits and behaviors that affect mobility  - Identify mobility aids required to assist with transfers and/or ambulation (gait belt, sit-to-stand, lift, walker, cane, etc )  - [place] fall precautions as indicated by assessment  - Record patient progress and toleration of activity level on Mobility SBAR; progress patient to next Phase/Stage  - Instruct patient to call for assistance with activity based on assessment  - Request Rehabilitation consult to assist with strengthening/weightbearing, etc   Outcome: Progressing     Problem: Knowledge Deficit  Goal: Patient/family/caregiver demonstrates understanding of disease process, treatment plan, medications, and discharge instructions  Description  Complete learning assessment and assess knowledge base    Interventions:  - Provide teaching at level of understanding  - Provide teaching via preferred learning methods  Outcome: Progressing

## 2019-08-12 NOTE — PLAN OF CARE
Problem: Potential for Falls  Goal: Patient will remain free of falls  Description  INTERVENTIONS:  - Assess patient frequently for physical needs  -  Identify cognitive and physical deficits and behaviors that affect risk of falls    -  [place] fall precautions as indicated by assessment   - Educate patient/family on patient safety including physical limitations  - Instruct patient to call for assistance with activity based on assessment  - Modify environment to reduce risk of injury  - Consider OT/PT consult to assist with strengthening/mobility  Outcome: Progressing     Problem: Prexisting or High Potential for Compromised Skin Integrity  Goal: Skin integrity is maintained or improved  Description  INTERVENTIONS:  - Identify patients at risk for skin breakdown  - Assess and monitor skin integrity  - Assess and monitor nutrition and hydration status  - Monitor labs (i e  albumin)  - Assess for incontinence   - Turn and reposition patient  - Assist with mobility/ambulation  - Relieve pressure over bony prominences  - Avoid friction and shearing  - Provide appropriate hygiene as needed including keeping skin clean and dry  - Evaluate need for skin moisturizer/barrier cream  - Collaborate with interdisciplinary team (i e  Nutrition, Rehabilitation, etc )   - Patient/family teaching  Outcome: Progressing     Problem: SAFETY ADULT  Goal: Maintain or return to baseline ADL function  Description  INTERVENTIONS:  -  Assess patient's ability to carry out ADLs; assess patient's baseline for ADL function and identify physical deficits which impact ability to perform ADLs (bathing, care of mouth/teeth, toileting, grooming, dressing, etc )  - Assess/evaluate cause of self-care deficits   - Assess range of motion  - Assess patient's mobility; develop plan if impaired  - Assess patient's need for assistive devices and provide as appropriate  - Encourage maximum independence but intervene and supervise when necessary  ¯ Involve family in performance of ADLs  ¯ Assess for home care needs following discharge   ¯ Request OT consult to assist with ADL evaluation and planning for discharge  ¯ Provide patient education as appropriate  Outcome: Progressing  Goal: Maintain or return mobility status to optimal level  Description  INTERVENTIONS:  - Assess patient's baseline mobility status (ambulation, transfers, stairs, etc )    - Identify cognitive and physical deficits and behaviors that affect mobility  - Identify mobility aids required to assist with transfers and/or ambulation (gait belt, sit-to-stand, lift, walker, cane, etc )  - [place] fall precautions as indicated by assessment  - Record patient progress and toleration of activity level on Mobility SBAR; progress patient to next Phase/Stage  - Instruct patient to call for assistance with activity based on assessment  - Request Rehabilitation consult to assist with strengthening/weightbearing, etc   Outcome: Progressing     Problem: Knowledge Deficit  Goal: Patient/family/caregiver demonstrates understanding of disease process, treatment plan, medications, and discharge instructions  Description  Complete learning assessment and assess knowledge base    Interventions:  - Provide teaching at level of understanding  - Provide teaching via preferred learning methods  Outcome: Progressing

## 2019-08-12 NOTE — SOCIAL WORK
CM s/w the pt's daughter Claritza Davidson ([phone]) via phone to review the IMM  Justin Booth stated that she understood the pt's rights

## 2019-08-12 NOTE — NURSING NOTE
Patient and daughter given discharge instructions  Denied any questions  No IV access  Patient's daughter given prescription  Patient, daughter and PCA walked to vehicle

## 2019-08-12 NOTE — DISCHARGE SUMMARY
Discharge Summary - [facility] Internal Medicine    Patient Information: Anh Ao 80 y o  female MRN: [id number]  [facility] [address] [id number] Encounter: [id number]    Discharging Physician / Practitioner: Maris Veloz MD  PCP: Mario Vazquez DO  Admission Date: 8/9/2019  Discharge Date: 08/12/19    Reason for Admission:  Syncope and collapse    Discharge Diagnoses:  Urinary tract infection    Principal Problem:    Syncope and collapse  Active Problems:    History of stroke    Alzheimer disease    Hyperlipidemia    Hypertension    A-fib ([address] )    Other headache syndrome    Chronic UTI    Patient is [employer]  Resolved Problems:    * No resolved hospital problems  *      Consultations During Hospital Stay:  ·     Procedures Performed:     Xr Chest 2 Views    Result Date: 8/9/2019  Narrative: CHEST INDICATION:   Chest Pain  Syncope  History of atrial fibrillation  COMPARISON:  Chest radiographs July 5, 2019 EXAM PERFORMED/VIEWS:  XR CHEST PA & LATERAL  AP semierect Images: 2 FINDINGS: Heart shadow appears unremarkable  Atherosclerotic vascular calcifications are noted  Lung markings are crowded secondary to low lung volumes  Within limitations of this examination there is no focal airspace opacity to suggest pneumonia  No pneumothorax or pleural effusion  Osseous structures appear within normal limits for patient age  Impression: No active pulmonary disease on examination which is somewhat limited secondary to low lung volumes  Workstation performed: [id number]     Ct Head Without Contrast    Result Date: 8/9/2019  Narrative: CT BRAIN - WITHOUT CONTRAST INDICATION:   headache and syncope on anticoagulation  COMPARISON:  7/5/2019  TECHNIQUE:  CT examination of the brain was performed  In addition to axial images, coronal 2D reformatted images were created and submitted for interpretation  Radiation dose length product (DLP) for this visit:  967 mGy-cm     This examination, like all CT scans performed in the [facility], was performed utilizing techniques to minimize radiation dose exposure, including the use of iterative reconstruction and automated exposure control  IMAGE QUALITY:  Diagnostic  FINDINGS: PARENCHYMA: Decreased attenuation is noted in periventricular and subcortical white matter demonstrating an appearance that is statistically most likely to represent advanced microangiopathic change  No CT signs of acute infarction  No intracranial mass, mass effect or midline shift  No acute parenchymal hemorrhage  VENTRICLES AND EXTRA-AXIAL SPACES:  Ventricles and extra-axial CSF spaces are prominent commensurate with the degree of volume loss  No hydrocephalus  No acute extra-axial hemorrhage  VISUALIZED ORBITS AND PARANASAL SINUSES:  Unremarkable  CALVARIUM AND EXTRACRANIAL SOFT TISSUES:  Normal      Impression: No acute intracranial abnormality  Microangiopathic changes  Workstation performed: [id number]     Ct Spine Cervical Without Contrast    Result Date: 8/9/2019  Narrative: CT CERVICAL SPINE - WITHOUT CONTRAST INDICATION:   syncope  COMPARISON:  None  TECHNIQUE:  CT examination of the cervical spine was performed without intravenous contrast   Contiguous axial images were obtained  Sagittal and coronal reconstructions were performed  Radiation dose length product (DLP) for this visit:  302 mGy-cm   This examination, like all CT scans performed in the [facility], was performed utilizing techniques to minimize radiation dose exposure, including the use of iterative reconstruction and automated exposure control  IMAGE QUALITY:  Diagnostic  FINDINGS: ALIGNMENT:  Normal alignment of the cervical spine  No subluxation  VERTEBRAL BODIES:  No fracture   DEGENERATIVE CHANGES:  C2-3 level demonstrates mild degenerative changes with no significant central canal narrowing of foraminal narrowing At C3-4 level there is facet joint disease with no significant central canal narrowing of foraminal narrowing C4-5 demonstrates facet joint disease with no significant central canal narrowing of foraminal narrowing C5-6 demonstrates degenerative disc disease with mild left and no significant right foraminal narrowing C6-7 demonstrates degenerative disc disease with mild right and mild left foraminal narrowing  C7-T1 level appear unremarkable PREVERTEBRAL AND PARASPINAL SOFT TISSUES:  Unremarkable  Atherosclerotic changes are seen in the carotid THORACIC INLET:  Mosaic attenuation of the lung parenchyma Dislocation seen at the level of the spinous process of the C7 vertebra probably due to hydroxyapatite deposition    Impression: No acute compression collapse of the vertebra  Degenerative disc disease at C5-6, C6/7 Mild Right foraminal narrowing at C5-6  Mild bilateral foraminal narrowing at C6-7  Workstation performed: [id number]         Significant Findings:     · 80year-old with syncope and collapse while on toilet chronic atrial fibrillation on anticoagulant therapy with CT scan showing no intracranial abnormality including no evidence of hemorrhage  Patient is anticoagulated on Pradaxa but also has a history of vascular dementia cord and family  · Syncope probably in relation to underlying UTI versus vasovagal grew out pansensitive E coli will complete course of therapy with cephalexin for another 3 days  Had been on empiric course of cefepime    Patient has chronic history of UTIs with last year's the urine culture resistance to quinolones however sensitive on present culture  · Chronic atrial for fibrillation on anticoagulation with Pradaxa  · Alzheimer disease, family reports history of vascular dementia continue medical management recommendation for palliative care follow-up as outpatient  · Hyperlipidemia continue amlodipine  · History of chronic headaches of which she takes Fioricet q h s     · History of stroke/continue Pradaxa    Incidental Findings: · DNR DNI    Test Results Pending at Discharge (will require follow up):   ·      Outpatient Tests Requested:  · Follow-up palliative care    Complications:      Hospital Course:     Mayco Flores is a 80 y o  female patient who originally presented to the hospital on 8/9/2019 due to syncope and collapse  toilet  Condition at Discharge: stable     Discharge Day Visit / Exam:     * Please refer to separate progress for these details *    Discharge instructions/Information to patient and family:   See after visit summary for information provided to patient and family  Provisions for Follow-Up Care:  See after visit summary for information related to follow-up care and any pertinent home health orders  Disposition:     Home    For Discharges to [facility] SNF:   · Not Applicable to this Patient - Not Applicable to this Patient      Discharge Statement:  I spent 56 minutes discharging the patient  This time was spent on the day of discharge  I had direct contact with the patient on the day of discharge  Greater than 50% of the total time was spent examining patient, answering all patient questions, arranging and discussing plan of care with patient as well as directly providing post-discharge instructions  Additional time then spent on discharge activities  Discharge Medications:  See after visit summary for reconciled discharge medications provided to patient and family  ** Please Note: Dragon 360 Dictation voice to text software may have been used in the creation of this document   **

## 2019-08-12 NOTE — UTILIZATION REVIEW
Initial Clinical Review    Admission: Date/Time/Statement: 8/9/19 @ 1308    Orders Placed This Encounter   Procedures    Inpatient Admission (expected length of stay for this patient Order details is greater than two midnights)     Standing Status:   Standing     Number of Occurrences:   1     Order Specific Question:   Admitting Physician     Answer:   Trinidad Hinton [[id number]]     Order Specific Question:   Level of Care     Answer:   Med Surg [[id number]]     Order Specific Question:   Estimated length of stay     Answer:   More than 2 Midnights     Order Specific Question:   Certification     Answer:   I certify that inpatient services are medically necessary for this patient for a duration of greater than two midnights  See H&P and MD Progress Notes for additional information about the patient's course of treatment  ED Arrival Information     Expected Arrival Acuity Means of Arrival Escorted By Service Admission Type    - 8/9/2019 09:12 Urgent Ambulance [facility] Urgent    Arrival Complaint    Syncope        Chief Complaint   Patient presents with    Syncope     Pt's daughter reports heard a, "Thud" in the bathroom  Daughter states, "She didn't fall to the ground " Pt has hx of dementia, unable to describe events  Pt reports headache  Daughter states, "She complains of a headache every day, but the intensity is worse " Pt's daughter was concerned because she was "Out longer than normal "      Assessment/Plan: [address] EMS AS INPATIENT ADMISSION FOR SYNCOPE  PER DAUGHTER HEARD A THUD IN THE BATHROOM AND FOUND HER MOTHERS HEAD AGAINST THE WALL  PATIENT  HAS BEEN COMPLAINING OF HEADACHES THAT HAS BEEN INCREASING FEW SEVERAL DAYS  PATIENT HAS HX OF RECURRENT UTI ON PO CIPRO  PMH S-FIB CHRONIC UTI HTN HYPERLIPIDEMIA, ALZHEIMER    PLAN IV ANTIBX   AND SUPPORTIVE CARE      Assessment and Plan  * Syncope and collapse  Assessment & Plan  Syncope and collapse while on toilet  Likely secondary to urinary tract infection vs vasovagal   Patient does have atrial fibrillation; will also place on telemetry to rule out dysrhythmias  Patient does have chronic UTIs previously on keflex now on ciprofloxacin however last urine culture 09/2018 demonstrates resistance is to both classes  Continue cefepime for now follow up on cultures  Have PT evaluate     Chronic UTI  Assessment & Plan  Chronic UTI previously on Keflex now on ciprofloxacin  Will hold both while on cefepime    Of note last urine culture 09/2018 demonstrates resistances to both classes    ED Triage Vitals   Temperature Pulse Respirations Blood Pressure SpO2   08/09/19 0918 08/09/19 0918 08/09/19 0918 08/09/19 0918 08/09/19 0918   97 5 °F (36 4 °C) 56 16 100/52 96 %      Temp Source Heart Rate Source Patient Position - Orthostatic VS BP Location FiO2 (%)   08/09/19 0918 08/09/19 0918 08/09/19 0918 08/09/19 0918 --   Oral Monitor Lying Right arm       Pain Score       08/09/19 1027       No Pain          Wt Readings from Last 1 Encounters:   08/07/19 68 kg (150 lb)     Additional Vital Signs:   08/10/19 0704  97 1 °F (36 2 °C)Abnormal   76  20  162/78  98 %  None (Room air)  Lying   08/09/19 2310  97 °F (36 1 °C)Abnormal   69  18  160/70  100 %  None (Room air)  Lying   08/09/19 1350  97 °F (36 1 °C)Abnormal   69  18  138/62  100 %  None (Room air)  Lying   08/09/19 1202    70  16  156/65  99 %  None (Room air)  Lying   08/09/19 1027    62  16  118/56  99 %  None (Room air)  Lying       Pertinent Labs/Diagnostic Test Results:   Results from last 7 days   Lab Units 08/10/19  0548 08/09/19  0924   WBC Thousand/uL 6 23 8 82   HEMOGLOBIN g/dL 10 4* 10 9*   HEMATOCRIT % 33 1* 33 6*   PLATELETS Thousands/uL 319 312   NEUTROS ABS Thousands/µL  --  5 39     Results from last 7 days   Lab Units 08/10/19  0548 08/09/19  0924   SODIUM mmol/L 139 137   POTASSIUM mmol/L 4 0 3 8   CHLORIDE mmol/L 104 102   CO2 mmol/L 25 24   ANION GAP mmol/L 10 11   BUN mg/dL 11 9   CREATININE mg/dL 0 74 0 82   EGFR ml/min/1 73sq m 74 65   CALCIUM mg/dL 8 7 8 9     Results from last 7 days   Lab Units 08/10/19  0548 08/09/19  0924   AST U/L 13 19   ALT U/L 9* 14   ALK PHOS U/L 65 73   TOTAL PROTEIN g/dL 6 2* 6 7   ALBUMIN g/dL 3 1* 3 4*   TOTAL BILIRUBIN mg/dL 0 24 0 22         Results from last 7 days   Lab Units 08/10/19  0548 08/09/19  0924   GLUCOSE RANDOM mg/dL 91 143*     Results from last 7 days   Lab Units 08/09/19  0924   TROPONIN I ng/mL <0 02     Results from last 7 days   Lab Units 08/09/19  1205   CLARITY UA  Slightly Cloudy   COLOR UA  Yellow   SPEC GRAV UA  1 015   PH UA  7 5   GLUCOSE UA mg/dl Negative   KETONES UA mg/dl Negative   BLOOD UA  Trace-Intact*   PROTEIN UA mg/dl Negative   NITRITE UA  Positive*   BILIRUBIN UA  Negative   UROBILINOGEN UA E U /dl 0 2   LEUKOCYTES UA  Large*   WBC UA /hpf Innumerable*   RBC UA /hpf None Seen   BACTERIA UA /hpf Innumerable*   EPITHELIAL CELLS WET PREP /hpf Occasional       CT HEAD 08-09-19  No acute intracranial abnormality   Microangiopathic changes  CT C-SPINE 08-09-19  No acute compression collapse of the vertebra  Degenerative disc disease at C5-6, C6/7  Mild Right foraminal narrowing at C5-6   Mild bilateral foraminal narrowing at C6-7    CXR 08-09-19  No active pulmonary disease on examination which is somewhat limited secondary to low lung volumes        ED Treatment:   Medication Administration from 08/09/2019 0912 to 08/09/2019 1339       Date/Time Order Dose Route Action     08/09/2019 1329 cefepime (MAXIPIME) 2 g/50 mL dextrose IVPB 2,000 mg Intravenous New Bag        Past Medical History:   Diagnosis Date    A-fib [facility])     Alzheimer disease     Chronic UTI     Diverticulosis     Dyslipidemia     History of stroke     Hyperlipidemia     Hypertension     UTI (urinary tract infection)     Vasovagal syncope 2/13/2018     Present on Admission:   Alzheimer disease   Chronic UTI   Hypertension   Hyperlipidemia   Patient is [employer]   Other headache syndrome   Syncope and collapse   A-fib (HCC)      Admitting Diagnosis: Syncope [R55]  UTI (urinary tract infection) [N39 0]  Age/Sex: 80 y o  female  Admission Orders:    Current Facility-Administered Medications:  acetaminophen 650 mg Oral Q6H PRN   atorvastatin 20 mg Oral Daily With Dinner   butalbital-acetaminophen-caffeine 1 tablet Oral HS   cefepime 1,000 mg Intravenous Q12H   dabigatran etexilate 150 mg Oral Q12H DANIELLE   docusate sodium 100 mg Oral BID PRN   donepezil 10 mg Oral HS   magnesium hydroxide 30 mL Oral BID PRN   memantine 10 mg Oral BID   menthol-methyl salicylate  Apply externally 4x Daily PRN   pantoprazole 40 mg Oral Early Morning   QUEtiapine 25 mg Oral HS     PT      Network Utilization Review Department  Phone: [phone]; Fax [phone]  [email or web address]  ATTENTION: Please call with any questions or concerns to [phone]  and carefully listen to the prompts so that you are directed to the right person  Send all requests for admission clinical reviews, approved or denied determinations and any other requests to fax [phone]   All voicemails are confidential

## 2019-12-14 ENCOUNTER — HOSPITAL ENCOUNTER (INPATIENT)
Facility: HOSPITAL | Age: 84
LOS: 13 days | Discharge: NURSING, REHAB OR LONG-TERM CARE | DRG: 516 | End: 2019-12-27
Attending: EMERGENCY MEDICINE | Admitting: INTERNAL MEDICINE
Payer: COMMERCIAL

## 2019-12-14 ENCOUNTER — APPOINTMENT (EMERGENCY)
Dept: CT IMAGING | Facility: HOSPITAL | Age: 84
DRG: 516 | End: 2019-12-14
Payer: COMMERCIAL

## 2019-12-14 DIAGNOSIS — S22.080A T12 COMPRESSION FRACTURE, INITIAL ENCOUNTER (HCC): ICD-10-CM

## 2019-12-14 DIAGNOSIS — R55 SYNCOPE: Primary | ICD-10-CM

## 2019-12-14 DIAGNOSIS — N39.0 UTI (URINARY TRACT INFECTION): ICD-10-CM
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
## 2019-12-14 LAB
ALBUMIN SERPL BCP-MCNC: 3.1 G/DL (ref 3.5–5)
ALP SERPL-CCNC: 77 U/L (ref 46–116)
ALT SERPL W P-5'-P-CCNC: 17 U/L (ref 12–78)
ANION GAP BLD CALC-SCNC: 17 MMOL/L (ref 4–13)
ANION GAP SERPL CALCULATED.3IONS-SCNC: 9 MMOL/L (ref 4–13)
APTT PPP: 50 SECONDS (ref 23–37)
AST SERPL W P-5'-P-CCNC: 17 U/L (ref 5–45)
ATRIAL RATE: 258 BPM
BACTERIA UR QL AUTO: ABNORMAL /HPF
BASOPHILS # BLD AUTO: 0.03 THOUSANDS/ΜL (ref 0–0.1)
BASOPHILS NFR BLD AUTO: 0 % (ref 0–1)
BILIRUB SERPL-MCNC: 0.3 MG/DL (ref 0.2–1)
BILIRUB UR QL STRIP: NEGATIVE
BUN BLD-MCNC: 14 MG/DL (ref 5–25)
BUN SERPL-MCNC: 15 MG/DL (ref 5–25)
CA-I BLD-SCNC: 1.1 MMOL/L (ref 1.12–1.32)
CALCIUM SERPL-MCNC: 8.1 MG/DL (ref 8.3–10.1)
CHLORIDE BLD-SCNC: 106 MMOL/L (ref 100–108)
CHLORIDE SERPL-SCNC: 106 MMOL/L (ref 100–108)
CLARITY UR: CLEAR
CO2 SERPL-SCNC: 26 MMOL/L (ref 21–32)
COLOR UR: YELLOW
CREAT BLD-MCNC: 0.8 MG/DL (ref 0.6–1.3)
CREAT SERPL-MCNC: 0.88 MG/DL (ref 0.6–1.3)
EOSINOPHIL # BLD AUTO: 0.08 THOUSAND/ΜL (ref 0–0.61)
EOSINOPHIL NFR BLD AUTO: 1 % (ref 0–6)
ERYTHROCYTE [DISTWIDTH] IN BLOOD BY AUTOMATED COUNT: 13.4 % (ref 11.6–15.1)
GFR SERPL CREATININE-BSD FRML MDRD: 60 ML/MIN/1.73SQ M
GFR SERPL CREATININE-BSD FRML MDRD: 67 ML/MIN/1.73SQ M
GLUCOSE SERPL-MCNC: 131 MG/DL (ref 65–140)
GLUCOSE SERPL-MCNC: 136 MG/DL (ref 65–140)
GLUCOSE UR STRIP-MCNC: NEGATIVE MG/DL
HCT VFR BLD AUTO: 35.3 % (ref 34.8–46.1)
HCT VFR BLD CALC: 34 % (ref 34.8–46.1)
HGB BLD-MCNC: 11 G/DL (ref 11.5–15.4)
HGB BLDA-MCNC: 11.6 G/DL (ref 11.5–15.4)
HGB UR QL STRIP.AUTO: ABNORMAL
IMM GRANULOCYTES # BLD AUTO: 0.13 THOUSAND/UL (ref 0–0.2)
IMM GRANULOCYTES NFR BLD AUTO: 1 % (ref 0–2)
INR PPP: 1.49 (ref 0.84–1.19)
KETONES UR STRIP-MCNC: NEGATIVE MG/DL
LEUKOCYTE ESTERASE UR QL STRIP: ABNORMAL
LYMPHOCYTES # BLD AUTO: 2.9 THOUSANDS/ΜL (ref 0.6–4.47)
LYMPHOCYTES NFR BLD AUTO: 26 % (ref 14–44)
MCH RBC QN AUTO: 28.1 PG (ref 26.8–34.3)
MCHC RBC AUTO-ENTMCNC: 31.2 G/DL (ref 31.4–37.4)
MCV RBC AUTO: 90 FL (ref 82–98)
MONOCYTES # BLD AUTO: 0.74 THOUSAND/ΜL (ref 0.17–1.22)
MONOCYTES NFR BLD AUTO: 7 % (ref 4–12)
NEUTROPHILS # BLD AUTO: 7.26 THOUSANDS/ΜL (ref 1.85–7.62)
NEUTS SEG NFR BLD AUTO: 65 % (ref 43–75)
NITRITE UR QL STRIP: NEGATIVE
NON-SQ EPI CELLS URNS QL MICRO: ABNORMAL /HPF
NRBC BLD AUTO-RTO: 0 /100 WBCS
PCO2 BLD: 22 MMOL/L (ref 21–32)
PH UR STRIP.AUTO: 8 [PH] (ref 4.5–8)
PLATELET # BLD AUTO: 255 THOUSANDS/UL (ref 149–390)
PMV BLD AUTO: 10.6 FL (ref 8.9–12.7)
POTASSIUM BLD-SCNC: 3.7 MMOL/L (ref 3.5–5.3)
POTASSIUM SERPL-SCNC: 3.7 MMOL/L (ref 3.5–5.3)
PROT SERPL-MCNC: 6.1 G/DL (ref 6.4–8.2)
PROT UR STRIP-MCNC: NEGATIVE MG/DL
PROTHROMBIN TIME: 18.3 SECONDS (ref 11.6–14.5)
QRS AXIS: 55 DEGREES
QRSD INTERVAL: 76 MS
QT INTERVAL: 326 MS
QTC INTERVAL: 454 MS
RBC # BLD AUTO: 3.92 MILLION/UL (ref 3.81–5.12)
RBC #/AREA URNS AUTO: ABNORMAL /HPF
SODIUM BLD-SCNC: 140 MMOL/L (ref 136–145)
SODIUM SERPL-SCNC: 141 MMOL/L (ref 136–145)
SP GR UR STRIP.AUTO: 1.01 (ref 1–1.03)
SPECIMEN SOURCE: ABNORMAL
T WAVE AXIS: 133 DEGREES
TROPONIN I SERPL-MCNC: <0.02 NG/ML
UROBILINOGEN UR QL STRIP.AUTO: 0.2 E.U./DL
VENTRICULAR RATE: 117 BPM
WBC # BLD AUTO: 11.14 THOUSAND/UL (ref 4.31–10.16)
WBC #/AREA URNS AUTO: ABNORMAL /HPF

## 2019-12-14 PROCEDURE — 99285 EMERGENCY DEPT VISIT HI MDM: CPT | Performed by: EMERGENCY MEDICINE

## 2019-12-14 PROCEDURE — 99446 NTRPROF PH1/NTRNET/EHR 5-10: CPT | Performed by: NEUROLOGICAL SURGERY

## 2019-12-14 PROCEDURE — 85730 THROMBOPLASTIN TIME PARTIAL: CPT | Performed by: EMERGENCY MEDICINE

## 2019-12-14 PROCEDURE — 84484 ASSAY OF TROPONIN QUANT: CPT | Performed by: EMERGENCY MEDICINE

## 2019-12-14 PROCEDURE — 85610 PROTHROMBIN TIME: CPT | Performed by: EMERGENCY MEDICINE

## 2019-12-14 PROCEDURE — 71260 CT THORAX DX C+: CPT

## 2019-12-14 PROCEDURE — 87186 SC STD MICRODIL/AGAR DIL: CPT

## 2019-12-14 PROCEDURE — 87086 URINE CULTURE/COLONY COUNT: CPT

## 2019-12-14 PROCEDURE — 96374 THER/PROPH/DIAG INJ IV PUSH: CPT

## 2019-12-14 PROCEDURE — 85014 HEMATOCRIT: CPT

## 2019-12-14 PROCEDURE — 87077 CULTURE AEROBIC IDENTIFY: CPT

## 2019-12-14 PROCEDURE — 85025 COMPLETE CBC W/AUTO DIFF WBC: CPT | Performed by: EMERGENCY MEDICINE

## 2019-12-14 PROCEDURE — 36415 COLL VENOUS BLD VENIPUNCTURE: CPT | Performed by: EMERGENCY MEDICINE

## 2019-12-14 PROCEDURE — 72125 CT NECK SPINE W/O DYE: CPT

## 2019-12-14 PROCEDURE — 99223 1ST HOSP IP/OBS HIGH 75: CPT | Performed by: STUDENT IN AN ORGANIZED HEALTH CARE EDUCATION/TRAINING PROGRAM

## 2019-12-14 PROCEDURE — 81001 URINALYSIS AUTO W/SCOPE: CPT

## 2019-12-14 PROCEDURE — 93005 ELECTROCARDIOGRAM TRACING: CPT

## 2019-12-14 PROCEDURE — 99285 EMERGENCY DEPT VISIT HI MDM: CPT

## 2019-12-14 PROCEDURE — 70450 CT HEAD/BRAIN W/O DYE: CPT

## 2019-12-14 PROCEDURE — 87040 BLOOD CULTURE FOR BACTERIA: CPT | Performed by: EMERGENCY MEDICINE

## 2019-12-14 PROCEDURE — 80053 COMPREHEN METABOLIC PANEL: CPT | Performed by: EMERGENCY MEDICINE

## 2019-12-14 PROCEDURE — 74177 CT ABD & PELVIS W/CONTRAST: CPT

## 2019-12-14 PROCEDURE — 93010 ELECTROCARDIOGRAM REPORT: CPT | Performed by: INTERNAL MEDICINE

## 2019-12-14 PROCEDURE — 80047 BASIC METABLC PNL IONIZED CA: CPT

## 2019-12-14 RX ORDER — ACETAMINOPHEN 325 MG/1
650 TABLET ORAL EVERY 6 HOURS PRN
Status: DISCONTINUED | OUTPATIENT
Start: 2019-12-14 | End: 2019-12-14

## 2019-12-14 RX ORDER — HYDROMORPHONE HCL/PF 1 MG/ML
0.2 SYRINGE (ML) INJECTION EVERY 4 HOURS PRN
Status: DISCONTINUED | OUTPATIENT
Start: 2019-12-14 | End: 2019-12-15

## 2019-12-14 RX ORDER — LANOLIN ALCOHOL/MO/W.PET/CERES
6 CREAM (GRAM) TOPICAL
Status: DISCONTINUED | OUTPATIENT
Start: 2019-12-14 | End: 2019-12-17

## 2019-12-14 RX ORDER — ACETAMINOPHEN 325 MG/1
650 TABLET ORAL EVERY 12 HOURS PRN
Status: DISCONTINUED | OUTPATIENT
Start: 2019-12-14 | End: 2019-12-27 | Stop reason: HOSPADM

## 2019-12-14 RX ORDER — CEPHALEXIN 250 MG/1
250 CAPSULE ORAL ONCE
COMMUNITY
End: 2020-02-01 | Stop reason: HOSPADM

## 2019-12-14 RX ORDER — POLYETHYLENE GLYCOL 3350 17 G/17G
17 POWDER, FOR SOLUTION ORAL DAILY PRN
Status: DISCONTINUED | OUTPATIENT
Start: 2019-12-14 | End: 2019-12-27 | Stop reason: HOSPADM

## 2019-12-14 RX ORDER — PANTOPRAZOLE SODIUM 40 MG/1
40 TABLET, DELAYED RELEASE ORAL
Status: DISCONTINUED | OUTPATIENT
Start: 2019-12-15 | End: 2019-12-27 | Stop reason: HOSPADM

## 2019-12-14 RX ORDER — QUETIAPINE FUMARATE 25 MG/1
25 TABLET, FILM COATED ORAL
Status: DISCONTINUED | OUTPATIENT
Start: 2019-12-14 | End: 2019-12-14

## 2019-12-14 RX ORDER — MEMANTINE HYDROCHLORIDE 5 MG/1
10 TABLET ORAL 2 TIMES DAILY
Status: DISCONTINUED | OUTPATIENT
Start: 2019-12-14 | End: 2019-12-27 | Stop reason: HOSPADM

## 2019-12-14 RX ORDER — ONDANSETRON 2 MG/ML
INJECTION INTRAMUSCULAR; INTRAVENOUS
Status: DISPENSED
Start: 2019-12-14 | End: 2019-12-14

## 2019-12-14 RX ORDER — ACETAMINOPHEN 325 MG/1
650 TABLET ORAL EVERY 6 HOURS SCHEDULED
Status: DISCONTINUED | OUTPATIENT
Start: 2019-12-14 | End: 2019-12-21

## 2019-12-14 RX ORDER — DOCUSATE SODIUM 100 MG/1
100 CAPSULE, LIQUID FILLED ORAL 2 TIMES DAILY
Status: DISCONTINUED | OUTPATIENT
Start: 2019-12-14 | End: 2019-12-27 | Stop reason: HOSPADM

## 2019-12-14 RX ORDER — DONEPEZIL HYDROCHLORIDE 10 MG/1
10 TABLET, FILM COATED ORAL
Status: DISCONTINUED | OUTPATIENT
Start: 2019-12-14 | End: 2019-12-27 | Stop reason: HOSPADM

## 2019-12-14 RX ORDER — ONDANSETRON 2 MG/ML
1 INJECTION INTRAMUSCULAR; INTRAVENOUS ONCE
Status: DISCONTINUED | OUTPATIENT
Start: 2019-12-14 | End: 2019-12-14 | Stop reason: HOSPADM

## 2019-12-14 RX ORDER — IBUPROFEN 400 MG/1
400 TABLET ORAL EVERY 8 HOURS SCHEDULED
Status: DISCONTINUED | OUTPATIENT
Start: 2019-12-14 | End: 2019-12-14

## 2019-12-14 RX ORDER — DABIGATRAN ETEXILATE 150 MG/1
150 CAPSULE, COATED PELLETS ORAL EVERY 12 HOURS SCHEDULED
Status: DISCONTINUED | OUTPATIENT
Start: 2019-12-14 | End: 2019-12-24

## 2019-12-14 RX ORDER — OXYCODONE HYDROCHLORIDE 5 MG/1
5 TABLET ORAL EVERY 6 HOURS PRN
Status: DISCONTINUED | OUTPATIENT
Start: 2019-12-14 | End: 2019-12-16

## 2019-12-14 RX ORDER — FENTANYL CITRATE 50 UG/ML
50 INJECTION, SOLUTION INTRAMUSCULAR; INTRAVENOUS ONCE
Status: COMPLETED | OUTPATIENT
Start: 2019-12-14 | End: 2019-12-14

## 2019-12-14 RX ORDER — KETOROLAC TROMETHAMINE 30 MG/ML
15 INJECTION, SOLUTION INTRAMUSCULAR; INTRAVENOUS ONCE
Status: COMPLETED | OUTPATIENT
Start: 2019-12-14 | End: 2019-12-14

## 2019-12-14 RX ORDER — ATORVASTATIN CALCIUM 20 MG/1
20 TABLET, FILM COATED ORAL
Status: DISCONTINUED | OUTPATIENT
Start: 2019-12-14 | End: 2019-12-27 | Stop reason: HOSPADM

## 2019-12-14 RX ORDER — QUETIAPINE FUMARATE 25 MG/1
25 TABLET, FILM COATED ORAL
Status: DISCONTINUED | OUTPATIENT
Start: 2019-12-14 | End: 2019-12-17

## 2019-12-14 RX ADMIN — SODIUM CHLORIDE 1000 ML: 0.9 INJECTION, SOLUTION INTRAVENOUS at 09:05

## 2019-12-14 RX ADMIN — MORPHINE SULFATE 2 MG: 2 INJECTION, SOLUTION INTRAMUSCULAR; INTRAVENOUS at 13:48

## 2019-12-14 RX ADMIN — OXYCODONE HYDROCHLORIDE 5 MG: 5 TABLET ORAL at 17:36

## 2019-12-14 RX ADMIN — DONEPEZIL HYDROCHLORIDE 10 MG: 10 TABLET, FILM COATED ORAL at 17:36

## 2019-12-14 RX ADMIN — HYDROMORPHONE HYDROCHLORIDE 0.2 MG: 1 INJECTION, SOLUTION INTRAMUSCULAR; INTRAVENOUS; SUBCUTANEOUS at 17:07

## 2019-12-14 RX ADMIN — QUETIAPINE FUMARATE 25 MG: 25 TABLET ORAL at 20:45

## 2019-12-14 RX ADMIN — FENTANYL CITRATE 50 MCG: 50 INJECTION, SOLUTION INTRAMUSCULAR; INTRAVENOUS at 09:45

## 2019-12-14 RX ADMIN — MORPHINE SULFATE 2 MG: 2 INJECTION, SOLUTION INTRAMUSCULAR; INTRAVENOUS at 10:35

## 2019-12-14 RX ADMIN — MELATONIN 6 MG: 3 TAB ORAL at 23:01

## 2019-12-14 RX ADMIN — DABIGATRAN ETEXILATE MESYLATE 150 MG: 150 CAPSULE ORAL at 19:12

## 2019-12-14 RX ADMIN — MEMANTINE 10 MG: 5 TABLET ORAL at 20:45

## 2019-12-14 RX ADMIN — ACETAMINOPHEN 650 MG: 325 TABLET ORAL at 17:36

## 2019-12-14 RX ADMIN — KETOROLAC TROMETHAMINE 15 MG: 30 INJECTION, SOLUTION INTRAMUSCULAR; INTRAVENOUS at 16:24

## 2019-12-14 RX ADMIN — ATORVASTATIN CALCIUM 20 MG: 20 TABLET, FILM COATED ORAL at 17:36

## 2019-12-14 RX ADMIN — ACETAMINOPHEN 650 MG: 325 TABLET ORAL at 23:01

## 2019-12-14 RX ADMIN — DOCUSATE SODIUM 100 MG: 100 CAPSULE, LIQUID FILLED ORAL at 17:36

## 2019-12-14 RX ADMIN — IODIXANOL 100 ML: 320 INJECTION, SOLUTION INTRAVASCULAR at 09:37

## 2019-12-14 RX ADMIN — CEFTRIAXONE 1000 MG: 1 INJECTION, POWDER, FOR SOLUTION INTRAMUSCULAR; INTRAVENOUS at 13:48

## 2019-12-14 NOTE — H&P
H&P- Ruthie Naeem 1933, 80 y o  female MRN: [id number]    Unit/Bed#: ED 16 Encounter: [id number]    Primary Care Provider: Golria Myrick DO   Date and time admitted to hospital: 12/14/2019  8:41 AM        * Syncope and collapse  Assessment & Plan  80-year-old female with history of CVA, AFib, Alzheimer's dementia, chronic UTI, previous episodes of syncope and collapse, recent episode of syncope collapse due to UTI presents again with episode of syncope and collapse  Unwitnessed  This time daughters reports that patient had frothy discharge from mouth, had urinary incontinence  No witnessed seizure-like activity noted  Currently on my encounter patient appears to be in pain  poor historian and history obtained from daughter  at bedside  Present on admission with tachycardia, tachypnea  Heart rate controlled now  Leukocytosis noted  No fever noted  Hemodynamically stable  UA indicative of UTI  CT head report noted for chronic microangiopathic changes, no acute finding  Syncopal episode suspected due to UTI versus vasovagal versus arrhythmia versus seizure  Continue tele monitoring  Continue IV Rocephin  Follow-up with pending cultures dose and ED  Seizure precaution  Fall precautions  PT OT eval  Delirium precautions  Continue with supportive care  F/u EEG  Follow-up with Neurology consultation          Compression fracture of T12 vertebra [facility])  Assessment & Plan  Patient presented with syncopal episode and fall  Complaining of excruciating back pain  T12 compression fracture noted on CT report  No focal neurological finding on exam : though exam limited due to excruciating back pain  Neurosurgery recommendation noted  Obtain upright films when patient able  TLSO brace  Analgesics for adequate pain control  Prn laxatives  PT OT eval  Fall precautions      Chronic UTI  Assessment & Plan  Present on admission with history of chronic UTI on Keflex        A-fib [facility])  Assessment & Plan  Present on admission with history of AFib  Not on any rate control agent  On Pradaxa for anticoagulation  Daughter at bedside reports that patient had stroke previously after discontinuing anticoagulation therefore family is adamant about continuing Pradaxa for stroke prevention  Follow-up with PCP and primary Cardiology outpatient  Hypertension  Assessment & Plan  Present on admission with history of hypertension  Controlled  Alzheimer disease [facility])  Assessment & Plan  Present on admission with history of Alzheimer disease  Home medication includes Namenda Aricept, Seroquel  Stable  Currently in excruciating pain  Maintain delirium precaution        VTE Prophylaxis: Dabigatran (Pradaxa)    Code Status:  Level 3 DNR DNI  POLST: POLST form is not discussed and not completed at this time  Discussion with family:  Both daughters present at bedside  Anticipated Length of Stay:  Patient will be admitted on an Inpatient basis with an anticipated length of stay of  > 2 midnights  Justification for Hospital Stay:  Syncope and collapse, T12 compression fracture    Total Time for Visit, including Counseling / Coordination of Care: 1 hour  Greater than 50% of this total time spent on direct patient counseling and coordination of care  Chief Complaint:   Syncope, collapse    History of Present Illness:    Chris Moore is a 80 y o  female patient past medical history of multiple CVA , AFib on Pradaxa, Alzheimer's disease, hypertension, chronic UTI on Keflex, was recently seen in the hospital for syncope and collapse suspected due to vasovagal versus UTI  who presents with episodes of syncope and collapse at home  Unwitnessed episode of syncope at home  Patient is a poor historian  History obtained from daughter is at bedside  Both daughters reports that patient was in her room and about to go to bathroom but before she made it to commode she had syncopized    Daughters were in the different room at home and heard a noise of patient falling on the floor and went on to check up on her  daughters reports that patient was noted with frothy sputum in her mouth as well as urinary incontinence without any obvious seeking signs  After waking up she was at her baseline  Patient also had an episode of vomiting after waking up per daughters at bedside  Daughters  are not sure about the duration of unconsciousness but reports that must have been only few minutes  Seems like daughters are upset since patient has been having syncopal episode without any clear etiology  Daughters also reports that patient's primary Neurology was considering starting her on antiseizure medication based on his suspicion but currently not on any medication  Currently on my encounter patient is awake, alert, oriented at her baseline per daughter's  appears to be in excruciating back pain  Does not offer any other complaints  Lives with daughters  Former smoker  Level 3 DNR DNI  No other events reported  Review of Systems:    Review of Systems   Unable to perform ROS: Dementia       Past Medical and Surgical History:     Past Medical History:   Diagnosis Date    [facility]     [facility]     Chronic UTI     Diverticulosis     Dyslipidemia     History of stroke     Hyperlipidemia     Hypertension     UTI (urinary tract infection)     Vasovagal syncope 2/13/2018       Past Surgical History:   Procedure Laterality Date    CHOLECYSTECTOMY         Meds/Allergies:    Prior to Admission medications    Medication Sig Start Date End Date Taking?  Authorizing Provider   atorvastatin (LIPITOR) 20 mg tablet Take 20 mg by mouth daily   Yes Historical Provider, MD   cephalexin (KEFLEX) 250 mg capsule Take 250 mg by mouth once   Yes Historical Provider, MD   dabigatran etexilate (PRADAXA) 150 mg capsu Take 1 capsule (150 mg total) by mouth every 12 (twelve) hours 7/9/19  Yes Lisa Laws PA-C   donepezil (ARICEPT) 10 mg tablet Take 10 mg by mouth daily at bedtime   Yes Historical Provider, MD   memantine (NAMENDA) 10 mg tablet Take 10 mg by mouth 2 (two) times a day   Yes Historical Provider, MD   pantoprazole (PROTONIX) 40 mg tablet Take 40 mg by mouth daily   Yes Historical Provider, MD   BUTALBITAL-ACETAMINOPHEN PO Take 1 tablet by mouth daily at bedtime    Historical Provider, MD   QUEtiapine (SEROquel) 25 mg tablet Take 1 tablet (25 mg total) by mouth daily at bedtime  Patient taking differently: Take 25 mg by mouth as needed  8/12/19   Leann Christianson MD     I have reviewed home medications with patient family member  Allergies: Allergies   Allergen Reactions    Ativan [Lorazepam]    Starlett Cunning [Lurasidone]        Social History:     Marital Status:    Patient Pre-hospital Living Situation:  With Family, daughters  Patient Pre-hospital Level of Mobility:  Independent  Patient Pre-hospital Diet Restrictions:  None reported  Substance Use History:   Social History     Substance and Sexual Activity   Alcohol Use No     Social History     Tobacco Use   Smoking Status Former Smoker   Smokeless Tobacco Never Used     Social History     Substance and Sexual Activity   Drug Use No       Family History:    non-contributory    Physical Exam:     Vitals:   Blood Pressure: 120/70 (12/14/19 1344)  Pulse: 77 (12/14/19 1344)  Temperature: 97 6 °F (36 4 °C) (12/14/19 0912)  Temp Source: Temporal (12/14/19 0912)  Respirations: 22 (12/14/19 1344)  SpO2: 97 % (12/14/19 1344)    Physical Exam   Constitutional: No distress  Elderly, deconditioned female appears to be in excruciating back pain  HENT:   Head: Normocephalic and atraumatic  Eyes: Pupils are equal, round, and reactive to light  EOM are normal    Neck: Normal range of motion  Neck supple  Cardiovascular: Normal rate and regular rhythm  Pulmonary/Chest: Effort normal and breath sounds normal  No stridor  No respiratory distress  Abdominal: Soft  Bowel sounds are normal  She exhibits no distension  There is no tenderness  Musculoskeletal:   Unable to do complete exam since excruciating pain upon movement in bed  Neurological:   Patient is awake, alert, oriented to her baseline per daughters at bedside  Skin: She is not diaphoretic  Additional Data:     Lab Results: I have personally reviewed pertinent reports  Results from last 7 days   Lab Units 12/14/19  0910 12/14/19  0905   WBC Thousand/uL  --  11 14*   HEMOGLOBIN g/dL  --  11 0*   I STAT HEMOGLOBIN g/dl 11 6  --    HEMATOCRIT %  --  35 3   HEMATOCRIT, ISTAT % 34*  --    PLATELETS Thousands/uL  --  255   NEUTROS PCT %  --  65   LYMPHS PCT %  --  26   MONOS PCT %  --  7   EOS PCT %  --  1     Results from last 7 days   Lab Units 12/14/19  0910 12/14/19  0905   SODIUM mmol/L  --  141   POTASSIUM mmol/L  --  3 7   CHLORIDE mmol/L  --  106   CO2 mmol/L  --  26   CO2, I-STAT mmol/L 22  --    BUN mg/dL  --  15   CREATININE mg/dL  --  0 88   AGAP mmol/L 17*  --    ANION GAP mmol/L  --  9   CALCIUM mg/dL  --  8 1*   ALBUMIN g/dL  --  3 1*   TOTAL BILIRUBIN mg/dL  --  0 30   ALK PHOS U/L  --  77   ALT U/L  --  17   AST U/L  --  17   GLUCOSE RANDOM mg/dL  --  136     Results from last 7 days   Lab Units 12/14/19  0905   INR  1 49*                   Imaging: I have personally reviewed pertinent reports  CT head without contrast   Final Result by Nika Julian MD (12/14 1015)      No acute intracranial process or significant interval change  No skull fracture  Chronic microangiopathy  Workstation performed: [id number]         CT cervical spine without contrast   Final Result by Nika Julian MD (12/14 1010)      No cervical spine fracture or traumatic malalignment insofar as can be detected on this study with mild motion artifact                     Workstation performed: [id number]         CT chest abdomen pelvis w contrast   Final Result by  (12/14 2224)   Addendum 1 of 1 by Rimma Rodriguez MD (12/14 8124)   ADDENDUM:      Incidentally noted moderate proximal third duodenal segment diverticulum  Final      CT recon only thoracolumbar (No Charge)   Final Result by Rimma Rodriguez MD (12/14 8822)      Mild acute to subacute compression fracture of T12 with 4 mm left posterior retropulsion  The examination demonstrates a significant  finding and was documented as such in [facility] for liaison and referring practitioner notification  Workstation performed: [id number]             EKG, Pathology, and Other Studies Reviewed on Admission:   · EKG:  AFib with RVR  Allscripts / Epic Records Reviewed: Yes     ** Please Note: This note has been constructed using a voice recognition system   **

## 2019-12-14 NOTE — ASSESSMENT & PLAN NOTE
51-year-old female with history of CVA, AFib, Alzheimer's dementia, chronic UTI, previous episodes of syncope and collapse, recent episode of syncope collapse due to UTI presents again with episode of syncope and collapse  Unwitnessed  This time daughters reports that patient had frothy discharge from mouth, had urinary incontinence  No witnessed seizure-like activity noted  Currently on my encounter patient appears to be in pain  Very poor historian and history obtained from daughter is at bedside  Present on admission with tachycardia, tachypnea  Heart rate controlled now  Leukocytosis noted  No fever noted  Hemodynamically stable  UA indicative of UTI  CT head report noted for chronic microangiopathic changes, no acute finding  Syncopal episode suspected due to UTI versus vasovagal versus arrhythmia versus seizure  Continue tele monitoring  Continue IV Rocephin  Follow-up with pending cultures dose and ED  Seizure precaution  Fall precautions  PT OT eval  Delirium precautions  Continue with supportive care    F/u EEG  Follow-up with Neurology consultation

## 2019-12-14 NOTE — ED PROVIDER NOTES
History  Chief Complaint   Patient presents with   Steph Shirley     Pt's son saw pt laying on floor  Per ems, pt was unresponsive on arrival and had vomited  Pt continued to vomit after their arrival  Pt's airway cleared  Pt complaining of lower back pain  Hx of dementia  Patient had 2 syncopal events today  Her family heard her fall and went and checked on her  She complains of back pain and she vomited once  No headaches  She is on Pradaxa  No chest pain, no shortness of breath, no abdominal pain  Family states that she has been weak lately  She has a history of chronic UTIs for which she is on Keflex all time as per her daughter          Prior to Admission Medications   Prescriptions Last Dose Informant Patient Reported? Taking?    BUTALBITAL-ACETAMINOPHEN PO   Yes No   Sig: Take 1 tablet by mouth daily at bedtime   QUEtiapine (SEROquel) 25 mg tablet   No No   Sig: Take 1 tablet (25 mg total) by mouth daily at bedtime   Patient taking differently: Take 25 mg by mouth as needed    atorvastatin (LIPITOR) 20 mg tablet 12/13/2019 at Unknown time Self Yes Yes   Sig: Take 20 mg by mouth daily   cephalexin (KEFLEX) 250 mg capsule   Yes Yes   Sig: Take 250 mg by mouth once   dabigatran etexilate (PRADAXA) 150 mg capsu 12/14/2019 at Unknown time  No Yes   Sig: Take 1 capsule (150 mg total) by mouth every 12 (twelve) hours   donepezil (ARICEPT) 10 mg tablet 12/13/2019 at Unknown time Self Yes Yes   Sig: Take 10 mg by mouth daily at bedtime   memantine (NAMENDA) 10 mg tablet 12/14/2019 at Unknown time Self Yes Yes   Sig: Take 10 mg by mouth 2 (two) times a day   pantoprazole (PROTONIX) 40 mg tablet  Child Yes Yes   Sig: Take 40 mg by mouth daily      Facility-Administered Medications: None       Past Medical History:   Diagnosis Date    A-fib ([facility] [address] )     Alzheimer disease ([facility] [address] )     Chronic UTI     Diverticulosis     Dyslipidemia     History of stroke     Hyperlipidemia     Hypertension     UTI (urinary tract infection)     Vasovagal syncope 2/13/2018       Past Surgical History:   Procedure Laterality Date    CHOLECYSTECTOMY         History reviewed  No pertinent family history  I have reviewed and agree with the history as documented  Social History     Tobacco Use    Smoking status: Former Smoker    Smokeless tobacco: Never Used   Substance Use Topics    Alcohol use: No    Drug use: No        Review of Systems   Constitutional: Positive for fatigue  Negative for appetite change and fever  HENT: Negative for rhinorrhea and sore throat  Respiratory: Negative for cough, shortness of breath and wheezing  Cardiovascular: Negative for chest pain and leg swelling  Gastrointestinal: Negative for abdominal pain, diarrhea and vomiting  Genitourinary: Negative for dysuria and flank pain  Musculoskeletal: Positive for back pain  Negative for neck pain  Skin: Negative for rash  Neurological: Positive for syncope and weakness  Negative for headaches  Psychiatric/Behavioral:        Mood normal       Physical Exam  Physical Exam   Constitutional: She is oriented to person, place, and time  She appears well-developed and well-nourished  Uncomfortable   HENT:   Head: Normocephalic and atraumatic  Mouth/Throat: Oropharynx is clear and moist    Eyes: Pupils are equal, round, and reactive to light  Neck: Normal range of motion  Neck supple  Cardiovascular: Normal rate and regular rhythm  Pulmonary/Chest: Effort normal and breath sounds normal  No respiratory distress  Abdominal: Soft  There is no tenderness  Musculoskeletal:   All extremities full range of motion and nontender, lower T upper L-spine tenderness  Neurovascularly intact   Neurological: She is alert and oriented to person, place, and time  Skin: Skin is warm and dry  Nursing note and vitals reviewed        Vital Signs  ED Triage Vitals [12/14/19 0912]   Temperature Pulse Respirations Blood Pressure SpO2   97 6 °F (36 4 °C) (!) 107 22 90/60 95 %      Temp Source Heart Rate Source Patient Position - Orthostatic VS BP Location FiO2 (%)   Temporal Monitor Lying Right arm --      Pain Score       Worst Possible Pain           Vitals:    12/14/19 1344 12/14/19 1525 12/14/19 1638 12/14/19 1703   BP: 120/70 (!) 173/72 170/84 168/74   Pulse: 77 77 81 79   Patient Position - Orthostatic VS: Lying Lying Lying Lying         Visual Acuity      ED Medications  Medications   HYDROmorphone (DILAUDID) injection 0 2 mg (0 2 mg Intravenous Given 12/14/19 1707)   atorvastatin (LIPITOR) tablet 20 mg (has no administration in time range)   dabigatran etexilate (PRADAXA) capsule 150 mg (has no administration in time range)   donepezil (ARICEPT) tablet 10 mg (has no administration in time range)   memantine (NAMENDA) tablet 10 mg (has no administration in time range)   pantoprazole (PROTONIX) EC tablet 40 mg (has no administration in time range)   QUEtiapine (SEROquel) tablet 25 mg (has no administration in time range)   ceftriaxone (ROCEPHIN) 1 g/50 mL in dextrose IVPB (has no administration in time range)   oxyCODONE (ROXICODONE) IR tablet 5 mg (has no administration in time range)   docusate sodium (COLACE) capsule 100 mg (has no administration in time range)   polyethylene glycol (MIRALAX) packet 17 g (has no administration in time range)   acetaminophen (TYLENOL) tablet 650 mg (has no administration in time range)   acetaminophen (TYLENOL) tablet 650 mg (has no administration in time range)   sodium chloride 0 9 % bolus 1,000 mL (0 mL Intravenous Stopped 12/14/19 0933)   iodixanol (VISIPAQUE) 320 MG/ML injection 100 mL (100 mL Intravenous Given 12/14/19 0937)   fentanyl citrate (PF) 100 MCG/2ML 50 mcg (50 mcg Intravenous Given 12/14/19 0945)   morphine injection 2 mg (2 mg Intravenous Given 12/14/19 1035)   ceftriaxone (ROCEPHIN) 1 g/50 mL in dextrose IVPB (0 mg Intravenous Stopped 12/14/19 1418)   morphine injection 2 mg (2 mg Intravenous Given 12/14/19 1348)   ketorolac (TORADOL) injection 15 mg (15 mg Intravenous Given 12/14/19 1624)       Diagnostic Studies  Results Reviewed     Procedure Component Value Units Date/Time    Blood culture #2 [[id number]] Collected:  12/14/19 1344    Lab Status: In process Specimen:  Blood from Hand, Right Updated:  12/14/19 1347    Blood culture #1 [[id number]] Collected:  12/14/19 1344    Lab Status: In process Specimen:  Blood from Arm, Right Updated:  12/14/19 1347    Urine Microscopic [[id number]]  (Abnormal) Collected:  12/14/19 0942    Lab Status:  Final result Specimen:  Urine, Clean Catch Updated:  12/14/19 1016     RBC, UA None Seen /hpf      WBC, UA 10-20 /hpf      Epithelial Cells Occasional /hpf      Bacteria, UA Moderate /hpf     Urine culture [[id number]] Collected:  12/14/19 0942    Lab Status:   In process Specimen:  Urine, Clean Catch Updated:  12/14/19 1016    POCT urinalysis dipstick [[id number]]  (Abnormal) Resulted:  12/14/19 0943    Lab Status:  Final result Specimen:  Urine Updated:  12/14/19 0943    Urine Macroscopic, POC [[id number]]  (Abnormal) Collected:  12/14/19 0942    Lab Status:  Final result Specimen:  Urine Updated:  12/14/19 0943     Color, UA Yellow     Clarity, UA Clear     pH, UA 8 0     Leukocytes, UA Small     Nitrite, UA Negative     Protein, UA Negative mg/dl      Glucose, UA Negative mg/dl      Ketones, UA Negative mg/dl      Urobilinogen, UA 0 2 E U /dl      Bilirubin, UA Negative     Blood, UA Trace     Specific Pep, UA 1 015    Narrative:       CLINITEK RESULT    Troponin I [[id number]]  (Normal) Collected:  12/14/19 0905    Lab Status:  Final result Specimen:  Blood from Arm, Right Updated:  12/14/19 0941     Troponin I <0 02 ng/mL     Protime-INR [[id number]]  (Abnormal) Collected:  12/14/19 0905    Lab Status:  Final result Specimen:  Blood from Arm, Right Updated:  12/14/19 0934     Protime 18 3 seconds      INR 1 49    APTT [[id number]]  (Abnormal) Collected:  12/14/19 0905    Lab Status:  Final result Specimen:  Blood from Arm, Right Updated:  12/14/19 0934     PTT 50 seconds     Comprehensive metabolic panel [[id number]]  (Abnormal) Collected:  12/14/19 0905    Lab Status:  Final result Specimen:  Blood from Arm, Right Updated:  12/14/19 0933     Sodium 141 mmol/L      Potassium 3 7 mmol/L      Chloride 106 mmol/L      CO2 26 mmol/L      ANION GAP 9 mmol/L      BUN 15 mg/dL      Creatinine 0 88 mg/dL      Glucose 136 mg/dL      Calcium 8 1 mg/dL      AST 17 U/L      ALT 17 U/L      Alkaline Phosphatase 77 U/L      Total Protein 6 1 g/dL      Albumin 3 1 g/dL      Total Bilirubin 0 30 mg/dL      eGFR 60 ml/min/1 73sq m     Narrative:       [place] guidelines for Chronic Kidney Disease (CKD):     Stage 1 with normal or high GFR (GFR > 90 mL/min/1 73 square meters)    Stage 2 Mild CKD (GFR = 60-89 mL/min/1 73 square meters)    Stage 3A Moderate CKD (GFR = 45-59 mL/min/1 73 square meters)    Stage 3B Moderate CKD (GFR = 30-44 mL/min/1 73 square meters)    Stage 4 Severe CKD (GFR = 15-29 mL/min/1 73 square meters)    Stage 5 End Stage CKD (GFR <15 mL/min/1 73 square meters)  Note: GFR calculation is accurate only with a steady state creatinine    POCT Chem 8+ [[id number]]  (Abnormal) Collected:  12/14/19 0910    Lab Status:  Final result Specimen:  Venous Updated:  12/14/19 0915     SODIUM, I-STAT 140 mmol/l      Potassium, i-STAT 3 7 mmol/L      Chloride, istat 106 mmol/L      CO2, i-STAT 22 mmol/L      Anion Gap, i-STAT 17 mmol/L      Calcium, Ionized i-STAT 1 10 mmol/L      BUN, I-STAT 14 mg/dl      Creatinine, i-STAT 0 8 mg/dl      eGFR 67 ml/min/1 73sq m      Glucose, i-STAT 131 mg/dl      Hct, i-STAT 34 %      Hgb, i-STAT 11 6 g/dl      Specimen Type VENOUS    Narrative:       National Kidney Disease Foundation guidelines for Chronic Kidney Disease (CKD):     Stage 1 with normal or high GFR (GFR > 90 mL/min/1 73 square meters)    Stage 2 Mild CKD (GFR = 60-89 mL/min/1 73 square meters)    Stage 3A Moderate CKD (GFR = 45-59 mL/min/1 73 square meters)    Stage 3B Moderate CKD (GFR = 30-44 mL/min/1 73 square meters)    Stage 4 Severe CKD (GFR = 15-29 mL/min/1 73 square meters)    Stage 5 End Stage CKD (GFR <15 mL/min/1 73 square meters)  Note: GFR calculation is accurate only with a steady state creatinine    CBC and differential [[id number]]  (Abnormal) Collected:  12/14/19 0905    Lab Status:  Final result Specimen:  Blood from Arm, Right Updated:  12/14/19 0911     WBC 11 14 Thousand/uL      RBC 3 92 Million/uL      Hemoglobin 11 0 g/dL      Hematocrit 35 3 %      MCV 90 fL      MCH 28 1 pg      MCHC 31 2 g/dL      RDW 13 4 %      MPV 10 6 fL      Platelets 733 Thousands/uL      nRBC 0 /100 WBCs      Neutrophils Relative 65 %      Immat GRANS % 1 %      Lymphocytes Relative 26 %      Monocytes Relative 7 %      Eosinophils Relative 1 %      Basophils Relative 0 %      Neutrophils Absolute 7 26 Thousands/µL      Immature Grans Absolute 0 13 Thousand/uL      Lymphocytes Absolute 2 90 Thousands/µL      Monocytes Absolute 0 74 Thousand/µL      Eosinophils Absolute 0 08 Thousand/µL      Basophils Absolute 0 03 Thousands/µL                  CT head without contrast   Final Result by Nicolás Mai MD (12/14 1015)      No acute intracranial process or significant interval change  No skull fracture  Chronic microangiopathy  Workstation performed: [id number]         CT cervical spine without contrast   Final Result by Nicolás Mai MD (12/14 1010)      No cervical spine fracture or traumatic malalignment insofar as can be detected on this study with mild motion artifact                     Workstation performed: [id number]         CT chest abdomen pelvis w contrast   Final Result by  (12/14 6821)   Addendum 1 of 1 by Nicolás Mai MD (12/14 2486)   ADDENDUM:      Incidentally noted moderate proximal third duodenal segment diverticulum  Final      CT recon only thoracolumbar (No Charge)   Final Result by Rimma Rodriguez MD (12/14 1034)      Mild acute to subacute compression fracture of T12 with 4 mm left posterior retropulsion  The examination demonstrates a significant  finding and was documented as such in [facility] for liaison and referring practitioner notification  Workstation performed: [id number]                    Procedures  ECG 12 Lead Documentation Only  Date/Time: 12/14/2019 8:57 AM  Performed by: Simon Acuna MD  Authorized by: Simon Acuna MD     Rate:     ECG rate:  117    ECG rate assessment: tachycardic    Rhythm:     Rhythm: atrial fibrillation    ST segments:     ST segments:  Non-specific  Comments: Old ekg shows sinus rhythm at 65  ED Course                               MDM  Number of Diagnoses or Management Options  Fall, initial encounter:   Syncope:   T12 compression fracture, initial encounter [facility]):   UTI (urinary tract infection):      Amount and/or Complexity of Data Reviewed  Clinical lab tests: ordered and reviewed  Tests in the radiology section of CPT®: ordered and reviewed    Risk of Complications, Morbidity, and/or Mortality  Presenting problems: moderate  General comments: I talked to Neurosurgery and went over her case with him  Neurosurgeon, Dr Elizabeth Chilel agrees with plan, She is stable for admission here at [facility] and he recommends a TLSO brace          Disposition  Final diagnoses:   Syncope   Fall, initial encounter   T12 compression fracture, initial encounter [facility])   UTI (urinary tract infection)     Time reflects when diagnosis was documented in both MDM as applicable and the Disposition within this note     Time User Action Codes Description Comment    12/14/2019  1:00 PM Delmer PONCE Add [R55] Syncope     12/14/2019  1:00 PM Delmer Alexandra Add [S10  XXXA] Fall, initial encounter 12/14/2019  1:00 PM Kyra Naidu Add [S22 080A] T12 compression fracture, initial encounter ([facility] [address] )     12/14/2019  1:00 PM Yudith Naidu Add [N39 0] UTI (urinary tract infection)       ED Disposition     ED Disposition Condition Date/Time Comment    Admit Stable Sat Dec 14, 2019  1:00 PM Case was discussed with SLIM and the patient's admission status was agreed to be inpt /tele      Follow-up Information    None         Current Discharge Medication List      CONTINUE these medications which have NOT CHANGED    Details   atorvastatin (LIPITOR) 20 mg tablet Take 20 mg by mouth daily      cephalexin (KEFLEX) 250 mg capsule Take 250 mg by mouth once      dabigatran etexilate (PRADAXA) 150 mg capsu Take 1 capsule (150 mg total) by mouth every 12 (twelve) hours  Refills: 0    Associated Diagnoses: A-fib (HCC)      donepezil (ARICEPT) 10 mg tablet Take 10 mg by mouth daily at bedtime      memantine (NAMENDA) 10 mg tablet Take 10 mg by mouth 2 (two) times a day      pantoprazole (PROTONIX) 40 mg tablet Take 40 mg by mouth daily      BUTALBITAL-ACETAMINOPHEN PO Take 1 tablet by mouth daily at bedtime      QUEtiapine (SEROquel) 25 mg tablet Take 1 tablet (25 mg total) by mouth daily at bedtime  Qty: 30 tablet, Refills: 0    Associated Diagnoses: Syncope; UTI (urinary tract infection); Late onset Alzheimer's disease with behavioral disturbance ([facility] [address] ); Paroxysmal atrial fibrillation ([facility] [address] ); Chronic UTI; Patient is [place]           No discharge procedures on file      ED Provider  Electronically Signed by           Susana Barros MD  12/14/19 5158

## 2019-12-14 NOTE — ASSESSMENT & PLAN NOTE
Present on admission with history of Alzheimer disease  Home medication includes Namenda Aricept, Seroquel  Stable  Currently in excruciating pain    Maintain delirium precaution

## 2019-12-14 NOTE — ASSESSMENT & PLAN NOTE
Present on admission with history of AFib  Not on any rate control agent  On Pradaxa for anticoagulation  Daughter at bedside reports that patient had stroke previously after discontinuing anticoagulation therefore family is adamant about continuing Pradaxa for stroke prevention  Follow-up with PCP and primary Cardiology outpatient

## 2019-12-14 NOTE — ASSESSMENT & PLAN NOTE
Patient presented with syncopal episode and fall  Complaining of excruciating back pain  T12 compression fracture noted on CT report  Neurosurgery recommendation noted  Obtain upright films and patient able  TLSO brace  Analgesics for adequate pain control    PT OT eval  Order caution

## 2019-12-14 NOTE — TELEMEDICINE
On-Call Telephone Note    Contacted by Dr Marichuy Hudson via Zari Hamilton 80 y o  female MRN: [id number]  Unit/Bed#: ED 16 Encounter: [id number]    Per provider report, patient presents post fall  /78 (BP Location: Right arm)   Pulse (!) 120   Temp 97 6 °F (36 4 °C) (Temporal)   Resp 22   SpO2 97%      Clinical exam per provider report, no focal neurological deficits in lower extremities  Unclear if patient has back pain  Imaging personally reviewed  CT scan of the chest abdomen pelvis, spinal reconstructions, dated December 14th, 2019  Degenerative changes throughout the thoracolumbar spine  Subacute superior endplate V65 compression fracture with minimal retropulsion  No splaying of facet joints though there may be a chronic appearing spinous process fracture  Assessment and Plan  3 80year-old woman with apparent TLICS 1 S28 fracture  No neurosurgical intervention is anticipated  Recommend nonsurgical care and consider TLSO brace when up and about for comfort  Obtain upright plain films when able  2  Patient can follow up with PCP or neurosurgery as outpatient  A total of 7 minutes was spent discussing the presentation, physical exam and formulating a management plan with the provider over [employer] [facility]

## 2019-12-14 NOTE — ED NOTES
Patient incontinent of urine at this time, saturated self and bed in urine, patient changed and cleaned up, bed linens changed as well at this time        Smith Alvarez RN  12/14/19 1539

## 2019-12-15 LAB
ANION GAP SERPL CALCULATED.3IONS-SCNC: 10 MMOL/L (ref 4–13)
BASOPHILS # BLD AUTO: 0.04 THOUSANDS/ΜL (ref 0–0.1)
BASOPHILS NFR BLD AUTO: 0 % (ref 0–1)
BUN SERPL-MCNC: 21 MG/DL (ref 5–25)
CALCIUM SERPL-MCNC: 8.6 MG/DL (ref 8.3–10.1)
CHLORIDE SERPL-SCNC: 107 MMOL/L (ref 100–108)
CO2 SERPL-SCNC: 26 MMOL/L (ref 21–32)
CREAT SERPL-MCNC: 1.03 MG/DL (ref 0.6–1.3)
EOSINOPHIL # BLD AUTO: 0.23 THOUSAND/ΜL (ref 0–0.61)
EOSINOPHIL NFR BLD AUTO: 2 % (ref 0–6)
ERYTHROCYTE [DISTWIDTH] IN BLOOD BY AUTOMATED COUNT: 13.5 % (ref 11.6–15.1)
GFR SERPL CREATININE-BSD FRML MDRD: 49 ML/MIN/1.73SQ M
GLUCOSE SERPL-MCNC: 112 MG/DL (ref 65–140)
HCT VFR BLD AUTO: 33 % (ref 34.8–46.1)
HGB BLD-MCNC: 10.3 G/DL (ref 11.5–15.4)
IMM GRANULOCYTES # BLD AUTO: 0.04 THOUSAND/UL (ref 0–0.2)
IMM GRANULOCYTES NFR BLD AUTO: 0 % (ref 0–2)
LYMPHOCYTES # BLD AUTO: 1.46 THOUSANDS/ΜL (ref 0.6–4.47)
LYMPHOCYTES NFR BLD AUTO: 15 % (ref 14–44)
MCH RBC QN AUTO: 28.2 PG (ref 26.8–34.3)
MCHC RBC AUTO-ENTMCNC: 31.2 G/DL (ref 31.4–37.4)
MCV RBC AUTO: 90 FL (ref 82–98)
MONOCYTES # BLD AUTO: 0.85 THOUSAND/ΜL (ref 0.17–1.22)
MONOCYTES NFR BLD AUTO: 9 % (ref 4–12)
NEUTROPHILS # BLD AUTO: 7.41 THOUSANDS/ΜL (ref 1.85–7.62)
NEUTS SEG NFR BLD AUTO: 74 % (ref 43–75)
NRBC BLD AUTO-RTO: 0 /100 WBCS
PLATELET # BLD AUTO: 230 THOUSANDS/UL (ref 149–390)
PMV BLD AUTO: 10.6 FL (ref 8.9–12.7)
POTASSIUM SERPL-SCNC: 4 MMOL/L (ref 3.5–5.3)
RBC # BLD AUTO: 3.65 MILLION/UL (ref 3.81–5.12)
SODIUM SERPL-SCNC: 143 MMOL/L (ref 136–145)
WBC # BLD AUTO: 10.03 THOUSAND/UL (ref 4.31–10.16)

## 2019-12-15 PROCEDURE — 99232 SBSQ HOSP IP/OBS MODERATE 35: CPT | Performed by: INTERNAL MEDICINE

## 2019-12-15 PROCEDURE — 99222 1ST HOSP IP/OBS MODERATE 55: CPT | Performed by: PSYCHIATRY & NEUROLOGY

## 2019-12-15 PROCEDURE — 80048 BASIC METABOLIC PNL TOTAL CA: CPT | Performed by: STUDENT IN AN ORGANIZED HEALTH CARE EDUCATION/TRAINING PROGRAM

## 2019-12-15 PROCEDURE — 85025 COMPLETE CBC W/AUTO DIFF WBC: CPT | Performed by: STUDENT IN AN ORGANIZED HEALTH CARE EDUCATION/TRAINING PROGRAM

## 2019-12-15 RX ORDER — DIVALPROEX SODIUM 125 MG/1
125 CAPSULE, COATED PELLETS ORAL 3 TIMES DAILY
Status: DISCONTINUED | OUTPATIENT
Start: 2019-12-15 | End: 2019-12-27 | Stop reason: HOSPADM

## 2019-12-15 RX ADMIN — ACETAMINOPHEN 650 MG: 325 TABLET ORAL at 17:45

## 2019-12-15 RX ADMIN — ACETAMINOPHEN 650 MG: 325 TABLET ORAL at 11:29

## 2019-12-15 RX ADMIN — DOCUSATE SODIUM 100 MG: 100 CAPSULE, LIQUID FILLED ORAL at 17:45

## 2019-12-15 RX ADMIN — DABIGATRAN ETEXILATE MESYLATE 150 MG: 150 CAPSULE ORAL at 20:07

## 2019-12-15 RX ADMIN — ATORVASTATIN CALCIUM 20 MG: 20 TABLET, FILM COATED ORAL at 17:45

## 2019-12-15 RX ADMIN — OXYCODONE HYDROCHLORIDE 5 MG: 5 TABLET ORAL at 19:47

## 2019-12-15 RX ADMIN — MEMANTINE 10 MG: 5 TABLET ORAL at 20:07

## 2019-12-15 RX ADMIN — DIVALPROEX SODIUM 125 MG: 125 CAPSULE, COATED PELLETS ORAL at 20:07

## 2019-12-15 RX ADMIN — DOCUSATE SODIUM 100 MG: 100 CAPSULE, LIQUID FILLED ORAL at 08:03

## 2019-12-15 RX ADMIN — OXYCODONE HYDROCHLORIDE 5 MG: 5 TABLET ORAL at 13:40

## 2019-12-15 RX ADMIN — MELATONIN 6 MG: 3 TAB ORAL at 21:22

## 2019-12-15 RX ADMIN — MEMANTINE 10 MG: 5 TABLET ORAL at 08:03

## 2019-12-15 RX ADMIN — DONEPEZIL HYDROCHLORIDE 10 MG: 10 TABLET, FILM COATED ORAL at 17:45

## 2019-12-15 RX ADMIN — DIVALPROEX SODIUM 125 MG: 125 CAPSULE, COATED PELLETS ORAL at 17:45

## 2019-12-15 RX ADMIN — QUETIAPINE FUMARATE 25 MG: 25 TABLET ORAL at 21:22

## 2019-12-15 RX ADMIN — DIVALPROEX SODIUM 125 MG: 125 CAPSULE, COATED PELLETS ORAL at 11:31

## 2019-12-15 RX ADMIN — DABIGATRAN ETEXILATE MESYLATE 150 MG: 150 CAPSULE ORAL at 08:08

## 2019-12-15 RX ADMIN — CEFTRIAXONE 1000 MG: 1 INJECTION, POWDER, FOR SOLUTION INTRAMUSCULAR; INTRAVENOUS at 13:31

## 2019-12-15 RX ADMIN — OXYCODONE HYDROCHLORIDE 5 MG: 5 TABLET ORAL at 07:36

## 2019-12-15 NOTE — PROGRESS NOTES
Progress Note - Dedrick Flow 80 y o  female MRN: [id number]    Unit/Bed#: E2 -01 Encounter: [id number]    Assessment/Plan:    Syncope   reviewed Neurology consult, multiple etiologies to consider including bradycardia, vasovagal, UTI seizure disorder, will initiate Depakote, EEG ordered continue follow-up with Neurology    Compression fracture T12 will consult Orthopedics continue pain control and consider TLSO brace, continue PT/ OT     Chronic UTI   continue Rocephin and monitor culture, greater than 100,000 gram-negative rods    AFib    continue Pradaxa anticoagulant    Dementia   continue Namenda and Aricept Seroquel    Chronic headache  Neurology started Depakote    Dyslipidemia   continue statin for LDL control    Subjective:   Complains of headache chronic frontal bilateral, denies chest pain shortness of breath mild low back pain no fevers chills    Objective:     Vitals: Blood pressure 130/80, pulse 100, temperature 98 1 °F (36 7 °C), temperature source Tympanic, resp  rate 18, SpO2 92 %  ,There is no height or weight on file to calculate BMI  Results from last 7 days   Lab Units 12/15/19  0617 12/14/19  0905   WBC Thousand/uL 10 03  --  11 14*   HEMOGLOBIN g/dL 10 3*  --  11 0*   I STAT HEMOGLOBIN   --    < >  --    HEMATOCRIT % 33 0*  --  35 3   HEMATOCRIT, ISTAT   --    < >  --    PLATELETS Thousands/uL 230  --  255   INR   --   --  1 49*    < > = values in this interval not displayed       Results from last 7 days   Lab Units 12/15/19  0617 12/14/19  0910 12/14/19  0905   POTASSIUM mmol/L 4 0  --  3 7   CHLORIDE mmol/L 107  --  106   CO2 mmol/L 26  --  26   CO2, I-STAT mmol/L  --  22  --    BUN mg/dL 21  --  15   CREATININE mg/dL 1 03  --  0 88   CALCIUM mg/dL 8 6  --  8 1*   ALK PHOS U/L  --   --  77   ALT U/L  --   --  17   AST U/L  --   --  17   GLUCOSE, ISTAT mg/dl  --  131  --        Scheduled Meds:    Current Facility-Administered Medications:  acetaminophen 650 mg Oral Q6H [facility] Bennie High MD    acetaminophen 650 mg Oral Q12H PRN Felipe GARCÍA MD    atorvastatin 20 mg Oral Daily With Dinner Young GARCÍA MD    cefTRIAXone 1,000 mg Intravenous Q24H Felipe GARCÍA MD Last Rate: 1,000 mg (12/15/19 1331)   dabigatran etexilate 150 mg Oral Q12H [facility] Felipe GARCÍA MD    divalproex sodium 125 mg Oral TID Kalia Villegas DO    docusate sodium 100 mg Oral BID Felipe GARCÍA MD    donepezil 10 mg Oral HS Felipe Beard V, MD    melatonin 6 mg Oral HS Felipe GARCÍA MD    memantine 10 mg Oral BID Felipe GARCÍA MD    oxyCODONE 5 mg Oral Q6H PRN Felipe GARCÍA MD    pantoprazole 40 mg Oral Daily Before Breakfast Felipe GARCÍA MD    polyethylene glycol 17 g Oral Daily PRN Felipe GARCÍA MD    QUEtiapine 25 mg Oral HS Michela Harris DO        Continuous Infusions:     Physical exam:  General appearance:  Alert no distress sleepy poor interaction  Head/Eyes:  Nonicteric sluggish pale  Neck:  Supple  Lungs:  Decreased BS bilateral no wheezing rhonchi or rales  Heart: normal S1 S2 regular  Abdomen: Soft nontender with bowel sounds  Extremities: no edema  Skin: no rash    Invasive Devices     Peripheral Intravenous Line            Peripheral IV 12/14/19 Right 1 day                  VTE Pharmacologic Prophylaxis:  Pradaxa      Counseling / Coordination of Care  Total floor / unit time spent today 30  minutes  Greater than 50% of total time was spent with the patient and / or family counseling and / or coordination of care    A description of the counseling / coordination of care:  Discussed with Neurology

## 2019-12-15 NOTE — PLAN OF CARE
Problem: Potential for Falls  Goal: Patient will remain free of falls  Description  INTERVENTIONS:  - Assess patient frequently for physical needs  -  Identify cognitive and physical deficits and behaviors that affect risk of falls    -  [place] fall precautions as indicated by assessment   - Educate patient/family on patient safety including physical limitations  - Instruct patient to call for assistance with activity based on assessment  - Modify environment to reduce risk of injury  - Consider OT/PT consult to assist with strengthening/mobility  Outcome: Progressing     Problem: Prexisting or High Potential for Compromised Skin Integrity  Goal: Skin integrity is maintained or improved  Description  INTERVENTIONS:  - Identify patients at risk for skin breakdown  - Assess and monitor skin integrity  - Assess and monitor nutrition and hydration status  - Monitor labs   - Assess for incontinence   - Turn and reposition patient  - Assist with mobility/ambulation  - Relieve pressure over bony prominences  - Avoid friction and shearing  - Provide appropriate hygiene as needed including keeping skin clean and dry  - Evaluate need for skin moisturizer/barrier cream  - Collaborate with interdisciplinary team   - Patient/family teaching  - Consider wound care consult   Outcome: Progressing     Problem: PAIN - ADULT  Goal: Verbalizes/displays adequate comfort level or baseline comfort level  Description  Interventions:  - Encourage patient to monitor pain and request assistance  - Assess pain using appropriate pain scale  - Administer analgesics based on type and severity of pain and evaluate response  - Implement non-pharmacological measures as appropriate and evaluate response  - Consider cultural and social influences on pain and pain management  - Notify physician/advanced practitioner if interventions unsuccessful or patient reports new pain  Outcome: Progressing     Problem: INFECTION - ADULT  Goal: Absence or prevention of progression during hospitalization  Description  INTERVENTIONS:  - Assess and monitor for signs and symptoms of infection  - Monitor lab/diagnostic results  - Monitor all insertion sites, i e  indwelling lines, tubes, and drains  - Monitor endotracheal if appropriate and nasal secretions for changes in amount and color  - [place] appropriate cooling/warming therapies per order  - Administer medications as ordered  - Instruct and encourage patient and family to use good hand hygiene technique  - Identify and instruct in appropriate isolation precautions for identified infection/condition  Outcome: Progressing  Goal: Absence of fever/infection during neutropenic period  Description  INTERVENTIONS:  - Monitor WBC    Outcome: Progressing     Problem: SAFETY ADULT  Goal: Maintain or return to baseline ADL function  Description  INTERVENTIONS:  -  Assess patient's ability to carry out ADLs; assess patient's baseline for ADL function and identify physical deficits which impact ability to perform ADLs (bathing, care of mouth/teeth, toileting, grooming, dressing, etc )  - Assess/evaluate cause of self-care deficits   - Assess range of motion  - Assess patient's mobility; develop plan if impaired  - Assess patient's need for assistive devices and provide as appropriate  - Encourage maximum independence but intervene and supervise when necessary  - Involve family in performance of ADLs  - Assess for home care needs following discharge   - Consider OT consult to assist with ADL evaluation and planning for discharge  - Provide patient education as appropriate  Outcome: Progressing  Goal: Maintain or return mobility status to optimal level  Description  INTERVENTIONS:  - Assess patient's baseline mobility status (ambulation, transfers, stairs, etc )    - Identify cognitive and physical deficits and behaviors that affect mobility  - Identify mobility aids required to assist with transfers and/or ambulation (gait belt, sit-to-stand, lift, walker, cane, etc )  - [place] fall precautions as indicated by assessment  - Record patient progress and toleration of activity level on Mobility SBAR; progress patient to next Phase/Stage  - Instruct patient to call for assistance with activity based on assessment  - Consider rehabilitation consult to assist with strengthening/weightbearing, etc   Outcome: Progressing     Problem: DISCHARGE PLANNING  Goal: Discharge to home or other facility with appropriate resources  Description  INTERVENTIONS:  - Identify barriers to discharge w/patient and caregiver  - Arrange for needed discharge resources and transportation as appropriate  - Identify discharge learning needs (meds, wound care, etc )  - Arrange for interpretive services to assist at discharge as needed  - Refer to Case Management Department for coordinating discharge planning if the patient needs post-hospital services based on physician/advanced practitioner order or complex needs related to functional status, cognitive ability, or social support system  Outcome: Progressing     Problem: Knowledge Deficit  Goal: Patient/family/caregiver demonstrates understanding of disease process, treatment plan, medications, and discharge instructions  Description  Complete learning assessment and assess knowledge base    Interventions:  - Provide teaching at level of understanding  - Provide teaching via preferred learning methods  Outcome: Progressing

## 2019-12-15 NOTE — CONSULTS
Consultation - Neurology   Marilyn Lancaster 80 y o  female MRN: [id number]  Unit/Bed#: E2 -01 Encounter: [id number]      Assessment/Plan   Assessment:  15-year-old female with advancing dementia, presenting with recurrent syncopal episodes  With her prior admissions for various episodes there have been various teres regarding the origin from bradycardia, to vasovagal, to UTI related  In retrospect, seizure disorder is not implausible  In particular, I am impressed with the episode in October 2017 when there was transient right hemiparesis following the episode, unexplained by the punctate right frontal CVA  The additional episodes the daughter describes where the patient becomes more withdrawn for several minutes accompanied by right upper extremity shaking are also fairly suspicious  Plan:  Discussed with her daughter the possibility of empiric anticonvulsant trial with Depakote  This agent may be advantageous specifically because it might also Anastacia rate behavioral disturbance, improve her appetite, and potentially address her chronic headache disorder  We reviewed potential side effects/tolerability  I would intentionally begin with a less than therapeutic epileptic does in order to optimize tolerance  I would start with 125 mg 3 times a day, increasing to 250 t i d  In 2 weeks, and then 375 mg t i d  2 weeks after that  At that point I would hold titration and observe, as well as checking a blood level  She does have an EEG ordered, which likely will be conducted on Monday  That may be useful if we can see over interictal epileptiform changes, but if not I would still propose the above empiric trial     Physician Requesting Consult: Angelo Tubbs DO  Reason for Consult / Principal Problem:  Recurrent syncopal episodes  Hx and PE limited by: The patient with dementia    HPI: Marilyn Lancaster is a 80y o  year old female who presents with a recurrent syncopal episode    She is known to Neurology both from outpatient follow-up for her dementia and cerebrovascular disease, as well as multiple prior admissions  Her dementia is presumptively combination of vascular and Alzheimer's pathology, present for at least several years, moderately advanced  Her cerebrovascular disease based on imaging and history is likely combination of small-vessel disease plus known prior embolic infarcts (AFib)  She is maintained anticoagulation with Pradaxa, formally Coumadin, it had been interrupted for some time due to some GI bleeding issues , potentially made more problematic because of her Jain beliefs, prohibiting transfusions, resumed in July after presentation with central retinal artery occlusion  Patient cannot really provide reliable history, and extensive chart review was utilized  Her syncopal episodes seemed to date back at least to July 2017 when she was hospitalized at [facility] for an episode that was attributed possibly to associated bradycardia from a beta-blocker  October 2017 she was hospitalized for a syncopal episode, unwitnessed, with reported transient right hemiparesis lasting several hours  Her workup included an MRI of the brain which demonstrated a right punctate diffusion abnormality in the frontal cortex  It re-demonstrated periventricular changes and a chronic left lateral frontal infarction  In February 2018 she had a syncopal episode that occurred in the emergency room  She had presented with complaints of headache following tooth extraction earlier in the day  A syncopal episode happened at the time she was being prepared for discharge  She had gone to the bathroom and returned, then became pale and clammy, loss of consciousness about 1 minutes, no tonic-clonic activity seen    Next in September she had a syncopal episode also occurring in the emergency room upon return from a trip to the bathroom    She had presented on occasion with weakness and lethargy associated with headache  In November 2018, her family heard her fall in the bathroom  She was presumed to have syncopal episode while in the shower  She was not admitted but released from the ER    In August of 2019 she had another syncopal episode and for which she was hospitalized  Neurology was not consulted  The presumption was that it was related to possibly combination of UTI and vasovagal factors  It was not thought to be cardiogenic    On the present occasion the family reported again hearing a thump  The loss of consciousness was not witnessed, but she was evidently on her way to the bathroom  When they came to check on her they noted frothing at the mouth as well as urinary incontinence  She had 2 episodes of vomiting following that  An EEG has been ordered  I do not see any prior EEG testing either through [facility] or [facility]  Preliminary imaging on this occasion included a head CT which was unremarkable for any acute change  Thoracic CT imaging disclosed an acute versus subacute mild compression fracture at T12  Neurosurgery was contacted and recommended no surgical intervention, but consideration for TLSO brace    The patient's daughter arrived while I was examining the patient  She offers that historically the syncopal episodes typically have a always been associated with vomiting (after the event)  She reports that there will often be a recurrent transient loss of consciousness after the initial episode  They are typically followed by a day or more of increased generalized weakness as though she is [employer]    She has noticed other episodes that seem to be associated with headache where the patient becomes abruptly more quiet and withdrawn, variably associated with some right upper extremity shaking lasting for variable time from 3-20 minutes-events that her PCP had suggested might be seizure    Inpatient consult to Neurology  Consult performed by: Kristopher Arriaga,   Consult ordered by: Ismael Thomas MD          Review of Systems   Reason unable to perform ROS: Difficult to reliably obtain due to her dementia, and easily agitated  She did not report any pain or headache and denied nausea at present  Historical Information   Past Medical History:   Diagnosis Date    [facility]-fib ([facility] )     Alzheimer disease ([facility] )     Chronic UTI     Diverticulosis     Dyslipidemia     History of stroke     Hyperlipidemia     Hypertension     UTI (urinary tract infection)     Vasovagal syncope 2/13/2018     Past Surgical History:   Procedure Laterality Date    CHOLECYSTECTOMY       Social History   Social History     Substance and Sexual Activity   Alcohol Use No     Social History     Substance and Sexual Activity   Drug Use No     Social History     Tobacco Use   Smoking Status Former Smoker   Smokeless Tobacco Never Used     Family History: non-contributory    Review of previous medical records was  completed  Meds/Allergies   all current active meds have been reviewed and PTA meds:   Prior to Admission Medications   Prescriptions Last Dose Informant Patient Reported? Taking?    BUTALBITAL-ACETAMINOPHEN PO   Yes No   Sig: Take 1 tablet by mouth daily at bedtime   QUEtiapine (SEROquel) 25 mg tablet   No No   Sig: Take 1 tablet (25 mg total) by mouth daily at bedtime   Patient taking differently: Take 25 mg by mouth as needed    atorvastatin (LIPITOR) 20 mg tablet 12/13/2019 at Unknown time Self Yes Yes   Sig: Take 20 mg by mouth daily   cephalexin (KEFLEX) 250 mg capsule   Yes Yes   Sig: Take 250 mg by mouth once   dabigatran etexilate (PRADAXA) 150 mg capsu 12/14/2019 at Unknown time  No Yes   Sig: Take 1 capsule (150 mg total) by mouth every 12 (twelve) hours   donepezil (ARICEPT) 10 mg tablet 12/13/2019 at Unknown time Self Yes Yes   Sig: Take 10 mg by mouth daily at bedtime   memantine (NAMENDA) 10 mg tablet 12/14/2019 at Unknown time Self Yes Yes   Sig: Take 10 mg by mouth 2 (two) times a day   pantoprazole (PROTONIX) 40 mg tablet  Child Yes Yes   Sig: Take 40 mg by mouth daily      Facility-Administered Medications: None       Allergies   Allergen Reactions    Ativan [Lorazepam]     Latuda [Lurasidone]        Objective   Vitals:Blood pressure 130/80, pulse 100, temperature 98 1 °F (36 7 °C), temperature source Tympanic, resp  rate 18, SpO2 92 %  ,There is no height or weight on file to calculate BMI  Intake/Output Summary (Last 24 hours) at 12/15/2019 0848  Last data filed at 12/15/2019 0337  Gross per 24 hour   Intake 1450 ml   Output 250 ml   Net 1200 ml       Invasive Devices: Invasive Devices     Peripheral Intravenous Line            Peripheral IV 12/14/19 Right less than 1 day                Physical Exam   Constitutional: She appears well-developed and well-nourished  She appears distressed  HENT:   Head: Normocephalic and atraumatic  Mouth/Throat: No oropharyngeal exudate  Eyes: Conjunctivae are normal  Right eye exhibits no discharge  Left eye exhibits no discharge  Neck: Normal range of motion  Neck supple  Cardiovascular: Normal rate  No murmur heard  Pulmonary/Chest: Effort normal and breath sounds normal  No respiratory distress  Abdominal: Soft  Bowel sounds are normal    Musculoskeletal: Normal range of motion  She exhibits no edema  Skin: Skin is warm and dry  She is not diaphoretic  Psychiatric:   She is irritable, intermittently yelling and cursing  She is somewhat consolable     Neurologic Exam     Mental Status   She was correctly oriented to name, not location or time  She did seem to recognize her daughter  She is able to follow simple commands when she is willing  Her speech is fluent, without notable paraphasic errors  When she is more upset she will speak in [place]  Cranial Nerves   Unable to obtain cooperation for formal visual field testing  She has a known visual loss in the left eye from prior retinal artery occlusion    Could not really assess pupillary reactions  Her extraocular movements however appear intact and conjugate  There was no facial weakness  Tongue protrudes in the midline  Speech is not dysarthric  Motor Exam Do to reduce cooperation, did not attempt to perform formal strength testing in all muscle groups however she has at least 4 of 5 power in all limbs, fearing relatively symmetric  No significant asymmetry in tone  No involuntary movements were witnessed  Sensory Exam   Again with limited cooperation, detailed testing not performed, but she does report symmetric perception of cold temperature in bilateral upper and lower limbs  Gait, Coordination, and Reflexes Gait was not assessed  Reflexes were grade 2 in the upper extremities at the knees  Coordination testing with the upper limbs should reasonably intact performance of finger-to-nose bilaterally  Lab Results:   I have personally reviewed pertinent reports    , CBC:   Results from last 7 days   Lab Units 12/15/19  0617 12/14/19 0910 12/14/19  0905   WBC Thousand/uL 10 03  --  11 14*   RBC Million/uL 3 65*  --  3 92   HEMOGLOBIN g/dL 10 3*  --  11 0*   I STAT HEMOGLOBIN g/dl  --  11 6  --    HEMATOCRIT % 33 0*  --  35 3   HEMATOCRIT, ISTAT %  --  34*  --    MCV fL 90  --  90   PLATELETS Thousands/uL 230  --  255   , BMP/CMP:   Results from last 7 days   Lab Units 12/15/19  0617 12/14/19 0910 12/14/19  0905   SODIUM mmol/L 143  --  141   POTASSIUM mmol/L 4 0  --  3 7   CHLORIDE mmol/L 107  --  106   CO2 mmol/L 26  --  26   CO2, I-STAT mmol/L  --  22  --    BUN mg/dL 21  --  15   CREATININE mg/dL 1 03  --  0 88   GLUCOSE, ISTAT mg/dl  --  131  --    CALCIUM mg/dL 8 6  --  8 1*   AST U/L  --   --  17   ALT U/L  --   --  17   ALK PHOS U/L  --   --  77   EGFR ml/min/1 73sq m 49 67 60   , Urinalysis:   Results from last 7 days   Lab Units 12/14/19  0942   COLOR UA  Yellow   CLARITY UA  Clear   SPEC GRAV UA  1 015   PH UA  8 0   LEUKOCYTES UA Small*   NITRITE UA  Negative   GLUCOSE UA mg/dl Negative   KETONES UA mg/dl Negative   BILIRUBIN UA  Negative   BLOOD UA  Trace*     Imaging Studies: I have personally reviewed pertinent films in PACS  Head CT and prior MRIs are reviewed  Current head CT appears unchanged as compared to priors  Shows extensive microvascular periventricular ischemic changes and the more focal encephalomalacia in the left lateral frontal lobe  EKG, Pathology, and Other Studies: I have personally reviewed pertinent reports          Code Status: Level 3 - DNAR and DNI  Advance Directive and Living Will:      Power of :    POLST:

## 2019-12-16 ENCOUNTER — APPOINTMENT (INPATIENT)
Dept: NEUROLOGY | Facility: HOSPITAL | Age: 84
DRG: 516 | End: 2019-12-16
Attending: STUDENT IN AN ORGANIZED HEALTH CARE EDUCATION/TRAINING PROGRAM
Payer: COMMERCIAL

## 2019-12-16 LAB — BACTERIA UR CULT: ABNORMAL

## 2019-12-16 PROCEDURE — 97167 OT EVAL HIGH COMPLEX 60 MIN: CPT

## 2019-12-16 PROCEDURE — 97763 ORTHC/PROSTC MGMT SBSQ ENC: CPT

## 2019-12-16 PROCEDURE — 99232 SBSQ HOSP IP/OBS MODERATE 35: CPT | Performed by: INTERNAL MEDICINE

## 2019-12-16 PROCEDURE — G8979 MOBILITY GOAL STATUS: HCPCS

## 2019-12-16 PROCEDURE — 95816 EEG AWAKE AND DROWSY: CPT

## 2019-12-16 PROCEDURE — G8978 MOBILITY CURRENT STATUS: HCPCS

## 2019-12-16 PROCEDURE — 95819 EEG AWAKE AND ASLEEP: CPT | Performed by: PSYCHIATRY & NEUROLOGY

## 2019-12-16 PROCEDURE — G8987 SELF CARE CURRENT STATUS: HCPCS

## 2019-12-16 PROCEDURE — 99221 1ST HOSP IP/OBS SF/LOW 40: CPT | Performed by: PHYSICIAN ASSISTANT

## 2019-12-16 PROCEDURE — G8988 SELF CARE GOAL STATUS: HCPCS

## 2019-12-16 PROCEDURE — 99232 SBSQ HOSP IP/OBS MODERATE 35: CPT | Performed by: PSYCHIATRY & NEUROLOGY

## 2019-12-16 PROCEDURE — 97163 PT EVAL HIGH COMPLEX 45 MIN: CPT

## 2019-12-16 RX ORDER — OLANZAPINE 10 MG/1
2.5 INJECTION, POWDER, LYOPHILIZED, FOR SOLUTION INTRAMUSCULAR ONCE
Status: COMPLETED | OUTPATIENT
Start: 2019-12-16 | End: 2019-12-16

## 2019-12-16 RX ORDER — OXYCODONE HYDROCHLORIDE 5 MG/1
2.5 TABLET ORAL EVERY 4 HOURS PRN
Status: DISCONTINUED | OUTPATIENT
Start: 2019-12-16 | End: 2019-12-27 | Stop reason: HOSPADM

## 2019-12-16 RX ADMIN — ACETAMINOPHEN 650 MG: 325 TABLET ORAL at 17:57

## 2019-12-16 RX ADMIN — DIVALPROEX SODIUM 125 MG: 125 CAPSULE, COATED PELLETS ORAL at 09:59

## 2019-12-16 RX ADMIN — DIVALPROEX SODIUM 125 MG: 125 CAPSULE, COATED PELLETS ORAL at 16:22

## 2019-12-16 RX ADMIN — DABIGATRAN ETEXILATE MESYLATE 150 MG: 150 CAPSULE ORAL at 23:00

## 2019-12-16 RX ADMIN — OLANZAPINE 2.5 MG: 10 INJECTION, POWDER, LYOPHILIZED, FOR SOLUTION INTRAMUSCULAR at 21:53

## 2019-12-16 RX ADMIN — ACETAMINOPHEN 650 MG: 325 TABLET ORAL at 10:34

## 2019-12-16 RX ADMIN — ATORVASTATIN CALCIUM 20 MG: 20 TABLET, FILM COATED ORAL at 16:22

## 2019-12-16 RX ADMIN — ACETAMINOPHEN 650 MG: 325 TABLET ORAL at 06:00

## 2019-12-16 RX ADMIN — DABIGATRAN ETEXILATE MESYLATE 150 MG: 150 CAPSULE ORAL at 11:52

## 2019-12-16 RX ADMIN — DONEPEZIL HYDROCHLORIDE 10 MG: 10 TABLET, FILM COATED ORAL at 17:57

## 2019-12-16 RX ADMIN — MORPHINE SULFATE 2 MG: 2 INJECTION, SOLUTION INTRAMUSCULAR; INTRAVENOUS at 21:42

## 2019-12-16 RX ADMIN — CEFTRIAXONE 1000 MG: 1 INJECTION, POWDER, FOR SOLUTION INTRAMUSCULAR; INTRAVENOUS at 14:18

## 2019-12-16 RX ADMIN — OXYCODONE HYDROCHLORIDE 5 MG: 5 TABLET ORAL at 12:05

## 2019-12-16 RX ADMIN — DOCUSATE SODIUM 100 MG: 100 CAPSULE, LIQUID FILLED ORAL at 09:59

## 2019-12-16 RX ADMIN — CEFEPIME HYDROCHLORIDE 1000 MG: 1 INJECTION, POWDER, FOR SOLUTION INTRAMUSCULAR; INTRAVENOUS at 18:33

## 2019-12-16 RX ADMIN — DOCUSATE SODIUM 100 MG: 100 CAPSULE, LIQUID FILLED ORAL at 17:57

## 2019-12-16 RX ADMIN — PANTOPRAZOLE SODIUM 40 MG: 40 TABLET, DELAYED RELEASE ORAL at 06:00

## 2019-12-16 RX ADMIN — MEMANTINE 10 MG: 5 TABLET ORAL at 09:58

## 2019-12-16 RX ADMIN — DIVALPROEX SODIUM 125 MG: 125 CAPSULE, COATED PELLETS ORAL at 23:00

## 2019-12-16 RX ADMIN — OXYCODONE HYDROCHLORIDE 5 MG: 5 TABLET ORAL at 06:00

## 2019-12-16 NOTE — PLAN OF CARE
Problem: PHYSICAL THERAPY ADULT  Goal: Performs mobility at highest level of function for planned discharge setting  See evaluation for individualized goals  Description  Treatment/Interventions: Functional transfer training, LE strengthening/ROM, Elevations, Therapeutic exercise, Endurance training, Patient/family training, Bed mobility, Gait training, Spoke to nursing, Spoke to case management, OT, Family, Spoke to MD          See flowsheet documentation for full assessment, interventions and recommendations  Note:   Prognosis: Fair  Problem List: Decreased strength, Decreased endurance, Impaired balance, Decreased mobility, Decreased cognition, Impaired judgement, Decreased safety awareness, Pain, Orthopedic restrictions  Assessment: Pt  80 y  o female presented after unwitnessed fall at home on 12/14/19 2* to syncope & collapse  Pt c/o acute back pain  Pt admitted for Syncope and collapse w/ UTI & acute T12 compression fracture  Pt referred to PT for mobility assessment, TLSO fitting/aplication & D/C planning w/ orders of activity as tolerated  TLSO per Ortho  PTA, per son pt I w/ her ADL's & functional mobility w/o AD  Pt's lives alone in In-law suite/apartment attached to her son's house  Per son, pt is alone in her apartment but somebody is always home in son's house w/ cameras set-up in her apartment to monitor pt  On eval, pt demonstrate dec mobility, balance, endurance & amb  Pt require modAx2 for most functional mobility + cues for techniques  Gait deviations as above, slow & unsteady but no gross LOB noted  Dec amb tolerance 2* to pain & poor amb balance  TLSO applied once pt sitting at EOB  Good fitting achieved  Pt tolerating TLSO well  Pt confused & easily gets agitated  No dizziness reported t/o session  Nsg staff most recent vital signs as follows: /90 (BP Location: Right arm)   Pulse 80   Temp 98 4 °F (36 9 °C) (Tympanic)   Resp 18   SpO2 94%    At end of session, pt tolerated OOB in chair w/o issues, call bell & phone in reach  Family in room  Fall precautions reinforced w/ good understanding  Pt functioning below baseline hence will continue skilled PT to improve function & safety  At this time, due to above mentioned deficits & high risk for falls, pt will benefit from inpt rehab at D/C  However family prefers to take pt home w/ home PT/OT services + hospital bed, RW & BSC  CM to follow  Nsg staff to continue to mobilized pt (OOB in chair for all meals & ambulate in room/unit) as tolerated to prevent further decline in function  Nsg notified  Recommendation: Short-term skilled PT, Home PT, 24 hour supervision/assist, Home with family support(STR beneficial but pt's family prefers to take pt home )          See flowsheet documentation for full assessment

## 2019-12-16 NOTE — ASSESSMENT & PLAN NOTE
Alzheimer's dementia on memantine and donepezil    On quetiapine and melatonin for sleep during hospitalization

## 2019-12-16 NOTE — PHYSICAL THERAPY NOTE
PT EVALUATION    Pt  Name: Vickie Contreras  Pt  Age: 80 y o  MRN: [id number]  LENGTH OF STAY: 2    Patient Active Problem List   Diagnosis    History of stroke    Alzheimer disease ([facility] )    Acute lacunar stroke ([place] [address] )    Carotid artery calcification, bilateral    Hyperlipidemia    Hypertension    A-fib ([facility] )    Other headache syndrome    Chronic anticoagulation    Chronic UTI    Patient is Jewish    Rectal bleed    Central retinal artery occlusion of left eye    Stenosis of right vertebral artery    Syncope and collapse    Compression fracture of T12 vertebra (HCC)    Syncope       Admitting Diagnoses:   Syncope [R55]  UTI (urinary tract infection) [N39 0]  T12 compression fracture, initial encounter ([place] [address] ) [S22 080A]  Fall, initial encounter [W19  XXXA]    Past Medical History:   Diagnosis Date    A-fib ([place] [address] )     Alzheimer disease ([place] [address] )     Chronic UTI     Diverticulosis     Dyslipidemia     History of stroke     Hyperlipidemia     Hypertension     UTI (urinary tract infection)     Vasovagal syncope 2/13/2018       Past Surgical History:   Procedure Laterality Date    CHOLECYSTECTOMY         Imaging Studies:  CT head without contrast   Final Result by Kaylene Mccarty MD (12/14 1015)      No acute intracranial process or significant interval change  No skull fracture  Chronic microangiopathy  Workstation performed: [id number]         CT cervical spine without contrast   Final Result by Kaylene Mccarty MD (12/14 1010)      No cervical spine fracture or traumatic malalignment insofar as can be detected on this study with mild motion artifact  Workstation performed: [id number]         CT chest abdomen pelvis w contrast   Final Result by  (12/16 3528)   Addendum 1 of 1 by Kayleen Mccarty MD (12/14 0011)   ADDENDUM:      Incidentally noted moderate proximal third duodenal segment diverticulum        Final      CT recon only thoracolumbar (No Charge)   Final Result by Lia Lew MD (12/14 9365)      Mild acute to subacute compression fracture of T12 with 4 mm left posterior retropulsion  The examination demonstrates a significant  finding and was documented as such [facility] for liaison and referring practitioner notification  Workstation performed: [id number]              12/16/19 1203   Note Type   Note type Eval only   Pain Assessment   Pain Assessment FLACC   Pain Type Acute pain   Pain Location Back   Hospital Pain Intervention(s) Medication (See MAR); Heat applied;Repositioned; Ambulation/increased activity; Emotional support; Rest   Pain Rating: FLACC (Rest) - Face 0   Pain Rating: FLACC (Rest) - Legs 0   Pain Rating: FLACC (Rest) - Activity 0   Pain Rating: FLACC (Rest) - Cry 0   Pain Rating: FLACC (Rest) - Consolability 0   Score: FLACC (Rest) 0   Pain Rating: FLACC (Activity) - Face 2   Pain Rating: FLACC (Activity) - Legs 1   Pain Rating: FLACC (Activity) - Activity 1   Pain Rating: FLACC (Activity) - Cry 2   Pain Rating: FLACC (Activity) - Consolability 2   Score: FLACC (Activity) 8   Home Living   Type of Home Apartment  (In-law suite attached to son's house)   Home Layout One level;Stairs to enter with rails  (3-4STE to son's house but pt's apartment all in one level)   Bathroom Shower/Tub Walk-in shower   Home Equipment Other (Comment)  (no DME)   Prior Function   Level of [place] Independent with ADLs and functional mobility  (w/o AD)   Lives With [employer] Help From Family   ADL Assistance Independent   Falls in the last 6 months 1 to 4  (3-4x 2* to syncope)   Comments Per pt's son, pt mostly I w/ her ADL's & functional mobility w/o AD  Pt alone in her apartment but somebody is always home at son's house & that they have cameras set-up in pt's apartment to monitor pt      Restrictions/Precautions   Weight Bearing Precautions Per Order Yes   RLE Weight Bearing Per Order WBAT   LLE Weight Bearing Per Order WBAT   Braces or Orthoses TLSO   Other Precautions Cognitive; Chair Alarm; Bed Alarm; Fall Risk;Pain;Spinal precautions  (TLSO when OOB)   General   Additional Pertinent History (+) Alzheimer's   Family/Caregiver Present Yes  (son initial visit; daughter during eval)   Cognition   Overall Cognitive Status Impaired   Arousal/Participation Arousable   Orientation Level Oriented to person  (pt's gets agitated when questioned)   Following Commands Follows one step commands with increased time or repetition   RUE Assessment   RUE Assessment   (refer to OT)   LUE Assessment   LUE Assessment   (refer to OT)   RLE Assessment   RLE Assessment   (unable to assess, pt uncooperative & can get agitated)   LLE Assessment   LLE Assessment X  (unable to assess, pt uncooperative & can get agitated)   Bed Mobility   Supine to Sit 3  Moderate assistance   Additional items Assist x 2;HOB elevated; Bedrails; Increased time required;Verbal cues;LE management   Additional Comments cues for techniques   Transfers   Sit to Stand 3  Moderate assistance   Additional items Assist x 2; Increased time required;Verbal cues   Stand to Sit 3  Moderate assistance   Additional items Assist x 2; Increased time required;Verbal cues   Additional Comments cues for techniques & safety   Ambulation/Elevation   Gait pattern Decreased foot clearance;Shuffling; Short stride; Excessively slow  (unsteady)   Gait Assistance 3  Moderate assist   Additional items Assist x 2;Verbal cues; Tactile cues   Assistive Device Rolling walker; Other (Comment)  (TLSO)   Distance 4'x1   Balance   Static Sitting Fair -   Static Standing Poor +  (w/ RW)   Ambulatory Poor  (w/ RW)   Endurance Deficit   Endurance Deficit Yes   Endurance Deficit Description pain   Activity Tolerance   Activity Tolerance Patient limited by pain   Medical Staff Made Aware OTR Yesica   Nurse Made Aware RN Jeimy   Assessment   Prognosis Fair   Problem List Decreased strength;Decreased endurance; Impaired balance;Decreased mobility; Decreased cognition; Impaired judgement;Decreased safety awareness;Pain;Orthopedic restrictions   Assessment Pt  80 y  o female presented after unwitnessed fall at home on 12/14/19 2* to syncope & collapse  Pt c/o acute back pain  Pt admitted for Syncope and collapse w/ UTI & acute T12 compression fracture  Pt referred to PT for mobility assessment, TLSO fitting/aplication & D/C planning w/ orders of activity as tolerated  TLSO per Ortho  PTA, per son pt I w/ her ADL's & functional mobility w/o AD  Pt's lives alone in In-law suite/apartment attached to her son's house  Per son, pt is alone in her apartment but somebody is always home in son's house w/ cameras set-up in her apartment to monitor pt  On eval, pt demonstrate dec mobility, balance, endurance & amb  Pt require modAx2 for most functional mobility + cues for techniques  Gait deviations as above, slow & unsteady but no gross LOB noted  Dec amb tolerance 2* to pain & poor amb balance  TLSO applied once pt sitting at EOB  Good fitting achieved  Pt tolerating TLSO well  Pt confused & easily gets agitated  No dizziness reported t/o session  Nsg staff most recent vital signs as follows: /90 (BP Location: Right arm)   Pulse 80   Temp 98 4 °F (36 9 °C) (Tympanic)   Resp 18   SpO2 94%   At end of session, pt tolerated OOB in chair w/o issues, call bell & phone in reach  Family in room  Fall precautions reinforced w/ good understanding  Pt functioning below baseline hence will continue skilled PT to improve function & safety  At this time, due to above mentioned deficits & high risk for falls, pt will benefit from inpt rehab at D/C  However family prefers to take pt home w/ home PT/OT services + hospital bed, RW & BSC  CM to follow  Nsg staff to continue to mobilized pt (OOB in chair for all meals & ambulate in room/unit) as tolerated to prevent further decline in function  Nsg notified      Goals   Patient Goals pt's daughter reports goals is to take pt home w/ home PT/OT services   STG Expiration Date 12/30/19   Short Term Goal #1 Goals to be met in 14 days; pt will be able to: 1) inc strength & balance by 1/2 grade to improve overall functional mobility & dec fall risk; 2) inc bed mobility to minAx1 for pt to be able to get in/OOB safely w/ proper techniques 100% of the time, to dec caregiver assistance & safely function at home; 3) inc transfers to minAx1 for pt to transition safely from one surface to another w/o % of the time, to dec caregiver assistance & safely function at home; 4) inc amb w/ RW approx  150' w/ minAx1 for pt to ambulate household distances w/o any % of the time, to dec caregiver assistance & safely function at home; 5) inc barthel score to 50 to decrease overall risk for falls; 6) negotiate stairs w/ minAx1 for inc safety during stair mgt inside/outside of home & dec caregiver assistance; 7) pt/caregiver ed   PT Treatment Day 0   Plan   Treatment/Interventions Functional transfer training;LE strengthening/ROM; Elevations; Therapeutic exercise; Endurance training;Patient/family training;Bed mobility;Gait training;Spoke to nursing;Spoke to case management;OT;Family;Spoke to MD   PT Frequency Other (Comment)  (3-5x/wk)   Recommendation   Recommendation Short-term skilled PT; Home PT;24 hour supervision/assist;Home with family support  (STR beneficial but pt's family prefers to take pt home )   Barthel Index   Feeding 5   Bathing 0   Grooming Score 0   Dressing Score 0   Bladder Score 5   Bowels Score 10   Toilet Use Score 5   Transfers (Bed/Chair) Score 5   Mobility (Level Surface) Score 0   Stairs Score 0   Barthel Index Score 30   Hx/personal factors: co-morbidities, advanced age, use of AD, dec cognition, pain, h/o of falls, fall risk, assist w/ ADL's and TLSO, spinal precautions  Examination: dec mobility, dec balance, dec endurance, dec amb, high fall risk, pain, dec cognition  Clinical: unpredictable (ongoing medical status, abnormal lab values, high fall risk and pain mgt)  Complexity: high     Porfirio Carbon, PT

## 2019-12-16 NOTE — SOCIAL WORK
CM met with pt and son at bedside to discuss discharge needs  CM also briefly spoke with pt's dtr via phone during assessment  She asked I come to the room tomorrow around 11AM to speak with her  CM got some basic information from pt's son whom she lives with  She is currently living in a house  ADL's are completed independently with no DME use  PCP identified as Dr Deric Horner  Pharmacy identified as [facility]  Family organizes medications for pt at home  Pt has hx of STR at [facility] "years ago"  POA identified as pt's dtr, Jocelyne Gregory ([phone])  Dtr reports that she would have her mother come live with her at D/C  PT/OT have not yet evaluated pt; refused to work with them today  Hoping when Jocelyne Gregory comes in she can encouarge pt to participate  CM will discuss further discharge planning with dtr tomorrow

## 2019-12-16 NOTE — OCCUPATIONAL THERAPY NOTE
[address]  Evaluation     Patient Name: Vickie Contreras  Today's Date: 12/16/2019  Problem List  Principal Problem:    Syncope and collapse  Active Problems:    Alzheimer disease ([facility] )    Hypertension    A-fib (HCC)    Chronic UTI    Compression fracture of T12 vertebra (HCC)    Syncope    Past Medical History  Past Medical History:   Diagnosis Date    A-fib ([facility] )     Alzheimer disease ([facility] )     Chronic UTI     Diverticulosis     Dyslipidemia     History of stroke     Hyperlipidemia     Hypertension     UTI (urinary tract infection)     Vasovagal syncope 2/13/2018     Past Surgical History  Past Surgical History:   Procedure Laterality Date    CHOLECYSTECTOMY               12/16/19 1640   Note Type   Note type Eval/Treat   Restrictions/Precautions   Weight Bearing Precautions Per Order Yes   RLE Weight Bearing Per Order WBAT   LLE Weight Bearing Per Order WBAT   Braces or Orthoses TLSO   Other Precautions Chair Alarm;Cognitive; Bed Alarm;Multiple lines; Fall Risk;Pain   Pain Assessment   Pain Assessment FLACC   Pain Type Acute pain   Pain Location [facility]   Hospital Pain Intervention(s) Ambulation/increased activity;Repositioned; Emotional support   Response to Interventions tolerated   Pain Rating: FLACC (Rest) - Face 1   Pain Rating: FLACC (Rest) - Legs 2   Pain Rating: FLACC (Rest) - Activity 1   Pain Rating: FLACC (Rest) - Cry 1   Pain Rating: FLACC (Rest) - Consolability 2   Score: FLACC (Rest) 7   Pain Rating: FLACC (Activity) - Face 1   Pain Rating: FLACC (Activity) - Legs 2   Pain Rating: FLACC (Activity) - Activity 1   Pain Rating: FLACC (Activity) - Cry 1   Pain Rating: FLACC (Activity) - Consolability 2   Score: FLACC (Activity) 7   Home Living   Type of Home Apartment  (in law suite attached to son's home)   Home Layout One level  (3-4 steps)   Bathroom Shower/Tub Walk-in shower   Bathroom Toilet Standard   Bathroom Equipment   (no DMe)   Bathroom Accessibility Accessible   Home Equipment (no DME)   Additional Comments pt lives in law suite attached to son's home and someone is always home checking on her and has cameras in the apt, pt supervision for ADLs and mobility w/ no AD; pt poor historian and family providing history   Prior Function   Level of [place] Independent with ADLs and functional mobility; Needs assistance with IADLs   Lives With Son;Family  (in law suite)   [address] Help From Family   ADL Assistance Independent   IADLs Needs assistance   Falls in the last 6 months 1 to 4  (syncopal)   Vocational Retired   Comments pt assist for transportation, pt has meals provided to her; pt one daughter reports her other daughter will be staying w/ her upon d/c   Lifestyle   Autonomy per pt son pt supervision w/ ADLs, supervision w/ functional transfers and mobility w/ no AD, assist w/ IADLs and transportation   Reciprocal Relationships son and daughters   Service to Others    Intrinsic Gratification watch tv   Subjective   Subjective "My back"   ADL   Where Assessed Chair   Eating Assistance 5  Supervision/Setup   Grooming Assistance 4  Minimal Assistance   UB Bathing Assistance 3  Moderate Assistance   LB [address] 2  Maximal [address] 3  Moderate Assistance   LB Dressing Assistance 2  Maximal [address]  2  Maximal Assistance   Bed Mobility   Supine to Sit 3  Moderate assistance   Additional items Assist x 2; Increased time required;Verbal cues;LE management; Bedrails;HOB elevated  (log rolling)   Additional Comments increased time to complete, w/ MAX cues of encouragement from therapists and pt daughters to participate   Transfers   Sit to Stand 3  Moderate assistance   Additional items Assist x 2; Increased time required;Verbal cues;Armrests   Stand to Sit 3  Moderate assistance   Additional items Assist x 2; Increased time required;Verbal cues;Armrests   Additional Comments VCs for hand placement and positioning, encouragement needed to participate   Functional Mobility   Functional Mobility 3  Moderate assistance   Additional Comments assist x2 w/ increased time and sequencing and RW safety   Additional items Rolling walker   Balance   Static Sitting Fair -   Dynamic Sitting Poor +   Static Standing Poor +   Dynamic Standing Poor   Ambulatory Poor   Activity Tolerance   Activity Tolerance Patient limited by pain; Patient limited by fatigue;Treatment limited secondary to agitation   Nurse Made Aware appropriate to see per Jeimy MIMS   RUTHOMAS Assessment   RUE Assessment WFL  (grossly 3+/5; unable to complete MMT 2* agitation)   LUE Assessment   LUE Assessment WFL  (grossly 3+/5; unable to complete MMT 2* agitation)   Sensation   Light Touch Not tested   Vision-Basic Assessment   Current Vision No visual deficits   Vision - Complex Assessment   Ocular Range of Motion WFL   Cognition   Overall Cognitive Status Impaired   Arousal/Participation Responsive   Attention Difficulty attending to directions   Orientation Level Oriented to person;Disoriented to place; Disoriented to time;Disoriented to situation  (pt agitated w/ questioning)   Memory Decreased short term memory;Decreased recall of recent events;Decreased recall of precautions   Following Commands Follows one step commands with increased time or repetition   Comments pt agitated and requiring encouragement to participate; pt swinging  at therapists while sitting up EOB, daughter present and able to assist w/ calming pt down; pt w/ Alzheimer's Dementia at baseline   Assessment   Limitation Decreased ADL status; Decreased Safe judgement during ADL;Decreased UE strength;Decreased endurance;Decreased cognition;Decreased sensation;Decreased self-care trans;Decreased high-level ADLs; Decreased fine motor control   Prognosis Guarded   Assessment Pt is a 80 y o  female seen for OT evaluation s/p admit to [facility] on 12/14/2019 w/ Syncope and collapse resulting in T 12 compression fracture and TLSO recommended for OOB and WBAT b/l LEs  Comorbidities affecting pt's functional performance at time of assessment include: chronic UTI, a-fib, Alzheimer's Dementia, chronic headache, dyslipidemia  Personal factors affecting pt at time of IE include:pt poor historian and agitated pt son provided history  Prior to admission, pt was living in an inlaw apt attached to son's home and family has cameras to watch her and someone is available to assist her  Pt supervision for ADLs, supervision functional mobility w/ no AD, assist w/ IADLs  Upon evaluation: Pt requires MOD assist x2 log rolling techniques (increased time to complete and encouragement to participate), MOD assist x2 sit<>stand w/ VCs for hand placement and positioning, MOD assist x2 functional mobility w/ RW and increased time, MAX assist LB ADLs, MIN-MOD assist UB ADLs, MAX assist toileting 2* the following deficits impacting occupational performance:  Increased pain, decreased strength and endurance, impaired balance, impaired activity tolerance, impaired cognition, agitation requires MAX encouragement from therapists and family to participate, spinal precautions, increased pain  Pt to benefit from continued skilled OT tx while in the hospital to address deficits as defined above and maximize level of functional independence w ADL's and functional mobility  Occupational Performance areas to address include: grooming, bathing/shower, toilet hygiene, dressing, health maintenance, functional mobility and clothing management, back safety education  From OT standpoint, recommendation at time of d/c would be short term rehab; however pt family prefers to take her home w/ HOME OT/PT and one daughter is to take her to her house to stay  Pt will need hospital bed, RW, BSC  Educated BSC can be used in shower, family receptive      Goals   Patient Goals pt daughter reports goal is to take her home w/ therapy   LTG Time Frame 10-14   Long Term Goal please see below goals   Plan   Treatment Interventions ADL retraining;UE strengthening/ROM; Functional transfer training; Endurance training;Cognitive reorientation;Patient/family training;Equipment evaluation/education; Compensatory technique education; Energy conservation; Activityengagement   Goal Expiration Date 12/30/19   OT Frequency 2-3x/wk   Recommendation   Equipment Recommended Tub seat with back; Bedside commode  (Rolling walker, hospital bed)   OT - OK to Discharge   (when medically stable)   Barthel Index   Feeding 5   Bathing 0   Grooming Score 0   Dressing Score 0   Bladder Score 5   Bowels Score 10   Toilet Use Score 5   Transfers (Bed/Chair) Score 5   Mobility (Level Surface) Score 0   Stairs Score 0   Barthel Index Score 30   Modified Lindsay Scale   Modified Lindsay Scale 4     Occupational Therapy Goals to be met in 10-14 days:  1) Pt will improve activity tolerance to F for 20 min txment sessions to enhance ADLs  2) Pt will complete ADLs/self care w/ min assist while maintaining spinal precautions  3) Pt will complete toileting w/ min assist w/ G hygiene/thoroughness using DME PRN  4) Pt will improve functional transfers on/off all surfaces using DME PRN w/ G balance/safety including toileting w/ min assist  5) Pt will improve fx'l mobility during I/ADl/leisure tasks using DME PRN w/ g balance/safety w/ min assist  6) Pt will engage in ongoing cognitive assessment w/ G participation to A w/ safe d/c planning/recommendations  7) Pt will demonstrate G carryover of pt/caregiver education and training as appropriate w/ mod I  w/ G tolerance  8) Pt will engage in depression screen/leisure interest checklist w/ G participation to monitor s/s depression and ID 3 positive coping strategies to A w/ emotional regulation and management  9) Pt will demonstrate 100% carryover of E C  techniques w/ mod I t/o fx'l I/ADL/leisure tasks w/o cues s/p skilled education  10) Pt will tolerate bed mobility and EOB seated tasks w/ min A  to engage in fx'l I/ADL/leisure tasks w/ min A w/ min cues w/ log rolling techniques  11) Pt will demonstrate 100% carryover of LHAE for LB ADLs/self care and leisure s/p skilled education w/ mod I and G participation  12) Pt will demonstrate improved b/l UE strength by 1 MMT grade to enhance ADLS and functional transfers     Documentation completed by: Khoi Peter, MS, OTR/L

## 2019-12-16 NOTE — PHYSICAL THERAPY NOTE
PHYSICAL THERAPY NOTE          Patient Name: Dion Nixon  [id number] Date: 12/16/2019     PT order received  Chart reviewed  Attempted PT eval however pt strongly refusing to participate despite max encouragement from staff & pt's son  After gathering info re: home set-up & PLOF &  trying to convince pt for approximately 10mins, pt's son requested to come back around 4pm when his sister/ pt's daughter comes as she can help to convince pt to participate in PT/OT evals  Rn notified  Will continue to follow as appropriate   Julien Spann, PT

## 2019-12-16 NOTE — OCCUPATIONAL THERAPY NOTE
Occupational Therapy Cancellation Note        Patient Name: Moses Botello  Today's Date: 12/16/2019      OT consult received  Attempted to see pt for OT evaluation and spoke to son and obtained history and attempted to have pt complete evaluation and pt becoming agitated and son requested therapy to come back after 4pm when his sister will be there and that she listens to his sister better than him  Will re-attempt       Georgie Shaw MS, OTR/L

## 2019-12-16 NOTE — ASSESSMENT & PLAN NOTE
T12 compression fracture  To be evaluated by PT/OT for further recommendations  TLSO brace has been placed

## 2019-12-16 NOTE — ASSESSMENT & PLAN NOTE
Syncope and collapse with history of CVA, dementia, chronic UTI  Seen by Neurology with possibility of seizure episode  Has been started on depakote by neurology

## 2019-12-16 NOTE — CONSULTS
Orthopaedic Surgery - Consultation  Osvaldo Smith (88 y o  female)   : 1933   MRN: [id number]  Date: 2019   Encounter: [id number]   Unit/Bed#: E2 -01    Consulting Physician:  Orlando Braga PA-C  Requesting Physician: Jesus Manuel Douglass DO  Reason for Consult / Principal Problem:  T12 compression fracture    Assessment / Plan  T12 compression fracture    · Continue with Neurosurgery recommendations of TLSO brace  · PT/OT with weight-bearing as tolerated on bilateral lower extremities  · Continue with ice and analgesics as needed  · ORTHO signing off at this time, plan outpatient follow-up with Neurosurgery, ortho spine, or pain management as outpatient    History of Present Illness  Osvaldo Smith is a 80 y o  female who presents to [facility] after unwitnessed fall at home on 2019  Patient does have dementia and the patient's daughter is in the room providing history  She states that since fall the patient has had pain in her midback when trying to move around  She is denying any change in weakness or sensation of her legs at this time  She denies any previous back issues  Since admission to the hospital patient has not been up removing around  Upon admission neurosurgery was consulted and reviewed her CT scans recommending TLSO brace and outpatient follow-up  Review of Systems  Negative for chest pain and shortness of breath    Medical, Surgical, Family, and Social History    Past Medical History:   Diagnosis Date    A-fib [facility])     [facility] disease ([facility] [address] )     Chronic UTI     Diverticulosis     Dyslipidemia     History of stroke     Hyperlipidemia     Hypertension     UTI (urinary tract infection)     Vasovagal syncope 2018       Past Surgical History:   Procedure Laterality Date    CHOLECYSTECTOMY         History reviewed  No pertinent family history      Social History     Occupational History    Not on file   Tobacco Use    Smoking status: Former Smoker    Smokeless tobacco: Never Used   Substance and Sexual Activity    Alcohol use: No    Drug use: No    Sexual activity: Not on file       Allergies   Allergen Reactions    Ativan [Lorazepam]     Latuda [Lurasidone]        Current Facility-Administered Medications   Medication Dose Route Frequency    acetaminophen (TYLENOL) tablet 650 mg  650 mg Oral Q6H [address]    acetaminophen (TYLENOL) tablet 650 mg  650 mg Oral Q12H PRN    atorvastatin (LIPITOR) tablet 20 mg  20 mg Oral Daily With Dinner    ceftriaxone (ROCEPHIN) 1 g/50 mL in dextrose IVPB  1,000 mg Intravenous Q24H    dabigatran etexilate (PRADAXA) capsule 150 mg  150 mg Oral Q12H [address]    divalproex sodium (DEPAKOTE SPRINKLE) capsule 125 mg  125 mg Oral TID    docusate sodium (COLACE) capsule 100 mg  100 mg Oral BID    donepezil (ARICEPT) tablet 10 mg  10 mg Oral HS    melatonin tablet 6 mg  6 mg Oral HS    memantine (NAMENDA) tablet 10 mg  10 mg Oral BID    oxyCODONE (ROXICODONE) IR tablet 5 mg  5 mg Oral Q6H PRN    pantoprazole (PROTONIX) EC tablet 40 mg  40 mg Oral Daily Before Breakfast    polyethylene glycol (MIRALAX) packet 17 g  17 g Oral Daily PRN    QUEtiapine (SEROquel) tablet 25 mg  25 mg Oral HS     Medications Prior to Admission   Medication    atorvastatin (LIPITOR) 20 mg tablet    cephalexin (KEFLEX) 250 mg capsule    dabigatran etexilate (PRADAXA) 150 mg capsu    donepezil (ARICEPT) 10 mg tablet    memantine (NAMENDA) 10 mg tablet    pantoprazole (PROTONIX) 40 mg tablet    BUTALBITAL-ACETAMINOPHEN PO    QUEtiapine (SEROquel) 25 mg tablet       Vitals  Temp:  [97 7 °F (36 5 °C)-98 °F (36 7 °C)] 97 9 °F (36 6 °C)  HR:  [80-95] 84  Resp:  [18-20] 18  BP: (127-148)/(66-97) 135/97  There is no height or weight on file to calculate BMI  I/O last 24 hours:   In: 240 [P O :240]  Out: 150 [Urine:150]    Physical Exam  General:  Alert & oriented x3, no distress, appears stated age  [de-identified]:  EOMI, eyes clear, hearing intact, mucous membranes moist  Neck:  Supple, non-tender, trachea midline, no lymphadenopathy  Cardiovascular:  Regular rate, no discernable arrhythmia  Pulmonary:  Regular rate, respirations non-labored   Abdominal:  Soft, non-tender    Spine Exam  Alignment:  Normal lumbar alignment  Normal resting hip posture  Normal knee alignment  Inspection:  No edema  No erythema  No ecchymosis  No deformity  Palpation:  Moderate tenderness at The mid aspect of the back on the thoracic spine into the paraspinal musculature  ROM:  Normal hip ROM  Normal knee ROM  Normal ankle ROM  Strength:  5/5 hip flexors and abductors  5/5 quadriceps and hamstrings  5/5 AT, GSC, PT, and peroneals  Tests:  No pertinent positive or negative tests  Neurovascular:  Sensation intact in DP/SP/Kennedy/Sa/T nerve distributions  Sensation intact in all digital nerve distributions  Toes warm and perfused  Gait:  Not tested  Imaging  I have personally reviewed pertinent films in PACS  CT of Thoracic and lumbar Spine - Show mild acute to subacute compression fracture of T12 with 4 mm left posterior retropulsion per radiology    There is also noted multiple-level degenerative disc disease    Lab Results  (I have personally reviewed pertinent lab results )  Results from last 7 days   Lab Units 12/15/19  0617 12/14/19  0910 12/14/19  0905   WBC Thousand/uL 10 03  --  11 14*   HEMOGLOBIN g/dL 10 3*  --  11 0*   I STAT HEMOGLOBIN g/dl  --  11 6  --    HEMATOCRIT % 33 0*  --  35 3   HEMATOCRIT, ISTAT %  --  34*  --    PLATELETS Thousands/uL 230  --  255   RBC Million/uL 3 65*  --  3 92   MCV fL 90  --  90   MCH pg 28 2  --  28 1   MCHC g/dL 31 2*  --  31 2*   RDW % 13 5  --  13 4   MPV fL 10 6  --  10 6   NRBC AUTO /100 WBCs 0  --  0     Results from last 7 days   Lab Units 12/14/19  0905   PTT seconds 50*   INR  1 49*     Results from last 7 days   Lab Units 12/15/19  0617 12/14/19  0910 12/14/19  0905   POTASSIUM mmol/L 4 0  --  3 7   CHLORIDE mmol/L 107  --  106   CO2 mmol/L 26  --  26   CO2, I-STAT mmol/L  --  22  --    BUN mg/dL 21  --  15   CREATININE mg/dL 1 03  --  0 88   EGFR ml/min/1 73sq m 49 67 60   CALCIUM mg/dL 8 6  --  8 1*   ALT U/L  --   --  17   AST U/L  --   --  17   GLUCOSE, ISTAT mg/dl  --  131  --          Results from last 7 days   Lab Units 12/14/19  1344 12/14/19  0942   BLOOD CULTURE  No Growth at 24 hrs  No Growth at 24 hrs   --    URINE CULTURE   --  >100,000 cfu/ml Gram Negative Talon Enteric Like*         Code Status  Level 3 - DNAR and DNI    Counseling / Coordination of Care  Total floor / unit time spent today 20 minutes  Greater than 50% of total time was spent with the patient and / or family counseling and / or coordination of care      Torrie Sharif PA-C

## 2019-12-16 NOTE — PROGRESS NOTES
Progress Note - Cheryl Lopez 1933, 80 y o  female MRN: [id number]    Unit/Bed#: E2 -01 Encounter: [id number]    Primary Care Provider: Brenda Alvarez,    Date and time admitted to hospital: 12/14/2019  8:41 AM        * Syncope and collapse  Assessment & Plan  Syncope and collapse with history of CVA, dementia, chronic UTI  Seen by Neurology with possibility of seizure episode  Has been started on depakote by neurology  Compression fracture of T12 vertebra (HCC)  Assessment & Plan  T12 compression fracture  To be evaluated by PT/OT for further recommendations  TLSO brace has been placed  Chronic UTI  Assessment & Plan  Chronic UTI on cephalexin however current urine culture citrobacter with multiple resistances  A-fib [facility])  Assessment & Plan  Paroxysmal atrial fibrillations  Continue pradaxa    Alzheimer disease [facility])  Assessment & Plan  Alzheimer's dementia on memantine and donepezil  On quetiapine and melatonin for sleep during hospitalization      VTE Pharmacologic Prophylaxis: Dabigatran (Pradaxa)    Patient Centered Rounds: I have performed bedside rounds with nursing staff today  Discussions with Specialists or Other Care Team Provider:  Case management  Education and Discussions with Family / Patient:  Daughter    Time Spent for Care: 25 mins  More than 50% of total time spent on counseling and coordination of care as described above  Current Length of Stay: 2 day(s)  Current Patient Status: Inpatient     Certification Statement: The patient will continue to require additional inpatient hospital stay due to Syncope and collapse  Discharge Plan / Estimated Discharge Date:     Code Status: Level 3 - DNAR and DNI  ______________________________________________________________________________    Subjective:   Patient seen and examined    Continues to complain of back pain    Objective:   Vitals: Blood pressure 156/90, pulse 80, temperature 98 4 °F (36 9 °C), temperature source Tympanic, resp  rate 18, SpO2 94 %  Physical Exam:   General appearance: alert, appears stated age and cooperative  Head: Normocephalic, without obvious abnormality, atraumatic  Lungs: diminished breath sounds  Heart: regular rate and rhythm  Abdomen: soft, non-tender, positive bowel sounds   Back: TLSO brace in place  Extremities: no ulcers, gangrene or trophic changes  Neurologic:  Pleasantly demented    Additional Data:   Labs:  Results from last 7 days   Lab Units 12/15/19  0617 12/14/19  0910 12/14/19  0905   WBC Thousand/uL 10 03  --  11 14*   HEMOGLOBIN g/dL 10 3*  --  11 0*   I STAT HEMOGLOBIN g/dl  --  11 6  --    HEMATOCRIT % 33 0*  --  35 3   HEMATOCRIT, ISTAT %  --  34*  --    MCV fL 90  --  90   PLATELETS Thousands/uL 230  --  255   INR   --   --  1 49*     Results from last 7 days   Lab Units 12/15/19  0617 12/14/19  0910 12/14/19  0905   SODIUM mmol/L 143  --  141   POTASSIUM mmol/L 4 0  --  3 7   CHLORIDE mmol/L 107  --  106   CO2 mmol/L 26  --  26   CO2, I-STAT mmol/L  --  22  --    AGAP mmol/L  --  17*  --    ANION GAP mmol/L 10  --  9   BUN mg/dL 21  --  15   CREATININE mg/dL 1 03  --  0 88   CALCIUM mg/dL 8 6  --  8 1*   ALBUMIN g/dL  --   --  3 1*   TOTAL BILIRUBIN mg/dL  --   --  0 30   ALK PHOS U/L  --   --  77   ALT U/L  --   --  17   AST U/L  --   --  17   EGFR ml/min/1 73sq m 49 67 60   GLUCOSE RANDOM mg/dL 112  --  136         Results from last 7 days   Lab Units 12/14/19  0905   TROPONIN I ng/mL <0 02                          * I Have Reviewed All Lab Data Listed Above  Cultures:   Results from last 7 days   Lab Units 12/14/19  1344 12/14/19  0942   BLOOD CULTURE  No Growth at 24 hrs    No Growth at 24 hrs   --    URINE CULTURE   --  >100,000 cfu/ml Citrobacter freundii*             Imaging:  Imaging Reports Reviewed Today Include:       Procedure: Ct Recon Only Thoracolumbar (no Charge)  Result Date: 12/14/2019  Impression: Mild acute to subacute compression fracture of T12 with 4 mm left posterior retropulsion  The examination demonstrates a significant  finding and was documented as such in Epic for liaison and referring practitioner notification  Workstation performed: [id number]     Scheduled Meds:  Current Facility-Administered Medications:  acetaminophen 650 mg Oral Q6H [address] Felipe GARCÍA MD    acetaminophen 650 mg Oral Q12H PRN Felipe GARCÍA MD    atorvastatin 20 mg Oral Daily With [employer] MD RAQUEL    cefTRIAXone 1,000 mg Intravenous Q24H Felipe GARCÍA MD Last Rate: 1,000 mg (12/16/19 1418)   dabigatran etexilate 150 mg Oral Q12H [address] Felipe GARCÍA MD    divalproex sodium 125 mg Oral TID Kalia Villegas DO    docusate sodium 100 mg Oral BID Felipe GARCÍA MD    donepezil 10 mg Oral HS Felipe Beard V, MD    melatonin 6 mg Oral HS Felipe GARCÍA MD    memantine 10 mg Oral BID Felipe GARCÍA MD    oxyCODONE 5 mg Oral Q6H PRN Felipe GARCÍA MD    pantoprazole 40 mg Oral Daily Before Breakfast Felipe GARCÍA MD    polyethylene glycol 17 g Oral Daily PRN Anita Guadalupe MD    QUEtiapine 25 mg Oral HS DO Ponce Haley DO  [facility] Internal Medicine  Hospitalist    ** Please Note: This note has been constructed using a voice recognition system   **

## 2019-12-16 NOTE — UTILIZATION REVIEW
Initial Clinical Review    Admission: Date/Time/Statement: Inpatient Admission Orders (From admission, onward)     Ordered        12/14/19 1301  Inpatient Admission (expected length of stay for this patient Order details is greater than two midnights)  Once                   Orders Placed This Encounter   Procedures    Inpatient Admission (expected length of stay for this patient Order details is greater than two midnights)     Standing Status:   Standing     Number of Occurrences:   1     Order Specific Question:   Admitting Physician     Answer:   April Gautam     Order Specific Question:   Level of Care     Answer:   Med Surg [16]     Order Specific Question:   Estimated length of stay     Answer:   More than 2 Midnights     Order Specific Question:   Certification     Answer:   I certify that inpatient services are medically necessary for this patient for a duration of greater than two midnights  See H&P and MD Progress Notes for additional information about the patient's course of treatment  ED Arrival Information     Expected Arrival Acuity Means of Arrival Escorted By Service Admission Type    - 12/14/2019 08:40 [facility]    Arrival Complaint    syncope        Chief Complaint   Patient presents with   Melissa Picking     Pt's son saw pt laying on floor  Per ems, pt was unresponsive on arrival and had vomited  Pt continued to vomit after their arrival  Pt's airway cleared  Pt complaining of lower back pain  Hx of dementia  Assessment/Plan: 80  Y O  Female  Presents to ED  From home  Via  EMS after an  Unwitnessed syncopal episode at home  Daughters   Cindy Peed  A noise, went to check on  Her,  And   Noted   frothy  Sputum   In  Mouth  And urinary incontinence  On waking, patient  Was at  Baseline, did vomit  X 1  Daughters  Unsure  About  Duration  Of  LOC,  Feel it  Was  A few minutes   Had  A  Recent  Admission  For syncope and  Collapse, no  Clear etiology  Identified  Per  Daughters,   Patient's  Neurologist  Was to start  Her on  antiseizure  Medicine,  Has not  Thus  Far  On exam, patient  Appears  In  excrutiating    Back pain  Ct  Scan of  T spine  Revealed compression  Fracture  T 12  Additional PMH  Is   Multiple  CVA, Afib  On  Pradaxa, Alzheimers  Disease, hypertension and chronic  UTI  On  Keflex  Admit   Ip with  Compression  Fracture  T 12 vertebra  And plan is   Neurosurgery consult, pain control,  Fall  Precautions, PT/OT  And back brace  Per  Neurosurgery  Consult:   Patient with apparent TLICS 1 T 12  Fraacture  Recommend   Back brace  And  Non surgical care  Per  Neurology  Consult:     Presents with  Recurrent  Syncopal episodes  Multiple  Etiologies  To consider  Plan  To initiate  Anticonvulsants and   EEG        ED Triage Vitals [12/14/19 0912]   Temperature Pulse Respirations Blood Pressure SpO2   97 6 °F (36 4 °C) (!) 107 22 90/60 95 %      Temp Source Heart Rate Source Patient Position - Orthostatic VS BP Location FiO2 (%)   Temporal Monitor Lying Right arm --      Pain Score       Worst Possible Pain        Wt Readings from Last 1 Encounters:   08/07/19 68 kg (150 lb)     Additional Vital Signs:   /15/19 1500  98 °F (36 7 °C)  80  18  127/66  91 %  None (Room air)  Lying   12/15/19 0746  98 1 °F (36 7 °C)  100  18  130/80  92 %  None (Room air)  Lying   12/14/19 2300  98 4 °F (36 9 °C)  105  18  174/82Abnormal   90 %  None (Room air)  Lying   12/14/19 1703  97 9 °F (36 6 °C)  79  19  168/74  98 %  Nasal cannula  Lying   12/14/19 1638    81  20  170/84  93 %  Nasal cannula  Lying   12/14/19 1525    77  20  173/72Abnormal   93 %  Nasal cannula  Lying   12/14/19 1344    77  22  120/70  97 %  Nasal cannula  Lying   12/14/19 1034    120Abnormal   22  120/78  97 %  Nasal cannula   Lying   O2 Device: 2L at 12/14/19 1034   12/14/19 0945    110Abnormal   22  130/90  95 %  None (Room air)  Lying   12/14/19 0912  97 6 °F (36 4 °C)  107Abnormal   22  90/60  95 %  None (Room air)  Lying         Pertinent Labs/Diagnostic Test Results:     Ct   Head  ( 12/14)    No acute intracranial abnormality  Ct  C/spine  ( 12/14)      No fracture  Ct  Chest/abd  ( 12/14)      Dependent hypoventilatory changes in the lower lobes  Otherwise, no evidence of acute thoracic process  COPD  Mild cardiomegaly with three-vessel coronary artery calcification       Small sliding hiatal hernia  Acute to subacute mild compression fracture of T12 with 4 mm left posterior retropulsion  Otherwise, no evidence of acute abdominopelvic process  Colonic diverticulosis  2 5 cm left renal interpolar parapelvic simple cyst   Ct  T/spine/l/s  Spine  (  12/14)      Mild acute to subacute compression fracture of T12 with 4 mm left posterior retropulsion      EKG    Afib  With    RVR  Results from last 7 days   Lab Units 12/15/19  0617 12/14/19  0910 12/14/19  0905   WBC Thousand/uL 10 03  --  11 14*   HEMOGLOBIN g/dL 10 3*  --  11 0*   I STAT HEMOGLOBIN g/dl  --  11 6  --    HEMATOCRIT % 33 0*  --  35 3   HEMATOCRIT, ISTAT %  --  34*  --    PLATELETS Thousands/uL 230  --  255   NEUTROS ABS Thousands/µL 7 41  --  7 26         Results from last 7 days   Lab Units 12/15/19  0617 12/14/19  0910 12/14/19  0905   SODIUM mmol/L 143  --  141   POTASSIUM mmol/L 4 0  --  3 7   CHLORIDE mmol/L 107  --  106   CO2 mmol/L 26  --  26   CO2, I-STAT mmol/L  --  22  --    AGAP mmol/L  --  17*  --    ANION GAP mmol/L 10  --  9   BUN mg/dL 21  --  15   CREATININE mg/dL 1 03  --  0 88   EGFR ml/min/1 73sq m 49 67 60   CALCIUM mg/dL 8 6  --  8 1*   CALCIUM, IONIZED, ISTAT mmol/L  --  1 10*  --      Results from last 7 days   Lab Units 12/14/19  0905   AST U/L 17   ALT U/L 17   ALK PHOS U/L 77   TOTAL PROTEIN g/dL 6 1*   ALBUMIN g/dL 3 1*   TOTAL BILIRUBIN mg/dL 0 30         Results from last 7 days   Lab Units 12/15/19  0617 12/14/19  0905   GLUCOSE RANDOM mg/dL 112 136 Results from last 7 days   Lab Units 12/14/19  0905   TROPONIN I ng/mL <0 02         Results from last 7 days   Lab Units 12/14/19  0905   PROTIME seconds 18 3*   INR  1 49*   PTT seconds 50*           Results from last 7 days   Lab Units 12/14/19  0942   CLARITY UA  Clear   COLOR UA  Yellow   SPEC GRAV UA  1 015   PH UA  8 0   GLUCOSE UA mg/dl Negative   KETONES UA mg/dl Negative   BLOOD UA  Trace*   PROTEIN UA mg/dl Negative   NITRITE UA  Negative   BILIRUBIN UA  Negative   UROBILINOGEN UA E U /dl 0 2   LEUKOCYTES UA  Small*   WBC UA /hpf 10-20*   RBC UA /hpf None Seen   BACTERIA UA /hpf Moderate*   EPITHELIAL CELLS WET PREP /hpf Occasional           Results from last 7 days   Lab Units 12/14/19  1344 12/14/19  0942   BLOOD CULTURE  No Growth at 24 hrs    No Growth at 24 hrs   --    URINE CULTURE   --  >100,000 cfu/ml Citrobacter freundii*               ED Treatment:   Medication Administration from 12/14/2019 0840 to 12/14/2019 1653       Date/Time Order Dose Route Action Comments     12/14/2019 0933 sodium chloride 0 9 % bolus 1,000 mL 0 mL Intravenous Stopped      12/14/2019 0905 sodium chloride 0 9 % bolus 1,000 mL 1,000 mL Intravenous New Bag      12/14/2019 0937 iodixanol (VISIPAQUE) 320 MG/ML injection 100 mL 100 mL Intravenous Given      12/14/2019 0945 fentanyl citrate (PF) 100 MCG/2ML 50 mcg 50 mcg Intravenous Given      12/14/2019 1035 morphine injection 2 mg 2 mg Intravenous Given      12/14/2019 1418 ceftriaxone (ROCEPHIN) 1 g/50 mL in dextrose IVPB 0 mg Intravenous Stopped      12/14/2019 1348 ceftriaxone (ROCEPHIN) 1 g/50 mL in dextrose IVPB 1,000 mg Intravenous New Bag      12/14/2019 1348 morphine injection 2 mg 2 mg Intravenous Given      12/14/2019 1624 ketorolac (TORADOL) injection 15 mg 15 mg Intravenous Given         Present on Admission:   [facility] [address] )   [facility]   Chronic UTI   Hypertension   Syncope and collapse      Admitting Diagnosis: Syncope [R55]  UTI (urinary tract infection) [N39 0]  [facility] [Z70 246N]  Fall, initial encounter [W19  XXXA]  Age/Sex: 80 y o  female  Admission Orders:  Scheduled Medications:    Medications:  acetaminophen 650 mg Oral Q6H [address]   atorvastatin 20 mg Oral Daily With Dinner   cefTRIAXone 1,000 mg Intravenous Q24H   dabigatran etexilate 150 mg Oral Q12H [address]   divalproex sodium 125 mg Oral TID   docusate sodium 100 mg Oral BID   donepezil 10 mg Oral HS   melatonin 6 mg Oral HS   memantine 10 mg Oral BID   pantoprazole 40 mg Oral Daily Before Breakfast   QUEtiapine 25 mg Oral HS     Continuous IV Infusions:     PRN Meds:    acetaminophen 650 mg Oral Q12H PRN   oxyCODONE 5 mg Oral Q6H PRN   polyethylene glycol 17 g Oral Daily PRN       IP CONSULT TO NEUROLOGY  IP CONSULT TO ORTHOPEDIC SURGERY     Fall precautions  1:1   Observation  Seizure precautions  Spine  Brace  EEG    Network Utilization Review Department  [email or web address]  ATTENTION: Please call with any questions or concerns to [phone] and carefully listen to the prompts so that you are directed to the right person  All voicemails are confidential   Chrystine Can all requests for admission clinical reviews, approved or denied determinations and any other requests to dedicated fax number below belonging to the campus where the patient is receiving treatment  List of dedicated fax numbers for the Facilities:  FACILITY NAME UR FAX NUMBER   ADMISSION DENIALS (Administrative/Medical Necessity) [phone]   [address] (Maternity/NICU/Pediatrics) [phone]   Kishore Congress [phone]   Stevan Ortiz [phone]   Kris Salcedo [phone]   [address] [facility] [address] [facility] [phone]   [address] [phone]     [address] [phone]

## 2019-12-16 NOTE — NURSING NOTE
Patient was able to ambulate with rolling walker from bed to bathroom  Sit on edge of bed and dangle  Was up in chair for about 30 minutes but could not tolerate  Now resting in bed  Urine sample came back as having MDRO  Shreya from [facility] contacted for correct isolation precautions      CM contacted for possible hospital bed, commode, and walker for home per family request

## 2019-12-16 NOTE — PROGRESS NOTES
Progress Note - Neurology   Ryan Mathew 80 y o  female MRN: [id number]  Unit/Bed#: E2 -01 Encounter: [id number]    Assessment/ Plan:  1)  Recurrent syncopal episodes - of unclear etiology, but complex partial seizure is not ruled out  If discussion with patient's daughter, will initiate Depakote trial to assist in both potential seizure, behavioral stabilization, and chronic headache in patient with advanced dementia   -Depakote titration as follows:    -125 mg t i d  x2 weeks then    -250 mg t i d  x2 weeks then    -375 mg t i d , recommend valproic acid level at that point and monitor tolerance   -routine EEG pending   -Pt may follow up with PCP, per daughter's request will defer Neurology apt as out of home appointments are agitating to patient    -No additional acute neurologic recommendations at this time, please call with questions      2)  Urinary tract infection   -UA with greater than 100,000 gram negative rods   -on Rocephin per primary team    Subjective:   Patient is an 26-year-old female with dementia and recurrent syncopal episodes  On prior admissions, these syncopal episodes have been thought to be secondary to bradycardia, UTI, or vasovagal events  However, there was an event in October, 2017 wherein the patient also demonstrated transient right hemiparesis following the episode, unexplained by patient's prior right punctate frontal CVA  Patient additionally has history of behavioral disturbance could chronic headache  Due to this, neurology recommended empiric trial of Depakote, with gentle titration schedule as above      On exam today,    ROS:  See subjective     Medications:  Scheduled Meds:  Current Facility-Administered Medications:  acetaminophen 650 mg Oral Q6H [address] Felipe GARCÍA MD    acetaminophen 650 mg Oral Q12H PRN Felipe GARCÍA MD    atorvastatin 20 mg Oral Daily With Dinner Zbigniew GARCÍA MD    cefTRIAXone 1,000 mg Intravenous Q24H Nicholas Pearson MD Last Rate: 1,000 mg (12/15/19 1331)   dabigatran etexilate 150 mg Oral Q12H [address] Felipe GARCÍA MD    divalproex sodium 125 mg Oral TID Kalia Villegas DO    docusate sodium 100 mg Oral BID Felipe Beard V, MD    donepezil 10 mg Oral HS Felipe Beard V, MD    melatonin 6 mg Oral HS Felipe GARCÍA MD    memantine 10 mg Oral BID Felipe GARCÍA MD    oxyCODONE 5 mg Oral Q6H PRN Felipe Beard V, MD    pantoprazole 40 mg Oral Daily Before Breakfast Felipe GARCÍA MD    polyethylene glycol 17 g Oral Daily PRN Felipe Beard V, MD    QUEtiapine 25 mg Oral HS Willis Osorio DO      Continuous Infusions:   PRN Meds: acetaminophen    oxyCODONE    polyethylene glycol      Vitals: Blood pressure 135/97, pulse 84, temperature 97 9 °F (36 6 °C), temperature source Tympanic, resp  rate 18, SpO2 91 %  ,There is no height or weight on file to calculate BMI  Physical Exam:   Physical Exam   Constitutional: She appears well-developed and well-nourished  No distress  HENT:   Head: Normocephalic and atraumatic  Right Ear: External ear normal    Left Ear: External ear normal    Mouth/Throat: Oropharynx is clear and moist  No oropharyngeal exudate  Eyes: Conjunctivae are normal  Right eye exhibits no discharge  Left eye exhibits no discharge  No scleral icterus  Neck: Normal range of motion  Neck supple  Pulmonary/Chest: Effort normal  No respiratory distress  Musculoskeletal: Normal range of motion  She exhibits no edema, tenderness or deformity  Neurological: She is alert  No cranial nerve deficit or sensory deficit  She exhibits normal muscle tone  Skin: Skin is warm and dry  No rash noted  She is not diaphoretic  No erythema  No pallor  Nursing note and vitals reviewed       Neurologic Exam     Mental Status   Level of consciousness: alert    Cranial Nerves     CN II-XII grossly intact      Motor Exam   Moves all extremities equally and spontaneously      Sensory Exam   Light touch normal        Lab, Imaging and other studies: I have personally reviewed pertinent reports  I have personally reviewed pertinent imaging in PACs  No results found for this or any previous visit (from the past 24 hour(s)) ]    VTE Prophylaxis: Sequential compression device (Venodyne)     Counseling / Coordination of Care  Total time spent today 30 minutes  Greater than 50% of total time was spent with the patient and / or family counseling and / or coordination of care  A description of the counseling / coordination of care: ,  The pt was seen and examined by myself and the attending physician  The chart was reviewed thoroughly, including laboratory values and imaging studies  The pt was counseled in the room

## 2019-12-16 NOTE — PLAN OF CARE
Problem: OCCUPATIONAL THERAPY ADULT  Goal: Performs self-care activities at highest level of function for planned discharge setting  See evaluation for individualized goals  Description  Treatment Interventions: ADL retraining, UE strengthening/ROM, Functional transfer training, Endurance training, Cognitive reorientation, Patient/family training, Equipment evaluation/education, Compensatory technique education, Energy conservation, Activityengagement  Equipment Recommended: Tub seat with back, Bedside commode(Rolling walker, hospital bed)       See flowsheet documentation for full assessment, interventions and recommendations  Note:   Limitation: Decreased ADL status, Decreased Safe judgement during ADL, Decreased UE strength, Decreased endurance, Decreased cognition, Decreased sensation, Decreased self-care trans, Decreased high-level ADLs, Decreased fine motor control  Prognosis: Guarded  Assessment: Pt is a 80 y o  female seen for OT evaluation s/p admit to [facility] on 12/14/2019 w/ Syncope and collapse resulting in T 12 compression fracture and TLSO recommended for OOB and WBAT b/l LEs  Comorbidities affecting pt's functional performance at time of assessment include: chronic UTI, a-fib, Alzheimer's Dementia, chronic headache, dyslipidemia  Personal factors affecting pt at time of IE include:pt poor historian and agitated pt son provided history  Prior to admission, pt was living in an inlaw apt attached to son's home and family has cameras to watch her and someone is available to assist her  Pt supervision for ADLs, supervision functional mobility w/ no AD, assist w/ IADLs   Upon evaluation: Pt requires MOD assist x2 log rolling techniques (increased time to complete and encouragement to participate), MOD assist x2 sit<>stand w/ VCs for hand placement and positioning, MOD assist x2 functional mobility w/ RW and increased time, MAX assist LB ADLs, MIN-MOD assist UB ADLs, MAX assist toileting 2* the following deficits impacting occupational performance:  Increased pain, decreased strength and endurance, impaired balance, impaired activity tolerance, impaired cognition, agitation requires MAX encouragement from therapists and family to participate, spinal precautions, increased pain  Pt to benefit from continued skilled OT tx while in the hospital to address deficits as defined above and maximize level of functional independence w ADL's and functional mobility  Occupational Performance areas to address include: grooming, bathing/shower, toilet hygiene, dressing, health maintenance, functional mobility and clothing management, back safety education  From OT standpoint, recommendation at time of d/c would be short term rehab; however pt family prefers to take her home w/ HOME OT/PT and one daughter is to take her to her house to stay  Pt will need hospital bed, RW, BSC  Educated BSC can be used in shower, family receptive           OT - OK to Discharge: (when medically stable)

## 2019-12-17 PROCEDURE — 99232 SBSQ HOSP IP/OBS MODERATE 35: CPT | Performed by: INTERNAL MEDICINE

## 2019-12-17 RX ORDER — OLANZAPINE 10 MG/1
2.5 INJECTION, POWDER, LYOPHILIZED, FOR SOLUTION INTRAMUSCULAR 3 TIMES DAILY PRN
Status: DISCONTINUED | OUTPATIENT
Start: 2019-12-17 | End: 2019-12-27 | Stop reason: HOSPADM

## 2019-12-17 RX ORDER — LANOLIN ALCOHOL/MO/W.PET/CERES
6 CREAM (GRAM) TOPICAL
Status: DISCONTINUED | OUTPATIENT
Start: 2019-12-17 | End: 2019-12-27 | Stop reason: HOSPADM

## 2019-12-17 RX ORDER — QUETIAPINE FUMARATE 25 MG/1
25 TABLET, FILM COATED ORAL
Status: DISCONTINUED | OUTPATIENT
Start: 2019-12-17 | End: 2019-12-27 | Stop reason: HOSPADM

## 2019-12-17 RX ADMIN — QUETIAPINE FUMARATE 25 MG: 25 TABLET ORAL at 00:00

## 2019-12-17 RX ADMIN — MEMANTINE 10 MG: 5 TABLET ORAL at 20:07

## 2019-12-17 RX ADMIN — DONEPEZIL HYDROCHLORIDE 10 MG: 10 TABLET, FILM COATED ORAL at 17:55

## 2019-12-17 RX ADMIN — DIVALPROEX SODIUM 125 MG: 125 CAPSULE, COATED PELLETS ORAL at 17:00

## 2019-12-17 RX ADMIN — DABIGATRAN ETEXILATE MESYLATE 150 MG: 150 CAPSULE ORAL at 10:35

## 2019-12-17 RX ADMIN — ACETAMINOPHEN 650 MG: 325 TABLET ORAL at 17:56

## 2019-12-17 RX ADMIN — DIVALPROEX SODIUM 125 MG: 125 CAPSULE, COATED PELLETS ORAL at 20:08

## 2019-12-17 RX ADMIN — CEFEPIME HYDROCHLORIDE 1000 MG: 1 INJECTION, POWDER, FOR SOLUTION INTRAMUSCULAR; INTRAVENOUS at 05:19

## 2019-12-17 RX ADMIN — ACETAMINOPHEN 650 MG: 325 TABLET ORAL at 12:09

## 2019-12-17 RX ADMIN — OXYCODONE HYDROCHLORIDE 2.5 MG: 5 TABLET ORAL at 12:16

## 2019-12-17 RX ADMIN — QUETIAPINE FUMARATE 25 MG: 25 TABLET ORAL at 20:06

## 2019-12-17 RX ADMIN — DOCUSATE SODIUM 100 MG: 100 CAPSULE, LIQUID FILLED ORAL at 10:34

## 2019-12-17 RX ADMIN — OXYCODONE HYDROCHLORIDE 2.5 MG: 5 TABLET ORAL at 04:02

## 2019-12-17 RX ADMIN — PANTOPRAZOLE SODIUM 40 MG: 40 TABLET, DELAYED RELEASE ORAL at 10:43

## 2019-12-17 RX ADMIN — MEMANTINE 10 MG: 5 TABLET ORAL at 10:34

## 2019-12-17 RX ADMIN — ATORVASTATIN CALCIUM 20 MG: 20 TABLET, FILM COATED ORAL at 17:00

## 2019-12-17 RX ADMIN — CEFEPIME HYDROCHLORIDE 1000 MG: 1 INJECTION, POWDER, FOR SOLUTION INTRAMUSCULAR; INTRAVENOUS at 17:56

## 2019-12-17 RX ADMIN — MELATONIN TAB 3 MG 6 MG: 3 TAB at 20:08

## 2019-12-17 RX ADMIN — DABIGATRAN ETEXILATE MESYLATE 150 MG: 150 CAPSULE ORAL at 20:06

## 2019-12-17 RX ADMIN — DOCUSATE SODIUM 100 MG: 100 CAPSULE, LIQUID FILLED ORAL at 17:55

## 2019-12-17 RX ADMIN — ACETAMINOPHEN 650 MG: 325 TABLET ORAL at 11:58

## 2019-12-17 RX ADMIN — DIVALPROEX SODIUM 125 MG: 125 CAPSULE, COATED PELLETS ORAL at 10:34

## 2019-12-17 RX ADMIN — DIVALPROEX SODIUM 125 MG: 125 CAPSULE, COATED PELLETS ORAL at 10:35

## 2019-12-17 NOTE — ASSESSMENT & PLAN NOTE
Chronic UTI on cephalexin however current urine culture citrobacter with multiple resistances    Continue cefepime for now

## 2019-12-17 NOTE — ASSESSMENT & PLAN NOTE
Syncope and collapse with history of CVA, dementia, chronic UTI  Seen by neurology with possibility of seizure episode  Has been started on depakote by neurology    EEG negative

## 2019-12-17 NOTE — PROGRESS NOTES
Progress Note - Osvaldo Smith 1933, 80 y o  female MRN: [id number]    Unit/Bed#: E2 -01 Encounter: [id number]    Primary Care Provider: Orlin Hughes DO   Date and time admitted to hospital: 12/14/2019  8:41 AM        * Syncope and collapse  Assessment & Plan  Syncope and collapse with history of CVA, dementia, chronic UTI  Seen by neurology with possibility of seizure episode  Has been started on depakote by neurology  EEG negative    Compression fracture of T12 vertebra (HCC)  Assessment & Plan  T12 compression fracture  To be evaluated by PT/OT for further recommendations  TLSO brace has been placed  Chronic UTI  Assessment & Plan  Chronic UTI on cephalexin however current urine culture citrobacter with multiple resistances  Continue cefepime for now    A-fib [facility])  Assessment & Plan  Paroxysmal atrial fibrillation  Continue pradaxa    Alzheimer disease [facility])  Assessment & Plan  Alzheimer's dementia on memantine and donepezil  On quetiapine and melatonin for sleep during hospitalization      VTE Pharmacologic Prophylaxis: Dabigatran (Pradaxa)    Patient Centered Rounds: I have performed bedside rounds with nursing staff today  Discussions with Specialists or Other Care Team Provider:  Case management  Education and Discussions with Family / Patient:  Son and daughter    Time Spent for Care: 25 mins  More than 50% of total time spent on counseling and coordination of care as described above  Current Length of Stay: 3 day(s)  Current Patient Status: Inpatient     Certification Statement: The patient will continue to require additional inpatient hospital stay due to Syncope and collapse  Discharge Plan / Estimated Discharge Date:     Code Status: Level 3 - DNAR and DNI  ______________________________________________________________________________    Subjective:   Patient seen and examined  Agitated last night    Delirious today     Objective:   Vitals: Blood pressure 160/90, pulse 78, temperature 98 5 °F (36 9 °C), temperature source Tympanic, resp  rate 18, SpO2 94 %  Physical Exam:   General appearance: alert and cooperative  Head: atraumatic  Lungs: diminished breath sounds  Heart: regular rate and rhythm  Abdomen: soft, non-tender, positive bowel sounds   Back: negative, range of motion normal  Extremities: extremities atraumatic, no cyanosis or edema  Neurologic:  Demented    Additional Data:   Labs:  Results from last 7 days   Lab Units 12/15/19  0617 12/14/19  0910 12/14/19  0905   WBC Thousand/uL 10 03  --  11 14*   HEMOGLOBIN g/dL 10 3*  --  11 0*   I STAT HEMOGLOBIN g/dl  --  11 6  --    HEMATOCRIT % 33 0*  --  35 3   HEMATOCRIT, ISTAT %  --  34*  --    MCV fL 90  --  90   PLATELETS Thousands/uL 230  --  255   INR   --   --  1 49*     Results from last 7 days   Lab Units 12/15/19  0617 12/14/19  0910 12/14/19  0905   SODIUM mmol/L 143  --  141   POTASSIUM mmol/L 4 0  --  3 7   CHLORIDE mmol/L 107  --  106   CO2 mmol/L 26  --  26   CO2, I-STAT mmol/L  --  22  --    AGAP mmol/L  --  17*  --    ANION GAP mmol/L 10  --  9   BUN mg/dL 21  --  15   CREATININE mg/dL 1 03  --  0 88   CALCIUM mg/dL 8 6  --  8 1*   ALBUMIN g/dL  --   --  3 1*   TOTAL BILIRUBIN mg/dL  --   --  0 30   ALK PHOS U/L  --   --  77   ALT U/L  --   --  17   AST U/L  --   --  17   EGFR ml/min/1 73sq m 49 67 60   GLUCOSE RANDOM mg/dL 112  --  136         Results from last 7 days   Lab Units 12/14/19  0905   TROPONIN I ng/mL <0 02                          * I Have Reviewed All Lab Data Listed Above  Cultures:   Results from last 7 days   Lab Units 12/14/19  1344 12/14/19  0942   BLOOD CULTURE  No Growth at 48 hrs  No Growth at 48 hrs  --    URINE CULTURE   --  >100,000 cfu/ml Citrobacter freundii*             Imaging:  Imaging Reports Reviewed Today Include:   No results found    Scheduled Meds:  Current Facility-Administered Medications:  acetaminophen 650 mg Oral Q6H [address] Felipe GARCÍA MD    acetaminophen 650 mg Oral Q12H PRN Ramiro Fonseca MD    atorvastatin 20 mg Oral Daily With [employer] Sourav GARCÍA MD    cefepime 1,000 mg Intravenous Q12H Juan Ornelas,  Last Rate: 1,000 mg (12/17/19 0226)   dabigatran etexilate 150 mg Oral Q12H [address] Felipe GARCÍA MD    divalproex sodium 125 mg Oral TID Kalia Villegas,     docusate sodium 100 mg Oral BID Felipe GARCÍA MD    donepezil 10 mg Oral HS Felipe Beard V, MD    melatonin 6 mg Oral HS Felipe GARCÍA MD    memantine 10 mg Oral BID Felipe GARCÍA MD    oxyCODONE 2 5 mg Oral Q4H PRN Juan Ornelas,     pantoprazole 40 mg Oral Daily Before Breakfast Felipe GARCÍA MD    polyethylene glycol 17 g Oral Daily PRN Ramiro Fonseca MD    QUEtiapine 25 mg Oral HS Annette Whelan, DO Serena Churchill,   [facility]  Hospitalist    ** Please Note: This note has been constructed using a voice recognition system   **

## 2019-12-18 LAB
ALBUMIN SERPL BCP-MCNC: 2.8 G/DL (ref 3.5–5)
ALP SERPL-CCNC: 73 U/L (ref 46–116)
ALT SERPL W P-5'-P-CCNC: 14 U/L (ref 12–78)
ANION GAP SERPL CALCULATED.3IONS-SCNC: 10 MMOL/L (ref 4–13)
AST SERPL W P-5'-P-CCNC: 14 U/L (ref 5–45)
BILIRUB SERPL-MCNC: 0.29 MG/DL (ref 0.2–1)
BUN SERPL-MCNC: 26 MG/DL (ref 5–25)
CALCIUM SERPL-MCNC: 8.3 MG/DL (ref 8.3–10.1)
CHLORIDE SERPL-SCNC: 104 MMOL/L (ref 100–108)
CO2 SERPL-SCNC: 26 MMOL/L (ref 21–32)
CREAT SERPL-MCNC: 0.87 MG/DL (ref 0.6–1.3)
ERYTHROCYTE [DISTWIDTH] IN BLOOD BY AUTOMATED COUNT: 13.6 % (ref 11.6–15.1)
GFR SERPL CREATININE-BSD FRML MDRD: 61 ML/MIN/1.73SQ M
GLUCOSE SERPL-MCNC: 118 MG/DL (ref 65–140)
HCT VFR BLD AUTO: 32 % (ref 34.8–46.1)
HGB BLD-MCNC: 10.2 G/DL (ref 11.5–15.4)
MCH RBC QN AUTO: 28.4 PG (ref 26.8–34.3)
MCHC RBC AUTO-ENTMCNC: 31.9 G/DL (ref 31.4–37.4)
MCV RBC AUTO: 89 FL (ref 82–98)
PLATELET # BLD AUTO: 222 THOUSANDS/UL (ref 149–390)
PMV BLD AUTO: 10.9 FL (ref 8.9–12.7)
POTASSIUM SERPL-SCNC: 3.7 MMOL/L (ref 3.5–5.3)
PROT SERPL-MCNC: 6.3 G/DL (ref 6.4–8.2)
RBC # BLD AUTO: 3.59 MILLION/UL (ref 3.81–5.12)
SODIUM SERPL-SCNC: 140 MMOL/L (ref 136–145)
WBC # BLD AUTO: 7.07 THOUSAND/UL (ref 4.31–10.16)

## 2019-12-18 PROCEDURE — 80053 COMPREHEN METABOLIC PANEL: CPT | Performed by: INTERNAL MEDICINE

## 2019-12-18 PROCEDURE — 97530 THERAPEUTIC ACTIVITIES: CPT

## 2019-12-18 PROCEDURE — 97763 ORTHC/PROSTC MGMT SBSQ ENC: CPT

## 2019-12-18 PROCEDURE — 85027 COMPLETE CBC AUTOMATED: CPT | Performed by: INTERNAL MEDICINE

## 2019-12-18 PROCEDURE — 97116 GAIT TRAINING THERAPY: CPT

## 2019-12-18 PROCEDURE — 99232 SBSQ HOSP IP/OBS MODERATE 35: CPT | Performed by: INTERNAL MEDICINE

## 2019-12-18 PROCEDURE — 97535 SELF CARE MNGMENT TRAINING: CPT

## 2019-12-18 RX ADMIN — MEMANTINE 10 MG: 5 TABLET ORAL at 20:38

## 2019-12-18 RX ADMIN — OXYCODONE HYDROCHLORIDE 2.5 MG: 5 TABLET ORAL at 20:38

## 2019-12-18 RX ADMIN — ACETAMINOPHEN 650 MG: 325 TABLET ORAL at 12:02

## 2019-12-18 RX ADMIN — DIVALPROEX SODIUM 125 MG: 125 CAPSULE, COATED PELLETS ORAL at 17:03

## 2019-12-18 RX ADMIN — DABIGATRAN ETEXILATE MESYLATE 150 MG: 150 CAPSULE ORAL at 08:28

## 2019-12-18 RX ADMIN — CEFEPIME HYDROCHLORIDE 1000 MG: 1 INJECTION, POWDER, FOR SOLUTION INTRAMUSCULAR; INTRAVENOUS at 17:37

## 2019-12-18 RX ADMIN — OXYCODONE HYDROCHLORIDE 2.5 MG: 5 TABLET ORAL at 16:00

## 2019-12-18 RX ADMIN — DOCUSATE SODIUM 100 MG: 100 CAPSULE, LIQUID FILLED ORAL at 08:28

## 2019-12-18 RX ADMIN — DIVALPROEX SODIUM 125 MG: 125 CAPSULE, COATED PELLETS ORAL at 20:37

## 2019-12-18 RX ADMIN — PANTOPRAZOLE SODIUM 40 MG: 40 TABLET, DELAYED RELEASE ORAL at 05:58

## 2019-12-18 RX ADMIN — DOCUSATE SODIUM 100 MG: 100 CAPSULE, LIQUID FILLED ORAL at 17:37

## 2019-12-18 RX ADMIN — DONEPEZIL HYDROCHLORIDE 10 MG: 10 TABLET, FILM COATED ORAL at 17:39

## 2019-12-18 RX ADMIN — ACETAMINOPHEN 650 MG: 325 TABLET ORAL at 05:58

## 2019-12-18 RX ADMIN — ATORVASTATIN CALCIUM 20 MG: 20 TABLET, FILM COATED ORAL at 17:37

## 2019-12-18 RX ADMIN — QUETIAPINE FUMARATE 25 MG: 25 TABLET ORAL at 20:38

## 2019-12-18 RX ADMIN — MEMANTINE 10 MG: 5 TABLET ORAL at 08:28

## 2019-12-18 RX ADMIN — DIVALPROEX SODIUM 125 MG: 125 CAPSULE, COATED PELLETS ORAL at 08:29

## 2019-12-18 RX ADMIN — MELATONIN TAB 3 MG 6 MG: 3 TAB at 20:38

## 2019-12-18 RX ADMIN — DABIGATRAN ETEXILATE MESYLATE 150 MG: 150 CAPSULE ORAL at 20:38

## 2019-12-18 RX ADMIN — CEFEPIME HYDROCHLORIDE 1000 MG: 1 INJECTION, POWDER, FOR SOLUTION INTRAMUSCULAR; INTRAVENOUS at 05:58

## 2019-12-18 RX ADMIN — ACETAMINOPHEN 650 MG: 325 TABLET ORAL at 17:39

## 2019-12-18 NOTE — PLAN OF CARE
Problem: Potential for Falls  Goal: Patient will remain free of falls  Description  INTERVENTIONS:  - Assess patient frequently for physical needs  -  Identify cognitive and physical deficits and behaviors that affect risk of falls    -  Blanket fall precautions as indicated by assessment   - Educate patient/family on patient safety including physical limitations  - Instruct patient to call for assistance with activity based on assessment  - Modify environment to reduce risk of injury  - Consider OT/PT consult to assist with strengthening/mobility  Outcome: Progressing     Problem: Prexisting or High Potential for Compromised Skin Integrity  Goal: Skin integrity is maintained or improved  Description  INTERVENTIONS:  - Identify patients at risk for skin breakdown  - Assess and monitor skin integrity  - Assess and monitor nutrition and hydration status  - Monitor labs   - Assess for incontinence   - Turn and reposition patient  - Assist with mobility/ambulation  - Relieve pressure over bony prominences  - Avoid friction and shearing  - Provide appropriate hygiene as needed including keeping skin clean and dry  - Evaluate need for skin moisturizer/barrier cream  - Collaborate with interdisciplinary team   - Patient/family teaching  - Consider wound care consult   Outcome: Progressing     Problem: PAIN - ADULT  Goal: Verbalizes/displays adequate comfort level or baseline comfort level  Description  Interventions:  - Encourage patient to monitor pain and request assistance  - Assess pain using appropriate pain scale  - Administer analgesics based on type and severity of pain and evaluate response  - Implement non-pharmacological measures as appropriate and evaluate response  - Consider cultural and social influences on pain and pain management  - Notify physician/advanced practitioner if interventions unsuccessful or patient reports new pain  Outcome: Progressing     Problem: INFECTION - ADULT  Goal: Absence or prevention of progression during hospitalization  Description  INTERVENTIONS:  - Assess and monitor for signs and symptoms of infection  - Monitor lab/diagnostic results  - Monitor all insertion sites, i e  indwelling lines, tubes, and drains  - Monitor endotracheal if appropriate and nasal secretions for changes in amount and color  - [place] appropriate cooling/warming therapies per order  - Administer medications as ordered  - Instruct and encourage patient and family to use good hand hygiene technique  - Identify and instruct in appropriate isolation precautions for identified infection/condition  Outcome: Progressing  Goal: Absence of fever/infection during neutropenic period  Description  INTERVENTIONS:  - Monitor WBC    Outcome: Progressing     Problem: SAFETY ADULT  Goal: Maintain or return to baseline ADL function  Description  INTERVENTIONS:  -  Assess patient's ability to carry out ADLs; assess patient's baseline for ADL function and identify physical deficits which impact ability to perform ADLs (bathing, care of mouth/teeth, toileting, grooming, dressing, etc )  - Assess/evaluate cause of self-care deficits   - Assess range of motion  - Assess patient's mobility; develop plan if impaired  - Assess patient's need for assistive devices and provide as appropriate  - Encourage maximum independence but intervene and supervise when necessary  - Involve family in performance of ADLs  - Assess for home care needs following discharge   - Consider OT consult to assist with ADL evaluation and planning for discharge  - Provide patient education as appropriate  Outcome: Progressing  Goal: Maintain or return mobility status to optimal level  Description  INTERVENTIONS:  - Assess patient's baseline mobility status (ambulation, transfers, stairs, etc )    - Identify cognitive and physical deficits and behaviors that affect mobility  - Identify mobility aids required to assist with transfers and/or ambulation (gait belt, sit-to-stand, lift, walker, cane, etc )  - [place] fall precautions as indicated by assessment  - Record patient progress and toleration of activity level on Mobility SBAR; progress patient to next Phase/Stage  - Instruct patient to call for assistance with activity based on assessment  - Consider rehabilitation consult to assist with strengthening/weightbearing, etc   Outcome: Progressing     Problem: DISCHARGE PLANNING  Goal: Discharge to home or other facility with appropriate resources  Description  INTERVENTIONS:  - Identify barriers to discharge w/patient and caregiver  - Arrange for needed discharge resources and transportation as appropriate  - Identify discharge learning needs (meds, wound care, etc )  - Arrange for interpretive services to assist at discharge as needed  - Refer to Case Management Department for coordinating discharge planning if the patient needs post-hospital services based on physician/advanced practitioner order or complex needs related to functional status, cognitive ability, or social support system  Outcome: Progressing     Problem: Knowledge Deficit  Goal: Patient/family/caregiver demonstrates understanding of disease process, treatment plan, medications, and discharge instructions  Description  Complete learning assessment and assess knowledge base  Interventions:  - Provide teaching at level of understanding  - Provide teaching via preferred learning methods  Outcome: Progressing     Problem: Nutrition/Hydration-ADULT  Goal: Nutrient/Hydration intake appropriate for improving, restoring or maintaining nutritional needs  Description  Monitor and assess patient's nutrition/hydration status for malnutrition  Collaborate with interdisciplinary team and initiate plan and interventions as ordered  Monitor patient's weight and dietary intake as ordered or per policy  Utilize nutrition screening tool and intervene as necessary   Determine patient's food preferences and provide high-protein, high-caloric foods as appropriate       INTERVENTIONS:  - Monitor oral intake, urinary output, labs, and treatment plans  - Assess nutrition and hydration status and recommend course of action  - Evaluate amount of meals eaten  - Assist patient with eating if necessary   - Allow adequate time for meals  - Recommend/ encourage appropriate diets, oral nutritional supplements, and vitamin/mineral supplements  - Order, calculate, and assess calorie counts as needed  - Recommend, monitor, and adjust tube feedings and TPN/PPN based on assessed needs  - Assess need for intravenous fluids  - Provide specific nutrition/hydration education as appropriate  - Include patient/family/caregiver in decisions related to nutrition  Outcome: Progressing

## 2019-12-18 NOTE — ASSESSMENT & PLAN NOTE
Acute on chronic UTI on cephalexin however current urine culture citrobacter with multiple resistances    Continue cefepime for now x3 days    Results from last 7 days   Lab Units 12/14/19  0942   URINE CULTURE  >100,000 cfu/ml Citrobacter freundii*

## 2019-12-18 NOTE — PLAN OF CARE
Problem: PHYSICAL THERAPY ADULT  Goal: Performs mobility at highest level of function for planned discharge setting  See evaluation for individualized goals  Description  Treatment/Interventions: Functional transfer training, LE strengthening/ROM, Elevations, Therapeutic exercise, Endurance training, Patient/family training, Bed mobility, Gait training, Spoke to nursing, Spoke to case management, OT, Family, Spoke to MD          See flowsheet documentation for full assessment, interventions and recommendations  Outcome: Progressing  Note:   Prognosis: Fair  Problem List: Decreased strength, Decreased range of motion, Decreased endurance, Impaired balance, Decreased mobility, Decreased coordination, Impaired judgement, Decreased cognition, Decreased safety awareness, Pain, Orthopedic restrictions  Assessment: Seen for progression of PT  Upon entry dtr expressing concerns as brace appears to create more pain vs helping  Clarification with Manoj Carnes [facility] with ortho that brace ordered for comfort and ok to modify to assist with pain management  Transitioned TLSO to LSO for OOB mobility  Pt continues with increased pain and difficulty consoling at times  Continues to require max A of 2 for bed mobiltiy  Is dependent to don/doff LSO, modA of 2 for transfers and ambulation using RW support  A for RW management needed to improve fluency in gait and increase distance  Family education completed at bedside with dtr regarding brace use/wear and purpose of same  Answered all immediate questions  Current recommendation is STR prior to home given patients need for Ax2  Daughter goal to care for pt in her home upon d/c when appropriate  Prefers to have her home over STR, however verbalizes understanding pt may require rhb if continues to require Ax2 for all cares  Will follow and progress in order to optimize functional outcomes    Barriers to Discharge: Inaccessible home environment     Recommendation: Short-term skilled PT     PT - OK to Discharge: (yes to rhb, NO if to home )    See flowsheet documentation for full assessment

## 2019-12-18 NOTE — OCCUPATIONAL THERAPY NOTE
Occupational Therapy Treatment Note:         12/18/19 1048   Restrictions/Precautions   Weight Bearing Precautions Per Order Yes   RLE Weight Bearing Per Order WBAT   LLE Weight Bearing Per Order WBAT   Braces or Orthoses TLSO   Other Precautions Cognitive; Bed Alarm; Chair Alarm   Pain Assessment   Pain Assessment FLACC   Pain Score 5   Pain Type Acute pain   Pain Location Back   Pain Orientation Lower   Pain Rating: FLACC (Rest) - Face 0   Pain Rating: FLACC (Rest) - Legs 0   Pain Rating: FLACC (Rest) - Activity 0   Pain Rating: FLACC (Rest) - Cry 0   Pain Rating: FLACC (Rest) - Consolability 0   Score: FLACC (Rest) 0   Pain Rating: FLACC (Activity) - Face 1   Pain Rating: FLACC (Activity) - Legs 1   Pain Rating: FLACC (Activity) - Activity 1   Pain Rating: FLACC (Activity) - Cry 1   Pain Rating: FLACC (Activity) - Consolability 1   Score: FLACC (Activity) 5   ADL   Where Assessed Edge of bed   Grooming Assistance Unable to assess   UB Bathing Assistance Unable to assess   LB Bathing Assistance 1  Total Assistance   LB Bathing Deficit Steadying;Verbal cueing;Supervision/safety; Increased time to complete; Buttocks; Perineal area   LB Bathing Comments Pt with limited stand tolerance for ashok care  UB Dressing Assistance Unable to assess   LB Dressing Assistance Unable to assess   Toileting Assistance  1  Total Assistance   Toileting Deficit Steadying;Supervison/safety;Verbal cueing; Increased time to complete;Clothing management down;Clothing management up;Perineal hygiene; Bedside commode   Toileting Comments Pt without focus to tasks, increased pain resistive behaviors/movements noted  Functional Standing Tolerance   Time 2 mins   Activity dynamic stand balance activities  Comments Pt with increased pain with activity  Bed Mobility   Rolling R 3  Moderate assistance   Additional items Assist x 2; Increased time required;Verbal cues;LE management   Rolling L 2  Maximal assistance   Additional items Assist x 2;Increased time required;Verbal cues;LE management   Supine to Sit 2  Maximal assistance   Additional items Assist x 2; Increased time required;Verbal cues;LE management   Sit to Supine 2  Maximal assistance   Additional items Assist x 2;Bedrails; Increased time required;Verbal cues;LE management   Additional Comments cues required to focus to tasks  Unable to focus due to noted pain levels,    Transfers   Sit to Stand 3  Moderate assistance   Additional items Assist x 2   Stand to Sit 3  Moderate assistance   Additional items Assist x 2   Stand pivot 3  Moderate assistance   Additional items Assist x 2; Increased time required;Verbal cues   Toilet transfer 3  Moderate assistance   Additional items Assist x 2;Armrests; Bedrails; Increased time required;Verbal cues; Commode   Additional Comments Pt with eyes closed through out tx session  Difficulty noted to redirect with focus to tasks  Functional Mobility   Functional Mobility 3  Moderate assistance   Additional Comments x2   Additional items Rolling walker   Toilet Transfers   Toilet Transfer From Bed   Toilet Transfer Type To   Toilet Transfer to Extra wide bedside commode   Toilet Transfer Technique Stand pivot; Ambulating   Toilet Transfers Verbal cues; Moderate assistance   Toilet Transfers Comments hand over hand A required  Cognition   Overall Cognitive Status Impaired   Arousal/Participation Uncooperative;Responsive   Attention Difficulty attending to directions   Orientation Level Oriented to person;Disoriented to place; Disoriented to situation;Disoriented to time   Memory Decreased short term memory;Decreased recall of recent events;Decreased recall of precautions   Following Commands Follows one step commands with increased time or repetition   Comments Pt without focus to tasks with increased pain levels noted      Additional Activities   Additional Activities Other (Comment)  (reviewed current plan of care with Pt's daughter  )   Additional Activities Comments Pt's daughter with questionable insight to her mothers cognitive decline  Encouraged activity as tolerated by Pt  Pt's daughter thought therapies in the hospital were provided 7 days a week to each patient  Activity Tolerance   Activity Tolerance Patient limited by pain; Patient limited by fatigue   Medical Staff Made Aware reported all findings to nursing staff  Assessment   Assessment Pt was seen for lengthy OT tx session with focus on completion of family education, review of fall prevention, back safety/log roll techs with repositioning/ bed mobility, basic orientation, thorough review of home set up and review of RW safety  Pt without initial response to staff's inquiries, keeping eyes closed and mumbling through out tx session  Spoke at length with Pt's daughter regarding current plan of care to include Pt's typically being seen in the hospital 3-5 txs weekly  Pt's current plan of care is 2-3 times weekly  Pt's daughter understood Pt was here for rehab and thought she would get therapies 7xs weekly  Pt's daughter reports interest in Pt returning to previous levels of functioning with return to home environment or that she be able to, "walk around", like she had been doing at home  Educated Pt's daughter of Pt's current levels of functioning and due to noted cognitive deficits and pain levels, the Pt may require lengthy recovery time prior to achieving these levels  Pt with increased signs of pain with each movement during tx session  Pt did attempt to reach out to hit at staff with attempts to mobilize  See above levels of A required for all functional tasks  Pt able to complete functional transfers with Mod A x2  Pt with discomfort with attempted self toileting  Pt hip shifted forward due to noted back pain with seated positioning  Pt with lack of initiation to tasks with eyes closed through out tx session  Adverse behaviors noted due to pain levels noted   Pt returned to supine positioning due to noted pain levels  Pt resting comfortably following activities instance without further signs of pain noted  Plan   Treatment Interventions ADL retraining;UE strengthening/ROM; Endurance training;Cognitive reorientation;Patient/family training;Functional transfer training   Goal Expiration Date 12/30/19   OT Treatment Day 1   OT Frequency 2-3x/wk   Recommendation   OT Discharge Recommendation Home OT  (Pt would benefit from STR family wants home)   Equipment Recommended Tub seat with back   OT - OK to Discharge Yes  (when medically cleared  )   Barthel Index   Feeding 5   Bathing 0   Grooming Score 0   Dressing Score 0   Bladder Score 5   Bowels Score 10   Toilet Use Score 5   Transfers (Bed/Chair) Score 5   Mobility (Level Surface) Score 0   Stairs Score 0   Barthel Index Score 30   Modified Forrest Scale   Modified Forrest Scale 4   Yasmine Funes, [address]

## 2019-12-18 NOTE — PHYSICAL THERAPY NOTE
PHYSICAL THERAPY NOTE          Patient Name: Samella Boast  CHWUV'O Date: 12/18/2019 12/18/19 1040   Pain Assessment   Pain Type Acute pain   Pain Location Back   Pain Orientation Lower   Hospital Pain Intervention(s) Repositioned; Ambulation/increased activity; Emotional support;Distraction; Other (Comment)  (brace modifications as cleared to do by ortho )   Response to Interventions upon return to supine, falls to sleep, more comfortable  Pain Rating: FLACC (Rest) - Face 0   Pain Rating: FLACC (Rest) - Legs 0   Pain Rating: FLACC (Rest) - Activity 0   Pain Rating: FLACC (Rest) - Cry 0   Pain Rating: FLACC (Rest) - Consolability 0   Score: FLACC (Rest) 0   Pain Rating: FLACC (Activity) - Face 1   Pain Rating: FLACC (Activity) - Legs 1   Pain Rating: FLACC (Activity) - Activity 1   Pain Rating: FLACC (Activity) - Cry 2   Pain Rating: FLACC (Activity) - Consolability 1   Score: FLACC (Activity) 6   Precautions   Spinal Precautions with hx of compression fx T12: no lifiting, twisting, bending  Restrictions/Precautions   RLE Weight Bearing Per Order WBAT   LLE Weight Bearing Per Order WBAT   Braces or Orthoses TLSO;LSO  (cleared by ortho to convert TLSO to LSO as order for comfort)   Other Precautions Agitated;Cognitive; Chair Alarm; Bed Alarm;Contact/isolation; Fall Risk;Pain;Spinal precautions   General   Chart Reviewed Yes   Additional Pertinent History clarification of brace use with Zorita Pilling, PAC with ortho  Brace ordered for comfort  Ok to modify TLSO per ortho if assists with pain management  Adjusted brace from TLSO to LSO at bedside  Response to Previous Treatment Patient with no complaints from previous session     Family/Caregiver Present Yes  (daughter, Luca Horne )   Cognition   Overall Cognitive Status Impaired   Arousal/Participation Uncooperative;Responsive   Attention Difficulty attending to directions Orientation Level Oriented to person   Following Commands Follows one step commands with increased time or repetition   Comments Distractable 2* pain  Frequent redirection and calming needed  Subjective   Subjective "My back, my back!" with attempts at [facility]  Bed Mobility   Rolling R 3  Moderate assistance   Additional items Assist x 2; Increased time required;Verbal cues;LE management; Bedrails;HOB elevated   Rolling L 2  Maximal assistance   Additional items Assist x 2; Increased time required;Verbal cues;LE management; Bedrails;HOB elevated   Supine to Sit 2  Maximal assistance   Additional items Assist x 2; Increased time required;Verbal cues;LE management; Bedrails;HOB elevated  (with use of logroll techique )   Sit to Supine 2  Maximal assistance   Additional items Assist x 2; Increased time required;Verbal cues;LE management; Bedrails   Additional Comments increased time with step by step cues and calming needed to complete transitions  Dependent to don/doff LSO at bedside  Transfers   Sit to Stand 3  Moderate assistance   Additional items Assist x 2; Increased time required;Verbal cues   Stand to Sit 3  Moderate assistance   Additional items Assist x 2; Increased time required;Verbal cues   Stand pivot 3  Moderate assistance   Additional items Assist x 2; Increased time required;Verbal cues  (with use of RW OOB to [facility] )   Toilet transfer 3  Moderate assistance   Additional items Assist x 2; Increased time required;Verbal cues; Commode   Additional Comments cues to maintain eyes opened with mobiltiy training  Increased redirection to tasks needed  Ambulation/Elevation   Gait pattern Decreased foot clearance; Improper Weight shift; Antalgic;Narrow LESLY; Inconsistent jeremias; Short stride; Excessively slow   Gait Assistance 3  Moderate assist   Additional items Assist x 2;Verbal cues; Tactile cues  (with assist for RW management needed )   Assistive Device Rolling walker   Distance Amb with RW 3'x1, then amb 12'x1 with RW support  Balance   Static Sitting Fair -   Dynamic Sitting Poor +   Static Standing Poor +   Dynamic Standing Poor   Ambulatory Poor   Endurance Deficit   Endurance Deficit Yes   Endurance Deficit Description pain   Activity Tolerance   Activity Tolerance Patient limited by fatigue;Patient limited by pain;Treatment limited secondary to agitation;Treatment limited secondary to medical complications (Comment)   Nurse Made Aware yes   Medical Staff Made Aware Nursing, COTA-Sharon Leticia Duverney manager for unit  Exercises   Neuro re-ed standing tolerance with RW support x 3 minutes  Caregiver education at bedside regarding brace wear and use, discussed goals for safe d/c to home vs need for rhb  Equipment Use   Comments Much family educaiton completed at bedside regarding increased care and therapy recommendations  Assessment   Prognosis Fair   Problem List Decreased strength;Decreased range of motion;Decreased endurance; Impaired balance;Decreased mobility; Decreased coordination; Impaired judgement;Decreased cognition;Decreased safety awareness;Pain;Orthopedic restrictions   Assessment Seen for progression of PT  Upon entry dtr expressing concerns as brace appears to create more pain vs helping  Clarification with Laura Dietz, [facility] with ortho that brace ordered for comfort and ok to modify to assist with pain management  Transitioned TLSO to LSO for OOB mobility  Pt continues with increased pain and difficulty consoling at times  Continues to require max A of 2 for bed mobiltiy  Is dependent to don/doff LSO, modA of 2 for transfers and ambulation using RW support  A for RW management needed to improve fluency in gait and increase distance  Family education completed at bedside with dtr regarding brace use/wear and purpose of same  Answered all immediate questions  Current recommendation is STR prior to home given patients need for Ax2    Daughter goal to care for pt in her home upon d/c when appropriate  Prefers to have her home over STR, however verbalizes understanding pt may require rhb if continues to require Ax2 for all cares  Will follow and progress in order to optimize functional outcomes  Barriers to Discharge Inaccessible home environment   Goals   Patient Goals pt without goal 2* cognition  Dtr goal to care for pt at home  STG Expiration Date 12/30/19   PT Treatment Day 1   Plan   Treatment/Interventions Functional transfer training;LE strengthening/ROM; Elevations; Therapeutic exercise; Endurance training;Patient/family training;Equipment eval/education; Bed mobility;Gait training; Compensatory technique education;Continued evaluation;Spoke to nursing;OT;Family   Progress Slow progress, decreased activity tolerance   PT Frequency Other (Comment)  (3-5x/wk)   Recommendation   Recommendation Short-term skilled PT   Equipment Recommended Walker   PT - OK to Discharge   (yes to rhb, NO if to home )   Additional Comments If to home, pt would need to be Ax1 for all mobility for caregiver     Sandrita Morrow PT

## 2019-12-18 NOTE — ASSESSMENT & PLAN NOTE
T12 compression fracture  Has been evaluated by PT/OT which family is declining SNF at this time  TLSO brace has been placed

## 2019-12-18 NOTE — PLAN OF CARE
Problem: OCCUPATIONAL THERAPY ADULT  Goal: Performs self-care activities at highest level of function for planned discharge setting  See evaluation for individualized goals  Description  Treatment Interventions: ADL retraining, UE strengthening/ROM, Functional transfer training, Endurance training, Cognitive reorientation, Patient/family training, Equipment evaluation/education, Compensatory technique education, Energy conservation, Activityengagement  Equipment Recommended: Tub seat with back, Bedside commode(Rolling walker, hospital bed)       See flowsheet documentation for full assessment, interventions and recommendations  Note:   Limitation: Decreased ADL status, Decreased Safe judgement during ADL, Decreased UE strength, Decreased endurance, Decreased cognition, Decreased sensation, Decreased self-care trans, Decreased high-level ADLs, Decreased fine motor control  Prognosis: Guarded  Assessment: Pt was seen for lengthy OT tx session with focus on completion of family education, review of fall prevention, back safety/log roll techs with repositioning/ bed mobility, basic orientation, thorough review of home set up and review of RW safety  Pt without initial response to staff's inquiries, keeping eyes closed and mumbling through out tx session  Spoke at length with Pt's daughter regarding current plan of care to include Pt's typically being seen in the hospital 3-5 txs weekly  Pt's current plan of care is 2-3 times weekly  Pt's daughter understood Pt was here for rehab and thought she would get therapies 7xs weekly  Pt's daughter reports interest in Pt returning to previous levels of functioning with return to home environment or that she be able to, "walk around", like she had been doing at home  Educated Pt's daughter of Pt's current levels of functioning and due to noted cognitive deficits and pain levels, the Pt may require lengthy recovery time prior to achieving these levels   Pt with increased signs of pain with each movement during tx session  Pt did attempt to reach out to hit at staff with attempts to mobilize  See above levels of A required for all functional tasks  Pt able to complete functional transfers with Mod A x2  Pt with discomfort with attempted self toileting  Pt hip shifted forward due to noted back pain with seated positioning  Pt with lack of initiation to tasks with eyes closed through out tx session  Adverse behaviors noted due to pain levels noted  Pt returned to supine positioning due to noted pain levels  Pt resting comfortably following activities instance without further signs of pain noted        OT Discharge Recommendation: Home OT(Pt would benefit from STR family wants home)  OT - OK to Discharge: Yes(when medically cleared  )

## 2019-12-18 NOTE — PROGRESS NOTES
Progress Note - Ruel Kincaid 1933, 80 y o  female MRN: [id number]    Unit/Bed#: E2 -01 Encounter: [id number]    Primary Care Provider: Tian Pina DO   Date and time admitted to hospital: 12/14/2019  8:41 AM        * Syncope and collapse  Assessment & Plan  Syncope and collapse with history of CVA, dementia, chronic UTI  Seen by neurology with possibility of seizure episode  Has been started on depakote by neurology  EEG negative    Compression fracture of T12 vertebra (HCC)  Assessment & Plan  T12 compression fracture  Has been evaluated by PT/OT which family is declining SNF at this time  TLSO brace has been placed  Chronic UTI  Assessment & Plan  Acute on chronic UTI on cephalexin however current urine culture citrobacter with multiple resistances  Continue cefepime for now x3 days    Results from last 7 days   Lab Units 12/14/19  0942   URINE CULTURE  >100,000 cfu/ml Citrobacter freundii*       A-fib [facility])  Assessment & Plan  Paroxysmal atrial fibrillation  Continue pradaxa    Hypertension  Assessment & Plan  History of hypertension without need for medications    Alzheimer disease ([facility] [address] )  Assessment & Plan  Alzheimer's dementia on memantine and donepezil  On quetiapine and melatonin for sleep during hospitalization      VTE Pharmacologic Prophylaxis: Dabigatran (Pradaxa)    Patient Centered Rounds: I have performed bedside rounds with nursing staff today  Discussions with Specialists or Other Care Team Provider:  Case management  Education and Discussions with Family / Patient:  Daughter    Time Spent for Care: 25 mins  More than 50% of total time spent on counseling and coordination of care as described above      Current Length of Stay: 4 day(s)  Current Patient Status: Inpatient     Certification Statement: The patient will continue to require additional inpatient hospital stay due to Syncope and collapse  Discharge Plan / Estimated Discharge Date:  Hopefully next 24 hours    Code Status: Level 3 - DNAR and DNI  ______________________________________________________________________________    Subjective:   Patient seen and examined  Less confused today  Work with physical therapy  Was retaining urine but when placed on commode was able to urinate    Objective:   Vitals: Blood pressure 131/60, pulse 73, temperature (!) 96 3 °F (35 7 °C), temperature source Tympanic, resp  rate 16, weight 65 9 kg (145 lb 4 5 oz), SpO2 98 %      Physical Exam:   General appearance: fatigued, appears stated age and cooperative  Head: Normocephalic, without obvious abnormality, atraumatic  Lungs: diminished breath sounds  Heart: regular rate and rhythm  Abdomen: soft, non-tender, positive bowel sounds   Back: Scattered tenderness  Extremities: extremities atraumatic, no cyanosis or edema  Neurologic:  Demented    Additional Data:   Labs:  Results from last 7 days   Lab Units 12/18/19  0600 12/15/19  0617 12/14/19  0910 12/14/19  0905   WBC Thousand/uL 7 07 10 03  --  11 14*   HEMOGLOBIN g/dL 10 2* 10 3*  --  11 0*   I STAT HEMOGLOBIN g/dl  --   --  11 6  --    HEMATOCRIT % 32 0* 33 0*  --  35 3   HEMATOCRIT, ISTAT %  --   --  34*  --    MCV fL 89 90  --  90   PLATELETS Thousands/uL 222 230  --  255   INR   --   --   --  1 49*     Results from last 7 days   Lab Units 12/18/19  0600 12/15/19  0617 12/14/19  0910 12/14/19  0905   SODIUM mmol/L 140 143  --  141   POTASSIUM mmol/L 3 7 4 0  --  3 7   CHLORIDE mmol/L 104 107  --  106   CO2 mmol/L 26 26  --  26   CO2, I-STAT mmol/L  --   --  22  --    AGAP mmol/L  --   --  17*  --    ANION GAP mmol/L 10 10  --  9   BUN mg/dL 26* 21  --  15   CREATININE mg/dL 0 87 1 03  --  0 88   CALCIUM mg/dL 8 3 8 6  --  8 1*   ALBUMIN g/dL 2 8*  --   --  3 1*   TOTAL BILIRUBIN mg/dL 0 29  --   --  0 30   ALK PHOS U/L 73  --   --  77   ALT U/L 14  --   --  17   AST U/L 14  --   --  17   EGFR ml/min/1 73sq m 61 49 67 60   GLUCOSE RANDOM mg/dL 118 112  --  136 Results from last 7 days   Lab Units 12/14/19  0905   TROPONIN I ng/mL <0 02                          * I Have Reviewed All Lab Data Listed Above  Cultures:   Results from last 7 days   Lab Units 12/14/19  1344 12/14/19  0942   BLOOD CULTURE  No Growth at 72 hrs  No Growth at 72 hrs   --    URINE CULTURE   --  >100,000 cfu/ml Citrobacter freundii*             Imaging:  Imaging Reports Reviewed Today Include:   No results found  Scheduled Meds:  Current Facility-Administered Medications:  acetaminophen 650 mg Oral Q6H [address] Felipe GARCÍA MD    acetaminophen 650 mg Oral Q12H PRN Felipe GARCÍA MD    atorvastatin 20 mg Oral Daily With Dinner Selene GARCÍA MD    cefepime 1,000 mg Intravenous Q12H Ralf Games, DO Last Rate: 1,000 mg (12/18/19 0558)   dabigatran etexilate 150 mg Oral Q12H [address] Felipe GARCÍA MD    divalproex sodium 125 mg Oral TID Kalia Villegas,     docusate sodium 100 mg Oral BID Felipe GARCÍA MD    donepezil 10 mg Oral HS Felipe GARCÍA MD    melatonin 6 mg Oral HS Juan Ornelas, DO    memantine 10 mg Oral BID Felipe GARCÍA MD    OLANZapine 2 5 mg Intramuscular TID PRN Ralf René, DO    oxyCODONE 2 5 mg Oral Q4H PRN Juan Ornelas, DO    pantoprazole 40 mg Oral Daily Before Breakfast Felipe GARCÍA MD    polyethylene glycol 17 g Oral Daily PRN Felipe Garrett MD    QUEtiapine 25 mg Oral HS Ralf [employer], DO        Ralf Games, DO  [facility] Internal Medicine  Hospitalist    ** Please Note: This note has been constructed using a voice recognition system   **

## 2019-12-19 LAB
BACTERIA BLD CULT: NORMAL
BACTERIA BLD CULT: NORMAL

## 2019-12-19 PROCEDURE — 99232 SBSQ HOSP IP/OBS MODERATE 35: CPT | Performed by: INTERNAL MEDICINE

## 2019-12-19 RX ORDER — NITROFURANTOIN 25; 75 MG/1; MG/1
100 CAPSULE ORAL 2 TIMES DAILY WITH MEALS
Status: DISCONTINUED | OUTPATIENT
Start: 2019-12-19 | End: 2019-12-23

## 2019-12-19 RX ADMIN — OXYCODONE HYDROCHLORIDE 2.5 MG: 5 TABLET ORAL at 09:11

## 2019-12-19 RX ADMIN — MEMANTINE 10 MG: 5 TABLET ORAL at 20:37

## 2019-12-19 RX ADMIN — DONEPEZIL HYDROCHLORIDE 10 MG: 10 TABLET, FILM COATED ORAL at 17:34

## 2019-12-19 RX ADMIN — MEMANTINE 10 MG: 5 TABLET ORAL at 09:12

## 2019-12-19 RX ADMIN — ACETAMINOPHEN 650 MG: 325 TABLET ORAL at 05:23

## 2019-12-19 RX ADMIN — DOCUSATE SODIUM 100 MG: 100 CAPSULE, LIQUID FILLED ORAL at 17:34

## 2019-12-19 RX ADMIN — ACETAMINOPHEN 650 MG: 325 TABLET ORAL at 12:43

## 2019-12-19 RX ADMIN — OXYCODONE HYDROCHLORIDE 2.5 MG: 5 TABLET ORAL at 20:40

## 2019-12-19 RX ADMIN — DIVALPROEX SODIUM 125 MG: 125 CAPSULE, COATED PELLETS ORAL at 17:32

## 2019-12-19 RX ADMIN — QUETIAPINE FUMARATE 25 MG: 25 TABLET ORAL at 20:38

## 2019-12-19 RX ADMIN — DABIGATRAN ETEXILATE MESYLATE 150 MG: 150 CAPSULE ORAL at 20:38

## 2019-12-19 RX ADMIN — OXYCODONE HYDROCHLORIDE 2.5 MG: 5 TABLET ORAL at 03:59

## 2019-12-19 RX ADMIN — NITROFURANTOIN (MONOHYDRATE/MACROCRYSTALS) 100 MG: 75; 25 CAPSULE ORAL at 17:32

## 2019-12-19 RX ADMIN — DABIGATRAN ETEXILATE MESYLATE 150 MG: 150 CAPSULE ORAL at 09:11

## 2019-12-19 RX ADMIN — DIVALPROEX SODIUM 125 MG: 125 CAPSULE, COATED PELLETS ORAL at 20:39

## 2019-12-19 RX ADMIN — DOCUSATE SODIUM 100 MG: 100 CAPSULE, LIQUID FILLED ORAL at 09:11

## 2019-12-19 RX ADMIN — ACETAMINOPHEN 650 MG: 325 TABLET ORAL at 17:34

## 2019-12-19 RX ADMIN — MELATONIN TAB 3 MG 6 MG: 3 TAB at 20:37

## 2019-12-19 RX ADMIN — PANTOPRAZOLE SODIUM 40 MG: 40 TABLET, DELAYED RELEASE ORAL at 05:23

## 2019-12-19 RX ADMIN — CEFEPIME HYDROCHLORIDE 1000 MG: 1 INJECTION, POWDER, FOR SOLUTION INTRAMUSCULAR; INTRAVENOUS at 05:23

## 2019-12-19 RX ADMIN — ATORVASTATIN CALCIUM 20 MG: 20 TABLET, FILM COATED ORAL at 17:33

## 2019-12-19 NOTE — PLAN OF CARE
Problem: Potential for Falls  Goal: Patient will remain free of falls  Description  INTERVENTIONS:  - Assess patient frequently for physical needs  -  Identify cognitive and physical deficits and behaviors that affect risk of falls    -  [place] fall precautions as indicated by assessment   - Educate patient/family on patient safety including physical limitations  - Instruct patient to call for assistance with activity based on assessment  - Modify environment to reduce risk of injury  - Consider OT/PT consult to assist with strengthening/mobility  Outcome: Progressing     Problem: Prexisting or High Potential for Compromised Skin Integrity  Goal: Skin integrity is maintained or improved  Description  INTERVENTIONS:  - Identify patients at risk for skin breakdown  - Assess and monitor skin integrity  - Assess and monitor nutrition and hydration status  - Monitor labs   - Assess for incontinence   - Turn and reposition patient  - Assist with mobility/ambulation  - Relieve pressure over bony prominences  - Avoid friction and shearing  - Provide appropriate hygiene as needed including keeping skin clean and dry  - Evaluate need for skin moisturizer/barrier cream  - Collaborate with interdisciplinary team   - Patient/family teaching  - Consider wound care consult   Outcome: Progressing     Problem: PAIN - ADULT  Goal: Verbalizes/displays adequate comfort level or baseline comfort level  Description  Interventions:  - Encourage patient to monitor pain and request assistance  - Assess pain using appropriate pain scale  - Administer analgesics based on type and severity of pain and evaluate response  - Implement non-pharmacological measures as appropriate and evaluate response  - Consider cultural and social influences on pain and pain management  - Notify physician/advanced practitioner if interventions unsuccessful or patient reports new pain  Outcome: Progressing     Problem: INFECTION - ADULT  Goal: Absence or prevention of progression during hospitalization  Description  INTERVENTIONS:  - Assess and monitor for signs and symptoms of infection  - Monitor lab/diagnostic results  - Monitor all insertion sites, i e  indwelling lines, tubes, and drains  - Monitor endotracheal if appropriate and nasal secretions for changes in amount and color  - [place] appropriate cooling/warming therapies per order  - Administer medications as ordered  - Instruct and encourage patient and family to use good hand hygiene technique  - Identify and instruct in appropriate isolation precautions for identified infection/condition  Outcome: Progressing  Goal: Absence of fever/infection during neutropenic period  Description  INTERVENTIONS:  - Monitor WBC    Outcome: Progressing     Problem: SAFETY ADULT  Goal: Maintain or return to baseline ADL function  Description  INTERVENTIONS:  -  Assess patient's ability to carry out ADLs; assess patient's baseline for ADL function and identify physical deficits which impact ability to perform ADLs (bathing, care of mouth/teeth, toileting, grooming, dressing, etc )  - Assess/evaluate cause of self-care deficits   - Assess range of motion  - Assess patient's mobility; develop plan if impaired  - Assess patient's need for assistive devices and provide as appropriate  - Encourage maximum independence but intervene and supervise when necessary  - Involve family in performance of ADLs  - Assess for home care needs following discharge   - Consider OT consult to assist with ADL evaluation and planning for discharge  - Provide patient education as appropriate  Outcome: Progressing  Goal: Maintain or return mobility status to optimal level  Description  INTERVENTIONS:  - Assess patient's baseline mobility status (ambulation, transfers, stairs, etc )    - Identify cognitive and physical deficits and behaviors that affect mobility  - Identify mobility aids required to assist with transfers and/or ambulation (gait belt, sit-to-stand, lift, walker, cane, etc )  - [place] fall precautions as indicated by assessment  - Record patient progress and toleration of activity level on Mobility SBAR; progress patient to next Phase/Stage  - Instruct patient to call for assistance with activity based on assessment  - Consider rehabilitation consult to assist with strengthening/weightbearing, etc   Outcome: Progressing     Problem: DISCHARGE PLANNING  Goal: Discharge to home or other facility with appropriate resources  Description  INTERVENTIONS:  - Identify barriers to discharge w/patient and caregiver  - Arrange for needed discharge resources and transportation as appropriate  - Identify discharge learning needs (meds, wound care, etc )  - Arrange for interpretive services to assist at discharge as needed  - Refer to Case Management Department for coordinating discharge planning if the patient needs post-hospital services based on physician/advanced practitioner order or complex needs related to functional status, cognitive ability, or social support system  Outcome: Progressing     Problem: Knowledge Deficit  Goal: Patient/family/caregiver demonstrates understanding of disease process, treatment plan, medications, and discharge instructions  Description  Complete learning assessment and assess knowledge base  Interventions:  - Provide teaching at level of understanding  - Provide teaching via preferred learning methods  Outcome: Progressing     Problem: Nutrition/Hydration-ADULT  Goal: Nutrient/Hydration intake appropriate for improving, restoring or maintaining nutritional needs  Description  Monitor and assess patient's nutrition/hydration status for malnutrition  Collaborate with interdisciplinary team and initiate plan and interventions as ordered  Monitor patient's weight and dietary intake as ordered or per policy  Utilize nutrition screening tool and intervene as necessary   Determine patient's food preferences and provide high-protein, high-caloric foods as appropriate       INTERVENTIONS:  - Monitor oral intake, urinary output, labs, and treatment plans  - Assess nutrition and hydration status and recommend course of action  - Evaluate amount of meals eaten  - Assist patient with eating if necessary   - Allow adequate time for meals  - Recommend/ encourage appropriate diets, oral nutritional supplements, and vitamin/mineral supplements  - Order, calculate, and assess calorie counts as needed  - Recommend, monitor, and adjust tube feedings and TPN/PPN based on assessed needs  - Assess need for intravenous fluids  - Provide specific nutrition/hydration education as appropriate  - Include patient/family/caregiver in decisions related to nutrition  Outcome: Progressing     Problem: NEUROSENSORY - ADULT  Goal: Achieves stable or improved neurological status  Description  INTERVENTIONS  - Monitor and report changes in neurological status  - Monitor vital signs such as temperature, blood pressure, glucose, and any other labs ordered   - Initiate measures to prevent increased intracranial pressure  - Monitor for seizure activity and implement precautions if appropriate      Outcome: Progressing  Goal: Remains free of injury related to seizures activity  Description  INTERVENTIONS  - Maintain airway, patient safety  and administer oxygen as ordered  - Monitor patient for seizure activity, document and report duration and description of seizure to physician/advanced practitioner  - If seizure occurs,  ensure patient safety during seizure  - Reorient patient post seizure  - Seizure pads on all 4 side rails  - Instruct patient/family to notify RN of any seizure activity including if an aura is experienced  - Instruct patient/family to call for assistance with activity based on nursing assessment  - Administer anti-seizure medications if ordered    Outcome: Progressing  Goal: Achieves maximal functionality and self care  Description  INTERVENTIONS  - Monitor swallowing and airway patency with patient fatigue and changes in neurological status  - Encourage and assist patient to increase activity and self care     - Encourage visually impaired, hearing impaired and aphasic patients to use assistive/communication devices  Outcome: Progressing     Problem: MUSCULOSKELETAL - ADULT  Goal: Maintain or return mobility to safest level of function  Description  INTERVENTIONS:  - Assess patient's ability to carry out ADLs; assess patient's baseline for ADL function and identify physical deficits which impact ability to perform ADLs (bathing, care of mouth/teeth, toileting, grooming, dressing, etc )  - Assess/evaluate cause of self-care deficits   - Assess range of motion  - Assess patient's mobility  - Assess patient's need for assistive devices and provide as appropriate  - Encourage maximum independence but intervene and supervise when necessary  - Involve family in performance of ADLs  - Assess for home care needs following discharge   - Consider OT consult to assist with ADL evaluation and planning for discharge  - Provide patient education as appropriate  Outcome: Progressing  Goal: Maintain proper alignment of affected body part  Description  INTERVENTIONS:  - Support, maintain and protect limb and body alignment  - Provide patient/ family with appropriate education  Outcome: Progressing

## 2019-12-19 NOTE — SOCIAL WORK
CM met with Keri at bedside to discuss discharge plan  She is still unsure at this time as to where she would like to take pt  She has explained to therapy that she would like to take her home but reports that she may not be able to care for her in this condition  GARRETT will need a definitive plan so we can start ordering equipment if needed or start arranging STR  Will discuss plan with dtr again tomorrow

## 2019-12-19 NOTE — SOCIAL WORK
CM s/w the pt's daughter Alejandro Crouch ([phone]) at bedside to discuss rehabs  CM provided a list of local rehabs  Cj Gotti stated that she prefers [address] [place] [address]  GARRETT told Cj Gotti that she would place the referral, but, also wanted to place referrals at other facilities if [address] [place] [address] did not accept  Keri told GARRETT that it was okay to select [facility]   CM completed a PASSR, placed referrals, and is waiting on a reply  CM will continue to follow

## 2019-12-19 NOTE — PROGRESS NOTES
Progress Note - Samella Boast 1933, 80 y o  female MRN: [id number]    Unit/Bed#: E5 -01 Encounter: [id number]    Primary Care Provider: Hyacinth Rinne, DO   Date and time admitted to hospital: 12/14/2019  8:41 AM        * Syncope and collapse  Assessment & Plan  Syncope and collapse with history of CVA, dementia, chronic UTI  Seen by neurology with possibility of seizure episode  Has been started on depakote by neurology  EEG negative    Compression fracture of T12 vertebra (HCC)  Assessment & Plan  T12 compression fracture  Has been evaluated by PT/OT which family is is now considering  SNF at this time  TLSO brace has been placed  Chronic UTI  Assessment & Plan  Acute on chronic UTI on cephalexin however current urine culture citrobacter with multiple resistances  Was started on cefepime, will change to nitrofurantoin    Results from last 7 days   Lab Units 12/14/19  0942   URINE CULTURE  >100,000 cfu/ml Citrobacter freundii*       A-fib [facility])  Assessment & Plan  Paroxysmal atrial fibrillation  Continue pradaxa    Hypertension  Assessment & Plan  History of hypertension without need for medications    Alzheimer disease ([facility] [address] )  Assessment & Plan  Alzheimer's dementia on memantine and donepezil  On quetiapine and melatonin for sleep during hospitalization      VTE Pharmacologic Prophylaxis: Dabigatran (Pradaxa)    Patient Centered Rounds: I have performed bedside rounds with nursing staff today  Discussions with Specialists or Other Care Team Provider:  Case management  Education and Discussions with Family / Patient:  Daughter    Time Spent for Care: 25 mins  More than 50% of total time spent on counseling and coordination of care as described above      Current Length of Stay: 5 day(s)  Current Patient Status: Inpatient     Certification Statement: The patient will continue to require additional inpatient hospital stay due to Syncope and collapse  Discharge Plan / Estimated Discharge Date: Waiting for rehab placement    Code Status: Level 3 - DNAR and DNI  ______________________________________________________________________________    Subjective:   Patient seen and examined  No new complaints, no events overnight  Objective:   Vitals: Blood pressure 98/51, pulse 71, temperature (!) 97 2 °F (36 2 °C), temperature source Temporal, resp  rate 17, weight 65 9 kg (145 lb 4 5 oz), SpO2 96 %      Physical Exam:   General appearance: alert, appears stated age and cooperative  Head: Normocephalic, without obvious abnormality, atraumatic  Lungs: diminished breath sounds  Heart: regular rate and rhythm  Abdomen: Soft nontender  Back: Thoracic tenderness palpation  Extremities: extremities atraumatic, no cyanosis or edema  Neurologic: Grossly normal    Additional Data:   Labs:  Results from last 7 days   Lab Units 12/18/19  0600 12/15/19  0617 12/14/19  0910 12/14/19  0905   WBC Thousand/uL 7 07 10 03  --  11 14*   HEMOGLOBIN g/dL 10 2* 10 3*  --  11 0*   I STAT HEMOGLOBIN g/dl  --   --  11 6  --    HEMATOCRIT % 32 0* 33 0*  --  35 3   HEMATOCRIT, ISTAT %  --   --  34*  --    MCV fL 89 90  --  90   PLATELETS Thousands/uL 222 230  --  255   INR   --   --   --  1 49*     Results from last 7 days   Lab Units 12/18/19  0600 12/15/19  0617 12/14/19  0910 12/14/19  0905   SODIUM mmol/L 140 143  --  141   POTASSIUM mmol/L 3 7 4 0  --  3 7   CHLORIDE mmol/L 104 107  --  106   CO2 mmol/L 26 26  --  26   CO2, I-STAT mmol/L  --   --  22  --    AGAP mmol/L  --   --  17*  --    ANION GAP mmol/L 10 10  --  9   BUN mg/dL 26* 21  --  15   CREATININE mg/dL 0 87 1 03  --  0 88   CALCIUM mg/dL 8 3 8 6  --  8 1*   ALBUMIN g/dL 2 8*  --   --  3 1*   TOTAL BILIRUBIN mg/dL 0 29  --   --  0 30   ALK PHOS U/L 73  --   --  77   ALT U/L 14  --   --  17   AST U/L 14  --   --  17   EGFR ml/min/1 73sq m 61 49 67 60   GLUCOSE RANDOM mg/dL 118 112  --  136         Results from last 7 days   Lab Units 12/14/19  0905   TROPONIN I ng/mL <0 02 * I Have Reviewed All Lab Data Listed Above  Cultures:   Results from last 7 days   Lab Units 12/14/19  1344 12/14/19  0942   BLOOD CULTURE  No Growth After 4 Days  No Growth After 4 Days  --    URINE CULTURE   --  >100,000 cfu/ml Citrobacter freundii*             Imaging:  Imaging Reports Reviewed Today Include:   No results found  Scheduled Meds:  Current Facility-Administered Medications:  acetaminophen 650 mg Oral Q6H [address] Felipe GARCÍA MD    acetaminophen 650 mg Oral Q12H PRN Felipe GARCÍA MD    atorvastatin 20 mg Oral Daily With Dinner Jen GARCÍA MD    cefepime 1,000 mg Intravenous Q12H Chip Stade, DO Last Rate: 1,000 mg (12/19/19 0523)   dabigatran etexilate 150 mg Oral Q12H [address] Felipe GARCÍA MD    divalproex sodium 125 mg Oral TID Kalia Villegas, DO    docusate sodium 100 mg Oral BID Felipe GARCÍA MD    donepezil 10 mg Oral HS Felipe GARCÍA MD    melatonin 6 mg Oral HS Juan Ornelas, DO    memantine 10 mg Oral BID Felipe GARCÍA MD    OLANZapine 2 5 mg Intramuscular TID PRN Chip Stade, DO    oxyCODONE 2 5 mg Oral Q4H PRN Juan Ornelas, DO    pantoprazole 40 mg Oral Daily Before Breakfast Felipe GARCÍA MD    polyethylene glycol 17 g Oral Daily PRN Biren Sheilda Soulier, MD    QUEtiapine 25 mg Oral HS Chip Stade, DO        Chip Stade, DO  [facility] Internal Medicine  Hospitalist    ** Please Note: This note has been constructed using a voice recognition system   **

## 2019-12-19 NOTE — NURSING NOTE
Awake, alert, oriented to self only  Anxious, irritable, uncooperative  Pt refusing all medications at this time after multiple attempts  PRN pain medication offered x3, pt refused  Pt refusing oral care and breakfast at this time  Pads applied to side rails, bed alarm on  Will continue to monitor

## 2019-12-19 NOTE — PLAN OF CARE
Problem: Potential for Falls  Goal: Patient will remain free of falls  Description  INTERVENTIONS:  - Assess patient frequently for physical needs  -  Identify cognitive and physical deficits and behaviors that affect risk of falls    -  [place] fall precautions as indicated by assessment   - Educate patient/family on patient safety including physical limitations  - Instruct patient to call for assistance with activity based on assessment  - Modify environment to reduce risk of injury  - Consider OT/PT consult to assist with strengthening/mobility  Outcome: Progressing     Problem: Prexisting or High Potential for Compromised Skin Integrity  Goal: Skin integrity is maintained or improved  Description  INTERVENTIONS:  - Identify patients at risk for skin breakdown  - Assess and monitor skin integrity  - Assess and monitor nutrition and hydration status  - Monitor labs   - Assess for incontinence   - Turn and reposition patient  - Assist with mobility/ambulation  - Relieve pressure over bony prominences  - Avoid friction and shearing  - Provide appropriate hygiene as needed including keeping skin clean and dry  - Evaluate need for skin moisturizer/barrier cream  - Collaborate with interdisciplinary team   - Patient/family teaching  - Consider wound care consult   Outcome: Progressing     Problem: PAIN - ADULT  Goal: Verbalizes/displays adequate comfort level or baseline comfort level  Description  Interventions:  - Encourage patient to monitor pain and request assistance  - Assess pain using appropriate pain scale  - Administer analgesics based on type and severity of pain and evaluate response  - Implement non-pharmacological measures as appropriate and evaluate response  - Consider cultural and social influences on pain and pain management  - Notify physician/advanced practitioner if interventions unsuccessful or patient reports new pain  Outcome: Progressing     Problem: INFECTION - ADULT  Goal: Absence or prevention of progression during hospitalization  Description  INTERVENTIONS:  - Assess and monitor for signs and symptoms of infection  - Monitor lab/diagnostic results  - Monitor all insertion sites, i e  indwelling lines, tubes, and drains  - Monitor endotracheal if appropriate and nasal secretions for changes in amount and color  - [place] appropriate cooling/warming therapies per order  - Administer medications as ordered  - Instruct and encourage patient and family to use good hand hygiene technique  - Identify and instruct in appropriate isolation precautions for identified infection/condition  Outcome: Progressing  Goal: Absence of fever/infection during neutropenic period  Description  INTERVENTIONS:  - Monitor WBC    Outcome: Progressing     Problem: SAFETY ADULT  Goal: Maintain or return to baseline ADL function  Description  INTERVENTIONS:  -  Assess patient's ability to carry out ADLs; assess patient's baseline for ADL function and identify physical deficits which impact ability to perform ADLs (bathing, care of mouth/teeth, toileting, grooming, dressing, etc )  - Assess/evaluate cause of self-care deficits   - Assess range of motion  - Assess patient's mobility; develop plan if impaired  - Assess patient's need for assistive devices and provide as appropriate  - Encourage maximum independence but intervene and supervise when necessary  - Involve family in performance of ADLs  - Assess for home care needs following discharge   - Consider OT consult to assist with ADL evaluation and planning for discharge  - Provide patient education as appropriate  Outcome: Progressing  Goal: Maintain or return mobility status to optimal level  Description  INTERVENTIONS:  - Assess patient's baseline mobility status (ambulation, transfers, stairs, etc )    - Identify cognitive and physical deficits and behaviors that affect mobility  - Identify mobility aids required to assist with transfers and/or ambulation (gait belt, sit-to-stand, lift, walker, cane, etc )  - [place] fall precautions as indicated by assessment  - Record patient progress and toleration of activity level on Mobility SBAR; progress patient to next Phase/Stage  - Instruct patient to call for assistance with activity based on assessment  - Consider rehabilitation consult to assist with strengthening/weightbearing, etc   Outcome: Progressing     Problem: DISCHARGE PLANNING  Goal: Discharge to home or other facility with appropriate resources  Description  INTERVENTIONS:  - Identify barriers to discharge w/patient and caregiver  - Arrange for needed discharge resources and transportation as appropriate  - Identify discharge learning needs (meds, wound care, etc )  - Arrange for interpretive services to assist at discharge as needed  - Refer to Case Management Department for coordinating discharge planning if the patient needs post-hospital services based on physician/advanced practitioner order or complex needs related to functional status, cognitive ability, or social support system  Outcome: Progressing     Problem: Knowledge Deficit  Goal: Patient/family/caregiver demonstrates understanding of disease process, treatment plan, medications, and discharge instructions  Description  Complete learning assessment and assess knowledge base  Interventions:  - Provide teaching at level of understanding  - Provide teaching via preferred learning methods  Outcome: Progressing     Problem: Nutrition/Hydration-ADULT  Goal: Nutrient/Hydration intake appropriate for improving, restoring or maintaining nutritional needs  Description  Monitor and assess patient's nutrition/hydration status for malnutrition  Collaborate with interdisciplinary team and initiate plan and interventions as ordered  Monitor patient's weight and dietary intake as ordered or per policy  Utilize nutrition screening tool and intervene as necessary   Determine patient's food preferences and provide high-protein, high-caloric foods as appropriate       INTERVENTIONS:  - Monitor oral intake, urinary output, labs, and treatment plans  - Assess nutrition and hydration status and recommend course of action  - Evaluate amount of meals eaten  - Assist patient with eating if necessary   - Allow adequate time for meals  - Recommend/ encourage appropriate diets, oral nutritional supplements, and vitamin/mineral supplements  - Order, calculate, and assess calorie counts as needed  - Recommend, monitor, and adjust tube feedings and TPN/PPN based on assessed needs  - Assess need for intravenous fluids  - Provide specific nutrition/hydration education as appropriate  - Include patient/family/caregiver in decisions related to nutrition  Outcome: Progressing     Problem: NEUROSENSORY - ADULT  Goal: Achieves stable or improved neurological status  Description  INTERVENTIONS  - Monitor and report changes in neurological status  - Monitor vital signs such as temperature, blood pressure, glucose, and any other labs ordered   - Initiate measures to prevent increased intracranial pressure  - Monitor for seizure activity and implement precautions if appropriate      Outcome: Progressing  Goal: Remains free of injury related to seizures activity  Description  INTERVENTIONS  - Maintain airway, patient safety  and administer oxygen as ordered  - Monitor patient for seizure activity, document and report duration and description of seizure to physician/advanced practitioner  - If seizure occurs,  ensure patient safety during seizure  - Reorient patient post seizure  - Seizure pads on all 4 side rails  - Instruct patient/family to notify RN of any seizure activity including if an aura is experienced  - Instruct patient/family to call for assistance with activity based on nursing assessment  - Administer anti-seizure medications if ordered    Outcome: Progressing  Goal: Achieves maximal functionality and self care  Description  INTERVENTIONS  - Monitor swallowing and airway patency with patient fatigue and changes in neurological status  - Encourage and assist patient to increase activity and self care     - Encourage visually impaired, hearing impaired and aphasic patients to use assistive/communication devices  Outcome: Progressing     Problem: MUSCULOSKELETAL - ADULT  Goal: Maintain or return mobility to safest level of function  Description  INTERVENTIONS:  - Assess patient's ability to carry out ADLs; assess patient's baseline for ADL function and identify physical deficits which impact ability to perform ADLs (bathing, care of mouth/teeth, toileting, grooming, dressing, etc )  - Assess/evaluate cause of self-care deficits   - Assess range of motion  - Assess patient's mobility  - Assess patient's need for assistive devices and provide as appropriate  - Encourage maximum independence but intervene and supervise when necessary  - Involve family in performance of ADLs  - Assess for home care needs following discharge   - Consider OT consult to assist with ADL evaluation and planning for discharge  - Provide patient education as appropriate  Outcome: Progressing  Goal: Maintain proper alignment of affected body part  Description  INTERVENTIONS:  - Support, maintain and protect limb and body alignment  - Provide patient/ family with appropriate education  Outcome: Progressing

## 2019-12-19 NOTE — ASSESSMENT & PLAN NOTE
Acute on chronic UTI on cephalexin however current urine culture citrobacter with multiple resistances    Was started on cefepime, will change to nitrofurantoin    Results from last 7 days   Lab Units 12/14/19  0942   URINE CULTURE  >100,000 cfu/ml Citrobacter freundii*

## 2019-12-19 NOTE — UTILIZATION REVIEW
Continued Stay Review    Date:    12/19/2019                          Current Patient Class:   Inpatient  Current Level of Care:   Med surg    HPI:86 y o  female initially admitted on   12/14/2019   With syncope    Assessment/Plan:   12/19/2019   EEG  Negative  Cont  PT/OT  LINDSAY brace  In place  For  T12 compression  Fracture  Current urine culture   citrobacter with multiple resistances  Was started on cefepime, will change to nitrofurantoin   PT  Recommends  SNF      Pertinent Labs/Diagnostic Results:   Results from last 7 days   Lab Units 12/18/19  0600 12/15/19  0617 12/14/19  0910 12/14/19  0905   WBC Thousand/uL 7 07 10 03  --  11 14*   HEMOGLOBIN g/dL 10 2* 10 3*  --  11 0*   I STAT HEMOGLOBIN g/dl  --   --  11 6  --    HEMATOCRIT % 32 0* 33 0*  --  35 3   HEMATOCRIT, ISTAT %  --   --  34*  --    PLATELETS Thousands/uL 222 230  --  255   NEUTROS ABS Thousands/µL  --  7 41  --  7 26         Results from last 7 days   Lab Units 12/18/19  0600 12/15/19  0617 12/14/19  0910 12/14/19  0905   SODIUM mmol/L 140 143  --  141   POTASSIUM mmol/L 3 7 4 0  --  3 7   CHLORIDE mmol/L 104 107  --  106   CO2 mmol/L 26 26  --  26   CO2, I-STAT mmol/L  --   --  22  --    AGAP mmol/L  --   --  17*  --    ANION GAP mmol/L 10 10  --  9   BUN mg/dL 26* 21  --  15   CREATININE mg/dL 0 87 1 03  --  0 88   EGFR ml/min/1 73sq m 61 49 67 60   CALCIUM mg/dL 8 3 8 6  --  8 1*   CALCIUM, IONIZED, ISTAT mmol/L  --   --  1 10*  --      Results from last 7 days   Lab Units 12/18/19  0600 12/14/19  0905   AST U/L 14 17   ALT U/L 14 17   ALK PHOS U/L 73 77   TOTAL PROTEIN g/dL 6 3* 6 1*   ALBUMIN g/dL 2 8* 3 1*   TOTAL BILIRUBIN mg/dL 0 29 0 30         Results from last 7 days   Lab Units 12/18/19  0600 12/15/19  0617 12/14/19  0905   GLUCOSE RANDOM mg/dL 118 112 136                     Vital Signs:   97 6 °F (36 4 °C)  79  18  103/56  97 %  None (Room air)  Lying         Medications:   Scheduled Medications:    Medications:  acetaminophen 650 mg Oral Q6H [facility]   atorvastatin 20 mg Oral Daily With Dinner   dabigatran etexilate 150 mg Oral Q12H [facility]   divalproex sodium 125 mg Oral TID   docusate sodium 100 mg Oral BID   donepezil 10 mg Oral HS   melatonin 6 mg Oral HS   memantine 10 mg Oral BID   nitrofurantoin 100 mg Oral BID With Meals   pantoprazole 40 mg Oral Daily Before Breakfast   QUEtiapine 25 mg Oral HS     Continuous IV Infusions:     PRN Meds:    acetaminophen 650 mg Oral Q12H PRN   OLANZapine 2 5 mg Intramuscular TID PRN   oxyCODONE 2 5 mg Oral Q4H PRN   polyethylene glycol 17 g Oral Daily PRN       Discharge Plan:    TBD    Network Utilization Review Department  [email or web address]  ATTENTION: Please call with any questions or concerns to [phone] and carefully listen to the prompts so that you are directed to the right person  All voicemails are confidential   Keke Robin all requests for admission clinical reviews, approved or denied determinations and any other requests to dedicated fax number below belonging to the campus where the patient is receiving treatment   List of dedicated fax numbers for the Facilities:  [address] DENIALS (Administrative/Medical Necessity) [phone]   [address] (Maternity/NICU/Pediatrics) [phone]   Eder Harper [phone]   Stevan Cruz [phone]   Sasha Clemente [phone]   145 [facility]  [phone]   [address] [place] [address] [facility] [phone]   Kylah Mayfield [phone]   220[address] [facility]  [address] [address] [phone] 20

## 2019-12-19 NOTE — ASSESSMENT & PLAN NOTE
T12 compression fracture  Has been evaluated by PT/OT which family is is now considering  SNF at this time  TLSO brace has been placed

## 2019-12-20 PROCEDURE — 97110 THERAPEUTIC EXERCISES: CPT

## 2019-12-20 PROCEDURE — 97530 THERAPEUTIC ACTIVITIES: CPT

## 2019-12-20 PROCEDURE — 97535 SELF CARE MNGMENT TRAINING: CPT

## 2019-12-20 PROCEDURE — 99232 SBSQ HOSP IP/OBS MODERATE 35: CPT | Performed by: INTERNAL MEDICINE

## 2019-12-20 RX ADMIN — ACETAMINOPHEN 650 MG: 325 TABLET ORAL at 05:14

## 2019-12-20 RX ADMIN — PANTOPRAZOLE SODIUM 40 MG: 40 TABLET, DELAYED RELEASE ORAL at 09:12

## 2019-12-20 RX ADMIN — MEMANTINE 10 MG: 5 TABLET ORAL at 09:12

## 2019-12-20 RX ADMIN — ACETAMINOPHEN 650 MG: 325 TABLET ORAL at 11:56

## 2019-12-20 RX ADMIN — DOCUSATE SODIUM 100 MG: 100 CAPSULE, LIQUID FILLED ORAL at 17:06

## 2019-12-20 RX ADMIN — ATORVASTATIN CALCIUM 20 MG: 20 TABLET, FILM COATED ORAL at 17:06

## 2019-12-20 RX ADMIN — OXYCODONE HYDROCHLORIDE 2.5 MG: 5 TABLET ORAL at 09:13

## 2019-12-20 RX ADMIN — DABIGATRAN ETEXILATE MESYLATE 150 MG: 150 CAPSULE ORAL at 09:23

## 2019-12-20 RX ADMIN — DONEPEZIL HYDROCHLORIDE 10 MG: 10 TABLET, FILM COATED ORAL at 17:06

## 2019-12-20 RX ADMIN — DIVALPROEX SODIUM 125 MG: 125 CAPSULE, COATED PELLETS ORAL at 20:06

## 2019-12-20 RX ADMIN — DOCUSATE SODIUM 100 MG: 100 CAPSULE, LIQUID FILLED ORAL at 09:12

## 2019-12-20 RX ADMIN — MEMANTINE 10 MG: 5 TABLET ORAL at 20:05

## 2019-12-20 RX ADMIN — MELATONIN TAB 3 MG 6 MG: 3 TAB at 20:04

## 2019-12-20 RX ADMIN — NITROFURANTOIN (MONOHYDRATE/MACROCRYSTALS) 100 MG: 75; 25 CAPSULE ORAL at 09:11

## 2019-12-20 RX ADMIN — DABIGATRAN ETEXILATE MESYLATE 150 MG: 150 CAPSULE ORAL at 20:04

## 2019-12-20 RX ADMIN — QUETIAPINE FUMARATE 25 MG: 25 TABLET ORAL at 20:05

## 2019-12-20 RX ADMIN — ACETAMINOPHEN 650 MG: 325 TABLET ORAL at 17:05

## 2019-12-20 RX ADMIN — DIVALPROEX SODIUM 125 MG: 125 CAPSULE, COATED PELLETS ORAL at 17:04

## 2019-12-20 RX ADMIN — POLYETHYLENE GLYCOL 3350 17 G: 17 POWDER, FOR SOLUTION ORAL at 09:23

## 2019-12-20 RX ADMIN — NITROFURANTOIN (MONOHYDRATE/MACROCRYSTALS) 100 MG: 75; 25 CAPSULE ORAL at 17:05

## 2019-12-20 NOTE — OCCUPATIONAL THERAPY NOTE
OccupationalTherapy Progress Note     Patient Name: Althea Reid  Today's Date: 12/20/2019  Problem List  Principal Problem:    Syncope and collapse  Active Problems:    Alzheimer disease ([facility] )    Hypertension    [facility] ([facility] )    Chronic UTI    Compression fracture of T12 vertebra ([facility] )    Syncope          12/20/19 1354   Restrictions/Precautions   Weight Bearing Precautions Per Order Yes   RLE Weight Bearing Per Order WBAT   LLE Weight Bearing Per Order WBAT   Braces or Orthoses TLSO  (modified to LSO; As needed for comfort, pt declined )   Other Precautions Cognitive; Bed Alarm; Chair Alarm;Multiple lines; Fall Risk;Pain   Pain Assessment   Pain Assessment 0-10   Pain Score Worst Possible Pain   Pain Type Acute pain   Pain Location Back   Pain Orientation Lower   Hospital Pain Intervention(s) Repositioned; Emotional support; Rest   Response to Interventions Tolerated OT   Multiple Pain Sites No   Lifestyle   Autonomy per pt son pt supervision w/ ADLs, supervision w/ functional transfers and mobility w/ no AD, assist w/ IADLs and transportation   Reciprocal Relationships son and daughters   Service to Others    Intrinsic Gratification watch tv   Bed Mobility   Rolling R 3  Moderate assistance   Additional items Assist x 2;Bedrails; Increased time required;Verbal cues;LE management   Supine to Sit 2  Maximal assistance   Additional items Assist x 2;HOB elevated; Bedrails; Increased time required;Verbal cues;LE management   Sit to Supine 2  Maximal assistance   Additional items Assist x 2; Increased time required;Verbal cues;LE management   Additional Comments Pt lying supine at end of session with call bell and phone within reach  All needs met and pt reports no further questions for OT at this time  Transfers   Sit to Stand 4  Minimal assistance   Additional items Assist x 2; Increased time required;Verbal cues   Stand to Sit 3  Moderate assistance   Additional items Assist x 2; Increased time required;Verbal cues   Additional Comments Cues for safe technique, hand placement, and posture; Increased encouragement for participation   Functional Mobility   Additional Comments Pt unable to complete 2* nausea at this time   Cognition   Overall Cognitive Status Impaired   Arousal/Participation Alert   Attention Difficulty attending to directions   Orientation Level Oriented to person   Memory Decreased short term memory;Decreased recall of recent events;Decreased recall of precautions   Following Commands Follows one step commands with increased time or repetition   Assessment   Assessment Pt seen for OT treatment session this PM focusing on functional activity tolerance, bed mobility, ADLs, and functional transfers  Pt alert and cooperative throughout session  Pt lying supine at start of session, requiring Max A of 2 for supine>sit with decreased carryover of log roll technique demonstrated  Pt with reports of increased pain sitting EOB, however declined use of TLSO/LSO brace at this time 2* increased comfort with brace donned  Transfers completed with Mod-Min A of 2 with increased ability to initiate forward weight shift from surface for sit>stand  Upon standing, pt with c/o increased pain  Attempted to redirect pt, however pt then reports feeling nauseous  Pt returned to supine position with Max A of 2 with assist for LE management and trunk control  Pt repositioned towards [facility] with assist x2 for optimal pt positioning  Pt with continued reports of nausea, vomiting in supine position with HOB elevated  Please see above levels for ADL assistance required  Pt lying supine at end of session with call bell and phone within reach  All needs met and pt reports no further questions for OT at this time  Continue to recommend STR when medically cleared  OT to follow pt on caseload  Plan   Treatment Interventions ADL retraining;Functional transfer training;UE strengthening/ROM; Endurance training;Patient/family training;Equipment evaluation/education; Compensatory technique education; Energy conservation; Activityengagement   Goal Expiration Date 12/30/19   OT Treatment Day 2   OT Frequency 2-3x/wk   Recommendation   OT Discharge Recommendation Short Term Rehab   Equipment Recommended Tub seat with back; Bedside commode   OT - OK to Discharge Yes  (when medically cleared to rehab)   B[place] Index   Feeding 5   Bathing 0   Grooming Score 0   Dressing Score 0   Bladder Score 5   Bowels Score 10   Toilet Use Score 5   Transfers (Bed/Chair) Score 5   Mobility (Level Surface) Score 0   Stairs Score 0   [place] Index Score 30   Modified Granite Scale   Modified [place] Scale 4       Sally Mcdonough, OTR/L

## 2019-12-20 NOTE — ASSESSMENT & PLAN NOTE
Acute on chronic UTI on cephalexin however current urine culture citrobacter with multiple resistances  Was started on cefepime, has been switched to nitrofurantoin  Currently antibiotic day 5  Will discontinue      Results from last 7 days   Lab Units 12/14/19  0938   URINE CULTURE  >100,000 cfu/ml Citrobacter freundii*

## 2019-12-20 NOTE — ASSESSMENT & PLAN NOTE
T12 compression fracture  Has been evaluated by PT/OT which family is is now agreeable to SNF at this time  TLSO brace has been placed    Awaiting rehab placement

## 2019-12-20 NOTE — PROGRESS NOTES
Progress Note - Ray Mccurdy 1933, 80 y o  female MRN: [id number]    Unit/Bed#: E5 -01 Encounter: [id number]    Primary Care Provider: Ada Peter DO   Date and time admitted to hospital: 12/14/2019  8:41 AM        * Syncope and collapse  Assessment & Plan  Syncope and collapse with history of CVA, dementia, chronic UTI  Seen by neurology with possibility of seizure episode  Has been started on depakote by neurology  EEG negative    Compression fracture of T12 vertebra (HCC)  Assessment & Plan  T12 compression fracture  Has been evaluated by PT/OT which family is is now agreeable to SNF at this time  TLSO brace has been placed  Awaiting rehab placement    Patient is [place]  Assessment & Plan  No blood products  Chronic UTI  Assessment & Plan  Acute on chronic UTI on cephalexin however current urine culture citrobacter with multiple resistances  Was started on cefepime, has been switched to nitrofurantoin  Currently antibiotic day 5  Will discontinue  Results from last 7 days   Lab Units 12/14/19  0942   URINE CULTURE  >100,000 cfu/ml Citrobacter freundii*       A-fib [facility])  Assessment & Plan  Paroxysmal atrial fibrillation  Continue pradaxa    Hypertension  Assessment & Plan  History of hypertension without need for medications    Alzheimer disease ([facility] [address] )  Assessment & Plan  Alzheimer's dementia on memantine and donepezil  On quetiapine and melatonin for sleep during hospitalization      VTE Pharmacologic Prophylaxis: Dabigatran (Pradaxa)    Patient Centered Rounds: I have performed bedside rounds with nursing staff today  Discussions with Specialists or Other Care Team Provider:  Case management  Education and Discussions with Family / Patient:  Daughter and son    Time Spent for Care: 25 mins  More than 50% of total time spent on counseling and coordination of care as described above      Current Length of Stay: 6 day(s)  Current Patient Status: Inpatient Certification Statement: The patient will continue to require additional inpatient hospital stay due to awaiting rehab placement  Discharge Plan / Estimated Discharge Date:  Medically stable for discharge to rehab    Code Status: Level 3 - DNAR and DNI  ______________________________________________________________________________    Subjective:   Patient seen and examined  More awake today  No new complaints  Objective:   Vitals: Blood pressure 110/60, pulse 76, temperature 97 5 °F (36 4 °C), temperature source Temporal, resp  rate 18, weight 65 9 kg (145 lb 4 5 oz), SpO2 97 %      Physical Exam:   General appearance: alert, appears stated age and cooperative  Head: atraumatic  Lungs: diminished breath sounds  Heart: regular rate and rhythm  Abdomen: soft, non-tender, positive bowel sounds   Back: negative, range of motion normal  Extremities: extremities atraumatic, no cyanosis or edema  Neurologic: Grossly normal    Additional Data:   Labs:  Results from last 7 days   Lab Units 12/18/19  0600 12/15/19  0617 12/14/19  0910 12/14/19  0905   WBC Thousand/uL 7 07 10 03  --  11 14*   HEMOGLOBIN g/dL 10 2* 10 3*  --  11 0*   I STAT HEMOGLOBIN g/dl  --   --  11 6  --    HEMATOCRIT % 32 0* 33 0*  --  35 3   HEMATOCRIT, ISTAT %  --   --  34*  --    MCV fL 89 90  --  90   PLATELETS Thousands/uL 222 230  --  255   INR   --   --   --  1 49*     Results from last 7 days   Lab Units 12/18/19  0600 12/15/19  0617 12/14/19  0910 12/14/19  0905   SODIUM mmol/L 140 143  --  141   POTASSIUM mmol/L 3 7 4 0  --  3 7   CHLORIDE mmol/L 104 107  --  106   CO2 mmol/L 26 26  --  26   CO2, I-STAT mmol/L  --   --  22  --    AGAP mmol/L  --   --  17*  --    ANION GAP mmol/L 10 10  --  9   BUN mg/dL 26* 21  --  15   CREATININE mg/dL 0 87 1 03  --  0 88   CALCIUM mg/dL 8 3 8 6  --  8 1*   ALBUMIN g/dL 2 8*  --   --  3 1*   TOTAL BILIRUBIN mg/dL 0 29  --   --  0 30   ALK PHOS U/L 73  --   --  77   ALT U/L 14  --   --  17   AST U/L 14 --   --  17   EGFR ml/min/1 73sq m 61 49 67 60   GLUCOSE RANDOM mg/dL 118 112  --  136         Results from last 7 days   Lab Units 12/14/19  0905   TROPONIN I ng/mL <0 02                          * I Have Reviewed All Lab Data Listed Above  Cultures:   Results from last 7 days   Lab Units 12/14/19  1344 12/14/19  0942   BLOOD CULTURE  No Growth After 5 Days  No Growth After 5 Days  --    URINE CULTURE   --  >100,000 cfu/ml Citrobacter freundii*             Imaging:  Imaging Reports Reviewed Today Include:   No results found  Scheduled Meds:  Current Facility-Administered Medications:  acetaminophen 650 mg Oral Q6H [facility] Felipe GARCÍA MD   acetaminophen 650 mg Oral Q12H PRN Felipe Beard V, MD   atorvastatin 20 mg Oral Daily With Dinner Ismael Thomas MD   dabigatran etexilate 150 mg Oral Q12H [facility] Felipe GARCÍA MD   divalproex sodium 125 mg Oral TID Kalia Villegas, DO   docusate sodium 100 mg Oral BID Felipe GARCÍA MD   donepezil 10 mg Oral HS Felipe Beard V, MD   melatonin 6 mg Oral HS Juan Ornelas, DO   memantine 10 mg Oral BID Felipe GARCÍA MD   nitrofurantoin 100 mg Oral BID With Meals Juan Ornelas, DO   OLANZapine 2 5 mg Intramuscular TID PRN Abdifatah Bilis, DO   oxyCODONE 2 5 mg Oral Q4H PRN Juan Ornelas, DO   pantoprazole 40 mg Oral Daily Before Breakfast Felipe GARCÍA MD   polyethylene glycol 17 g Oral Daily PRN Felipe Hampton MD   QUEtiapine 25 mg Oral HS Abdifatah Bilis, DO       Abdifatah Bilis, DO  [facility] Internal Medicine  Hospitalist    ** Please Note: This note has been constructed using a voice recognition system   **

## 2019-12-20 NOTE — PHYSICAL THERAPY NOTE
Physical Therapy Progress Note     12/20/19 1987   Pain Assessment   Pain Assessment 0-10   Pain Score Worst Possible Pain   Pain Type Acute pain   Pain Location Back   Pain Orientation Lower   Hospital Pain Intervention(s) Ambulation/increased activity;Repositioned   Response to Interventions Poorly tolerated  Restrictions/Precautions   Weight Bearing Precautions Per Order Yes   RLE Weight Bearing Per Order WBAT   LLE Weight Bearing Per Order WBAT   Braces or Orthoses TLSO  (modified to LSO; As needed for comfort, pt declined )   Other Precautions Cognitive; Chair Alarm; Bed Alarm; Fall Risk;Pain;Multiple lines   General   Chart Reviewed Yes   Response to Previous Treatment Patient reporting fatigue but able to participate  Family/Caregiver Present Yes  (Pt's son present during treatment session )   Subjective   Subjective Willing to participate in therapy this PM    Bed Mobility   Rolling R 3  Moderate assistance   Additional items Assist x 2;HOB elevated; Bedrails; Increased time required;Leg ;Verbal cues;LE management   Supine to Sit 2  Maximal assistance   Additional items Assist x 2;HOB elevated; Bedrails;Leg ; Increased time required;LE management;Verbal cues   Sit to Supine 2  Maximal assistance   Additional items Assist x 2;Bedrails;Leg ; Increased time required;Verbal cues;LE management   Transfers   Sit to Stand 4  Minimal assistance   Additional items Assist x 2;Bedrails; Increased time required;Verbal cues   Stand to Sit 4  Minimal assistance   Additional items Assist x 2;Bedrails; Increased time required;Verbal cues   Balance   Static Sitting Fair -   Dynamic Sitting Poor +   Static Standing Poor +   Dynamic Standing Poor   Endurance Deficit   Endurance Deficit Yes   Endurance Deficit Description pain/fatigue   Activity Tolerance   Activity Tolerance Patient limited by fatigue;Patient limited by pain   Nurse Made Aware Yes   Medical Staff Made Aware ANNAMARIA Cortez   Exercises   THR Supine;10 reps;AAROM; Bilateral   Assessment   Prognosis Fair   Problem List Decreased strength; Impaired balance;Decreased range of motion;Decreased endurance;Decreased mobility; Decreased skin integrity;Orthopedic restrictions;Pain;Decreased cognition;Decreased safety awareness; Impaired judgement   Assessment Pt  supine in bed upon my arrival  Pt  continues to report increased pain/fatigue, however agreeable to therapeutic intervention  Pt  defers application of LSO this treatment session  Performance of limited HEP supine in bed with cues provided for proper completion  Progressed with transfers requiring A of 2 with cues for hand placement/technique  Pt  resistive to movement due to increased pain, requiring quick return to EOB  Pt  repositioned supine in bed with A of 2  Pt  began vomiting, requiring increased A of therapist for pericare  RN aware of vomiting episode of pt  at end of treatment session  Pt  remained supine in bed with alarm active at end of treatment session  PT will continue to recommend d/c to rehab when medically stable for continued improvement of noted impairments above  Barriers to Discharge Inaccessible home environment;Decreased caregiver support   Barriers to Discharge Comments Lives alone, [place]   Goals   Patient Goals To have less pain  STG Expiration Date 12/30/19   PT Treatment Day 2   Plan   Treatment/Interventions Functional transfer training;LE strengthening/ROM; Therapeutic exercise; Endurance training;Bed mobility;Spoke to nursing;Spoke to case management;OT;Family   Progress Slow progress, decreased activity tolerance   PT Frequency Other (Comment)  (3-5x/wk)   Recommendation   Recommendation Short-term skilled PT   Equipment Recommended Walker  (RW)   PT - OK to Discharge Yes  (if d/c to rehab when medically stable )     Radha Vargas, PTA

## 2019-12-20 NOTE — PLAN OF CARE
Problem: OCCUPATIONAL THERAPY ADULT  Goal: Performs self-care activities at highest level of function for planned discharge setting  See evaluation for individualized goals  Description  Treatment Interventions: ADL retraining, UE strengthening/ROM, Functional transfer training, Endurance training, Cognitive reorientation, Patient/family training, Equipment evaluation/education, Compensatory technique education, Energy conservation, Activityengagement  Equipment Recommended: Tub seat with back, Bedside commode(Rolling walker, hospital bed)       See flowsheet documentation for full assessment, interventions and recommendations  Outcome: Progressing  Note:   Limitation: Decreased ADL status, Decreased Safe judgement during ADL, Decreased UE strength, Decreased endurance, Decreased cognition, Decreased sensation, Decreased self-care trans, Decreased high-level ADLs, Decreased fine motor control  Prognosis: Guarded  Assessment: Pt seen for OT treatment session this PM focusing on functional activity tolerance, bed mobility, ADLs, and functional transfers  Pt alert and cooperative throughout session  Pt lying supine at start of session, requiring Max A of 2 for supine>sit with decreased carryover of log roll technique demonstrated  Pt with reports of increased pain sitting EOB, however declined use of TLSO/LSO brace at this time 2* increased comfort with brace donned  Transfers completed with Mod-Min A of 2 with increased ability to initiate forward weight shift from surface for sit>stand  Upon standing, pt with c/o increased pain  Attempted to redirect pt, however pt then reports feeling nauseous  Pt returned to supine position with Max A of 2 with assist for LE management and trunk control  Pt repositioned towards [facility] with assist x2 for optimal pt positioning  Pt with continued reports of nausea, vomiting in supine position with HOB elevated  Please see above levels for ADL assistance required   Pt lying supine at end of session with call bell and phone within reach  All needs met and pt reports no further questions for OT at this time  Continue to recommend STR when medically cleared  OT to follow pt on caseload        OT Discharge Recommendation: Short Term Rehab  OT - OK to Discharge: Yes(when medically cleared to rehab)

## 2019-12-20 NOTE — PLAN OF CARE
Problem: PHYSICAL THERAPY ADULT  Goal: Performs mobility at highest level of function for planned discharge setting  See evaluation for individualized goals  Description  Treatment/Interventions: Functional transfer training, LE strengthening/ROM, Elevations, Therapeutic exercise, Endurance training, Patient/family training, Bed mobility, Gait training, Spoke to nursing, Spoke to case management, OT, Family, Spoke to MD          See flowsheet documentation for full assessment, interventions and recommendations  Outcome: Progressing  Note:   Prognosis: Fair  Problem List: Decreased strength, Impaired balance, Decreased range of motion, Decreased endurance, Decreased mobility, Decreased skin integrity, Orthopedic restrictions, Pain, Decreased cognition, Decreased safety awareness, Impaired judgement  Assessment: Pt  supine in bed upon my arrival  Pt  continues to report increased pain/fatigue, however agreeable to therapeutic intervention  Pt  defers application of LSO this treatment session  Performance of limited HEP supine in bed with cues provided for proper completion  Progressed with transfers requiring A of 2 with cues for hand placement/technique  Pt  resistive to movement due to increased pain, requiring quick return to EOB  Pt  repositioned supine in bed with A of 2  Pt  began vomiting, requiring increased A of therapist for pericare  RN aware of vomiting episode of pt  at end of treatment session  Pt  remained supine in bed with alarm active at end of treatment session  PT will continue to recommend d/c to rehab when medically stable for continued improvement of noted impairments above  Barriers to Discharge: Inaccessible home environment, Decreased caregiver support  Barriers to Discharge Comments: Lives alone, [place]  Recommendation: Short-term skilled PT     PT - OK to Discharge: Yes(if d/c to rehab when medically stable )    See flowsheet documentation for full assessment

## 2019-12-21 PROCEDURE — 99232 SBSQ HOSP IP/OBS MODERATE 35: CPT | Performed by: INTERNAL MEDICINE

## 2019-12-21 RX ORDER — GABAPENTIN 100 MG/1
100 CAPSULE ORAL
Status: DISCONTINUED | OUTPATIENT
Start: 2019-12-21 | End: 2019-12-27 | Stop reason: HOSPADM

## 2019-12-21 RX ORDER — LIDOCAINE 50 MG/G
1 PATCH TOPICAL DAILY
Status: DISCONTINUED | OUTPATIENT
Start: 2019-12-21 | End: 2019-12-27 | Stop reason: HOSPADM

## 2019-12-21 RX ORDER — GABAPENTIN 100 MG/1
100 CAPSULE ORAL
Status: DISCONTINUED | OUTPATIENT
Start: 2019-12-21 | End: 2019-12-21

## 2019-12-21 RX ORDER — ACETAMINOPHEN 325 MG/1
975 TABLET ORAL EVERY 8 HOURS SCHEDULED
Status: DISCONTINUED | OUTPATIENT
Start: 2019-12-21 | End: 2019-12-27 | Stop reason: HOSPADM

## 2019-12-21 RX ORDER — ACETAMINOPHEN 325 MG/1
975 TABLET ORAL EVERY 8 HOURS SCHEDULED
Status: DISCONTINUED | OUTPATIENT
Start: 2019-12-21 | End: 2019-12-21

## 2019-12-21 RX ADMIN — ACETAMINOPHEN 650 MG: 325 TABLET ORAL at 00:47

## 2019-12-21 RX ADMIN — PANTOPRAZOLE SODIUM 40 MG: 40 TABLET, DELAYED RELEASE ORAL at 07:44

## 2019-12-21 RX ADMIN — ACETAMINOPHEN 650 MG: 325 TABLET ORAL at 15:03

## 2019-12-21 RX ADMIN — DIVALPROEX SODIUM 125 MG: 125 CAPSULE, COATED PELLETS ORAL at 15:07

## 2019-12-21 RX ADMIN — MEMANTINE 10 MG: 5 TABLET ORAL at 20:21

## 2019-12-21 RX ADMIN — POLYETHYLENE GLYCOL 3350 17 G: 17 POWDER, FOR SOLUTION ORAL at 17:19

## 2019-12-21 RX ADMIN — ACETAMINOPHEN 650 MG: 325 TABLET ORAL at 07:46

## 2019-12-21 RX ADMIN — MELATONIN TAB 3 MG 6 MG: 3 TAB at 20:21

## 2019-12-21 RX ADMIN — NITROFURANTOIN (MONOHYDRATE/MACROCRYSTALS) 100 MG: 75; 25 CAPSULE ORAL at 08:36

## 2019-12-21 RX ADMIN — DABIGATRAN ETEXILATE MESYLATE 150 MG: 150 CAPSULE ORAL at 20:21

## 2019-12-21 RX ADMIN — NITROFURANTOIN (MONOHYDRATE/MACROCRYSTALS) 100 MG: 75; 25 CAPSULE ORAL at 17:19

## 2019-12-21 RX ADMIN — DOCUSATE SODIUM 100 MG: 100 CAPSULE, LIQUID FILLED ORAL at 08:30

## 2019-12-21 RX ADMIN — OXYCODONE HYDROCHLORIDE 2.5 MG: 5 TABLET ORAL at 02:48

## 2019-12-21 RX ADMIN — ACETAMINOPHEN 975 MG: 325 TABLET ORAL at 20:21

## 2019-12-21 RX ADMIN — OXYCODONE HYDROCHLORIDE 2.5 MG: 5 TABLET ORAL at 15:04

## 2019-12-21 RX ADMIN — OXYCODONE HYDROCHLORIDE 2.5 MG: 5 TABLET ORAL at 08:30

## 2019-12-21 RX ADMIN — QUETIAPINE FUMARATE 25 MG: 25 TABLET ORAL at 20:21

## 2019-12-21 RX ADMIN — ATORVASTATIN CALCIUM 20 MG: 20 TABLET, FILM COATED ORAL at 17:19

## 2019-12-21 RX ADMIN — GABAPENTIN 100 MG: 100 CAPSULE ORAL at 20:21

## 2019-12-21 RX ADMIN — DIVALPROEX SODIUM 125 MG: 125 CAPSULE, COATED PELLETS ORAL at 20:21

## 2019-12-21 RX ADMIN — DOCUSATE SODIUM 100 MG: 100 CAPSULE, LIQUID FILLED ORAL at 17:19

## 2019-12-21 RX ADMIN — MEMANTINE 10 MG: 5 TABLET ORAL at 08:31

## 2019-12-21 RX ADMIN — DIVALPROEX SODIUM 125 MG: 125 CAPSULE, COATED PELLETS ORAL at 08:36

## 2019-12-21 RX ADMIN — LIDOCAINE 1 PATCH: 50 PATCH CUTANEOUS at 15:05

## 2019-12-21 RX ADMIN — DONEPEZIL HYDROCHLORIDE 10 MG: 10 TABLET, FILM COATED ORAL at 17:19

## 2019-12-21 RX ADMIN — DABIGATRAN ETEXILATE MESYLATE 150 MG: 150 CAPSULE ORAL at 08:30

## 2019-12-21 NOTE — PLAN OF CARE
Problem: Potential for Falls  Goal: Patient will remain free of falls  Description  INTERVENTIONS:  - Assess patient frequently for physical needs  -  Identify cognitive and physical deficits and behaviors that affect risk of falls    -  [place] fall precautions as indicated by assessment   - Educate patient/family on patient safety including physical limitations  - Instruct patient to call for assistance with activity based on assessment  - Modify environment to reduce risk of injury  - Consider OT/PT consult to assist with strengthening/mobility  Outcome: Progressing     Problem: Prexisting or High Potential for Compromised Skin Integrity  Goal: Skin integrity is maintained or improved  Description  INTERVENTIONS:  - Identify patients at risk for skin breakdown  - Assess and monitor skin integrity  - Assess and monitor nutrition and hydration status  - Monitor labs   - Assess for incontinence   - Turn and reposition patient  - Assist with mobility/ambulation  - Relieve pressure over bony prominences  - Avoid friction and shearing  - Provide appropriate hygiene as needed including keeping skin clean and dry  - Evaluate need for skin moisturizer/barrier cream  - Collaborate with interdisciplinary team   - Patient/family teaching  - Consider wound care consult   Outcome: Progressing     Problem: PAIN - ADULT  Goal: Verbalizes/displays adequate comfort level or baseline comfort level  Description  Interventions:  - Encourage patient to monitor pain and request assistance  - Assess pain using appropriate pain scale  - Administer analgesics based on type and severity of pain and evaluate response  - Implement non-pharmacological measures as appropriate and evaluate response  - Consider cultural and social influences on pain and pain management  - Notify physician/advanced practitioner if interventions unsuccessful or patient reports new pain  Outcome: Progressing     Problem: INFECTION - ADULT  Goal: Absence or prevention of progression during hospitalization  Description  INTERVENTIONS:  - Assess and monitor for signs and symptoms of infection  - Monitor lab/diagnostic results  - Monitor all insertion sites, i e  indwelling lines, tubes, and drains  - Monitor endotracheal if appropriate and nasal secretions for changes in amount and color  - [place] appropriate cooling/warming therapies per order  - Administer medications as ordered  - Instruct and encourage patient and family to use good hand hygiene technique  - Identify and instruct in appropriate isolation precautions for identified infection/condition  Outcome: Progressing  Goal: Absence of fever/infection during neutropenic period  Description  INTERVENTIONS:  - Monitor WBC    Outcome: Progressing     Problem: SAFETY ADULT  Goal: Maintain or return to baseline ADL function  Description  INTERVENTIONS:  -  Assess patient's ability to carry out ADLs; assess patient's baseline for ADL function and identify physical deficits which impact ability to perform ADLs (bathing, care of mouth/teeth, toileting, grooming, dressing, etc )  - Assess/evaluate cause of self-care deficits   - Assess range of motion  - Assess patient's mobility; develop plan if impaired  - Assess patient's need for assistive devices and provide as appropriate  - Encourage maximum independence but intervene and supervise when necessary  - Involve family in performance of ADLs  - Assess for home care needs following discharge   - Consider OT consult to assist with ADL evaluation and planning for discharge  - Provide patient education as appropriate  Outcome: Progressing  Goal: Maintain or return mobility status to optimal level  Description  INTERVENTIONS:  - Assess patient's baseline mobility status (ambulation, transfers, stairs, etc )    - Identify cognitive and physical deficits and behaviors that affect mobility  - Identify mobility aids required to assist with transfers and/or ambulation (gait belt, sit-to-stand, lift, walker, cane, etc )  - [place] fall precautions as indicated by assessment  - Record patient progress and toleration of activity level on Mobility SBAR; progress patient to next Phase/Stage  - Instruct patient to call for assistance with activity based on assessment  - Consider rehabilitation consult to assist with strengthening/weightbearing, etc   Outcome: Progressing     Problem: DISCHARGE PLANNING  Goal: Discharge to home or other facility with appropriate resources  Description  INTERVENTIONS:  - Identify barriers to discharge w/patient and caregiver  - Arrange for needed discharge resources and transportation as appropriate  - Identify discharge learning needs (meds, wound care, etc )  - Arrange for interpretive services to assist at discharge as needed  - Refer to Case Management Department for coordinating discharge planning if the patient needs post-hospital services based on physician/advanced practitioner order or complex needs related to functional status, cognitive ability, or social support system  Outcome: Progressing     Problem: Knowledge Deficit  Goal: Patient/family/caregiver demonstrates understanding of disease process, treatment plan, medications, and discharge instructions  Description  Complete learning assessment and assess knowledge base  Interventions:  - Provide teaching at level of understanding  - Provide teaching via preferred learning methods  Outcome: Progressing     Problem: Nutrition/Hydration-ADULT  Goal: Nutrient/Hydration intake appropriate for improving, restoring or maintaining nutritional needs  Description  Monitor and assess patient's nutrition/hydration status for malnutrition  Collaborate with interdisciplinary team and initiate plan and interventions as ordered  Monitor patient's weight and dietary intake as ordered or per policy  Utilize nutrition screening tool and intervene as necessary   Determine patient's food preferences and provide high-protein, high-caloric foods as appropriate       INTERVENTIONS:  - Monitor oral intake, urinary output, labs, and treatment plans  - Assess nutrition and hydration status and recommend course of action  - Evaluate amount of meals eaten  - Assist patient with eating if necessary   - Allow adequate time for meals  - Recommend/ encourage appropriate diets, oral nutritional supplements, and vitamin/mineral supplements  - Order, calculate, and assess calorie counts as needed  - Recommend, monitor, and adjust tube feedings and TPN/PPN based on assessed needs  - Assess need for intravenous fluids  - Provide specific nutrition/hydration education as appropriate  - Include patient/family/caregiver in decisions related to nutrition  Outcome: Progressing     Problem: NEUROSENSORY - ADULT  Goal: Achieves stable or improved neurological status  Description  INTERVENTIONS  - Monitor and report changes in neurological status  - Monitor vital signs such as temperature, blood pressure, glucose, and any other labs ordered   - Initiate measures to prevent increased intracranial pressure  - Monitor for seizure activity and implement precautions if appropriate      Outcome: Progressing  Goal: Remains free of injury related to seizures activity  Description  INTERVENTIONS  - Maintain airway, patient safety  and administer oxygen as ordered  - Monitor patient for seizure activity, document and report duration and description of seizure to physician/advanced practitioner  - If seizure occurs,  ensure patient safety during seizure  - Reorient patient post seizure  - Seizure pads on all 4 side rails  - Instruct patient/family to notify RN of any seizure activity including if an aura is experienced  - Instruct patient/family to call for assistance with activity based on nursing assessment  - Administer anti-seizure medications if ordered    Outcome: Progressing  Goal: Achieves maximal functionality and self care  Description  INTERVENTIONS  - Monitor swallowing and airway patency with patient fatigue and changes in neurological status  - Encourage and assist patient to increase activity and self care     - Encourage visually impaired, hearing impaired and aphasic patients to use assistive/communication devices  Outcome: Progressing     Problem: MUSCULOSKELETAL - ADULT  Goal: Maintain or return mobility to safest level of function  Description  INTERVENTIONS:  - Assess patient's ability to carry out ADLs; assess patient's baseline for ADL function and identify physical deficits which impact ability to perform ADLs (bathing, care of mouth/teeth, toileting, grooming, dressing, etc )  - Assess/evaluate cause of self-care deficits   - Assess range of motion  - Assess patient's mobility  - Assess patient's need for assistive devices and provide as appropriate  - Encourage maximum independence but intervene and supervise when necessary  - Involve family in performance of ADLs  - Assess for home care needs following discharge   - Consider OT consult to assist with ADL evaluation and planning for discharge  - Provide patient education as appropriate  Outcome: Progressing  Goal: Maintain proper alignment of affected body part  Description  INTERVENTIONS:  - Support, maintain and protect limb and body alignment  - Provide patient/ family with appropriate education  Outcome: Progressing

## 2019-12-21 NOTE — ASSESSMENT & PLAN NOTE
T12 compression fracture  Neurosurgery recommended conservative management  Has been evaluated by PT/OT which family is is now agreeable to SNF at this time  TLSO brace has been placed  Awaiting rehab placement  Will add Lidoderm patch and gabapentin HS for pain  Change Tylenol to 975 Q 8    Continue oxycodone 2 5 mg

## 2019-12-21 NOTE — QUICK NOTE
Pt getting out of bed throughout the night, bed alarm goes off when coming into the Pt room, Pt verbally curses out and doesn't want males coming into then room  Nurse made aware

## 2019-12-21 NOTE — PROGRESS NOTES
Progress Note - Lewis Nicolas 1933, 80 y o  female MRN: [id number]    Unit/Bed#: E5 -01 Encounter: [id number]    Primary Care Provider: Marva Hall DO   Date and time admitted to hospital: 12/14/2019  8:41 AM        * Syncope and collapse  Assessment & Plan  Syncope and collapse with history of CVA, dementia, chronic UTI  Seen by neurology with possibility of seizure episode  Has been started on depakote by neurology  EEG negative    Compression fracture of T12 vertebra (HCC)  Assessment & Plan  T12 compression fracture  Neurosurgery recommended conservative management  Has been evaluated by PT/OT which family is is now agreeable to SNF at this time  TLSO brace has been placed  Awaiting rehab placement  Will add Lidoderm patch and gabapentin HS for pain  Change Tylenol to 975 Q 8  Continue oxycodone 2 5 mg  A-fib [facility])  Assessment & Plan  Paroxysmal atrial fibrillation  Continue pradaxa    Hypertension  Assessment & Plan  History of hypertension without need for medications    Alzheimer disease ([facility] [address] )  Assessment & Plan  Alzheimer's dementia on memantine and donepezil  On quetiapine and melatonin for sleep during hospitalization      VTE Pharmacologic Prophylaxis:   Pharmacologic: Dabigatran (Pradaxa)  Mechanical VTE Prophylaxis in Place: Yes    Patient Centered Rounds: I have performed bedside rounds with nursing staff today  Discussions with Specialists or Other Care Team Provider:     Education and Discussions with Family / Patient: discussed with son at bedside    Time Spent for Care: 30 minutes  More than 50% of total time spent on counseling and coordination of care as described above      Current Length of Stay: 7 day(s)    Current Patient Status: Inpatient   Certification Statement: The patient will continue to require additional inpatient hospital stay due to above    Discharge Plan: pending placement    Code Status: Level 3 - DNAR and DNI      Subjective:   Pt seen and examined by me this morning  Pt complained of pain in her back  Current pain regimen not helping a lot  Patient's son was present at bedside  Objective:     Vitals:   Temp (24hrs), Av 7 °F (36 5 °C), Min:97 6 °F (36 4 °C), Max:97 8 °F (36 6 °C)    Temp:  [97 6 °F (36 4 °C)-97 8 °F (36 6 °C)] 97 8 °F (36 6 °C)  HR:  [76-77] 77  Resp:  [18] 18  BP: (143-165)/(65-97) 165/97  SpO2:  [96 %-98 %] 96 %  Body mass index is 24 94 kg/m²  Input and Output Summary (last 24 hours): Intake/Output Summary (Last 24 hours) at 2019 1236  Last data filed at 2019 0900  Gross per 24 hour   Intake 300 ml   Output 300 ml   Net 0 ml       Physical Exam:     Physical Exam    Constitutional: Pt appears comfortable  Not in any acute distress  Cardiovascular: Normal rate, regular rhythm, normal heart sounds  Systolic murmur heard  Pulmonary/Chest: Effort normal, air entry b/l equal  No respiratory distress  Pt has no wheezes or crackles  Abdominal: Soft  Non-distended, Non-tender  Bowel sounds are normal    Neurological:  Awake and alert  Moving all extremities spontaneously  Psychiatric:  Pleasantly confused    Additional Data:     Labs:    Results from last 7 days   Lab Units 19  0600 12/15/19  0617   WBC Thousand/uL 7 07 10 03   HEMOGLOBIN g/dL 10 2* 10 3*   HEMATOCRIT % 32 0* 33 0*   PLATELETS Thousands/uL 222 230   NEUTROS PCT %  --  74   LYMPHS PCT %  --  15   MONOS PCT %  --  9   EOS PCT %  --  2     Results from last 7 days   Lab Units 19  0600   SODIUM mmol/L 140   POTASSIUM mmol/L 3 7   CHLORIDE mmol/L 104   CO2 mmol/L 26   BUN mg/dL 26*   CREATININE mg/dL 0 87   ANION GAP mmol/L 10   CALCIUM mg/dL 8 3   ALBUMIN g/dL 2 8*   TOTAL BILIRUBIN mg/dL 0 29   ALK PHOS U/L 73   ALT U/L 14   AST U/L 14   GLUCOSE RANDOM mg/dL 118                           * I Have Reviewed All Lab Data Listed Above  * Additional Pertinent Lab Tests Reviewed:  Stacey 66 Admission Reviewed    Imaging:    Imaging Reports Reviewed Today Include:   Imaging Personally Reviewed by Myself Includes:      Recent Cultures (last 7 days):     Results from last 7 days   Lab Units 12/14/19  1344   BLOOD CULTURE  No Growth After 5 Days  No Growth After 5 Days  Last 24 Hours Medication List:     Current Facility-Administered Medications:  acetaminophen 650 mg Oral Q12H PRN Terrence GARCÍA MD   acetaminophen 975 mg Oral Q8H [facility] Masha Danielle MD   atorvastatin 20 mg Oral Daily With Dinner Mendez Prater MD   dabigatran etexilate 150 mg Oral Q12H [facility] Felipe GARCÍA MD   divalproex sodium 125 mg Oral TID Kalia Villegas DO   docusate sodium 100 mg Oral BID Felipe GARCÍA MD   donepezil 10 mg Oral HS Felipe GARCÍA MD   gabapentin 100 mg Oral HS Masha Danielle MD   lidocaine 1 patch Topical Daily Mahsa Danielle MD   melatonin 6 mg Oral HS Juan Ornelas,    memantine 10 mg Oral BID Felipe GARCÍA MD   nitrofurantoin 100 mg Oral BID With Meals Juan Ornelas DO   OLANZapine 2 5 mg Intramuscular TID PRN Juan Ornelas,    oxyCODONE 2 5 mg Oral Q4H PRN Jaun Ornelas DO   pantoprazole 40 mg Oral Daily Before Breakfast Felipe GARCÍA MD   polyethylene glycol 17 g Oral Daily PRN Felipe GARCÍA MD   QUEtiapine 25 mg Oral HS Jose Antonio Barrera DO        Today, Patient Was Seen By: Masha Danielle MD    ** Please Note: Dictation voice to text software may have been used in the creation of this document   **

## 2019-12-22 PROCEDURE — 99232 SBSQ HOSP IP/OBS MODERATE 35: CPT | Performed by: INTERNAL MEDICINE

## 2019-12-22 RX ADMIN — DABIGATRAN ETEXILATE MESYLATE 150 MG: 150 CAPSULE ORAL at 08:27

## 2019-12-22 RX ADMIN — PANTOPRAZOLE SODIUM 40 MG: 40 TABLET, DELAYED RELEASE ORAL at 06:05

## 2019-12-22 RX ADMIN — MELATONIN TAB 3 MG 6 MG: 3 TAB at 20:59

## 2019-12-22 RX ADMIN — ATORVASTATIN CALCIUM 20 MG: 20 TABLET, FILM COATED ORAL at 16:00

## 2019-12-22 RX ADMIN — ACETAMINOPHEN 975 MG: 325 TABLET ORAL at 05:57

## 2019-12-22 RX ADMIN — NITROFURANTOIN (MONOHYDRATE/MACROCRYSTALS) 100 MG: 75; 25 CAPSULE ORAL at 08:27

## 2019-12-22 RX ADMIN — DOCUSATE SODIUM 100 MG: 100 CAPSULE, LIQUID FILLED ORAL at 08:27

## 2019-12-22 RX ADMIN — LIDOCAINE 1 PATCH: 50 PATCH CUTANEOUS at 08:27

## 2019-12-22 RX ADMIN — DIVALPROEX SODIUM 125 MG: 125 CAPSULE, COATED PELLETS ORAL at 16:00

## 2019-12-22 RX ADMIN — GABAPENTIN 100 MG: 100 CAPSULE ORAL at 20:55

## 2019-12-22 RX ADMIN — DABIGATRAN ETEXILATE MESYLATE 150 MG: 150 CAPSULE ORAL at 20:55

## 2019-12-22 RX ADMIN — NITROFURANTOIN (MONOHYDRATE/MACROCRYSTALS) 100 MG: 75; 25 CAPSULE ORAL at 16:00

## 2019-12-22 RX ADMIN — MEMANTINE 10 MG: 5 TABLET ORAL at 20:55

## 2019-12-22 RX ADMIN — QUETIAPINE FUMARATE 25 MG: 25 TABLET ORAL at 20:55

## 2019-12-22 RX ADMIN — MEMANTINE 10 MG: 5 TABLET ORAL at 08:27

## 2019-12-22 RX ADMIN — DOCUSATE SODIUM 100 MG: 100 CAPSULE, LIQUID FILLED ORAL at 17:39

## 2019-12-22 RX ADMIN — DIVALPROEX SODIUM 125 MG: 125 CAPSULE, COATED PELLETS ORAL at 20:55

## 2019-12-22 RX ADMIN — DONEPEZIL HYDROCHLORIDE 10 MG: 10 TABLET, FILM COATED ORAL at 17:39

## 2019-12-22 RX ADMIN — DIVALPROEX SODIUM 125 MG: 125 CAPSULE, COATED PELLETS ORAL at 08:27

## 2019-12-22 RX ADMIN — ACETAMINOPHEN 975 MG: 325 TABLET ORAL at 13:06

## 2019-12-22 RX ADMIN — ACETAMINOPHEN 975 MG: 325 TABLET ORAL at 20:55

## 2019-12-22 NOTE — PROGRESS NOTES
Progress Note - Michelle Maru 1933, 80 y o  female MRN: [id number]    Unit/Bed#: E5 -01 Encounter: [id number]    Primary Care Provider: J Carlos Flanagan DO   Date and time admitted to hospital: 12/14/2019  8:41 AM        * Syncope and collapse  Assessment & Plan  Syncope and collapse with history of CVA, dementia, chronic UTI  Seen by neurology with possibility of seizure episode  Has been started on depakote by neurology  EEG negative    Compression fracture of T12 vertebra (HCC)  Assessment & Plan  T12 compression fracture  Neurosurgery recommended conservative management  Has been evaluated by PT/OT which family is is now agreeable to SNF at this time  TLSO brace has been placed  Awaiting rehab placement  Lidoderm patch and gabapentin HS for pain  Tylenol to 975 Q 8  Oxycodone 2 5 mg  Patient's daughter reports better pain control this morning  Patient was able to stand and walk to the bathroom using a walker with 1 person assist which she has not been able to do for last few days  A-fib [facility])  Assessment & Plan  Paroxysmal atrial fibrillation  Continue pradaxa    Hypertension  Assessment & Plan  History of hypertension without need for medications    Alzheimer disease ([facility] [address] )  Assessment & Plan  Alzheimer's dementia on memantine and donepezil  On quetiapine and melatonin for sleep during hospitalization        VTE Pharmacologic Prophylaxis:   Pharmacologic: Dabigatran (Pradaxa)  Mechanical VTE Prophylaxis in Place: Yes     Patient Centered Rounds: I have performed bedside rounds with nursing staff today      Discussions with Specialists or Other Care Team Provider:      Education and Discussions with Family / Patient: discussed with daughter at bedside     Time Spent for Care: 30 minutes  More than 50% of total time spent on counseling and coordination of care as described above      Current Length of Stay: 8 day(s)     Current Patient Status: Inpatient   Certification Statement:  The patient will continue to require additional inpatient hospital stay due to above     Discharge Plan: pending placement     Code Status: Level 3 - DNAR and DNI    Subjective:   Pt seen and examined by me this morning  Pt complains of persistent pain in back  Does not water pain was yesterday  Patient's daughter was present at bedside  As per patient this morning was able to get of the bed walk to the bathroom using a walker with the help of 1 person  She was not able to do his for last few days  Daughter feels that patient's pain is definitely better compared to yesterday  Objective:     Vitals:   Temp (24hrs), Av 4 °F (36 9 °C), Min:97 9 °F (36 6 °C), Max:98 8 °F (37 1 °C)    Temp:  [97 9 °F (36 6 °C)-98 8 °F (37 1 °C)] 97 9 °F (36 6 °C)  HR:  [72-77] 72  Resp:  [18] 18  BP: (142-159)/(62-89) 158/89  SpO2:  [95 %-98 %] 98 %  Body mass index is 24 94 kg/m²  Input and Output Summary (last 24 hours): Intake/Output Summary (Last 24 hours) at 2019 0924  Last data filed at 2019 0900  Gross per 24 hour   Intake 240 ml   Output    Net 240 ml       Physical Exam:     Physical Exam    Constitutional: Pt appears comfortable  Not in any acute distress  Cardiovascular: Normal rate, regular rhythm, normal heart sounds  Systolic murmur heard  Pulmonary/Chest: Effort normal, air entry b/l equal  No respiratory distress  Pt has no wheezes or crackles  Abdominal: Soft  Non-distended, Non-tender  Bowel sounds are normal    Neurological:  Awake and alert  Moving all extremities spontaneously    Psychiatric:  Pleasantly confused    Additional Data:     Labs:    Results from last 7 days   Lab Units 19  0600   WBC Thousand/uL 7 07   HEMOGLOBIN g/dL 10 2*   HEMATOCRIT % 32 0*   PLATELETS Thousands/uL 222     Results from last 7 days   Lab Units 19  0600   SODIUM mmol/L 140   POTASSIUM mmol/L 3 7   CHLORIDE mmol/L 104   CO2 mmol/L 26   BUN mg/dL 26*   CREATININE mg/dL 0 87   ANION GAP mmol/L 10   CALCIUM mg/dL 8 3   ALBUMIN g/dL 2 8*   TOTAL BILIRUBIN mg/dL 0 29   ALK PHOS U/L 73   ALT U/L 14   AST U/L 14   GLUCOSE RANDOM mg/dL 118                           * I Have Reviewed All Lab Data Listed Above  * Additional Pertinent Lab Tests Reviewed: Stacey 66 Admission Reviewed    Imaging:    Imaging Reports Reviewed Today Include:   Imaging Personally Reviewed by Myself Includes:      Recent Cultures (last 7 days):           Last 24 Hours Medication List:     Current Facility-Administered Medications:  acetaminophen 650 mg Oral Q12H PRN Ismael Thomas MD   acetaminophen 975 mg Oral Q8H [facility] Monique Pagan PA-C   atorvastatin 20 mg Oral Daily With [employer] MD RAQUEL   dabigatran etexilate 150 mg Oral Q12H [facility] Felipe GARCÍA MD   divalproex sodium 125 mg Oral TID Kalia Villegas DO   docusate sodium 100 mg Oral BID Felipe GARCÍA MD   donepezil 10 mg Oral HS Felipe GARCÍA MD   gabapentin 100 mg Oral HS Monique Pagan PA-C   lidocaine 1 patch Topical Daily Jessica Holly MD   melatonin 6 mg Oral HS Juan Ornelas, DO   memantine 10 mg Oral BID Felipe GARCÍA MD   nitrofurantoin 100 mg Oral BID With Meals Juan Ornelas,    OLANZapine 2 5 mg Intramuscular TID PRN Juan Ornelas,    oxyCODONE 2 5 mg Oral Q4H PRN Juan Ornelas,    pantoprazole 40 mg Oral Daily Before Breakfast Felipe GARCÍA MD   polyethylene glycol 17 g Oral Daily PRN Felipe GARCÍA MD   QUEtiapine 25 mg Oral HS Abdifatah Leslie DO        Today, Patient Was Seen By: Jessica Holly MD    ** Please Note: Dictation voice to text software may have been used in the creation of this document   **

## 2019-12-22 NOTE — OCCUPATIONAL THERAPY NOTE
Occupational Therapy Cancellation Note        Patient Name: Michelle Mahoney  Today's Date: 12/22/2019      Attempted to see pt for OT session in amd and discussion w/ NSG and pt not appropriate at this time to complete OT session  Will continue to follow as appropriate      Sean Bañuelos MS, OTR/L

## 2019-12-22 NOTE — ASSESSMENT & PLAN NOTE
T12 compression fracture  Neurosurgery recommended conservative management  Has been evaluated by PT/OT which family is is now agreeable to SNF at this time  TLSO brace has been placed  Awaiting rehab placement  Lidoderm patch and gabapentin HS for pain  Tylenol to 975 Q 8  Oxycodone 2 5 mg  Patient's daughter reports better pain control this morning  Patient was able to stand and walk to the bathroom using a walker with 1 person assist which she has not been able to do for last few days

## 2019-12-22 NOTE — PHYSICAL THERAPY NOTE
Physical Therapy Cancellation Note  Attempted to see pt  This AM, however discussion with nursing  Pt  Not appropriate for therapeutic intervention at this time  Will cancel and continue to follow as appropriate       Cesario Barker, [facility]

## 2019-12-23 LAB
ANION GAP SERPL CALCULATED.3IONS-SCNC: 9 MMOL/L (ref 4–13)
BUN SERPL-MCNC: 15 MG/DL (ref 5–25)
CALCIUM SERPL-MCNC: 9 MG/DL (ref 8.3–10.1)
CHLORIDE SERPL-SCNC: 102 MMOL/L (ref 100–108)
CO2 SERPL-SCNC: 26 MMOL/L (ref 21–32)
CREAT SERPL-MCNC: 0.68 MG/DL (ref 0.6–1.3)
ERYTHROCYTE [DISTWIDTH] IN BLOOD BY AUTOMATED COUNT: 13.5 % (ref 11.6–15.1)
GFR SERPL CREATININE-BSD FRML MDRD: 79 ML/MIN/1.73SQ M
GLUCOSE SERPL-MCNC: 109 MG/DL (ref 65–140)
HCT VFR BLD AUTO: 31.8 % (ref 34.8–46.1)
HGB BLD-MCNC: 10.1 G/DL (ref 11.5–15.4)
MCH RBC QN AUTO: 28.4 PG (ref 26.8–34.3)
MCHC RBC AUTO-ENTMCNC: 31.8 G/DL (ref 31.4–37.4)
MCV RBC AUTO: 89 FL (ref 82–98)
PLATELET # BLD AUTO: 294 THOUSANDS/UL (ref 149–390)
PMV BLD AUTO: 11.1 FL (ref 8.9–12.7)
POTASSIUM SERPL-SCNC: 3.6 MMOL/L (ref 3.5–5.3)
RBC # BLD AUTO: 3.56 MILLION/UL (ref 3.81–5.12)
SODIUM SERPL-SCNC: 137 MMOL/L (ref 136–145)
WBC # BLD AUTO: 8.24 THOUSAND/UL (ref 4.31–10.16)

## 2019-12-23 PROCEDURE — 97116 GAIT TRAINING THERAPY: CPT

## 2019-12-23 PROCEDURE — 85027 COMPLETE CBC AUTOMATED: CPT | Performed by: INTERNAL MEDICINE

## 2019-12-23 PROCEDURE — 80048 BASIC METABOLIC PNL TOTAL CA: CPT | Performed by: INTERNAL MEDICINE

## 2019-12-23 PROCEDURE — 97530 THERAPEUTIC ACTIVITIES: CPT

## 2019-12-23 PROCEDURE — 99232 SBSQ HOSP IP/OBS MODERATE 35: CPT | Performed by: INTERNAL MEDICINE

## 2019-12-23 PROCEDURE — 97535 SELF CARE MNGMENT TRAINING: CPT

## 2019-12-23 RX ORDER — CEPHALEXIN 250 MG/1
250 CAPSULE ORAL DAILY
Status: DISCONTINUED | OUTPATIENT
Start: 2019-12-23 | End: 2019-12-27 | Stop reason: HOSPADM

## 2019-12-23 RX ADMIN — NITROFURANTOIN (MONOHYDRATE/MACROCRYSTALS) 100 MG: 75; 25 CAPSULE ORAL at 09:00

## 2019-12-23 RX ADMIN — ACETAMINOPHEN 975 MG: 325 TABLET ORAL at 20:04

## 2019-12-23 RX ADMIN — DABIGATRAN ETEXILATE MESYLATE 150 MG: 150 CAPSULE ORAL at 09:00

## 2019-12-23 RX ADMIN — MELATONIN TAB 3 MG 6 MG: 3 TAB at 20:04

## 2019-12-23 RX ADMIN — DIVALPROEX SODIUM 125 MG: 125 CAPSULE, COATED PELLETS ORAL at 20:05

## 2019-12-23 RX ADMIN — MEMANTINE 10 MG: 5 TABLET ORAL at 08:59

## 2019-12-23 RX ADMIN — ACETAMINOPHEN 975 MG: 325 TABLET ORAL at 14:20

## 2019-12-23 RX ADMIN — DONEPEZIL HYDROCHLORIDE 10 MG: 10 TABLET, FILM COATED ORAL at 17:14

## 2019-12-23 RX ADMIN — DIVALPROEX SODIUM 125 MG: 125 CAPSULE, COATED PELLETS ORAL at 09:00

## 2019-12-23 RX ADMIN — DOCUSATE SODIUM 100 MG: 100 CAPSULE, LIQUID FILLED ORAL at 17:14

## 2019-12-23 RX ADMIN — QUETIAPINE FUMARATE 25 MG: 25 TABLET ORAL at 20:04

## 2019-12-23 RX ADMIN — OXYCODONE HYDROCHLORIDE 2.5 MG: 5 TABLET ORAL at 04:51

## 2019-12-23 RX ADMIN — ATORVASTATIN CALCIUM 20 MG: 20 TABLET, FILM COATED ORAL at 17:14

## 2019-12-23 RX ADMIN — DABIGATRAN ETEXILATE MESYLATE 150 MG: 150 CAPSULE ORAL at 20:04

## 2019-12-23 RX ADMIN — DIVALPROEX SODIUM 125 MG: 125 CAPSULE, COATED PELLETS ORAL at 17:14

## 2019-12-23 RX ADMIN — MEMANTINE 10 MG: 5 TABLET ORAL at 20:04

## 2019-12-23 RX ADMIN — DOCUSATE SODIUM 100 MG: 100 CAPSULE, LIQUID FILLED ORAL at 09:00

## 2019-12-23 RX ADMIN — LIDOCAINE 1 PATCH: 50 PATCH CUTANEOUS at 08:59

## 2019-12-23 RX ADMIN — OXYCODONE HYDROCHLORIDE 2.5 MG: 5 TABLET ORAL at 12:14

## 2019-12-23 RX ADMIN — CEPHALEXIN 250 MG: 250 CAPSULE ORAL at 12:13

## 2019-12-23 RX ADMIN — ACETAMINOPHEN 975 MG: 325 TABLET ORAL at 04:52

## 2019-12-23 RX ADMIN — GABAPENTIN 100 MG: 100 CAPSULE ORAL at 20:04

## 2019-12-23 RX ADMIN — PANTOPRAZOLE SODIUM 40 MG: 40 TABLET, DELAYED RELEASE ORAL at 04:52

## 2019-12-23 NOTE — ASSESSMENT & PLAN NOTE
Acute on chronic UTI on cephalexin however current urine culture citrobacter with multiple resistances  Was started on cefepime, has been switched to nitrofurantoin  Currently antibiotic day 5   Will discontinue  - Resumed on Keflex today

## 2019-12-23 NOTE — PLAN OF CARE
Problem: PHYSICAL THERAPY ADULT  Goal: Performs mobility at highest level of function for planned discharge setting  See evaluation for individualized goals  Description  Treatment/Interventions: Functional transfer training, LE strengthening/ROM, Elevations, Therapeutic exercise, Endurance training, Patient/family training, Bed mobility, Gait training, Spoke to nursing, Spoke to case management, OT, Family, Spoke to MD          See flowsheet documentation for full assessment, interventions and recommendations  Outcome: Progressing  Note:   Prognosis: Fair  Problem List: Decreased strength, Decreased range of motion, Decreased endurance, Impaired balance, Decreased mobility, Decreased cognition, Orthopedic restrictions, Pain, Decreased safety awareness  Assessment: Pt rec'd supine in bed  Pt reports increased back pain and headache  Pt demonstrates difficulty in performing rolling and sidelying to sit transfers requiring mod assist x1 and verbal cues for safe and proper logrolling techniques  Pt performs sit to stand with min assist x1 with verbal cues for handplacement and safety  Pt progressed with ambulation distances to 115' x2 with min assist x1 and standby assist of another for safety  Pt requires verbal cues for improved gait pattern and mobility techniques  Pt demonstrates slow reciprocal gait pattern with decreased strides, b/l knee flexion and forward flexed posture  Pt repositioned in bed with pillow under b/l knees for comfort  Pt requires total assist to don and doff LSO  Recommend inpt rehab at d/c [facility] to maximize safe functional mobility and independence  Barriers to Discharge: Inaccessible home environment, Decreased caregiver support  Barriers to Discharge Comments: Lives alone, [place]  Recommendation: Short-term skilled PT     PT - OK to Discharge: Yes    See flowsheet documentation for full assessment

## 2019-12-23 NOTE — ASSESSMENT & PLAN NOTE
Syncope and collapse with history of CVA, dementia, chronic UTI  Seen by neurology with possibility of seizure episode  Has been started on depakote by neurology  EEG negative    Symptoms stable    OP Neurology f/u

## 2019-12-23 NOTE — PROGRESS NOTES
Progress Note - Vickie Contreras 1933, 80 y o  female MRN: [id number]    Unit/Bed#: E5 -01 Encounter: [id number]    Primary Care Provider: Caprice Fleming DO   Date and time admitted to hospital: 12/14/2019  8:41 AM        Compression fracture of T12 vertebra ([address] )  Assessment & Plan  T12 compression fracture  Neurosurgery recommended conservative management  Has been evaluated by PT/OT which family is is now agreeable to SNF at this time  TLSO brace has been placed  Awaiting rehab placement  Lidoderm patch and gabapentin HS for pain  Tylenol to 975 Q 8  Oxycodone 2 5 mg    - Much better today    Case personally [facility]  [facility] - [facility] [place] - Patient will only require short stay - Now assist of one    Patient is Patrizia  No blood products  Chronic UTI  Assessment & Plan  Acute on chronic UTI on cephalexin however current urine culture citrobacter with multiple resistances  Was started on cefepime, has been switched to nitrofurantoin  Currently antibiotic day 5  Will discontinue  - Resumed on Keflex today          A-fib [facility])  Assessment & Plan  Paroxysmal atrial fibrillation  Continue pradaxa, VR rate controlled here    Hypertension  Assessment & Plan  History of hypertension without need for medications - Monitor here    Alzheimer disease [facility])  Assessment & Plan  Alzheimer's dementia on memantine and donepezil  On quetiapine and melatonin for sleep during hospitalization    * Syncope and collapse  Assessment & Plan  Syncope and collapse with history of CVA, dementia, chronic UTI  Seen by neurology with possibility of seizure episode  Has been started on depakote by neurology  EEG negative    Symptoms stable    OP Neurology f/u      [address] Internal Medicine Progress Note  Patient: Vickie Contreras 80 y o  female   MRN: [id number]  PCP: Caprice Fleming DO  Unit/Bed#: E5 -01 Encounter: [id number]  Date Of Visit: 19    Assessment:    Principal Problem:    Syncope and collapse  Active Problems:    Alzheimer disease (HCC)    Hypertension    A-fib (HCC)    Chronic UTI    Patient is [employer]    Compression fracture of T12 vertebra ([facility] [address] )      Plan:    · Discharge Plan  · Restart home suppressive medication for chronic UTI - consider methenamine in the outpatient       VTE Pharmacologic Prophylaxis:   Pharmacologic: Dabigatran (Pradaxa)  Mechanical VTE Prophylaxis in Place: Yes    Patient Centered Rounds: I have performed bedside rounds with nursing staff today  Discussions with Specialists or Other Care Team Provider:  Case management    Education and Discussions with Family / Patient:  Patient    Time Spent for Care: 30 minutes  More than 50% of total time spent on counseling and coordination of care as described above  Current Length of Stay: 9 day(s)    Current Patient Status: Inpatient   Certification Statement: The patient will continue to require additional inpatient hospital stay due to Ongoing discharge planning    Discharge Plan / Estimated Discharge Date:  24 hours    Code Status: Level 3 - DNAR and DNI      Subjective:   Patient seen examined, pain is well controlled  Endorses no other acute complaint    A complete and comprehensive 14 point organ system review has been performed and all other systems are negative other than stated above  Objective:     Vitals:   Temp (24hrs), Av 8 °F (36 6 °C), Min:97 5 °F (36 4 °C), Max:98 °F (36 7 °C)    Temp:  [97 5 °F (36 4 °C)-98 °F (36 7 °C)] 97 5 °F (36 4 °C)  HR:  [67-74] 74  Resp:  [18] 18  BP: (142-143)/(66-67) 143/66  SpO2:  [98 %-99 %] 99 %  Body mass index is 24 94 kg/m²       Input and Output Summary (last 24 hours):     No intake or output data in the 24 hours ending 19 6781    Physical Exam:     General: well appearing, no acute distress  HEENT: atraumatic, PERRLA, moist mucosa, normal pharynx, normal tonsils and adenoids, normal tongue, no fluid in sinuses  Neck: Trachea midline, no carotid bruit, no masses  Respiratory: normal chest wall expansion, CTA B, no r/r/w, no rubs  Cardiovascular:  Irregularly irregular  Abdomen: Soft, non-tender, non-distended, normal bowel sounds in all quadrants, no hepatosplenomegaly, no tympany  Rectal: deferred  Musculoskeletal: normal ROM in upper and lower extremities  Integumentary: warm, dry, and pink, with no rash, purpura, or petechia  Heme/Lymph: no lymphadenopathy, no bruises  Neurological: Cranial Nerves II-XII grossly intact, no tics, normal sensation to pressure and light touch  Psychiatric: cooperative with normal mood, affect, and cognition      Additional Data:     Labs:    Results from last 7 days   Lab Units 12/23/19  0455   WBC Thousand/uL 8 24   HEMOGLOBIN g/dL 10 1*   HEMATOCRIT % 31 8*   PLATELETS Thousands/uL 294     Results from last 7 days   Lab Units 12/23/19  0455 12/18/19  0600   POTASSIUM mmol/L 3 6 3 7   CHLORIDE mmol/L 102 104   CO2 mmol/L 26 26   BUN mg/dL 15 26*   CREATININE mg/dL 0 68 0 87   CALCIUM mg/dL 9 0 8 3   ALK PHOS U/L  --  73   ALT U/L  --  14   AST U/L  --  14           * I Have Reviewed All Lab Data Listed Above  * Additional Pertinent Lab Tests Reviewed:  Stacey Bhat Admission Reviewed    Imaging:    No new imaging    Recent Cultures (last 7 days):           Last 24 Hours Medication List:     Current Facility-Administered Medications:  acetaminophen 650 mg Oral Q12H PRN Dolores GARCÍA MD   acetaminophen 975 mg Oral Q8H [address] Monique Pagan PA-C   atorvastatin 20 mg Oral Daily With [employer] MD RAQUEL   cephalexin 250 mg Oral Daily Brandon Mahoney, DO   dabigatran etexilate 150 mg Oral Q12H [address] Felipe GARCÍA MD   divalproex sodium 125 mg Oral TID Kalia Villegas, DO   docusate sodium 100 mg Oral BID Felipe GARCÍA MD   donepezil 10 mg Oral HS Felipe GARCÍA MD   gabapentin 100 mg Oral HS Monique Pagan PA-C   lidocaine 1 patch Topical Daily Eduin Lloyd MD   melatonin 6 mg Oral HS Juan Ornelas DO   memantine 10 mg Oral BID Felipe GARCÍA MD   OLANZapine 2 5 mg Intramuscular TID PRN Purvi Ramon DO   oxyCODONE 2 5 mg Oral Q4H PRN Juan Ornelas DO   pantoprazole 40 mg Oral Daily Before Breakfast Felipe GARCÍA MD   polyethylene glycol 17 g Oral Daily PRN Felipe GARCÍA MD   QUEtiapine 25 mg Oral HS Purvi Ramon DO        Today, Patient Was Seen By: Ramiro Lawson DO    ** Please Note: This note has been constructed using a voice recognition system   **

## 2019-12-23 NOTE — PLAN OF CARE
Problem: OCCUPATIONAL THERAPY ADULT  Goal: Performs self-care activities at highest level of function for planned discharge setting  See evaluation for individualized goals  Description  Treatment Interventions: ADL retraining, UE strengthening/ROM, Functional transfer training, Endurance training, Cognitive reorientation, Patient/family training, Equipment evaluation/education, Compensatory technique education, Energy conservation, Activityengagement  Equipment Recommended: Tub seat with back, Bedside commode(Rolling walker, hospital bed)       See flowsheet documentation for full assessment, interventions and recommendations  Outcome: Progressing  Note:   Limitation: Decreased ADL status, Decreased Safe judgement during ADL, Decreased UE strength, Decreased endurance, Decreased cognition, Decreased sensation, Decreased self-care trans, Decreased high-level ADLs, Decreased fine motor control  Prognosis: Guarded  Assessment: Pt was seen for skilled OT with focus on completion of self care tasks, functional transfers, review of RW safety, basic orientation and review of current plan of care  See above levels of A required for all functional tasks  Pt with improved stand tolerance noted  Agreeable to tx session, though increased pain levels continue  Pt may benefit from further rehab with focus on achieving optimal performance levels with all functional tasks   Continue to recommend Inpatient rehab     OT Discharge Recommendation: Short Term Rehab  OT - OK to Discharge: Yes(when medically cleared to rehab  )

## 2019-12-23 NOTE — OCCUPATIONAL THERAPY NOTE
Occupational Therapy Treatment Note:         12/23/19 1455   Restrictions/Precautions   Weight Bearing Precautions Per Order No   RLE Weight Bearing Per Order WBAT   LLE Weight Bearing Per Order WBAT   Braces or Orthoses TLSO   Other Precautions Pain; Fall Risk;Cognitive; Chair Alarm; Bed Alarm   Pain Assessment   Pain Assessment 0-10   Pain Score Worst Possible Pain   Pain Type Acute pain;Chronic pain   Pain Location Back   Pain Orientation Lower;Mid   ADL   Where Assessed Edge of bed   Grooming Assistance 4  Minimal Assistance   Grooming Deficit Setup   UB Bathing Assistance 4  Minimal Assistance   UB Bathing Deficit Setup   UB Bathing Comments with simulation   LB Bathing Assistance 3  Moderate Assistance   LB Bathing Deficit Setup;Verbal cueing; Increased time to complete;Supervision/safety; Buttocks; Perineal area; Left lower leg including foot;Right lower leg including foot   LB Bathing Comments with simulation   UB Dressing Assistance 4  Minimal Assistance   UB Dressing Deficit Setup   LB Dressing Assistance 2  Maximal Assistance   LB Dressing Deficit Steadying;Setup; Requires assistive device for steadying;Verbal cueing;Supervision/safety; Increased time to complete; Don/doff R sock; Don/doff L sock   LB Dressing Comments Pt iwth back precautions, limited functional reach  Toileting Assistance  Unable to assess   Functional Standing Tolerance   Time 10 mins   Activity dynamic stand balance activity  Comments Pt able to tolerate activity without further increase in pain levels  Bed Mobility   Rolling R 4  Minimal assistance   Additional items Assist x 1   Rolling L 4  Minimal assistance   Additional items Assist x 1   Supine to Sit 3  Moderate assistance   Additional items Assist x 2;Bedrails; Increased time required;Verbal cues;LE management   Sit to Supine 3  Moderate assistance   Additional items Assist x 2; Increased time required;LE management;Verbal cues   Additional Comments cues for use of log roll tech required  Transfers   Sit to Stand 4  Minimal assistance   Additional items Assist x 1; Armrests; Bedrails; Increased time required;Verbal cues   Stand to Sit 4  Minimal assistance   Additional items Assist x 1;Bedrails;Armrests; Increased time required;Verbal cues   Stand pivot 4  Minimal assistance   Additional items Assist x 2;Armrests; Increased time required;Verbal cues; Bedrails   Additional Comments cues for safety with SPT to reach surface with both RW and back of her legs required  Functional Mobility   Functional Mobility 4  Minimal assistance   Additional Comments x1   Additional items Rolling walker   Cognition   Overall Cognitive Status Impaired   Arousal/Participation Alert; Responsive; Cooperative   Attention Difficulty attending to directions   Orientation Level Oriented to person;Oriented to place; Disoriented to situation;Disoriented to time   Memory Decreased recall of recent events;Decreased short term memory;Decreased recall of precautions   Following Commands Follows one step commands without difficulty   Comments Pt continues with increased pain levels  Pleasant and cooperative through out tx session  Additional Activities   Additional Activities Other (Comment)  (reviewed current planof care  )   Additional Activities Comments Pt reports having F understanding  Pt continues to ask, "what happened to me, I've never had this"  Activity Tolerance   Activity Tolerance Patient limited by pain; Patient limited by fatigue   Medical Staff Made Aware reported all findings to nursing staff  Assessment   Assessment Pt was seen for skilled OT with focus on completion of self care tasks, functional transfers, review of RW safety, basic orientation and review of current plan of care  See above levels of A required for all functional tasks  Pt with improved stand tolerance noted  Agreeable to tx session, though increased pain levels continue   Pt may benefit from further rehab with focus on achieving optimal performance levels with all functional tasks  Continue to recommend Inpatient rehab   Plan   Treatment Interventions ADL retraining;Functional transfer training; Endurance training;Cognitive reorientation;UE strengthening/ROM   Goal Expiration Date 12/30/19   OT Treatment Day 3   OT Frequency 2-3x/wk   Recommendation   OT Discharge Recommendation Short Term Rehab   Equipment Recommended Tub seat with back   OT - OK to Discharge Yes  (when medically cleared to rehab  )   Barthel Index   Feeding 5   Bathing 0   Grooming Score 0   Dressing Score 0   Bladder Score 5   Bowels Score 10   Toilet Use Score 5   Transfers (Bed/Chair) Score 5   Mobility (Level Surface) Score 0   Stairs Score 0   Barthel Index Score 30   Lindsay Morris, [address]

## 2019-12-23 NOTE — SOCIAL WORK
The pt was accepted by [facility]  CM notified the pt's daughter Lilo Martin face to face  Lilo Martin stated that she called [place] and left a message with [facility] stating why the pt is a good for the facility  CM told Lilo Martin that she will ask Addy Otero about it again, but stated their was no guarantee that he will be placed in [facility]  Lilo Martin stated that it was ok to proceed with [facility] was initiated by ANSELMO LEE   Pt will dc tomorrow pending auth  CM will continue to follow

## 2019-12-23 NOTE — ASSESSMENT & PLAN NOTE
T12 compression fracture  Neurosurgery recommended conservative management  Has been evaluated by PT/OT which family is is now agreeable to SNF at this time  TLSO brace has been placed  Awaiting rehab placement  Lidoderm patch and gabapentin HS for pain  Tylenol to 975 Q 8  Oxycodone 2 5 mg    - Much better today    Case personally discussed with [address] medical coordinator - [facility] - Patient will only require short stay - Now assist of one

## 2019-12-24 PROCEDURE — 99232 SBSQ HOSP IP/OBS MODERATE 35: CPT | Performed by: INTERNAL MEDICINE

## 2019-12-24 PROCEDURE — 99221 1ST HOSP IP/OBS SF/LOW 40: CPT | Performed by: RADIOLOGY

## 2019-12-24 RX ORDER — SACCHAROMYCES BOULARDII 250 MG
250 CAPSULE ORAL 2 TIMES DAILY
Status: DISCONTINUED | OUTPATIENT
Start: 2019-12-24 | End: 2019-12-27 | Stop reason: HOSPADM

## 2019-12-24 RX ADMIN — OXYCODONE HYDROCHLORIDE 2.5 MG: 5 TABLET ORAL at 13:52

## 2019-12-24 RX ADMIN — MELATONIN TAB 3 MG 6 MG: 3 TAB at 21:41

## 2019-12-24 RX ADMIN — LIDOCAINE 1 PATCH: 50 PATCH CUTANEOUS at 08:03

## 2019-12-24 RX ADMIN — ATORVASTATIN CALCIUM 20 MG: 20 TABLET, FILM COATED ORAL at 16:44

## 2019-12-24 RX ADMIN — DOCUSATE SODIUM 100 MG: 100 CAPSULE, LIQUID FILLED ORAL at 07:28

## 2019-12-24 RX ADMIN — MEMANTINE 10 MG: 5 TABLET ORAL at 21:41

## 2019-12-24 RX ADMIN — DIVALPROEX SODIUM 125 MG: 125 CAPSULE, COATED PELLETS ORAL at 07:33

## 2019-12-24 RX ADMIN — QUETIAPINE FUMARATE 25 MG: 25 TABLET ORAL at 21:41

## 2019-12-24 RX ADMIN — MEMANTINE 10 MG: 5 TABLET ORAL at 16:44

## 2019-12-24 RX ADMIN — OXYCODONE HYDROCHLORIDE 2.5 MG: 5 TABLET ORAL at 07:28

## 2019-12-24 RX ADMIN — ACETAMINOPHEN 975 MG: 325 TABLET ORAL at 13:52

## 2019-12-24 RX ADMIN — ACETAMINOPHEN 975 MG: 325 TABLET ORAL at 06:15

## 2019-12-24 RX ADMIN — MEMANTINE 10 MG: 5 TABLET ORAL at 07:29

## 2019-12-24 RX ADMIN — Medication 250 MG: at 17:59

## 2019-12-24 RX ADMIN — DOCUSATE SODIUM 100 MG: 100 CAPSULE, LIQUID FILLED ORAL at 16:44

## 2019-12-24 RX ADMIN — DONEPEZIL HYDROCHLORIDE 10 MG: 10 TABLET, FILM COATED ORAL at 17:59

## 2019-12-24 RX ADMIN — CEPHALEXIN 250 MG: 250 CAPSULE ORAL at 07:33

## 2019-12-24 RX ADMIN — OXYCODONE HYDROCHLORIDE 2.5 MG: 5 TABLET ORAL at 17:58

## 2019-12-24 RX ADMIN — DIVALPROEX SODIUM 125 MG: 125 CAPSULE, COATED PELLETS ORAL at 21:41

## 2019-12-24 RX ADMIN — DIVALPROEX SODIUM 125 MG: 125 CAPSULE, COATED PELLETS ORAL at 16:44

## 2019-12-24 RX ADMIN — DABIGATRAN ETEXILATE MESYLATE 150 MG: 150 CAPSULE ORAL at 07:29

## 2019-12-24 RX ADMIN — PANTOPRAZOLE SODIUM 40 MG: 40 TABLET, DELAYED RELEASE ORAL at 06:15

## 2019-12-24 RX ADMIN — ACETAMINOPHEN 975 MG: 325 TABLET ORAL at 21:41

## 2019-12-24 RX ADMIN — GABAPENTIN 100 MG: 100 CAPSULE ORAL at 21:41

## 2019-12-24 NOTE — CONSULTS
Interventional Radiology  Consultation 12/24/2019     Ryan Mathew   1933   [id number]      Assessment/Plan:  T12 acute to subacute compression fracture with pain incompletely controlled with p o  Narcotics  We will plan for image guided vertebroplasty/kyphoplasty  However, the patient is currently on Pradaxa and requires a 48 hour hold prior to the procedure  On physical exam, the pain is lower than I would expect, but she does have some discomfort at the T12 level  The procedure will be tentatively scheduled for 12/26  Problem List     * (Principal) Syncope and collapse    History of stroke    Alzheimer disease ([facility] )    Acute lacunar stroke ([facility] )    Carotid artery calcification, bilateral    Hyperlipidemia    Hypertension    A-fib (HCC)    Other headache syndrome    Chronic anticoagulation    Chronic UTI    Patient is Jehovah's witness    Rectal bleed    Central retinal artery occlusion of left eye    Stenosis of right vertebral artery    Compression fracture of T12 vertebra (HCC)    Syncope            Subjective:     Patient ID: Ryan Mathew is a 80 y o  female  History of Present Illness  59-year-old female with a history of CVA, AFib, and Alzheimer's dementia who presented to the hospital following an episode of syncope and collapse  A CT scan obtained demonstrates a acute to subacute T12 compression fracture  The patient has been in the hospital since admission with incompletely controlled pain  The plan is for the patient to be [facility] for [facility], but requires control prior to discharge        Review of Systems   Unable to perform ROS: Dementia     Spoke with daughter    Past Medical History:   Diagnosis Date    A-fib [facility])     Alzheimer disease ([facility] )     Chronic UTI     Diverticulosis     Dyslipidemia     History of stroke     Hyperlipidemia     Hypertension     UTI (urinary tract infection)     Vasovagal syncope 2/13/2018        Past Surgical History: Procedure Laterality Date    CHOLECYSTECTOMY          Social History     Tobacco Use   Smoking Status Former Smoker   Smokeless Tobacco Never Used        Social History     Substance and Sexual Activity   Alcohol Use No        Social History     Substance and Sexual Activity   Drug Use No        Allergies   Allergen Reactions    Ativan [Lorazepam]     Latuda [Lurasidone]        Current Facility-Administered Medications   Medication Dose Route Frequency Provider Last Rate Last Dose    acetaminophen (TYLENOL) tablet 650 mg  650 mg Oral Q12H PRN Val GARCÍA MD   650 mg at 12/21/19 1503    acetaminophen (TYLENOL) tablet 975 mg  975 mg Oral Q8H [address] Monique Pagan PA-C   975 mg at 12/24/19 0615    atorvastatin (LIPITOR) tablet 20 mg  20 mg Oral Daily With [facility] Sourav GARCÍA MD   20 mg at 12/23/19 1714    cephalexin (KEFLEX) capsule 250 mg  250 mg Oral Daily Brandon Mahoney DO   250 mg at 12/24/19 0733    dabigatran etexilate (PRADAXA) capsule 150 mg  150 mg Oral Q12H [address] Felipe GARCÍA MD   150 mg at 12/24/19 0729    divalproex sodium (DEPAKOTE SPRINKLE) capsule 125 mg  125 mg Oral TID Kalia Villegas DO   125 mg at 12/24/19 4084    docusate sodium (COLACE) capsule 100 mg  100 mg Oral BID Felipe GARCÍA MD   100 mg at 12/24/19 0728    donepezil (ARICEPT) tablet 10 mg  10 mg Oral HS Felipe GARCÍA MD   10 mg at 12/23/19 1714    gabapentin (NEURONTIN) capsule 100 mg  100 mg Oral HS Monique Pagan PA-C   100 mg at 12/23/19 2004    lidocaine (LIDODERM) 5 % patch 1 patch  1 patch Topical Daily Jessica Holly MD   1 patch at 12/24/19 0803    melatonin tablet 6 mg  6 mg Oral HS Juan Ornelas DO   6 mg at 12/23/19 2004    memantine (NAMENDA) tablet 10 mg  10 mg Oral BID Felipe GARCÍA MD   10 mg at 12/24/19 0729    OLANZapine (ZyPREXA) IM injection 2 5 mg  2 5 mg Intramuscular TID PRN Abdifatah Leslie DO        oxyCODONE (ROXICODONE) IR tablet 2 5 mg  2 5 mg Oral Q4H PRN Abdifatah Leslie, DO 2 5 mg at 12/24/19 0728    pantoprazole (PROTONIX) EC tablet 40 mg  40 mg Oral Daily Before Breakfast Felipe GARCÍA MD   40 mg at 12/24/19 0615    polyethylene glycol (MIRALAX) packet 17 g  17 g Oral Daily PRN Maureen GARCÍA MD   17 g at 12/21/19 1719    QUEtiapine (SEROquel) tablet 25 mg  25 mg Oral HS Juan DO Johnson   25 mg at 12/23/19 2004          Objective:    Vitals:    12/22/19 1529 12/23/19 0747 12/23/19 1520 12/24/19 0624   BP: 147/65 142/67 143/66 122/71   BP Location: Left arm Left arm Left arm Left arm   Pulse: 74 67 74 76   Resp: 18 18 18 18   Temp: 98 3 °F (36 8 °C) 98 °F (36 7 °C) 97 5 °F (36 4 °C) 97 6 °F (36 4 °C)   TempSrc: Temporal Temporal Temporal Temporal   SpO2: 98% 98% 99% 99%   Weight:            Physical Exam   Constitutional: She appears well-developed and well-nourished  No distress  Neck: Normal range of motion  Neck supple  Cardiovascular: Normal rate, regular rhythm and normal heart sounds  No murmur heard  Pulmonary/Chest: Effort normal and breath sounds normal  No stridor  No respiratory distress  She has no wheezes  She has no rales  She exhibits no tenderness  Abdominal: Soft  Bowel sounds are normal  She exhibits no distension and no mass  There is no tenderness  There is no rebound and no guarding  Musculoskeletal:        Back:    Skin: She is not diaphoretic  No results found for: BNP   Lab Results   Component Value Date    WBC 8 24 12/23/2019    HGB 10 1 (L) 12/23/2019    HCT 31 8 (L) 12/23/2019    MCV 89 12/23/2019     12/23/2019     Lab Results   Component Value Date    INR 1 49 (H) 12/14/2019    INR 1 03 07/05/2019    INR 1 58 (H) 05/13/2019    PROTIME 18 3 (H) 12/14/2019    PROTIME 13 6 07/05/2019    PROTIME 19 0 (H) 05/13/2019     Lab Results   Component Value Date    PTT 50 (H) 12/14/2019         I have personally reviewed pertinent imaging and laboratory results       This procedure has been fully reviewed with the patient and written informed consent has been obtained  Code Status: Level 3 - DNAR and DNI     IR has been consulted to evaluate the patient, determine the appropriate procedure, and whether or not a procedure can and should be performed  Thank you for allowing me to participate in the care of Rocío Abrams  Please don't hesitate to call, text, email, or TigerText with any questions  This text is generated with voice recognition software  There may be translation, syntax,  or grammatical errors  If you have any questions, please contact the dictating provider

## 2019-12-24 NOTE — ASSESSMENT & PLAN NOTE
Acute on chronic UTI on cephalexin however current urine culture citrobacter with multiple resistances  Was started on cefepime, has been switched to nitrofurantoin  Completed 5 day course here  Keflex resumed 12/23, add Probiotics indef    Consider methanamine and Vit C as Outpatient

## 2019-12-24 NOTE — PLAN OF CARE
Problem: Potential for Falls  Goal: Patient will remain free of falls  Description  INTERVENTIONS:  - Assess patient frequently for physical needs  -  Identify cognitive and physical deficits and behaviors that affect risk of falls    -  [place] fall precautions as indicated by assessment   - Educate patient/family on patient safety including physical limitations  - Instruct patient to call for assistance with activity based on assessment  - Modify environment to reduce risk of injury  - Consider OT/PT consult to assist with strengthening/mobility  Outcome: Progressing     Problem: Prexisting or High Potential for Compromised Skin Integrity  Goal: Skin integrity is maintained or improved  Description  INTERVENTIONS:  - Identify patients at risk for skin breakdown  - Assess and monitor skin integrity  - Assess and monitor nutrition and hydration status  - Monitor labs   - Assess for incontinence   - Turn and reposition patient  - Assist with mobility/ambulation  - Relieve pressure over bony prominences  - Avoid friction and shearing  - Provide appropriate hygiene as needed including keeping skin clean and dry  - Evaluate need for skin moisturizer/barrier cream  - Collaborate with interdisciplinary team   - Patient/family teaching  - Consider wound care consult   Outcome: Progressing     Problem: PAIN - ADULT  Goal: Verbalizes/displays adequate comfort level or baseline comfort level  Description  Interventions:  - Encourage patient to monitor pain and request assistance  - Assess pain using appropriate pain scale  - Administer analgesics based on type and severity of pain and evaluate response  - Implement non-pharmacological measures as appropriate and evaluate response  - Consider cultural and social influences on pain and pain management  - Notify physician/advanced practitioner if interventions unsuccessful or patient reports new pain  Outcome: Progressing     Problem: INFECTION - ADULT  Goal: Absence or prevention of progression during hospitalization  Description  INTERVENTIONS:  - Assess and monitor for signs and symptoms of infection  - Monitor lab/diagnostic results  - Monitor all insertion sites, i e  indwelling lines, tubes, and drains  - Monitor endotracheal if appropriate and nasal secretions for changes in amount and color  - [place] appropriate cooling/warming therapies per order  - Administer medications as ordered  - Instruct and encourage patient and family to use good hand hygiene technique  - Identify and instruct in appropriate isolation precautions for identified infection/condition  Outcome: Progressing  Goal: Absence of fever/infection during neutropenic period  Description  INTERVENTIONS:  - Monitor WBC    Outcome: Progressing     Problem: SAFETY ADULT  Goal: Maintain or return to baseline ADL function  Description  INTERVENTIONS:  -  Assess patient's ability to carry out ADLs; assess patient's baseline for ADL function and identify physical deficits which impact ability to perform ADLs (bathing, care of mouth/teeth, toileting, grooming, dressing, etc )  - Assess/evaluate cause of self-care deficits   - Assess range of motion  - Assess patient's mobility; develop plan if impaired  - Assess patient's need for assistive devices and provide as appropriate  - Encourage maximum independence but intervene and supervise when necessary  - Involve family in performance of ADLs  - Assess for home care needs following discharge   - Consider OT consult to assist with ADL evaluation and planning for discharge  - Provide patient education as appropriate  Outcome: Progressing  Goal: Maintain or return mobility status to optimal level  Description  INTERVENTIONS:  - Assess patient's baseline mobility status (ambulation, transfers, stairs, etc )    - Identify cognitive and physical deficits and behaviors that affect mobility  - Identify mobility aids required to assist with transfers and/or ambulation (gait belt, sit-to-stand, lift, walker, cane, etc )  - [place] fall precautions as indicated by assessment  - Record patient progress and toleration of activity level on Mobility SBAR; progress patient to next Phase/Stage  - Instruct patient to call for assistance with activity based on assessment  - Consider rehabilitation consult to assist with strengthening/weightbearing, etc   Outcome: Progressing     Problem: DISCHARGE PLANNING  Goal: Discharge to home or other facility with appropriate resources  Description  INTERVENTIONS:  - Identify barriers to discharge w/patient and caregiver  - Arrange for needed discharge resources and transportation as appropriate  - Identify discharge learning needs (meds, wound care, etc )  - Arrange for interpretive services to assist at discharge as needed  - Refer to Case Management Department for coordinating discharge planning if the patient needs post-hospital services based on physician/advanced practitioner order or complex needs related to functional status, cognitive ability, or social support system  Outcome: Progressing     Problem: Knowledge Deficit  Goal: Patient/family/caregiver demonstrates understanding of disease process, treatment plan, medications, and discharge instructions  Description  Complete learning assessment and assess knowledge base  Interventions:  - Provide teaching at level of understanding  - Provide teaching via preferred learning methods  Outcome: Progressing     Problem: Nutrition/Hydration-ADULT  Goal: Nutrient/Hydration intake appropriate for improving, restoring or maintaining nutritional needs  Description  Monitor and assess patient's nutrition/hydration status for malnutrition  Collaborate with interdisciplinary team and initiate plan and interventions as ordered  Monitor patient's weight and dietary intake as ordered or per policy  Utilize nutrition screening tool and intervene as necessary   Determine patient's food preferences and provide high-protein, high-caloric foods as appropriate       INTERVENTIONS:  - Monitor oral intake, urinary output, labs, and treatment plans  - Assess nutrition and hydration status and recommend course of action  - Evaluate amount of meals eaten  - Assist patient with eating if necessary   - Allow adequate time for meals  - Recommend/ encourage appropriate diets, oral nutritional supplements, and vitamin/mineral supplements  - Order, calculate, and assess calorie counts as needed  - Recommend, monitor, and adjust tube feedings and TPN/PPN based on assessed needs  - Assess need for intravenous fluids  - Provide specific nutrition/hydration education as appropriate  - Include patient/family/caregiver in decisions related to nutrition  Outcome: Progressing     Problem: NEUROSENSORY - ADULT  Goal: Achieves stable or improved neurological status  Description  INTERVENTIONS  - Monitor and report changes in neurological status  - Monitor vital signs such as temperature, blood pressure, glucose, and any other labs ordered   - Initiate measures to prevent increased intracranial pressure  - Monitor for seizure activity and implement precautions if appropriate      Outcome: Progressing  Goal: Remains free of injury related to seizures activity  Description  INTERVENTIONS  - Maintain airway, patient safety  and administer oxygen as ordered  - Monitor patient for seizure activity, document and report duration and description of seizure to physician/advanced practitioner  - If seizure occurs,  ensure patient safety during seizure  - Reorient patient post seizure  - Seizure pads on all 4 side rails  - Instruct patient/family to notify RN of any seizure activity including if an aura is experienced  - Instruct patient/family to call for assistance with activity based on nursing assessment  - Administer anti-seizure medications if ordered    Outcome: Progressing  Goal: Achieves maximal functionality and self care  Description  INTERVENTIONS  - Monitor swallowing and airway patency with patient fatigue and changes in neurological status  - Encourage and assist patient to increase activity and self care     - Encourage visually impaired, hearing impaired and aphasic patients to use assistive/communication devices  Outcome: Progressing     Problem: MUSCULOSKELETAL - ADULT  Goal: Maintain or return mobility to safest level of function  Description  INTERVENTIONS:  - Assess patient's ability to carry out ADLs; assess patient's baseline for ADL function and identify physical deficits which impact ability to perform ADLs (bathing, care of mouth/teeth, toileting, grooming, dressing, etc )  - Assess/evaluate cause of self-care deficits   - Assess range of motion  - Assess patient's mobility  - Assess patient's need for assistive devices and provide as appropriate  - Encourage maximum independence but intervene and supervise when necessary  - Involve family in performance of ADLs  - Assess for home care needs following discharge   - Consider OT consult to assist with ADL evaluation and planning for discharge  - Provide patient education as appropriate  Outcome: Progressing  Goal: Maintain proper alignment of affected body part  Description  INTERVENTIONS:  - Support, maintain and protect limb and body alignment  - Provide patient/ family with appropriate education  Outcome: Progressing

## 2019-12-24 NOTE — ASSESSMENT & PLAN NOTE
Syncope and collapse with history of CVA, dementia, chronic UTI  Seen by neurology with possibility of seizure episode  Has been started on depakote by neurology  EEG negative  Symptoms stable    OP Neurology f/u on discharge -

## 2019-12-24 NOTE — ASSESSMENT & PLAN NOTE
T12 compression fracture is new and acute   Neurosurgery recommended conservative management  Lidoderm patch and gabapentin HS for pain ineffective  Tylenol to 975 Q 8  Oxycodone 2 5 mg  Cannot tolerate back brace     IR consulted for verterbroplasty - will need to hold Pradaxa x 48 hours    Case personally discussed with [address] [place] [address] medical coordinator   - Patient will only require short stay - family motivated to take patient home

## 2019-12-24 NOTE — PROGRESS NOTES
Progress Note - Raisa Martinez 1933, 80 y o  female MRN: [id number]    Unit/Bed#: E5 -01 Encounter: [id number]    Primary Care Provider: Jessica Florez DO   Date and time admitted to hospital: 12/14/2019  8:41 AM        Compression fracture of T12 vertebra ([facility] [address] )  Assessment & Plan  T12 compression fracture is new and acute   Neurosurgery recommended conservative management  Lidoderm patch and gabapentin HS for pain ineffective  Tylenol to 975 Q 8  Oxycodone 2 5 mg  Cannot tolerate back brace     IR consulted for verterbroplasty - will need to hold Pradaxa x 48 hours    Case personally discussed with [address] [place] [address] medical coordinator   - Patient will only require short stay - family motivated to take patient home  Patient is Denominational  Assessment & Plan  No blood products per Scientology preference    Chronic UTI  Assessment & Plan  Acute on chronic UTI on cephalexin however current urine culture citrobacter with multiple resistances  Was started on cefepime, has been switched to nitrofurantoin  Completed 5 day course here  Keflex resumed 12/23, add Probiotics indef    Consider methanamine and Vit C as Outpatient  A-fib [facility])  Assessment & Plan  Paroxysmal atrial fibrillation  VR rate controlled here    Pradaxa held 12/24 for IR procedure    Hypertension  Assessment & Plan  History of hypertension without need for medications - Monitor here    Alzheimer disease [facility])  Assessment & Plan  Alzheimer's dementia on memantine and donepezil  On quetiapine and melatonin for sleep during hospitalization    * Syncope and collapse  Assessment & Plan  Syncope and collapse with history of CVA, dementia, chronic UTI  Seen by neurology with possibility of seizure episode  Has been started on depakote by neurology  EEG negative  Symptoms stable    OP Neurology f/u on discharge -       [facility] Progress Note  Patient: Raisa Martinez 80 y o  female   MRN: [id number] PCP: Usha Oneal DO  Unit/Bed#: E5 -01 Encounter: [id number]  Date Of Visit: 19    Assessment:    Principal Problem:    Syncope and collapse  Active Problems:    Compression fracture of T12 vertebra (HCC)    Alzheimer disease (HCC)    Hypertension    A-fib (HCC)    Chronic UTI    Patient is Sabianism      Plan:    · IR consult for Vertebroplasty -  · Hold Pradaxa x 48 hours  · Continue Keflex  · Florastor started       VTE Pharmacologic Prophylaxis:   Pharmacologic: Dabigatran (Pradaxa) - start heparin tomorrow  Mechanical VTE Prophylaxis in Place: Yes    Patient Centered Rounds: I have performed bedside rounds with nursing staff today  Discussions with Specialists or Other Care Team Provider:  Interventional Radiology    Education and Discussions with Family / Patient:  Patient and daughter    Time Spent for Care: 30 minutes  More than 50% of total time spent on counseling and coordination of care as described above  Current Length of Stay: 10 day(s)    Current Patient Status: Inpatient   Certification Statement: The patient will continue to require additional inpatient hospital stay due to Vertebroplasty    Discharge Plan / Estimated Discharge Date: To be determined    Code Status: Level 3 - DNAR and DNI      Subjective:   Patient seen and examined complaints of neck and back pain    No other chest pain shortness of breath difficulty breathing    A complete and comprehensive 14 point organ system review has been performed and all other systems are negative other than stated above  Objective:     Vitals:   Temp (24hrs), Av 6 °F (36 4 °C), Min:97 5 °F (36 4 °C), Max:97 6 °F (36 4 °C)    Temp:  [97 5 °F (36 4 °C)-97 6 °F (36 4 °C)] 97 6 °F (36 4 °C)  HR:  [74-76] 76  Resp:  [18] 18  BP: (122-143)/(66-71) 122/71  SpO2:  [99 %] 99 %  Body mass index is 24 94 kg/m²  Input and Output Summary (last 24 hours):        Intake/Output Summary (Last 24 hours) at 2019 1345  Last data filed at 12/24/2019 0900  Gross per 24 hour   Intake 240 ml   Output    Net 240 ml       Physical Exam:     General: well appearing, no acute distress  HEENT: atraumatic, PERRLA, moist mucosa, normal pharynx, normal tonsils and adenoids, normal tongue, no fluid in sinuses  Neck: Trachea midline, no carotid bruit, no masses  Respiratory: normal chest wall expansion, CTA B, no r/r/w, no rubs  Cardiovascular:  Irregularly irregular  Abdomen: Soft, non-tender, non-distended, normal bowel sounds in all quadrants, no hepatosplenomegaly, no tympany  Rectal: deferred  Musculoskeletal:  Limited by pain of thoracic spine  Integumentary: warm, dry, and pink, with no rash, purpura, or petechia  Heme/Lymph: no lymphadenopathy, no bruises  Neurological: Cranial Nerves II-XII grossly intact, no tics, normal sensation to pressure and light touch  Psychiatric: cooperative with normal mood, affect, and cognition      Additional Data:     Labs:    Results from last 7 days   Lab Units 12/23/19  0455   WBC Thousand/uL 8 24   HEMOGLOBIN g/dL 10 1*   HEMATOCRIT % 31 8*   PLATELETS Thousands/uL 294     Results from last 7 days   Lab Units 12/23/19  0455 12/18/19  0600   POTASSIUM mmol/L 3 6 3 7   CHLORIDE mmol/L 102 104   CO2 mmol/L 26 26   BUN mg/dL 15 26*   CREATININE mg/dL 0 68 0 87   CALCIUM mg/dL 9 0 8 3   ALK PHOS U/L  --  73   ALT U/L  --  14   AST U/L  --  14           * I Have Reviewed All Lab Data Listed Above  * Additional Pertinent Lab Tests Reviewed:  All Labs Within Last 24 Hours Reviewed    Imaging:    Reviewed imaging with Interventional Radiology - fracture is acute and new    Recent Cultures (last 7 days):           Last 24 Hours Medication List:     Current Facility-Administered Medications:  acetaminophen 650 mg Oral Q12H PRN Jessica GARCÍA MD   acetaminophen 975 mg Oral Q8H [address] Monique Pagan PA-C   atorvastatin 20 mg Oral Daily With Dinner Tatiana García MD   cephalexin 250 mg Oral Daily Joy Mojica DO divalproex sodium 125 mg Oral TID Kalia Villegas DO   docusate sodium 100 mg Oral BID Felipe GARCÍA MD   donepezil 10 mg Oral HS Felipe GARCÍA MD   gabapentin 100 mg Oral HS Monique Pagan PA-C   lidocaine 1 patch Topical Daily Wyvonne Shone, MD   melatonin 6 mg Oral HS Juan Ornelas DO   memantine 10 mg Oral BID Felipe GARCÍA MD   OLANZapine 2 5 mg Intramuscular TID PRN Serena Churchill DO   oxyCODONE 2 5 mg Oral Q4H PRN Juan Ornelas DO   pantoprazole 40 mg Oral Daily Before Breakfast Felipe GARCÍA MD   polyethylene glycol 17 g Oral Daily PRN Felipe GARCÍA MD   QUEtiapine 25 mg Oral HS Serena Churchill DO        Today, Patient Was Seen By: Charo Monzon DO    ** Please Note: This note has been constructed using a voice recognition system   **

## 2019-12-24 NOTE — PLAN OF CARE
Problem: Potential for Falls  Goal: Patient will remain free of falls  Description  INTERVENTIONS:  - Assess patient frequently for physical needs  -  Identify cognitive and physical deficits and behaviors that affect risk of falls    -  [place] fall precautions as indicated by assessment   - Educate patient/family on patient safety including physical limitations  - Instruct patient to call for assistance with activity based on assessment  - Modify environment to reduce risk of injury  - Consider OT/PT consult to assist with strengthening/mobility  Outcome: Progressing     Problem: Prexisting or High Potential for Compromised Skin Integrity  Goal: Skin integrity is maintained or improved  Description  INTERVENTIONS:  - Identify patients at risk for skin breakdown  - Assess and monitor skin integrity  - Assess and monitor nutrition and hydration status  - Monitor labs   - Assess for incontinence   - Turn and reposition patient  - Assist with mobility/ambulation  - Relieve pressure over bony prominences  - Avoid friction and shearing  - Provide appropriate hygiene as needed including keeping skin clean and dry  - Evaluate need for skin moisturizer/barrier cream  - Collaborate with interdisciplinary team   - Patient/family teaching  - Consider wound care consult   Outcome: Progressing     Problem: PAIN - ADULT  Goal: Verbalizes/displays adequate comfort level or baseline comfort level  Description  Interventions:  - Encourage patient to monitor pain and request assistance  - Assess pain using appropriate pain scale  - Administer analgesics based on type and severity of pain and evaluate response  - Implement non-pharmacological measures as appropriate and evaluate response  - Consider cultural and social influences on pain and pain management  - Notify physician/advanced practitioner if interventions unsuccessful or patient reports new pain  Outcome: Progressing     Problem: INFECTION - ADULT  Goal: Absence or prevention of progression during hospitalization  Description  INTERVENTIONS:  - Assess and monitor for signs and symptoms of infection  - Monitor lab/diagnostic results  - Monitor all insertion sites, i e  indwelling lines, tubes, and drains  - Monitor endotracheal if appropriate and nasal secretions for changes in amount and color  - [place] appropriate cooling/warming therapies per order  - Administer medications as ordered  - Instruct and encourage patient and family to use good hand hygiene technique  - Identify and instruct in appropriate isolation precautions for identified infection/condition  Outcome: Progressing  Goal: Absence of fever/infection during neutropenic period  Description  INTERVENTIONS:  - Monitor WBC    Outcome: Progressing     Problem: SAFETY ADULT  Goal: Maintain or return to baseline ADL function  Description  INTERVENTIONS:  -  Assess patient's ability to carry out ADLs; assess patient's baseline for ADL function and identify physical deficits which impact ability to perform ADLs (bathing, care of mouth/teeth, toileting, grooming, dressing, etc )  - Assess/evaluate cause of self-care deficits   - Assess range of motion  - Assess patient's mobility; develop plan if impaired  - Assess patient's need for assistive devices and provide as appropriate  - Encourage maximum independence but intervene and supervise when necessary  - Involve family in performance of ADLs  - Assess for home care needs following discharge   - Consider OT consult to assist with ADL evaluation and planning for discharge  - Provide patient education as appropriate  Outcome: Progressing  Goal: Maintain or return mobility status to optimal level  Description  INTERVENTIONS:  - Assess patient's baseline mobility status (ambulation, transfers, stairs, etc )    - Identify cognitive and physical deficits and behaviors that affect mobility  - Identify mobility aids required to assist with transfers and/or ambulation (gait belt, sit-to-stand, lift, walker, cane, etc )  - [place] fall precautions as indicated by assessment  - Record patient progress and toleration of activity level on Mobility SBAR; progress patient to next Phase/Stage  - Instruct patient to call for assistance with activity based on assessment  - Consider rehabilitation consult to assist with strengthening/weightbearing, etc   Outcome: Progressing     Problem: DISCHARGE PLANNING  Goal: Discharge to home or other facility with appropriate resources  Description  INTERVENTIONS:  - Identify barriers to discharge w/patient and caregiver  - Arrange for needed discharge resources and transportation as appropriate  - Identify discharge learning needs (meds, wound care, etc )  - Arrange for interpretive services to assist at discharge as needed  - Refer to Case Management Department for coordinating discharge planning if the patient needs post-hospital services based on physician/advanced practitioner order or complex needs related to functional status, cognitive ability, or social support system  Outcome: Progressing     Problem: Knowledge Deficit  Goal: Patient/family/caregiver demonstrates understanding of disease process, treatment plan, medications, and discharge instructions  Description  Complete learning assessment and assess knowledge base  Interventions:  - Provide teaching at level of understanding  - Provide teaching via preferred learning methods  Outcome: Progressing     Problem: Nutrition/Hydration-ADULT  Goal: Nutrient/Hydration intake appropriate for improving, restoring or maintaining nutritional needs  Description  Monitor and assess patient's nutrition/hydration status for malnutrition  Collaborate with interdisciplinary team and initiate plan and interventions as ordered  Monitor patient's weight and dietary intake as ordered or per policy  Utilize nutrition screening tool and intervene as necessary   Determine patient's food preferences and provide high-protein, high-caloric foods as appropriate       INTERVENTIONS:  - Monitor oral intake, urinary output, labs, and treatment plans  - Assess nutrition and hydration status and recommend course of action  - Evaluate amount of meals eaten  - Assist patient with eating if necessary   - Allow adequate time for meals  - Recommend/ encourage appropriate diets, oral nutritional supplements, and vitamin/mineral supplements  - Order, calculate, and assess calorie counts as needed  - Recommend, monitor, and adjust tube feedings and TPN/PPN based on assessed needs  - Assess need for intravenous fluids  - Provide specific nutrition/hydration education as appropriate  - Include patient/family/caregiver in decisions related to nutrition  Outcome: Progressing     Problem: NEUROSENSORY - ADULT  Goal: Achieves stable or improved neurological status  Description  INTERVENTIONS  - Monitor and report changes in neurological status  - Monitor vital signs such as temperature, blood pressure, glucose, and any other labs ordered   - Initiate measures to prevent increased intracranial pressure  - Monitor for seizure activity and implement precautions if appropriate      Outcome: Progressing  Goal: Remains free of injury related to seizures activity  Description  INTERVENTIONS  - Maintain airway, patient safety  and administer oxygen as ordered  - Monitor patient for seizure activity, document and report duration and description of seizure to physician/advanced practitioner  - If seizure occurs,  ensure patient safety during seizure  - Reorient patient post seizure  - Seizure pads on all 4 side rails  - Instruct patient/family to notify RN of any seizure activity including if an aura is experienced  - Instruct patient/family to call for assistance with activity based on nursing assessment  - Administer anti-seizure medications if ordered    Outcome: Progressing  Goal: Achieves maximal functionality and self care  Description  INTERVENTIONS  - Monitor swallowing and airway patency with patient fatigue and changes in neurological status  - Encourage and assist patient to increase activity and self care     - Encourage visually impaired, hearing impaired and aphasic patients to use assistive/communication devices  Outcome: Progressing     Problem: MUSCULOSKELETAL - ADULT  Goal: Maintain or return mobility to safest level of function  Description  INTERVENTIONS:  - Assess patient's ability to carry out ADLs; assess patient's baseline for ADL function and identify physical deficits which impact ability to perform ADLs (bathing, care of mouth/teeth, toileting, grooming, dressing, etc )  - Assess/evaluate cause of self-care deficits   - Assess range of motion  - Assess patient's mobility  - Assess patient's need for assistive devices and provide as appropriate  - Encourage maximum independence but intervene and supervise when necessary  - Involve family in performance of ADLs  - Assess for home care needs following discharge   - Consider OT consult to assist with ADL evaluation and planning for discharge  - Provide patient education as appropriate  Outcome: Progressing  Goal: Maintain proper alignment of affected body part  Description  INTERVENTIONS:  - Support, maintain and protect limb and body alignment  - Provide patient/ family with appropriate education  Outcome: Progressing

## 2019-12-24 NOTE — UTILIZATION REVIEW
Continued Stay Review    Date:    12/24/2019                         Current Patient Class:   Inpatient  Current Level of Care:    Med surg    HPI:86 y o  female initially admitted on  12/14  With   syncope     Assessment/Plan:   12/24:   Continue  Conservative management  For  Compression  Fracture   T 12  Cont  lidoderm patch,  Oxycodone  Now on  Po  Antibiotic  For  Chronic  UTI  Cont  PT  And  Will  Need STR  Per  Recommendation        Pertinent Labs/Diagnostic Results:   Results from last 7 days   Lab Units 12/23/19  0455 12/18/19  0600   WBC Thousand/uL 8 24 7 07   HEMOGLOBIN g/dL 10 1* 10 2*   HEMATOCRIT % 31 8* 32 0*   PLATELETS Thousands/uL 294 222         Results from last 7 days   Lab Units 12/23/19  0455 12/18/19  0600   SODIUM mmol/L 137 140   POTASSIUM mmol/L 3 6 3 7   CHLORIDE mmol/L 102 104   CO2 mmol/L 26 26   ANION GAP mmol/L 9 10   BUN mg/dL 15 26*   CREATININE mg/dL 0 68 0 87   EGFR ml/min/1 73sq m 79 61   CALCIUM mg/dL 9 0 8 3     Results from last 7 days   Lab Units 12/18/19  0600   AST U/L 14   ALT U/L 14   ALK PHOS U/L 73   TOTAL PROTEIN g/dL 6 3*   ALBUMIN g/dL 2 8*   TOTAL BILIRUBIN mg/dL 0 29         Results from last 7 days   Lab Units 12/23/19  0455 12/18/19  0600   GLUCOSE RANDOM mg/dL 109 118             Vital Signs:   97 6 °F (36 4 °C)  76  18  122/71  99 %  None (Room air)         Medications:   Scheduled Medications:    Medications:  acetaminophen 975 mg Oral Q8H [address]   atorvastatin 20 mg Oral Daily With Dinner   cephalexin 250 mg Oral Daily   dabigatran etexilate 150 mg Oral Q12H [address]   divalproex sodium 125 mg Oral TID   docusate sodium 100 mg Oral BID   donepezil 10 mg Oral HS   gabapentin 100 mg Oral HS   lidocaine 1 patch Topical Daily   melatonin 6 mg Oral HS   memantine 10 mg Oral BID   pantoprazole 40 mg Oral Daily Before Breakfast   QUEtiapine 25 mg Oral HS     Continuous IV Infusions:     PRN Meds:    acetaminophen 650 mg Oral Q12H PRN   OLANZapine 2 5 mg Intramuscular TID PRN   oxyCODONE 2 5 mg Oral Q4H PRN   polyethylene glycol 17 g Oral Daily PRN       Discharge Plan:    STR    Network Utilization Review Department  [email or web address]  ATTENTION: Please call with any questions or concerns to [phone] and carefully listen to the prompts so that you are directed to the right person  All voicemails are confidential   Cris White all requests for admission clinical reviews, approved or denied determinations and any other requests to dedicated fax number below belonging to the campus where the patient is receiving treatment   List of dedicated fax numbers for the Facilities:  [address] DENIALS (Administrative/Medical Necessity) [phone]   [address] (Maternity/NICU/Pediatrics) [phone] 5400 [facility] [phone]   [facility] [phone]   [facility] [phone]   [facility] [phone]   [address] [phone]   [facility]  [phone]   [address] [facility] [address] [facility]  [address] [address] [address] [facility] [phone]

## 2019-12-25 PROCEDURE — 99232 SBSQ HOSP IP/OBS MODERATE 35: CPT | Performed by: INTERNAL MEDICINE

## 2019-12-25 RX ORDER — OXYCODONE HYDROCHLORIDE 5 MG/1
2.5 TABLET ORAL ONCE
Status: COMPLETED | OUTPATIENT
Start: 2019-12-25 | End: 2019-12-25

## 2019-12-25 RX ADMIN — OXYCODONE HYDROCHLORIDE 2.5 MG: 5 TABLET ORAL at 09:29

## 2019-12-25 RX ADMIN — DIVALPROEX SODIUM 125 MG: 125 CAPSULE, COATED PELLETS ORAL at 09:30

## 2019-12-25 RX ADMIN — OXYCODONE HYDROCHLORIDE 2.5 MG: 5 TABLET ORAL at 17:15

## 2019-12-25 RX ADMIN — Medication 250 MG: at 09:29

## 2019-12-25 RX ADMIN — QUETIAPINE FUMARATE 25 MG: 25 TABLET ORAL at 21:44

## 2019-12-25 RX ADMIN — OXYCODONE HYDROCHLORIDE 2.5 MG: 5 TABLET ORAL at 06:28

## 2019-12-25 RX ADMIN — ACETAMINOPHEN 975 MG: 325 TABLET ORAL at 06:28

## 2019-12-25 RX ADMIN — ATORVASTATIN CALCIUM 20 MG: 20 TABLET, FILM COATED ORAL at 17:14

## 2019-12-25 RX ADMIN — Medication 250 MG: at 17:14

## 2019-12-25 RX ADMIN — OXYCODONE HYDROCHLORIDE 2.5 MG: 5 TABLET ORAL at 13:12

## 2019-12-25 RX ADMIN — MEMANTINE 10 MG: 5 TABLET ORAL at 21:45

## 2019-12-25 RX ADMIN — DOCUSATE SODIUM 100 MG: 100 CAPSULE, LIQUID FILLED ORAL at 09:29

## 2019-12-25 RX ADMIN — PANTOPRAZOLE SODIUM 40 MG: 40 TABLET, DELAYED RELEASE ORAL at 06:29

## 2019-12-25 RX ADMIN — DOCUSATE SODIUM 100 MG: 100 CAPSULE, LIQUID FILLED ORAL at 17:14

## 2019-12-25 RX ADMIN — DONEPEZIL HYDROCHLORIDE 10 MG: 10 TABLET, FILM COATED ORAL at 17:15

## 2019-12-25 RX ADMIN — DIVALPROEX SODIUM 125 MG: 125 CAPSULE, COATED PELLETS ORAL at 17:14

## 2019-12-25 RX ADMIN — MEMANTINE 10 MG: 5 TABLET ORAL at 09:29

## 2019-12-25 RX ADMIN — DIVALPROEX SODIUM 125 MG: 125 CAPSULE, COATED PELLETS ORAL at 21:46

## 2019-12-25 RX ADMIN — GABAPENTIN 100 MG: 100 CAPSULE ORAL at 21:45

## 2019-12-25 RX ADMIN — MELATONIN TAB 3 MG 6 MG: 3 TAB at 21:45

## 2019-12-25 RX ADMIN — LIDOCAINE 1 PATCH: 50 PATCH CUTANEOUS at 09:29

## 2019-12-25 RX ADMIN — MORPHINE SULFATE 0.5 MG: 2 INJECTION, SOLUTION INTRAMUSCULAR; INTRAVENOUS at 18:41

## 2019-12-25 RX ADMIN — ACETAMINOPHEN 975 MG: 325 TABLET ORAL at 13:12

## 2019-12-25 RX ADMIN — ACETAMINOPHEN 975 MG: 325 TABLET ORAL at 21:44

## 2019-12-25 RX ADMIN — CEPHALEXIN 250 MG: 250 CAPSULE ORAL at 09:30

## 2019-12-25 NOTE — PLAN OF CARE
Problem: Potential for Falls  Goal: Patient will remain free of falls  Description  INTERVENTIONS:  - Assess patient frequently for physical needs  -  Identify cognitive and physical deficits and behaviors that affect risk of falls    -  [place] fall precautions as indicated by assessment   - Educate patient/family on patient safety including physical limitations  - Instruct patient to call for assistance with activity based on assessment  - Modify environment to reduce risk of injury  - Consider OT/PT consult to assist with strengthening/mobility  Outcome: Progressing     Problem: Prexisting or High Potential for Compromised Skin Integrity  Goal: Skin integrity is maintained or improved  Description  INTERVENTIONS:  - Identify patients at risk for skin breakdown  - Assess and monitor skin integrity  - Assess and monitor nutrition and hydration status  - Monitor labs   - Assess for incontinence   - Turn and reposition patient  - Assist with mobility/ambulation  - Relieve pressure over bony prominences  - Avoid friction and shearing  - Provide appropriate hygiene as needed including keeping skin clean and dry  - Evaluate need for skin moisturizer/barrier cream  - Collaborate with interdisciplinary team   - Patient/family teaching  - Consider wound care consult   Outcome: Progressing     Problem: PAIN - ADULT  Goal: Verbalizes/displays adequate comfort level or baseline comfort level  Description  Interventions:  - Encourage patient to monitor pain and request assistance  - Assess pain using appropriate pain scale  - Administer analgesics based on type and severity of pain and evaluate response  - Implement non-pharmacological measures as appropriate and evaluate response  - Consider cultural and social influences on pain and pain management  - Notify physician/advanced practitioner if interventions unsuccessful or patient reports new pain  Outcome: Progressing     Problem: INFECTION - ADULT  Goal: Absence or prevention of progression during hospitalization  Description  INTERVENTIONS:  - Assess and monitor for signs and symptoms of infection  - Monitor lab/diagnostic results  - Monitor all insertion sites, i e  indwelling lines, tubes, and drains  - Monitor endotracheal if appropriate and nasal secretions for changes in amount and color  - [place] appropriate cooling/warming therapies per order  - Administer medications as ordered  - Instruct and encourage patient and family to use good hand hygiene technique  - Identify and instruct in appropriate isolation precautions for identified infection/condition  Outcome: Progressing  Goal: Absence of fever/infection during neutropenic period  Description  INTERVENTIONS:  - Monitor WBC    Outcome: Progressing     Problem: SAFETY ADULT  Goal: Maintain or return to baseline ADL function  Description  INTERVENTIONS:  -  Assess patient's ability to carry out ADLs; assess patient's baseline for ADL function and identify physical deficits which impact ability to perform ADLs (bathing, care of mouth/teeth, toileting, grooming, dressing, etc )  - Assess/evaluate cause of self-care deficits   - Assess range of motion  - Assess patient's mobility; develop plan if impaired  - Assess patient's need for assistive devices and provide as appropriate  - Encourage maximum independence but intervene and supervise when necessary  - Involve family in performance of ADLs  - Assess for home care needs following discharge   - Consider OT consult to assist with ADL evaluation and planning for discharge  - Provide patient education as appropriate  Outcome: Progressing  Goal: Maintain or return mobility status to optimal level  Description  INTERVENTIONS:  - Assess patient's baseline mobility status (ambulation, transfers, stairs, etc )    - Identify cognitive and physical deficits and behaviors that affect mobility  - Identify mobility aids required to assist with transfers and/or ambulation (gait belt, sit-to-stand, lift, walker, cane, etc )  - [place] fall precautions as indicated by assessment  - Record patient progress and toleration of activity level on Mobility SBAR; progress patient to next Phase/Stage  - Instruct patient to call for assistance with activity based on assessment  - Consider rehabilitation consult to assist with strengthening/weightbearing, etc   Outcome: Progressing     Problem: DISCHARGE PLANNING  Goal: Discharge to home or other facility with appropriate resources  Description  INTERVENTIONS:  - Identify barriers to discharge w/patient and caregiver  - Arrange for needed discharge resources and transportation as appropriate  - Identify discharge learning needs (meds, wound care, etc )  - Arrange for interpretive services to assist at discharge as needed  - Refer to Case Management Department for coordinating discharge planning if the patient needs post-hospital services based on physician/advanced practitioner order or complex needs related to functional status, cognitive ability, or social support system  Outcome: Progressing     Problem: Knowledge Deficit  Goal: Patient/family/caregiver demonstrates understanding of disease process, treatment plan, medications, and discharge instructions  Description  Complete learning assessment and assess knowledge base  Interventions:  - Provide teaching at level of understanding  - Provide teaching via preferred learning methods  Outcome: Progressing     Problem: Nutrition/Hydration-ADULT  Goal: Nutrient/Hydration intake appropriate for improving, restoring or maintaining nutritional needs  Description  Monitor and assess patient's nutrition/hydration status for malnutrition  Collaborate with interdisciplinary team and initiate plan and interventions as ordered  Monitor patient's weight and dietary intake as ordered or per policy  Utilize nutrition screening tool and intervene as necessary   Determine patient's food preferences and provide high-protein, high-caloric foods as appropriate       INTERVENTIONS:  - Monitor oral intake, urinary output, labs, and treatment plans  - Assess nutrition and hydration status and recommend course of action  - Evaluate amount of meals eaten  - Assist patient with eating if necessary   - Allow adequate time for meals  - Recommend/ encourage appropriate diets, oral nutritional supplements, and vitamin/mineral supplements  - Order, calculate, and assess calorie counts as needed  - Recommend, monitor, and adjust tube feedings and TPN/PPN based on assessed needs  - Assess need for intravenous fluids  - Provide specific nutrition/hydration education as appropriate  - Include patient/family/caregiver in decisions related to nutrition  Outcome: Progressing     Problem: NEUROSENSORY - ADULT  Goal: Achieves stable or improved neurological status  Description  INTERVENTIONS  - Monitor and report changes in neurological status  - Monitor vital signs such as temperature, blood pressure, glucose, and any other labs ordered   - Initiate measures to prevent increased intracranial pressure  - Monitor for seizure activity and implement precautions if appropriate      Outcome: Progressing  Goal: Remains free of injury related to seizures activity  Description  INTERVENTIONS  - Maintain airway, patient safety  and administer oxygen as ordered  - Monitor patient for seizure activity, document and report duration and description of seizure to physician/advanced practitioner  - If seizure occurs,  ensure patient safety during seizure  - Reorient patient post seizure  - Seizure pads on all 4 side rails  - Instruct patient/family to notify RN of any seizure activity including if an aura is experienced  - Instruct patient/family to call for assistance with activity based on nursing assessment  - Administer anti-seizure medications if ordered    Outcome: Progressing  Goal: Achieves maximal functionality and self care  Description  INTERVENTIONS  - Monitor swallowing and airway patency with patient fatigue and changes in neurological status  - Encourage and assist patient to increase activity and self care     - Encourage visually impaired, hearing impaired and aphasic patients to use assistive/communication devices  Outcome: Progressing     Problem: MUSCULOSKELETAL - ADULT  Goal: Maintain or return mobility to safest level of function  Description  INTERVENTIONS:  - Assess patient's ability to carry out ADLs; assess patient's baseline for ADL function and identify physical deficits which impact ability to perform ADLs (bathing, care of mouth/teeth, toileting, grooming, dressing, etc )  - Assess/evaluate cause of self-care deficits   - Assess range of motion  - Assess patient's mobility  - Assess patient's need for assistive devices and provide as appropriate  - Encourage maximum independence but intervene and supervise when necessary  - Involve family in performance of ADLs  - Assess for home care needs following discharge   - Consider OT consult to assist with ADL evaluation and planning for discharge  - Provide patient education as appropriate  Outcome: Progressing  Goal: Maintain proper alignment of affected body part  Description  INTERVENTIONS:  - Support, maintain and protect limb and body alignment  - Provide patient/ family with appropriate education  Outcome: Progressing

## 2019-12-26 ENCOUNTER — APPOINTMENT (INPATIENT)
Dept: RADIOLOGY | Facility: HOSPITAL | Age: 84
DRG: 516 | End: 2019-12-26
Attending: INTERNAL MEDICINE
Payer: COMMERCIAL

## 2019-12-26 LAB
INR PPP: 1.04 (ref 0.84–1.19)
PROTHROMBIN TIME: 13.7 SECONDS (ref 11.6–14.5)

## 2019-12-26 PROCEDURE — 99232 SBSQ HOSP IP/OBS MODERATE 35: CPT | Performed by: INTERNAL MEDICINE

## 2019-12-26 PROCEDURE — 0PU43JZ SUPPLEMENT THORACIC VERTEBRA WITH SYNTHETIC SUBSTITUTE, PERCUTANEOUS APPROACH: ICD-10-PCS | Performed by: RADIOLOGY

## 2019-12-26 PROCEDURE — 85610 PROTHROMBIN TIME: CPT | Performed by: INTERNAL MEDICINE

## 2019-12-26 PROCEDURE — 22513 PERQ VERTEBRAL AUGMENTATION: CPT

## 2019-12-26 PROCEDURE — 99152 MOD SED SAME PHYS/QHP 5/>YRS: CPT

## 2019-12-26 PROCEDURE — 99153 MOD SED SAME PHYS/QHP EA: CPT

## 2019-12-26 PROCEDURE — 99152 MOD SED SAME PHYS/QHP 5/>YRS: CPT | Performed by: RADIOLOGY

## 2019-12-26 PROCEDURE — 22513 PERQ VERTEBRAL AUGMENTATION: CPT | Performed by: RADIOLOGY

## 2019-12-26 PROCEDURE — C1713 ANCHOR/SCREW BN/BN,TIS/BN: HCPCS

## 2019-12-26 PROCEDURE — 0PS43ZZ REPOSITION THORACIC VERTEBRA, PERCUTANEOUS APPROACH: ICD-10-PCS | Performed by: RADIOLOGY

## 2019-12-26 RX ORDER — DABIGATRAN ETEXILATE 150 MG/1
150 CAPSULE, COATED PELLETS ORAL EVERY 12 HOURS SCHEDULED
Status: DISCONTINUED | OUTPATIENT
Start: 2019-12-27 | End: 2019-12-27 | Stop reason: HOSPADM

## 2019-12-26 RX ORDER — MIDAZOLAM HYDROCHLORIDE 2 MG/2ML
INJECTION, SOLUTION INTRAMUSCULAR; INTRAVENOUS CODE/TRAUMA/SEDATION MEDICATION
Status: COMPLETED | OUTPATIENT
Start: 2019-12-26 | End: 2019-12-26

## 2019-12-26 RX ORDER — FENTANYL CITRATE 50 UG/ML
INJECTION, SOLUTION INTRAMUSCULAR; INTRAVENOUS CODE/TRAUMA/SEDATION MEDICATION
Status: COMPLETED | OUTPATIENT
Start: 2019-12-26 | End: 2019-12-26

## 2019-12-26 RX ORDER — CEFAZOLIN SODIUM 1 G/50ML
1000 SOLUTION INTRAVENOUS ONCE
Status: DISCONTINUED | OUTPATIENT
Start: 2019-12-26 | End: 2019-12-26 | Stop reason: ALTCHOICE

## 2019-12-26 RX ORDER — CEFAZOLIN SODIUM 1 G/50ML
1000 SOLUTION INTRAVENOUS ONCE
Status: COMPLETED | OUTPATIENT
Start: 2019-12-26 | End: 2019-12-26

## 2019-12-26 RX ORDER — LIDOCAINE WITH 8.4% SOD BICARB 0.9%(10ML)
SYRINGE (ML) INJECTION CODE/TRAUMA/SEDATION MEDICATION
Status: COMPLETED | OUTPATIENT
Start: 2019-12-26 | End: 2019-12-26

## 2019-12-26 RX ADMIN — DIVALPROEX SODIUM 125 MG: 125 CAPSULE, COATED PELLETS ORAL at 17:11

## 2019-12-26 RX ADMIN — LIDOCAINE HYDROCHLORIDE 5 ML: 10 INJECTION, SOLUTION INFILTRATION; PERINEURAL at 11:25

## 2019-12-26 RX ADMIN — CEFAZOLIN SODIUM 1000 MG: 1 SOLUTION INTRAVENOUS at 11:16

## 2019-12-26 RX ADMIN — OXYCODONE HYDROCHLORIDE 2.5 MG: 5 TABLET ORAL at 17:11

## 2019-12-26 RX ADMIN — DIVALPROEX SODIUM 125 MG: 125 CAPSULE, COATED PELLETS ORAL at 21:19

## 2019-12-26 RX ADMIN — DONEPEZIL HYDROCHLORIDE 10 MG: 10 TABLET, FILM COATED ORAL at 17:12

## 2019-12-26 RX ADMIN — MEMANTINE 10 MG: 5 TABLET ORAL at 21:18

## 2019-12-26 RX ADMIN — DOCUSATE SODIUM 100 MG: 100 CAPSULE, LIQUID FILLED ORAL at 17:11

## 2019-12-26 RX ADMIN — MIDAZOLAM 0.5 MG: 1 INJECTION INTRAMUSCULAR; INTRAVENOUS at 11:01

## 2019-12-26 RX ADMIN — Medication 250 MG: at 17:11

## 2019-12-26 RX ADMIN — FENTANYL CITRATE 25 MCG: 50 INJECTION, SOLUTION INTRAMUSCULAR; INTRAVENOUS at 11:49

## 2019-12-26 RX ADMIN — QUETIAPINE FUMARATE 25 MG: 25 TABLET ORAL at 21:18

## 2019-12-26 RX ADMIN — ACETAMINOPHEN 975 MG: 325 TABLET ORAL at 05:52

## 2019-12-26 RX ADMIN — ATORVASTATIN CALCIUM 20 MG: 20 TABLET, FILM COATED ORAL at 17:12

## 2019-12-26 RX ADMIN — FENTANYL CITRATE 25 MCG: 50 INJECTION, SOLUTION INTRAMUSCULAR; INTRAVENOUS at 11:05

## 2019-12-26 RX ADMIN — GABAPENTIN 100 MG: 100 CAPSULE ORAL at 21:18

## 2019-12-26 RX ADMIN — ACETAMINOPHEN 975 MG: 325 TABLET ORAL at 13:56

## 2019-12-26 RX ADMIN — MELATONIN TAB 3 MG 6 MG: 3 TAB at 21:18

## 2019-12-26 RX ADMIN — ACETAMINOPHEN 975 MG: 325 TABLET ORAL at 21:17

## 2019-12-26 RX ADMIN — MIDAZOLAM 0.5 MG: 1 INJECTION INTRAMUSCULAR; INTRAVENOUS at 11:20

## 2019-12-26 RX ADMIN — PANTOPRAZOLE SODIUM 40 MG: 40 TABLET, DELAYED RELEASE ORAL at 05:53

## 2019-12-26 NOTE — CASE MANAGEMENT
CM informed this morning by SLIM that patient will be ready for discharge after vertebroplasty this am and family wants patient to go to [facility]  Additionally, [facility] no longer had available bed to accept patient at this time  CM received confirmation from [facility] at [facility] that the patient could be accepted tomorrow  CM called patient's [employer] [phone] and spoke to [employer] [place]  To change authorization from [address] [place] [address] to [facility]  CM received auth# [id number] from [employer]  And transferred to preauthorization RN line where left detailed message indicated as urgent to receive call back today so that authorization status can be changed for discharge tomorrow  CM department will continue to follow through discharge

## 2019-12-26 NOTE — CASE MANAGEMENT
CM received call back from [place]  From insurance and CM provided new NPI# [id number] for [facility] to change the discharge facility on the authorization and update dates on auth to 12/27-1/3/20  CM will be called back at [phone] if additional information is required and provided fax# [phone]  CM to call back [phone] tomorrow morning if confirmation is not received to get further assistance/update from Vu Walters  department will continue to follow through discharge

## 2019-12-26 NOTE — ASSESSMENT & PLAN NOTE
T12 compression fracture is new and acute   Neurosurgery recommended conservative management  Lidoderm patch and gabapentin HS for pain ineffective  Tylenol to 975 Q 8    Oxycodone 2 5 mg     S/P Vertebroplasty from 12/26  Plan for rehab discharge on 12/27-  Accepted at [facility]     - Patient will only require short stay - family motivated to take patient home once well

## 2019-12-26 NOTE — PROGRESS NOTES
Progress Note - Ruddy Chase 1933, 80 y o  female MRN: [id number]    Unit/Bed#: E5 -01 Encounter: [id number]    Primary Care Provider: Leighton West DO   Date and time admitted to hospital: 12/14/2019  8:41 AM        Compression fracture of T12 vertebra ([address] )  Assessment & Plan  T12 compression fracture is new and acute   Neurosurgery recommended conservative management  Lidoderm patch and gabapentin HS for pain ineffective  Tylenol to 975 Q 8  Oxycodone 2 5 mg  Cannot tolerate back brace     IR consulted for verterbroplasty scheduled 12/26/2019  - Pradaxa held x 48 hrs     - Patient will only require short stay - family motivated to take patient home once well    Patient is Denominational  Assessment & Plan  No blood products per [place] preference    Chronic UTI  Assessment & Plan  Acute on chronic UTI on cephalexin however current urine culture citrobacter with multiple resistances  Was started on cefepime, has been switched to nitrofurantoin  Completed 5 day course here  Keflex resumed 12/23, add Probiotics indef    Consider methanamine and Vit C as Outpatient  A-fib [facility])  Assessment & Plan  Paroxysmal atrial fibrillation  VR rate controlled here    Pradaxa held 12/24 for IR procedure    Hypertension  Assessment & Plan  History of hypertension without need for medications - Monitor here    Alzheimer disease [facility])  Assessment & Plan  Alzheimer's dementia on memantine and donepezil  On quetiapine and melatonin for sleep during hospitalization    * Syncope and collapse  Assessment & Plan  Syncope and collapse with history of CVA, dementia, chronic UTI  Seen by neurology with possibility of seizure episode  Has been started on depakote by neurology  EEG negative  Symptoms stable    OP Neurology f/u on discharge -       [facility] Internal Medicine Progress Note  Patient: Ruddy Chase 80 y o  female   MRN: [id number]  PCP: Leighton West DO  Unit/Bed#: E5 -01 Encounter: [id number]  Date Of Visit: 19    Assessment:    Principal Problem:    Syncope and collapse  Active Problems:    Compression fracture of T12 vertebra (HCC)    Alzheimer disease (HCC)    Hypertension    A-fib (HCC)    Chronic UTI    Patient is Restoration      Plan:    · Vertebroplasty/kyphoplasty scheduled for tomorrow  · NPO after mid  · Pain control       VTE Pharmacologic Prophylaxis:   Pharmacologic: Held for upcoming procedure  Mechanical VTE Prophylaxis in Place: Yes    Patient Centered Rounds: I have performed bedside rounds with nursing staff today  Discussions with Specialists or Other Care Team Provider:  None    Education and Discussions with Family / Patient:  Patient and family member at bedside    Time Spent for Care: 20 minutes  More than 50% of total time spent on counseling and coordination of care as described above  Current Length of Stay: 11 day(s)    Current Patient Status: Inpatient   Certification Statement: The patient will continue to require additional inpatient hospital stay due to A vertebroplasty    Discharge Plan / Estimated Discharge Date:  24-48 hours    Code Status: Level 3 - DNAR and DNI      Subjective:   Patient seen and examined, endorses back pain, no chest pain, shortness of breath, fever  Otherwise no other complaints    A complete and comprehensive 14 point organ system review has been performed and all other systems are negative other than stated above  Objective:     Vitals:   Temp (24hrs), Av 7 °F (36 5 °C), Min:97 5 °F (36 4 °C), Max:97 8 °F (36 6 °C)    Temp:  [97 5 °F (36 4 °C)-97 8 °F (36 6 °C)] 97 5 °F (36 4 °C)  HR:  [75-79] 79  Resp:  [18] 18  BP: (133-141)/(64-70) 133/70  SpO2:  [98 %] 98 %  Body mass index is 24 94 kg/m²  Input and Output Summary (last 24 hours):        Intake/Output Summary (Last 24 hours) at 2019 1900  Last data filed at 2019 1100  Gross per 24 hour   Intake 180 ml   Output 200 ml   Net -20 ml Physical Exam:     General: well appearing, no acute distress  HEENT: atraumatic, PERRLA, moist mucosa, normal pharynx, normal tonsils and adenoids, normal tongue, no fluid in sinuses  Neck: Trachea midline, no carotid bruit, no masses  Respiratory: normal chest wall expansion, CTA B, no r/r/w, no rubs  Cardiovascular: RRR, no m/r/g, Normal S1 and S2  Abdomen: Soft, non-tender, non-distended, normal bowel sounds in all quadrants, no hepatosplenomegaly, no tympany  Rectal: deferred  Musculoskeletal:  Limited by back pain  Integumentary: warm, dry, and pink, with no rash, purpura, or petechia  Heme/Lymph: no lymphadenopathy, no bruises  Neurological: Cranial Nerves II-XII grossly intact, no tics, normal sensation to pressure and light touch  Psychiatric: cooperative with normal mood, affect, and cognition      Additional Data:     Labs:    Results from last 7 days   Lab Units 12/23/19  0455   WBC Thousand/uL 8 24   HEMOGLOBIN g/dL 10 1*   HEMATOCRIT % 31 8*   PLATELETS Thousands/uL 294     Results from last 7 days   Lab Units 12/23/19  0455   POTASSIUM mmol/L 3 6   CHLORIDE mmol/L 102   CO2 mmol/L 26   BUN mg/dL 15   CREATININE mg/dL 0 68   CALCIUM mg/dL 9 0           * I Have Reviewed All Lab Data Listed Above  * Additional Pertinent Lab Tests Reviewed:  Stacey Bhat Admission Reviewed    Imaging:    No new imaging    Recent Cultures (last 7 days):           Last 24 Hours Medication List:     Current Facility-Administered Medications:  acetaminophen 650 mg Oral Q12H PRN Young GARCÍA MD   acetaminophen 975 mg Oral Q8H [address] Monique Pagan PA-C   atorvastatin 20 mg Oral Daily With [employer] MD RAQUEL   cephalexin 250 mg Oral Daily Brandon Mahoney DO   divalproex sodium 125 mg Oral TID Kalia Villegas,    docusate sodium 100 mg Oral BID Felipe GARCÍA MD   donepezil 10 mg Oral HS Felipe GARCÍA MD   gabapentin 100 mg Oral HS Monique Pagan PA-C   lidocaine 1 patch Topical Daily Liat Kulkarni MD   melatonin 6 mg Oral HS Juan Ornelas DO   memantine 10 mg Oral BID Felipe GARCÍA MD   OLANZapine 2 5 mg Intramuscular TID PRN Jesus Manuel Douglass DO   oxyCODONE 2 5 mg Oral Q4H PRN Juan Ornelas DO   pantoprazole 40 mg Oral Daily Before Breakfast Felipe GARCÍA MD   polyethylene glycol 17 g Oral Daily PRN Felipe GARCÍA MD   QUEtiapine 25 mg Oral HS Juan Ornelas DO   saccharomyces boulardii 250 mg Oral BID Merlin Chafe, DO        Today, Patient Was Seen By: Mora Neff DO    ** Please Note: This note has been constructed using a voice recognition system   **

## 2019-12-26 NOTE — PROGRESS NOTES
Progress Note - Marilyn Lancaster 1933, 80 y o  female MRN: [id number]    Unit/Bed#: E5 -01 Encounter: [id number]    Primary Care Provider: José Miguel Enriquez DO   Date and time admitted to hospital: 12/14/2019  8:41 AM        Compression fracture of T12 vertebra ([address] )  Assessment & Plan  T12 compression fracture is new and acute   Neurosurgery recommended conservative management  Lidoderm patch and gabapentin HS for pain ineffective  Tylenol to 975 Q 8  Oxycodone 2 5 mg     S/P Vertebroplasty from 12/26  Plan for rehab discharge on 12/27-  Accepted at [facility]     - Patient will only require short stay - family motivated to take patient home once well    Patient is Mandaeism  Assessment & Plan  No blood products per Latter day preference    Chronic UTI  Assessment & Plan  Acute on chronic UTI on cephalexin however current urine culture citrobacter with multiple resistances  Was started on cefepime, has been switched to nitrofurantoin  Completed 5 day course here  Keflex resumed 12/23, add Probiotics indef    Consider methanamine and Vit C as Outpatient  A-fib [facility])  Assessment & Plan  Paroxysmal atrial fibrillation  VR rate controlled here    Pradaxa held 12/24 for IR procedure    Restart 12/27 - DVT prophylaxis heparin for now    Hypertension  Assessment & Plan  History of hypertension without need for medications - Monitor here    Alzheimer disease [facility])  Assessment & Plan  Alzheimer's dementia on memantine and donepezil  On quetiapine and melatonin for sleep during hospitalization    * Syncope and collapse  Assessment & Plan  Syncope and collapse with history of CVA, dementia, chronic UTI  Seen by neurology with possibility of seizure episode  Has been started on depakote by neurology  EEG negative  Symptoms stable    OP Neurology f/u on discharge        [address] [facility] Internal Medicine Progress Note  Patient: Marilyn Lancaster 80 y o  female   MRN: [id number]  PCP: José Miguel Enriquez DO  Unit/Bed#: E5 -01 Encounter: [id number]  Date Of Visit: 19    Assessment:    Principal Problem:    Syncope and collapse  Active Problems:    Compression fracture of T12 vertebra (HCC)    Alzheimer disease (HCC)    Hypertension    A-fib (HCC)    Chronic UTI    Patient is Islam      Plan:    · Rehab placement tomorrow  · Start Pradaxa   · Post kyphoplasty instructions       VTE Pharmacologic Prophylaxis:   Pharmacologic: Heparin  Mechanical VTE Prophylaxis in Place: Yes    Patient Centered Rounds: I have performed bedside rounds with nursing staff today  Discussions with Specialists or Other Care Team Provider:  Interventional Radiology    Education and Discussions with Family / Patient:  Patient and daughter    Time Spent for Care: 20 minutes  More than 50% of total time spent on counseling and coordination of care as described above  Current Length of Stay: 12 day(s)    Current Patient Status: Inpatient   Certification Statement: The patient will continue to require additional inpatient hospital stay due to Rehab placement    Discharge Plan / Estimated Discharge Date:  24 hour    Code Status: Level 3 - DNAR and DNI      Subjective:   Patient seen and examined, difficult historian secondary to dementia    A complete and comprehensive 14 point organ system review has been performed and all other systems are negative other than stated above  - unreliable secondary to dementia    Objective:     Vitals:   Temp (24hrs), Av 7 °F (36 5 °C), Min:97 5 °F (36 4 °C), Max:97 9 °F (36 6 °C)    Temp:  [97 5 °F (36 4 °C)-97 9 °F (36 6 °C)] 97 6 °F (36 4 °C)  HR:  [71-99] 99  Resp:  [17-18] 17  BP: (129-190)/(51-90) 153/78  SpO2:  [93 %-100 %] 96 %  Body mass index is 24 94 kg/m²       Input and Output Summary (last 24 hours):     No intake or output data in the 24 hours ending 19 2772    Physical Exam:     General: well appearing, no acute distress  HEENT: atraumatic, PERRLA, moist mucosa, normal pharynx, normal tonsils and adenoids, normal tongue, no fluid in sinuses  Neck: Trachea midline, no carotid bruit, no masses  Respiratory: normal chest wall expansion, CTA B, no r/r/w, no rubs  Cardiovascular:  Irregularly irregular  Abdomen: Soft, non-tender, non-distended, normal bowel sounds in all quadrants, no hepatosplenomegaly, no tympany  Rectal: deferred  Musculoskeletal: normal ROM in upper and lower extremities  Integumentary: warm, dry, and pink, with no rash, purpura, or petechia  Heme/Lymph: no lymphadenopathy, no bruises  Neurological: Cranial Nerves II-XII grossly intact, no tics, normal sensation to pressure and light touch  Psychiatric:  Apparent dementia      Additional Data:     Labs:    Results from last 7 days   Lab Units 12/23/19  0455   WBC Thousand/uL 8 24   HEMOGLOBIN g/dL 10 1*   HEMATOCRIT % 31 8*   PLATELETS Thousands/uL 294     Results from last 7 days   Lab Units 12/23/19  0455   POTASSIUM mmol/L 3 6   CHLORIDE mmol/L 102   CO2 mmol/L 26   BUN mg/dL 15   CREATININE mg/dL 0 68   CALCIUM mg/dL 9 0     Results from last 7 days   Lab Units 12/26/19  0552   INR  1 04       * I Have Reviewed All Lab Data Listed Above  * Additional Pertinent Lab Tests Reviewed:  All Labs Within Last 24 Hours Reviewed    Imaging:  No new imaging  Recent Cultures (last 7 days):           Last 24 Hours Medication List:     Current Facility-Administered Medications:  acetaminophen 650 mg Oral Q12H PRN Liborio GARCÍA MD   acetaminophen 975 mg Oral Q8H [address] Monique Pagan PA-C   atorvastatin 20 mg Oral Daily With [employer] MD RAQUEL   cephalexin 250 mg Oral Daily Christian Kohli DO   [START ON 12/27/2019] dabigatran etexilate 150 mg Oral Q12H [address] Brandon Mahoney DO   divalproex sodium 125 mg Oral TID Kalia Villegas DO   docusate sodium 100 mg Oral BID Felipe GARCÍA MD   donepezil 10 mg Oral HS Felipe GARCÍA MD   gabapentin 100 mg Oral HS Monique Pagan PA-C   lidocaine 1 patch Topical Daily Viky Nichols MD   melatonin 6 mg Oral HS Juan Ornelas DO   memantine 10 mg Oral BID Felipe GARCÍA MD   OLANZapine 2 5 mg Intramuscular TID PRN Humberto Gentile DO   oxyCODONE 2 5 mg Oral Q4H PRN Juan Ornelas DO   pantoprazole 40 mg Oral Daily Before Breakfast Felipe GARCÍA MD   polyethylene glycol 17 g Oral Daily PRN Felipe GARCÍA MD   QUEtiapine 25 mg Oral HS Juan Ornelas DO   saccharomyces boulardii 250 mg Oral BID Christian Kohli DO        Today, Patient Was Seen By: Anastacia Tellez DO    ** Please Note: This note has been constructed using a voice recognition system   **

## 2019-12-26 NOTE — ASSESSMENT & PLAN NOTE
Paroxysmal atrial fibrillation   VR rate controlled here    Pradaxa held 12/24 for IR procedure    Restart 12/27 - DVT prophylaxis heparin for now

## 2019-12-26 NOTE — NURSING NOTE
Pt digitally disimpacted for large amount of hard brown stool  Pt tolerated fairly well, yelling at times  No blood observed at this time  Will continue to monitor

## 2019-12-26 NOTE — UTILIZATION REVIEW
Continued Stay Review    Date:    12/26/2019                          Current Patient Class:    Inpatient  Current Level of Care:    Med surg    HPI:86 y o  female initially admitted on    12/14  With syncope     Assessment/Plan:   12/26     IR KYPHOPLASTY/VERTEBROPLASTY Procedure   pradaxa  Held   X  48  Hrs  T   12  Compression  Fracture is new  And acute, continue conservative treatment    Unable to tolerate  Back brace    Cont  Po antibiotics for  Acute/chronic UTI  Pertinent Labs/Diagnostic Results:         Results from last 7 days   Lab Units 12/23/19  0455   SODIUM mmol/L 137   POTASSIUM mmol/L 3 6   CHLORIDE mmol/L 102   CO2 mmol/L 26   ANION GAP mmol/L 9   BUN mg/dL 15   CREATININE mg/dL 0 68   EGFR ml/min/1 73sq m 79   CALCIUM mg/dL 9 0             Results from last 7 days   Lab Units 12/23/19  0455   GLUCOSE RANDOM mg/dL 109               Results from last 7 days   Lab Units 12/26/19  0552   PROTIME seconds 13 7   INR  1 04             Vital Signs:    97 9-80-17         129/88    Medications:   Scheduled Medications:    Medications:  acetaminophen 975 mg Oral Q8H [facility]   atorvastatin 20 mg Oral Daily With Dinner   cephalexin 250 mg Oral Daily   divalproex sodium 125 mg Oral TID   docusate sodium 100 mg Oral BID   donepezil 10 mg Oral HS   gabapentin 100 mg Oral HS   lidocaine 1 patch Topical Daily   melatonin 6 mg Oral HS   memantine 10 mg Oral BID   pantoprazole 40 mg Oral Daily Before Breakfast   QUEtiapine 25 mg Oral HS   saccharomyces boulardii 250 mg Oral BID     Continuous IV Infusions:     PRN Meds:    acetaminophen 650 mg Oral Q12H PRN   OLANZapine 2 5 mg Intramuscular TID PRN   oxyCODONE 2 5 mg Oral Q4H PRN   polyethylene glycol 17 g Oral Daily PRN       Discharge Plan:    STR    Network Utilization Review Department  [email or web address]  ATTENTION: Please call with any questions or concerns to [phone] and carefully listen to the prompts so that you are directed to the right person  All voicemails are confidential   Govind Franklin all requests for admission clinical reviews, approved or denied determinations and any other requests to dedicated fax number below belonging to the campus where the patient is receiving treatment   List of dedicated fax numbers for the Facilities:  [address] DENIALS (Administrative/Medical Necessity) [phone]   1000  [facility] (Maternity/NICU/Pediatrics) [phone]   Deneise Notice [phone]   Eloina Hsusein [phone]   Twan Candelario [phone]   [address] [place] [address]  [phone]   [address] [place] [address] [phone]   [facility]  [phone]   [address] [place] [address], [place] [id number] [facility] [address] [facility] [phone]

## 2019-12-26 NOTE — DISCHARGE INSTRUCTIONS
Kyphoplasty   WHAT YOU NEED TO KNOW:   Kyphoplasty is surgery to fix broken vertebrae  Your vertebrae are the bones in your back that form your spine  WHILE YOU ARE HERE:   Before your surgery:   · Informed consent  is a legal document that explains the tests, treatments, or procedures that you may need  Informed consent means you understand what will be done and can make decisions about what you want  You give your permission when you sign the consent form  You can have someone sign this form for you if you are not able to sign it  You have the right to understand your medical care in words you know  Before you sign the consent form, understand the risks and benefits of what will be done  Make sure all your questions are answered  · An IV  is a small tube placed in your vein that is used to give you medicine or liquids  · Anesthesia  is medicine to make you comfortable during the surgery  Healthcare providers will work with you to decide which anesthesia is best for you  ¨ General anesthesia  will keep you asleep and free from pain during surgery  You may get anesthesia through your IV  You may instead breathe it in through a mask or a tube placed down your throat  The tube may cause you to have a sore throat when you wake up  ¨ Local anesthesia  is a shot of medicine put into your back  It is used to numb the area and dull the pain  You may still feel pressure or pushing during surgery  · Medicines:      ¨ Antibiotics  help prevent an infection caused by bacteria  ¨ A sedative  helps you stay calm and relaxed if you have local anesthesia  During your surgery: Your surgeon will use an x-ray to locate your broken vertebrae and guide him during surgery  He will make a small incision over your broken vertebrae  A balloon will be inserted near the broken vertebrae and inflated to make a pocket  The balloon will be removed and bone cement will be injected into the pocket   The hardened cement will help keep your broken vertebrae together so it can heal  A bandage will be placed over the surgery site  Your surgeon may do an x-ray or CT scan to check for any cement leaks  After your surgery: You will be taken to a room to rest until you are fully awake  You will have to lie flat for up to 2 hours so the cement can fully harden  Healthcare providers will monitor you closely for any problems  Do not get out of bed until your healthcare provider says it is okay  When your healthcare provider sees that you are okay, you will be taken to your hospital room  · Your neurological signs  will be checked after surgery  These are also called neuro signs, neuro checks, or neuro status  Healthcare providers check your eyes, your memory, and how awake and alert you are  This helps tell healthcare providers how your brain is working after your surgery  · Pain medicine  will decrease or take away your pain  Do not wait until the pain is severe to ask for your medicine  Pain medicine can make you dizzy or sleepy  Prevent falls by calling a healthcare provider when you want to get out of bed or if you need help  RISKS:   · You may have an allergic reaction to the bone cement  Your nerves and spinal cord may be damaged  Spinal cord damage may cause you to leak spinal fluid  This can cause paralysis  You may be bruised or get an infection after surgery  Vertebrae that are near the surgery area may break  Cement may leak into your spinal cord, kidneys, and blood vessels  Cement leaks may travel into your lungs and brain  This can be life-threatening  · Without surgery, your pain may get worse  The changes in the curve of your spine may make it harder for you to breathe  You may have more trouble walking and doing the activities you enjoy  CARE AGREEMENT:   You have the right to help plan your care  Learn about your health condition and how it may be treated   Discuss treatment options with your caregivers to decide what care you want to receive  You always have the right to refuse treatment  © 2017 [address] Information is for End User's use only and may not be sold, redistributed or otherwise used for commercial purposes  All illustrations and images included in CareNotes® are the copyrighted property of [employer] , [employer]  or [employer]up[employer]  The above information is an  only  It is not intended as medical advice for individual conditions or treatments  Talk to your doctor, nurse or pharmacist before following any medical regimen to see if it is safe and effective for you

## 2019-12-26 NOTE — BRIEF OP NOTE (RAD/CATH)
IR KYPHOPLASTY/VERTEBROPLASTY Procedure Note    PATIENT NAME: Michelle Mahoney  : 1933  MRN: [id number]    Pre-op Diagnosis:   1  Syncope    2  Fall, initial encounter    3  T12 compression fracture, initial encounter ([facility] [address] [address] )    4  UTI (urinary tract infection)      Post-op Diagnosis:   1  Syncope    2  Fall, initial encounter    3  [facility]12 compression fracture, initial encounter [facility] )    4  UTI (urinary tract infection)        Surgeon:   Marco Moore MD  Assistants:     No qualified resident was available, Resident is only observing    Estimated Blood Loss: none  Findings: T12 kyphoplasty with 2 ml of PMMA      Specimens: none    Complications:  none    Anesthesia: Conscious sedation and Local    Marco Moore MD     Date: 2019  Time: 12:00 PM

## 2019-12-26 NOTE — ASSESSMENT & PLAN NOTE
Syncope and collapse with history of CVA, dementia, chronic UTI  Seen by neurology with possibility of seizure episode  Has been started on depakote by neurology  EEG negative  Symptoms stable    OP Neurology f/u on discharge

## 2019-12-26 NOTE — ASSESSMENT & PLAN NOTE
T12 compression fracture is new and acute   Neurosurgery recommended conservative management  Lidoderm patch and gabapentin HS for pain ineffective  Tylenol to 975 Q 8  Oxycodone 2 5 mg      Cannot tolerate back brace     IR consulted for verterbroplasty scheduled 12/26/2019  - Pradaxa held x 48 hrs     - Patient will only require short stay - family motivated to take patient home once well

## 2019-12-26 NOTE — CASE MANAGEMENT
CM received faxed authorization letter for [facility] starting 12/27/19 auth # [id number], fax attached in [employer], put in bin to be scanned in EMR, and provided copy to patient's daughter  CM discussed transport with patient's daughter and she is agreeable to transporting patient at 10am tomorrow via [address]  CM informed [facility] of same via tiger text and sent updated referral with authorization letter and pickup time to [facility] via [facility]  CM department will continue to follow through discharge

## 2019-12-27 ENCOUNTER — HOSPITAL ENCOUNTER (INPATIENT)
Facility: HOSPITAL | Age: 84
LOS: 7 days | Discharge: HOME HEALTH | DRG: 560 | End: 2020-01-03
Attending: FAMILY MEDICINE | Admitting: FAMILY MEDICINE
Payer: COMMERCIAL

## 2019-12-27 VITALS
HEART RATE: 69 BPM | DIASTOLIC BLOOD PRESSURE: 69 MMHG | SYSTOLIC BLOOD PRESSURE: 144 MMHG | RESPIRATION RATE: 18 BRPM | OXYGEN SATURATION: 97 % | BODY MASS INDEX: 24.94 KG/M2 | WEIGHT: 145.28 LBS | TEMPERATURE: 97.6 F

## 2019-12-27 DIAGNOSIS — G30.1 LATE ONSET ALZHEIMER'S DISEASE WITH BEHAVIORAL DISTURBANCE (HCC): ICD-10-CM

## 2019-12-27 DIAGNOSIS — M48.54XA: ICD-10-CM

## 2019-12-27 DIAGNOSIS — R55 SYNCOPE AND COLLAPSE: ICD-10-CM

## 2019-12-27 DIAGNOSIS — I11.9 HYPERTENSIVE HEART DISEASE WITHOUT HEART FAILURE: ICD-10-CM

## 2019-12-27 DIAGNOSIS — R51.9 CHRONIC INTRACTABLE HEADACHE, UNSPECIFIED HEADACHE TYPE: Primary | ICD-10-CM

## 2019-12-27 DIAGNOSIS — F51.01 PRIMARY INSOMNIA: ICD-10-CM

## 2019-12-27 DIAGNOSIS — G89.29 CHRONIC INTRACTABLE HEADACHE, UNSPECIFIED HEADACHE TYPE: Primary | ICD-10-CM

## 2019-12-27 DIAGNOSIS — F02.81 LATE ONSET ALZHEIMER'S DISEASE WITH BEHAVIORAL DISTURBANCE (HCC): ICD-10-CM

## 2019-12-27 PROBLEM — M48.52XA: Status: ACTIVE | Noted: 2019-12-27

## 2019-12-27 PROCEDURE — 99239 HOSP IP/OBS DSCHRG MGMT >30: CPT | Performed by: INTERNAL MEDICINE

## 2019-12-27 PROCEDURE — 97167 OT EVAL HIGH COMPLEX 60 MIN: CPT

## 2019-12-27 PROCEDURE — 99306 1ST NF CARE HIGH MDM 50: CPT | Performed by: FAMILY MEDICINE

## 2019-12-27 PROCEDURE — 97530 THERAPEUTIC ACTIVITIES: CPT

## 2019-12-27 RX ORDER — DABIGATRAN ETEXILATE 150 MG/1
150 CAPSULE, COATED PELLETS ORAL EVERY 12 HOURS SCHEDULED
Status: CANCELLED | OUTPATIENT
Start: 2019-12-27

## 2019-12-27 RX ORDER — GABAPENTIN 100 MG/1
100 CAPSULE ORAL
Status: DISCONTINUED | OUTPATIENT
Start: 2019-12-27 | End: 2020-01-03 | Stop reason: HOSPADM

## 2019-12-27 RX ORDER — DIVALPROEX SODIUM 125 MG/1
125 CAPSULE, COATED PELLETS ORAL 3 TIMES DAILY
Status: CANCELLED | OUTPATIENT
Start: 2019-12-27

## 2019-12-27 RX ORDER — QUETIAPINE FUMARATE 25 MG/1
25 TABLET, FILM COATED ORAL
Status: CANCELLED | OUTPATIENT
Start: 2019-12-27

## 2019-12-27 RX ORDER — OLANZAPINE 10 MG/1
2.5 INJECTION, POWDER, LYOPHILIZED, FOR SOLUTION INTRAMUSCULAR 3 TIMES DAILY PRN
Status: CANCELLED | OUTPATIENT
Start: 2019-12-27

## 2019-12-27 RX ORDER — CEPHALEXIN 250 MG/1
250 CAPSULE ORAL DAILY
Status: DISCONTINUED | OUTPATIENT
Start: 2019-12-28 | End: 2020-01-03 | Stop reason: HOSPADM

## 2019-12-27 RX ORDER — ACETAMINOPHEN 325 MG/1
975 TABLET ORAL EVERY 8 HOURS SCHEDULED
Status: CANCELLED | OUTPATIENT
Start: 2019-12-27

## 2019-12-27 RX ORDER — GABAPENTIN 100 MG/1
100 CAPSULE ORAL
Status: CANCELLED | OUTPATIENT
Start: 2019-12-27

## 2019-12-27 RX ORDER — OXYCODONE HYDROCHLORIDE 5 MG/1
2.5 TABLET ORAL EVERY 4 HOURS PRN
Status: DISCONTINUED | OUTPATIENT
Start: 2019-12-27 | End: 2019-12-31

## 2019-12-27 RX ORDER — LANOLIN ALCOHOL/MO/W.PET/CERES
6 CREAM (GRAM) TOPICAL
Status: DISCONTINUED | OUTPATIENT
Start: 2019-12-27 | End: 2020-01-03 | Stop reason: HOSPADM

## 2019-12-27 RX ORDER — ACETAMINOPHEN 325 MG/1
975 TABLET ORAL EVERY 8 HOURS SCHEDULED
Status: DISCONTINUED | OUTPATIENT
Start: 2019-12-27 | End: 2020-01-03 | Stop reason: HOSPADM

## 2019-12-27 RX ORDER — LIDOCAINE 50 MG/G
1 PATCH TOPICAL DAILY
Status: CANCELLED | OUTPATIENT
Start: 2019-12-28

## 2019-12-27 RX ORDER — DOCUSATE SODIUM 100 MG/1
100 CAPSULE, LIQUID FILLED ORAL 2 TIMES DAILY
Status: DISCONTINUED | OUTPATIENT
Start: 2019-12-27 | End: 2019-12-30

## 2019-12-27 RX ORDER — ATORVASTATIN CALCIUM 20 MG/1
20 TABLET, FILM COATED ORAL
Status: CANCELLED | OUTPATIENT
Start: 2019-12-27

## 2019-12-27 RX ORDER — CEPHALEXIN 250 MG/1
250 CAPSULE ORAL DAILY
Status: CANCELLED | OUTPATIENT
Start: 2019-12-28

## 2019-12-27 RX ORDER — QUETIAPINE FUMARATE 25 MG/1
25 TABLET, FILM COATED ORAL
Status: DISCONTINUED | OUTPATIENT
Start: 2019-12-27 | End: 2020-01-03 | Stop reason: HOSPADM

## 2019-12-27 RX ORDER — ACETAMINOPHEN 325 MG/1
650 TABLET ORAL EVERY 12 HOURS PRN
Status: DISCONTINUED | OUTPATIENT
Start: 2019-12-27 | End: 2020-01-03 | Stop reason: HOSPADM

## 2019-12-27 RX ORDER — OXYCODONE HYDROCHLORIDE 5 MG/1
2.5 TABLET ORAL EVERY 4 HOURS PRN
Status: CANCELLED | OUTPATIENT
Start: 2019-12-27

## 2019-12-27 RX ORDER — DABIGATRAN ETEXILATE 75 MG/1
150 CAPSULE, COATED PELLETS ORAL EVERY 12 HOURS SCHEDULED
Status: DISCONTINUED | OUTPATIENT
Start: 2019-12-27 | End: 2020-01-03 | Stop reason: HOSPADM

## 2019-12-27 RX ORDER — MEMANTINE HYDROCHLORIDE 10 MG/1
10 TABLET ORAL 2 TIMES DAILY
Status: DISCONTINUED | OUTPATIENT
Start: 2019-12-27 | End: 2020-01-03 | Stop reason: HOSPADM

## 2019-12-27 RX ORDER — DONEPEZIL HYDROCHLORIDE 5 MG/1
10 TABLET, FILM COATED ORAL
Status: DISCONTINUED | OUTPATIENT
Start: 2019-12-27 | End: 2020-01-03 | Stop reason: HOSPADM

## 2019-12-27 RX ORDER — ATORVASTATIN CALCIUM 20 MG/1
20 TABLET, FILM COATED ORAL
Status: DISCONTINUED | OUTPATIENT
Start: 2019-12-27 | End: 2020-01-03 | Stop reason: HOSPADM

## 2019-12-27 RX ORDER — LANOLIN ALCOHOL/MO/W.PET/CERES
6 CREAM (GRAM) TOPICAL
Status: CANCELLED | OUTPATIENT
Start: 2019-12-27

## 2019-12-27 RX ORDER — ACETAMINOPHEN 325 MG/1
650 TABLET ORAL EVERY 12 HOURS PRN
Status: CANCELLED | OUTPATIENT
Start: 2019-12-27

## 2019-12-27 RX ORDER — PANTOPRAZOLE SODIUM 40 MG/1
40 TABLET, DELAYED RELEASE ORAL
Status: CANCELLED | OUTPATIENT
Start: 2019-12-28

## 2019-12-27 RX ORDER — POLYETHYLENE GLYCOL 3350 17 G/17G
17 POWDER, FOR SOLUTION ORAL DAILY PRN
Status: DISCONTINUED | OUTPATIENT
Start: 2019-12-27 | End: 2020-01-03 | Stop reason: HOSPADM

## 2019-12-27 RX ORDER — SACCHAROMYCES BOULARDII 250 MG
250 CAPSULE ORAL 2 TIMES DAILY
Status: CANCELLED | OUTPATIENT
Start: 2019-12-27

## 2019-12-27 RX ORDER — SACCHAROMYCES BOULARDII 250 MG
250 CAPSULE ORAL 2 TIMES DAILY
Status: DISCONTINUED | OUTPATIENT
Start: 2019-12-27 | End: 2020-01-03 | Stop reason: HOSPADM

## 2019-12-27 RX ORDER — MEMANTINE HYDROCHLORIDE 5 MG/1
10 TABLET ORAL 2 TIMES DAILY
Status: CANCELLED | OUTPATIENT
Start: 2019-12-27

## 2019-12-27 RX ORDER — DOCUSATE SODIUM 100 MG/1
100 CAPSULE, LIQUID FILLED ORAL 2 TIMES DAILY
Status: CANCELLED | OUTPATIENT
Start: 2019-12-27

## 2019-12-27 RX ORDER — OLANZAPINE 10 MG/1
2.5 INJECTION, POWDER, LYOPHILIZED, FOR SOLUTION INTRAMUSCULAR 3 TIMES DAILY PRN
Status: DISCONTINUED | OUTPATIENT
Start: 2019-12-27 | End: 2019-12-31

## 2019-12-27 RX ORDER — DIVALPROEX SODIUM 125 MG/1
125 CAPSULE, COATED PELLETS ORAL 3 TIMES DAILY
Status: DISCONTINUED | OUTPATIENT
Start: 2019-12-27 | End: 2020-01-02

## 2019-12-27 RX ORDER — LIDOCAINE 50 MG/G
1 PATCH TOPICAL DAILY
Status: DISCONTINUED | OUTPATIENT
Start: 2019-12-28 | End: 2020-01-03 | Stop reason: HOSPADM

## 2019-12-27 RX ORDER — DONEPEZIL HYDROCHLORIDE 10 MG/1
10 TABLET, FILM COATED ORAL
Status: CANCELLED | OUTPATIENT
Start: 2019-12-27

## 2019-12-27 RX ORDER — POLYETHYLENE GLYCOL 3350 17 G/17G
17 POWDER, FOR SOLUTION ORAL DAILY PRN
Status: CANCELLED | OUTPATIENT
Start: 2019-12-27

## 2019-12-27 RX ORDER — PANTOPRAZOLE SODIUM 40 MG/1
40 TABLET, DELAYED RELEASE ORAL
Status: DISCONTINUED | OUTPATIENT
Start: 2019-12-28 | End: 2020-01-03 | Stop reason: HOSPADM

## 2019-12-27 RX ADMIN — DIVALPROEX SODIUM 125 MG: 125 CAPSULE, COATED PELLETS ORAL at 08:44

## 2019-12-27 RX ADMIN — LIDOCAINE 1 PATCH: 50 PATCH CUTANEOUS at 08:44

## 2019-12-27 RX ADMIN — PANTOPRAZOLE SODIUM 40 MG: 40 TABLET, DELAYED RELEASE ORAL at 05:45

## 2019-12-27 RX ADMIN — DIVALPROEX SODIUM 125 MG: 125 CAPSULE, COATED PELLETS ORAL at 21:22

## 2019-12-27 RX ADMIN — MELATONIN TAB 3 MG 6 MG: 3 TAB at 19:49

## 2019-12-27 RX ADMIN — GABAPENTIN 100 MG: 100 CAPSULE ORAL at 21:22

## 2019-12-27 RX ADMIN — ATORVASTATIN CALCIUM 20 MG: 20 TABLET, FILM COATED ORAL at 16:30

## 2019-12-27 RX ADMIN — MEMANTINE 10 MG: 5 TABLET ORAL at 08:44

## 2019-12-27 RX ADMIN — DOCUSATE SODIUM 100 MG: 100 CAPSULE, LIQUID FILLED ORAL at 08:44

## 2019-12-27 RX ADMIN — ACETAMINOPHEN 975 MG: 325 TABLET ORAL at 21:22

## 2019-12-27 RX ADMIN — MEMANTINE 10 MG: 10 TABLET ORAL at 21:22

## 2019-12-27 RX ADMIN — DONEPEZIL HYDROCHLORIDE 10 MG: 5 TABLET, FILM COATED ORAL at 17:50

## 2019-12-27 RX ADMIN — OXYCODONE HYDROCHLORIDE 2.5 MG: 5 TABLET ORAL at 05:45

## 2019-12-27 RX ADMIN — DIVALPROEX SODIUM 125 MG: 125 CAPSULE, COATED PELLETS ORAL at 16:30

## 2019-12-27 RX ADMIN — Medication 250 MG: at 08:44

## 2019-12-27 RX ADMIN — DOCUSATE SODIUM 100 MG: 100 CAPSULE, LIQUID FILLED ORAL at 17:50

## 2019-12-27 RX ADMIN — CEPHALEXIN 250 MG: 250 CAPSULE ORAL at 08:44

## 2019-12-27 RX ADMIN — ACETAMINOPHEN 975 MG: 325 TABLET ORAL at 05:45

## 2019-12-27 RX ADMIN — ACETAMINOPHEN 975 MG: 325 TABLET ORAL at 15:01

## 2019-12-27 RX ADMIN — DABIGATRAN ETEXILATE MESYLATE 150 MG: 150 CAPSULE ORAL at 08:44

## 2019-12-27 RX ADMIN — DABIGATRAN ETEXILATE MESYLATE 150 MG: 75 CAPSULE ORAL at 21:22

## 2019-12-27 RX ADMIN — QUETIAPINE FUMARATE 25 MG: 25 TABLET ORAL at 19:48

## 2019-12-27 RX ADMIN — Medication 250 MG: at 17:50

## 2019-12-27 NOTE — OCCUPATIONAL THERAPY NOTE
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)  Active Problems:    Alzheimer disease ([facility] )    Mixed hyperlipidemia    Hypertensive heart disease without heart failure    Paroxysmal atrial fibrillation (HCC)    Chronic headache    Chronic UTI    Primary insomnia    Past Medical History  Past Medical History:   Diagnosis Date    A-fib ([facility] )     [facility]     Chronic UTI     Diverticulosis     Dyslipidemia     History of stroke     Hyperlipidemia     Hypertension     UTI (urinary tract infection)     Vasovagal syncope 2/13/2018     Past Surgical History  Past Surgical History:   Procedure Laterality Date    CHOLECYSTECTOMY      IR KYPHOPLASTY/VERTEBROPLASTY  12/26/2019       Time- 8118-3281 20 min eval 25 min txt   Vitals- 135/63 HR 75 O2 97% RA resting   Standardized Assessment-  Barthel 40/100  Home Evaluation (virtual)- Dtr able to provide photos as needed  Family/Caregiver Training- Family available/visiting daily- will initiate/schedule FT as needed  Pt remains in room with all needs at end of session         12/27/19 8568   Note Type   Note type Eval/Treat   Restrictions/Precautions   Weight Bearing Precautions Per Order Yes   RLE Weight Bearing Per Order WBAT   LLE Weight Bearing Per Order WBAT   Braces or Orthoses TLSO   Other Precautions Contact/isolation; Impulsive;Cognitive; Chair Alarm; Bed Alarm;WBS;Seizure; Fall Risk;Pain;Spinal precautions; Visual impairment   Pain Assessment   Pain Assessment FLACC  (states "when i move it hurts" - unable to quanitfy )   Effect of Pain on Daily Activities limits participation in activity    Patient's Stated Pain Goal No pain   Hospital Pain Intervention(s) Repositioned; Ambulation/increased activity; Rest   Response to Interventions tolerated session    Pain Rating: FLACC (Rest) - Face 1   Pain Rating: FLACC (Rest) - Legs 0   Pain Rating: FLACC (Rest) - Activity 1   Pain Rating: FLACC (Rest) - Cry 1   Pain Rating: FLACC (Rest) - Consolability 1   Score: FLACC (Rest) 4   Home Living   Type of Home House  (pt previously living in in-law suite-- d/c to [facility]'s home )   Home Layout One level;Performs ADLs on one level; Able to live on main level with bedroom/bathroom;Stairs to enter without rails  (2 CLINT[place] no HR's)   Bathroom Shower/Tub Walk-in shower  (3-4 inch threshold )   Bathroom Toilet Standard   Bathroom Equipment   (No prior DME )   [address]   (No prior DME; )   Additional Comments Home setup obtained from [facility]-- reports pta living in [address] [place] [address] with Son and DIL however uncomfortable with d/c to in-law suite therefore planning for d/c to [facility]; setup described above    Prior Function   Level of [place] Independent with ADLs and functional mobility; Needs assistance with IADLs  (ambulating no AD)   Lives With Family   Receives Help From Family   ADL Assistance Independent   IADLs Needs assistance   Falls in the last 6 months 1 to 4   Vocational Retired   Comments As per dtr report, pta pt ind with ADLs and ambulation, family provides close to 24/7 supervision, cameras for safety  Family completes all IADLs  (-) drives  Plans to d/c to [facility] where 24/7 will be provided  Pt unaware, will further discuss when appropriate, dtr reports d/c information will cause confusion  No prior DME/AE      Lifestyle   Reciprocal Relationships Daughters, son, DIL    Psychosocial   Psychosocial (WDL) WDL   Subjective   Subjective "I can't do that, I just can't"   ADL   Eating Assistance 5  Supervision/Setup   Grooming Assistance 4  Minimal Assistance   UB Bathing Assistance 4  Minimal Assistance   LB Bathing Assistance 3  Moderate Assistance   [address] 3 Moderate Assistance   Toileting Assistance  3  Moderate Assistance   Bed Mobility   Rolling R 5  Supervision   Additional items Increased time required;Verbal cues   Rolling L 5  Supervision   Additional items Increased time required;Verbal cues   Supine to Sit 4  Minimal assistance   Additional items Assist x 1; Increased time required;Verbal cues   Sit to Supine Unable to assess  (concluded session in recliner )   Additional Comments Pt requires VC's and increased time 2* cognition and back pain  Education provided on appropriate tech to adhere to spinal precautions    Transfers   Sit to Stand 4  Minimal assistance  (MIn A<>CGA 2* impulsivity )   Additional items Assist x 1; Increased time required; Impulsive;Verbal cues   Stand to Sit 4  Minimal assistance   Additional items Assist x 1; Increased time required; Impulsive;Verbal cues  (VC's for hand placements -- not receptive )   Stand pivot 4  Minimal assistance   Additional items Assist x 1; Increased time required; Impulsive;Verbal cues   Additional Comments with RW; not receptive of hand placements; IMPULSIVE   Functional Mobility   Functional Mobility 4  Minimal assistance   Additional Comments x1 4-5 ft    Additional items Rolling walker   Balance   Static Sitting Fair   Dynamic Sitting Fair -   Static Standing Fair -   Dynamic Standing Poor +   Ambulatory Poor +   Activity Tolerance   Activity Tolerance Patient limited by fatigue;Patient limited by pain   Medical Staff Made Aware RN    Nurse Made Aware FELICITA Schrader Assessment   RUE Assessment X  (Shldr 3/5; declined tricep/bicep assess; wrist/hand WFL )   LUE Assessment   LUE Assessment X  (Shldr 3/5; declined tricep/bicep assess; wrist/hand WFL )   Hand Function   Gross Motor Coordination Functional   Fine Motor Coordination Functional   Sensation   Light Touch Partial deficits in the RUE  (reports tingling in R hand )   Proprioception   Proprioception No apparent deficits   Vision-Basic Assessment   Current Vision   (Per dtr-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-pt unaware-compensates)   Vision - Complex Assessment   Additional Comments Dtr reports pt is unaware of visual deficits -- has compensated for many years requested no further visual assessments to prevent upsetting pt    Perception   Inattention/Neglect Appears intact  (to further assess )   Perseveration Perseverates during conversation  (perseverates on Lidocaine patch/dressing on back )   Cognition   Overall Cognitive Status Impaired   Arousal/Participation Alert   Attention Difficulty attending to directions   Orientation Level Oriented to person;Oriented to place; Disoriented to time;Disoriented to situation  (dtr confirms pt unable to state  for years )   Memory Decreased short term memory;Decreased recall of recent events;Decreased recall of precautions   Following Commands Follows one step commands with increased time or repetition   Comments impaired safety awareness, IMPULSIVE, decreased insight to deficits, requires encouragment for participation    Assessment   Limitation Decreased ADL status; Decreased UE ROM; Decreased UE strength;Decreased Safe judgement during ADL;Decreased cognition;Decreased endurance;Visual deficit; Decreased self-care trans;Decreased high-level ADLs;Mood limitation   Prognosis Good;Fair   Assessment Pt is a 80 y o  female seen for OT evaluation s/p admit to [facility] on 2019 w/ T12 compression fracture caused by syncope episode  Underwent successful vertebroplasty on 19  OT completed an extensive review of pt's medical and social history  Comorbidities affecting pt's functional performance at time of assessment include:  A-fib ([facility] ), Alzheimer disease ([facility] ), Central retinal artery occlusion of left eye, Chronic UTI, Diverticulosis, Dyslipidemia, History of stroke, Hyperlipidemia, Hypertension, UTI , and Vasovagal syncope    Personal factors affecting pt at time of IE include:steps to enter environment, behavioral pattern, difficulty performing ADLS, difficulty performing IADLS , limited insight into deficits, compliance, decreased initiation and engagement  and environment  Daughter Sis Miller present during session and able to assist with gathering information related to home setup, d/c plans, and PLOF  Prior to admission, pt was living in in-law suite attached to son and DIL's home  Cameras are located within the suite to ensure safety  However, Sis Miller reports family does not feel comfortable with d/c to in-law suite 2* pt's current cognitive and performance deficits  Plans for d/c are to [facility]'s home with 24/7 supervision/assist  Prior to admission pt was independent for transfers, ADLs, and ambulation without device  Family completes all IADLs  Pt currently requires Min A UB ADLs, Mod A LB ADLs and Mod A toileting, and Min A with RW mobility 2* the following deficits impacting occupational performance  Pt presents to OT below baseline due to the following performance deficits: weakness, decreased ROM, decreased strength, decreased balance, decreased tolerance, impaired sensation, impaired arousal, impaired attention, impaired initiation, impaired memory, impaired sequencing, impaired problem solving, impulsivity, decreased safety awareness, increased pain, orthopedic restrictions and decreased coping skills  Pt to benefit from continued skilled OT services while in the hospital to address deficits as defined above and maximize level of functional independence w ADL's and functional mobility  Occupational performance areas to address include: grooming, bathing/shower, toilet hygiene, dressing, health maintenance, functional mobility, community mobility, clothing management and social participation  Pt with score of 40/100 on the Barthel Index  Based on OT evaluation, assessment(s), performance deficits listed, and current level of function, pt identified as a HIGH complexity evaluation   From OT standpoint, recommendation at time of d/c would be home OT with family support (24/7 supervision/A)  Goals   Patient Goals "To get better"    Plan   Treatment Interventions ADL retraining;Functional transfer training;UE strengthening/ROM; Endurance training;Cognitive reorientation;Patient/family training;Equipment evaluation/education; Neuromuscular reeducation; Compensatory technique education;Continued evaluation; Energy conservation; Activityengagement   Goal Expiration Date 01/10/20   OT Treatment Day 1   OT Frequency   (5-7x/wk)   Additional Treatment Session   Start Time 9335   End Time 1410   Treatment Assessment Pt seen for OT Txt s/p eval with focus on bed mobility, STS and SPT txfrs with and without armrests, functional mobility 5 ft -- limited by participation, increased pain, and fatigue  Pt reports dizziness and headache, normal Bp's see above  Unable to complete ADL tasks at this time 2* cognition, pain, and participation  Pt perseverating on Lidocaine patch located on lower back  Pt is limited by cognition, decreased insight to deficits , impaired problem solving, difficulty sequencing , Safety awareness, pain, ROM, weakness, fatigue, endurance, activity tolerance , standing tolerance and static/dynamic standing balance    Pt educated on safe functional transfer techniques, the appropriate use of AE/DME to improve functional performance and activity modification techniques for energy conservation  Pt would benefit from continued OT sessions to further address above deficits to maximize independence and reduce caregiver burden      Additional Treatment Day 1   Recommendation   OT Discharge Recommendation Home OT  (with 24/7 supervision/assist )   OT - OK to Discharge No   Barthel Index   Feeding 5  (dtr reports VC's to eat and setup - no diff swallowing)   Bathing 0   Grooming Score 0   Dressing Score 5   Bladder Score 5   Bowels Score 10   Toilet Use Score 5   Transfers (Bed/Chair) Score 10   Mobility (Level Surface) Score 0 Stairs Score 0   Barthel Index Score 40       Goals STG achieved within 2 weeks Performance at Initial Evaluation 12/27/2019  Current Performance (last date completed)   Grooming while standing at sink S Min A 12/27 Min A    ADL transfers  S Min A 12/27 Min A impulsive    Bathroom mobility with appropriate AD S Unable to assess    Don/Doff TLSO  Min A  12/27 Pt declined brace-- impulsive into txfr without TLSO    UB ADL  S Min A 12/27 Min A    LB ADL, AE PRN S Mod A 12/27 Mod A   Toileting/clothing management and hygiene S Mod A 12/27 Mod A   Dynamic standing balance for increased safety when completing purposeful tasks F/F+ P+ 12/27 P+   Increase standing tolerance for inc'd safety with standing purposeful tasks 4-7 min <1 min  12/27 <1 min   Participate in therex 1-3x/week for inc'd overall stamina/activity tolerance for purposeful tasks To be completed   12/27 Resistive to MMT   shower stall transfer (NO DME) S Unable to assess    Bed mobility with HOB flat  S Min A  12/27 Sup<>sit S  Rolling S        Yovani Crooks, MS, OTR/L

## 2019-12-27 NOTE — ASSESSMENT & PLAN NOTE
Patient was admitted for T2 compression fracture after unwitnessed fall at home  Neurosurgery was consulted who recommended conservative management  Lidoderm patch and gabapentin HS for pain ineffective  Tylenol to 975 Q 8    Oxycodone 2 5 mg   S/P Vertebroplasty from 12/26 Patient was discharged for rehabiliation

## 2019-12-27 NOTE — PLAN OF CARE
Problem: Potential for Falls  Goal: Patient will remain free of falls  Description  INTERVENTIONS:  - Assess patient frequently for physical needs  -  Identify cognitive and physical deficits and behaviors that affect risk of falls    -  [place] fall precautions as indicated by assessment   - Educate patient/family on patient safety including physical limitations  - Instruct patient to call for assistance with activity based on assessment  - Modify environment to reduce risk of injury  - Consider OT/PT consult to assist with strengthening/mobility  Outcome: Progressing     Problem: Prexisting or High Potential for Compromised Skin Integrity  Goal: Skin integrity is maintained or improved  Description  INTERVENTIONS:  - Identify patients at risk for skin breakdown  - Assess and monitor skin integrity  - Assess and monitor nutrition and hydration status  - Monitor labs   - Assess for incontinence   - Turn and reposition patient  - Assist with mobility/ambulation  - Relieve pressure over bony prominences  - Avoid friction and shearing  - Provide appropriate hygiene as needed including keeping skin clean and dry  - Evaluate need for skin moisturizer/barrier cream  - Collaborate with interdisciplinary team   - Patient/family teaching  - Consider wound care consult   Outcome: Progressing     Problem: PAIN - ADULT  Goal: Verbalizes/displays adequate comfort level or baseline comfort level  Description  Interventions:  - Encourage patient to monitor pain and request assistance  - Assess pain using appropriate pain scale  - Administer analgesics based on type and severity of pain and evaluate response  - Implement non-pharmacological measures as appropriate and evaluate response  - Consider cultural and social influences on pain and pain management  - Notify physician/advanced practitioner if interventions unsuccessful or patient reports new pain  Outcome: Progressing     Problem: INFECTION - ADULT  Goal: Absence or prevention of progression during hospitalization  Description  INTERVENTIONS:  - Assess and monitor for signs and symptoms of infection  - Monitor lab/diagnostic results  - Monitor all insertion sites, i e  indwelling lines, tubes, and drains  - Monitor endotracheal if appropriate and nasal secretions for changes in amount and color  - [place] appropriate cooling/warming therapies per order  - Administer medications as ordered  - Instruct and encourage patient and family to use good hand hygiene technique  - Identify and instruct in appropriate isolation precautions for identified infection/condition  Outcome: Progressing  Goal: Absence of fever/infection during neutropenic period  Description  INTERVENTIONS:  - Monitor WBC    Outcome: Progressing     Problem: SAFETY ADULT  Goal: Maintain or return to baseline ADL function  Description  INTERVENTIONS:  -  Assess patient's ability to carry out ADLs; assess patient's baseline for ADL function and identify physical deficits which impact ability to perform ADLs (bathing, care of mouth/teeth, toileting, grooming, dressing, etc )  - Assess/evaluate cause of self-care deficits   - Assess range of motion  - Assess patient's mobility; develop plan if impaired  - Assess patient's need for assistive devices and provide as appropriate  - Encourage maximum independence but intervene and supervise when necessary  - Involve family in performance of ADLs  - Assess for home care needs following discharge   - Consider OT consult to assist with ADL evaluation and planning for discharge  - Provide patient education as appropriate  Outcome: Progressing  Goal: Maintain or return mobility status to optimal level  Description  INTERVENTIONS:  - Assess patient's baseline mobility status (ambulation, transfers, stairs, etc )    - Identify cognitive and physical deficits and behaviors that affect mobility  - Identify mobility aids required to assist with transfers and/or ambulation (gait belt, sit-to-stand, lift, walker, cane, etc )  - [place] fall precautions as indicated by assessment  - Record patient progress and toleration of activity level on Mobility SBAR; progress patient to next Phase/Stage  - Instruct patient to call for assistance with activity based on assessment  - Consider rehabilitation consult to assist with strengthening/weightbearing, etc   Outcome: Progressing     Problem: DISCHARGE PLANNING  Goal: Discharge to home or other facility with appropriate resources  Description  INTERVENTIONS:  - Identify barriers to discharge w/patient and caregiver  - Arrange for needed discharge resources and transportation as appropriate  - Identify discharge learning needs (meds, wound care, etc )  - Arrange for interpretive services to assist at discharge as needed  - Refer to Case Management Department for coordinating discharge planning if the patient needs post-hospital services based on physician/advanced practitioner order or complex needs related to functional status, cognitive ability, or social support system  Outcome: Progressing     Problem: Knowledge Deficit  Goal: Patient/family/caregiver demonstrates understanding of disease process, treatment plan, medications, and discharge instructions  Description  Complete learning assessment and assess knowledge base  Interventions:  - Provide teaching at level of understanding  - Provide teaching via preferred learning methods  Outcome: Progressing     Problem: Nutrition/Hydration-ADULT  Goal: Nutrient/Hydration intake appropriate for improving, restoring or maintaining nutritional needs  Description  Monitor and assess patient's nutrition/hydration status for malnutrition  Collaborate with interdisciplinary team and initiate plan and interventions as ordered  Monitor patient's weight and dietary intake as ordered or per policy  Utilize nutrition screening tool and intervene as necessary   Determine patient's food preferences and provide high-protein, high-caloric foods as appropriate       INTERVENTIONS:  - Monitor oral intake, urinary output, labs, and treatment plans  - Assess nutrition and hydration status and recommend course of action  - Evaluate amount of meals eaten  - Assist patient with eating if necessary   - Allow adequate time for meals  - Recommend/ encourage appropriate diets, oral nutritional supplements, and vitamin/mineral supplements  - Order, calculate, and assess calorie counts as needed  - Recommend, monitor, and adjust tube feedings and TPN/PPN based on assessed needs  - Assess need for intravenous fluids  - Provide specific nutrition/hydration education as appropriate  - Include patient/family/caregiver in decisions related to nutrition  Outcome: Progressing     Problem: NEUROSENSORY - ADULT  Goal: Achieves stable or improved neurological status  Description  INTERVENTIONS  - Monitor and report changes in neurological status  - Monitor vital signs such as temperature, blood pressure, glucose, and any other labs ordered   - Initiate measures to prevent increased intracranial pressure  - Monitor for seizure activity and implement precautions if appropriate      Outcome: Progressing  Goal: Remains free of injury related to seizures activity  Description  INTERVENTIONS  - Maintain airway, patient safety  and administer oxygen as ordered  - Monitor patient for seizure activity, document and report duration and description of seizure to physician/advanced practitioner  - If seizure occurs,  ensure patient safety during seizure  - Reorient patient post seizure  - Seizure pads on all 4 side rails  - Instruct patient/family to notify RN of any seizure activity including if an aura is experienced  - Instruct patient/family to call for assistance with activity based on nursing assessment  - Administer anti-seizure medications if ordered    Outcome: Progressing  Goal: Achieves maximal functionality and self care  Description  INTERVENTIONS  - Monitor swallowing and airway patency with patient fatigue and changes in neurological status  - Encourage and assist patient to increase activity and self care     - Encourage visually impaired, hearing impaired and aphasic patients to use assistive/communication devices  Outcome: Progressing     Problem: MUSCULOSKELETAL - ADULT  Goal: Maintain or return mobility to safest level of function  Description  INTERVENTIONS:  - Assess patient's ability to carry out ADLs; assess patient's baseline for ADL function and identify physical deficits which impact ability to perform ADLs (bathing, care of mouth/teeth, toileting, grooming, dressing, etc )  - Assess/evaluate cause of self-care deficits   - Assess range of motion  - Assess patient's mobility  - Assess patient's need for assistive devices and provide as appropriate  - Encourage maximum independence but intervene and supervise when necessary  - Involve family in performance of ADLs  - Assess for home care needs following discharge   - Consider OT consult to assist with ADL evaluation and planning for discharge  - Provide patient education as appropriate  Outcome: Progressing  Goal: Maintain proper alignment of affected body part  Description  INTERVENTIONS:  - Support, maintain and protect limb and body alignment  - Provide patient/ family with appropriate education  Outcome: Progressing

## 2019-12-27 NOTE — DISCHARGE SUMMARY
Discharge- Mauricio Guthrie 1933, 80 y o  female MRN: [id number]    Unit/Bed#: E5 -01 Encounter: [id number]    Primary Care Provider: Nel Vargas DO   Date and time admitted to hospital: 12/14/2019  8:41 AM        Compression fracture of T12 vertebra ([address] )  Assessment & Plan  T12 compression fracture is new and acute   Neurosurgery recommended conservative management  Lidoderm patch and gabapentin HS for pain ineffective  Tylenol to 975 Q 8  Oxycodone 2 5 mg     S/P Vertebroplasty from 12/26  Plan for rehab discharge on 12/27-  Accepted at [facility]     - Patient will only require short stay - family motivated to take patient home once well      Medically stable for discharge  Patient is Mosque  Assessment & Plan  No blood products per Shinto preference    Chronic UTI  Assessment & Plan  Acute on chronic UTI on cephalexin however current urine culture citrobacter with multiple resistances  Was started on cefepime, has been switched to nitrofurantoin  Completed 5 day course here  Keflex resumed 12/23, add Probiotics indef  Consider methanamine and Vit C as Outpatient - Defer to Dr Adali Gonzalez          A-fib [facility])  Assessment & Plan  Paroxysmal atrial fibrillation  VR rate controlled here  Anticoagulated with Pradaxa    Hypertension  Assessment & Plan  History of hypertension without need for medications - Monitor at rehab    Alzheimer disease [facility])  Assessment & Plan  Alzheimer's dementia on memantine and donepezil  On quetiapine and melatonin for sleep during hospitalization      Admitting Provider:  Cassie Malhotra DO  Discharge Provider:  Alf Perdue DO  Admission Date: 12/14/2019       Discharge Date: 12/27/19   LOS: 13  Primary Care Physician at Discharge: Nel Vargas, [address] [place] [address] COURSE:  Mauricio Guthrie is a 80 y o  female who presented with episode of syncope and collapse with concern for urinary tract infection versus seizure activity    She was evaluated in consultation by the neurology service and started on epilepsy medication  The patient did have a compression fracture as a result of her syncopal episode and subsequently was given oral and intravenous pain relief  The patient could not obtain appropriate pain relief with this regimen and was evaluated in consultation by the interventional radiology service, she underwent successful vertebroplasty, and subsequently was evaluated by the physical therapy and occupational therapy services with plans for discharge to rehabilitation  Patient did complete a course of antibiotics, she does have a history of chronic urinary tract infections and will remain on Keflex indefinitely  Please consider alternative UTI suppression medication to include methenamine and vitamin-C he if this is felt to be appropriate  At the time of discharge the patient was tolerating oral diet she was back to her baseline mental status and was clinically stable for discharge with family members to rehabilitation  Thank you for choosing [facility] for your healthcare needs  If I can be of any assistance in the future, please feel to contact me  DISCHARGE DIAGNOSES  Compression fracture of T12 vertebra (HCC)  Assessment & Plan  T12 compression fracture is new and acute   Neurosurgery recommended conservative management  Lidoderm patch and gabapentin HS for pain ineffective  Tylenol to 975 Q 8  Oxycodone 2 5 mg     S/P Vertebroplasty from 12/26  Plan for rehab discharge on 12/27-  Accepted at [facility]     - Patient will only require short stay - family motivated to take patient home once well      Medically stable for discharge  Patient is Yazidism  Assessment & Plan  No blood products per [employer]aritan preference    Chronic UTI  Assessment & Plan  Acute on chronic UTI on cephalexin however current urine culture citrobacter with multiple resistances      Was started on cefepime, has been switched to nitrofurantoin  Completed 5 day course here  Keflex resumed 12/23, add Probiotics indef  Consider methanamine and Vit C as Outpatient - Defer to Dr Manju Lazcano          A-fib [facility])  Assessment & Plan  Paroxysmal atrial fibrillation  VR rate controlled here  Anticoagulated with Pradaxa    Hypertension  Assessment & Plan  History of hypertension without need for medications - Monitor at rehab    Alzheimer disease [facility])  Assessment & Plan  Alzheimer's dementia on memantine and donepezil  On quetiapine and melatonin for sleep during hospitalization      CONSULTING PROVIDERS   IP CONSULT TO NEUROLOGY  IP CONSULT TO ORTHOPEDIC SURGERY    PROCEDURES PERFORMED  * No surgery found *    RADIOLOGY RESULTS  Ct Head Without Contrast    Result Date: 12/14/2019  Narrative: CT BRAIN - WITHOUT CONTRAST INDICATION:   fall, on pradaxa  COMPARISON:  CT head 8/9/2019 TECHNIQUE:  CT examination of the brain was performed  In addition to axial images, coronal 2D reformatted images were created and submitted for interpretation  Radiation dose length product (DLP) for this visit:  861 mGy-cm   This examination, like all CT scans performed in the [facility], was performed utilizing techniques to minimize radiation dose exposure, including the use of iterative reconstruction and automated exposure control  IMAGE QUALITY:  Diagnostic  FINDINGS: PARENCHYMA:  No intracranial mass, mass effect or midline shift  No CT signs of acute infarction  No acute parenchymal hemorrhage  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  Periventricular, centrum semiovale, and subcortical white matter hypodensity is a nonspecific finding likely representing chronic microangiopathy  Calcification bilateral cavernous internal carotid arteries  VENTRICLES AND EXTRA-AXIAL SPACES:  No acute hydrocephalus  Stable mild ventriculomegaly attributed to central predominant atrophy versus normal pressure hydrocephalus   VISUALIZED ORBITS AND PARANASAL SINUSES:  Changes of bilateral lens replacements noted  Paranasal sinuses are clear  CALVARIUM AND EXTRACRANIAL SOFT TISSUES:  Normal      Impression: No acute intracranial process or significant interval change  No skull fracture  Chronic microangiopathy  Workstation performed: [id number]     Ct Cervical Spine Without Contrast    Result Date: 12/14/2019  Narrative: CT CERVICAL SPINE - WITHOUT CONTRAST INDICATION:   fall, on pradaxa  COMPARISON:  CT cervical spine 8/9/2019 TECHNIQUE:  CT examination of the cervical spine was performed without intravenous contrast   Contiguous axial images were obtained  Sagittal and coronal reconstructions were performed  Radiation dose length product (DLP) for this visit:  509 mGy-cm   This examination, like all CT scans performed in the [facility], was performed utilizing techniques to minimize radiation dose exposure, including the use of iterative reconstruction and automated exposure control  IMAGE QUALITY:  Diagnostic  FINDINGS: Mild motion artifact  ALIGNMENT:  Patient's head turned to left  No acute posttraumatic malalignment  VERTEBRAL BODIES:  No fracture insofar as can be evaluated on this study with mild motion artifact  DEGENERATIVE CHANGES:  Moderate multilevel cervical degenerative changes are noted  No critical central canal stenosis  PREVERTEBRAL AND PARASPINAL SOFT TISSUES:  No prevertebral soft tissue swelling  Calcification inferior to anterior arch of C1 within the longus coli tendon  No retropharyngeal fluid to suggest acute calcific tendinitis  Bilateral carotid artery calcification  THORACIC INLET:  Please refer to the concurrent chest, abdomen, and pelvic CT report for description of the thoracic inlet findings  Impression: No cervical spine fracture or traumatic malalignment insofar as can be detected on this study with mild motion artifact    Workstation performed: [id number]     Ct Chest Abdomen Pelvis W Contrast    Addendum Date: 12/14/2019 Addendum:   ADDENDUM: Incidentally noted moderate proximal third duodenal segment diverticulum  Result Date: 12/14/2019  Narrative: CT CHEST, ABDOMEN AND PELVIS WITH IV CONTRAST INDICATION:   fall, on pradaxa  COMPARISON:  None  TECHNIQUE: CT examination of the chest, abdomen and pelvis was performed  Axial, sagittal, and coronal 2D reformatted images were created from the source data and submitted for interpretation  Radiation dose length product (DLP) for this visit:  527 mGy-cm   This examination, like all CT scans performed in the [facility], was performed utilizing techniques to minimize radiation dose exposure, including the use of iterative reconstruction and automated exposure control  IV Contrast:  100 mL of iodixanol (VISIPAQUE)   320 Enteric Contrast: Enteric contrast was not administered  FINDINGS: Mild dependent hypoventilatory changes in the lower lobes  Motion artifact  CHEST LUNGS:  Diffuse hypoattenuating alveolar disease and diffuse pulmonary mosaic attenuation compatible with chronic obstructive pulmonary disease  Dependent hypoventilatory changes in the lower lobes  No infiltrate, lung contusion, or pulmonary mass  Central airways are clear  PLEURA:  Unremarkable  HEART/GREAT VESSELS:  Heart is mildly enlarged  No pericardial effusion  Aortic and coronary artery calcification  MEDIASTINUM AND ROMI:  Small sliding hiatal hernia  Otherwise unremarkable  CHEST WALL AND LOWER NECK:   Unremarkable  ABDOMEN LIVER/BILIARY TREE:  There is mild intra and extrahepatic bile duct distention within normal limits for patient's age and postcholecystectomy reservoir distention  Otherwise unremarkable  GALLBLADDER:  Post cholecystectomy  SPLEEN:  Unremarkable  PANCREAS:  Moderate fatty infiltration of the head and uncinate process  Otherwise unremarkable  ADRENAL GLANDS:  Unremarkable   KIDNEYS/URETERS:  2 5 cm left renal interpolar parapelvic simple cyst  Kidneys otherwise unremarkable for patient's age  No perinephric collection  STOMACH AND BOWEL:  Diffuse colonic diverticulosis without immediate adjacent stranding  Moderate stool throughout the colon  No bowel obstruction  APPENDIX:  A normal appendix was visualized  ABDOMINOPELVIC CAVITY:  No ascites or free intraperitoneal air  No lymphadenopathy  VESSELS:  Aortoiliac calcification  No aneurysm  PELVIS REPRODUCTIVE ORGANS:  Unremarkable for patient's age  URINARY BLADDER:  Unremarkable  ABDOMINAL WALL/INGUINAL REGIONS:  Unremarkable  OSSEOUS STRUCTURES:  Mild acute to subacute compression fracture of T12 with 4 mm left posterior retropulsion  No acute fracture or osseous destructive lesion identified  Degenerative changes of the spine, pubic symphysis, and multiple joints  Diffuse osteopenia  Impression: CT chest: Dependent hypoventilatory changes in the lower lobes  Otherwise, no evidence of acute thoracic process  COPD  Mild cardiomegaly with three-vessel coronary artery calcification    Small sliding hiatal hernia  CT abdomen and pelvis: Acute to subacute mild compression fracture of T12 with 4 mm left posterior retropulsion  Otherwise, no evidence of acute abdominopelvic process  Colonic diverticulosis  2 5 cm left renal interpolar parapelvic simple cyst  The examination demonstrates a significant  finding and was documented as such in [facility] for liaison and referring practitioner notification  Workstation performed: [id number]     Ct Recon Only Thoracolumbar (no Charge)    Result Date: 12/14/2019  Narrative: CT THORACIC AND LUMBAR SPINE INDICATION:   fall, on pradaxa  COMPARISON: TECHNIQUE: Axial CT examination of the thoracic and lumbar spine was obtained utilizing reconstructed images from CT of the chest abdomen and pelvis performed the same day  Images were reformatted in the sagittal and coronal planes   This examination, like all CT scans performed in the [facility], was performed utilizing techniques to minimize radiation dose exposure, including the use of iterative reconstruction and automated exposure control  FINDINGS: Mild motion artifact  ALIGNMENT: Normal alignment of lumbar vertebral bodies  No subluxation  VERTEBRAL BODIES: Mild acute to subacute compression fracture of T12 with 4 mm left posterior retropulsion  Diffuse osteopenia  DEGENERATIVE CHANGES: Mild multilevel degenerative disc disease  PREVERTEBRAL AND PARASPINAL SOFT TISSUES:  No paravertebral hematoma  Trace anterior paraspinal stranding at the level of T12  Normal      Impression: Mild acute to subacute compression fracture of T12 with 4 mm left posterior retropulsion  The examination demonstrates a significant  finding and was documented as such in [facility] for liaison and referring practitioner notification  Workstation performed: [id number]     Ir Kyphoplasty/vertebroplasty    Result Date: 12/26/2019  Narrative: Fluoroscopic-guided percutaneous kyphoplasty Clinical History: T12 compression fracture  Abnormal CT   Focal back pain without radiation  Sedation time: 1 hour Fluoroscopy time: 6 7 minutes Images: 18 Procedure: After explaining the risks and benefits of the procedure, informed consent was obtained  The patient was identified verbally and by wristband  The patient was brought into the angiography suite and placed in the prone position on the table  Fluoroscopy was used to localize the T12 vertebral body  The level was confirmed by Dr Regalado  The overlying skin was prepped and draped in the usual sterile fashion and local anesthesia and within 1% lidocaine solution  Under fluoroscopic guidance, and 11 gauge needle was advanced through the left T12 pedicle into the middle portion of the compressed vertebral body  Positioning was confirmed in two 90 degree perpendicular planes  A cavity was created using a straight and articulating osteotome    2 cc's of polymethylmethacrylate (PMMA) was injected under continuous fluoroscopy in the lateral projection  The cement extends from the endplate to endplate and crosses the midline without extravasation  The needle was removed and hemostasis obtained  A small amount is cement filled the pedicle, while removing the needle  The patient was maintained within the prone position on the fluoroscopy table for a 1/2 hour  The patient was gently rolled onto the  stretcher  The patient's neurologic exam was intact following the procedure  The patient tolerated the procedure well and left the department in stable condition  Impression: Impression: Successful percutaneous T12 kyphoplasty with injection of 2 cc of PMMA   Workstation performed: [id number]       LABS  Results from last 7 days   Lab Units 12/26/19  0552 12/23/19 0455   WBC Thousand/uL  --  8 24   HEMOGLOBIN g/dL  --  10 1*   HEMATOCRIT %  --  31 8*   MCV fL  --  89   PLATELETS Thousands/uL  --  294   INR  1 04  --      Results from last 7 days   Lab Units 12/23/19 0455   SODIUM mmol/L 137   POTASSIUM mmol/L 3 6   CHLORIDE mmol/L 102   CO2 mmol/L 26   BUN mg/dL 15   CREATININE mg/dL 0 68   CALCIUM mg/dL 9 0   EGFR ml/min/1 73sq m 79   GLUCOSE RANDOM mg/dL 109                                        Cultures:         Invalid input(s): URIBILINOGEN              PHYSICAL EXAM:  Vitals:   Blood Pressure: 144/69 (12/27/19 0736)  Pulse: 69 (12/27/19 0736)  Temperature: 97 6 °F (36 4 °C) (12/27/19 0736)  Temp Source: Temporal (12/27/19 0736)  Respirations: 18 (12/27/19 0736)  Weight - Scale: 65 9 kg (145 lb 4 5 oz) (12/18/19 0558)  SpO2: 97 % (12/27/19 0736)      General: well appearing, no acute distress  HEENT: atraumatic, PERRLA, moist mucosa, normal pharynx, normal tonsils and adenoids, normal tongue, no fluid in sinuses  Neck: Trachea midline, no carotid bruit, no masses  Respiratory: normal chest wall expansion, CTA B, no r/r/w, no rubs  Cardiovascular: RRR, no m/r/g, Normal S1 and S2  Abdomen: Soft, non-tender, non-distended, normal bowel sounds in all quadrants, no hepatosplenomegaly, no tympany  Rectal: deferred  Musculoskeletal: normal ROM in upper and lower extremities  Integumentary: warm, dry, and pink, with no rash, purpura, or petechia  Heme/Lymph: no lymphadenopathy, no bruises  Neurological: Cranial Nerves II-XII grossly intact  Psychiatric:  Apparent dementia      Discharge Disposition: Rehab, TCU at [address] Results Pending at Discharge:           Medications   · Summary of Medication Adjustments made as a result of this hospitalization:  Depakote 125 mg 3 times a day  · Medication Dosing Tapers - Please refer to Discharge Medication List for details on any medication dosing tapers (if applicable to patient)  · Discharge Medication List: See after visit summary for reconciled discharge medications  Diet restrictions:         Diet Orders   (From admission, onward)             Start     Ordered    12/26/19 1323  Dietary nutrition supplements  Once     Question Answer Comment   Select Supplement: Ensure Compact-Chocolate    Frequency Lunch        12/26/19 1322    12/26/19 1202  Diet Regular; Regular House  Diet effective now     Question Answer Comment   Diet Type Regular    Regular Regular House    RD to adjust diet per protocol?  Yes        12/26/19 1201    12/18/19 1710  Room Service  Once     Question:  Type of Service  Answer:  Room Service-Appropriate    12/18/19 1709    12/16/19 1347  Dietary nutrition supplements  Once     Question Answer Comment   Select Supplement: Magic Cup [place]    Frequency Lunch        12/16/19 1346    12/16/19 1347  Dietary nutrition supplements  Once     Question Answer Comment   Select Supplement: Magic Cup Chocolate    Frequency Dinner        12/16/19 1346    12/16/19 1346  Dietary nutrition supplements  Once     Question Answer Comment   Select Supplement: Ensure Compact-Chocolate    Frequency Breakfast        12/16/19 1346              Activity restrictions: No strenuous activity  Discharge Condition:  Improving    Outpatient Follow-Up and Discharge Instructions  See after visit summary section titled Discharge Instructions for information provided to patient and family  Code Status: Level 3 - DNAR and DNI  Discharge Statement   I spent 35 minutes discharging the patient  This time was spent on the day of discharge  Greater than 50% of total time was spent with the patient and / or family counseling and / or coordination of care  ** Please Note: This note has been constructed using a voice recognition system   **

## 2019-12-27 NOTE — PLAN OF CARE
Problem: Prexisting or High Potential for Compromised Skin Integrity  Goal: Skin integrity is maintained or improved  Description  INTERVENTIONS:  - Identify patients at risk for skin breakdown  - Assess and monitor skin integrity  - Assess and monitor nutrition and hydration status  - Monitor labs   - Assess for incontinence   - Turn and reposition patient  - Assist with mobility/ambulation  - Relieve pressure over bony prominences  - Avoid friction and shearing  - Provide appropriate hygiene as needed including keeping skin clean and dry  - Evaluate need for skin moisturizer/barrier cream  - Collaborate with interdisciplinary team   - Patient/family teaching  - Consider wound care consult   Outcome: Progressing     Problem: Potential for Falls  Goal: Patient will remain free of falls  Description  INTERVENTIONS:  - Assess patient frequently for physical needs  -  Identify cognitive and physical deficits and behaviors that affect risk of falls    -  Lee fall precautions as indicated by assessment   - Educate patient/family on patient safety including physical limitations  - Instruct patient to call for assistance with activity based on assessment  - Modify environment to reduce risk of injury  - Consider OT/PT consult to assist with strengthening/mobility  Outcome: Progressing

## 2019-12-27 NOTE — PLAN OF CARE
Problem: Potential for Falls  Goal: Patient will remain free of falls  Description  INTERVENTIONS:  - Assess patient frequently for physical needs  -  Identify cognitive and physical deficits and behaviors that affect risk of falls    -  [place] fall precautions as indicated by assessment   - Educate patient/family on patient safety including physical limitations  - Instruct patient to call for assistance with activity based on assessment  - Modify environment to reduce risk of injury  - Consider OT/PT consult to assist with strengthening/mobility  Outcome: Adequate for Discharge     Problem: Prexisting or High Potential for Compromised Skin Integrity  Goal: Skin integrity is maintained or improved  Description  INTERVENTIONS:  - Identify patients at risk for skin breakdown  - Assess and monitor skin integrity  - Assess and monitor nutrition and hydration status  - Monitor labs   - Assess for incontinence   - Turn and reposition patient  - Assist with mobility/ambulation  - Relieve pressure over bony prominences  - Avoid friction and shearing  - Provide appropriate hygiene as needed including keeping skin clean and dry  - Evaluate need for skin moisturizer/barrier cream  - Collaborate with interdisciplinary team   - Patient/family teaching  - Consider wound care consult   Outcome: Adequate for Discharge     Problem: PAIN - ADULT  Goal: Verbalizes/displays adequate comfort level or baseline comfort level  Description  Interventions:  - Encourage patient to monitor pain and request assistance  - Assess pain using appropriate pain scale  - Administer analgesics based on type and severity of pain and evaluate response  - Implement non-pharmacological measures as appropriate and evaluate response  - Consider cultural and social influences on pain and pain management  - Notify physician/advanced practitioner if interventions unsuccessful or patient reports new pain  Outcome: Adequate for Discharge     Problem: INFECTION - ADULT  Goal: Absence or prevention of progression during hospitalization  Description  INTERVENTIONS:  - Assess and monitor for signs and symptoms of infection  - Monitor lab/diagnostic results  - Monitor all insertion sites, i e  indwelling lines, tubes, and drains  - Monitor endotracheal if appropriate and nasal secretions for changes in amount and color  - [place] appropriate cooling/warming therapies per order  - Administer medications as ordered  - Instruct and encourage patient and family to use good hand hygiene technique  - Identify and instruct in appropriate isolation precautions for identified infection/condition  Outcome: Adequate for Discharge  Goal: Absence of fever/infection during neutropenic period  Description  INTERVENTIONS:  - Monitor WBC    Outcome: Adequate for Discharge     Problem: SAFETY ADULT  Goal: Maintain or return to baseline ADL function  Description  INTERVENTIONS:  -  Assess patient's ability to carry out ADLs; assess patient's baseline for ADL function and identify physical deficits which impact ability to perform ADLs (bathing, care of mouth/teeth, toileting, grooming, dressing, etc )  - Assess/evaluate cause of self-care deficits   - Assess range of motion  - Assess patient's mobility; develop plan if impaired  - Assess patient's need for assistive devices and provide as appropriate  - Encourage maximum independence but intervene and supervise when necessary  - Involve family in performance of ADLs  - Assess for home care needs following discharge   - Consider OT consult to assist with ADL evaluation and planning for discharge  - Provide patient education as appropriate  Outcome: Adequate for Discharge  Goal: Maintain or return mobility status to optimal level  Description  INTERVENTIONS:  - Assess patient's baseline mobility status (ambulation, transfers, stairs, etc )    - Identify cognitive and physical deficits and behaviors that affect mobility  - Identify mobility aids required to assist with transfers and/or ambulation (gait belt, sit-to-stand, lift, walker, cane, etc )  - [place] fall precautions as indicated by assessment  - Record patient progress and toleration of activity level on Mobility SBAR; progress patient to next Phase/Stage  - Instruct patient to call for assistance with activity based on assessment  - Consider rehabilitation consult to assist with strengthening/weightbearing, etc   Outcome: Adequate for Discharge     Problem: DISCHARGE PLANNING  Goal: Discharge to home or other facility with appropriate resources  Description  INTERVENTIONS:  - Identify barriers to discharge w/patient and caregiver  - Arrange for needed discharge resources and transportation as appropriate  - Identify discharge learning needs (meds, wound care, etc )  - Arrange for interpretive services to assist at discharge as needed  - Refer to Case Management Department for coordinating discharge planning if the patient needs post-hospital services based on physician/advanced practitioner order or complex needs related to functional status, cognitive ability, or social support system  Outcome: Adequate for Discharge     Problem: Knowledge Deficit  Goal: Patient/family/caregiver demonstrates understanding of disease process, treatment plan, medications, and discharge instructions  Description  Complete learning assessment and assess knowledge base  Interventions:  - Provide teaching at level of understanding  - Provide teaching via preferred learning methods  Outcome: Adequate for Discharge     Problem: Nutrition/Hydration-ADULT  Goal: Nutrient/Hydration intake appropriate for improving, restoring or maintaining nutritional needs  Description  Monitor and assess patient's nutrition/hydration status for malnutrition  Collaborate with interdisciplinary team and initiate plan and interventions as ordered  Monitor patient's weight and dietary intake as ordered or per policy   Utilize nutrition screening tool and intervene as necessary  Determine patient's food preferences and provide high-protein, high-caloric foods as appropriate       INTERVENTIONS:  - Monitor oral intake, urinary output, labs, and treatment plans  - Assess nutrition and hydration status and recommend course of action  - Evaluate amount of meals eaten  - Assist patient with eating if necessary   - Allow adequate time for meals  - Recommend/ encourage appropriate diets, oral nutritional supplements, and vitamin/mineral supplements  - Order, calculate, and assess calorie counts as needed  - Recommend, monitor, and adjust tube feedings and TPN/PPN based on assessed needs  - Assess need for intravenous fluids  - Provide specific nutrition/hydration education as appropriate  - Include patient/family/caregiver in decisions related to nutrition  Outcome: Adequate for Discharge     Problem: NEUROSENSORY - ADULT  Goal: Achieves stable or improved neurological status  Description  INTERVENTIONS  - Monitor and report changes in neurological status  - Monitor vital signs such as temperature, blood pressure, glucose, and any other labs ordered   - Initiate measures to prevent increased intracranial pressure  - Monitor for seizure activity and implement precautions if appropriate      Outcome: Adequate for Discharge  Goal: Remains free of injury related to seizures activity  Description  INTERVENTIONS  - Maintain airway, patient safety  and administer oxygen as ordered  - Monitor patient for seizure activity, document and report duration and description of seizure to physician/advanced practitioner  - If seizure occurs,  ensure patient safety during seizure  - Reorient patient post seizure  - Seizure pads on all 4 side rails  - Instruct patient/family to notify RN of any seizure activity including if an aura is experienced  - Instruct patient/family to call for assistance with activity based on nursing assessment  - Administer anti-seizure medications if ordered    Outcome: Adequate for Discharge  Goal: Achieves maximal functionality and self care  Description  INTERVENTIONS  - Monitor swallowing and airway patency with patient fatigue and changes in neurological status  - Encourage and assist patient to increase activity and self care     - Encourage visually impaired, hearing impaired and aphasic patients to use assistive/communication devices  Outcome: Adequate for Discharge     Problem: MUSCULOSKELETAL - ADULT  Goal: Maintain or return mobility to safest level of function  Description  INTERVENTIONS:  - Assess patient's ability to carry out ADLs; assess patient's baseline for ADL function and identify physical deficits which impact ability to perform ADLs (bathing, care of mouth/teeth, toileting, grooming, dressing, etc )  - Assess/evaluate cause of self-care deficits   - Assess range of motion  - Assess patient's mobility  - Assess patient's need for assistive devices and provide as appropriate  - Encourage maximum independence but intervene and supervise when necessary  - Involve family in performance of ADLs  - Assess for home care needs following discharge   - Consider OT consult to assist with ADL evaluation and planning for discharge  - Provide patient education as appropriate  Outcome: Adequate for Discharge  Goal: Maintain proper alignment of affected body part  Description  INTERVENTIONS:  - Support, maintain and protect limb and body alignment  - Provide patient/ family with appropriate education  Outcome: Adequate for Discharge

## 2019-12-27 NOTE — PLAN OF CARE
Problem: OCCUPATIONAL THERAPY ADULT  Goal: Performs self-care activities at highest level of function for planned discharge setting  See evaluation for individualized goals  Description  Treatment Interventions: ADL retraining, Functional transfer training, UE strengthening/ROM, Endurance training, Cognitive reorientation, Patient/family training, Equipment evaluation/education, Neuromuscular reeducation, Compensatory technique education, Continued evaluation, Energy conservation, Activityengagement          See flowsheet documentation for full assessment, interventions and recommendations  Outcome: Progressing  Note:   Limitation: Decreased ADL status, Decreased UE ROM, Decreased UE strength, Decreased Safe judgement during ADL, Decreased cognition, Decreased endurance, Visual deficit, Decreased self-care trans, Decreased high-level ADLs, Mood limitation  Prognosis: Good, Fair  Assessment: Pt is a 80 y o  female seen for OT evaluation s/p admit to [facility] on 12/27/2019 w/ T12 compression fracture caused by syncope episode  Underwent successful vertebroplasty on 12/26/19  OT completed an extensive review of pt's medical and social history  Comorbidities affecting pt's functional performance at time of assessment include:  A-fib ([facility] [address] ), Alzheimer disease ([facility] [address] ), Central retinal artery occlusion of left eye, Chronic UTI, Diverticulosis, Dyslipidemia, History of stroke, Hyperlipidemia, Hypertension, UTI , and Vasovagal syncope    Personal factors affecting pt at time of IE include:steps to enter environment, behavioral pattern, difficulty performing ADLS, difficulty performing IADLS , limited insight into deficits, compliance, decreased initiation and engagement  and environment  Daughter Jarefugiojohnallen Wong present during session and able to assist with gathering information related to home setup, d/c plans, and PLOF  Prior to admission, pt was living in in-law suite attached to son and DIL's home   Cameras are located within the suite to ensure safety  However, Nicola Wong reports family does not feel comfortable with d/c to in-law suite 2* pt's current cognitive and performance deficits  Plans for d/c are to [facility] home with 24/7 supervision/assist  Prior to admission pt was independent for transfers, ADLs, and ambulation without device  Family completes all IADLs  Pt currently requires Min A UB ADLs, Mod A LB ADLs and Mod A toileting, and Min A with RW mobility 2* the following deficits impacting occupational performance  Pt presents to OT below baseline due to the following performance deficits: weakness, decreased ROM, decreased strength, decreased balance, decreased tolerance, impaired sensation, impaired arousal, impaired attention, impaired initiation, impaired memory, impaired sequencing, impaired problem solving, impulsivity, decreased safety awareness, increased pain, orthopedic restrictions and decreased coping skills  Pt to benefit from continued skilled OT services while in the hospital to address deficits as defined above and maximize level of functional independence w ADL's and functional mobility  Occupational performance areas to address include: grooming, bathing/shower, toilet hygiene, dressing, health maintenance, functional mobility, community mobility, clothing management and social participation  Pt with score of 40/100 on the Barthel Index   Based on OT evaluation, assessment(s), performance deficits listed, and current level of functio     OT Discharge Recommendation: Home OT(with 24/7 supervision/assist )  OT - OK to Discharge: No

## 2019-12-27 NOTE — ASSESSMENT & PLAN NOTE
T12 compression fracture is new and acute   Neurosurgery recommended conservative management  Lidoderm patch and gabapentin HS for pain ineffective  Tylenol to 975 Q 8  Oxycodone 2 5 mg     S/P Vertebroplasty from 12/26  Plan for rehab discharge on 12/27-  Accepted at [facility]     - Patient will only require short stay - family motivated to take patient home once well      Medically stable for discharge

## 2019-12-27 NOTE — ASSESSMENT & PLAN NOTE
Acute on chronic UTI on cephalexin however current urine culture citrobacter with multiple resistances  Was started on cefepime, has been switched to nitrofurantoin      Completed 5 day course here  Keflex resumed 12/23, add Probiotics indef  Consider methanamine and Vit C as Outpatient - Defer to Dr Aden Rebolledo

## 2019-12-27 NOTE — ASSESSMENT & PLAN NOTE
During her acute care admission, Neurology was consulted who started patient on Depakote on trial   Will continue

## 2019-12-27 NOTE — ASSESSMENT & PLAN NOTE
Patient is currently not on any antihypertensive medication  Will continue to monitor    Last blood pressure was 119/67

## 2019-12-27 NOTE — H&P
H&P- Millie Rodriguez 1933, 80 y o  female MRN: [id number]    Unit/Bed#: -01 Encounter: [id number]    Primary Care Provider: Palma Chiu DO   Date and time admitted to hospital: 12/27/2019 11:21 AM        * Thoracic vertebral collapse, initial encounter [facility])  Assessment & Plan  Patient was admitted for T2 compression fracture after unwitnessed fall at home  Neurosurgery was consulted who recommended conservative management  Lidoderm patch and gabapentin HS for pain ineffective  Tylenol to 975 Q 8  Oxycodone 2 5 mg   S/P Vertebroplasty from 12/26 Patient was discharged for rehabiliation    Paroxysmal atrial fibrillation [facility])  Assessment & Plan  Patient is anticoagulated on Pradaxa, continue Lipitor    Hypertensive heart disease without heart failure  Assessment & Plan  Patient is currently not on any antihypertensive medication  Will continue to monitor  Last blood pressure was 119/67    Primary insomnia  Assessment & Plan  Patient was on Seroquel and melatonin, will continue    Chronic UTI  Assessment & Plan  Patient is on daily keflex    Chronic headache  Assessment & Plan  During her acute care admission, Neurology was consulted who started patient on Depakote on trial   Will continue    Mixed hyperlipidemia  Assessment & Plan  Continue statin    Alzheimer disease ([facility] [address] )  Assessment & Plan  Continue home medication Namenda and Aricept and seroquel        VTE Prophylaxis: Dabigatran (Pradaxa)          Anticipated Length of Stay:  Patient will be admitted on an SNF Short Term Inpatient basis with an anticipated length of stay of  > 2 midnights  Justification for Hospital Stay: rehab    Total Time for Visit, including Counseling / Coordination of Care: 45 minutes  Greater than 50% of this total time spent on direct patient counseling and coordination of care      Chief Complaint:   Compression fracture    History of Present Illness:    Millie Rodriguez is a 80 y o  female who was recently admitted in [facility] after episode of syncope, found to have T2 compression fracture, status post vertebral plasty  Patient was discharged to rehabilitation for physical therapy and occupational therapy services  Patient was also started on gabapentin, Tylenol and oxycodone and Lidoderm patch for pain control  Patient does have history of chronic urinary tract infection, currently on Keflex in definitively  Patient has history of Alzheimer's disorder, following up with outpatient Neurology, currently on Namenda and Aricept and Seroquel  Patient was also suffering from recurrent syncope, Neurology was consulted with thought may have underlying epilepsy, started on Depakote  Review of Systems:    Review of Systems   Constitutional: Positive for activity change and fatigue  Negative for appetite change and fever  HENT: Negative for congestion and sore throat  Eyes: Negative for pain and visual disturbance  Respiratory: Negative for cough, shortness of breath and wheezing  Cardiovascular: Negative for chest pain, palpitations and leg swelling  Gastrointestinal: Negative for abdominal distention and abdominal pain  Endocrine: Negative for polyuria  Genitourinary: Negative for difficulty urinating and dysuria  Musculoskeletal: Positive for arthralgias and back pain  Skin: Negative for rash and wound  Allergic/Immunologic: Negative for immunocompromised state  Neurological: Negative for dizziness, speech difficulty and headaches  Hematological: Negative for adenopathy  Psychiatric/Behavioral: Positive for sleep disturbance  The patient is not hyperactive          Past Medical and Surgical History:     Past Medical History:   Diagnosis Date    [facility]fib ([facility] )     Alzheimer disease ([facility] )     Chronic UTI     Diverticulosis     Dyslipidemia     History of stroke     Hyperlipidemia     Hypertension     UTI (urinary tract infection)     Vasovagal syncope 2/13/2018       Past Surgical History:   Procedure Laterality Date    CHOLECYSTECTOMY      IR KYPHOPLASTY/VERTEBROPLASTY  2019       Meds/Allergies:    Prior to Admission medications    Medication Sig Start Date End Date Taking? Authorizing Provider   atorvastatin (LIPITOR) 20 mg tablet Take 20 mg by mouth daily   Yes Historical Provider, MD   BUTALBITAL-ACETAMINOPHEN PO Take 1 tablet by mouth daily at bedtime   Yes Historical Provider, MD   cephalexin (KEFLEX) 250 mg capsule Take 250 mg by mouth once   Yes Historical Provider, MD   dabigatran etexilate (PRADAXA) 150 mg capsu Take 1 capsule (150 mg total) by mouth every 12 (twelve) hours 19  Yes Harlan Chen PA-C   donepezil (ARICEPT) 10 mg tablet Take 10 mg by mouth daily at bedtime   Yes Historical Provider, MD   memantine (NAMENDA) 10 mg tablet Take 10 mg by mouth 2 (two) times a day   Yes Historical Provider, MD   pantoprazole (PROTONIX) 40 mg tablet Take 40 mg by mouth daily   Yes Historical Provider, MD   QUEtiapine (SEROquel) 25 mg tablet Take 1 tablet (25 mg total) by mouth daily at bedtime  Patient taking differently: Take 25 mg by mouth as needed  19  Yes Milena Arauz MD     I have reviewed home medications using allscripts  Allergies: Allergies   Allergen Reactions    Ativan [Lorazepam]    [place]  [Lurasidone]        Social History:     Marital Status:       Substance Use History:   Social History     Substance and Sexual Activity   Alcohol Use Never    Frequency: Never     Social History     Tobacco Use   Smoking Status Former Smoker    Last attempt to quit: 2012    Years since quittin 0   Smokeless Tobacco Never Used     Social History     Substance and Sexual Activity   Drug Use Never       Family History:    History reviewed  No pertinent family history      Physical Exam:     Vitals:   Blood Pressure: 119/67 (19 1114)  Pulse: 86 (19 1114)  Temperature: 97 8 °F (36 6 °C) (19 1105)  Temp Source: Temporal (12/27/19 1114)  Respirations: 19 (12/27/19 1105)  Weight - Scale: 65 4 kg (144 lb 2 9 oz) (12/27/19 1105)  SpO2: 98 % (12/27/19 1105)    Physical Exam   Constitutional: She appears well-developed and well-nourished  HENT:   Head: Normocephalic and atraumatic  Right Ear: External ear normal    Left Ear: External ear normal    Nose: Nose normal    Mouth/Throat: Oropharynx is clear and moist    Eyes: Conjunctivae and EOM are normal    Neck: Normal range of motion  Neck supple  Cardiovascular: Normal rate, regular rhythm and normal heart sounds  Pulmonary/Chest: Effort normal and breath sounds normal    Abdominal: Soft  Bowel sounds are normal    Genitourinary:   Genitourinary Comments: deferred   Neurological: She is alert  Cranial nerve 2 -12 are normal   Patient answers question directly  However reluctant to continue conversation  Patient has history of Alzheimer's dementia as per daughter  Daughter provided the history  Skin: Skin is warm and dry  Psychiatric: She has a normal mood and affect  Additional Data:     Lab Results: I have personally reviewed pertinent reports  Results from last 7 days   Lab Units 12/23/19  0455   WBC Thousand/uL 8 24   HEMOGLOBIN g/dL 10 1*   HEMATOCRIT % 31 8*   PLATELETS Thousands/uL 294     Results from last 7 days   Lab Units 12/23/19  0455   SODIUM mmol/L 137   POTASSIUM mmol/L 3 6   CHLORIDE mmol/L 102   CO2 mmol/L 26   BUN mg/dL 15   CREATININE mg/dL 0 68   ANION GAP mmol/L 9   CALCIUM mg/dL 9 0   GLUCOSE RANDOM mg/dL 109     Results from last 7 days   Lab Units 12/26/19  0552   INR  1 04                   Imaging: I have personally reviewed pertinent reports  No orders to display       ·      [employer] / Epic Records Reviewed: Yes     ** Please Note: This note has been constructed using a voice recognition system   **

## 2019-12-28 PROCEDURE — G8978 MOBILITY CURRENT STATUS: HCPCS

## 2019-12-28 PROCEDURE — 97163 PT EVAL HIGH COMPLEX 45 MIN: CPT

## 2019-12-28 PROCEDURE — G8979 MOBILITY GOAL STATUS: HCPCS

## 2019-12-28 PROCEDURE — 97535 SELF CARE MNGMENT TRAINING: CPT

## 2019-12-28 RX ADMIN — Medication 250 MG: at 17:02

## 2019-12-28 RX ADMIN — GABAPENTIN 100 MG: 100 CAPSULE ORAL at 20:02

## 2019-12-28 RX ADMIN — DIVALPROEX SODIUM 125 MG: 125 CAPSULE, COATED PELLETS ORAL at 17:01

## 2019-12-28 RX ADMIN — Medication 250 MG: at 08:12

## 2019-12-28 RX ADMIN — QUETIAPINE FUMARATE 25 MG: 25 TABLET ORAL at 20:03

## 2019-12-28 RX ADMIN — DABIGATRAN ETEXILATE MESYLATE 150 MG: 75 CAPSULE ORAL at 08:12

## 2019-12-28 RX ADMIN — MEMANTINE 10 MG: 10 TABLET ORAL at 20:02

## 2019-12-28 RX ADMIN — DIVALPROEX SODIUM 125 MG: 125 CAPSULE, COATED PELLETS ORAL at 08:13

## 2019-12-28 RX ADMIN — CEPHALEXIN 250 MG: 250 CAPSULE ORAL at 08:12

## 2019-12-28 RX ADMIN — MELATONIN TAB 3 MG 6 MG: 3 TAB at 20:02

## 2019-12-28 RX ADMIN — MEMANTINE 10 MG: 10 TABLET ORAL at 08:12

## 2019-12-28 RX ADMIN — ACETAMINOPHEN 975 MG: 325 TABLET ORAL at 15:18

## 2019-12-28 RX ADMIN — DOCUSATE SODIUM 100 MG: 100 CAPSULE, LIQUID FILLED ORAL at 17:02

## 2019-12-28 RX ADMIN — DONEPEZIL HYDROCHLORIDE 10 MG: 5 TABLET, FILM COATED ORAL at 17:02

## 2019-12-28 RX ADMIN — DOCUSATE SODIUM 100 MG: 100 CAPSULE, LIQUID FILLED ORAL at 08:12

## 2019-12-28 RX ADMIN — ACETAMINOPHEN 975 MG: 325 TABLET ORAL at 05:47

## 2019-12-28 RX ADMIN — PANTOPRAZOLE SODIUM 40 MG: 40 TABLET, DELAYED RELEASE ORAL at 06:13

## 2019-12-28 RX ADMIN — DIVALPROEX SODIUM 125 MG: 125 CAPSULE, COATED PELLETS ORAL at 20:03

## 2019-12-28 RX ADMIN — ATORVASTATIN CALCIUM 20 MG: 20 TABLET, FILM COATED ORAL at 17:01

## 2019-12-28 RX ADMIN — ACETAMINOPHEN 975 MG: 325 TABLET ORAL at 20:02

## 2019-12-28 RX ADMIN — LIDOCAINE 1 PATCH: 50 PATCH CUTANEOUS at 08:17

## 2019-12-28 RX ADMIN — DABIGATRAN ETEXILATE MESYLATE 150 MG: 75 CAPSULE ORAL at 20:02

## 2019-12-28 NOTE — OCCUPATIONAL THERAPY NOTE
Occupational Therapy Treatment Note    Name:  Dedrick Andrew   MRN:   [id number]  Age:     80 y o  Patient Active Problem List   Diagnosis    History of stroke    Alzheimer disease ([facility] )    Acute lacunar stroke ([facility] )    Carotid artery calcification, bilateral    Mixed hyperlipidemia    Hypertensive heart disease without heart failure    Paroxysmal atrial fibrillation (HCC)    Chronic headache    Chronic anticoagulation    Chronic UTI    Patient is Lutheran    Rectal bleed    Central retinal artery occlusion of left eye    Stenosis of right vertebral artery    Syncope and collapse    Compression fracture of T12 vertebra (HCC)    Syncope    Thoracic vertebral collapse, initial encounter ([facility] )    Primary insomnia     Syncope [R55]      Subjective/Goals: patient cursing and aggitiated this session with daughter aiding to speed along therapy process and provide needed encouragement  Patient reports to daughter's cues  Vitals: stable throughout session     Pain: increased complaints of back pain  Patient did not wear TLSO brace-- daughter reports patient fights excessively to wear and more counter productive  Patient unable to rate pain  Treatment Time: (815-031) 40 minutes    Cognition: impaired    Precautions: TLSO brace, WBAT, Contact/isolation, impulsive, Cognitive, Chair/bed alarms, WBS, Seizure, Pain, Spinal precautions, visual impairment    EVALUATION information:   OT Goal expiration date: 1/10/20   Treatment day: 2   Discharge recommendation: Home OT with 24* supervision    HOME SET UP:  o House- previously living in in law suite- plans to live in home with family upon d/c   o One level home with 2 CLINTON and no HR's  o Walk in shower (3-4" threshold)  o Standard bathroom  o Assist from family  o As per dtr report, pta pt ind with ADLs and ambulation, family provides close to 24/7 supervision, cameras for safety  Family completes all IADLs  (-) drives   Plans to d/c to dtr Keri's home where 24/7 will be provided  Pt unaware, will further discuss when appropriate, dtr reports d/c information will cause confusion  No prior DME/AE  o Per daughter vision loss in left eye-- patient unaware and compensates  o Compression fx T12 post syncope episode    Patient education: SPINAL PRECAUTIONS, SLOWING OF PACE, ENERGY CONSERVATION TECHNIQUES FOR CARRY OVER UPON D/C, INCREASED FAMILY SUPPORT, CONTINUE PARTICIPATION IN SELF-CARE/MOBILITY WITH STAFF [facility] , OVERALL SAFETY AWARENESS, BACK PRECAUTIONS, FALL PREVENTION, ENERGY CONSERVATION  and PAIN MANAGEMENT WITH FUNCTIONAL ACTIVITIES    Additional comments:    Assessment:  Patient seen this date for OT with focus on goals as set by OTR  Patient agreeable to skilled OT session with focus on ADL's (bathing, dressing, toileting), Transfers (STS, SPT), Standing tolerance/balance, Strength/ROM/HEP, Tub/shower transfers, Activity tolerance, Education on safety, fall prevention and energy conservation techniques, Item retrieval/safe transport with DME ed and Bathroom/kitchen/home mobility  Barriers to treatment include fatigue, pain, volition, cognition and safety  Patient educated on safe functional transfers techniques, the appropriate use of AE/DME to improve functional performance, and activity modification techniques for energy conservation  Plans for d/c are home with 24* supervision  Patient is making gains toward OT goals with continued OT recommended at this time to max tolerance, safety and function for appropriate d/c planning  At end of session patient remains in bed with all needs within reach    Additional session details:  Daughter present for entire session aiding to provide the encouragement needed due to patient resistance  Patient able to complete ADL's with tailor sitting however assist needed PRN to initiate tasks  Family supportive        Session focused on eating (Setup), oral hygiene (Setup), toileting (S), bathing (S), UB dressing (Ind ), LB dressing (S), and don/doff footwear (S)  Goals STG achieved within 2 weeks Performance at Initial Evaluation 12/27/2019  Current Performance (last date completed)   Grooming while standing at sink S Min A 12/28 REFUSED sinkside getting very aggitiated therefore set up seated for oral care completing with MI upon set up  12/27 Min A    ADL transfers  S Min A 12/28-- VERY impulsive  STS MI (poor safety awareness); CGA/CS SPT and mobility with RW (patient appeared to transfer best in bathroom without AD)  12/27 Min A impulsive    Bathroom mobility with appropriate AD S Unable to assess  12/28 CS/CGA + RW and short distance without as she pushed walker away quickly in frustration    Don/Doff TLSO  Min A   12/28 REFUSED-- daughter states brace not effective  Requesting different brace stating present brace only further adds to frustration     12/27 Pt declined brace-- impulsive into txfr without TLSO    UB ADL  S  (12/28) Min A 12/28 MI upon set up, cues and MAX encouragement   12/27 Min A    LB ADL, AE PRN S  (12/28) Mod A 12/28 Doff mod to encourage participation due to resistance     Washed with Supervision + v/c and dressed with supervision + cues (tailor sitting)  12/27 Mod A   Toileting/clothing management and hygiene S Mod A 12/28 CGA/CS  12/27 Mod A   Dynamic standing balance for increased safety when completing purposeful tasks F/F+ P+ 12/28 Fair  12/27 P+   Increase standing tolerance for inc'd safety with standing purposeful tasks 4-7 min <1 min  12/28 2-3 min with ADL/mobility  12/27 <1 min   Participate in therex 1-3x/week for inc'd overall stamina/activity tolerance for purposeful tasks To be completed    12/28 ADL only  12/27 Resistive to MMT   shower stall transfer (NO DME) S Unable to assess 12/28 CGA/CS + MAX encouragement    Bed mobility with HOB flat  S  (12/28) Min A  12/28 Supervision (unable to ed on spinal precautions)  12/27 Sup<>sit S  Rolling S    [place]  12/28/2019

## 2019-12-28 NOTE — SOCIAL WORK
LOS: 1, Bundle: No, 30 day readmission: No    Patient admitted with syncope and collapse with compression fracture  SW met with patient's daughter Gabby Jane to review admission packet and consents  Consents signed, patient's daughters are her health care representatives  Dtr is agreeable to a [facility] on Monday at 10:00 AM  Team notified  Prior to hospitalization, patient lived with her son and DIL however plans to return home with her daughter who lives in a house with 2 CLINTON and no HR with first floor set-up  She will provide 24/7 supervision  Patient does not own any DME, daughter has a list of equipment she is interested in for patient including: RW, BSC, hospital bed, transport chair, alarm for hospital bed, bed pads, wipes, gloves and spray  PCP is Dr Diamond Simmons and [facility] is [facility]  SW will continue to follow at this time

## 2019-12-28 NOTE — PLAN OF CARE
Problem: OCCUPATIONAL THERAPY ADULT  Goal: Performs self-care activities at highest level of function for planned discharge setting  See evaluation for individualized goals  Description  Treatment Interventions: ADL retraining, Functional transfer training, UE strengthening/ROM, Endurance training, Cognitive reorientation, Patient/family training, Equipment evaluation/education, Neuromuscular reeducation, Compensatory technique education, Continued evaluation, Energy conservation, Activityengagement          See flowsheet documentation for full assessment, interventions and recommendations      Outcome: Progressing  Note:   Limitation: Decreased ADL status, Decreased UE ROM, Decreased UE strength, Decreased Safe judgement during ADL, Decreased cognition, Decreased endurance, Visual deficit, Decreased self-care trans, Decreased high-level ADLs, Mood limitation  Prognosis: Good, Fair  Assessment: see note     OT Discharge Recommendation: Home OT(with 24/7 supervision/assist )  OT - OK to Discharge: No

## 2019-12-28 NOTE — PHYSICAL THERAPY NOTE
Physical Therapy Evaluation    Patient's Name: Ryan Mathew    Admitting Diagnosis  Syncope [R55]    Problem List  Patient Active Problem List   Diagnosis    History of stroke    Alzheimer disease ([facility] )    Acute lacunar stroke ([facility] )    Carotid artery calcification, bilateral    Mixed hyperlipidemia    Hypertensive heart disease without heart failure    Paroxysmal atrial fibrillation (HCC)    Chronic headache    Chronic anticoagulation    Chronic UTI    Patient is Orthodox    Rectal bleed    Central retinal artery occlusion of left eye    Stenosis of right vertebral artery    Syncope and collapse    Compression fracture of T12 vertebra (HCC)    Syncope    Thoracic vertebral collapse, initial encounter ([place] [address] )    Primary insomnia       Past Medical History  Past Medical History:   Diagnosis Date    A-fib ([facility] )     Alzheimer disease ([place] [address] )     Chronic UTI     Diverticulosis     Dyslipidemia     History of stroke     Hyperlipidemia     Hypertension     UTI (urinary tract infection)     Vasovagal syncope 2/13/2018       Past Surgical History  Past Surgical History:   Procedure Laterality Date    CHOLECYSTECTOMY      IR KYPHOPLASTY/VERTEBROPLASTY  12/26/2019       Vitals:    12/27/19 1114 12/27/19 1607 12/27/19 1959 12/28/19 0728   BP: 119/67 159/70  (!) 181/75   BP Location: Right arm Right arm  Right arm   Pulse: 86 73  72   Resp:  16  20   Temp:  (!) 96 7 °F (35 9 °C)  (!) 96 8 °F (36 °C)   TempSrc: Temporal Temporal  Temporal   SpO2:  90% 97% 96%   Weight:           Standardized Assessment- Barthel 40  Home Evaluation (virtual)- dtr willing to provide pictures as needed  Family/Caregiver Training- dtr willing to participate as needed    Additional Comments-  Pt remains in room with all needs at end of session       12/28/19 5577   Note Type   Note type Eval only   Pain Assessment   Pain Assessment 0-10   Pain Score 4   Pain Type Acute pain   Pain Location Back   [facility]   [facility]; Performs ADLs on one level; Able to live on main level with bedroom/bathroom  (2 CLINTON)   Bathroom Shower/Tub Walk-in shower   Bathroom Toilet Standard   Bathroom Equipment   (no use of DME PTA)   Bathroom Accessibility Accessible   Additional Comments info gathered from dtr whom she will live with at D/C   Prior Function   Level of [place] Independent with ADLs and functional mobility   Lives With [employer] Help From Family   ADL Assistance Independent   IADLs Needs assistance   Falls in the last 6 months 1 to 4  (dtr endorses 2-3 syncopal episodes per year)   Vocational Retired   Comments info gathered from dtr whom she will live with at D/C   Restrictions/Precautions   [employer] [place] Bearing Precautions Per Order No   RLE Weight Bearing Per Order WBAT   LLE Weight Bearing Per Order WBAT   Braces or Orthoses TLSO  (pt not able to tolerate)   Other Precautions Contact/isolation; Chair Alarm;Cognitive; Bed Alarm; Impulsive;WBS;Pain; Fall Risk;Seizure;Spinal precautions; Visual impairment   General   Family/Caregiver Present Yes  (DTR)   Cognition   Overall Cognitive Status Impaired   Arousal/Participation Alert   Attention Difficulty attending to directions   Memory Decreased short term memory;Decreased recall of recent events;Decreased recall of precautions   Following Commands Follows one step commands with increased time or repetition   Strength RLE   R Hip Flexion 3+/5   R Knee Flexion 3+/5   R Knee Extension 4-/5   R Ankle Plantar Flexion 3/5   R Ankle Dorsiflexion 3+/5   Strength LLE   L Hip Flexion 3+/5   L Knee Flexion 3+/5   L Knee Extension 4-/5   L Ankle Dorsiflexion 3+/5   L Ankle Plantar Flexion 3/5   Coordination   Movements are Fluid and Coordinated 0   Coordination and Movement Description guarded movements 2* to pain   Sensation WFL   Light Touch   RLE Light Touch Grossly intact   LLE Light Touch Grossly intact   Bed Mobility   Supine to Sit 4 Minimal assistance   Additional items Assist x 1; Increased time required;HOB elevated; Bedrails; Impulsive   Sit to Supine 4  Minimal assistance   Additional items Assist x 1; Increased time required; Bedrails;HOB elevated; Impulsive;Verbal cues   Transfers   Sit to Stand 4  Minimal assistance   Additional items Assist x 1; Armrests; Increased time required;HOB elevated; Impulsive;Verbal cues   Stand to Sit 4  Minimal assistance   Additional items Assist x 1;HOB elevated;Armrests; Increased time required; Impulsive;Verbal cues   Additional Comments all transfers done with RW   Ambulation/Elevation   Gait pattern Narrow LESLY; Foward flexed; Excessively slow;Decreased foot clearance; Antalgic   Gait Assistance 4  Minimal assist   Additional items Assist x 1;Verbal cues; Tactile cues   Assistive Device Rolling walker   Distance 30ft   Stair Management Assistance Not tested   Balance   Static Sitting Fair   Dynamic Sitting Fair -   Static Standing Fair -   Dynamic Standing Poor +   Ambulatory Poor +   Endurance Deficit   Endurance Deficit Yes   Endurance Deficit Description LBP   Activity Tolerance   Activity Tolerance Patient limited by pain   Nurse Made Aware RN cleared pt for evaluation    Assessment   Prognosis Good   Problem List Decreased strength;Decreased endurance; Impaired balance;Decreased mobility; Decreased cognition; Impaired judgement;Decreased safety awareness; Impaired vision;Orthopedic restrictions;Pain   Assessment Cheryl Lopez is a 80 y o  female admitted to [facility] on 12/27/2019 for Thoracic vertebral collapse, initial encounter ([facility] )  Pt  has a past medical history of A-fib ([facility] ), Alzheimer disease ([facility] ), Chronic UTI, Diverticulosis, Dyslipidemia, History of stroke, Hyperlipidemia, Hypertension, UTI (urinary tract infection), and Vasovagal syncope (2/13/2018)  PT was consulted and pt was seen on 12/28/2019 for mobility assessment and d/c planning   Pt presents supine in bed with eyes closed, easily aroused to verbal stimuli, dtr at bedside  Pt is reporting increased LBP at rest made worse with movement  She is confused and slightly agitated but overall cooperative  Pt lives with son and dtr in law PTA but will d/c to dtrs home  Dtrs home setup includes a home with first floor setup and 2 CL[place]ON without rail  Prior to admission pt required independent for transfers, elevations and ambulation using a no assistive device and was largely ind with ADLs and received assist from family for IADLs  Pt is currently functioning at a minimum assistance x1 level for bed mobility, minimum assistance x1 level for transfers, minimum assistance x1 level for ambulation with Rolling Walker  Pt demonstrated impulsiveness and decreased safety awareness during all mobility requiring VC to maintain precautions, for hand placement and to reduce impulsiveness  She demonstrates improper use of RW leaving it behind when approaching bed, pt will likely perform better with no AD when able 2* to decreased cognition  Pt will benefit from continued skilled IP PT to address the above mentioned impairments  in order to maximize recovery and increase functional independence when completing mobility and ADLs  Currently PT recommendations for DME will be determined based on progress and needs at conclusion of rehab stay  At this time PT recommendations for d/c are likely home with family support and home therapies, this will be pending progress in therapy  Barriers to Discharge Inaccessible home environment   Goals   Patient Goals to reduce LBP   STG Expiration Date 01/11/20   Short Term Goal #1 see grid   PT Treatment Day 0   Plan   Treatment/Interventions Functional transfer training;Elevations;LE strengthening/ROM; Therapeutic exercise; Endurance training;Cognitive reorientation;Patient/family training;Equipment eval/education; Bed mobility;Gait training;Spoke to nursing   PT Frequency   (5-7x/wk)   Recommendation   Recommendation   (likely home with family support pending progress in rehab)   Equipment Recommended   (TBD)   Barthel Index   Feeding 5   Bathing 0   Grooming Score 0   Dressing Score 5   Bladder Score 5   Bowels Score 10   Toilet Use Score 5   Transfers (Bed/Chair) Score 10   Mobility (Level Surface) Score 0   Stairs Score 0   Barthel Index Score 40       Goals STG achieved within 2 weeks Performance at Initial Evaluation (12/28/19) Current Performance (last date completed)     Bed mobility skills including rolling and repositioning to prevent skin breakdown and decrease caregiver burden  Mod I     Min A     12/28/19     Supine to sit transitions to increase ease of transfer, allow pt to get out of bed, and decrease caregiver burden       Mod I     Min A     12/28/19     Sit to supine transitions to increase ease of transfer, allow pt to get back into bed, and decrease caregiver burden       Mod I     Min A     12/28/19     Functional transfers to facilitate safe return to previous living environment         Supervision     Min A w/ RW     12/28/19     Ambulation with least restrictive AD; including at least 2 turns for safe household distance ambulation       Supervision     Min A w/ RW 30ft     12/28/19     Ascend/descend 3 steps, using appropriate AD and method, no handrails, for safe access to previous living environment       Supervision     Not attempted 2* to increased LBP       Not attempted 2* to increased LBP     Eladia Bui, PT

## 2019-12-28 NOTE — PLAN OF CARE
Problem: PHYSICAL THERAPY ADULT  Goal: Performs mobility at highest level of function for planned discharge setting  See evaluation for individualized goals  Description  Treatment/Interventions: Functional transfer training, Elevations, LE strengthening/ROM, Therapeutic exercise, Endurance training, Cognitive reorientation, Patient/family training, Equipment eval/education, Bed mobility, Gait training, Spoke to nursing  Equipment Recommended: (TBD)       See flowsheet documentation for full assessment, interventions and recommendations  Note:   Prognosis: Good  Problem List: Decreased strength, Decreased endurance, Impaired balance, Decreased mobility, Decreased cognition, Impaired judgement, Decreased safety awareness, Impaired vision, Orthopedic restrictions, Pain  Assessment: Alli Goldsmith is a 80 y o  female admitted to [facility] on 12/27/2019 for Thoracic vertebral collapse, initial encounter ([facility] )  Pt  has a past medical history of [facility] ([facility] ), Alzheimer disease ([facility] ), Chronic UTI, Diverticulosis, Dyslipidemia, History of stroke, Hyperlipidemia, Hypertension, UTI (urinary tract infection), and Vasovagal syncope (2/13/2018)  PT was consulted and pt was seen on 12/28/2019 for mobility assessment and d/c planning  Pt presents supine in bed with eyes closed, easily aroused to verbal stimuli, dtr at bedside  Pt is reporting increased LBP at rest made worse with movement  She is confused and slightly agitated but overall cooperative  Pt lives with son and dtr in law PTA but will d/c to dtrs home  Dtrs home setup includes a home with first floor setup and 2 [place] without rail  Prior to admission pt required independent for transfers, elevations and ambulation using a no assistive device and was largely ind with ADLs and received assist from family for IADLs   Pt is currently functioning at a minimum assistance x1 level for bed mobility, minimum assistance x1 level for transfers, minimum assistance x1 level for ambulation with Rolling Walker  Pt demonstrated impulsiveness and decreased safety awareness during all mobility requiring VC to maintain precautions, for hand placement and to reduce impulsiveness  She demonstrates improper use of RW leaving it behind when approaching bed, pt will likely perform better with no AD when able 2* to decreased cognition  Pt will benefit from continued skilled IP PT to address the above mentioned impairments  in order to maximize recovery and increase functional independence when completing mobility and ADLs  Currently PT recommendations for DME will be determined based on progress and needs at conclusion of rehab stay  At this time PT recommendations for d/c are likely home with family support and home therapies, this will be pending progress in therapy  Barriers to Discharge: Inaccessible home environment     Recommendation: (likely home with family support pending progress in rehab)          See flowsheet documentation for full assessment

## 2019-12-28 NOTE — PROGRESS NOTES
Medication Regimen Review (MRR)    To promote positive health outcomes and reduce adverse consequences the patient's medication therapy has been reviewed by a pharmacist for the following potential problems:   1   documented indication and therapeutic benefits  2   appropriate dose, frequency, route, and duration of therapy  3   medication interactions, side effects, and allergies  4   medication or transcription errors  Medications are also reviewed for appropriate monitoring, duplicate therapy, and dose reduction  Based on the review please see the following recommendations  Patient information:    The patient is 80 y o  admitted for s/p vertebroplasty      Wt Readings from Last 1 Encounters:   12/27/19 65 4 kg (144 lb 2 9 oz)       Lab Results   Component Value Date    GLUCOSE 131 12/14/2019    CALCIUM 9 0 12/23/2019     10/23/2015    K 3 6 12/23/2019    CO2 26 12/23/2019     12/23/2019    BUN 15 12/23/2019    CREATININE 0 68 12/23/2019         Recommendations: There are no recommendations from the pharmacy department at this time      Greer Yo, Pharmacist  [phone]

## 2019-12-29 RX ADMIN — Medication 250 MG: at 09:33

## 2019-12-29 RX ADMIN — LIDOCAINE 1 PATCH: 50 PATCH CUTANEOUS at 09:33

## 2019-12-29 RX ADMIN — DIVALPROEX SODIUM 125 MG: 125 CAPSULE, COATED PELLETS ORAL at 20:02

## 2019-12-29 RX ADMIN — MEMANTINE 10 MG: 10 TABLET ORAL at 20:01

## 2019-12-29 RX ADMIN — DOCUSATE SODIUM 100 MG: 100 CAPSULE, LIQUID FILLED ORAL at 09:33

## 2019-12-29 RX ADMIN — PANTOPRAZOLE SODIUM 40 MG: 40 TABLET, DELAYED RELEASE ORAL at 06:04

## 2019-12-29 RX ADMIN — CEPHALEXIN 250 MG: 250 CAPSULE ORAL at 09:33

## 2019-12-29 RX ADMIN — DONEPEZIL HYDROCHLORIDE 10 MG: 5 TABLET, FILM COATED ORAL at 17:06

## 2019-12-29 RX ADMIN — MELATONIN TAB 3 MG 6 MG: 3 TAB at 20:02

## 2019-12-29 RX ADMIN — DOCUSATE SODIUM 100 MG: 100 CAPSULE, LIQUID FILLED ORAL at 17:06

## 2019-12-29 RX ADMIN — ACETAMINOPHEN 975 MG: 325 TABLET ORAL at 20:02

## 2019-12-29 RX ADMIN — ACETAMINOPHEN 975 MG: 325 TABLET ORAL at 06:01

## 2019-12-29 RX ADMIN — DIVALPROEX SODIUM 125 MG: 125 CAPSULE, COATED PELLETS ORAL at 09:33

## 2019-12-29 RX ADMIN — GABAPENTIN 100 MG: 100 CAPSULE ORAL at 20:01

## 2019-12-29 RX ADMIN — ACETAMINOPHEN 975 MG: 325 TABLET ORAL at 14:10

## 2019-12-29 RX ADMIN — DIVALPROEX SODIUM 125 MG: 125 CAPSULE, COATED PELLETS ORAL at 17:06

## 2019-12-29 RX ADMIN — Medication 250 MG: at 17:06

## 2019-12-29 RX ADMIN — DABIGATRAN ETEXILATE MESYLATE 150 MG: 75 CAPSULE ORAL at 20:01

## 2019-12-29 RX ADMIN — MEMANTINE 10 MG: 10 TABLET ORAL at 09:33

## 2019-12-29 RX ADMIN — DABIGATRAN ETEXILATE MESYLATE 150 MG: 75 CAPSULE ORAL at 09:33

## 2019-12-29 RX ADMIN — QUETIAPINE FUMARATE 25 MG: 25 TABLET ORAL at 20:01

## 2019-12-29 RX ADMIN — ATORVASTATIN CALCIUM 20 MG: 20 TABLET, FILM COATED ORAL at 17:06

## 2019-12-30 LAB
ANION GAP SERPL CALCULATED.3IONS-SCNC: 9 MMOL/L (ref 5–14)
ATRIAL RATE: 78 BPM
BUN SERPL-MCNC: 12 MG/DL (ref 5–25)
CALCIUM SERPL-MCNC: 8.9 MG/DL (ref 8.4–10.2)
CHLORIDE SERPL-SCNC: 105 MMOL/L (ref 97–108)
CO2 SERPL-SCNC: 23 MMOL/L (ref 22–30)
CREAT SERPL-MCNC: 0.58 MG/DL (ref 0.6–1.2)
ERYTHROCYTE [DISTWIDTH] IN BLOOD BY AUTOMATED COUNT: 15.3 %
GFR SERPL CREATININE-BSD FRML MDRD: 84 ML/MIN/1.73SQ M
GLUCOSE P FAST SERPL-MCNC: 95 MG/DL (ref 70–99)
GLUCOSE SERPL-MCNC: 95 MG/DL (ref 70–99)
HCT VFR BLD AUTO: 32.2 % (ref 36–46)
HGB BLD-MCNC: 10.6 G/DL (ref 12–16)
MCH RBC QN AUTO: 28.6 PG (ref 26.8–34.3)
MCHC RBC AUTO-ENTMCNC: 32.9 G/DL (ref 31.4–37.4)
MCV RBC AUTO: 87 FL (ref 80–100)
P AXIS: 62 DEGREES
PLATELET # BLD AUTO: 412 THOUSANDS/UL (ref 150–450)
PMV BLD AUTO: 9.3 FL (ref 8.9–12.7)
POTASSIUM SERPL-SCNC: 3.8 MMOL/L (ref 3.6–5)
PR INTERVAL: 156 MS
QRS AXIS: 48 DEGREES
QRSD INTERVAL: 84 MS
QT INTERVAL: 376 MS
QTC INTERVAL: 428 MS
RBC # BLD AUTO: 3.71 MILLION/UL (ref 4–5.2)
SODIUM SERPL-SCNC: 137 MMOL/L (ref 137–147)
T WAVE AXIS: 89 DEGREES
VENTRICULAR RATE: 78 BPM
WBC # BLD AUTO: 9.2 THOUSAND/UL (ref 4.31–10.16)

## 2019-12-30 PROCEDURE — 97530 THERAPEUTIC ACTIVITIES: CPT

## 2019-12-30 PROCEDURE — 97116 GAIT TRAINING THERAPY: CPT

## 2019-12-30 PROCEDURE — 97535 SELF CARE MNGMENT TRAINING: CPT

## 2019-12-30 PROCEDURE — 80048 BASIC METABOLIC PNL TOTAL CA: CPT | Performed by: FAMILY MEDICINE

## 2019-12-30 PROCEDURE — 93010 ELECTROCARDIOGRAM REPORT: CPT | Performed by: INTERNAL MEDICINE

## 2019-12-30 PROCEDURE — 85027 COMPLETE CBC AUTOMATED: CPT | Performed by: FAMILY MEDICINE

## 2019-12-30 PROCEDURE — 93005 ELECTROCARDIOGRAM TRACING: CPT

## 2019-12-30 RX ORDER — DOCUSATE SODIUM 100 MG/1
100 CAPSULE, LIQUID FILLED ORAL 2 TIMES DAILY PRN
Status: DISCONTINUED | OUTPATIENT
Start: 2019-12-30 | End: 2020-01-03 | Stop reason: HOSPADM

## 2019-12-30 RX ORDER — HYDRALAZINE HYDROCHLORIDE 10 MG/1
10 TABLET, FILM COATED ORAL ONCE
Status: COMPLETED | OUTPATIENT
Start: 2019-12-30 | End: 2019-12-30

## 2019-12-30 RX ADMIN — DOCUSATE SODIUM 100 MG: 100 CAPSULE, LIQUID FILLED ORAL at 08:44

## 2019-12-30 RX ADMIN — ACETAMINOPHEN 975 MG: 325 TABLET ORAL at 05:54

## 2019-12-30 RX ADMIN — Medication 250 MG: at 08:45

## 2019-12-30 RX ADMIN — ATORVASTATIN CALCIUM 20 MG: 20 TABLET, FILM COATED ORAL at 17:20

## 2019-12-30 RX ADMIN — HYDRALAZINE HYDROCHLORIDE 10 MG: 10 TABLET, FILM COATED ORAL at 21:01

## 2019-12-30 RX ADMIN — MEMANTINE 10 MG: 10 TABLET ORAL at 08:45

## 2019-12-30 RX ADMIN — DABIGATRAN ETEXILATE MESYLATE 150 MG: 75 CAPSULE ORAL at 20:11

## 2019-12-30 RX ADMIN — QUETIAPINE FUMARATE 25 MG: 25 TABLET ORAL at 20:12

## 2019-12-30 RX ADMIN — DONEPEZIL HYDROCHLORIDE 10 MG: 5 TABLET, FILM COATED ORAL at 17:20

## 2019-12-30 RX ADMIN — DABIGATRAN ETEXILATE MESYLATE 150 MG: 75 CAPSULE ORAL at 08:44

## 2019-12-30 RX ADMIN — DIVALPROEX SODIUM 125 MG: 125 CAPSULE, COATED PELLETS ORAL at 20:12

## 2019-12-30 RX ADMIN — ACETAMINOPHEN 975 MG: 325 TABLET ORAL at 21:02

## 2019-12-30 RX ADMIN — MEMANTINE 10 MG: 10 TABLET ORAL at 20:13

## 2019-12-30 RX ADMIN — PANTOPRAZOLE SODIUM 40 MG: 40 TABLET, DELAYED RELEASE ORAL at 06:02

## 2019-12-30 RX ADMIN — DIVALPROEX SODIUM 125 MG: 125 CAPSULE, COATED PELLETS ORAL at 17:20

## 2019-12-30 RX ADMIN — GABAPENTIN 100 MG: 100 CAPSULE ORAL at 20:13

## 2019-12-30 RX ADMIN — Medication 250 MG: at 17:20

## 2019-12-30 RX ADMIN — MELATONIN TAB 3 MG 6 MG: 3 TAB at 20:12

## 2019-12-30 RX ADMIN — CEPHALEXIN 250 MG: 250 CAPSULE ORAL at 08:45

## 2019-12-30 RX ADMIN — DIVALPROEX SODIUM 125 MG: 125 CAPSULE, COATED PELLETS ORAL at 08:44

## 2019-12-30 RX ADMIN — LIDOCAINE 1 PATCH: 50 PATCH CUTANEOUS at 08:45

## 2019-12-30 NOTE — OCCUPATIONAL THERAPY NOTE
Occupational Therapy Treatment Note     Name:  Samella Boast   MRN:   [id number]  Age:     80 y o  Patient Active Problem List   Diagnosis    History of stroke    [facility] disease ([facility] )    Acute lacunar stroke ([facility] )    Carotid artery calcification, bilateral    Mixed hyperlipidemia    Hypertensive heart disease without heart failure    Paroxysmal atrial fibrillation (HCC)    Chronic headache    Chronic anticoagulation    Chronic UTI    Patient is Mormonism    Rectal bleed    Central retinal artery occlusion of left eye    Stenosis of right vertebral artery    Syncope and collapse    [facility]12 vertebra ([facility])    Syncope    Thoracic vertebral collapse, initial encounter ([facility])    Primary insomnia      Syncope [R55]        Subjective/Goals: "bathroom"     Vitals: stable throughout session however patient reports that she is dizzy-- daughter states this to be patient's norm     Pain: patient indicates being in pain (back) but unable to quantify or rate  Decreased recall of fall that lead to compression fracture     Treatment Time:   · (121-310) 5 minutes:    · (0572-7000) 32 minutes  · Total time this date over 2 session:  37 minutes    Cognition: impaired     Precautions: TLSO brace, WBAT, Contact/isolation, impulsive, Cognitive, Chair/bed alarms, WBS, Seizure, Pain, Spinal precautions, visual impairment     EVALUATION information:  · OT Goal expiration date: 1/10/20  · Treatment day: 3  · Discharge recommendation: Home OT with 24* supervision   · HOME SET UP:  ? House- previously living in in law suite- plans to live in home with family upon d/c   ? One level home with 2 CLINTON and no HR's  ? Walk in shower (3-4" threshold)  ? Standard bathroom  ? Assist from family  ? As per dtr report, pta pt ind with ADLs and ambulation, family provides close to 24/7 supervision, cameras for safety  Family completes all IADLs  (-) drives   Plans to d/c to dtr Keri's home where 24/7 will be provided  Pt unaware, will further discuss when appropriate, dtr reports d/c information will cause confusion  No prior DME/AE  ? Per daughter vision loss in left eye-- patient unaware and compensates  ? Compression fx T12 post syncope episode     Patient education: SPINAL PRECAUTIONS, SLOWING OF PACE, ENERGY CONSERVATION TECHNIQUES FOR CARRY OVER UPON D/C, INCREASED FAMILY SUPPORT, CONTINUE PARTICIPATION IN SELF-CARE/MOBILITY WITH STAFF WHILE IN THE HOSPITAL , OVERALL SAFETY AWARENESS, BACK PRECAUTIONS, FALL PREVENTION, ENERGY CONSERVATION  and PAIN MANAGEMENT WITH FUNCTIONAL ACTIVITIES     Additional comments:  · AM 5 minutes:  MCDONALD answered bed chair alarm and found patient in room walking without AD, agitated  Patient was aware she was in hospital but decreased recall into reasoning (fall, back pain)  Patient encouraged and redirected to walk back to bedside-- CS where she crawled back into bed with supervision  · Later AM session:  Patient's daughter present  Daughter reports that Friday D/C is the goal for home if medically cleared and items can be obtained that she feels she will need to care for her mother  Daughter reports that she feels confident in ability to provide the needed supervision/assist at home but is requesting bed/chair alarms as patient is impulsive  · Daughter also reorts that the brace which was provided is Only going to be a fight with patient to wear (which she does refuse on unit) and therefore requesting if a abd binder can be used/trailed instead  · Patient refuses ther-ex not understanding reasoning or benefit however daughter reports that prior to progression of dementia/[employer] patient would walk/run 10 miles at least a day was exercise was very important  Daughter indicated that she feels that the most exercise she may get from patient upon d/c will be walking to and from bathroom several times a day      · DME requested which SW is working on includes:  · Walker  · Shower chair  · Hospital bed with protective pad, wipes and gloves due to incontinence issues PRN  · Transport chair as patient is unable to walk and manage distance safety for appointments  · Bed/chair alarms    Assessment:  Patient seen this date for OT with focus on goals as set by OTR  Patient agreeable to skilled OT session with focus on ADL's (bathing, dressing, toileting), Transfers (STS, SPT), Standing tolerance/balance, Strength/ROM/HEP, Tub/shower transfers, Cognition, Activity tolerance and Bathroom/kitchen/home mobility  Barriers to treatment include fatigue, pain, volition, cognition, safety, Dizziness, decreased strength/coordination, activity tolerance and standing tolerance  Patient educated on safe functional transfers techniques, the appropriate use of AE/DME to improve functional performance, and activity modification techniques for energy conservation  Plans for d/c are home with 24* supervision  Patient is making gains toward OT goals with continued OT recommended at this time to max tolerance, safety and function for appropriate d/c planning  At end of session patient remains in bed with all needs within reach  Goals STG achieved within 2 weeks Performance at Initial Evaluation 12/27/2019  Current Performance (last date completed)   Grooming while standing at sink S  (12/30) Min A 12/30 Supervision + cues for handwashing (cues to remember to use soap)  12/28 REFUSED sinkside getting very aggitiated therefore set up seated for oral care completing with MI upon set up  12/27 Min A    ADL transfers  S  (12/30) Min A 12/30 STS MI, Supervision SPT (VERY IMPULSIVE- DECREASED SAFETY AWARENESS)  12/28-- VERY impulsive  STS MI (poor safety awareness);  CGA/CS SPT and mobility with RW (patient appeared to transfer best in bathroom without AD)  12/27 Min A impulsive    Bathroom mobility with appropriate AD S  (12/30) Unable to assess 12/30 NEEDS CUES TO STAY WITH WALKER (WILL PUSH AWAY WHEN APPROACHING SURFACES)-- overall supervision needed  Was walking in room without AD earlier in AM when MCDONALD answered chair alarm (decreased insight)   12/28 CS/CGA + RW and short distance without as she pushed walker away quickly in frustration    Don/Doff TLSO  Min A   12/30 refuses to wear-- daughter requesting abd binder for support as TLSO brace will "just be a fight for her"  12/28 REFUSED-- daughter states brace not effective  Requesting different brace stating present brace only further adds to frustration     12/27 Pt declined brace-- impulsive into txfr without TLSO    UB ADL  S  (12/28) Min A 12/28 MI upon set up, cues and MAX encouragement   12/27 Min A    LB ADL, AE PRN S  (12/28) Mod A 12/28 Doff mod to encourage participation due to resistance  Washed with Supervision + v/c and dressed with supervision + cues (tailor sitting)  12/27 Mod A   Toileting/clothing management and hygiene S  (12/30) Mod A 12/30 CM- S/MI, Hygiene S/MI (Supervision for safety and sequencing)    Patient reports beng aware of loose stool and incontinence    12/28 CGA/CS  12/27 Mod A   Dynamic standing balance for increased safety when completing purposeful tasks F/F+  (12/30) P+ 12/30 Fair/Fair+ for toileting/transfers   12/28 Fair  12/27 P+   Increase standing tolerance for inc'd safety with standing purposeful tasks 4-7 min <1 min  12/30 3 minutes (self limits with confustion and difficulty getting patient to attend to functional tasks)  12/28 2-3 min with ADL/mobility  12/27 <1 min   Participate in therex 1-3x/week for inc'd overall stamina/activity tolerance for purposeful tasks To be completed    12/30 unable to get patient to cooperate early AM prior to daughter arrival   Later PM daughter feels only exercise patient may get upon d/c will be to/from bathroom frequently throughout day  12/28 ADL only  12/27 Resistive to MMT   shower stall transfer (NO DME) S Unable to assess 12/30 unable to assess (left bathroom post toileting and headed to bed)  12/28 CGA/CS + MAX encouragement    Bed mobility with HOB flat  S  (12/28) (12/30) Min A  12/30 supervision (poor tech for back protection, unable to get patient to correct  Patient almost climbs into bed and can fix self upon supine    Daughter reports her method to be the same she would always complete PTA)  12/28 Supervision (unable to ed on spinal precautions)  12/27 Sup<>sit S  Rolling S       Shen Bales  12/30/2019

## 2019-12-30 NOTE — PLAN OF CARE
Problem: PHYSICAL THERAPY ADULT  Goal: Performs mobility at highest level of function for planned discharge setting  See evaluation for individualized goals  Description  Treatment/Interventions: Functional transfer training, Elevations, LE strengthening/ROM, Therapeutic exercise, Endurance training, Cognitive reorientation, Patient/family training, Equipment eval/education, Bed mobility, Gait training, Spoke to nursing  Equipment Recommended: (TBD)       See flowsheet documentation for full assessment, interventions and recommendations  Outcome: Progressing  Note:   Prognosis: Fair  Problem List: Decreased strength, Decreased range of motion, Decreased endurance, Impaired balance, Decreased mobility, Decreased coordination, Decreased cognition, Impaired judgement, Decreased safety awareness, Impaired vision, Decreased skin integrity, Orthopedic restrictions, Pain  Assessment: Pt was seen today for a PT treatment session and care conference with focus on gait training, transfers, and D/C planning with family  She continues to be confused and agitated at times but again overall cooperative with redirection and repeated instructions  She continues to report LBP at rest increased with movement  She continues to require VC to maintain precautions and is very impulsive  During gait training today pt with multiple LOB to both L and R requiring min A to recover  She ambulates slowly with short stride and decreased foot clearance and she is pain limited to short household distances  She is able to transfer into bed with supervision but only if [facility] is raised, requires assistance with the bed flat  In care conference D/C planning was discussed with pt and her dtr whom she will be living with at D/C  Her dtr reports that she is able to provide 24/7 supervision to pt and assist as needed  Pt will stay with dtr until it is appropriate to resume prior living arrangements    Will f/u with pt and dtr in additional care conference later in week closer to d/c to confirm that pt and dtr have no concerns regarding D/C plan  At end of treatment pt left supine in bed with HOB elevated, all needs in reach all questions answered and dtr at bedside  Barriers to Discharge: Inaccessible home environment     Recommendation: Home with family support, Home PT          See flowsheet documentation for full assessment

## 2019-12-30 NOTE — PHYSICAL THERAPY NOTE
Physical Therapy Daily Treatment Note    Patient's Name: Vickie Contreras    Admitting Diagnosis  Syncope [R55]    Problem List  Patient Active Problem List   Diagnosis    History of stroke    [facility] disease ([facility] )    Acute lacunar stroke ([place] [address] )    Carotid artery calcification, bilateral    Mixed hyperlipidemia    Hypertensive heart disease without heart failure    Paroxysmal atrial fibrillation (HCC)    Chronic headache    Chronic anticoagulation    Chronic UTI    Patient is Alevism    Rectal bleed    Central retinal artery occlusion of left eye    Stenosis of right vertebral artery    Syncope and collapse    Compression fracture of T12 vertebra (HCC)    Syncope    Thoracic vertebral collapse, initial encounter ([place] [address] )    Primary insomnia       Past Medical History  Past Medical History:   Diagnosis Date    A-fib ([place] [address] )     Alzheimer disease ([place] [address] )     Chronic UTI     Diverticulosis     Dyslipidemia     History of stroke     Hyperlipidemia     Hypertension     UTI (urinary tract infection)     Vasovagal syncope 2/13/2018       Past Surgical History  Past Surgical History:   Procedure Laterality Date    CHOLECYSTECTOMY      IR KYPHOPLASTY/VERTEBROPLASTY  12/26/2019       Vitals:    12/29/19 1544 12/29/19 2005 12/30/19 0724 12/30/19 1636   BP: 167/76  (!) 198/87 (!) 177/74   BP Location: Right arm  Right arm Right arm   Pulse: 85  88 79   Resp: 18  20 18   Temp: 98 2 °F (36 8 °C)  97 9 °F (36 6 °C) 97 5 °F (36 4 °C)   TempSrc: Temporal  Temporal Temporal   SpO2: 93% 96% 92% 96%   Weight:            12/30/19 0930   Pain Assessment   Pain Assessment 0-10   Pain Score 5   Pain Type Acute pain   Pain Location Back   Pain Orientation Mid   Restrictions/Precautions   Braces or Orthoses TLSO  (for comfort; pt does not tolerate)   Other Precautions Contact/isolation; Chair Alarm; Bed Alarm;Cognitive; Fall Risk;Pain; Impulsive;Spinal precautions; Visual impairment; Seizure   General   Chart Reviewed Yes   Family/Caregiver Present Yes   Cognition   Overall Cognitive Status Impaired   Arousal/Participation Alert   Attention Difficulty attending to directions   Memory Decreased short term memory;Decreased recall of recent events;Decreased recall of precautions   Following Commands Follows one step commands with increased time or repetition   Subjective   Subjective "My back hurts!"   Bed Mobility   Supine to Sit Unable to assess   Additional items   (pt found seated in bedside recliner)   Sit to Supine 5  Supervision   Additional items Assist x 1;Bedrails; Increased time required;HOB elevated;Verbal cues   Additional Comments Pt with increased pain during transfers   Transfers   Sit to Stand 5  Supervision   Additional items Assist x 1; Armrests; Increased time required; Impulsive;Verbal cues   Stand to Sit 5  Supervision   Additional items Armrests; Increased time required;Verbal cues; Impulsive; Bed elevated   Additional Comments all transfers with RW   Ambulation/Elevation   Gait pattern Narrow LESLY; Forward Flexion;Decreased foot clearance;Shuffling; Inconsistent jeremias; Short stride; Excessively slow; Antalgic  (very unsteady; multiple LOB)   Gait Assistance 4  Minimal assist   Additional items Assist x 1;Verbal cues; Tactile cues   Assistive Device Rolling walker   Distance 90ft with 2 turns   Stair Management Assistance Not tested   Balance   Static Sitting Fair   Dynamic Sitting Fair -   Static Standing Fair -   Dynamic Standing Poor +   Ambulatory Poor +   Endurance Deficit   Endurance Deficit Yes   Endurance Deficit Description LBP   Activity Tolerance   Activity Tolerance Patient limited by pain   Nurse Made Aware RN   Assessment   Prognosis Fair   Problem List Decreased strength;Decreased range of motion;Decreased endurance; Impaired balance;Decreased mobility; Decreased coordination;Decreased cognition; Impaired judgement;Decreased safety awareness; Impaired vision;Decreased skin integrity;Orthopedic restrictions;Pain   Assessment Pt was seen today for a PT treatment session and care conference with focus on gait training, transfers, and D/C planning with family  She continues to be confused and agitated at times but again overall cooperative with redirection and repeated instructions  She continues to report LBP at rest increased with movement  She continues to require VC to maintain precautions and is very impulsive  During gait training today pt with multiple LOB to both L and R requiring min A to recover  She ambulates slowly with short stride and decreased foot clearance and she is pain limited to short household distances  She is able to transfer into bed with supervision but only if [facility] is raised, requires assistance with the bed flat  In care conference D/C planning was discussed with pt and her dtr whom she will be living with at D/C  Her dtr reports that she is able to provide 24/7 supervision to pt and assist as needed  Pt will stay with dtr until it is appropriate to resume prior living arrangements  Will f/u with pt and dtr in additional care conference later in week closer to d/c to confirm that pt and dtr have no concerns regarding D/C plan  At end of treatment pt left supine in bed with HOB elevated, all needs in reach all questions answered and dtr at bedside  Goals   Patient Goals reduce LBP   STG Expiration Date 01/11/19   Short Term Goal #1 grid   PT Treatment Day 1   Plan   Treatment/Interventions Functional transfer training;LE strengthening/ROM; Therapeutic exercise; Endurance training;Cognitive reorientation;Patient/family training;Equipment eval/education; Bed mobility;Gait training; Compensatory technique education;Spoke to nursing;Spoke to case management;OT   Progress Slow progress, decreased activity tolerance  (pain)   PT Frequency   (3-5x/wk)   Recommendation   Recommendation Home with family support;Home PT   Equipment Recommended   (TBD)     Goals STG achieved within 2 weeks Performance at Initial Evaluation (12/28/19) Current Performance (last date completed)      Bed mobility skills including rolling and repositioning to prevent skin breakdown and decrease caregiver burden            Mod I       Min A       12/28/19      Supine to sit transitions to increase ease of transfer, allow pt to get out of bed, and decrease caregiver burden          Mod I       Min A       12/28/19      Sit to supine transitions to increase ease of transfer, allow pt to get back into bed, and decrease caregiver burden          Mod I       Min A       12/30/19      Functional transfers to facilitate safe return to previous living environment           Supervision       Min A w/ RW       12/30/19      Ambulation with least restrictive AD; including at least 2 turns for safe household distance ambulation          Supervision       Min A w/ RW 30ft       12/30/19      Ascend/descend 3 steps, using appropriate AD and method, no handrails, for safe access to previous living environment          Supervision       Not attempted 2* to increased LBP          Not attempted 2* to increased LBP        Isa Monreal, PT

## 2019-12-30 NOTE — SOCIAL WORK
[address] Tran held with patient and patient's daughter  PT, DON and SW present  Patient continues to make progress in therapy, plan is to discharge home on Friday with daughter's support 24/7  SW confirmed daughter's address which is [address] [place]  Team reviewed all needed medical equipment  SW to follow-up to order  Plan is for patient to discharge home on Friday  Daughter to transport home  Daughter will provide 24/7 supervision   DON reviewed medications and questions  No further questions/concerns at this time

## 2019-12-30 NOTE — NURSING NOTE
Neurology consult dated 12/15 suggests an increase with depakote at this time   Will write in call book for physician  Consult Neuro?

## 2019-12-31 PROCEDURE — 99309 SBSQ NF CARE MODERATE MDM 30: CPT | Performed by: FAMILY MEDICINE

## 2019-12-31 PROCEDURE — 97530 THERAPEUTIC ACTIVITIES: CPT

## 2019-12-31 PROCEDURE — 97535 SELF CARE MNGMENT TRAINING: CPT

## 2019-12-31 PROCEDURE — 97116 GAIT TRAINING THERAPY: CPT

## 2019-12-31 RX ORDER — AMLODIPINE BESYLATE 5 MG/1
5 TABLET ORAL 2 TIMES DAILY
Status: DISCONTINUED | OUTPATIENT
Start: 2019-12-31 | End: 2019-12-31

## 2019-12-31 RX ORDER — OXYCODONE HYDROCHLORIDE 5 MG/1
2.5 TABLET ORAL EVERY 6 HOURS PRN
Status: DISCONTINUED | OUTPATIENT
Start: 2019-12-31 | End: 2020-01-03 | Stop reason: HOSPADM

## 2019-12-31 RX ORDER — AMLODIPINE BESYLATE 5 MG/1
5 TABLET ORAL 2 TIMES DAILY
Status: DISCONTINUED | OUTPATIENT
Start: 2019-12-31 | End: 2020-01-03 | Stop reason: HOSPADM

## 2019-12-31 RX ORDER — B-COMPLEX WITH VITAMIN C
1 TABLET ORAL 2 TIMES DAILY WITH MEALS
Status: DISCONTINUED | OUTPATIENT
Start: 2019-12-31 | End: 2020-01-03 | Stop reason: HOSPADM

## 2019-12-31 RX ORDER — LISINOPRIL 10 MG/1
10 TABLET ORAL DAILY
Status: DISCONTINUED | OUTPATIENT
Start: 2019-12-31 | End: 2020-01-03 | Stop reason: HOSPADM

## 2019-12-31 RX ADMIN — QUETIAPINE FUMARATE 25 MG: 25 TABLET ORAL at 21:09

## 2019-12-31 RX ADMIN — DABIGATRAN ETEXILATE MESYLATE 150 MG: 75 CAPSULE ORAL at 08:48

## 2019-12-31 RX ADMIN — MEMANTINE 10 MG: 10 TABLET ORAL at 21:10

## 2019-12-31 RX ADMIN — OXYCODONE HYDROCHLORIDE 2.5 MG: 5 TABLET ORAL at 13:42

## 2019-12-31 RX ADMIN — ACETAMINOPHEN 975 MG: 325 TABLET ORAL at 13:42

## 2019-12-31 RX ADMIN — ACETAMINOPHEN 975 MG: 325 TABLET ORAL at 05:30

## 2019-12-31 RX ADMIN — DIVALPROEX SODIUM 125 MG: 125 CAPSULE, COATED PELLETS ORAL at 21:09

## 2019-12-31 RX ADMIN — AMLODIPINE BESYLATE 5 MG: 5 TABLET ORAL at 12:15

## 2019-12-31 RX ADMIN — Medication 250 MG: at 17:26

## 2019-12-31 RX ADMIN — OYSTER SHELL CALCIUM WITH VITAMIN D 1 TABLET: 500; 200 TABLET, FILM COATED ORAL at 17:26

## 2019-12-31 RX ADMIN — GABAPENTIN 100 MG: 100 CAPSULE ORAL at 21:08

## 2019-12-31 RX ADMIN — CEPHALEXIN 250 MG: 250 CAPSULE ORAL at 08:48

## 2019-12-31 RX ADMIN — DIVALPROEX SODIUM 125 MG: 125 CAPSULE, COATED PELLETS ORAL at 17:26

## 2019-12-31 RX ADMIN — ATORVASTATIN CALCIUM 20 MG: 20 TABLET, FILM COATED ORAL at 17:26

## 2019-12-31 RX ADMIN — LISINOPRIL 10 MG: 10 TABLET ORAL at 13:42

## 2019-12-31 RX ADMIN — DONEPEZIL HYDROCHLORIDE 10 MG: 5 TABLET, FILM COATED ORAL at 17:26

## 2019-12-31 RX ADMIN — Medication 250 MG: at 08:48

## 2019-12-31 RX ADMIN — ACETAMINOPHEN 975 MG: 325 TABLET ORAL at 21:11

## 2019-12-31 RX ADMIN — DIVALPROEX SODIUM 125 MG: 125 CAPSULE, COATED PELLETS ORAL at 08:48

## 2019-12-31 RX ADMIN — PANTOPRAZOLE SODIUM 40 MG: 40 TABLET, DELAYED RELEASE ORAL at 06:34

## 2019-12-31 RX ADMIN — MELATONIN TAB 3 MG 6 MG: 3 TAB at 21:08

## 2019-12-31 RX ADMIN — MEMANTINE 10 MG: 10 TABLET ORAL at 08:48

## 2019-12-31 RX ADMIN — LIDOCAINE 1 PATCH: 50 PATCH CUTANEOUS at 09:24

## 2019-12-31 RX ADMIN — DABIGATRAN ETEXILATE MESYLATE 150 MG: 75 CAPSULE ORAL at 21:09

## 2019-12-31 NOTE — ASSESSMENT & PLAN NOTE
Patient was admitted for T2 compression fracture after unwitnessed fall at home  She is now aftercare following surgery of the nervous system  Lidoderm patch and gabapentin HS for pain ineffective  Tylenol to 975 Q 8  Oxycodone 2 5 mg q6hr prn  T12 kyphoplasty with 2 ml of PMMA done by intervention Radiology on 12/26   Stable for now    Continue routine postop care and physical therapy and occupational therapy

## 2019-12-31 NOTE — ASSESSMENT & PLAN NOTE
Patient is anticoagulated on Pradaxa    Heart rates are well controlled and she is on no rate control medications

## 2019-12-31 NOTE — NURSING NOTE
Pt's BP elevated this afternoon  Dr Liya Dorado made aware  Manual and automatic BP taken per family request  BP remained elevated with both methods  Norvasc given will reassess BP

## 2019-12-31 NOTE — ASSESSMENT & PLAN NOTE
Patient is currently not on any antihypertensive medication  Will continue to monitor  Patient has had multiple elevated blood pressure since yesterday  Will place her on lisinopril if her systolic blood pressures more than 140 and Norvasc 5 mg twice daily for systolic blood pressures more than 160   Try to gradually adjust and titrate medications for optimal BP control for her age

## 2019-12-31 NOTE — ASSESSMENT & PLAN NOTE
Continue home medication Namenda and Aricept and seroquel  Patient appears to be at her baseline  She has some periods of being a little aggressive as well    Continue dementia supportive treatment  Continue Seroquel daily at bedtime for behavior control  Continue Depakote for mood control

## 2019-12-31 NOTE — PLAN OF CARE
Problem: PHYSICAL THERAPY ADULT  Goal: Performs mobility at highest level of function for planned discharge setting  See evaluation for individualized goals  Description  Treatment/Interventions: Functional transfer training, Elevations, LE strengthening/ROM, Therapeutic exercise, Endurance training, Cognitive reorientation, Patient/family training, Equipment eval/education, Bed mobility, Gait training, Spoke to nursing  Equipment Recommended: (TBD)       See flowsheet documentation for full assessment, interventions and recommendations  Outcome: Progressing  Note:   Prognosis: Fair  Problem List: Decreased strength, Decreased range of motion, Decreased endurance, Impaired balance, Decreased mobility, Decreased coordination, Decreased cognition, Impaired judgement, Decreased safety awareness, Pain, Orthopedic restrictions, Decreased skin integrity, Impaired vision, Impaired hearing  Assessment: Pt seen today for PT treatment session focused on bed mobility, gait training and family training  Pt again reporting significant LBP with activity as well as with lying flat in bed, she was unable to tolerate lying flat for more than 2 min  Pt unable to safely transfer supine to sit without assist with HOB flat 2* to increased pain and tactile cuing needed to remain compliant with spinal precautions  With the [facility] elevated to 30 degrees pt is able to transfer safely with supervision and use of bed rails  Pt also requires assistance when transferring from the bed in the lowered position vs able to transfer supervision with bed raised  Pt continues to be limited to short house hold distances with ambulation 2* to LBP  Pt will benefit from use of both transport w/c and hospital bed at home to improve safety with mobility and decrease caregiver burden   Pt and pts dtr educated on donning and doffing of abdominal binder standing at bedside with RW, with abdominal binder pt demonstrated improved seated and standing posture with improved gait pattern, she also appeared to be in less pain however unable to verbalize pain rating  At end of session pt was left supine in bed with HOB raised to 30 degrees all needs in reach and all questions answered with dtr at bedside  Barriers to Discharge: Inaccessible home environment     Recommendation: Home with family support, Home PT          See flowsheet documentation for full assessment

## 2019-12-31 NOTE — OCCUPATIONAL THERAPY NOTE
Occupational Therapy Treatment Note     Ceasar Mckinnon  ZVK:   [id number]  Age:     86 y o         Patient Active Problem List   Diagnosis    History of stroke    [facility] disease ([facility] )    Acute lacunar stroke ([facility] )    Carotid artery calcification, bilateral    Mixed hyperlipidemia    Hypertensive heart disease without heart failure    Paroxysmal atrial fibrillation (HCC)    Chronic headache    Chronic anticoagulation    Chronic UTI    Patient is Nondenominational    Rectal bleed    Central retinal artery occlusion of left eye    Stenosis of right vertebral artery    Syncope and collapse    Compression fracture of T12 vertebra (HCC)    Syncope    Thoracic vertebral collapse, initial encounter ([facility] )    Primary insomnia      Syncope [R55]        Subjective/Goals: "this is shit"- "leave me alone"-- daughter present and provided more direction and cues then MCDONALD as she responds to daughter     Vitals: refused vitals this AM with nursing; PM patient with elevated BP's both with automatic and manual (refer to nursing summary for details)     Pain: pain reported with facial score of 6/10  Patient with poor recall of spinal precautions, poor recall of fall that lead to compression fx and refuses to wear back brace    MCDONALD had nursing order a abd binder to trail as daughter reports "either way we are going to fight her to wear" but she hopes that the abd would be more tolerable and "give some support"      Treatment Time: (237-768) 17 minutes; PM session withheld with elevated BP's     Cognition: impaired     Precautions: TLSO brace, WBAT, Contact/isolation, impulsive, Cognitive, Chair/bed alarms, WBS, Seizure, Pain, Spinal precautions, visual impairment     EVALUATION information:  · OT Goal expiration date: 1/10/20  · Treatment day: 4  · Discharge recommendation: Home OT with 24* supervision   · HOME SET UP:  ? House- previously living in in law suite- plans to live in home with family upon d/c   ? One level home with 2 CL[place] and no HR's  ? Walk in shower (3-4" threshold)  ? Standard bathroom  ? Assist from family  ? As per dtr report, pta pt ind with ADLs and ambulation, family provides close to 24/7 supervision, cameras for safety  Family completes all IADLs  (-) drives  Plans to d/c to dtr Keri's home where 24/7 will be provided  Pt unaware, will further discuss when appropriate, dtr reports d/c information will cause confusion  No prior DME/AE  ? Per daughter vision loss in left eye-- patient unaware and compensates  ? Compression fx T12 post syncope episode     Patient education: SPINAL PRECAUTIONS, SLOWING OF PACE, ENERGY CONSERVATION TECHNIQUES FOR CARRY OVER UPON D/C, INCREASED FAMILY SUPPORT, CONTINUE PARTICIPATION IN SELF-CARE/MOBILITY WITH STAFF [facility] , OVERALL SAFETY AWARENESS, BACK PRECAUTIONS, FALL PREVENTION, ENERGY CONSERVATION  and PAIN MANAGEMENT WITH FUNCTIONAL ACTIVITIES     Additional comments:  David Bacon arrived during end stages of shower this AM with daughter  Daughter provided patient with shower vs nursing this AM as no nurse was present  Patient's daughter completed tasks for patient which patient is capable of completing and educated as such  Daughter reports "sometimes it is not worth the fight" as she continued to assist drying and dressing patient  MCDONALD ed on spinal precautions and how patient is able to manage along with recommendations to continue to have patient complete tasks as she is able to maintain function alejandra given activity is limited by patient's overall cognition and volition  MCDONALD also noted patient did not have the back brace on when walking, which she continues to refuse therefore per daughter's request an ABD binder was ordered for trail  Finally, it was noted that patient walked to bathroom without AD    Ed on recommendations for use-- daughter hand held patient out of shower to shower chair and then MCDONALD provided hand held as well when exiting shower room to bed room (hand held assist of 2 for balance/safety)        Assessment:  Patient seen this date for OT with focus on goals as set by OTR  Patient agreeable to skilled OT session with focus on ADL's (bathing, dressing, toileting), Transfers (STS, SPT), Standing tolerance/balance, Strength/ROM/HEP, Tub/shower transfers, Cognition, Activity tolerance and Bathroom/kitchen/home mobility  Barriers to treatment include fatigue, pain, volition, cognition, safety, Dizziness, decreased strength/coordination, activity tolerance and standing tolerance  Patient educated on safe functional transfers techniques, the appropriate use of AE/DME to improve functional performance, and activity modification techniques for energy conservation  Plans for d/c are home with 24* supervision  Patient is making gains toward OT goals with continued OT recommended at this time to max tolerance, safety and function for appropriate d/c planning  At end of session patient remains in bed with all needs within reach         Goals STG achieved within 2 weeks Performance at Initial Evaluation 12/27/2019  Current Performance (last date completed)   Grooming while standing at sink S  (12/30) (12/31) Min A 12/31 Supervision for oral care  12/30 Supervision + cues for handwashing (cues to remember to use soap)  12/28 REFUSED sinkside getting very aggitiated therefore set up seated for oral care completing with MI upon set up  12/27 Min A    ADL transfers  S  (12/30) (12/31) Min A 12/31, 12/30 STS MI, Supervision SPT (VERY IMPULSIVE- DECREASED SAFETY AWARENESS)  12/28-- VERY impulsive   STS MI (poor safety awareness);  CGA/CS SPT and mobility with RW (patient appeared to transfer best in bathroom without AD)  12/27 Min A impulsive    Bathroom mobility with appropriate AD S  (12/30) (12/31)  Unable to assess 12/31 Supervision + RW recommended-- Patient walked to/from shower room with hand held assistance (x 1-2) (x2 with fatigue/pain post shower)   12/30 NEEDS CUES TO STAY WITH WALKER (WILL PUSH AWAY WHEN APPROACHING SURFACES)-- overall supervision needed  Was walking in room without AD earlier in AM when MCDONALD answered chair alarm (decreased insight)   12/28 CS/CGA + RW and short distance without as she pushed walker away quickly in frustration    Don/Doff TLSO  Min A   12/31 refuses  [place] Fee a abd binder per daughter request-- dependent don  12/30 refuses to wear-- daughter requesting abd binder for support as TLSO brace will "just be a fight for her"  12/28 REFUSED-- daughter states brace not effective   Requesting different brace stating present brace only further adds to frustration     12/27 Pt declined brace-- impulsive into txfr without TLSO    UB ADL  S  (12/28) Min A 12/31 daughter assisted patient without attempt to have patient do on own  12/28 MI upon set up, cues and MAX encouragement   12/27 Min A    LB ADL, AE PRN S  (12/28) Mod A 12/31 daughter assisted without attempt to have patient complete-- daughter reports easier to do then fight with patient  12/28 Doff mod to encourage participation due to resistance    Washed with Supervision + v/c and dressed with supervision + cues (tailor sitting)  12/27 Mod A   Toileting/clothing management and hygiene S  (12/30) (12/31) Mod A 12/31, 12/30 CM- S/MI, Hygiene S/MI (Supervision for safety and sequencing)    Patient reports beng aware of loose stool and incontinence    12/28 CGA/CS  12/27 Mod A   Dynamic standing balance for increased safety when completing purposeful tasks F/F+  (12/30) P+ 12/31 Fair  12/30 Fair/Fair+ for toileting/transfers   12/28 Fair  12/27 P+   Increase standing tolerance for inc'd safety with standing purposeful tasks 4-7 min <1 min  12/31 3-4 min with shower ADL/mobility (increased fatigue overall post shower)  12/30 3 minutes (self limits with confustion and difficulty getting patient to attend to functional tasks)  12/28 2-3 min with ADL/mobility  12/27 <1 min   Participate in therex 1-3x/week for inc'd overall stamina/activity tolerance for purposeful tasks To be completed    12/30 unable to get patient to cooperate early AM prior to daughter arrival   Later PM daughter feels only exercise patient may get upon d/c will be to/from bathroom frequently throughout day  12/28 ADL only  12/27 Resistive to MMT   shower stall transfer (NO DME) S Unable to assess 12/31 handheld assist (daughter planning to get a shower chair for home)  12/30 unable to assess (left bathroom post toileting and headed to bed)  12/28 CGA/CS + MAX encouragement    Bed mobility with HOB flat  S  (12/28) (12/30) (12/31) Min A  12/31 Supervision   12/30 supervision (poor tech for back protection, unable to get patient to correct  Patient almost climbs into bed and can fix self upon supine    Daughter reports her method to be the same she would always complete PTA)  12/28 Supervision (unable to ed on spinal precautions)  12/27 Sup<>sit S  Rolling S       Germaine Mckee  12/31/2019

## 2019-12-31 NOTE — NURSING NOTE
This morning patient violated bed alarm and was walking into bathroom  Redirected patient and told her to call for assistance before getting out of bed  Pt began yelling and swinging at staff  Pt not able to be redirected  Will continue to monitor  Chair alarm/bed alarm on and activated

## 2019-12-31 NOTE — PHYSICAL THERAPY NOTE
Wheelchair Justification Note    Patient name: Cheryl Lopez  Height: 5' 4"  Weight: 141 lb 12 2 oz      WC Type, Size, Parts Recommendations  17" Transport Wheelchair with Footrests and Heel Loops      A  The patient has a mobility limitation that significantly impairs his/her abilitytoparticipate in one or more mobility-realted activites of daily living ( MRADL's) outside of the home  A mobility limitation in one that:  1  Prevents the patient from accomplishing an MRADL entirely  2  Places the patient at reasonably determined heightened risk of morbidity or mortality secondary to the attempts to perform and MRADL;   3  Prevents the patient from completing an MRADL within a reasonable timeframe  B  The patient's mobility limitation cannot be sufficiently resolved by the use of an appropriately fitted cane or RW or manual w/c     C  The patient does not have sufficient upper extremity function and other physical and mental capabilities needed to safely self-propel a manual wheelchair in the community during a typical day    Limitations of strength, endurance, range of motion, or coordination, presence of pain or deformity or absence of one or both upper extremities are relevant to the assessment of upper extremity function    D  The patient has a caregiver who is available, willing and able to provide assistance with the wheelchair        Viviana Harper, PT

## 2019-12-31 NOTE — NURSING NOTE
Pt slept most of night  Toileted this morning  Confused but pleasant  Violates bed alarm  Complained of dizziness again after walking  BP was 191/78 initially  Pt was given time to rest after ambulating  BP rechecked and was 139/67    Pt stated the dizziness was "a little better "            ``````````````````````````````````````````````````````````````````````````````````````````````````````````````````````````````````````````````````````````````````````````````````````````````````````````````

## 2019-12-31 NOTE — PHYSICAL THERAPY NOTE
Physical Therapy Daily Treatment Note    Patient's Name: Court Molina    Admitting Diagnosis  Syncope [R55]    Problem List  Patient Active Problem List   Diagnosis    History of stroke    Late onset Alzheimer's disease with behavioral disturbance ([facility] )    Acute lacunar stroke ([place] [address] )    Carotid artery calcification, bilateral    Mixed hyperlipidemia    Hypertensive heart disease without heart failure    Paroxysmal atrial fibrillation (HCC)    Chronic headache    Chronic anticoagulation    Chronic UTI    Patient is Anabaptist    Rectal bleed    Central retinal artery occlusion of left eye    Stenosis of right vertebral artery    Syncope and collapse    Compression fracture of T12 vertebra (HCC)    Syncope    Thoracic vertebral collapse, initial encounter ([place] [address] )    Primary insomnia       Past Medical History  Past Medical History:   Diagnosis Date    A-fib ([place] [address] )     Alzheimer disease ([place] [address] )     Chronic UTI     Diverticulosis     Dyslipidemia     History of stroke     Hyperlipidemia     Hypertension     UTI (urinary tract infection)     Vasovagal syncope 2/13/2018       Past Surgical History  Past Surgical History:   Procedure Laterality Date    CHOLECYSTECTOMY      IR KYPHOPLASTY/VERTEBROPLASTY  12/26/2019       Vitals:    12/31/19 0530 12/31/19 0836 12/31/19 1151 12/31/19 1158   BP: 139/67 149/71 (!) 181/77 (!) 194/88   BP Location:  Right arm Right arm Right arm   Pulse:  71 68    Resp:  20     Temp:  98 6 °F (37 °C)     TempSrc:  Temporal     SpO2:  96%     Weight:         Total PT Treatment Time: 40 minutes     12/31/19 1030   Pain Assessment   Pain Rating: FLACC (Rest) - Face 1   Pain Rating: FLACC (Rest) - Legs 0   Pain Rating: FLACC (Rest) - Activity 0   Pain Rating: FLACC (Rest) - Cry 1   Pain Rating: FLACC (Rest) - Consolability 1   Score: FLACC (Rest) 3   Pain Rating: FLACC (Activity) - Face 1   Pain Rating: FLACC (Activity) - Legs 1   Pain Rating: FLACC (Activity) - Activity 1   Pain Rating: FLACC (Activity) - Cry 1   Pain Rating: FLACC (Activity) - Consolability 1   Score: FLACC (Activity) 5   Restrictions/Precautions   Weight Bearing Precautions Per Order No   Braces or Orthoses   (abdominal binder for comfort)   Other Precautions Contact/isolation; Chair Alarm; Bed Alarm;Cognitive; Fall Risk;Pain; Impulsive;Spinal precautions   General   Family/Caregiver Present Yes  (2 dtrs)   Cognition   Overall Cognitive Status Impaired   Attention Difficulty attending to directions   Memory Decreased short term memory;Decreased recall of recent events;Decreased recall of precautions   Following Commands Follows one step commands with increased time or repetition   Bed Mobility   Rolling R 4  Minimal assistance   Additional items Assist x 1; Increased time required;Verbal cues;LE management; Bedrails; Impulsive  (with HOB flat; supervision with HOB raised to 30 degrees)   Rolling L 4  Minimal assistance   Additional items Assist x 1;Bedrails; Increased time required;Verbal cues; Impulsive;LE management  (with HOB flat; supervision with HOB raised to 30 degrees)   Supine to Sit 4  Minimal assistance   Additional items Assist x 1;Bedrails; Increased time required; Impulsive;Verbal cues;LE management  (with HOB flat; supervision with HOB raised to 30 degrees)   Sit to Supine 4  Minimal assistance   Additional items Assist x 1; Increased time required; Impulsive;Verbal cues;LE management; Bedrails  (with HOB flat; supervision with HOB raised to 30 degrees)   Additional Comments Pt with significantly increased pain when attempting to lay supine with HOB flat, unable to tolerate for more than 2 min    Transfers   Sit to Stand 4  Minimal assistance   Additional items Assist x 1; Increased time required; Impulsive;Verbal cues;Armrests  (Supervision with bed raised)   Stand to Sit 4  Minimal assistance   Additional items Assist x 1; Increased time required; Impulsive;Verbal cues;Armrests  (supervision with bed raised)   Toilet transfer 5  Supervision   Additional items Assist x 1; Armrests; Increased time required; Impulsive;Verbal cues;Standard toilet   Additional Comments transfers completed with RW   Ambulation/Elevation   Gait pattern Excessively slow; Short stride; Foward flexed; Shuffling;Decreased foot clearance; Antalgic;Narrow LESLY   Gait Assistance 4  Minimal assist   Additional items Assist x 1;Verbal cues; Tactile cues   Assistive Device Rolling walker   Distance 75ft with 2 turns   Stair Management Assistance Not tested   Balance   Static Sitting Fair   Dynamic Sitting Fair -   Static Standing Fair -   Dynamic Standing Poor +   Ambulatory Poor +   Endurance Deficit   Endurance Deficit Yes   Endurance Deficit Description LBP   Activity Tolerance   Activity Tolerance Patient limited by pain   Assessment   Prognosis Fair   Problem List Decreased strength;Decreased range of motion;Decreased endurance; Impaired balance;Decreased mobility; Decreased coordination;Decreased cognition; Impaired judgement;Decreased safety awareness;Pain;Orthopedic restrictions;Decreased skin integrity; Impaired vision; Impaired hearing   Assessment Pt seen today for PT treatment session focused on bed mobility, gait training and family training  Pt again reporting significant LBP with activity as well as with lying flat in bed, she was unable to tolerate lying flat for more than 2 min  Pt unable to safely transfer supine to sit without assist with HOB flat 2* to increased pain and tactile cuing needed to remain compliant with spinal precautions  With the [facility] elevated to 30 degrees pt is able to transfer safely with supervision and use of bed rails  Pt also requires assistance when transferring from the bed in the lowered position vs able to transfer supervision with bed raised  Pt continues to be limited to short house hold distances with ambulation 2* to LBP    Pt will benefit from use of both transport w/c and hospital bed at home to improve safety with mobility and decrease caregiver burden  Pt and pts dtr educated on donning and doffing of abdominal binder standing at bedside with RW, with abdominal binder pt demonstrated improved seated and standing posture with improved gait pattern, she also appeared to be in less pain however unable to verbalize pain rating  At end of session pt was left supine in bed with HOB raised to 30 degrees all needs in reach and all questions answered with dtr at bedside  Goals   Patient Goals reduce LBP   STG Expiration Date 01/11/20   Short Term Goal #1 grid   PT Treatment Day 2   Plan   Treatment/Interventions Functional transfer training;LE strengthening/ROM; Elevations; Therapeutic exercise; Endurance training;Patient/family training;Cognitive reorientation;Equipment eval/education;Gait training;Bed mobility;Spoke to nursing;Spoke to case management   PT Frequency   (5-7x/wk)   Recommendation   Recommendation Home with family support;Home PT   Equipment Recommended Wheelchair;Walker  (hospital bed, commode, shower chair with back)     Goals STG achieved within 2 weeks Performance at Initial Evaluation (12/28/19) Current Performance (last date completed)      Bed mobility skills including rolling and repositioning to prevent skin breakdown and decrease caregiver burden            Mod I       Min A       12/28/19      Supine to sit transitions to increase ease of transfer, allow pt to get out of bed, and decrease caregiver burden          Mod I       Min A       12/28/19      Sit to supine transitions to increase ease of transfer, allow pt to get back into bed, and decrease caregiver burden          Mod I       Min A       12/30/19      Functional transfers to facilitate safe return to previous living environment           Supervision       Min A w/ RW       12/30/19      Ambulation with least restrictive AD; including at least 2 turns for safe household distance ambulation          Supervision       Min A w/ RW 30ft       12/30/19      Ascend/descend 3 steps, using appropriate AD and method, no handrails, for safe access to previous living environment          Supervision       Not attempted 2* to increased LBP          Not attempted 2* to increased LBP      sIabella Interiano, PT

## 2019-12-31 NOTE — PHYSICAL THERAPY NOTE
Hospital Bed Justification Note  Patient: Millie Rodriguez  Height: 5' 4"  Weight: 141 lbs 12 1 oz    Admitting Diagnoses  Syncope [R55]  S/P IR Kyphoplasty/Vertebroplasty 2* to T12 Compression Fx and vertebral Collapse    Patient Active Problem List   Diagnosis    History of stroke    Alzheimer disease ([facility] )    Acute lacunar stroke ([facility] )    Carotid artery calcification, bilateral    Mixed hyperlipidemia    Hypertensive heart disease without heart failure    Paroxysmal atrial fibrillation (HCC)    Chronic headache    Chronic anticoagulation    Chronic UTI    Patient is [employer]    Rectal bleed    Central retinal artery occlusion of left eye    Stenosis of right vertebral artery    Syncope and collapse    Compression fracture of T12 vertebra (HCC)    Syncope    Thoracic vertebral collapse, initial encounter ([facility] )    Primary insomnia     Nursing currently positions the hospital bed greater than 30 degrees at all times 2* to significantly increased pain in her back when lying flat making her unable to tolerate lying flat  In addition, a hospital bed would allow pt to perform bed mobility with improved ease as well as  1) to alleviate pain  2) decrease risk for pressure sore formation  3) allowing pt to maintain spinal precautions when moving into and out of bed  4) decrease the workload demands on the cardiovascular system  5) decrease risk of valsalva maneuver in light of history of syncope and collapse  6) use of rails and elevating abilities will also decrease care giver burden as pt will need 24/7 assistance and supervision 2* to decreased cognition and functional mobility      Jodi Nina, PT

## 2019-12-31 NOTE — PROGRESS NOTES
Progress Note - Samella Boast 1933, 80 y o  female MRN: [id number]    Unit/Bed#: -01 Encounter: [id number]    Primary Care Provider: Hyacinth Rinne, DO   Date and time admitted to hospital: 12/27/2019 11:21 AM        * Thoracic vertebral collapse, initial encounter [facility])  Assessment & Plan  Patient was admitted for T2 compression fracture after unwitnessed fall at home  She is now aftercare following surgery of the nervous system  Lidoderm patch and gabapentin HS for pain ineffective  Tylenol to 975 Q 8  Oxycodone 2 5 mg q6hr prn  T12 kyphoplasty with 2 ml of PMMA done by intervention Radiology on 12/26   Stable for now  Continue routine postop care and physical therapy and occupational therapy    Hypertensive heart disease without heart failure  Assessment & Plan  Patient is currently not on any antihypertensive medication  Will continue to monitor  Patient has had multiple elevated blood pressure since yesterday  Will place her on lisinopril if her systolic blood pressures more than 140 and Norvasc 5 mg twice daily for systolic blood pressures more than 160  Try to gradually adjust and titrate medications for optimal BP control for her age    Late onset Alzheimer's disease with behavioral disturbance [facility])  Assessment & Plan  Continue home medication Namenda and Aricept and seroquel  Patient appears to be at her baseline  She has some periods of being a little aggressive as well    Continue dementia supportive treatment  Continue Seroquel daily at bedtime for behavior control  Continue Depakote for mood control    Primary insomnia  Assessment & Plan  Patient was on Seroquel and melatonin, will continue  Stable for now    Chronic UTI  Assessment & Plan  Patient is on daily keflex    Chronic headache  Assessment & Plan  During her acute care admission, Neurology was consulted who started patient on Depakote on trial   Will continue    Paroxysmal atrial fibrillation ([facility] )  Assessment & Plan  Patient is anticoagulated on Pradaxa  Heart rates are well controlled and she is on no rate control medications    Mixed hyperlipidemia  Assessment & Plan  Continue statin      VTE Pharmacologic Prophylaxis:   Pharmacologic: Dabigatran (Pradaxa)  Mechanical VTE Prophylaxis in Place: No    Patient Centered Rounds: I have performed bedside rounds with nursing staff today  Discussions with Specialists or Other Care Team Provider:  Discussed with nursing staff    Education and Discussions with Family / Patient:  Discussed with patient and her daughter at bedside    Time Spent for Care: 45 minutes  More than 50% of total time spent on counseling and coordination of care as described above  Current Length of Stay: 4 day(s)    Current Patient Status: SNF Short Term Inpatient     Discharge Plan:  Pending progress    Code Status: Level 3 - DNAR and DNI      Subjective:   Patient complains of not feeling well  She complains of pain in her back and headache and dizziness  She is very restless and uncomfortable and is pleasantly confused    Objective:     Vitals:   Temp (24hrs), Av 1 °F (36 7 °C), Min:97 5 °F (36 4 °C), Max:98 6 °F (37 °C)    Temp:  [97 5 °F (36 4 °C)-98 6 °F (37 °C)] 98 6 °F (37 °C)  HR:  [68-82] 68  Resp:  [18-20] 20  BP: (139-195)/(67-88) 194/88  SpO2:  [96 %] 96 %  Body mass index is 24 33 kg/m²  Input and Output Summary (last 24 hours):     No intake or output data in the 24 hours ending 19 1306    Physical Exam:     Physical Exam   Constitutional: She appears well-developed and well-nourished  HENT:   Head: Normocephalic and atraumatic  Right Ear: External ear normal    Left Ear: External ear normal    Mouth/Throat: Oropharynx is clear and moist    Eyes: Conjunctivae and EOM are normal    Neck: Normal range of motion  Neck supple  Cardiovascular: Normal rate, regular rhythm and normal heart sounds     Pulmonary/Chest: Effort normal and breath sounds normal    Abdominal: Soft  Bowel sounds are normal  She exhibits no mass  There is no tenderness  There is no rebound and no guarding  Genitourinary:   Genitourinary Comments: deferred   Musculoskeletal: Normal range of motion  Tenderness on palpation of the mid to lower back   Neurological: She is alert  She has normal reflexes  Cranial nerves 2-12 are normal   Normal neurological exam  Oriented to person only   Skin: Skin is warm and dry  No rash noted  Psychiatric:   Restless and anxious   Nursing note and vitals reviewed  Additional Data:     Labs:    Results from last 7 days   Lab Units 12/30/19  0459   WBC Thousand/uL 9 20   HEMOGLOBIN g/dL 10 6*   HEMATOCRIT % 32 2*   PLATELETS Thousands/uL 412     Results from last 7 days   Lab Units 12/30/19  0459   SODIUM mmol/L 137   POTASSIUM mmol/L 3 8   CHLORIDE mmol/L 105   CO2 mmol/L 23   BUN mg/dL 12   CREATININE mg/dL 0 58*   ANION GAP mmol/L 9   CALCIUM mg/dL 8 9   GLUCOSE RANDOM mg/dL 95     Results from last 7 days   Lab Units 12/26/19  0552   INR  1 04                       * I Have Reviewed All Lab Data Listed Above  * Additional Pertinent Lab Tests Reviewed:  Stacey 66 Admission Reviewed    Imaging:    Imaging Reports Reviewed Today Include:  None  Imaging Personally Reviewed by Myself Includes:  None    Recent Cultures (last 7 days):           Last 24 Hours Medication List:     Current Facility-Administered Medications:  acetaminophen 650 mg Oral Q12H PRN Amalia Farley MD   acetaminophen 975 mg Oral Q8H Melisa Ceballos MD   amLODIPine 5 mg Oral BID Adrienne Cameron MD   atorvastatin 20 mg Oral Daily With Alan Bledsoe MD   cephalexin 250 mg Oral Daily Amalia Farley MD   dabigatran etexilate 150 mg Oral Q12H Melisa Ceballos MD   divalproex sodium 125 mg Oral TID Amalia Farley MD   docusate sodium 100 mg Oral BID PRN Bonnie Martínez MD   donepezil 10 mg Oral HS Amalia Farley MD   gabapentin 100 mg Oral HS Amalia Farley MD   lidocaine 1 patch Topical Daily Andie Sparrow MD   lisinopril 10 mg Oral Daily Mariel Canseco MD   melatonin 6 mg Oral HS Andie Sparrow MD   memantine 10 mg Oral BID Andie Sparrow MD   OLANZapine 2 5 mg Intramuscular TID PRN Andie Sparrow MD   oxyCODONE 2 5 mg Oral Q6H PRN Mariel Canseco MD   pantoprazole 40 mg Oral Daily Before Carmen Costello MD   polyethylene glycol 17 g Oral Daily PRN Andie Sparrow MD   QUEtiapine 25 mg Oral HS Andie Sparrow MD   saccharomyces boulardii 250 mg Oral BID Andie Sparrow MD        Today, Patient Was Seen By: Mariel Canseco MD    ** Please Note: Dictation voice to text software may have been used in the creation of this document   **

## 2019-12-31 NOTE — PLAN OF CARE
Called the [facility] with refill request on her Coumadin/ LM with pharmacy for the request.     Problem: OCCUPATIONAL THERAPY ADULT  Goal: Performs self-care activities at highest level of function for planned discharge setting  See evaluation for individualized goals  Description  Treatment Interventions: ADL retraining, Functional transfer training, UE strengthening/ROM, Endurance training, Cognitive reorientation, Patient/family training, Equipment evaluation/education, Neuromuscular reeducation, Compensatory technique education, Continued evaluation, Energy conservation, Activityengagement          See flowsheet documentation for full assessment, interventions and recommendations      Outcome: Adequate for Discharge  Note:   Limitation: Decreased ADL status, Decreased UE ROM, Decreased UE strength, Decreased Safe judgement during ADL, Decreased cognition, Decreased endurance, Visual deficit, Decreased self-care trans, Decreased high-level ADLs, Mood limitation  Prognosis: Good, Fair  Assessment: see note   OT Discharge Recommendation: Home OT(with 24/7 supervision/assist )  OT - OK to Discharge: No

## 2019-12-31 NOTE — NURSING NOTE
After pt was toileted, she stated she had a headache and felt dizzy  Bp was checked at that time  Bp on the right w/ automated cuff was 195/82, she was rechecked manually on the left and was 184/78  HR 82 and pulse ox was 96% on RA  Pt pointed to her forehead when asked where the HA was  Denied any other pain at this time  CRNP on call was informed of pt's symptoms  EKG obtained and reviewed by same  Pt given Apresoline OT at 2100  BP rechecked at 2200 and was 156/68  Pt has been sleeping since

## 2020-01-01 PROCEDURE — 97530 THERAPEUTIC ACTIVITIES: CPT

## 2020-01-01 PROCEDURE — 97535 SELF CARE MNGMENT TRAINING: CPT

## 2020-01-01 RX ADMIN — OYSTER SHELL CALCIUM WITH VITAMIN D 1 TABLET: 500; 200 TABLET, FILM COATED ORAL at 07:54

## 2020-01-01 RX ADMIN — DABIGATRAN ETEXILATE MESYLATE 150 MG: 75 CAPSULE ORAL at 20:54

## 2020-01-01 RX ADMIN — LISINOPRIL 10 MG: 10 TABLET ORAL at 08:37

## 2020-01-01 RX ADMIN — MELATONIN TAB 3 MG 6 MG: 3 TAB at 20:54

## 2020-01-01 RX ADMIN — OYSTER SHELL CALCIUM WITH VITAMIN D 1 TABLET: 500; 200 TABLET, FILM COATED ORAL at 16:00

## 2020-01-01 RX ADMIN — AMLODIPINE BESYLATE 5 MG: 5 TABLET ORAL at 08:37

## 2020-01-01 RX ADMIN — PANTOPRAZOLE SODIUM 40 MG: 40 TABLET, DELAYED RELEASE ORAL at 06:24

## 2020-01-01 RX ADMIN — DIVALPROEX SODIUM 125 MG: 125 CAPSULE, COATED PELLETS ORAL at 20:54

## 2020-01-01 RX ADMIN — DONEPEZIL HYDROCHLORIDE 10 MG: 5 TABLET, FILM COATED ORAL at 17:05

## 2020-01-01 RX ADMIN — MEMANTINE 10 MG: 10 TABLET ORAL at 20:54

## 2020-01-01 RX ADMIN — ACETAMINOPHEN 975 MG: 325 TABLET ORAL at 20:54

## 2020-01-01 RX ADMIN — Medication 250 MG: at 08:37

## 2020-01-01 RX ADMIN — CEPHALEXIN 250 MG: 250 CAPSULE ORAL at 08:38

## 2020-01-01 RX ADMIN — GABAPENTIN 100 MG: 100 CAPSULE ORAL at 20:54

## 2020-01-01 RX ADMIN — ACETAMINOPHEN 975 MG: 325 TABLET ORAL at 13:05

## 2020-01-01 RX ADMIN — DIVALPROEX SODIUM 125 MG: 125 CAPSULE, COATED PELLETS ORAL at 08:37

## 2020-01-01 RX ADMIN — Medication 250 MG: at 17:05

## 2020-01-01 RX ADMIN — DIVALPROEX SODIUM 125 MG: 125 CAPSULE, COATED PELLETS ORAL at 16:00

## 2020-01-01 RX ADMIN — MEMANTINE 10 MG: 10 TABLET ORAL at 08:38

## 2020-01-01 RX ADMIN — QUETIAPINE FUMARATE 25 MG: 25 TABLET ORAL at 20:54

## 2020-01-01 RX ADMIN — DABIGATRAN ETEXILATE MESYLATE 150 MG: 75 CAPSULE ORAL at 08:37

## 2020-01-01 RX ADMIN — ATORVASTATIN CALCIUM 20 MG: 20 TABLET, FILM COATED ORAL at 16:00

## 2020-01-01 RX ADMIN — ACETAMINOPHEN 975 MG: 325 TABLET ORAL at 05:08

## 2020-01-01 NOTE — QUICK NOTE
Patient violated bed and chair alarm multiple times today, when trying to redirect the patient she became combative and started to swing,punch and yell at staff  The son walked in and witnessed it all

## 2020-01-01 NOTE — NURSING NOTE
AOX3 with period of forgetfulness HR with Murmur B/L LE trace edema Lungs decreased clear + Bowel sounds x4 + PP ABD soft  Denies any pain  Will continue to monitor call bell within reach

## 2020-01-01 NOTE — OCCUPATIONAL THERAPY NOTE
Occupational Therapy Treatment Note     Deion Smith  [facility]:   [id number]  Age:     86 y o         Patient Active Problem List   Diagnosis    History of stroke    Alzheimer disease ([facility] )    Acute lacunar stroke ([facility] )    Carotid artery calcification, bilateral    Mixed hyperlipidemia    Hypertensive heart disease without heart failure    Paroxysmal atrial fibrillation (HCC)    Chronic headache    Chronic anticoagulation    Chronic UTI    Patient is Christianity    Rectal bleed    Central retinal artery occlusion of left eye    Stenosis of right vertebral artery    Syncope and collapse    Compression fracture of T12 vertebra (HCC)    Syncope    Thoracic vertebral collapse, initial encounter ([facility] )    Primary insomnia      Syncope [R55]        Subjective/Goals: "where did my family go, they left without me"     Vitals: none taken- patient agitated     Pain: reports pain to back- not able to quantify        Treatment Time: (925825) 10 minutes + ([id number]) 23 min ADL= 33 min total treatment over 2 sessions        Cognition: impaired     Precautions: TLSO brace, WBAT, Contact/isolation, impulsive, Cognitive, Chair/bed alarms, WBS, Seizure, Pain, Spinal precautions, visual impairment     EVALUATION information:  · OT Goal expiration date: 1/10/20  · Treatment day: 5  · Discharge recommendation: Home OT with 24* supervision   · HOME SET UP:  ? House- previously living in in law suite- plans to live in home with family upon d/c   ? One level home with 2 CLINTON and no HR's  ? Walk in shower (3-4" threshold)  ? Standard bathroom  ? Assist from family  ? As per dtr report, pta pt ind with ADLs and ambulation, family provides close to 24/7 supervision, cameras for safety  Family completes all IADLs  (-) drives  Plans to d/c to dtr [facility] where 24/7 will be provided  Pt unaware, will further discuss when appropriate, dtr reports d/c information will cause confusion  No prior DME/AE  ?  Per daughter vision loss in left eye-- patient unaware and compensates  ? Compression fx T12 post syncope episode     Patient education: SPINAL PRECAUTIONS, SLOWING OF PACE, ENERGY CONSERVATION TECHNIQUES FOR CARRY OVER UPON D/C, INCREASED FAMILY SUPPORT, CONTINUE PARTICIPATION IN SELF-CARE/MOBILITY WITH STAFF WH[facility] , OVERALL SAFETY AWARENESS, BACK PRECAUTIONS, FALL PREVENTION, ENERGY CONSERVATION  and PAIN MANAGEMENT WITH FUNCTIONAL ACTIVITIES     Additional comments:  ([phone]) 10 minutes:  Patient in cordero this AM upon arrival requesting to get dressed  MCDONALD walked patient into her room with Close supervision and hand assist PRN where she sat on edge of bed and MCDONALD obrained needed items  Once water was brought to patient and she realized that clean PJ's was all she had for the day she laid self back into bed and refused AM care  Daughter at this time not present  MCDONALD attempted to encourage patient to complete care however unsuccessful as patient indicated she had PJ's on and no need to get changed stating she wanted pants (aka- street clothes) "i'm going home, why did they leave without me"  Further ed provided however patient remains in bed, alarm attached  Patient later walked back to [facility] to await daughter's arrival      ([id number]) 23 min ADL:  Session focused on eating (Setup), oral hygiene (S), toileting (S), bathing (S), UB dressing (S), LB dressing (S), and don/doff footwear (S)  Overall supervision with MAX cues for completion and overall safety  Patient with decreased insight into deficits-- daughter present for sponge bath ADL          Assessment:  Patient seen this date for OT with focus on goals as set by [address] [place] [address] agreeable to skilled OT session with focus on ADL's (bathing, dressing, toileting), Transfers (STS, SPT), Standing tolerance/balance, Strength/ROM/HEP, Tub/shower transfers, Cognition, Activity tolerance and Bathroom/kitchen/home mobility    Barriers to treatment include fatigue, pain, volition, cognition, safety, Dizziness, decreased strength/coordination, activity tolerance and standing tolerance  Patient educated on safe functional transfers techniques, the appropriate use of AE/DME to improve functional performance, and activity modification techniques for energy conservation   Plans for d/c are home with 24* supervision  Patient is making gains toward OT goals with continued OT recommended at this time to max tolerance, safety and function for appropriate d/c planning   At end of session patient remains in bed with all needs within reach         Goals STG achieved within 2 weeks Performance at Initial Evaluation 12/27/2019  Current Performance (last date completed)   Grooming while standing at sink S  (12/30) (12/31) (1/1) Min A 1/1, 12/31 Supervision for oral care  12/30 Supervision + cues for handwashing (cues to remember to use soap)  12/28 REFUSED sinkside getting very aggitiated therefore set up seated for oral care completing with MI upon set up  12/27 Min A    ADL transfers  S  (12/30) (12/31) (1/1) Min A 1/1, 12/31, 12/30 STS MI, Supervision SPT (VERY IMPULSIVE- DECREASED SAFETY AWARENESS)  12/28-- VERY impulsive   STS MI (poor safety awareness); CGA/CS SPT and mobility with RW (patient appeared to transfer best in bathroom without AD)  12/27 Min A impulsive    Bathroom mobility with appropriate AD S  (12/30) (12/31) (1/1)  Unable to assess 1/1, 12/31 Supervision + RW recommended-- Patient walked to/from shower room with hand held assistance (x 1-2) (x2 with fatigue/pain post shower)   12/30 NEEDS CUES TO STAY WITH WALKER (WILL PUSH AWAY WHEN APPROACHING SURFACES)-- overall supervision needed   Was walking in room without AD earlier in AM when MCDONALD answered chair alarm (decreased insight)   12/28 CS/CGA + RW and short distance without as she pushed walker away quickly in frustration    Don/Doff TLSO  Min A   1/1 Abd binder provided-- continues to refuse use  Daughter feels that when at home and she is not allowed to be in bed all day she will be more receptive to binder use  12/31 refuses  Candance Kathleenva a abd binder per daughter request-- dependent don  12/30 refuses to wear-- daughter requesting abd binder for support as TLSO brace will "just be a fight for her"  12/28 REFUSED-- daughter states brace not effective   Requesting different brace stating present brace only further adds to frustration     12/27 Pt declined brace-- impulsive into txfr without TLSO    UB ADL  S  (12/28) (1/1) Min A 1/1 Supervision + cues and encouragement due to resistance (responded to daughters cues to listen to therapy)  12/31 daughter assisted patient without attempt to have patient do on own  12/28 MI upon set up, cues and MAX encouragement   12/27 Min A    LB ADL, AE PRN S  (12/28) (1/1) Mod A 1/1 Supervision + v/c tailor sit for LE management   12/31 daughter assisted without attempt to have patient complete-- daughter reports easier to do then fight with patient    12/28 Doff mod to encourage participation due to resistance    Washed with Supervision + v/c and dressed with supervision + cues (tailor sitting)  12/27 Mod A   Toileting/clothing management and hygiene S  (12/30) (12/31) (1/1) Mod A 1/1, 12/31, 12/30 CM- S/MI, Hygiene S/MI (Supervision for safety and sequencing)   Patient reports beng aware of loose stool and incontinence    12/28 CGA/CS  12/27 Mod A   Dynamic standing balance for increased safety when completing purposeful tasks F/F+  (12/30) (1/1) P+ 1/1 Fair/fair+  12/31 Fair  12/30 Fair/Fair+ for toileting/transfers   12/28 Fair  12/27 P+   Increase standing tolerance for inc'd safety with standing purposeful tasks 4-7 min <1 min  1/1 self limits stance and mobility-- 1-2 minutes due to patient volition  12/31 3-4 min with shower ADL/mobility (increased fatigue overall post shower)  12/30 3 minutes (self limits with confustion and difficulty getting patient to attend to functional tasks)  12/28 2-3 min with ADL/mobility  12/27 <1 min   Participate in therex 1-3x/week for inc'd overall stamina/activity tolerance for purposeful tasks To be completed    1/1 refuses but tolerates ADL functioning with cues   12/30 unable to get patient to cooperate early AM prior to daughter arrival  Lesley Garcia PM daughter feels only exercise patient may get upon d/c will be to/from bathroom frequently throughout day  12/28 ADL only  12/27 Resistive to MMT   shower stall transfer (NO DME) S  (1/1- simulated) Unable to assess 1/1 Supervision   12/31 handheld assist (daughter planning to get a shower chair for home)  12/30 unable to assess (left bathroom post toileting and headed to bed)  12/28 CGA/CS + MAX encouragement    Bed mobility with HOB flat  S  (12/28) (12/30)   (12/31) (1/1) Min A  1/1, 12/31 Supervision   12/30 supervision (poor tech for back protection, unable to get patient to correct   Patient almost climbs into bed and can fix self upon supine   Daughter reports her method to be the same she would always complete PTA)  12/28 Supervision (unable to ed on spinal precautions)  12/27 Sup<>sit S  Rolling S       Dahl Going  1/1/2020

## 2020-01-01 NOTE — PLAN OF CARE
Problem: Prexisting or High Potential for Compromised Skin Integrity  Goal: Skin integrity is maintained or improved  Description  INTERVENTIONS:  - Identify patients at risk for skin breakdown  - Assess and monitor skin integrity  - Assess and monitor nutrition and hydration status  - Monitor labs   - Assess for incontinence   - Turn and reposition patient  - Assist with mobility/ambulation  - Relieve pressure over bony prominences  - Avoid friction and shearing  - Provide appropriate hygiene as needed including keeping skin clean and dry  - Evaluate need for skin moisturizer/barrier cream  - Collaborate with interdisciplinary team   - Patient/family teaching  - Consider wound care consult   Outcome: Progressing     Problem: Potential for Falls  Goal: Patient will remain free of falls  Description  INTERVENTIONS:  - Assess patient frequently for physical needs  -  Identify cognitive and physical deficits and behaviors that affect risk of falls  -  [place] fall precautions as indicated by assessment   - Educate patient/family on patient safety including physical limitations  - Instruct patient to call for assistance with activity based on assessment  - Modify environment to reduce risk of injury  - Consider OT/PT consult to assist with strengthening/mobility  Outcome: Progressing     Problem: Nutrition/Hydration-ADULT  Goal: Nutrient/Hydration intake appropriate for improving, restoring or maintaining nutritional needs  Description  Monitor and assess patient's nutrition/hydration status for malnutrition  Collaborate with interdisciplinary team and initiate plan and interventions as ordered  Monitor patient's weight and dietary intake as ordered or per policy  Utilize nutrition screening tool and intervene as necessary  Determine patient's food preferences and provide high-protein, high-caloric foods as appropriate       INTERVENTIONS:  - Monitor oral intake, urinary output, labs, and treatment plans  - Assess nutrition and hydration status and recommend course of action  - Evaluate amount of meals eaten  - Assist patient with eating if necessary   - Allow adequate time for meals  - Recommend/ encourage appropriate diets, oral nutritional supplements, and vitamin/mineral supplements  - Order, calculate, and assess calorie counts as needed  - Recommend, monitor, and adjust tube feedings and TPN/PPN based on assessed needs  - Assess need for intravenous fluids  - Provide specific nutrition/hydration education as appropriate  - Include patient/family/caregiver in decisions related to nutrition  Outcome: Progressing     Problem: PAIN - ADULT  Goal: Verbalizes/displays adequate comfort level or baseline comfort level  Description  Interventions:  - Encourage patient to monitor pain and request assistance  - Assess pain using appropriate pain scale  - Administer analgesics based on type and severity of pain and evaluate response  - Implement non-pharmacological measures as appropriate and evaluate response  - Consider cultural and social influences on pain and pain management  - Notify physician/advanced practitioner if interventions unsuccessful or patient reports new pain  Outcome: Progressing     Problem: INFECTION - ADULT  Goal: Absence or prevention of progression during hospitalization  Description  INTERVENTIONS:  - Assess and monitor for signs and symptoms of infection  - Monitor lab/diagnostic results  - Monitor all insertion sites, i e  indwelling lines, tubes, and drains  - Monitor endotracheal if appropriate and nasal secretions for changes in amount and color  - [place] appropriate cooling/warming therapies per order  - Administer medications as ordered  - Instruct and encourage patient and family to use good hand hygiene technique  - Identify and instruct in appropriate isolation precautions for identified infection/condition  Outcome: Progressing  Goal: Absence of fever/infection during neutropenic period  Description  INTERVENTIONS:  - Monitor WBC    Outcome: Progressing     Problem: SAFETY ADULT  Goal: Patient will remain free of falls  Description  INTERVENTIONS:  - Assess patient frequently for physical needs  -  Identify cognitive and physical deficits and behaviors that affect risk of falls    -  [place] fall precautions as indicated by assessment   - Educate patient/family on patient safety including physical limitations  - Instruct patient to call for assistance with activity based on assessment  - Modify environment to reduce risk of injury  - Consider OT/PT consult to assist with strengthening/mobility  Outcome: Progressing  Goal: Maintain or return to baseline ADL function  Description  INTERVENTIONS:  -  Assess patient's ability to carry out ADLs; assess patient's baseline for ADL function and identify physical deficits which impact ability to perform ADLs (bathing, care of mouth/teeth, toileting, grooming, dressing, etc )  - Assess/evaluate cause of self-care deficits   - Assess range of motion  - Assess patient's mobility; develop plan if impaired  - Assess patient's need for assistive devices and provide as appropriate  - Encourage maximum independence but intervene and supervise when necessary  - Involve family in performance of ADLs  - Assess for home care needs following discharge   - Consider OT consult to assist with ADL evaluation and planning for discharge  - Provide patient education as appropriate  Outcome: Progressing  Goal: Maintain or return mobility status to optimal level  Description  INTERVENTIONS:  - Assess patient's baseline mobility status (ambulation, transfers, stairs, etc )    - Identify cognitive and physical deficits and behaviors that affect mobility  - Identify mobility aids required to assist with transfers and/or ambulation (gait belt, sit-to-stand, lift, walker, cane, etc )  - [place] fall precautions as indicated by assessment  - Record patient progress and toleration of activity level on Mobility SBAR; progress patient to next Phase/Stage  - Instruct patient to call for assistance with activity based on assessment  - Consider rehabilitation consult to assist with strengthening/weightbearing, etc   Outcome: Progressing     Problem: DISCHARGE PLANNING  Goal: Discharge to home or other facility with appropriate resources  Description  INTERVENTIONS:  - Identify barriers to discharge w/patient and caregiver  - Arrange for needed discharge resources and transportation as appropriate  - Identify discharge learning needs (meds, wound care, etc )  - Arrange for interpretive services to assist at discharge as needed  - Refer to Case Management Department for coordinating discharge planning if the patient needs post-hospital services based on physician/advanced practitioner order or complex needs related to functional status, cognitive ability, or social support system  Outcome: Progressing     Problem: Knowledge Deficit  Goal: Patient/family/caregiver demonstrates understanding of disease process, treatment plan, medications, and discharge instructions  Description  Complete learning assessment and assess knowledge base    Interventions:  - Provide teaching at level of understanding  - Provide teaching via preferred learning methods  Outcome: Progressing

## 2020-01-02 LAB
BACTERIA UR QL AUTO: ABNORMAL /HPF
BILIRUB UR QL STRIP: NEGATIVE
CLARITY UR: CLEAR
COLOR UR: ABNORMAL
ERYTHROCYTE [DISTWIDTH] IN BLOOD BY AUTOMATED COUNT: 15 %
GLUCOSE UR STRIP-MCNC: NEGATIVE MG/DL
HCT VFR BLD AUTO: 33.8 % (ref 36–46)
HGB BLD-MCNC: 11.3 G/DL (ref 12–16)
HGB UR QL STRIP.AUTO: NEGATIVE
KETONES UR STRIP-MCNC: ABNORMAL MG/DL
LEUKOCYTE ESTERASE UR QL STRIP: 25
MCH RBC QN AUTO: 28.9 PG (ref 26.8–34.3)
MCHC RBC AUTO-ENTMCNC: 33.5 G/DL (ref 31.4–37.4)
MCV RBC AUTO: 86 FL (ref 80–100)
NITRITE UR QL STRIP: NEGATIVE
NON-SQ EPI CELLS URNS QL MICRO: ABNORMAL /HPF
PH UR STRIP.AUTO: 8 [PH]
PLATELET # BLD AUTO: 417 THOUSANDS/UL (ref 150–450)
PMV BLD AUTO: 8.7 FL (ref 8.9–12.7)
PROCALCITONIN SERPL-MCNC: <0.05 NG/ML
PROT UR STRIP-MCNC: NEGATIVE MG/DL
RBC # BLD AUTO: 3.92 MILLION/UL (ref 4–5.2)
RBC #/AREA URNS AUTO: ABNORMAL /HPF
SP GR UR STRIP.AUTO: 1.01 (ref 1–1.04)
UROBILINOGEN UA: NEGATIVE MG/DL
WBC # BLD AUTO: 7.1 THOUSAND/UL (ref 4.31–10.16)
WBC #/AREA URNS AUTO: ABNORMAL /HPF

## 2020-01-02 PROCEDURE — 82607 VITAMIN B-12: CPT | Performed by: FAMILY MEDICINE

## 2020-01-02 PROCEDURE — 85027 COMPLETE CBC AUTOMATED: CPT | Performed by: FAMILY MEDICINE

## 2020-01-02 PROCEDURE — 84145 PROCALCITONIN (PCT): CPT | Performed by: FAMILY MEDICINE

## 2020-01-02 PROCEDURE — 81001 URINALYSIS AUTO W/SCOPE: CPT | Performed by: FAMILY MEDICINE

## 2020-01-02 PROCEDURE — 81003 URINALYSIS AUTO W/O SCOPE: CPT | Performed by: FAMILY MEDICINE

## 2020-01-02 PROCEDURE — 99309 SBSQ NF CARE MODERATE MDM 30: CPT | Performed by: FAMILY MEDICINE

## 2020-01-02 RX ORDER — DIVALPROEX SODIUM 125 MG/1
250 CAPSULE, COATED PELLETS ORAL 3 TIMES DAILY
Status: DISCONTINUED | OUTPATIENT
Start: 2020-01-02 | End: 2020-01-03 | Stop reason: HOSPADM

## 2020-01-02 RX ADMIN — ATORVASTATIN CALCIUM 20 MG: 20 TABLET, FILM COATED ORAL at 16:00

## 2020-01-02 RX ADMIN — OYSTER SHELL CALCIUM WITH VITAMIN D 1 TABLET: 500; 200 TABLET, FILM COATED ORAL at 16:00

## 2020-01-02 RX ADMIN — AMLODIPINE BESYLATE 5 MG: 5 TABLET ORAL at 17:01

## 2020-01-02 RX ADMIN — DABIGATRAN ETEXILATE MESYLATE 150 MG: 75 CAPSULE ORAL at 08:45

## 2020-01-02 RX ADMIN — OYSTER SHELL CALCIUM WITH VITAMIN D 1 TABLET: 500; 200 TABLET, FILM COATED ORAL at 08:47

## 2020-01-02 RX ADMIN — ACETAMINOPHEN 975 MG: 325 TABLET ORAL at 16:00

## 2020-01-02 RX ADMIN — GABAPENTIN 100 MG: 100 CAPSULE ORAL at 22:07

## 2020-01-02 RX ADMIN — MELATONIN TAB 3 MG 6 MG: 3 TAB at 22:07

## 2020-01-02 RX ADMIN — DOCUSATE SODIUM 100 MG: 100 CAPSULE, LIQUID FILLED ORAL at 17:02

## 2020-01-02 RX ADMIN — ACETAMINOPHEN 975 MG: 325 TABLET ORAL at 22:07

## 2020-01-02 RX ADMIN — Medication 250 MG: at 08:45

## 2020-01-02 RX ADMIN — DABIGATRAN ETEXILATE MESYLATE 150 MG: 75 CAPSULE ORAL at 22:07

## 2020-01-02 RX ADMIN — DONEPEZIL HYDROCHLORIDE 10 MG: 5 TABLET, FILM COATED ORAL at 17:02

## 2020-01-02 RX ADMIN — DIVALPROEX SODIUM 125 MG: 125 CAPSULE, COATED PELLETS ORAL at 08:46

## 2020-01-02 RX ADMIN — ACETAMINOPHEN 975 MG: 325 TABLET ORAL at 06:27

## 2020-01-02 RX ADMIN — MEMANTINE 10 MG: 10 TABLET ORAL at 22:07

## 2020-01-02 RX ADMIN — DIVALPROEX SODIUM 250 MG: 125 CAPSULE, COATED PELLETS ORAL at 22:08

## 2020-01-02 RX ADMIN — QUETIAPINE FUMARATE 25 MG: 25 TABLET ORAL at 22:08

## 2020-01-02 RX ADMIN — DIVALPROEX SODIUM 250 MG: 125 CAPSULE, COATED PELLETS ORAL at 16:00

## 2020-01-02 RX ADMIN — MEMANTINE 10 MG: 10 TABLET ORAL at 08:45

## 2020-01-02 RX ADMIN — CEPHALEXIN 250 MG: 250 CAPSULE ORAL at 08:46

## 2020-01-02 RX ADMIN — PANTOPRAZOLE SODIUM 40 MG: 40 TABLET, DELAYED RELEASE ORAL at 06:27

## 2020-01-02 RX ADMIN — Medication 250 MG: at 17:02

## 2020-01-02 RX ADMIN — AMLODIPINE BESYLATE 5 MG: 5 TABLET ORAL at 06:39

## 2020-01-02 NOTE — ASSESSMENT & PLAN NOTE
During her acute care admission, Neurology was consulted who started patient on Depakote on trial   As per neuro recommendations she is to have her Depakote dose gradually titrated out every 2 weeks  She was started on 125 mg t i d   Will increase to 250 mg t i d  Today  Recheck of Depakote level in 4-5 days  After 2 weeks she has to further have her dose titrated up to 375 mg t i d    Should have outpatient follow-up with Neurology scheduled prior to discharge

## 2020-01-02 NOTE — ASSESSMENT & PLAN NOTE
Patient was admitted for T2 compression fracture after unwitnessed fall at home  She is now aftercare following surgery of the nervous system  Lidoderm patch and gabapentin HS for pain ineffective  Tylenol to 975 Q 8  Family does not want her to be on any narcotics  T12 kyphoplasty with 2 ml of PMMA done by intervention Radiology on 12/26   Stable for now    Continue routine postop care and physical therapy and occupational therapy

## 2020-01-02 NOTE — PHYSICAL THERAPY NOTE
PHYSICAL THERAPY NOTE      Patient Name: Kay Del Angel  Today's Date: 1/2/2020     Attempted to see pt this afternoon for PT treatment session, upon entering room pt asleep with family members at bedside  Family reporting that pt has been feeling ill today with increased fatigue and back pain  Testing revealed new UTI  Pt awoke to name however only having eyes open briefly then lying with eyes closed, pt declining OOB for therapy as well as bed level exercise 2* to significantly increased fatigue  Will attempt to see pt tomorrow as able and appropriate      Eladia Bui, PT

## 2020-01-02 NOTE — PROGRESS NOTES
Progress Note - Dedrick Flow 1933, 80 y o  female MRN: [id number]    Unit/Bed#: -01 Encounter: [id number]    Primary Care Provider: Diamond Simmons DO   Date and time admitted to hospital: 12/27/2019 11:21 AM        * Thoracic vertebral collapse, initial encounter [facility])  Assessment & Plan  Patient was admitted for T2 compression fracture after unwitnessed fall at home  She is now aftercare following surgery of the nervous system  Lidoderm patch and gabapentin HS for pain ineffective  Tylenol to 975 Q 8  Family does not want her to be on any narcotics  T12 kyphoplasty with 2 ml of PMMA done by intervention Radiology on 12/26   Stable for now  Continue routine postop care and physical therapy and occupational therapy    Hypertensive heart disease without heart failure  Assessment & Plan  Patient has had multiple elevated blood pressure since admission to rehab however no history of elevated blood pressure in the past   Continue lisinopril and Norvasc as per holding parameters    Late onset Alzheimer's disease with behavioral disturbance ([facility] [address] )  Assessment & Plan  Continue home medication Namenda and Aricept and seroquel  Patient appears to be at her baseline  She has some periods of being a little aggressive as well  Continue dementia supportive treatment  Continue Seroquel daily at bedtime for behavior control  Continue Depakote for mood control    Primary insomnia  Assessment & Plan  Patient was on Seroquel and melatonin, will continue  Stable for now    Syncope and collapse  Assessment & Plan  Patient seen by Neurology at [facility] and there was concern for possible seizure disorder  She was placed on Depakote and her dose has been gradually increased from 125 t i d  To 250 t i d  Today    Will need outpatient follow-up with Neurology upon discharge    Chronic UTI  Assessment & Plan  Patient is on daily keflex  Family is concerned that her change in behavior and lethargy and worsening headache is secondary to another UT I  There has been no fevers chills reported  Will check a CBC a procalcitonin level and UA and will only treat a UTI if it is significantly positive  She is known to be a chronic colonizer as well of multidrug resistant bacteria    Chronic headache  Assessment & Plan  During her acute care admission, Neurology was consulted who started patient on Depakote on trial   As per neuro recommendations she is to have her Depakote dose gradually titrated out every 2 weeks  She was started on 125 mg t i d   Will increase to 250 mg t i d  Today  Recheck of Depakote level in 4-5 days  After 2 weeks she has to further have her dose titrated up to 375 mg t i d  Should have outpatient follow-up with Neurology scheduled prior to discharge    Paroxysmal atrial fibrillation [facility])  Assessment & Plan  Patient is anticoagulated on Pradaxa  Heart rates are well controlled and she is on no rate control medications    Mixed hyperlipidemia  Assessment & Plan  Continue statin      VTE Pharmacologic Prophylaxis:   Pharmacologic: Dabigatran (Pradaxa)  Mechanical VTE Prophylaxis in Place: no    Patient Centered Rounds: I have performed bedside rounds with nursing staff today  Discussions with Specialists or Other Care Team Provider:  None    Education and Discussions with Family / Patient:  Discussed with patient's daughter at bedside    Time Spent for Care: 30 minutes  More than 50% of total time spent on counseling and coordination of care as described above  Current Length of Stay: 6 day(s)    Current Patient Status: SNF Short Term Inpatient     Discharge Plan:  Pending progress    Code Status: Level 3 - DNAR and DNI      Subjective:   As per the patient daughter she is having increased agitation and change in mental status and she is very concerned that she might have a UTI again      Objective:     Vitals:   Temp (24hrs), Av 3 °F (36 3 °C), Min:96 2 °F (35 7 °C), Max:98 4 °F (36 9 °C)    Temp:  [96 2 °F (35 7 °C)-98 4 °F (36 9 °C)] 97 1 °F (36 2 °C)  HR:  [79-88] 81  Resp:  [16-22] 16  BP: (129-186)/(64-88) 142/75  SpO2:  [95 %-98 %] 98 %  Body mass index is 24 33 kg/m²  Input and Output Summary (last 24 hours):     No intake or output data in the 24 hours ending 01/02/20 1334    Physical Exam:     Physical Exam   HENT:   Head: Normocephalic and atraumatic  Right Ear: External ear normal    Left Ear: External ear normal    Mouth/Throat: Oropharynx is clear and moist    Eyes: Conjunctivae and EOM are normal    Neck: Normal range of motion  Neck supple  Cardiovascular: Normal rate, regular rhythm and normal heart sounds  Pulmonary/Chest: Effort normal and breath sounds normal    Abdominal: Soft  Bowel sounds are normal  She exhibits no mass  There is no tenderness  There is no rebound and no guarding  Genitourinary:   Genitourinary Comments: deferred   Musculoskeletal: Normal range of motion  Neurological: She has normal reflexes  Somnolent but arousable  Pleasantly confused   Skin: Skin is warm and dry  No rash noted  Psychiatric:   restless   Nursing note and vitals reviewed  Additional Data:     Labs:    Results from last 7 days   Lab Units 12/30/19  0459   WBC Thousand/uL 9 20   HEMOGLOBIN g/dL 10 6*   HEMATOCRIT % 32 2*   PLATELETS Thousands/uL 412     Results from last 7 days   Lab Units 12/30/19  0459   SODIUM mmol/L 137   POTASSIUM mmol/L 3 8   CHLORIDE mmol/L 105   CO2 mmol/L 23   BUN mg/dL 12   CREATININE mg/dL 0 58*   ANION GAP mmol/L 9   CALCIUM mg/dL 8 9   GLUCOSE RANDOM mg/dL 95                           * I Have Reviewed All Lab Data Listed Above  * Additional Pertinent Lab Tests Reviewed:  [address] [facility] Admission Reviewed    Imaging:    Imaging Reports Reviewed Today Include: none  Imaging Personally Reviewed by Myself Includes:  none    Recent Cultures (last 7 days):           Last 24 Hours Medication List:     Current Facility-Administered Medications:  acetaminophen 650 mg Oral Q12H PRN Daljit Bush MD   acetaminophen 975 mg Oral Q8H Brenda Doran MD   amLODIPine 5 mg Oral BID Jossie Baldwin MD   atorvastatin 20 mg Oral Daily With Asher Carreon MD   calcium carbonate-vitamin D 1 tablet Oral BID With Meals Jossie Baldwin MD   cephalexin 250 mg Oral Daily Daljit Bush MD   dabigatran etexilate 150 mg Oral Q12H Brenda Doran MD   divalproex sodium 250 mg Oral TID Jossie Baldwin MD   docusate sodium 100 mg Oral BID PRN Brittani Marrero MD   donepezil 10 mg Oral HS Daljit Bush MD   gabapentin 100 mg Oral HS Daljit Bush MD   lidocaine 1 patch Topical Daily Daljit Bush MD   lisinopril 10 mg Oral Daily Jossie Baldwin MD   melatonin 6 mg Oral HS Daljit Bush MD   memantine 10 mg Oral BID Daljit Bush MD   oxyCODONE 2 5 mg Oral Q6H PRN Jossie Baldwin MD   pantoprazole 40 mg Oral Daily Before [place] MD Zackery   polyethylene glycol 17 g Oral Daily PRN Daljit Bush MD   QUEtiapine 25 mg Oral HS Daljit Bush MD   saccharomyces boulardii 250 mg Oral BID Daljit Bush MD        Today, Patient Was Seen By: Jossie Baldwin MD    ** Please Note: Dictation voice to text software may have been used in the creation of this document   **

## 2020-01-02 NOTE — ASSESSMENT & PLAN NOTE
Patient is on daily keflex  Family is concerned that her change in behavior and lethargy and worsening headache is secondary to another UT I  There has been no fevers chills reported  Will check a CBC a procalcitonin level and UA and will only treat a UTI if it is significantly positive    She is known to be a chronic colonizer as well of multidrug resistant bacteria

## 2020-01-02 NOTE — SOCIAL WORK
NERISSA sent referral for all DME to [facility]  Daughter will be receiving everything but the shower chair as she plans to use 3 in 1 commode for this  SW received call back from [facility] reporting in 2020 no longer [place] coverage for incontinence supplies  NERISSA relayed to the daughter who does not feel pt needs anymore  Discharge was planned for tomorrow however pt with increased agitation and concern for possible UTI  Attending notified  NERISSA will continue to follow

## 2020-01-02 NOTE — OCCUPATIONAL THERAPY NOTE
Therapy attempted this PM with patient in bed with increased fatigue  Patient's family at bedside and reports increased fatigue and pain and + new UTI  Family requested patient not be awoken at this time       Evelyne Pitts  1/2/2020

## 2020-01-02 NOTE — ASSESSMENT & PLAN NOTE
Patient has had multiple elevated blood pressure since admission to rehab however no history of elevated blood pressure in the past   Continue lisinopril and Norvasc as per holding parameters

## 2020-01-02 NOTE — NURSING NOTE
attempted to do BIMS, PHQ9 and pain assessment but resident not cooperative and becoming agitated  This is patietns usual behavior  Will perform staff assessments for these sections of the [facility]

## 2020-01-02 NOTE — OCCUPATIONAL THERAPY NOTE
MCDONALD attempted this AM with patient in bed sleeping  Per nursing patient been sleeping more this AM but was HIGHLY agitated this AM biting, punching and hitting  MD notified for possible UTI which daughter states patient gets chronic UTI's  Nursing stated holding patient treatment at this time to allow her to rest    Daughter not present      Moises Collado  1/2/2020

## 2020-01-02 NOTE — NURSING NOTE
Patient lethargic ans sleeping most of there day  Complaining of headaches and dizziness  Labs drawn as per order  Vitals 142/75 HR 81 resp 18 and temp 97 1    Will continue to monitor

## 2020-01-02 NOTE — ASSESSMENT & PLAN NOTE
Patient seen by Neurology at [facility] and there was concern for possible seizure disorder  She was placed on Depakote and her dose has been gradually increased from 125 t i d  To 250 t i d  Today    Will need outpatient follow-up with Neurology upon discharge

## 2020-01-02 NOTE — NURSING NOTE
Met with the family  Patient more restless, and agitated over the past 24-48 hours  Also yelling, biting and punching  Difficult to redirect  Per dtr she is being treated for chronic UTIs and she is usually "without symptoms"  No noted changes in bladder pattern per family or staff no fever  Per dtr she has had a decrease in appetite, and the patient was angry that the dtr was in to encourage her to eat and drink  Dr Natalie Schmid notified  Will continue to monitor for safety

## 2020-01-03 VITALS
OXYGEN SATURATION: 98 % | HEART RATE: 76 BPM | BODY MASS INDEX: 26.09 KG/M2 | DIASTOLIC BLOOD PRESSURE: 76 MMHG | TEMPERATURE: 97.7 F | WEIGHT: 141.76 LBS | RESPIRATION RATE: 18 BRPM | HEIGHT: 62 IN | SYSTOLIC BLOOD PRESSURE: 146 MMHG

## 2020-01-03 LAB — VIT B12 SERPL-MCNC: 934 PG/ML (ref 100–900)

## 2020-01-03 PROCEDURE — 97535 SELF CARE MNGMENT TRAINING: CPT

## 2020-01-03 PROCEDURE — 97530 THERAPEUTIC ACTIVITIES: CPT

## 2020-01-03 PROCEDURE — 97116 GAIT TRAINING THERAPY: CPT

## 2020-01-03 PROCEDURE — 99316 NF DSCHRG MGMT 30 MIN+: CPT | Performed by: FAMILY MEDICINE

## 2020-01-03 RX ORDER — LISINOPRIL 10 MG/1
10 TABLET ORAL DAILY
Qty: 30 TABLET | Refills: 0 | Status: SHIPPED | OUTPATIENT
Start: 2020-01-04 | End: 2020-02-03

## 2020-01-03 RX ORDER — DIVALPROEX SODIUM 250 MG/1
250 TABLET, DELAYED RELEASE ORAL EVERY 12 HOURS SCHEDULED
Qty: 60 TABLET | Refills: 0 | Status: SHIPPED | OUTPATIENT
Start: 2020-01-03 | End: 2020-01-29 | Stop reason: CLARIF

## 2020-01-03 RX ORDER — GABAPENTIN 100 MG/1
100 CAPSULE ORAL
Qty: 30 CAPSULE | Refills: 0 | Status: SHIPPED | OUTPATIENT
Start: 2020-01-03 | End: 2020-01-29 | Stop reason: CLARIF

## 2020-01-03 RX ORDER — AMLODIPINE BESYLATE 5 MG/1
5 TABLET ORAL DAILY
Qty: 30 TABLET | Refills: 0 | Status: SHIPPED | OUTPATIENT
Start: 2020-01-03

## 2020-01-03 RX ORDER — B-COMPLEX WITH VITAMIN C
1 TABLET ORAL 2 TIMES DAILY WITH MEALS
Qty: 60 TABLET | Refills: 0 | Status: SHIPPED | OUTPATIENT
Start: 2020-01-03 | End: 2020-01-29 | Stop reason: CLARIF

## 2020-01-03 RX ORDER — ACETAMINOPHEN 325 MG/1
650 TABLET ORAL EVERY 6 HOURS PRN
Qty: 30 TABLET | Refills: 0 | Status: SHIPPED | OUTPATIENT
Start: 2020-01-03

## 2020-01-03 RX ORDER — LANOLIN ALCOHOL/MO/W.PET/CERES
6 CREAM (GRAM) TOPICAL
Qty: 60 TABLET | Refills: 0 | Status: SHIPPED | OUTPATIENT
Start: 2020-01-03 | End: 2020-01-29 | Stop reason: CLARIF

## 2020-01-03 RX ADMIN — Medication 250 MG: at 08:51

## 2020-01-03 RX ADMIN — PANTOPRAZOLE SODIUM 40 MG: 40 TABLET, DELAYED RELEASE ORAL at 06:27

## 2020-01-03 RX ADMIN — MEMANTINE 10 MG: 10 TABLET ORAL at 08:51

## 2020-01-03 RX ADMIN — LISINOPRIL 10 MG: 10 TABLET ORAL at 08:51

## 2020-01-03 RX ADMIN — DABIGATRAN ETEXILATE MESYLATE 150 MG: 75 CAPSULE ORAL at 08:51

## 2020-01-03 RX ADMIN — OYSTER SHELL CALCIUM WITH VITAMIN D 1 TABLET: 500; 200 TABLET, FILM COATED ORAL at 08:50

## 2020-01-03 RX ADMIN — ACETAMINOPHEN 975 MG: 325 TABLET ORAL at 06:28

## 2020-01-03 RX ADMIN — CEPHALEXIN 250 MG: 250 CAPSULE ORAL at 08:51

## 2020-01-03 NOTE — ASSESSMENT & PLAN NOTE
Patient was admitted for T2 compression fracture after unwitnessed fall at home  She is now aftercare following surgery of the nervous system  gabapentin HS for pain ineffective  Tylenol to 500mg Q 8  Family does not want her to be on any narcotics  T12 kyphoplasty with 2 ml of PMMA done by intervention Radiology on 12/26   Stable for now    Continue routine postop care and physical therapy and occupational therapy via home vna

## 2020-01-03 NOTE — NURSING NOTE
AVS reviewed with daughter , escripts sent  Instructed to f/u with PCP  Patient left with volunteer in wheelchair and not in distress  Daughter to meet at the front door

## 2020-01-03 NOTE — PLAN OF CARE
Problem: PHYSICAL THERAPY ADULT  Goal: Performs mobility at highest level of function for planned discharge setting  See evaluation for individualized goals  Description  Treatment/Interventions: Functional transfer training, Elevations, LE strengthening/ROM, Therapeutic exercise, Endurance training, Cognitive reorientation, Patient/family training, Equipment eval/education, Bed mobility, Gait training, Spoke to nursing  Equipment Recommended: (TBD)       See flowsheet documentation for full assessment, interventions and recommendations  Outcome: Adequate for Discharge  Note:   Prognosis: Fair  Problem List: Decreased strength, Decreased range of motion, Decreased endurance, Impaired balance, Decreased mobility, Decreased coordination, Decreased cognition, Impaired judgement, Decreased safety awareness, Impaired hearing, Impaired vision, Pain  Assessment: Pt is discharged from skilled PT effective today, 01/03/20  Pt remains on [facility] Unit with plans to return to home with family today, if medically cleared  Family will be available to provide 24/7 Supervision and assist as needed  Since 12/28/19 PT Eval, pt was seen for 3 skilled PT tx sessions for bed nobility, theract, and gait/elevation training  Despite ongoing pain issues, severely impaired cognition, and very low motivation; pt with good progress during today's skilled PT tx session  Improvement assessed with: functional strength, balance, activity tolerance,  bed mobility, transfers, and gait/elevations  Please grid below and flowsheet, for details on pt's functional mobility status at discharge  Barriers to Discharge: Inaccessible home environment(Below functioanl baseline, impaired cognition)     Recommendation: 24 hour supervision/assist, D/C PT, Home with family support, Home PT     PT - OK to Discharge: Yes(When medically cleared)    See flowsheet documentation for full assessment

## 2020-01-03 NOTE — ASSESSMENT & PLAN NOTE
Patient seen by Neurology at [facility] and there was concern for possible seizure disorder  She was placed on Depakote and her dose has been gradually increased from 125 t i d  To 250 b i d  Today    Will need outpatient follow-up with Neurology upon discharge

## 2020-01-03 NOTE — ASSESSMENT & PLAN NOTE
During her acute care admission, Neurology was consulted who started patient on Depakote on trial   As per neuro recommendations she is to have her Depakote dose gradually titrated out every 2 weeks  She was started on 125 mg t i d   Will increase to 250 mg bid  Check Depakote level outpatient in 1 week  Family is very conservative and does not want any rapid up titration of any medication as they are worried about side effects    Should have outpatient follow-up with Neurology

## 2020-01-03 NOTE — SOCIAL WORK
Daughter confirmed all medical equipment was delivered to the home  Patient medically cleared for discharge home today  Daughter would like to take patient home as soon as possible  Attending notified  [address] discussed with daughter, copy signed and faxed to insurance  Transfer letter provided in patient's discharge folder, copy placed in scan bin  A post acute care recommendation was made by your care team for home health care  Discussed Freedom of Choice with patient's daughter  List of providers discussed with patient's dtr  Patient's dtr aware the list is custom filtered for them by preferred provider and that [facility]'s post acute providers are designated  [facility]

## 2020-01-03 NOTE — OCCUPATIONAL THERAPY NOTE
OCCUPATIONAL THERAPY TREATMENT AND DISCHARGE SUMMARY    Pt seen in conjunction with PT  Pt highly limited by cognition, behaviors, safety awareness, weakness, pain and activity tolerance impacting overall engagement in functional activity  Co-treatment aimed to maximize function, activity tolerance, and safety; both OT and PT goals addressed separately throughout session  TIME: 0758 - 0824 7 Ind 19 Cotx    VITALS: 130/62 HR 76   PAIN: pain in lower back; headache; dizziness unable to quantify   COGNITION: impaired; limited participation; H/O Alzheimer's Disease   PRECAUTIONS: TLSO brace (refuses), WBAT, Contact/isolation, impulsive, Cognitive, Chair/bed alarms, WBS, Seizure, Pain, Spinal precautions, visual impairment    EXPIRATION DATE: 1/10/20  TREATMENT DAY: 6    ASSESSMENT:    Pt fatigued upon initiation of session  No family present  Willing to participate in bed mobility, toileting (S), grooming (S), functional mobility short household distances (60 ft S) and oral hygiene (S) this AM  As per nursing report AM ADL completed as follows: eating (Setup), bathing (Min/Mod A), UB dressing (S), LB dressing (Min/Mod A), and don/doff footwear (Min/Mod A)   Pt limited by cognition, behaviors, participation, decreased insight to deficits , impaired problem solving, difficulty sequencing , decreased STM, Safety awareness, impulsivity , pain, weakness, fatigue, activity tolerance , static/dynamic standing balance  and spinal precautions  Pt educated on  safe functional transfer techniques with appropriate body mechanics  Pt ready for d/c when medically able  Pt supine in bed with call bell in reach to conclude session  Alarms activated       DISPOSITION: Home OT with 24/7 supervision/assist     AE/DME: RW, hospital bed    DISCHARGE SUMMARY: Pt discharged from OT caseload 1/3/20 with anticipated d/c to daughter's home 1/3/20 pending urinalysis  Participated in a total of 6/7 OT sessions   Pt achieved 5/12 goals as per POC  Goals not achieved d/t cognition, behaviors, participation, decreased insight to deficits , impaired problem solving, difficulty sequencing , decreased STM, Safety awareness, impulsivity , pain, weakness, fatigue, activity tolerance , static/dynamic standing balance  and spinal precautions   Recommend home therapy focus on above noted deficits and further achieve independence in ADL related tasks in new home environment  Pt is safe to live with daughter, with 24/7 supervision/Assist  No further concerns noted  Goals STG achieved within 2 weeks Performance at Initial Evaluation 12/27/2019  Current Performance (last date completed)   Grooming while standing at sink S  MET Min A 1/3 S insert dentures/complete oral hygiene/handwashing   12/30 Supervision + cues for handwashing (cues to remember to use soap)  12/28 REFUSED sinkside getting very aggitiated therefore set up seated for oral care completing with MI upon set up  12/27 Min A    ADL transfers  S  MET Min A 1/3 12/30 STS MI, Supervision SPT (VERY IMPULSIVE- DECREASED SAFETY AWARENESS)  12/28-- VERY impulsive   STS MI (poor safety awareness); CGA/CS SPT and mobility with RW (patient appeared to transfer best in bathroom without AD)  12/27 Min A impulsive    Bathroom mobility with appropriate AD S  NOT MET  Unable to assess 1/3 CGA/HHA   12/30 NEEDS CUES TO STAY WITH WALKER (WILL PUSH AWAY WHEN APPROACHING SURFACES)-- overall supervision needed    Was walking in room without AD earlier in AM when MCDONALD answered chair alarm (decreased insight)   12/28 CS/CGA + RW and short distance without as she pushed walker away quickly in frustration    Don/Doff TLSO  Min A  NOT MET - refused      1/3 refused TLSO -- dtr reports abd binder for d/c   12/30 refuses to wear-- daughter requesting abd binder for support as TLSO brace will "just be a fight for her"  12/28 REFUSED-- daughter states brace not effective   Requesting different brace stating present brace only further adds to frustration     12/27 Pt declined brace-- impulsive into txfr without TLSO    UB ADL  S  MET Min A 12/28 MI upon set up, cues and MAX encouragement   12/27 Min A    LB ADL, AE PRN S  NOT MET  Mod A 12/28 Doff mod to encourage participation due to resistance    Washed with Supervision + v/c and dressed with supervision + cues (tailor sitting)  12/27 Mod A   Toileting/clothing management and hygiene S  MET Mod A 1/3 S in stance for CM   12/30 CM- S/MI, Hygiene S/MI (Supervision for safety and sequencing)  Patient reports beng aware of loose stool and incontinence    12/28 CGA/CS  12/27 Mod A   Dynamic standing balance for increased safety when completing purposeful tasks F/F+  NOT MET 2* consistency  P+ 1/3 F/F+ static F/F- dynamic   12/30 Fair/Fair+ for toileting/transfers   12/28 Fair  12/27 P+   Increase standing tolerance for inc'd safety with standing purposeful tasks 4-7 min  NOT MET  <1 min  12/30 3 minutes (self limits with confustion and difficulty getting patient to attend to functional tasks)  12/28 2-3 min with ADL/mobility  12/27 <1 min   Participate in therex 1-3x/week for inc'd overall stamina/activity tolerance for purposeful tasks To be completed   NOT MET    12/30 unable to get patient to cooperate early AM prior to daughter arrival   Later PM daughter feels only exercise patient may get upon d/c will be to/from bathroom frequently throughout day  12/28 ADL only  12/27 Resistive to MMT   shower stall transfer (NO DME) S  NOT MET  Unable to assess 12/30 unable to assess (left bathroom post toileting and headed to bed)  12/28 CGA/CS + MAX encouragement    Bed mobility with HOB flat  S  MET Min A  1/3 S   12/30 supervision (poor tech for back protection, unable to get patient to correct  Patient almost climbs into bed and can fix self upon supine    Daughter reports her method to be the same she would always complete PTA)  12/28 Supervision (unable to ed on spinal precautions)  12/27 Sup<>sit S  Rolling S         [employer], Duane Rakesh 87, OTR/L

## 2020-01-03 NOTE — PLAN OF CARE
Problem: OCCUPATIONAL THERAPY ADULT  Goal: Performs self-care activities at highest level of function for planned discharge setting  See evaluation for individualized goals  Description  Treatment Interventions: ADL retraining, Functional transfer training, UE strengthening/ROM, Endurance training, Cognitive reorientation, Patient/family training, Equipment evaluation/education, Neuromuscular reeducation, Compensatory technique education, Continued evaluation, Energy conservation, Activityengagement          See flowsheet documentation for full assessment, interventions and recommendations  Outcome: Progressing  Note:   Limitation: Decreased ADL status, Decreased UE ROM, Decreased UE strength, Decreased Safe judgement during ADL, Decreased cognition, Decreased endurance, Visual deficit, Decreased self-care trans, Decreased high-level ADLs, Mood limitation  Prognosis: Good, Fair  Assessment: Pt fatigued upon initiation of session  No family present  Willing to participate in bed mobility, toileting (S), grooming (S), functional mobility short household distances (60 ft S) and oral hygiene (S) this AM  As per nursing report AM ADL completed as follows: eating (Setup), bathing (Min/Mod A), UB dressing (S), LB dressing (Min/Mod A), and don/doff footwear (Min/Mod A)   Pt limited by cognition, behaviors, participation, decreased insight to deficits , impaired problem solving, difficulty sequencing , decreased STM, Safety awareness, impulsivity , pain, weakness, fatigue, activity tolerance , static/dynamic standing balance  and spinal precautions  Pt educated on  safe functional transfer techniques with appropriate body mechanics  Pt ready for d/c when medically able  Pt supine in bed with call bell in reach to conclude session   Alarms activated      OT Discharge Recommendation: Home OT(with 24/7 supervision/assist )  OT - OK to Discharge: No

## 2020-01-03 NOTE — DISCHARGE SUMMARY
Discharge- Kyle Mueller 1933, 80 y o  female MRN: [id number]    Unit/Bed#: -01 Encounter: [id number]    Primary Care Provider: Nicola White DO   Date and time admitted to hospital: 12/27/2019 11:21 AM        * Thoracic vertebral collapse, initial encounter [facility])  Assessment & Plan  Patient was admitted for T2 compression fracture after unwitnessed fall at home  She is now aftercare following surgery of the nervous system  gabapentin HS for pain ineffective  Tylenol to 500mg Q 8  Family does not want her to be on any narcotics  T12 kyphoplasty with 2 ml of PMMA done by intervention Radiology on 12/26   Stable for now  Continue routine postop care and physical therapy and occupational therapy via home vna    Hypertensive heart disease without heart failure  Assessment & Plan  Patient has had multiple elevated blood pressure since admission to rehab however no history of elevated blood pressure in the past   Continue lisinopril and Norvasc as per holding parameters    Late onset Alzheimer's disease with behavioral disturbance ([facility] [address] )  Assessment & Plan  Continue home medication Namenda and Aricept and seroquel  Patient appears to be at her baseline  She has some periods of being a little aggressive as well  Continue dementia supportive treatment  Continue Seroquel daily at bedtime for behavior control  Continue Depakote for mood control    Primary insomnia  Assessment & Plan  Patient was on Seroquel and melatonin, will continue  Stable for now    Syncope and collapse  Assessment & Plan  Patient seen by Neurology at [facility] and there was concern for possible seizure disorder  She was placed on Depakote and her dose has been gradually increased from 125 t i d  To 250 b i d  Today    Will need outpatient follow-up with Neurology upon discharge    Chronic UTI  Assessment & Plan  Patient is on daily keflex for chronic suppressive treatment of UTIs  Family is concerned that her change in behavior and lethargy and worsening headache is secondary to another UT I  There has been no fevers chills reported  Labs, UA reviewed and no evidence of any infection  Reassure the family that it is not a new UTI    Chronic headache  Assessment & Plan  During her acute care admission, Neurology was consulted who started patient on Depakote on trial   As per neuro recommendations she is to have her Depakote dose gradually titrated out every 2 weeks  She was started on 125 mg t i d   Will increase to 250 mg bid  Check Depakote level outpatient in 1 week  Family is very conservative and does not want any rapid up titration of any medication as they are worried about side effects  Should have outpatient follow-up with Neurology     Paroxysmal atrial fibrillation [facility])  Assessment & Plan  Patient is anticoagulated on Pradaxa  Heart rates are well controlled and she is on no rate control medications    Mixed hyperlipidemia  Assessment & Plan  Continue statin      Discharging Physician / Practitioner: Andres Hull MD  PCP: Brenda Alvarez DO  Admission Date:   Admission Orders (From admission, onward)     Ordered        01/03/20 1012  Admit Patient to  Once                   Discharge Date: 01/03/20    Resolved Problems  Date Reviewed: 1/3/2020    None          Consultations During Hospital Stay:  · Physical therapy and occupational therapy    Procedures Performed:   · None    Significant Findings / Test Results:   · None    Incidental Findings:   · None     Test Results Pending at Discharge (will require follow up): · None     Outpatient Tests Requested:  · None    Complications:  None    Reason for Admission:  Back pain    Hospital Course:     Cheryl Lopez is a 80 y o  female patient who originally presented to the hospital on 12/27/2019 due to back pain  Patient is a pleasantly confused patient chronic dementia with behavioral disturbance    She had an episode of syncope and collapse and there is suspicion that she might have underlying seizure disorder and was seen by Neurology and placed on Depakote  While she was here in rehab she was found have elevated blood pressures which was a new occurrence for her and was placed on antihypertensive agents  She has been doing well and has chronic headaches  Family is very concerned on her taking any pain medications including narcotics or any new medications  She has been advised to follow up outpatient with Neurology and they will also be home visiting nurses and physical therapy to follow up with her upon discharge  Please see above list of diagnoses and related plan for additional information  Condition at Discharge: fair     Discharge Day Visit / Exam:     Subjective:  Patient denies any chest pain or shortness of breath  She states she has chronic headaches which are 3 x 10  She is pleasantly confused   Vitals: Blood Pressure: 146/76 (01/03/20 0716)  Pulse: 76 (01/03/20 0716)  Temperature: 97 7 °F (36 5 °C) (01/03/20 0716)  Temp Source: Temporal (01/03/20 0716)  Respirations: 18 (01/03/20 0716)  Height: 5' 2" (157 5 cm) (12/27/19 1607)  Weight - Scale: 64 3 kg (141 lb 12 1 oz) (12/31/19 9337)  SpO2: 98 % (01/03/20 0716)  Exam:   Physical Exam   Constitutional: She appears well-developed and well-nourished  HENT:   Head: Normocephalic and atraumatic  Right Ear: External ear normal    Left Ear: External ear normal    Mouth/Throat: Oropharynx is clear and moist    Eyes: Conjunctivae and EOM are normal    Neck: Normal range of motion  Neck supple  Cardiovascular: Normal rate, regular rhythm and normal heart sounds  Pulmonary/Chest: Effort normal and breath sounds normal    Abdominal: Soft  Bowel sounds are normal  She exhibits no mass  There is no tenderness  There is no rebound and no guarding  Genitourinary:   Genitourinary Comments: deferred   Musculoskeletal: Normal range of motion  She exhibits no edema  Neurological: She is alert   She has normal reflexes  Oriented to person only   Skin: Skin is warm and dry  No rash noted  Psychiatric:   Angry and restless at times   Nursing note and vitals reviewed  Discussion with Family:  Discussed with daughter at bedside    Discharge instructions/Information to patient and family:   See after visit summary for information provided to patient and family  Provisions for Follow-Up Care:  See after visit summary for information related to follow-up care and any pertinent home health orders  Disposition:     Home with VNA Services (Reminder: Complete face to face encounter)    For Discharges to [facility] SNF:   · Not Applicable to this Patient - Not Applicable to this Patient    Planned Readmission:  None     Discharge Statement:  I spent 35 minutes discharging the patient  This time was spent on the day of discharge  I had direct contact with the patient on the day of discharge  Greater than 50% of the total time was spent examining patient, answering all patient questions, arranging and discussing plan of care with patient as well as directly providing post-discharge instructions  Additional time then spent on discharge activities  Discharge Medications:  See after visit summary for reconciled discharge medications provided to patient and family        ** Please Note: This note has been constructed using a voice recognition system **

## 2020-01-03 NOTE — ASSESSMENT & PLAN NOTE
Patient is on daily keflex for chronic suppressive treatment of UTIs  Family is concerned that her change in behavior and lethargy and worsening headache is secondary to another UT I  There has been no fevers chills reported  Labs, UA reviewed and no evidence of any infection    Reassure the family that it is not a new UTI

## 2020-01-03 NOTE — PHYSICAL THERAPY NOTE
Physical Therapy Daily Treatment Note and Discharge Summary       01/03/20:24 Minutes Total Time (8:05 to 8:29)    19 minutes Co-treatment ( 8:05 to 8:24) ->Co-treatment with OT completed  Pt highly limited by pain and decreased motivation to participate in rehab,  thus impacting overall engagement in functional activity  Co-treatment aimed to maximize activity tolerance and maintain pt safety with onset of pain; both PT and OT goals separately addressed throughout session  5 minutes Individual Treatment ( 8:24 to 8:29)      Pain Assessment   Pain Assessment Hsieh-Sanchez FACES   Pain Score   (When asked for pain level, pt stated ' I'm tired and dizzy")   Hsieh-Sanchez FACES Pain Rating 4   Pain Location Head;Back   Pain Descriptors Patient unable to describe   Pain Frequency Constant/continuous  (Pt with hx of chronic low back pain)   Pain Onset Ongoing   Clinical Progression Not changed   Effect of Pain on Daily Activities   (Limits participation and mobility)   Restrictions/Precautions   Weight Bearing Precautions Per Order No   Braces or Orthoses   (Abdominal binder for comforrt)   Other Precautions Contact/isolation; Chair Alarm; Bed Alarm;Cognitive; Fall Risk;Pain;Hard of hearing;Visual impairment;Spinal precautions; Impulsive   General   Family/Caregiver Present No   Cognition   Overall Cognitive Status Impaired   Arousal/Participation Cooperative   Attention Difficulty attending to directions   Orientation Level Oriented to person;Disoriented to place; Disoriented to time;Disoriented to situation  (Unable to recall age and birthdate/year)   Memory Decreased long term memory;Decreased recall of biographical information;Decreased short term memory;Decreased recall of recent events;Decreased recall of precautions   Following Commands Follows one step commands inconsistently   Comments   (Impaired safety, decreased insight/judgement into deficits)   Bed Mobility   Rolling R 5  Supervision  (HOB flat, +rail)   Additional items Increased time required;Verbal cues   Rolling L 5  Supervision  (HOB flat, +rail)   Additional items Increased time required;Verbal cues   Supine to Sit 5  Supervision  (HOB elevated, no rail)   Additional items Increased time required  (+ log roll)   Sit to Supine 5  Supervision  (HOB lfat, no rail)   Additional items Increased time required  (Crawled into bed -> Pt performed quadruped to R sidelying)   Additional Comments   (Increased effort with all aspects of bed mobility)   Transfers   Sit to Stand   (CG A)   Additional items Increased time required  (Good hand placement)   Stand to Sit   (CG A)   Additional items Increased time required;Verbal cues  (Good hand placement, assisted to control descent)   Stand pivot   (SPT with CG HHA ( hand held assistance))   Additional items Increased time required;Verbal cues   Toilet transfer   (Sit <->stand CG A; SPT with CG HHA )   Additional items Increased time required;Standard toilet  (Cues to completely back up to toilet)   Car transfer Unable to assess  (Pt declined to get back out of bed)   Additional Comments Refused to use RW for transfers  (Transfers: EOB, toilet, recliner chair)   Ambulation/Elevation   Gait pattern Decreased foot clearance;Narrow LESLY; Antalgic; Improper Weight shift; Short stride; Excessively slow   Gait Assistance   (CG A)   Additional items   (Refused to use RW for ambulation)   Assistive Device   (HHA ( hand held assistance))   Distance 20 feet, 60 feet  (Negotiated 4 turns during 60 feet amb trial)   Stair Management Assistance   (CG A)   Additional items Tactile cues; Verbal cues   Stair Management Technique Step to pattern; Foreward; No rails  (Hand held assistance and 1 hand on door frame)   Number of Stairs 2   Curbs 1 curb step without rails and CG HHA   Balance   Static Sitting Fair   Dynamic Sitting Fair   Static Standing Fair  (Supported )   Dynamic Standing   (Fair (-) supported with hand held assistance)   Ambulatory   (Fair (-) supported with hand held assistance)   Higher level balance Side stepping   Higher level balance rep range to the L and to the R  (Fair (-) supported with hand held assistance)   Endurance Deficit   Endurance Deficit Yes   Endurance Deficit Description Post amb and transfer into bed: /62, HR 76  (Decreased endurance for activity)   Activity Tolerance   Activity Tolerance Patient limited by pain  (Patient limited by severely impaired cognitive status)   Assessment   Prognosis Fair   Problem List Decreased strength;Decreased range of motion;Decreased endurance; Impaired balance;Decreased mobility; Decreased coordination;Decreased cognition; Impaired judgement;Decreased safety awareness; Impaired hearing; Impaired vision;Pain   Assessment Pt is discharged from skilled PT effective today, 01/03/20  Pt remains on TCF Unit with plans to return to home with family today, if medically cleared  Family will be available to provide 24/7 Supervision and assist as needed  Since 12/28/19 PT Eval, pt was seen for 3 skilled PT tx sessions for bed mobility, theract, and gait/elevation training  Despite ongoing pain issues, severely impaired cognition, and very low motivation; pt with good progress during today's skilled PT tx session  Improvement assessed with: functional strength, balance, activity tolerance,  bed mobility, transfers, and gait/elevations  Please grid below and flowsheet, for details on pt's functional mobility status at discharge  Barriers to Discharge Inaccessible home environment  (Below functioanl baseline, impaired cognition)   Goals   Patient Goals   (" I want to get out of here and go home")   STG Expiration Date 01/11/20   Short Term Goal #1 See grid   PT Treatment Day 3   Plan   Treatment/Interventions ADL retraining;Functional transfer training;LE strengthening/ROM; Elevations; Therapeutic exercise; Endurance training;Cognitive reorientation;Patient/family training;Equipment eval/education; Bed mobility;Gait training; Compensatory technique education;Continued evaluation;Spoke to MD;Spoke to case management;Spoke to nursing;Spoke to advanced practitioner;OT;Family   PT Frequency   (5 to 7x/week)   Recommendation   Recommendation 24 hour supervision/assist;D/C PT;Home with family support;Home PT   Equipment Recommended   [facility] bed, BSC, shower chair with back)   PT - OK to Discharge Yes  (When medically cleared)   Barthel Index   Feeding 5   Bathing 0   Grooming Score 5  (Per conversation with OT [facility]))   Dressing Score 5   Bladder Score 5   Bowels Score 10   Toilet Use Score 5   Mobility (Level Surface) Score 0   Stairs Score 5  (On 2 steps without rails)       Goals STG achieved within 2 weeks Performance at Initial Evaluation (12/28/19) Current Performance (last date completed)      Bed mobility skills including rolling and repositioning to prevent skin breakdown and decrease caregiver burden            Mod I     NOT ACHIEVED       Min A       01/03/20      Supine to sit transitions to increase ease of transfer, allow pt to get out of bed, and decrease caregiver burden          Mod I     NOT ACHIEVED       Min A       01/03/20      Sit to supine transitions to increase ease of transfer, allow pt to get back into bed, and decrease caregiver burden          Mod I     NOT ACHIEVED       Min A       01/03/20      Functional transfers to facilitate safe return to previous living environment           Supervision     NOT ACHIEVED       Min A w/ RW       01/03/20      Ambulation with least restrictive AD; including at least 2 turns for safe household distance ambulation          Supervision     NOT ACHIEVED       Min A w/ RW 30ft       01/03/20      Ascend/descend 3 steps, using appropriate AD and method, no handrails, for safe access to previous living environment          Supervision     NOT ACHIEVED       Not attempted 2* to increased LBP          01/03/20

## 2020-01-09 ENCOUNTER — APPOINTMENT (OUTPATIENT)
Dept: LAB | Age: 85
End: 2020-01-09
Payer: COMMERCIAL

## 2020-01-09 ENCOUNTER — TRANSCRIBE ORDERS (OUTPATIENT)
Dept: ADMINISTRATIVE | Age: 85
End: 2020-01-09

## 2020-01-09 DIAGNOSIS — R55 SYNCOPE AND COLLAPSE: ICD-10-CM

## 2020-01-09 DIAGNOSIS — R55 SYNCOPE AND COLLAPSE: Primary | ICD-10-CM

## 2020-01-09 PROCEDURE — 80164 ASSAY DIPROPYLACETIC ACD TOT: CPT

## 2020-01-09 PROCEDURE — 36415 COLL VENOUS BLD VENIPUNCTURE: CPT

## 2020-01-10 LAB — VALPROATE SERPL-MCNC: 13 UG/ML (ref 50–100)

## 2020-01-29 ENCOUNTER — APPOINTMENT (EMERGENCY)
Dept: CT IMAGING | Facility: HOSPITAL | Age: 85
DRG: 689 | End: 2020-01-29
Payer: COMMERCIAL

## 2020-01-29 ENCOUNTER — HOSPITAL ENCOUNTER (INPATIENT)
Facility: HOSPITAL | Age: 85
LOS: 3 days | Discharge: HOME | DRG: 689 | End: 2020-02-01
Attending: EMERGENCY MEDICINE | Admitting: STUDENT IN AN ORGANIZED HEALTH CARE EDUCATION/TRAINING PROGRAM
Payer: COMMERCIAL

## 2020-01-29 DIAGNOSIS — N39.0 UTI (URINARY TRACT INFECTION): ICD-10-CM

## 2020-01-29 DIAGNOSIS — F02.80 ALZHEIMER DISEASE (HCC): ICD-10-CM

## 2020-01-29 DIAGNOSIS — R51.9 NONINTRACTABLE HEADACHE, UNSPECIFIED CHRONICITY PATTERN, UNSPECIFIED HEADACHE TYPE: ICD-10-CM

## 2020-01-29 DIAGNOSIS — G30.9 ALZHEIMER DISEASE (HCC): ICD-10-CM

## 2020-01-29 DIAGNOSIS — S22.089A: ICD-10-CM

## 2020-01-29 DIAGNOSIS — R42 DIZZINESS: ICD-10-CM

## 2020-01-29 DIAGNOSIS — I21.4 NSTEMI (NON-ST ELEVATED MYOCARDIAL INFARCTION) (HCC): Primary | ICD-10-CM

## 2020-01-29 DIAGNOSIS — R55 SYNCOPE: ICD-10-CM

## 2020-01-29 PROBLEM — N30.01 ACUTE CYSTITIS WITH HEMATURIA: Status: ACTIVE | Noted: 2020-01-29

## 2020-01-29 PROBLEM — I48.20 CHRONIC ATRIAL FIBRILLATION (HCC): Status: ACTIVE | Noted: 2020-01-29

## 2020-01-29 PROBLEM — R77.8 ELEVATED TROPONIN LEVEL NOT DUE MYOCARDIAL INFARCTION: Status: ACTIVE | Noted: 2020-01-29

## 2020-01-29 PROBLEM — N30.00 ACUTE CYSTITIS WITHOUT HEMATURIA: Status: ACTIVE | Noted: 2020-01-29

## 2020-01-29 LAB
ALBUMIN SERPL BCP-MCNC: 3.1 G/DL (ref 3.5–5)
ALP SERPL-CCNC: 77 U/L (ref 46–116)
ALT SERPL W P-5'-P-CCNC: 9 U/L (ref 12–78)
ANION GAP SERPL CALCULATED.3IONS-SCNC: 13 MMOL/L (ref 4–13)
APTT PPP: 53 SECONDS (ref 23–37)
AST SERPL W P-5'-P-CCNC: 12 U/L (ref 5–45)
ATRIAL RATE: 100 BPM
ATRIAL RATE: 63 BPM
ATRIAL RATE: 78 BPM
BACTERIA UR QL AUTO: ABNORMAL /HPF
BASOPHILS # BLD AUTO: 0.04 THOUSANDS/ΜL (ref 0–0.1)
BASOPHILS NFR BLD AUTO: 1 % (ref 0–1)
BILIRUB SERPL-MCNC: 0.35 MG/DL (ref 0.2–1)
BILIRUB UR QL STRIP: ABNORMAL
BUN SERPL-MCNC: 19 MG/DL (ref 5–25)
CALCIUM SERPL-MCNC: 8.9 MG/DL (ref 8.3–10.1)
CHLORIDE SERPL-SCNC: 105 MMOL/L (ref 100–108)
CK SERPL-CCNC: 24 U/L (ref 26–192)
CLARITY UR: ABNORMAL
CO2 SERPL-SCNC: 24 MMOL/L (ref 21–32)
COLOR UR: YELLOW
CREAT SERPL-MCNC: 1 MG/DL (ref 0.6–1.3)
EOSINOPHIL # BLD AUTO: 0.1 THOUSAND/ΜL (ref 0–0.61)
EOSINOPHIL NFR BLD AUTO: 1 % (ref 0–6)
ERYTHROCYTE [DISTWIDTH] IN BLOOD BY AUTOMATED COUNT: 14.3 % (ref 11.6–15.1)
GFR SERPL CREATININE-BSD FRML MDRD: 51 ML/MIN/1.73SQ M
GLUCOSE SERPL-MCNC: 108 MG/DL (ref 65–140)
GLUCOSE UR STRIP-MCNC: NEGATIVE MG/DL
HCT VFR BLD AUTO: 32 % (ref 34.8–46.1)
HGB BLD-MCNC: 10.2 G/DL (ref 11.5–15.4)
HGB UR QL STRIP.AUTO: ABNORMAL
IMM GRANULOCYTES # BLD AUTO: 0.02 THOUSAND/UL (ref 0–0.2)
IMM GRANULOCYTES NFR BLD AUTO: 0 % (ref 0–2)
INR PPP: 1.55 (ref 0.84–1.19)
KETONES UR STRIP-MCNC: NEGATIVE MG/DL
LEUKOCYTE ESTERASE UR QL STRIP: ABNORMAL
LYMPHOCYTES # BLD AUTO: 1.95 THOUSANDS/ΜL (ref 0.6–4.47)
LYMPHOCYTES NFR BLD AUTO: 27 % (ref 14–44)
MAGNESIUM SERPL-MCNC: 2 MG/DL (ref 1.6–2.6)
MCH RBC QN AUTO: 28.6 PG (ref 26.8–34.3)
MCHC RBC AUTO-ENTMCNC: 31.9 G/DL (ref 31.4–37.4)
MCV RBC AUTO: 90 FL (ref 82–98)
MONOCYTES # BLD AUTO: 0.58 THOUSAND/ΜL (ref 0.17–1.22)
MONOCYTES NFR BLD AUTO: 8 % (ref 4–12)
NEUTROPHILS # BLD AUTO: 4.57 THOUSANDS/ΜL (ref 1.85–7.62)
NEUTS SEG NFR BLD AUTO: 63 % (ref 43–75)
NITRITE UR QL STRIP: POSITIVE
NON-SQ EPI CELLS URNS QL MICRO: ABNORMAL /HPF
NRBC BLD AUTO-RTO: 0 /100 WBCS
P AXIS: 78 DEGREES
P AXIS: 83 DEGREES
PH UR STRIP.AUTO: 7 [PH]
PLATELET # BLD AUTO: 278 THOUSANDS/UL (ref 149–390)
PMV BLD AUTO: 10.6 FL (ref 8.9–12.7)
POTASSIUM SERPL-SCNC: 3.7 MMOL/L (ref 3.5–5.3)
PR INTERVAL: 174 MS
PR INTERVAL: 200 MS
PROT SERPL-MCNC: 6.3 G/DL (ref 6.4–8.2)
PROT UR STRIP-MCNC: ABNORMAL MG/DL
PROTHROMBIN TIME: 18.8 SECONDS (ref 11.6–14.5)
QRS AXIS: 49 DEGREES
QRS AXIS: 58 DEGREES
QRS AXIS: 62 DEGREES
QRSD INTERVAL: 78 MS
QRSD INTERVAL: 80 MS
QRSD INTERVAL: 84 MS
QT INTERVAL: 376 MS
QT INTERVAL: 378 MS
QT INTERVAL: 426 MS
QTC INTERVAL: 419 MS
QTC INTERVAL: 425 MS
QTC INTERVAL: 435 MS
RBC # BLD AUTO: 3.57 MILLION/UL (ref 3.81–5.12)
RBC #/AREA URNS AUTO: ABNORMAL /HPF
SODIUM SERPL-SCNC: 142 MMOL/L (ref 136–145)
SP GR UR STRIP.AUTO: 1.01 (ref 1–1.03)
T WAVE AXIS: -54 DEGREES
T WAVE AXIS: 102 DEGREES
T WAVE AXIS: 104 DEGREES
TROPONIN I SERPL-MCNC: 0.05 NG/ML
TROPONIN I SERPL-MCNC: 0.07 NG/ML
TROPONIN I SERPL-MCNC: 0.08 NG/ML
UROBILINOGEN UR QL STRIP.AUTO: 0.2 E.U./DL
VENTRICULAR RATE: 63 BPM
VENTRICULAR RATE: 74 BPM
VENTRICULAR RATE: 77 BPM
WBC # BLD AUTO: 7.26 THOUSAND/UL (ref 4.31–10.16)
WBC #/AREA URNS AUTO: ABNORMAL /HPF

## 2020-01-29 PROCEDURE — 93005 ELECTROCARDIOGRAM TRACING: CPT

## 2020-01-29 PROCEDURE — 93010 ELECTROCARDIOGRAM REPORT: CPT | Performed by: INTERNAL MEDICINE

## 2020-01-29 PROCEDURE — 99285 EMERGENCY DEPT VISIT HI MDM: CPT

## 2020-01-29 PROCEDURE — 85610 PROTHROMBIN TIME: CPT | Performed by: NURSE PRACTITIONER

## 2020-01-29 PROCEDURE — 96374 THER/PROPH/DIAG INJ IV PUSH: CPT

## 2020-01-29 PROCEDURE — 36415 COLL VENOUS BLD VENIPUNCTURE: CPT | Performed by: NURSE PRACTITIONER

## 2020-01-29 PROCEDURE — 81001 URINALYSIS AUTO W/SCOPE: CPT | Performed by: NURSE PRACTITIONER

## 2020-01-29 PROCEDURE — 87186 SC STD MICRODIL/AGAR DIL: CPT | Performed by: PHYSICIAN ASSISTANT

## 2020-01-29 PROCEDURE — 99285 EMERGENCY DEPT VISIT HI MDM: CPT | Performed by: NURSE PRACTITIONER

## 2020-01-29 PROCEDURE — 84484 ASSAY OF TROPONIN QUANT: CPT | Performed by: STUDENT IN AN ORGANIZED HEALTH CARE EDUCATION/TRAINING PROGRAM

## 2020-01-29 PROCEDURE — 99223 1ST HOSP IP/OBS HIGH 75: CPT | Performed by: STUDENT IN AN ORGANIZED HEALTH CARE EDUCATION/TRAINING PROGRAM

## 2020-01-29 PROCEDURE — 85025 COMPLETE CBC W/AUTO DIFF WBC: CPT | Performed by: NURSE PRACTITIONER

## 2020-01-29 PROCEDURE — 82550 ASSAY OF CK (CPK): CPT | Performed by: NURSE PRACTITIONER

## 2020-01-29 PROCEDURE — 87086 URINE CULTURE/COLONY COUNT: CPT | Performed by: PHYSICIAN ASSISTANT

## 2020-01-29 PROCEDURE — 72128 CT CHEST SPINE W/O DYE: CPT

## 2020-01-29 PROCEDURE — 85730 THROMBOPLASTIN TIME PARTIAL: CPT | Performed by: NURSE PRACTITIONER

## 2020-01-29 PROCEDURE — 80053 COMPREHEN METABOLIC PANEL: CPT | Performed by: NURSE PRACTITIONER

## 2020-01-29 PROCEDURE — 83735 ASSAY OF MAGNESIUM: CPT | Performed by: NURSE PRACTITIONER

## 2020-01-29 PROCEDURE — 84484 ASSAY OF TROPONIN QUANT: CPT | Performed by: NURSE PRACTITIONER

## 2020-01-29 PROCEDURE — 87077 CULTURE AEROBIC IDENTIFY: CPT | Performed by: PHYSICIAN ASSISTANT

## 2020-01-29 PROCEDURE — 70450 CT HEAD/BRAIN W/O DYE: CPT

## 2020-01-29 RX ORDER — ATORVASTATIN CALCIUM 10 MG/1
20 TABLET, FILM COATED ORAL
Status: DISCONTINUED | OUTPATIENT
Start: 2020-01-29 | End: 2020-02-01 | Stop reason: HOSPADM

## 2020-01-29 RX ORDER — KETOROLAC TROMETHAMINE 30 MG/ML
15 INJECTION, SOLUTION INTRAMUSCULAR; INTRAVENOUS ONCE
Status: COMPLETED | OUTPATIENT
Start: 2020-01-29 | End: 2020-01-29

## 2020-01-29 RX ORDER — ACETAMINOPHEN 325 MG/1
650 TABLET ORAL ONCE
Status: COMPLETED | OUTPATIENT
Start: 2020-01-29 | End: 2020-01-29

## 2020-01-29 RX ORDER — MEMANTINE HYDROCHLORIDE 5 MG/1
10 TABLET ORAL 2 TIMES DAILY
Status: DISCONTINUED | OUTPATIENT
Start: 2020-01-29 | End: 2020-02-01 | Stop reason: HOSPADM

## 2020-01-29 RX ORDER — PANTOPRAZOLE SODIUM 40 MG/1
40 TABLET, DELAYED RELEASE ORAL DAILY
Status: DISCONTINUED | OUTPATIENT
Start: 2020-01-29 | End: 2020-02-01 | Stop reason: HOSPADM

## 2020-01-29 RX ORDER — MECLIZINE HCL 12.5 MG/1
12.5 TABLET ORAL ONCE
Status: COMPLETED | OUTPATIENT
Start: 2020-01-29 | End: 2020-01-29

## 2020-01-29 RX ORDER — DABIGATRAN ETEXILATE 150 MG/1
150 CAPSULE, COATED PELLETS ORAL EVERY 12 HOURS SCHEDULED
Status: DISCONTINUED | OUTPATIENT
Start: 2020-01-29 | End: 2020-02-01 | Stop reason: HOSPADM

## 2020-01-29 RX ORDER — QUETIAPINE FUMARATE 25 MG/1
25 TABLET, FILM COATED ORAL
Status: DISCONTINUED | OUTPATIENT
Start: 2020-01-29 | End: 2020-02-01 | Stop reason: HOSPADM

## 2020-01-29 RX ORDER — DONEPEZIL HYDROCHLORIDE 10 MG/1
10 TABLET, FILM COATED ORAL
Status: DISCONTINUED | OUTPATIENT
Start: 2020-01-29 | End: 2020-02-01 | Stop reason: HOSPADM

## 2020-01-29 RX ORDER — ACETAMINOPHEN 325 MG/1
650 TABLET ORAL EVERY 6 HOURS PRN
Status: DISCONTINUED | OUTPATIENT
Start: 2020-01-29 | End: 2020-02-01 | Stop reason: HOSPADM

## 2020-01-29 RX ORDER — AMLODIPINE BESYLATE 5 MG/1
5 TABLET ORAL DAILY
Status: DISCONTINUED | OUTPATIENT
Start: 2020-01-29 | End: 2020-02-01 | Stop reason: HOSPADM

## 2020-01-29 RX ORDER — SODIUM CHLORIDE 9 MG/ML
125 INJECTION, SOLUTION INTRAVENOUS CONTINUOUS
Status: DISCONTINUED | OUTPATIENT
Start: 2020-01-29 | End: 2020-01-30

## 2020-01-29 RX ORDER — LISINOPRIL 10 MG/1
10 TABLET ORAL DAILY
Status: DISCONTINUED | OUTPATIENT
Start: 2020-01-29 | End: 2020-02-01 | Stop reason: HOSPADM

## 2020-01-29 RX ADMIN — LISINOPRIL 10 MG: 5 TABLET ORAL at 13:51

## 2020-01-29 RX ADMIN — DABIGATRAN ETEXILATE MESYLATE 150 MG: 150 CAPSULE ORAL at 21:58

## 2020-01-29 RX ADMIN — SODIUM CHLORIDE 125 ML/HR: 0.9 INJECTION, SOLUTION INTRAVENOUS at 06:17

## 2020-01-29 RX ADMIN — AMLODIPINE BESYLATE 5 MG: 10 TABLET ORAL at 13:50

## 2020-01-29 RX ADMIN — ACETAMINOPHEN 650 MG: 325 TABLET ORAL at 06:10

## 2020-01-29 RX ADMIN — SODIUM CHLORIDE 125 ML/HR: 0.9 INJECTION, SOLUTION INTRAVENOUS at 16:00

## 2020-01-29 RX ADMIN — MECLIZINE 12.5 MG: 12.5 TABLET ORAL at 06:10

## 2020-01-29 RX ADMIN — ATORVASTATIN CALCIUM 20 MG: 40 TABLET, FILM COATED ORAL at 15:59

## 2020-01-29 RX ADMIN — DABIGATRAN ETEXILATE MESYLATE 150 MG: 150 CAPSULE ORAL at 13:53

## 2020-01-29 RX ADMIN — CEFTRIAXONE SODIUM 1000 MG: 10 INJECTION, POWDER, FOR SOLUTION INTRAVENOUS at 22:53

## 2020-01-29 RX ADMIN — DONEPEZIL HYDROCHLORIDE 10 MG: 10 TABLET, FILM COATED ORAL at 21:08

## 2020-01-29 RX ADMIN — PANTOPRAZOLE SODIUM 40 MG: 40 TABLET, DELAYED RELEASE ORAL at 13:50

## 2020-01-29 RX ADMIN — QUETIAPINE FUMARATE 25 MG: 25 TABLET ORAL at 21:08

## 2020-01-29 RX ADMIN — KETOROLAC TROMETHAMINE 15 MG: 30 INJECTION, SOLUTION INTRAMUSCULAR at 06:07

## 2020-01-29 RX ADMIN — MEMANTINE 10 MG: 5 TABLET ORAL at 13:52

## 2020-01-29 RX ADMIN — ACETAMINOPHEN 650 MG: 325 TABLET ORAL at 13:51

## 2020-01-29 NOTE — ED NOTES
Patient transported to [facility]  [place], [address] [place] [address]  01/29/20 8331

## 2020-01-29 NOTE — ASSESSMENT & PLAN NOTE
49-year-old female with atrial fibrillation on Pradaxa, hypertension, and hyperlipidemia was admitted due to 3 episodes of syncope this morning  Possibly orthostatic or vasovagal   Will admit to telemetry to determine if an arrhythmia could better explain this    - Telemetry  - Orthostatics

## 2020-01-29 NOTE — ASSESSMENT & PLAN NOTE
Controlled   Continue antihypertensives    Vitals:    01/29/20 0422 01/29/20 0637 01/29/20 0840   BP: 144/61 130/59 116/59   BP Location: Right arm Right arm Right arm   Pulse: 77 71 81   Resp: 20 16 16   Temp: 98 4 °F (36 9 °C)     TempSrc: Oral     SpO2: 100% 98% 96%

## 2020-01-29 NOTE — ASSESSMENT & PLAN NOTE
Unlikely to be due to ACS  Patient currently denies any chest pain  EKG with no ST changes      Recent Labs     01/29/20  0513 01/29/20  0803   TROPONINI 0 05* 0 07*

## 2020-01-29 NOTE — SOCIAL WORK
Moses Botello is an 80years old female who was admitted as a result of syncope and collapse  Patient;s daughter Verónica Saxena   provided all information collected on this assessment  Patient was diagnosed with dementia 6 years ago  Patient lives with her daughter in a single home with 2 steps to reach main entrance, bedroom and bathroom located on the main floor  Patient needs assistance with all ADL'S,  Patient has a walker, transport chair, hospital bed bedside commode  Family providing 24/7 care [facility]  No psychiatric diagnosis reported  Patient was admitted to [facility] December to January 2020  Patient's PCP is Dr Khushbu Zhong and uses [facility], [address] [place] [address]  Family will assist with transport at discharge  SW/CM to follow  CM reviewed d/c planning process including the following: identifying help at home, patient preference for d/c planning discharge,  availability of treatment team to discuss questions or concerns patient and/or family may have regarding understanding medications and recognizing signs and symptoms once discharged  CM also encouraged patient to follow up with all recommended appointments after discharge  Patient advised of importance for patient and family to participate in managing patients medical well being

## 2020-01-29 NOTE — ED NOTES
Assumed care of patient at this time  Family at bedside  Patient resting comfortably        Suzy Benitez, RN  01/29/20 9896

## 2020-01-29 NOTE — ED NOTES
Patient straight cathed for 30 cc cloudy, yellow urine with sediment         Elyse Adam RN  01/29/20 1625

## 2020-01-29 NOTE — ED PROVIDER NOTES
History  Chief Complaint   Patient presents with    Syncope     Per patient's daughter was sitting on toilet this evening and leaned forward and passed out about 0300  First time was about 5 minutes  Patient had 2 syncopal episodes since first one   Headache     Patient c/o headache, per daughter, headaches are chronic   Dizziness     ongoing     This is an 80year old female who daughter states was sitting on the toilet this am about 3am and she was standing at the door and noticed that pt had placed her left hand on the floor and noted that pt had passed out  She states she was out for about 5 minutes  Pt then was groggy and then passed out again  She states that this is not normal for her; however 10 minutes had passed - pt was awake and alert and passed out again  She states that pt has chronic syncopal episodes however never for 5 minutes or 3 in a row  So EMS was called  Daughter states that pt has chronic headaches as well  Daughter denies that she has noticed anything new  She states pt has dementia so it is difficult to get accurate information from her  Pt is also c/o dizziness which is chronic  Pt is not able to express her needs or c/o  Daughter states she is concerned that pt may be in pain as she had a recent kyphoplasty of T-12 and would like to have it checked since she was told that the bone around it could fracture  Pt also has chronic UTI's and is on prophylactic keflex for about 3 yrs  Pt has hx of HTN, stroke, Afib on pradaxa, vasovagal syncope  History provided by:  Medical records and relative  History limited by:  Acuity of condition and dementia   used: No    Syncope   Episode history:  Multiple  Most recent episode:   Today  Timing:  Intermittent  Context: sitting down    Witnessed: yes    Relieved by:  None tried  Associated symptoms: dizziness and headaches    Headache   Associated symptoms: dizziness and syncope    Dizziness   Associated symptoms: headaches and syncope        Prior to Admission Medications   Prescriptions Last Dose Informant Patient Reported? Taking?    QUEtiapine (SEROquel) 25 mg tablet 1/28/2020 at Unknown time  No Yes   Sig: Take 1 tablet (25 mg total) by mouth daily at bedtime   Patient taking differently: Take 25 mg by mouth as needed    acetaminophen (TYLENOL) 325 mg tablet 1/28/2020 at Unknown time  No Yes   Sig: Take 2 tablets (650 mg total) by mouth every 6 (six) hours as needed for mild pain   amLODIPine (NORVASC) 5 mg tablet 1/28/2020 at Unknown time  No Yes   Sig: Take 1 tablet (5 mg total) by mouth daily   atorvastatin (LIPITOR) 20 mg tablet 1/28/2020 at Unknown time Self Yes Yes   Sig: Take 20 mg by mouth daily   calcium carbonate-vitamin D (OSCAL-D) 500 mg-200 units per tablet Not Taking at Unknown time  No No   Sig: Take 1 tablet by mouth 2 (two) times a day with meals   Patient not taking: Reported on 1/29/2020   cephalexin (KEFLEX) 250 mg capsule 1/28/2020 at Unknown time  Yes Yes   Sig: Take 250 mg by mouth once   dabigatran etexilate (PRADAXA) 150 mg capsu 1/28/2020 at Unknown time  No Yes   Sig: Take 1 capsule (150 mg total) by mouth every 12 (twelve) hours   divalproex sodium (DEPAKOTE) 250 mg EC tablet Not Taking at Unknown time  No No   Sig: Take 1 tablet (250 mg total) by mouth every 12 (twelve) hours   Patient not taking: Reported on 1/29/2020   donepezil (ARICEPT) 10 mg tablet 1/28/2020 at Unknown time Self Yes Yes   Sig: Take 10 mg by mouth daily at bedtime   gabapentin (NEURONTIN) 100 mg capsule Not Taking at Unknown time  No No   Sig: Take 1 capsule (100 mg total) by mouth daily at bedtime   Patient not taking: Reported on 1/29/2020   lisinopril (ZESTRIL) 10 mg tablet 1/28/2020 at Unknown time  No Yes   Sig: Take 1 tablet (10 mg total) by mouth daily   melatonin 3 mg Not Taking at Unknown time  No No   Sig: Take 2 tablets (6 mg total) by mouth daily at bedtime   Patient not taking: Reported on 1/29/2020 memantine (NAMENDA) 10 mg tablet 2020 at Unknown time Self Yes Yes   Sig: Take 10 mg by mouth 2 (two) times a day   pantoprazole (PROTONIX) 40 mg tablet 2020 at Unknown time Child Yes Yes   Sig: Take 40 mg by mouth daily      Facility-Administered Medications: None       Past Medical History:   Diagnosis Date    [facility]-fib [facility] [address] [facility]     Alzheimer disease ([facility] )     Chronic UTI     Diverticulosis     Dyslipidemia     History of stroke     Hyperlipidemia     Hypertension     UTI (urinary tract infection)     Vasovagal syncope 2018       Past Surgical History:   Procedure Laterality Date    CHOLECYSTECTOMY      IR KYPHOPLASTY/VERTEBROPLASTY  2019       History reviewed  No pertinent family history  I have reviewed and agree with the history as documented  Social History     Tobacco Use    Smoking status: Former Smoker     Last attempt to quit: 2012     Years since quittin 0    Smokeless tobacco: Never Used   Substance Use Topics    Alcohol use: Never     Frequency: Never    Drug use: Never        Review of Systems   Constitutional: Negative  HENT: Negative  Eyes: Negative  Respiratory: Negative  Cardiovascular: Positive for syncope  Gastrointestinal: Negative  Endocrine: Negative  Musculoskeletal: Negative  Skin: Negative  Allergic/Immunologic: Negative  Neurological: Positive for dizziness and headaches  Hematological: Negative  Psychiatric/Behavioral: Negative  Physical Exam  Physical Exam   Constitutional: She appears well-developed and well-nourished  She appears distressed  Pt is lying in bed shaking - stating "dizzy"    HENT:   Head: Normocephalic and atraumatic  Eyes: Pupils are equal, round, and reactive to light  EOM are normal    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    Neck: Normal range of motion  Neck supple  Cardiovascular: Normal rate, regular rhythm and normal heart sounds     Pulmonary/Chest: Effort normal and breath sounds normal    Abdominal: Soft  Bowel sounds are normal  She exhibits no distension  There is no tenderness  Musculoskeletal: Normal range of motion  Neurological: She is alert  Skin: Skin is warm and dry  Capillary refill takes less than 2 seconds  She is not diaphoretic  Psychiatric: She has a normal mood and affect  Her behavior is normal  Judgment and thought content normal    Nursing note and vitals reviewed  Vital Signs  ED Triage Vitals   Temperature Pulse Respirations Blood Pressure SpO2   01/29/20 0422 01/29/20 0422 01/29/20 0422 01/29/20 0422 01/29/20 0422   98 4 °F (36 9 °C) 77 20 144/61 100 %      Temp Source Heart Rate Source Patient Position - Orthostatic VS BP Location FiO2 (%)   01/29/20 0422 01/29/20 0422 01/29/20 0422 01/29/20 0422 --   Oral Monitor Lying Right arm       Pain Score       01/29/20 0610       6           Vitals:    01/29/20 0422 01/29/20 0637   BP: 144/61 130/59   Pulse: 77 71   Patient Position - Orthostatic VS: Lying Lying         Visual Acuity      ED Medications  Medications   sodium chloride 0 9 % infusion (125 mL/hr Intravenous New Bag 1/29/20 0617)   acetaminophen (TYLENOL) tablet 650 mg (650 mg Oral Given 1/29/20 0610)   ketorolac (TORADOL) injection 15 mg (15 mg Intravenous Given 1/29/20 0607)   meclizine (ANTIVERT) tablet 12 5 mg (12 5 mg Oral Given 1/29/20 0610)       Diagnostic Studies  Results Reviewed     Procedure Component Value Units Date/Time    Troponin I [[id number]]     Lab Status:  No result Specimen:  Blood     Troponin I [[id number]]  (Abnormal) Collected:  01/29/20 0513    Lab Status:  Final result Specimen:  Blood from Arm, Left Updated:  01/29/20 0617     Troponin I 0 05 ng/mL     UA (URINE) with reflex to Scope [[id number]] Collected:  01/29/20 0553    Lab Status:   In process Specimen:  Urine, Straight Cath Updated:  01/29/20 0554    Protime-INR [[id number]]  (Abnormal) Collected:  01/29/20 0513    Lab Status:  Final result Specimen:  Blood from Arm, Left Updated:  01/29/20 0552     Protime 18 8 seconds      INR 1 55    APTT [[id number]]  (Abnormal) Collected:  01/29/20 0513    Lab Status:  Final result Specimen:  Blood from Arm, Left Updated:  01/29/20 0552     PTT 53 seconds     CBC and differential [[id number]]  (Abnormal) Collected:  01/29/20 0513    Lab Status:  Final result Specimen:  Blood from Arm, Left Updated:  01/29/20 0535     WBC 7 26 Thousand/uL      RBC 3 57 Million/uL      Hemoglobin 10 2 g/dL      Hematocrit 32 0 %      MCV 90 fL      MCH 28 6 pg      MCHC 31 9 g/dL      RDW 14 3 %      MPV 10 6 fL      Platelets 045 Thousands/uL      nRBC 0 /100 WBCs      Neutrophils Relative 63 %      Immat GRANS % 0 %      Lymphocytes Relative 27 %      Monocytes Relative 8 %      Eosinophils Relative 1 %      Basophils Relative 1 %      Neutrophils Absolute 4 57 Thousands/µL      Immature Grans Absolute 0 02 Thousand/uL      Lymphocytes Absolute 1 95 Thousands/µL      Monocytes Absolute 0 58 Thousand/µL      Eosinophils Absolute 0 10 Thousand/µL      Basophils Absolute 0 04 Thousands/µL     Comprehensive metabolic panel [[id number]] Collected:  01/29/20 0513    Lab Status: In process Specimen:  Blood from Arm, Left Updated:  01/29/20 0528    Magnesium [[id number]] Collected:  01/29/20 0513    Lab Status: In process Specimen:  Blood from Arm, Left Updated:  01/29/20 0528    CK Total with Reflex CKMB [[id number]] Collected:  01/29/20 0513    Lab Status: In process Specimen:  Blood from Arm, Left Updated:  01/29/20 0528                 CT spine thoracic without contrast   Final Result by Kasey Jacobsen DO (01/29 0543)   Compression fracture T12 vertebral body, status post kyphoplasty  Although the overall loss in the axial height of the T12 vertebral body is unchanged from the most recent prior kyphoplasty, the degree of bony retropulsion of the T12    vertebral body has progressed from the prior CT scan of December 14, 2019    There is currently moderate to severe central stenosis posterior to the T12 vertebral body level  The study was marked in [facility] for immediate notification  Workstation performed: [id number]         CT head without contrast   Final Result by Shari Estrada DO (01/29 0542)   Cerebral atrophy with chronic small vessel ischemic white matter disease  No acute intracranial abnormality  No significant change from priors  Workstation performed: [id number]                    Procedures  ECG 12 Lead Documentation Only  Date/Time: 1/29/2020 4:29 AM  Performed by: CARMELLA Cramer  Authorized by: CARMELLA Cramer     Indications / Diagnosis:  Syncope   ECG reviewed by me, the ED Provider: yes (Dr Mario Milian )    Patient location:  ED  Previous ECG:     Previous ECG:  Compared to current    Similarity:  No change    Comparison to cardiac monitor: Yes    Interpretation:     Interpretation: abnormal    Rate:     ECG rate:  77    ECG rate assessment: normal    Rhythm:     Rhythm: sinus rhythm    Ectopy:     Ectopy: none    QRS:     QRS axis:  Normal    QRS intervals:  Normal  ST segments:     ST segments:  Normal  T waves:     T waves: normal               ED Course  ED Course as of Jan 29 0655   Wed Jan 29, 2020   0545 IMPRESSION:  CT T spine   Compression fracture T12 vertebral body, status post kyphoplasty  Although the overall loss in the axial height of the T12 vertebral body is unchanged from the most recent prior kyphoplasty, the degree of bony retropulsion of the T12   vertebral body has progressed from the prior CT scan of December 14, 2019  There is currently moderate to severe central stenosis posterior to the T12 vertebral body level  2597 IMPRESSION: CT head   Cerebral atrophy with chronic small vessel ischemic white matter disease      No acute intracranial abnormality        No significant change from priors        5440 12/2019 Cr 0 58 and Gfr 84 will give low dose IV toradol for pain and headache  Ordered meclizine for complaints of dizziness  4012 Trop 0 05  - EKG unremarkable  6395 CT of head and T spine reviewed and discussed with daughter  I have informed her of elevated trop 0 05 and admission  2137 Page and TT to cardiology to discuss elevated trop - no answer  Report to [facility] for reassessment, review of labs and dispo  Daughter agrees for admission                                      MDM  Number of Diagnoses or Management Options  Diagnosis management comments: Syncope x 3   Dizzy    Plan  EKG  Tele  CT head and T spine  Urine  IV   unable to get orthostatics due to pt shaking and syncope          Amount and/or Complexity of Data Reviewed  Clinical lab tests: ordered and reviewed  Tests in the radiology section of CPT®: ordered and reviewed  Review and summarize past medical records: yes          Disposition  Final diagnoses:   Nonintractable headache, unspecified chronicity pattern, unspecified headache type   Dizziness   Syncope   Fracture of T12 vertebra (HCC) - with retropulsion   NSTEMI (non-ST elevated myocardial infarction) ([facility] [address] )     Time reflects when diagnosis was documented in both MDM as applicable and the Disposition within this note     Time User Action Codes Description Comment    1/29/2020  5:59 AM Sherlene Panola Add [R51] Nonintractable headache, unspecified chronicity pattern, unspecified headache type     1/29/2020  5:59 AM Sherlene Panola Add [R42] Dizziness     1/29/2020  5:59 AM Sherlene Panola Add [R55] Syncope     1/29/2020  6:01 AM Sherlene Panola Add [S22 089A] Fracture of T12 vertebra ([facility] [address] )     1/29/2020  6:01 AM Sherlene Panola Modify [S22 089A] Fracture of T12 vertebra ([email or web address] with retropulsion    1/29/2020  6:01 AM Sherlene Jason Modify [R51] Nonintractable headache, unspecified chronicity pattern, unspecified headache type     1/29/2020  6:01 AM Sherlene Panola Modify [R55] Syncope     1/29/2020  6:27 AM Hui Drake Priscilla Hendrix [I21 4] NSTEMI (non-ST elevated myocardial infarction) ([facility] [address] )     1/29/2020  6:27 AM [facility] [R55] Syncope     1/29/2020  6:27 AM [facility] Modify [I21 4] NSTEMI (non-ST elevated myocardial infarction) [facility])       ED Disposition     None      Follow-up Information    None         Patient's Medications   Discharge Prescriptions    No medications on file     No discharge procedures on file      ED Provider  Electronically Signed by           Pily Pruett  01/29/20 6017

## 2020-01-29 NOTE — ASSESSMENT & PLAN NOTE
Continue donepezil, memantine  sSwitch quetiapine to HS for now rather than PRN to prevent sundowning

## 2020-01-29 NOTE — ED NOTES
[place] within reach  Bed in low position  Siderails up    HOB elevated [address]  01/29/20 153

## 2020-01-29 NOTE — ED CARE HANDOFF
Emergency Department Sign Out Note        Sign out and transfer of care from [address]  See Separate Emergency Department note  The patient, Chris Moore, was evaluated by the previous provider for syncope  Workup Completed:  CBC, Trop, EKG, PT/INR, CT head, CT thoracic spine    ED Course / Workup Pending (followup):  CMP, Mg, CK, admission        Patient was signed out to me by Geraldo Arndt for follow-up of remaining labs and admission for elevated troponin, syncopal episodes  According to sign out, patient is an 51-year-old female with a past medical history of Alzheimer's, chronic headache, dizziness, intermittent episodes of syncope who presents with 3 episodes of syncope this morning  Patient was on the toilet and had 1 episode of syncope that lasted approximately 5 minutes, which is uncommon for patient  Patient also had recent kyphoplasty, which is why CT of thoracic spine was completed  There was no fall or head injury  Patient's only complaints on my assessment are headache and dizziness, which are patient's baseline  Daughter in room providing majority of history and information  Results Reviewed     Procedure Component Value Units Date/Time    Troponin I [[id number]]  (Abnormal) Collected:  01/29/20 1200    Lab Status:  Final result Specimen:  Blood from Arm, Left Updated:  01/29/20 1225     Troponin I 0 08 ng/mL     Urine culture [[id number]] Collected:  01/29/20 0553    Lab Status:   In process Specimen:  Urine, Straight Cath Updated:  01/29/20 0924    Troponin I [[id number]]  (Abnormal) Collected:  01/29/20 0803    Lab Status:  Final result Specimen:  Blood from Arm, Left Updated:  01/29/20 0829     Troponin I 0 07 ng/mL     Urine Microscopic [[id number]]  (Abnormal) Collected:  01/29/20 0553    Lab Status:  Final result Specimen:  Urine, Straight Cath Updated:  01/29/20 0711     RBC, UA       Field obscured, unable to enumerate     /hpf     WBC, UA Innumerable /hpf Epithelial Cells Occasional /hpf      Bacteria, UA Innumerable /hpf     UA (URINE) with reflex to Scope [[id number]]  (Abnormal) Collected:  01/29/20 0553    Lab Status:  Final result Specimen:  Urine, Straight Cath Updated:  01/29/20 0711     Color, UA Yellow     Clarity, UA Cloudy     Specific [place], UA 1 010     pH, UA 7 0     Leukocytes, UA Large     Nitrite, UA Positive     Protein, UA Trace mg/dl      Glucose, UA Negative mg/dl      Ketones, UA Negative mg/dl      Urobilinogen, UA 0 2 E U /dl      Bilirubin, UA Small     Blood, UA Small    Comprehensive metabolic panel [[id number]]  (Abnormal) Collected:  01/29/20 0513    Lab Status:  Final result Specimen:  Blood from Arm, Left Updated:  01/29/20 0701     Sodium 142 mmol/L      Potassium 3 7 mmol/L      Chloride 105 mmol/L      CO2 24 mmol/L      ANION GAP 13 mmol/L      BUN 19 mg/dL      Creatinine 1 00 mg/dL      Glucose 108 mg/dL      Calcium 8 9 mg/dL      AST 12 U/L      ALT 9 U/L      Alkaline Phosphatase 77 U/L      Total Protein 6 3 g/dL      Albumin 3 1 g/dL      Total Bilirubin 0 35 mg/dL      eGFR 51 ml/min/1 73sq m     Narrative:       [place] guidelines for Chronic Kidney Disease (CKD):     Stage 1 with normal or high GFR (GFR > 90 mL/min/1 73 square meters)    Stage 2 Mild CKD (GFR = 60-89 mL/min/1 73 square meters)    Stage 3A Moderate CKD (GFR = 45-59 mL/min/1 73 square meters)    Stage 3B Moderate CKD (GFR = 30-44 mL/min/1 73 square meters)    Stage 4 Severe CKD (GFR = 15-29 mL/min/1 73 square meters)    Stage 5 End Stage CKD (GFR <15 mL/min/1 73 square meters)  Note: GFR calculation is accurate only with a steady state creatinine    Magnesium [[id number]]  (Normal) Collected:  01/29/20 0513    Lab Status:  Final result Specimen:  Blood from Arm, Left Updated:  01/29/20 0701     Magnesium 2 0 mg/dL     CK Total with Reflex CKMB [[id number]]  (Abnormal) Collected:  01/29/20 0513    Lab Status:  Final result Specimen:  Blood from Arm, Left Updated:  01/29/20 0701     Total CK 24 U/L     Troponin I [[id number]]  (Abnormal) Collected:  01/29/20 0513    Lab Status:  Final result Specimen:  Blood from Arm, Left Updated:  01/29/20 0617     Troponin I 0 05 ng/mL     Protime-INR [[id number]]  (Abnormal) Collected:  01/29/20 0513    Lab Status:  Final result Specimen:  Blood from Arm, Left Updated:  01/29/20 0552     Protime 18 8 seconds      INR 1 55    APTT [[id number]]  (Abnormal) Collected:  01/29/20 0513    Lab Status:  Final result Specimen:  Blood from Arm, Left Updated:  01/29/20 0552     PTT 53 seconds     CBC and differential [[id number]]  (Abnormal) Collected:  01/29/20 0513    Lab Status:  Final result Specimen:  Blood from Arm, Left Updated:  01/29/20 0535     WBC 7 26 Thousand/uL      RBC 3 57 Million/uL      Hemoglobin 10 2 g/dL      Hematocrit 32 0 %      MCV 90 fL      MCH 28 6 pg      MCHC 31 9 g/dL      RDW 14 3 %      MPV 10 6 fL      Platelets 102 Thousands/uL      nRBC 0 /100 WBCs      Neutrophils Relative 63 %      Immat GRANS % 0 %      Lymphocytes Relative 27 %      Monocytes Relative 8 %      Eosinophils Relative 1 %      Basophils Relative 1 %      Neutrophils Absolute 4 57 Thousands/µL      Immature Grans Absolute 0 02 Thousand/uL      Lymphocytes Absolute 1 95 Thousands/µL      Monocytes Absolute 0 58 Thousand/µL      Eosinophils Absolute 0 10 Thousand/µL      Basophils Absolute 0 04 Thousands/µL                   ED Course as of Jan 29 1652   Wed Jan 29, 2020   0758 Spoke with cardiology regarding elevated troponin and they reviewed EKG, no intervention at this time      0758 WBC, UA(!): Innumerable   0759 Bacteria, UA(!): Innumerable   0759 Nitrite, UA(!): Positive   0828 Page SLIM for admission      0831 Troponin I(!): 0 07   0854 Cardiology aware of second troponin and EKG, no intervention at this time  [phone] with patient and daughter about all results and answered questions    They are both agreeable to admission  Patient continues to complain of headache and dizziness, but has no other complaints at this time  Patient is oriented to self, and knows daughter  Heart with regular rate and rhythm, lungs clear to auscultation bilaterally, abdomen with normal active bowel sounds, nontender to palpation  3636 Daughter does note the patient has chronic UTIs and is on Keflex daily, but continues to have UTIs  Will hold off on treatment at this time until culture or Urology consult if needed      [phone] Paged SLIM again      [address] [facility] with SLIM Dr Moses Byrd, reviewed case and ED course   He is agreeable to admission        ECG 12 Lead Documentation Only  Date/Time: 1/29/2020 8:15 AM  Performed by: Caleb Joseph PA-C  Authorized by: Caleb Joseph PA-C     Indications / Diagnosis:  Syncope  ECG reviewed by me, the ED Provider: yes    Previous ECG:     Previous ECG:  Compared to current    Similarity:  No change  Rate:     ECG rate:  63    ECG rate assessment: normal    Rhythm:     Rhythm: sinus rhythm    Ectopy:     Ectopy: none    QRS:     QRS axis:  Normal  ST segments:     ST segments:  Non-specific  T waves:     T waves: non-specific        MDM    Disposition  Final diagnoses:   Nonintractable headache, unspecified chronicity pattern, unspecified headache type   Dizziness   Syncope   Fracture of T12 vertebra (HCC) - with retropulsion   NSTEMI (non-ST elevated myocardial infarction) ([facility] )   UTI (urinary tract infection)   Alzheimer disease ([facility] )     Time reflects when diagnosis was documented in both MDM as applicable and the Disposition within this note     Time User Action Codes Description Comment    1/29/2020  5:59 AM Scherrie Romance Add [R51] Nonintractable headache, unspecified chronicity pattern, unspecified headache type     1/29/2020  5:59 AM Scherrie Romance Add [R42] Dizziness     1/29/2020  5:59 AM Scherrie Romance Add [R55] Syncope     1/29/2020  6:01 AM Scherrie Romance Add [S22 089A] Fracture of T12 vertebra ([place] [address] )     1/29/2020  6:01 AM Bib Schillings Modify [S22 089A] Fracture of T12 vertebra ([place] [address] ) with retropulsion    1/29/2020  6:01 AM Bib Schillings Modify [R51] Nonintractable headache, unspecified chronicity pattern, unspecified headache type     1/29/2020  6:01 AM Bib Schillings Modify [R55] Syncope     1/29/2020  6:27 AM Bib Schillings Add [I21 4] NSTEMI (non-ST elevated myocardial infarction) ([place] [address] )     1/29/2020  6:27 AM Bib Schillings Modify [R55] Syncope     1/29/2020  6:27 AM Lyndia  [I21 4] NSTEMI (non-ST elevated myocardial infarction) ([place] [address] )     1/29/2020  8:30 AM Ryan Grullon Add [N39 0] UTI (urinary tract infection)     1/29/2020  8:54 AM Ryna Grullon Add [G30 9,  F02 80] Alzheimer disease [facility])       ED Disposition     ED Disposition Condition Date/Time Comment    Admit Stable Wed Jan 29, 2020  9:13 AM Case was discussed with Dr Houston Walker and the patient's admission status was agreed to be Admission Status: inpatient status to the service of Dr Houston Walker         Follow-up Information    None       Patient's Medications   Discharge Prescriptions    No medications on file     No discharge procedures on file         ED Provider  Electronically Signed by     Katerine Banks PA-C  01/29/20 6143

## 2020-01-29 NOTE — H&P
H&P- Court Kimbrought 1933, 80 y o  female MRN: [id number]    Unit/Bed#: ED 32 Encounter: [id number]    Primary Care Provider: Ludmila Ortiz DO   Date and time admitted to hospital: 1/29/2020  4:19 AM        * Syncope and collapse  Assessment & Plan  80year-old female with atrial fibrillation on Pradaxa, hypertension, and hyperlipidemia was admitted due to 3 episodes of syncope this morning  Possibly orthostatic or vasovagal   Will admit to telemetry to determine if an arrhythmia could better explain this  - Telemetry  - Orthostatics      Elevated troponin level not due myocardial infarction  Assessment & Plan  Unlikely to be due to ACS  Patient currently denies any chest pain  EKG with no ST changes  Recent Labs     01/29/20  0513 01/29/20  0803   TROPONINI 0 05* 0 07*         Chronic atrial fibrillation  Assessment & Plan  Continue Pradaxa    Essential hypertension  Assessment & Plan  Controlled  Continue antihypertensives    Vitals:    01/29/20 0422 01/29/20 0637 01/29/20 0840   BP: 144/61 130/59 116/59   BP Location: Right arm Right arm Right arm   Pulse: 77 71 81   Resp: 20 16 16   Temp: 98 4 °F (36 9 °C)     TempSrc: Oral     SpO2: 100% 98% 96%         Mixed hyperlipidemia  Assessment & Plan  Continue statin    Late onset Alzheimer's disease with behavioral disturbance (HCC)  Assessment & Plan  Continue donepezil, memantine  sSwitch quetiapine to HS for now rather than PRN to prevent sundowning    History of stroke  Assessment & Plan  3 strokes in the past  No gross focal motor deficits    Patient is Congregational  Assessment & Plan  Avoid blood products    Acute cystitis without hematuria  Assessment & Plan  Symptomatic   - Ceftriaxone started  - Follow-up cultures      VTE Prophylaxis: Dabigatran (Pradaxa)  / sequential compression device   Code Status: DNR/DNI  POLST: POLST is not applicable to this patient  Discussion with family: Keri (Daughter)    Anticipated Length of Stay: Patient will be admitted on an Inpatient basis with an anticipated length of stay of  Less than 2 midnights  Justification for Hospital Stay: Telemetry monitoring    Total Time for Visit, including Counseling / Coordination of Care: 75 minutes  Greater than 50% of this total time spent on direct patient counseling and coordination of care  Chief Complaint:   Syncope    History of Present Illness:    Ruth Dennis is a 80 y o  female who presents with Syncope  51-year-old female with atrial fibrillation on Pradaxa, controlled hypertension, history of stroke with no gross focal motor deficits, Alzheimer's disease, angio was witness who was admitted due to syncope  Patient is a poor historian hence most of the history was obtained through patient's daughter Arabella Palmer  Daughter states that the patient was seated on a toilet seat in the bathroom when she syncopized  Daughter was present the entire time and states that patient did not fall or hit herself  She was out for about 5 minutes  She was groggy and then passed out again twice  Daughter states that the patient would have chronic syncopal episodes sometimes back to back but not three in a row  This prompted her to seek immediate medical attention  EMS was contacted and was brought into our ED for further evaluation  In our ED, CT head negative  CT spine was performed due to her history of kyphoplasty with no acute findings noted  Of note, patient has chronic UTIs and is on prophylactic Keflex for the last 3 years  However, patient still is complaining of frequency but no dysuria  Review of Systems:    Review of Systems   Constitutional: Negative for chills, diaphoresis, fatigue and fever  HENT: Negative for ear pain, rhinorrhea and sore throat  Eyes: Negative for pain  Respiratory: Negative for cough, choking, chest tightness, shortness of breath, wheezing and stridor      Cardiovascular: Negative for chest pain, palpitations and leg swelling  Gastrointestinal: Negative for abdominal distention, abdominal pain, diarrhea, nausea and vomiting  Endocrine: Negative for polyuria  Genitourinary: Positive for frequency and urgency  Negative for hematuria and pelvic pain  Musculoskeletal: Negative for arthralgias, neck pain and neck stiffness  Neurological: Positive for dizziness, syncope and headaches  Negative for seizures  Psychiatric/Behavioral: Negative for hallucinations  Past Medical and Surgical History:     Past Medical History:   Diagnosis Date    A-fib ([facility] )     Alzheimer disease ([facility] )     Chronic UTI     Diverticulosis     Dyslipidemia     History of stroke     Hyperlipidemia     Hypertension     UTI (urinary tract infection)     Vasovagal syncope 2/13/2018       Past Surgical History:   Procedure Laterality Date    CHOLECYSTECTOMY      IR KYPHOPLASTY/VERTEBROPLASTY  12/26/2019       Meds/Allergies:    Prior to Admission medications    Medication Sig Start Date End Date Taking?  Authorizing Provider   acetaminophen (TYLENOL) 325 mg tablet Take 2 tablets (650 mg total) by mouth every 6 (six) hours as needed for mild pain 1/3/20  Yes Maria E Neely MD   amLODIPine (NORVASC) 5 mg tablet Take 1 tablet (5 mg total) by mouth daily 1/3/20  Yes Maria E Neely MD   atorvastatin (LIPITOR) 20 mg tablet Take 20 mg by mouth daily   Yes Historical Provider, MD   cephalexin (KEFLEX) 250 mg capsule Take 250 mg by mouth once   Yes Historical Provider, MD   dabigatran etexilate (PRADAXA) 150 mg capsu Take 1 capsule (150 mg total) by mouth every 12 (twelve) hours 7/9/19  Yes Jayce Almanzar PA-C   donepezil (ARICEPT) 10 mg tablet Take 10 mg by mouth daily at bedtime   Yes Historical Provider, MD   lisinopril (ZESTRIL) 10 mg tablet Take 1 tablet (10 mg total) by mouth daily 1/4/20 2/3/20 Yes Maria E Neely MD   memantine (NAMENDA) 10 mg tablet Take 10 mg by mouth 2 (two) times a day   Yes Historical Provider, MD   pantoprazole (PROTONIX) 40 mg tablet Take 40 mg by mouth daily   Yes Historical Provider, MD   QUEtiapine (SEROquel) 25 mg tablet Take 1 tablet (25 mg total) by mouth daily at bedtime  Patient taking differently: Take 25 mg by mouth as needed  19  Yes Isaac Murguia MD   calcium carbonate-vitamin D (OSCAL-D) 500 mg-200 units per tablet Take 1 tablet by mouth 2 (two) times a day with meals  Patient not taking: Reported on 2020 1/3/20 1/29/20  Viviana Funez MD   divalproex sodium (DEPAKOTE) 250 mg EC tablet Take 1 tablet (250 mg total) by mouth every 12 (twelve) hours  Patient not taking: Reported on 2020 1/3/20 1/29/20  Viviana Funez MD   gabapentin (NEURONTIN) 100 mg capsule Take 1 capsule (100 mg total) by mouth daily at bedtime  Patient not taking: Reported on 2020 1/3/20 1/29/20  Viviana Funez MD   melatonin 3 mg Take 2 tablets (6 mg total) by mouth daily at bedtime  Patient not taking: Reported on 2020 1/3/20 1/29/20  Viviana Funez MD     I have reviewed home medications with patient family member  Allergies: Allergies   Allergen Reactions    Ativan [Lorazepam]    Nikko Quintanilla [Lurasidone]        Social History:     Marital Status:    Occupation: Retired  Substance Use History:   Social History     Substance and Sexual Activity   Alcohol Use Never    Frequency: Never     Social History     Tobacco Use   Smoking Status Former Smoker    Last attempt to quit: 2012    Years since quittin 0   Smokeless Tobacco Never Used     Social History     Substance and Sexual Activity   Drug Use Never       Family History:    Non-contributory  Family history reviewed  Physical Exam:     Vitals:   Blood Pressure: 116/59 (20 0840)  Pulse: 81 (20 0840)  Temperature: 98 4 °F (36 9 °C) (20)  Temp Source: Oral (20)  Respirations: 16 (20 0840)  SpO2: 96 % (20 0840)    Physical Exam   Constitutional: No distress     Elderly lady   HENT:   Head: Normocephalic and atraumatic  Eyes: EOM are normal  No scleral icterus  Neck: Neck supple  No JVD present  Cardiovascular: Normal rate and intact distal pulses  An irregularly irregular rhythm present  Exam reveals no gallop and no friction rub  No murmur heard  Pulmonary/Chest: Effort normal and breath sounds normal  No stridor  She has no wheezes  She has no rales  Abdominal: Soft  Bowel sounds are normal  She exhibits no mass  There is no tenderness  There is no guarding  Musculoskeletal: She exhibits no edema or tenderness  Neurological: She is alert  Skin: Skin is warm and dry  Psychiatric:   Cannot be properly assessed   Vitals reviewed  Additional Data:     Lab Results: I have personally reviewed pertinent reports  Results from last 7 days   Lab Units 01/29/20  0513   WBC Thousand/uL 7 26   HEMOGLOBIN g/dL 10 2*   HEMATOCRIT % 32 0*   PLATELETS Thousands/uL 278   NEUTROS PCT % 63   LYMPHS PCT % 27   MONOS PCT % 8   EOS PCT % 1     Results from last 7 days   Lab Units 01/29/20  0513   SODIUM mmol/L 142   POTASSIUM mmol/L 3 7   CHLORIDE mmol/L 105   CO2 mmol/L 24   BUN mg/dL 19   CREATININE mg/dL 1 00   ANION GAP mmol/L 13   CALCIUM mg/dL 8 9   ALBUMIN g/dL 3 1*   TOTAL BILIRUBIN mg/dL 0 35   ALK PHOS U/L 77   ALT U/L 9*   AST U/L 12   GLUCOSE RANDOM mg/dL 108     Results from last 7 days   Lab Units 01/29/20  0513   INR  1 55*                   Imaging: I have personally reviewed pertinent reports  CT spine thoracic without contrast   Final Result by Juanita White DO (01/29 0543)   Compression fracture T12 vertebral body, status post kyphoplasty  Although the overall loss in the axial height of the T12 vertebral body is unchanged from the most recent prior kyphoplasty, the degree of bony retropulsion of the T12    vertebral body has progressed from the prior CT scan of December 14, 2019    There is currently moderate to severe central stenosis posterior to the T12 vertebral body level  The study was marked in [facility] for immediate notification  Workstation performed: [id number]         CT head without contrast   Final Result by Igor Weber DO (01/29 0542)   Cerebral atrophy with chronic small vessel ischemic white matter disease  No acute intracranial abnormality  No significant change from priors  Workstation performed: [id number]             EKG, Pathology, and Other Studies Reviewed on Admission:   · EKG: Normal Sinus rhythm at 63, No ST elevation    Allscripts / Epic Records Reviewed: Yes     ** Please Note: This note has been constructed using a voice recognition system   **

## 2020-01-30 LAB
ANION GAP SERPL CALCULATED.3IONS-SCNC: 11 MMOL/L (ref 4–13)
BUN SERPL-MCNC: 18 MG/DL (ref 5–25)
CALCIUM SERPL-MCNC: 8.1 MG/DL (ref 8.3–10.1)
CHLORIDE SERPL-SCNC: 109 MMOL/L (ref 100–108)
CO2 SERPL-SCNC: 24 MMOL/L (ref 21–32)
CREAT SERPL-MCNC: 0.83 MG/DL (ref 0.6–1.3)
GFR SERPL CREATININE-BSD FRML MDRD: 64 ML/MIN/1.73SQ M
GLUCOSE SERPL-MCNC: 92 MG/DL (ref 65–140)
POTASSIUM SERPL-SCNC: 4 MMOL/L (ref 3.5–5.3)
SODIUM SERPL-SCNC: 144 MMOL/L (ref 136–145)

## 2020-01-30 PROCEDURE — 80048 BASIC METABOLIC PNL TOTAL CA: CPT | Performed by: STUDENT IN AN ORGANIZED HEALTH CARE EDUCATION/TRAINING PROGRAM

## 2020-01-30 PROCEDURE — 99232 SBSQ HOSP IP/OBS MODERATE 35: CPT | Performed by: STUDENT IN AN ORGANIZED HEALTH CARE EDUCATION/TRAINING PROGRAM

## 2020-01-30 RX ORDER — OLANZAPINE 10 MG/1
2.5 INJECTION, POWDER, LYOPHILIZED, FOR SOLUTION INTRAMUSCULAR
Status: DISCONTINUED | OUTPATIENT
Start: 2020-01-30 | End: 2020-02-01 | Stop reason: HOSPADM

## 2020-01-30 RX ORDER — OLANZAPINE 10 MG/1
2.5 INJECTION, POWDER, LYOPHILIZED, FOR SOLUTION INTRAMUSCULAR ONCE
Status: DISCONTINUED | OUTPATIENT
Start: 2020-01-30 | End: 2020-02-01 | Stop reason: HOSPADM

## 2020-01-30 RX ADMIN — CEFTRIAXONE SODIUM 1000 MG: 10 INJECTION, POWDER, FOR SOLUTION INTRAVENOUS at 17:46

## 2020-01-30 RX ADMIN — DABIGATRAN ETEXILATE MESYLATE 150 MG: 150 CAPSULE ORAL at 20:29

## 2020-01-30 RX ADMIN — MEMANTINE 10 MG: 5 TABLET ORAL at 17:45

## 2020-01-30 RX ADMIN — QUETIAPINE FUMARATE 25 MG: 25 TABLET ORAL at 20:29

## 2020-01-30 RX ADMIN — SODIUM CHLORIDE 125 ML/HR: 0.9 INJECTION, SOLUTION INTRAVENOUS at 01:51

## 2020-01-30 RX ADMIN — DONEPEZIL HYDROCHLORIDE 10 MG: 10 TABLET, FILM COATED ORAL at 20:29

## 2020-01-30 RX ADMIN — ATORVASTATIN CALCIUM 20 MG: 40 TABLET, FILM COATED ORAL at 17:46

## 2020-01-30 RX ADMIN — PANTOPRAZOLE SODIUM 40 MG: 40 TABLET, DELAYED RELEASE ORAL at 05:53

## 2020-01-30 RX ADMIN — DABIGATRAN ETEXILATE MESYLATE 150 MG: 150 CAPSULE ORAL at 09:20

## 2020-01-30 RX ADMIN — LISINOPRIL 10 MG: 5 TABLET ORAL at 09:20

## 2020-01-30 RX ADMIN — ACETAMINOPHEN 650 MG: 325 TABLET ORAL at 17:45

## 2020-01-30 RX ADMIN — MEMANTINE 10 MG: 5 TABLET ORAL at 09:20

## 2020-01-30 RX ADMIN — AMLODIPINE BESYLATE 5 MG: 10 TABLET ORAL at 09:20

## 2020-01-30 NOTE — PLAN OF CARE
Problem: Potential for Falls  Goal: Patient will remain free of falls  Description  INTERVENTIONS:  - Assess patient frequently for physical needs  -  Identify cognitive and physical deficits and behaviors that affect risk of falls    -  [place] fall precautions as indicated by assessment   - Educate patient/family on patient safety including physical limitations  - Instruct patient to call for assistance with activity based on assessment  - Modify environment to reduce risk of injury  - Consider OT/PT consult to assist with strengthening/mobility  1/30/2020 0754 by Gin Osorio RN  Outcome: Rob James  1/30/2020 0752 by Gin Osorio RN  Outcome: Progressing     Problem: Prexisting or High Potential for Compromised Skin Integrity  Goal: Skin integrity is maintained or improved  Description  INTERVENTIONS:  - Identify patients at risk for skin breakdown  - Assess and monitor skin integrity  - Assess and monitor nutrition and hydration status  - Monitor labs   - Assess for incontinence   - Turn and reposition patient  - Assist with mobility/ambulation  - Relieve pressure over bony prominences  - Avoid friction and shearing  - Provide appropriate hygiene as needed including keeping skin clean and dry  - Evaluate need for skin moisturizer/barrier cream  - Collaborate with interdisciplinary team   - Patient/family teaching  - Consider wound care consult   1/30/2020 0754 by Gin Osorio RN  Outcome: Progressing  1/30/2020 0752 by Gin Osorio RN  Outcome: Progressing     Problem: PAIN - ADULT  Goal: Verbalizes/displays adequate comfort level or baseline comfort level  Description  Interventions:  - Encourage patient to monitor pain and request assistance  - Assess pain using appropriate pain scale  - Administer analgesics based on type and severity of pain and evaluate response  - Implement non-pharmacological measures as appropriate and evaluate response  - Consider cultural and social influences on pain and pain management  - Notify physician/advanced practitioner if interventions unsuccessful or patient reports new pain  1/30/2020 0754 by Shreya Jackson RN  Outcome: Progressing  1/30/2020 0752 by Shreya Jackson RN  Outcome: Progressing     Problem: INFECTION - ADULT  Goal: Absence or prevention of progression during hospitalization  Description  INTERVENTIONS:  - Assess and monitor for signs and symptoms of infection  - Monitor lab/diagnostic results  - Monitor all insertion sites, i e  indwelling lines, tubes, and drains  - Administer medications as ordered  - Instruct and encourage patient and family to use good hand hygiene technique  - Identify and instruct in appropriate isolation precautions for identified infection/condition   1/30/2020 0754 by Shreya Jackson RN  Outcome: Progressing  1/30/2020 0752 by Shreya Jackson RN  Outcome: Progressing     Problem: SAFETY ADULT  Goal: Patient will remain free of falls  Description  INTERVENTIONS:  - Assess patient frequently for physical needs  -  Identify cognitive and physical deficits and behaviors that affect risk of falls    -  [place] fall precautions as indicated by assessment   - Educate patient/family on patient safety including physical limitations  - Instruct patient to call for assistance with activity based on assessment  - Modify environment to reduce risk of injury  - Consider OT/PT consult to assist with strengthening/mobility  1/30/2020 0754 by Shreya Jackson RN  Outcome: Progressing  1/30/2020 0752 by Shreya Jackson RN  Outcome: Progressing  Goal: Maintain or return to baseline ADL function  Description  INTERVENTIONS:  -  Assess patient's ability to carry out ADLs; assess patient's baseline for ADL function and identify physical deficits which impact ability to perform ADLs (bathing, care of mouth/teeth, toileting, grooming, dressing, etc )  - Assess/evaluate cause of self-care deficits   - Assess range of motion  - Assess patient's mobility; develop plan if impaired  - Assess patient's need for assistive devices and provide as appropriate  - Encourage maximum independence but intervene and supervise when necessary  - Involve family in performance of ADLs  - Assess for home care needs following discharge   - Consider OT consult to assist with ADL evaluation and planning for discharge  - Provide patient education as appropriate  1/30/2020 0754 by Skye López RN  Outcome: Progressing  1/30/2020 0752 by Skye López RN  Outcome: Progressing  Goal: Maintain or return mobility status to optimal level  Description  INTERVENTIONS:  - Assess patient's baseline mobility status (ambulation, transfers, stairs, etc )    - Identify cognitive and physical deficits and behaviors that affect mobility  - Identify mobility aids required to assist with transfers and/or ambulation (gait belt, sit-to-stand, lift, walker, cane, etc )  - [place] fall precautions as indicated by assessment  - Record patient progress and toleration of activity level on Mobility SBAR; progress patient to next Phase/Stage  - Instruct patient to call for assistance with activity based on assessment  - Consider rehabilitation consult to assist with strengthening/weightbearing, etc   1/30/2020 0754 by Skye López RN  Outcome: Progressing  1/30/2020 0752 by Skye López RN  Outcome: Progressing     Problem: DISCHARGE PLANNING  Goal: Discharge to home or other facility with appropriate resources  Description  INTERVENTIONS:  - Identify barriers to discharge w/patient and caregiver  - Arrange for needed discharge resources and transportation as appropriate  - Identify discharge learning needs (meds, wound care, etc )  - Arrange for interpretive services to assist at discharge as needed  - Refer to Case Management Department for coordinating discharge planning if the patient needs post-hospital services based on physician/advanced practitioner order or complex needs related to functional status, cognitive ability, or social support system  1/30/2020 0754 by Juan Mederos RN  Outcome: Progressing  1/30/2020 0752 by Juan Mederos RN  Outcome: Progressing     Problem: Knowledge Deficit  Goal: Patient/family/caregiver demonstrates understanding of disease process, treatment plan, medications, and discharge instructions  Description  Complete learning assessment and assess knowledge base    Interventions:  - Provide teaching at level of understanding  - Provide teaching via preferred learning methods  1/30/2020 0754 by Juan Mederos RN  Outcome: Progressing  1/30/2020 0752 by Juan Mederos RN  Outcome: Progressing     Problem: COPING  Goal: Pt/Family able to verbalize concerns and demonstrate effective coping strategies  Description  INTERVENTIONS:  - Assist patient/family to identify coping skills, available support systems and cultural and spiritual values  - Provide emotional support, including active listening and acknowledgement of concerns of patient and caregivers  - Reduce environmental stimuli, as able  - Provide patient education  - Assess for spiritual pain/suffering and initiate spiritual care, including notification of Pastoral Care or selin based community as needed  - Assess effectiveness of coping strategies  Outcome: Progressing  Goal: Will report anxiety at manageable levels  Description  INTERVENTIONS:  - Administer medication as ordered  - Teach and encourage coping skills  - Provide emotional support  - Assess patient/family for anxiety and ability to cope  Outcome: Progressing     Problem: BEHAVIOR  Goal: Pt/Family maintain appropriate behavior and adhere to behavioral management agreement, if implemented  Description  INTERVENTIONS:  - Assess the family dynamic   - Encourage verbalization of thoughts and concerns in a socially appropriate manner  - Assess patient/family's coping skills and non-compliant behavior (including use of illegal substances)  - Utilize positive, consistent limit setting strategies supporting safety of patient, staff and others  - Initiate consult with Case Management, Spiritual Care or other ancillary services as appropriate  - If a patient's/visitor's behavior jeopardizes the safety of the patient, staff, or others, refer to organization procedure     - Notify Security of behavior or suspected illegal substances which indicate the need for search of the patient and/or belongings  - Encourage participation in the decision making process about a behavioral management agreement; implement if patient meets criteria  Outcome: Progressing

## 2020-01-30 NOTE — ASSESSMENT & PLAN NOTE
Controlled   Continue antihypertensives    Vitals:    01/30/20 0100 01/30/20 0300 01/30/20 1106 01/30/20 1505   BP: 119/59 137/74 139/60 131/64   BP Location: Right arm Right arm Right arm Right arm   Pulse: 85 71 67 76   Resp: 18 18 18 18   Temp: (!) 97 °F (36 1 °C) 97 7 °F (36 5 °C) 97 7 °F (36 5 °C) 99 9 °F (37 7 °C)   TempSrc: Temporal Temporal Tympanic Temporal   SpO2: 98% 97% 98% 96%   Weight:       Height:

## 2020-01-30 NOTE — UTILIZATION REVIEW
Initial Clinical Review    Admission: Date/Time/Statement: Inpatient Admission Orders (From admission, onward)     Ordered        01/29/20 0914  Inpatient Admission (expected length of stay for this patient Order details is greater than two midnights)  Once                   Orders Placed This Encounter   Procedures    Inpatient Admission (expected length of stay for this patient Order details is greater than two midnights)     Standing Status:   Standing     Number of Occurrences:   1     Order Specific Question:   Admitting Physician     Answer:   Abraham Steiner [[id number]]     Order Specific Question:   Level of Care     Answer:   Med Surg [16]     Order Specific Question:   Estimated length of stay     Answer:   More than 2 Midnights     Order Specific Question:   Certification     Answer:   I certify that inpatient services are medically necessary for this patient for a duration of greater than two midnights  See H&P and MD Progress Notes for additional information about the patient's course of treatment  ED Arrival Information     Expected Arrival Acuity Means of Arrival Escorted By Service Admission Type    - 1/29/2020 04:17 Urgent [facility] General Medicine Urgent    Arrival Complaint    syncope        Chief Complaint   Patient presents with    Syncope     Per patient's daughter was sitting on toilet this evening and leaned forward and passed out about 0300  First time was about 5 minutes  Patient had 2 syncopal episodes since first one   Headache     Patient c/o headache, per daughter, headaches are chronic   Dizziness     ongoing     Assessment/Plan:    81 yo female,  To ER via EMS ,  Admitted INPT status at [facility] level of care for workup of repeated syncopal episodes w/ongoing dizziness      Pt w/dementia and h/o 3 prior strokes  Caretaker is daughter who reports 3 episodes syncope  this am w/ "passed out" time of  5-10 min each    Daughter states that episodes were much longer this am and have never repeated before  Recent kyphoplasty of T-12 and  chronic UTI's (on prophylactic keflex)   Unlikely due to ACS as EKG w/out ST changes  Will place on telemetry,  Obtain orthostatic bps;  Frequent neuro ck & vs;  Consult neurology,  PT/OT,  Case management  Order MRI brain         1/30 @  0700 -  Pt aggressively hitting staff , spitting out pills  Given IM zyprexa  unable to get orthostatics due to pt shaking and syncope        ED Triage Vitals   Temperature Pulse Respirations Blood Pressure SpO2   01/29/20 0422 01/29/20 0422 01/29/20 0422 01/29/20 0422 01/29/20 0422   98 4 °F (36 9 °C) 77 20 144/61 100 %      Temp Source Heart Rate Source Patient Position - Orthostatic VS BP Location FiO2 (%)   01/29/20 0422 01/29/20 0422 01/29/20 0422 01/29/20 0422 --   Oral Monitor Lying Right arm       Pain Score       01/29/20 0610       6        Wt Readings from Last 1 Encounters:   01/29/20 63 5 kg (140 lb)     Additional Vital Signs:   01/30/20 1106  97 7 °F (36 5 °C)  67  18  139/60  98 %   01/30/20 0300  97 7 °F (36 5 °C)  71  18  137/74  97 %  rm air        Pertinent Labs/Diagnostic Test Results:   1/29  EKG -  SR,  No significant ST  changes when compared to prior     1/29  CT head -  Cerebral atrophy with chronic small vessel ischemic white matter disease  No acute intracranial abnormality       No significant change from priors      1/29  CT spine thoracid -  Compression fracture T12 vertebral body, status post kyphoplasty   Although the overall loss in the axial height of the T12 vertebral body is unchanged from the most recent prior kyphoplasty, the degree of bony retropulsion of the T12 vertebral body has progressed from the prior CT scan of December 14, 2019  Lila Haley is currently moderate to severe central stenosis posterior to the T12 vertebral body level        Results from last 7 days   Lab Units 01/29/20  0513   WBC Thousand/uL 7 26   HEMOGLOBIN g/dL 10 2*   HEMATOCRIT % 32 0*   PLATELETS Thousands/uL 278   NEUTROS ABS Thousands/µL 4 57         Results from last 7 days   Lab Units 01/30/20  0507 01/29/20  0513   SODIUM mmol/L 144 142   POTASSIUM mmol/L 4 0 3 7   CHLORIDE mmol/L 109* 105   CO2 mmol/L 24 24   ANION GAP mmol/L 11 13   BUN mg/dL 18 19   CREATININE mg/dL 0 83 1 00   EGFR ml/min/1 73sq m 64 51   CALCIUM mg/dL 8 1* 8 9   MAGNESIUM mg/dL  --  2 0     Results from last 7 days   Lab Units 01/29/20  0513   AST U/L 12   ALT U/L 9*   ALK PHOS U/L 77   TOTAL PROTEIN g/dL 6 3*   ALBUMIN g/dL 3 1*   TOTAL BILIRUBIN mg/dL 0 35         Results from last 7 days   Lab Units 01/30/20  0507 01/29/20  0513   GLUCOSE RANDOM mg/dL 92 108     Results from last 7 days   Lab Units 01/29/20  0513   CK TOTAL U/L 24*     Results from last 7 days   Lab Units 01/29/20  1200 01/29/20  0803 01/29/20  0513   TROPONIN I ng/mL 0 08* 0 07* 0 05*         Results from last 7 days   Lab Units 01/29/20  0513   PROTIME seconds 18 8*   INR  1 55*   PTT seconds 53*     Results from last 7 days   Lab Units 01/29/20  0553   CLARITY UA  Cloudy   COLOR UA  Yellow   SPEC GRAV UA  1 010   PH UA  7 0   GLUCOSE UA mg/dl Negative   KETONES UA mg/dl Negative   BLOOD UA  Small*   PROTEIN UA mg/dl Trace*   NITRITE UA  Positive*   BILIRUBIN UA  Small*   UROBILINOGEN UA E U /dl 0 2   LEUKOCYTES UA  Large*   WBC UA /hpf Innumerable*   RBC UA /hpf Field obscured, unable to enumerate*   BACTERIA UA /hpf Innumerable*   EPITHELIAL CELLS WET PREP /hpf Occasional     Results from last 7 days   Lab Units 01/29/20  0553   URINE CULTURE  >100,000 cfu/ml Gram Negative [place] Enteric Like*       ED Treatment:   Medication Administration from 01/29/2020 0417 to 01/29/2020 1936 Date/Time Order Dose Route Action     01/29/2020 0610 acetaminophen (TYLENOL) tablet 650 mg 650 mg Oral Given     01/29/2020 2136 ketorolac (TORADOL) injection 15 mg 15 mg Intravenous Given     01/29/2020 0610 meclizine (ANTIVERT) tablet 12 5 mg 12 5 mg Oral Given     01/29/2020 1600 sodium chloride 0 9 % infusion 125 mL/hr Intravenous New Bag     01/29/2020 0617 sodium chloride 0 9 % infusion 125 mL/hr Intravenous New Bag     01/29/2020 1351 acetaminophen (TYLENOL) tablet 650 mg 650 mg Oral Given     01/29/2020 1350 amLODIPine (NORVASC) tablet 5 mg 5 mg Oral Given     01/29/2020 1559 atorvastatin (LIPITOR) tablet 20 mg 20 mg Oral Given     01/29/2020 1353 dabigatran etexilate (PRADAXA) capsule 150 mg 150 mg Oral Given     01/29/2020 1351 lisinopril (ZESTRIL) tablet 10 mg 10 mg Oral Given     01/29/2020 1352 memantine (NAMENDA) tablet 10 mg 10 mg Oral Given     01/29/2020 1350 pantoprazole (PROTONIX) EC tablet 40 mg 40 mg Oral Given        Past Medical History:   Diagnosis Date    [facility]fib ([facility] )     [facility]     Chronic UTI     Diverticulosis     Dyslipidemia     History of stroke     Hyperlipidemia     Hypertension     UTI (urinary tract infection)     Vasovagal syncope 2/13/2018     Present on Admission:   Late onset Alzheimer's disease with behavioral disturbance ([facility] )   Mixed hyperlipidemia   Essential hypertension   Patient is Gnosticist   Syncope and collapse      Admitting Diagnosis: Dizziness [R42]  Alzheimer disease ([facility] ) [G30 9, F02 80]  Syncope [R55]  UTI (urinary tract infection) [N39 0]  NSTEMI (non-ST elevated myocardial infarction) ([facility] ) [I21 4]  Fracture of T12 vertebra ([facility] ) [S22 089A]  Nonintractable headache, unspecified chronicity pattern, unspecified headache type [R51]  Age/Sex: 80 y o  female  Admission Orders:  [facility],  Consult neurology,  PT/OT evals & treat,  Orthostatic bps,  IVF NS @  125   VS/Neuro cks q 1 hr x4;  q 2 hr x4;  q 4 hrs ,   Dysphagia assessment prior to po  MRI head,  I/O q shift ,  SCDs,  Up w/assist      Scheduled Medications:    Medications:  amLODIPine 5 mg Oral Daily   atorvastatin 20 mg Oral Daily With Dinner   cefTRIAXone 1,000 mg Intravenous Q24H   dabigatran etexilate 150 mg Oral Q12H [facility]   donepezil 10 mg Oral HS   lisinopril 10 mg Oral Daily   memantine 10 mg Oral BID   OLANZapine 2 5 mg Intramuscular Once   pantoprazole 40 mg Oral Daily   QUEtiapine 25 mg Oral HS     Continuous IV Infusions:    sodium chloride 125 mL/hr Intravenous Continuous     PRN Meds:    acetaminophen 650 mg Oral Q6H PRN       None    Network Utilization Review Department  [email or web address]  ATTENTION: Please call with any questions or concerns to [phone] and carefully listen to the prompts so that you are directed to the right person  All voicemails are confidential   Evan Estrada all requests for admission clinical reviews, approved or denied determinations and any other requests to dedicated fax number below belonging to the campus where the patient is receiving treatment   List of dedicated fax numbers for the Facilities:  [address] DENIALS (Administrative/Medical Necessity) [phone]   [address] (Maternity/NICU/Pediatrics) [phone]   Daniela Tamayo [phone]   Leroy Grooms [phone]   Kelly Halima [phone]   Mertha Finger [phone]   [facility] [address] [phone]   [facility]  [phone]   [address] [facility]  [address] [facility] [address] [facility] [phone]

## 2020-01-30 NOTE — PROGRESS NOTES
Notified by nurse  Pt aggressive- hitting staff, ripped tele box off, non cooperative, difficult to redirect, spit AM pills out  Will give a one time dose of IM zyprexa 2 5 mg now

## 2020-01-30 NOTE — ASSESSMENT & PLAN NOTE
Symptomatic   - Continue IV ceftriaxone while awaiting speciation  Will likely have to escalate therapy given Citrobacter      Recent Labs     01/29/20  0553   URINECX >100,000 cfu/ml Citrobacter freundii*

## 2020-01-30 NOTE — PLAN OF CARE
Problem: Potential for Falls  Goal: Patient will remain free of falls  Description  INTERVENTIONS:  - Assess patient frequently for physical needs  -  Identify cognitive and physical deficits and behaviors that affect risk of falls    -  [place] fall precautions as indicated by assessment   - Educate patient/family on patient safety including physical limitations  - Instruct patient to call for assistance with activity based on assessment  - Modify environment to reduce risk of injury  - Consider OT/PT consult to assist with strengthening/mobility  Outcome: Progressing     Problem: Prexisting or High Potential for Compromised Skin Integrity  Goal: Skin integrity is maintained or improved  Description  INTERVENTIONS:  - Identify patients at risk for skin breakdown  - Assess and monitor skin integrity  - Assess and monitor nutrition and hydration status  - Monitor labs   - Assess for incontinence   - Turn and reposition patient  - Assist with mobility/ambulation  - Relieve pressure over bony prominences  - Avoid friction and shearing  - Provide appropriate hygiene as needed including keeping skin clean and dry  - Evaluate need for skin moisturizer/barrier cream  - Collaborate with interdisciplinary team   - Patient/family teaching  - Consider wound care consult   Outcome: Progressing     Problem: PAIN - ADULT  Goal: Verbalizes/displays adequate comfort level or baseline comfort level  Description  Interventions:  - Encourage patient to monitor pain and request assistance  - Assess pain using appropriate pain scale  - Administer analgesics based on type and severity of pain and evaluate response  - Implement non-pharmacological measures as appropriate and evaluate response  - Consider cultural and social influences on pain and pain management  - Notify physician/advanced practitioner if interventions unsuccessful or patient reports new pain  Outcome: Progressing     Problem: INFECTION - ADULT  Goal: Absence or prevention of progression during hospitalization  Description  INTERVENTIONS:  - Assess and monitor for signs and symptoms of infection  - Monitor lab/diagnostic results  - Monitor all insertion sites, i e  indwelling lines, tubes, and drains  - Administer medications as ordered  - Instruct and encourage patient and family to use good hand hygiene technique  - Identify and instruct in appropriate isolation precautions for identified infection/condition   Outcome: Progressing     Problem: SAFETY ADULT  Goal: Patient will remain free of falls  Description  INTERVENTIONS:  - Assess patient frequently for physical needs  -  Identify cognitive and physical deficits and behaviors that affect risk of falls    -  [place] fall precautions as indicated by assessment   - Educate patient/family on patient safety including physical limitations  - Instruct patient to call for assistance with activity based on assessment  - Modify environment to reduce risk of injury  - Consider OT/PT consult to assist with strengthening/mobility  Outcome: Progressing  Goal: Maintain or return to baseline ADL function  Description  INTERVENTIONS:  -  Assess patient's ability to carry out ADLs; assess patient's baseline for ADL function and identify physical deficits which impact ability to perform ADLs (bathing, care of mouth/teeth, toileting, grooming, dressing, etc )  - Assess/evaluate cause of self-care deficits   - Assess range of motion  - Assess patient's mobility; develop plan if impaired  - Assess patient's need for assistive devices and provide as appropriate  - Encourage maximum independence but intervene and supervise when necessary  - Involve family in performance of ADLs  - Assess for home care needs following discharge   - Consider OT consult to assist with ADL evaluation and planning for discharge  - Provide patient education as appropriate  Outcome: Progressing  Goal: Maintain or return mobility status to optimal level  Description  INTERVENTIONS:  - Assess patient's baseline mobility status (ambulation, transfers, stairs, etc )    - Identify cognitive and physical deficits and behaviors that affect mobility  - Identify mobility aids required to assist with transfers and/or ambulation (gait belt, sit-to-stand, lift, walker, cane, etc )  - [place] fall precautions as indicated by assessment  - Record patient progress and toleration of activity level on Mobility SBAR; progress patient to next Phase/Stage  - Instruct patient to call for assistance with activity based on assessment  - Consider rehabilitation consult to assist with strengthening/weightbearing, etc   Outcome: Progressing     Problem: DISCHARGE PLANNING  Goal: Discharge to home or other facility with appropriate resources  Description  INTERVENTIONS:  - Identify barriers to discharge w/patient and caregiver  - Arrange for needed discharge resources and transportation as appropriate  - Identify discharge learning needs (meds, wound care, etc )  - Arrange for interpretive services to assist at discharge as needed  - Refer to Case Management Department for coordinating discharge planning if the patient needs post-hospital services based on physician/advanced practitioner order or complex needs related to functional status, cognitive ability, or social support system  Outcome: Progressing     Problem: Knowledge Deficit  Goal: Patient/family/caregiver demonstrates understanding of disease process, treatment plan, medications, and discharge instructions  Description  Complete learning assessment and assess knowledge base    Interventions:  - Provide teaching at level of understanding  - Provide teaching via preferred learning methods  Outcome: Progressing

## 2020-01-30 NOTE — PLAN OF CARE
Problem: Potential for Falls  Goal: Patient will remain free of falls  Description  INTERVENTIONS:  - Assess patient frequently for physical needs  -  Identify cognitive and physical deficits and behaviors that affect risk of falls    -  [place] fall precautions as indicated by assessment   - Educate patient/family on patient safety including physical limitations  - Instruct patient to call for assistance with activity based on assessment  - Modify environment to reduce risk of injury  - Consider OT/PT consult to assist with strengthening/mobility  Outcome: Progressing     Problem: Prexisting or High Potential for Compromised Skin Integrity  Goal: Skin integrity is maintained or improved  Description  INTERVENTIONS:  - Identify patients at risk for skin breakdown  - Assess and monitor skin integrity  - Assess and monitor nutrition and hydration status  - Monitor labs   - Assess for incontinence   - Turn and reposition patient  - Assist with mobility/ambulation  - Relieve pressure over bony prominences  - Avoid friction and shearing  - Provide appropriate hygiene as needed including keeping skin clean and dry  - Evaluate need for skin moisturizer/barrier cream  - Collaborate with interdisciplinary team   - Patient/family teaching  - Consider wound care consult   Outcome: Progressing     Problem: PAIN - ADULT  Goal: Verbalizes/displays adequate comfort level or baseline comfort level  Description  Interventions:  - Encourage patient to monitor pain and request assistance  - Assess pain using appropriate pain scale  - Administer analgesics based on type and severity of pain and evaluate response  - Implement non-pharmacological measures as appropriate and evaluate response  - Consider cultural and social influences on pain and pain management  - Notify physician/advanced practitioner if interventions unsuccessful or patient reports new pain  Outcome: Progressing     Problem: INFECTION - ADULT  Goal: Absence or prevention of progression during hospitalization  Description  INTERVENTIONS:  - Assess and monitor for signs and symptoms of infection  - Monitor lab/diagnostic results  - Monitor all insertion sites, i e  indwelling lines, tubes, and drains  - Administer medications as ordered  - Instruct and encourage patient and family to use good hand hygiene technique  - Identify and instruct in appropriate isolation precautions for identified infection/condition   Outcome: Progressing     Problem: SAFETY ADULT  Goal: Patient will remain free of falls  Description  INTERVENTIONS:  - Assess patient frequently for physical needs  -  Identify cognitive and physical deficits and behaviors that affect risk of falls    -  [place] fall precautions as indicated by assessment   - Educate patient/family on patient safety including physical limitations  - Instruct patient to call for assistance with activity based on assessment  - Modify environment to reduce risk of injury  - Consider OT/PT consult to assist with strengthening/mobility  Outcome: Progressing  Goal: Maintain or return to baseline ADL function  Description  INTERVENTIONS:  -  Assess patient's ability to carry out ADLs; assess patient's baseline for ADL function and identify physical deficits which impact ability to perform ADLs (bathing, care of mouth/teeth, toileting, grooming, dressing, etc )  - Assess/evaluate cause of self-care deficits   - Assess range of motion  - Assess patient's mobility; develop plan if impaired  - Assess patient's need for assistive devices and provide as appropriate  - Encourage maximum independence but intervene and supervise when necessary  - Involve family in performance of ADLs  - Assess for home care needs following discharge   - Consider OT consult to assist with ADL evaluation and planning for discharge  - Provide patient education as appropriate  Outcome: Progressing  Goal: Maintain or return mobility status to optimal level  Description  INTERVENTIONS:  - Assess patient's baseline mobility status (ambulation, transfers, stairs, etc )    - Identify cognitive and physical deficits and behaviors that affect mobility  - Identify mobility aids required to assist with transfers and/or ambulation (gait belt, sit-to-stand, lift, walker, cane, etc )  - [place] fall precautions as indicated by assessment  - Record patient progress and toleration of activity level on Mobility SBAR; progress patient to next Phase/Stage  - Instruct patient to call for assistance with activity based on assessment  - Consider rehabilitation consult to assist with strengthening/weightbearing, etc   Outcome: Progressing     Problem: DISCHARGE PLANNING  Goal: Discharge to home or other facility with appropriate resources  Description  INTERVENTIONS:  - Identify barriers to discharge w/patient and caregiver  - Arrange for needed discharge resources and transportation as appropriate  - Identify discharge learning needs (meds, wound care, etc )  - Arrange for interpretive services to assist at discharge as needed  - Refer to Case Management Department for coordinating discharge planning if the patient needs post-hospital services based on physician/advanced practitioner order or complex needs related to functional status, cognitive ability, or social support system  Outcome: Progressing     Problem: Knowledge Deficit  Goal: Patient/family/caregiver demonstrates understanding of disease process, treatment plan, medications, and discharge instructions  Description  Complete learning assessment and assess knowledge base    Interventions:  - Provide teaching at level of understanding  - Provide teaching via preferred learning methods  Outcome: Progressing

## 2020-01-30 NOTE — UTILIZATION REVIEW
Notification of Inpatient Admission/Inpatient Authorization Request   This is a Notification of Inpatient Admission for [address]  Be advised that this patient was admitted to our facility under Inpatient Status  Contact Lashaun Greenwoodo at [phone] for additional admission information  [facility] DEPT  DEDICATED -[phone]  Patient Name:   Kyle Mueller   YOB: 1933       [address]   [address]:   [facility]  Tax ID: [id number]  NPI: [id number] Attending Provider/NPI: Guillermina Mayorga Md [[id number]]   Place of Service Code: 24     Place of Service Name:  [address] [place] [address]   Start Date: 1/29/20 6285     Discharge Date & Time: No discharge date for patient encounter  Type of Admission: Inpatient Status Discharge Disposition   (if discharged): Home with [address] [employer]   Patient Diagnoses: Dizziness [R42]  Alzheimer disease ([facility] ) [G30 9, F02 80]  Syncope [R55]  UTI (urinary tract infection) [N39 0]  NSTEMI (non-ST elevated myocardial infarction) ([facility] ) [I21 4]  Fracture of T12 vertebra ([facility] ) [S22 089A]  Nonintractable headache, unspecified chronicity pattern, unspecified headache type [R51]     Orders: Admission Orders (From admission, onward)     Ordered        01/29/20 0914  Inpatient Admission (expected length of stay for this patient Order details is greater than two midnights)  Once                    Assigned Utilization Review Contact: [address] [place] [address] Utilization Review Department  Phone: [phone]; Fax [phone]  Email: [email or web address]   ATTENTION PAYERS: Please call the assigned Utilization  directly with any questions or concerns ALL voicemails in the department are confidential  Send all requests for admission clinical reviews, approved or denied determinations and any other requests to dedicated fax number belonging to the campus where the patient is receiving treatment

## 2020-01-30 NOTE — OCCUPATIONAL THERAPY NOTE
Occupational Therapy Cancellation        Patient Name: Alli Goldsmith  FKBYV'L Date: 1/30/2020      OT consult received  Pt currently agitated and yelling and NSG present in patient room  Pt not appropriate for therapy at this time      Leann Cain MS, OTR/L

## 2020-01-30 NOTE — ASSESSMENT & PLAN NOTE
22-year-old female with atrial fibrillation on Pradaxa, hypertension, and hyperlipidemia was admitted due to 3 episodes of syncope this morning    Possibly orthostatic or vasovagal  Still awaiting telemetry evaluation since patient ripped out monitor overnight   - Continue telemetry monitoring for now

## 2020-01-30 NOTE — SOCIAL WORK
Cm reviewed pt care coordination rounds  Pt is on a 1:1 for aggressiveness and agitation, needs 24 hrs on tele monitoring  Anticipating d/c home tomorrow  Cm will f/u for final medical clearance and dcp

## 2020-01-30 NOTE — PROGRESS NOTES
Progress Note - Ganesh Hamilton 1933, 80 y o  female MRN: [id number]    Unit/Bed#: E4 -01 Encounter: [id number]    Primary Care Provider: Vidhya Ziegler DO   Date and time admitted to hospital: 1/29/2020  4:19 AM        * Syncope and collapse  Assessment & Plan  80year-old female with atrial fibrillation on Pradaxa, hypertension, and hyperlipidemia was admitted due to 3 episodes of syncope this morning  Possibly orthostatic or vasovagal  Still awaiting telemetry evaluation since patient ripped out monitor overnight   - Continue telemetry monitoring for now    Acute cystitis without hematuria  Assessment & Plan  Symptomatic   - Continue IV ceftriaxone while awaiting speciation  Will likely have to escalate therapy given Citrobacter  Recent Labs     01/29/20  0553   URINECX >100,000 cfu/ml Citrobacter freundii*         Chronic atrial fibrillation  Assessment & Plan  Continue Pradaxa    Essential hypertension  Assessment & Plan  Controlled  Continue antihypertensives    Vitals:    01/30/20 0100 01/30/20 0300 01/30/20 1106 01/30/20 1505   BP: 119/59 137/74 139/60 131/64   BP Location: Right arm Right arm Right arm Right arm   Pulse: 85 71 67 76   Resp: 18 18 18 18   Temp: (!) 97 °F (36 1 °C) 97 7 °F (36 5 °C) 97 7 °F (36 5 °C) 99 9 °F (37 7 °C)   TempSrc: Temporal Temporal Tympanic Temporal   SpO2: 98% 97% 98% 96%   Weight:       Height:               VTE Pharmacologic Prophylaxis:   Pharmacologic: Dabigatran (Pradaxa)  Mechanical VTE Prophylaxis in Place: Yes    Patient Centered Rounds: I have performed bedside rounds with nursing staff today  Discussions with Specialists or Other Care Team Provider: Nursing    Education and Discussions with Family / Patient: Patient and daughters    Time Spent for Care: 30 minutes  More than 50% of total time spent on counseling and coordination of care as described above      Current Length of Stay: 1 day(s)    Current Patient Status: Inpatient   Certification Statement: The patient will continue to require additional inpatient hospital stay due to IV antibiotic therapy and telemetry monitoring    Discharge Plan: 24-48 hours    Code Status: Level 3 - DNAR and DNI      Subjective:   Patient seen and examined at bedside  She was very combative last night  Will request one-to-one observation again tonight in anticipation for aggression  Ordered p r n  Agitation medications  Objective:     Vitals:   Temp (24hrs), Av °F (36 7 °C), Min:97 °F (36 1 °C), Max:99 9 °F (37 7 °C)    Temp:  [97 °F (36 1 °C)-99 9 °F (37 7 °C)] 99 9 °F (37 7 °C)  HR:  [67-85] 76  Resp:  [18] 18  BP: (119-139)/(55-74) 131/64  SpO2:  [96 %-99 %] 96 %  Body mass index is 25 61 kg/m²  Input and Output Summary (last 24 hours): Intake/Output Summary (Last 24 hours) at 2020 1750  Last data filed at 2020 0151  Gross per 24 hour   Intake 1168 75 ml   Output    Net 1168 75 ml       Physical Exam:     Physical Exam   Constitutional: No distress  Elderly lady   HENT:   Head: Normocephalic and atraumatic  Eyes: Pupils are equal, round, and reactive to light  EOM are normal    Neck: Neck supple  Cardiovascular: Normal rate and regular rhythm  Pulmonary/Chest: Effort normal and breath sounds normal    Abdominal: Soft  Bowel sounds are normal    Musculoskeletal: She exhibits no edema  Neurological: She is alert  Skin: Skin is warm and dry  Psychiatric: She has a normal mood and affect     Irritable     Additional Data:     Labs:    Results from last 7 days   Lab Units 20  0513   WBC Thousand/uL 7 26   HEMOGLOBIN g/dL 10 2*   HEMATOCRIT % 32 0*   PLATELETS Thousands/uL 278   NEUTROS PCT % 63   LYMPHS PCT % 27   MONOS PCT % 8   EOS PCT % 1     Results from last 7 days   Lab Units 20  0507 20  0513   SODIUM mmol/L 144 142   POTASSIUM mmol/L 4 0 3 7   CHLORIDE mmol/L 109* 105   CO2 mmol/L 24 24   BUN mg/dL 18 19   CREATININE mg/dL 0 83 1 00   ANION GAP mmol/L 11 13   CALCIUM mg/dL 8 1* 8 9   ALBUMIN g/dL  --  3 1*   TOTAL BILIRUBIN mg/dL  --  0 35   ALK PHOS U/L  --  77   ALT U/L  --  9*   AST U/L  --  12   GLUCOSE RANDOM mg/dL 92 108     Results from last 7 days   Lab Units 01/29/20  0513   INR  1 55*                       * I Have Reviewed All Lab Data Listed Above  * Additional Pertinent Lab Tests Reviewed: [address] Admission Reviewed    Imaging:    Imaging Reports Reviewed Today Include: No new imaging    Recent Cultures (last 7 days):     Results from last 7 days   Lab Units 01/29/20  0553   URINE CULTURE  >100,000 cfu/ml Citrobacter freundii*       Last 24 Hours Medication List:     Current Facility-Administered Medications:  acetaminophen 650 mg Oral Q6H PRN Elfego James MD    amLODIPine 5 mg Oral Daily Elfego James MD    atorvastatin 20 mg Oral Daily With Ana Hendrickson MD    cefTRIAXone 1,000 mg Intravenous Q24H Elfego James MD Last Rate: 1,000 mg (01/30/20 1746)   dabigatran etexilate 150 mg Oral Q12H [address] Elfego James MD    donepezil 10 mg Oral HS Elfego James MD    lisinopril 10 mg Oral Daily Elfego James MD    memantine 10 mg Oral BID Elfego James MD    OLANZapine 2 5 mg Intramuscular Once Henna Ramirez PA-C    OLANZapine 2 5 mg Intramuscular Q3H PRN Elfego James MD    pantoprazole 40 mg Oral Daily Elfego James MD    QUEtiapine 25 mg Oral HS Elfego James MD    sodium chloride 125 mL/hr Intravenous Continuous CARMELLA Greene Last Rate: 125 mL/hr (01/30/20 0151)        Today, Patient Was Seen By: Diane Cobb MD    ** Please Note: Dictation voice to text software may have been used in the creation of this document   **

## 2020-01-30 NOTE — NURSING NOTE
Patient remains on a 1:1 for safety and mood disorder  Refuses to take her medications this morning, refused vital signs, refused to keep her telemetry on   Patient asking why she's here, stated "I don't need a nurse, Moni Hawkins go home " Explained to patient the reason why shes at the hospital  Patient again stated "I Negin Holland go home "

## 2020-01-31 PROBLEM — G93.41 METABOLIC ENCEPHALOPATHY: Status: ACTIVE | Noted: 2020-01-31

## 2020-01-31 PROBLEM — G93.41 METABOLIC ENCEPHALOPATHY: Status: ACTIVE | Noted: 2019-08-09

## 2020-01-31 PROBLEM — A49.8 INFECTION DUE TO MULTIDRUG-RESISTANT ENTEROBACTER CLOACAE: Status: ACTIVE | Noted: 2020-01-29

## 2020-01-31 PROBLEM — Z16.24 INFECTION DUE TO MULTIDRUG-RESISTANT ENTEROBACTER CLOACAE: Status: ACTIVE | Noted: 2020-01-29

## 2020-01-31 LAB — BACTERIA UR CULT: ABNORMAL

## 2020-01-31 PROCEDURE — 99232 SBSQ HOSP IP/OBS MODERATE 35: CPT | Performed by: STUDENT IN AN ORGANIZED HEALTH CARE EDUCATION/TRAINING PROGRAM

## 2020-01-31 RX ADMIN — ACETAMINOPHEN 650 MG: 325 TABLET ORAL at 21:41

## 2020-01-31 RX ADMIN — ATORVASTATIN CALCIUM 20 MG: 40 TABLET, FILM COATED ORAL at 17:24

## 2020-01-31 RX ADMIN — MEMANTINE 10 MG: 5 TABLET ORAL at 08:59

## 2020-01-31 RX ADMIN — QUETIAPINE FUMARATE 25 MG: 25 TABLET ORAL at 21:41

## 2020-01-31 RX ADMIN — DONEPEZIL HYDROCHLORIDE 10 MG: 10 TABLET, FILM COATED ORAL at 21:41

## 2020-01-31 RX ADMIN — PANTOPRAZOLE SODIUM 40 MG: 40 TABLET, DELAYED RELEASE ORAL at 06:36

## 2020-01-31 RX ADMIN — AMLODIPINE BESYLATE 5 MG: 10 TABLET ORAL at 08:58

## 2020-01-31 RX ADMIN — DABIGATRAN ETEXILATE MESYLATE 150 MG: 150 CAPSULE ORAL at 09:00

## 2020-01-31 RX ADMIN — LISINOPRIL 10 MG: 5 TABLET ORAL at 08:57

## 2020-01-31 RX ADMIN — MEMANTINE 10 MG: 5 TABLET ORAL at 17:24

## 2020-01-31 RX ADMIN — CEFEPIME HYDROCHLORIDE 2000 MG: 2 INJECTION, POWDER, FOR SOLUTION INTRAVENOUS at 14:15

## 2020-01-31 RX ADMIN — ACETAMINOPHEN 650 MG: 325 TABLET ORAL at 09:03

## 2020-01-31 RX ADMIN — DABIGATRAN ETEXILATE MESYLATE 150 MG: 150 CAPSULE ORAL at 21:41

## 2020-01-31 NOTE — CASE MANAGEMENT
This case management assistant spoke with an  of Leann Encarnacion DO  At 4:02 PM on 1/31/2020, and she was able to verify that patient, Althea Reid, is scheduled to see Leann Encarnacion DO, on Friday February 7th, 2020 at 11:30 AM  The appointment was added to her AVS

## 2020-01-31 NOTE — PROGRESS NOTES
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  Assessment & Plan  Continue Cefepime for now  - Awaiting ID for further recommendations  Per previous note, complete treatment until 2/1/2020  Recent Labs     01/29/20  0553   URINECX >100,000 cfu/ml Citrobacter freundii*         Essential hypertension  Assessment & Plan  Controlled    Vitals:    01/30/20 1106 01/30/20 1505 01/30/20 1900 01/31/20 0748   BP: 139/60 131/64 141/75 129/79   BP Location: Right arm Right arm Right arm Right arm   Pulse: 67 76 98 76   Resp: 18 18 18 18   Temp: 97 7 °F (36 5 °C) 99 9 °F (37 7 °C)  97 5 °F (36 4 °C)   TempSrc: Tympanic Temporal Temporal Temporal   SpO2: 98% 96% 96% 100%   Weight:       Height:               VTE Pharmacologic Prophylaxis:   Pharmacologic: Dabigatran (Pradaxa)  Mechanical VTE Prophylaxis in Place: Yes    Patient Centered Rounds: I have performed bedside rounds with nursing staff today  Discussions with Specialists or Other Care Team Provider: Infectious disease    Education and Discussions with Family / Patient: Patient and daughter    Time Spent for Care: 45 minutes  More than 50% of total time spent on counseling and coordination of care as described above      Current Length of Stay: 2 day(s)    Current Patient Status: Inpatient   Certification Statement: The patient will continue to require additional inpatient hospital stay due to IV antibiotic therapy for MDR UTI, awaiting ID for further recommendations    Discharge Plan: 24-48 hours    Code Status: Level 3 - DNAR and DNI      Subjective:   Patient seen and examined at bedside  Resting comfortably  Spoke to daughter at bedside  No acute events or complaints overnight  Objective:     Vitals:   Temp (24hrs), Av 4 °F (36 9 °C), Min:97 5 °F (36 4 °C), Max:99 9 °F (37 7 °C)    Temp:  [97 5 °F (36 4 °C)-99 9 °F (37 7 °C)] 97 5 °F (36 4 °C)  HR:  [67-98] 76  Resp:  [18] 18  BP: (129-141)/(60-79) 129/79  SpO2:  [96 %-100 %] 100 %  Body mass index is 25 61 kg/m²  Input and Output Summary (last 24 hours):     No intake or output data in the 24 hours ending 20 1104    Physical Exam:     Physical Exam   Constitutional: No distress  HENT:   Head: Normocephalic and atraumatic  Eyes: Pupils are equal, round, and reactive to light  EOM are normal    Neck: Neck supple  Cardiovascular: Normal rate and regular rhythm  Pulmonary/Chest: Effort normal and breath sounds normal  She has no wheezes  She has no rales  Abdominal: Soft  Bowel sounds are normal  There is no tenderness  There is no guarding  Musculoskeletal: She exhibits no edema  Neurological:   awake   Skin: Skin is warm and dry  Psychiatric:   Irritable   Vitals reviewed      Additional Data:     Labs:    Results from last 7 days   Lab Units 20  0513   WBC Thousand/uL 7 26   HEMOGLOBIN g/dL 10 2*   HEMATOCRIT % 32 0*   PLATELETS Thousands/uL 278   NEUTROS PCT % 63   LYMPHS PCT % 27   MONOS PCT % 8   EOS PCT % 1     Results from last 7 days   Lab Units 20  0507 20  0513   SODIUM mmol/L 144 142   POTASSIUM mmol/L 4 0 3 7   CHLORIDE mmol/L 109* 105   CO2 mmol/L 24 24   BUN mg/dL 18 19   CREATININE mg/dL 0 83 1 00   ANION GAP mmol/L 11 13   CALCIUM mg/dL 8 1* 8 9   ALBUMIN g/dL  --  3 1*   TOTAL BILIRUBIN mg/dL  --  0 35   ALK PHOS U/L  --  77   ALT U/L  --  9*   AST U/L  --  12   GLUCOSE RANDOM mg/dL 92 108 Results from last 7 days   Lab Units 01/29/20  0513   INR  1 55*                       * I Have Reviewed All Lab Data Listed Above  * Additional Pertinent Lab Tests Reviewed: [place] [facility] Admission Reviewed    Imaging:    Imaging Reports Reviewed Today Include: No new imaging    Recent Cultures (last 7 days):     Results from last 7 days   Lab Units 01/29/20  0553   URINE CULTURE  >100,000 cfu/ml Citrobacter freundii*       Last 24 Hours Medication List:     Current Facility-Administered Medications:  acetaminophen 650 mg Oral Q6H PRN Brena Space, MD    amLODIPine 5 mg Oral Daily Brena Space, MD    atorvastatin 20 mg Oral Daily With Reggie Dey MD    cefTRIAXone 1,000 mg Intravenous Q24H Matthew Killian, MD Last Rate: Stopped (01/30/20 1837)   dabigatran etexilate 150 mg Oral Q12H [address] Brena Space, MD    donepezil 10 mg Oral HS Brena Space, MD    lisinopril 10 mg Oral Daily Brena Space, MD    memantine 10 mg Oral BID Brena Space, MD    OLANZapine 2 5 mg Intramuscular Once Radha Devlin PA-C    OLANZapine 2 5 mg Intramuscular Q3H PRN Brena Space, MD    pantoprazole 40 mg Oral Daily Brena Space, MD    QUEtiapine 25 mg Oral HS Brena Space, MD         Today, Patient Was Seen By: Patti Rooney MD    ** Please Note: Dictation voice to text software may have been used in the creation of this document   **

## 2020-01-31 NOTE — ASSESSMENT & PLAN NOTE
Continue Cefepime for now  - Awaiting ID for further recommendations  Per previous note, complete treatment until 2/1/2020      Recent Labs     01/29/20  0553   URINECX >100,000 cfu/ml Citrobacter freundii*

## 2020-01-31 NOTE — PLAN OF CARE
Problem: Potential for Falls  Goal: Patient will remain free of falls  Description  INTERVENTIONS:  - Assess patient frequently for physical needs  -  Identify cognitive and physical deficits and behaviors that affect risk of falls    -  [place] fall precautions as indicated by assessment   - Educate patient/family on patient safety including physical limitations  - Instruct patient to call for assistance with activity based on assessment  - Modify environment to reduce risk of injury  - Consider OT/PT consult to assist with strengthening/mobility  Outcome: Progressing     Problem: Prexisting or High Potential for Compromised Skin Integrity  Goal: Skin integrity is maintained or improved  Description  INTERVENTIONS:  - Identify patients at risk for skin breakdown  - Assess and monitor skin integrity  - Assess and monitor nutrition and hydration status  - Monitor labs   - Assess for incontinence   - Turn and reposition patient  - Assist with mobility/ambulation  - Relieve pressure over bony prominences  - Avoid friction and shearing  - Provide appropriate hygiene as needed including keeping skin clean and dry  - Evaluate need for skin moisturizer/barrier cream  - Collaborate with interdisciplinary team   - Patient/family teaching  - Consider wound care consult   Outcome: Progressing     Problem: PAIN - ADULT  Goal: Verbalizes/displays adequate comfort level or baseline comfort level  Description  Interventions:  - Encourage patient to monitor pain and request assistance  - Assess pain using appropriate pain scale  - Administer analgesics based on type and severity of pain and evaluate response  - Implement non-pharmacological measures as appropriate and evaluate response  - Consider cultural and social influences on pain and pain management  - Notify physician/advanced practitioner if interventions unsuccessful or patient reports new pain  Outcome: Progressing     Problem: INFECTION - ADULT  Goal: Absence or prevention of progression during hospitalization  Description  INTERVENTIONS:  - Assess and monitor for signs and symptoms of infection  - Monitor lab/diagnostic results  - Monitor all insertion sites, i e  indwelling lines, tubes, and drains  - Administer medications as ordered  - Instruct and encourage patient and family to use good hand hygiene technique  - Identify and instruct in appropriate isolation precautions for identified infection/condition   Outcome: Progressing     Problem: SAFETY ADULT  Goal: Patient will remain free of falls  Description  INTERVENTIONS:  - Assess patient frequently for physical needs  -  Identify cognitive and physical deficits and behaviors that affect risk of falls    -  [place] fall precautions as indicated by assessment   - Educate patient/family on patient safety including physical limitations  - Instruct patient to call for assistance with activity based on assessment  - Modify environment to reduce risk of injury  - Consider OT/PT consult to assist with strengthening/mobility  Outcome: Progressing  Goal: Maintain or return to baseline ADL function  Description  INTERVENTIONS:  -  Assess patient's ability to carry out ADLs; assess patient's baseline for ADL function and identify physical deficits which impact ability to perform ADLs (bathing, care of mouth/teeth, toileting, grooming, dressing, etc )  - Assess/evaluate cause of self-care deficits   - Assess range of motion  - Assess patient's mobility; develop plan if impaired  - Assess patient's need for assistive devices and provide as appropriate  - Encourage maximum independence but intervene and supervise when necessary  - Involve family in performance of ADLs  - Assess for home care needs following discharge   - Consider OT consult to assist with ADL evaluation and planning for discharge  - Provide patient education as appropriate  Outcome: Progressing  Goal: Maintain or return mobility status to optimal level  Description  INTERVENTIONS:  - Assess patient's baseline mobility status (ambulation, transfers, stairs, etc )    - Identify cognitive and physical deficits and behaviors that affect mobility  - Identify mobility aids required to assist with transfers and/or ambulation (gait belt, sit-to-stand, lift, walker, cane, etc )  - [place] fall precautions as indicated by assessment  - Record patient progress and toleration of activity level on Mobility SBAR; progress patient to next Phase/Stage  - Instruct patient to call for assistance with activity based on assessment  - Consider rehabilitation consult to assist with strengthening/weightbearing, etc   Outcome: Progressing     Problem: DISCHARGE PLANNING  Goal: Discharge to home or other facility with appropriate resources  Description  INTERVENTIONS:  - Identify barriers to discharge w/patient and caregiver  - Arrange for needed discharge resources and transportation as appropriate  - Identify discharge learning needs (meds, wound care, etc )  - Arrange for interpretive services to assist at discharge as needed  - Refer to Case Management Department for coordinating discharge planning if the patient needs post-hospital services based on physician/advanced practitioner order or complex needs related to functional status, cognitive ability, or social support system  Outcome: Progressing     Problem: Knowledge Deficit  Goal: Patient/family/caregiver demonstrates understanding of disease process, treatment plan, medications, and discharge instructions  Description  Complete learning assessment and assess knowledge base    Interventions:  - Provide teaching at level of understanding  - Provide teaching via preferred learning methods  Outcome: Progressing     Problem: COPING  Goal: Pt/Family able to verbalize concerns and demonstrate effective coping strategies  Description  INTERVENTIONS:  - Assist patient/family to identify coping skills, available support systems and cultural and spiritual values  - Provide emotional support, including active listening and acknowledgement of concerns of patient and caregivers  - Reduce environmental stimuli, as able  - Provide patient education  - Assess for spiritual pain/suffering and initiate spiritual care, including notification of Pastoral Care or selin based community as needed  - Assess effectiveness of coping strategies  Outcome: Progressing  Goal: Will report anxiety at manageable levels  Description  INTERVENTIONS:  - Administer medication as ordered  - Teach and encourage coping skills  - Provide emotional support  - Assess patient/family for anxiety and ability to cope  Outcome: Progressing     Problem: BEHAVIOR  Goal: Pt/Family maintain appropriate behavior and adhere to behavioral management agreement, if implemented  Description  INTERVENTIONS:  - Assess the family dynamic   - Encourage verbalization of thoughts and concerns in a socially appropriate manner  - Assess patient/family's coping skills and non-compliant behavior (including use of illegal substances)  - Utilize positive, consistent limit setting strategies supporting safety of patient, staff and others  - Initiate consult with Case Management, Spiritual Care or other ancillary services as appropriate  - If a patient's/visitor's behavior jeopardizes the safety of the patient, staff, or others, refer to organization procedure  - Notify Security of behavior or suspected illegal substances which indicate the need for search of the patient and/or belongings  - Encourage participation in the decision making process about a behavioral management agreement; implement if patient meets criteria  Outcome: Progressing     Problem: Nutrition/Hydration-ADULT  Goal: Nutrient/Hydration intake appropriate for improving, restoring or maintaining nutritional needs  Description  Monitor and assess patient's nutrition/hydration status for malnutrition   Collaborate with interdisciplinary team and initiate plan and interventions as ordered  Monitor patient's weight and dietary intake as ordered or per policy  Utilize nutrition screening tool and intervene as necessary  Determine patient's food preferences and provide high-protein, high-caloric foods as appropriate       INTERVENTIONS:  - Monitor oral intake, urinary output, labs, and treatment plans  - Assess nutrition and hydration status and recommend course of action  - Evaluate amount of meals eaten  - Assist patient with eating if necessary   - Allow adequate time for meals  - Recommend/ encourage appropriate diets, oral nutritional supplements, and vitamin/mineral supplements  - Order, calculate, and assess calorie counts as needed  - Recommend, monitor, and adjust tube feedings and TPN/PPN based on assessed needs  - Assess need for intravenous fluids  - Provide specific nutrition/hydration education as appropriate  - Include patient/family/caregiver in decisions related to nutrition  Outcome: Progressing

## 2020-01-31 NOTE — ASSESSMENT & PLAN NOTE
70-year-old female with atrial fibrillation on Pradaxa, hypertension, and hyperlipidemia was admitted due to metabolic encephalopathy which resulted in three episodes of syncope (orthostatic or vasovagal) and behavioral changes from baseline possibly due to MDR resistant UTI

## 2020-01-31 NOTE — ASSESSMENT & PLAN NOTE
Controlled    Vitals:    01/30/20 1106 01/30/20 1505 01/30/20 1900 01/31/20 0748   BP: 139/60 131/64 141/75 129/79   BP Location: Right arm Right arm Right arm Right arm   Pulse: 67 76 98 76   Resp: 18 18 18 18   Temp: 97 7 °F (36 5 °C) 99 9 °F (37 7 °C)  97 5 °F (36 4 °C)   TempSrc: Tympanic Temporal Temporal Temporal   SpO2: 98% 96% 96% 100%   Weight:       Height:

## 2020-02-01 VITALS
BODY MASS INDEX: 25.76 KG/M2 | DIASTOLIC BLOOD PRESSURE: 67 MMHG | OXYGEN SATURATION: 98 % | RESPIRATION RATE: 17 BRPM | WEIGHT: 140 LBS | HEIGHT: 62 IN | HEART RATE: 71 BPM | SYSTOLIC BLOOD PRESSURE: 149 MMHG | TEMPERATURE: 97.3 F

## 2020-02-01 PROCEDURE — 99239 HOSP IP/OBS DSCHRG MGMT >30: CPT | Performed by: STUDENT IN AN ORGANIZED HEALTH CARE EDUCATION/TRAINING PROGRAM

## 2020-02-01 RX ORDER — SULFAMETHOXAZOLE AND TRIMETHOPRIM 800; 160 MG/1; MG/1
1 TABLET ORAL EVERY 12 HOURS SCHEDULED
Qty: 6 TABLET | Refills: 0 | Status: SHIPPED | OUTPATIENT
Start: 2020-02-01 | End: 2020-02-04

## 2020-02-01 RX ADMIN — MEMANTINE 10 MG: 5 TABLET ORAL at 08:42

## 2020-02-01 RX ADMIN — CEFEPIME HYDROCHLORIDE 2000 MG: 2 INJECTION, POWDER, FOR SOLUTION INTRAVENOUS at 01:09

## 2020-02-01 RX ADMIN — AMLODIPINE BESYLATE 5 MG: 10 TABLET ORAL at 08:43

## 2020-02-01 RX ADMIN — CEFEPIME HYDROCHLORIDE 2000 MG: 2 INJECTION, POWDER, FOR SOLUTION INTRAVENOUS at 12:35

## 2020-02-01 RX ADMIN — LISINOPRIL 10 MG: 5 TABLET ORAL at 08:42

## 2020-02-01 RX ADMIN — DABIGATRAN ETEXILATE MESYLATE 150 MG: 150 CAPSULE ORAL at 08:43

## 2020-02-01 NOTE — PLAN OF CARE
Problem: Potential for Falls  Goal: Patient will remain free of falls  Description  INTERVENTIONS:  - Assess patient frequently for physical needs  -  Identify cognitive and physical deficits and behaviors that affect risk of falls    -  [place] fall precautions as indicated by assessment   - Educate patient/family on patient safety including physical limitations  - Instruct patient to call for assistance with activity based on assessment  - Modify environment to reduce risk of injury  - Consider OT/PT consult to assist with strengthening/mobility  Outcome: Progressing     Problem: Prexisting or High Potential for Compromised Skin Integrity  Goal: Skin integrity is maintained or improved  Description  INTERVENTIONS:  - Identify patients at risk for skin breakdown  - Assess and monitor skin integrity  - Assess and monitor nutrition and hydration status  - Monitor labs   - Assess for incontinence   - Turn and reposition patient  - Assist with mobility/ambulation  - Relieve pressure over bony prominences  - Avoid friction and shearing  - Provide appropriate hygiene as needed including keeping skin clean and dry  - Evaluate need for skin moisturizer/barrier cream  - Collaborate with interdisciplinary team   - Patient/family teaching  - Consider wound care consult   Outcome: Progressing     Problem: PAIN - ADULT  Goal: Verbalizes/displays adequate comfort level or baseline comfort level  Description  Interventions:  - Encourage patient to monitor pain and request assistance  - Assess pain using appropriate pain scale  - Administer analgesics based on type and severity of pain and evaluate response  - Implement non-pharmacological measures as appropriate and evaluate response  - Consider cultural and social influences on pain and pain management  - Notify physician/advanced practitioner if interventions unsuccessful or patient reports new pain  Outcome: Progressing     Problem: INFECTION - ADULT  Goal: Absence or prevention of progression during hospitalization  Description  INTERVENTIONS:  - Assess and monitor for signs and symptoms of infection  - Monitor lab/diagnostic results  - Monitor all insertion sites, i e  indwelling lines, tubes, and drains  - Administer medications as ordered  - Instruct and encourage patient and family to use good hand hygiene technique  - Identify and instruct in appropriate isolation precautions for identified infection/condition   Outcome: Progressing     Problem: SAFETY ADULT  Goal: Patient will remain free of falls  Description  INTERVENTIONS:  - Assess patient frequently for physical needs  -  Identify cognitive and physical deficits and behaviors that affect risk of falls    -  [place] fall precautions as indicated by assessment   - Educate patient/family on patient safety including physical limitations  - Instruct patient to call for assistance with activity based on assessment  - Modify environment to reduce risk of injury  - Consider OT/PT consult to assist with strengthening/mobility  Outcome: Progressing  Goal: Maintain or return to baseline ADL function  Description  INTERVENTIONS:  -  Assess patient's ability to carry out ADLs; assess patient's baseline for ADL function and identify physical deficits which impact ability to perform ADLs (bathing, care of mouth/teeth, toileting, grooming, dressing, etc )  - Assess/evaluate cause of self-care deficits   - Assess range of motion  - Assess patient's mobility; develop plan if impaired  - Assess patient's need for assistive devices and provide as appropriate  - Encourage maximum independence but intervene and supervise when necessary  - Involve family in performance of ADLs  - Assess for home care needs following discharge   - Consider OT consult to assist with ADL evaluation and planning for discharge  - Provide patient education as appropriate  Outcome: Progressing  Goal: Maintain or return mobility status to optimal level  Description  INTERVENTIONS:  - Assess patient's baseline mobility status (ambulation, transfers, stairs, etc )    - Identify cognitive and physical deficits and behaviors that affect mobility  - Identify mobility aids required to assist with transfers and/or ambulation (gait belt, sit-to-stand, lift, walker, cane, etc )  - [place] fall precautions as indicated by assessment  - Record patient progress and toleration of activity level on Mobility SBAR; progress patient to next Phase/Stage  - Instruct patient to call for assistance with activity based on assessment  - Consider rehabilitation consult to assist with strengthening/weightbearing, etc   Outcome: Progressing     Problem: DISCHARGE PLANNING  Goal: Discharge to home or other facility with appropriate resources  Description  INTERVENTIONS:  - Identify barriers to discharge w/patient and caregiver  - Arrange for needed discharge resources and transportation as appropriate  - Identify discharge learning needs (meds, wound care, etc )  - Arrange for interpretive services to assist at discharge as needed  - Refer to Case Management Department for coordinating discharge planning if the patient needs post-hospital services based on physician/advanced practitioner order or complex needs related to functional status, cognitive ability, or social support system  Outcome: Progressing     Problem: Knowledge Deficit  Goal: Patient/family/caregiver demonstrates understanding of disease process, treatment plan, medications, and discharge instructions  Description  Complete learning assessment and assess knowledge base    Interventions:  - Provide teaching at level of understanding  - Provide teaching via preferred learning methods  Outcome: Progressing     Problem: COPING  Goal: Pt/Family able to verbalize concerns and demonstrate effective coping strategies  Description  INTERVENTIONS:  - Assist patient/family to identify coping skills, available support systems and cultural and spiritual values  - Provide emotional support, including active listening and acknowledgement of concerns of patient and caregivers  - Reduce environmental stimuli, as able  - Provide patient education  - Assess for spiritual pain/suffering and initiate spiritual care, including notification of Pastoral Care or selin based community as needed  - Assess effectiveness of coping strategies  Outcome: Progressing  Goal: Will report anxiety at manageable levels  Description  INTERVENTIONS:  - Administer medication as ordered  - Teach and encourage coping skills  - Provide emotional support  - Assess patient/family for anxiety and ability to cope  Outcome: Progressing     Problem: BEHAVIOR  Goal: Pt/Family maintain appropriate behavior and adhere to behavioral management agreement, if implemented  Description  INTERVENTIONS:  - Assess the family dynamic   - Encourage verbalization of thoughts and concerns in a socially appropriate manner  - Assess patient/family's coping skills and non-compliant behavior (including use of illegal substances)  - Utilize positive, consistent limit setting strategies supporting safety of patient, staff and others  - Initiate consult with Case Management, Spiritual Care or other ancillary services as appropriate  - If a patient's/visitor's behavior jeopardizes the safety of the patient, staff, or others, refer to organization procedure  - Notify Security of behavior or suspected illegal substances which indicate the need for search of the patient and/or belongings  - Encourage participation in the decision making process about a behavioral management agreement; implement if patient meets criteria  Outcome: Progressing     Problem: Nutrition/Hydration-ADULT  Goal: Nutrient/Hydration intake appropriate for improving, restoring or maintaining nutritional needs  Description  Monitor and assess patient's nutrition/hydration status for malnutrition   Collaborate with interdisciplinary team and initiate plan and interventions as ordered  Monitor patient's weight and dietary intake as ordered or per policy  Utilize nutrition screening tool and intervene as necessary  Determine patient's food preferences and provide high-protein, high-caloric foods as appropriate       INTERVENTIONS:  - Monitor oral intake, urinary output, labs, and treatment plans  - Assess nutrition and hydration status and recommend course of action  - Evaluate amount of meals eaten  - Assist patient with eating if necessary   - Allow adequate time for meals  - Recommend/ encourage appropriate diets, oral nutritional supplements, and vitamin/mineral supplements  - Order, calculate, and assess calorie counts as needed  - Recommend, monitor, and adjust tube feedings and TPN/PPN based on assessed needs  - Assess need for intravenous fluids  - Provide specific nutrition/hydration education as appropriate  - Include patient/family/caregiver in decisions related to nutrition  Outcome: Progressing

## 2020-02-01 NOTE — ASSESSMENT & PLAN NOTE
Controlled    Vitals:    02/01/20 0017 02/01/20 0026 02/01/20 0300 02/01/20 0840   BP: (!) 83/53 143/65 129/77 149/67   BP Location: Right arm Right arm Right arm Right arm   Pulse: 92 74 81 71   Resp: 18  17 17   Temp: 97 9 °F (36 6 °C)  (!) 97 °F (36 1 °C) (!) 97 3 °F (36 3 °C)   TempSrc: Tympanic  Temporal Temporal   SpO2: 97% 98% 100% 98%   Weight:       Height:

## 2020-02-01 NOTE — PLAN OF CARE
Problem: Potential for Falls  Goal: Patient will remain free of falls  Description  INTERVENTIONS:  - Assess patient frequently for physical needs  -  Identify cognitive and physical deficits and behaviors that affect risk of falls    -  [place] fall precautions as indicated by assessment   - Educate patient/family on patient safety including physical limitations  - Instruct patient to call for assistance with activity based on assessment  - Modify environment to reduce risk of injury  - Consider OT/PT consult to assist with strengthening/mobility  Outcome: Progressing     Problem: Prexisting or High Potential for Compromised Skin Integrity  Goal: Skin integrity is maintained or improved  Description  INTERVENTIONS:  - Identify patients at risk for skin breakdown  - Assess and monitor skin integrity  - Assess and monitor nutrition and hydration status  - Monitor labs   - Assess for incontinence   - Turn and reposition patient  - Assist with mobility/ambulation  - Relieve pressure over bony prominences  - Avoid friction and shearing  - Provide appropriate hygiene as needed including keeping skin clean and dry  - Evaluate need for skin moisturizer/barrier cream  - Collaborate with interdisciplinary team   - Patient/family teaching  - Consider wound care consult   Outcome: Progressing     Problem: PAIN - ADULT  Goal: Verbalizes/displays adequate comfort level or baseline comfort level  Description  Interventions:  - Encourage patient to monitor pain and request assistance  - Assess pain using appropriate pain scale  - Administer analgesics based on type and severity of pain and evaluate response  - Implement non-pharmacological measures as appropriate and evaluate response  - Consider cultural and social influences on pain and pain management  - Notify physician/advanced practitioner if interventions unsuccessful or patient reports new pain  Outcome: Progressing     Problem: INFECTION - ADULT  Goal: Absence or prevention of progression during hospitalization  Description  INTERVENTIONS:  - Assess and monitor for signs and symptoms of infection  - Monitor lab/diagnostic results  - Monitor all insertion sites, i e  indwelling lines, tubes, and drains  - Administer medications as ordered  - Instruct and encourage patient and family to use good hand hygiene technique  - Identify and instruct in appropriate isolation precautions for identified infection/condition   Outcome: Progressing     Problem: SAFETY ADULT  Goal: Patient will remain free of falls  Description  INTERVENTIONS:  - Assess patient frequently for physical needs  -  Identify cognitive and physical deficits and behaviors that affect risk of falls    -  [place] fall precautions as indicated by assessment   - Educate patient/family on patient safety including physical limitations  - Instruct patient to call for assistance with activity based on assessment  - Modify environment to reduce risk of injury  - Consider OT/PT consult to assist with strengthening/mobility  Outcome: Progressing  Goal: Maintain or return to baseline ADL function  Description  INTERVENTIONS:  -  Assess patient's ability to carry out ADLs; assess patient's baseline for ADL function and identify physical deficits which impact ability to perform ADLs (bathing, care of mouth/teeth, toileting, grooming, dressing, etc )  - Assess/evaluate cause of self-care deficits   - Assess range of motion  - Assess patient's mobility; develop plan if impaired  - Assess patient's need for assistive devices and provide as appropriate  - Encourage maximum independence but intervene and supervise when necessary  - Involve family in performance of ADLs  - Assess for home care needs following discharge   - Consider OT consult to assist with ADL evaluation and planning for discharge  - Provide patient education as appropriate  Outcome: Progressing  Goal: Maintain or return mobility status to optimal level  Description  INTERVENTIONS:  - Assess patient's baseline mobility status (ambulation, transfers, stairs, etc )    - Identify cognitive and physical deficits and behaviors that affect mobility  - Identify mobility aids required to assist with transfers and/or ambulation (gait belt, sit-to-stand, lift, walker, cane, etc )  - [place] fall precautions as indicated by assessment  - Record patient progress and toleration of activity level on Mobility SBAR; progress patient to next Phase/Stage  - Instruct patient to call for assistance with activity based on assessment  - Consider rehabilitation consult to assist with strengthening/weightbearing, etc   Outcome: Progressing     Problem: DISCHARGE PLANNING  Goal: Discharge to home or other facility with appropriate resources  Description  INTERVENTIONS:  - Identify barriers to discharge w/patient and caregiver  - Arrange for needed discharge resources and transportation as appropriate  - Identify discharge learning needs (meds, wound care, etc )  - Arrange for interpretive services to assist at discharge as needed  - Refer to Case Management Department for coordinating discharge planning if the patient needs post-hospital services based on physician/advanced practitioner order or complex needs related to functional status, cognitive ability, or social support system  Outcome: Progressing     Problem: Knowledge Deficit  Goal: Patient/family/caregiver demonstrates understanding of disease process, treatment plan, medications, and discharge instructions  Description  Complete learning assessment and assess knowledge base    Interventions:  - Provide teaching at level of understanding  - Provide teaching via preferred learning methods  Outcome: Progressing     Problem: COPING  Goal: Pt/Family able to verbalize concerns and demonstrate effective coping strategies  Description  INTERVENTIONS:  - Assist patient/family to identify coping skills, available support systems and cultural and spiritual values  - Provide emotional support, including active listening and acknowledgement of concerns of patient and caregivers  - Reduce environmental stimuli, as able  - Provide patient education  - Assess for spiritual pain/suffering and initiate spiritual care, including notification of Pastoral Care or selin based community as needed  - Assess effectiveness of coping strategies  Outcome: Progressing  Goal: Will report anxiety at manageable levels  Description  INTERVENTIONS:  - Administer medication as ordered  - Teach and encourage coping skills  - Provide emotional support  - Assess patient/family for anxiety and ability to cope  Outcome: Progressing     Problem: BEHAVIOR  Goal: Pt/Family maintain appropriate behavior and adhere to behavioral management agreement, if implemented  Description  INTERVENTIONS:  - Assess the family dynamic   - Encourage verbalization of thoughts and concerns in a socially appropriate manner  - Assess patient/family's coping skills and non-compliant behavior (including use of illegal substances)  - Utilize positive, consistent limit setting strategies supporting safety of patient, staff and others  - Initiate consult with Case Management, Spiritual Care or other ancillary services as appropriate  - If a patient's/visitor's behavior jeopardizes the safety of the patient, staff, or others, refer to organization procedure  - Notify Security of behavior or suspected illegal substances which indicate the need for search of the patient and/or belongings  - Encourage participation in the decision making process about a behavioral management agreement; implement if patient meets criteria  Outcome: Progressing     Problem: Nutrition/Hydration-ADULT  Goal: Nutrient/Hydration intake appropriate for improving, restoring or maintaining nutritional needs  Description  Monitor and assess patient's nutrition/hydration status for malnutrition   Collaborate with interdisciplinary team and initiate plan and interventions as ordered  Monitor patient's weight and dietary intake as ordered or per policy  Utilize nutrition screening tool and intervene as necessary  Determine patient's food preferences and provide high-protein, high-caloric foods as appropriate       INTERVENTIONS:  - Monitor oral intake, urinary output, labs, and treatment plans  - Assess nutrition and hydration status and recommend course of action  - Evaluate amount of meals eaten  - Assist patient with eating if necessary   - Allow adequate time for meals  - Recommend/ encourage appropriate diets, oral nutritional supplements, and vitamin/mineral supplements  - Order, calculate, and assess calorie counts as needed  - Recommend, monitor, and adjust tube feedings and TPN/PPN based on assessed needs  - Assess need for intravenous fluids  - Provide specific nutrition/hydration education as appropriate  - Include patient/family/caregiver in decisions related to nutrition  Outcome: Progressing

## 2020-02-01 NOTE — ASSESSMENT & PLAN NOTE
19-year-old female with atrial fibrillation on Pradaxa, hypertension, and hyperlipidemia was admitted due to metabolic encephalopathy which resulted in three episodes of syncope (orthostatic or vasovagal) and behavioral changes from baseline possibly due to MDR resistant UTI   - One more dose of Cefepime today and discharge with bactrim

## 2020-02-01 NOTE — PROGRESS NOTES
Called to examine patient following an episode of unresponsiveness  Patient examined in bed  Lethargic, but opens eye to voice  Responds "I don't know what happened, that's never happened in my life" over and over  Reporting dizziness and headache, appears to be recurrent throughout hospitalization  Vitals obtained  Likely episode of vasovagal syncope  BP recovered, mental status returned to baseline  Head: symmetric, atraumatic, no tenderness or deformity   Pupils: equal, round and reactive  Heart: regular rate and rhythm  Respiratory: breath sounds clear and equal   Neuro: moving all four extremities equally, no deformity, oriented x 1     Will trial telemetry monitoring again tonight  Continue one to one observation  Fall precautions  Continue PRN Tylenol for headache  Continue vital sign monitor per routine and PRN complaints of dizziness or lightheadedness

## 2020-02-01 NOTE — NURSING NOTE
@0017 responded to PCA call for help with pt  Went in to see pt in bathroom aide attempting to walk pt back to bed pt refusing to move as legs began to give out  We lowered pt to the ground, pt became less responsive  Pt was then carried into bed  Pt remained very lethargic  BP while pt was In BR was 83/53 after just raising from toilet  After returning to bed /65  SLIM called at bedside  Telemetry reordered on pt  No more orders at this time  Aide remains at bedside  Call bell within reach   Will continue to monitor

## 2020-02-01 NOTE — DISCHARGE SUMMARY
Discharge- Julio Cesar Ball 1933, 80 y o  female MRN: [id number]    Unit/Bed#: E4 -01 Encounter: [id number]    Primary Care Provider: Kelsi Thomas DO   Date and time admitted to hospital: 1/29/2020  4:19 AM        * Metabolic encephalopathy  Assessment & Plan  59-year-old female with atrial fibrillation on Pradaxa, hypertension, and hyperlipidemia was admitted due to metabolic encephalopathy which resulted in three episodes of syncope (orthostatic or vasovagal) and behavioral changes from baseline possibly due to MDR resistant UTI   - One more dose of Cefepime today and discharge with bactrim    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  Assessment & Plan  One more dose of Cefepime  Discussed case with Infectious disease  May discharge patient with Bactrim given her sensitivities      Essential hypertension  Assessment & Plan  Controlled    Vitals:    02/01/20 0017 02/01/20 0026 02/01/20 0300 02/01/20 0840   BP: (!) 83/53 143/65 129/77 149/67   BP Location: Right arm Right arm Right arm Right arm   Pulse: 92 74 81 71   Resp: 18  17 17   Temp: 97 9 °F (36 6 °C)  (!) 97 °F (36 1 °C) (!) 97 3 °F (36 3 °C)   TempSrc: Tympanic  Temporal Temporal   SpO2: 97% 98% 100% 98%   Weight:       Height:               Discharging Physician / Practitioner: Tiffanie Garnett MD  PCP: Kelsi Thomas DO  Admission Date:   Admission Orders (From admission, onward)     Ordered        01/29/20 0914  Inpatient Admission (expected length of stay for this patient Order details is greater than two midnights)  Once                   Discharge Date: 02/01/20    Resolved Problems  Date Reviewed: 2/1/2020    None          Consultations During Hospital Stay:  · None    Procedures Performed:   · None    Significant Findings / Test Results:   · CT head wo contrast    PARENCHYMA: Decreased attenuation is noted in periventricular and subcortical white matter demonstrating an appearance that is statistically most likely to represent advanced microangiopathic change  Chronic lacunar infarction(s) are noted in basal   ganglia, unchanged from prior exam      No CT signs of acute infarction  No intracranial mass, mass effect or midline shift  No acute parenchymal hemorrhage         Bilateral internal carotid artery and vertebral artery calcifications         VENTRICLES AND EXTRA-AXIAL SPACES:  Enlargement of ventricles and extra-axial CSF spaces, out of proportion to the patient's age most consistent with cerebral and cerebellar atrophy         VISUALIZED ORBITS AND PARANASAL SINUSES:  No acute abnormality involving the orbits  Bilateral lens surgery      Mild scattered sinus mucosal thickening is noted  No fluid levels are seen         CALVARIUM AND EXTRACRANIAL SOFT TISSUES:  Minimal soft tissue swelling overlying the right frontal convexity  Underlying bony calvarium intact       IMPRESSION:  Cerebral atrophy with chronic small vessel ischemic white matter disease      No acute intracranial abnormality        No significant change from priors  · CT spine thoracic wo contrast    ALIGNMENT:  Normal alignment of the thoracic spine  No subluxation         VERTEBRAL BODIES:   Osseous structures demineralized      Again identified is a severe compression fracture of the T12 vertebral body, status post kyphoplasty  There is moderate bony retropulsion of the posterior aspect of the T12 vertebral body  The overall height of the vertebral body is unchanged from the   most recent kyphoplasty  There has however been progressive posterior retropulsion of the posterior endplate of H34 when compared to the prior CT scan  There is moderate to severe central stenosis secondary to bony retropulsion      The remainder of the vertebral body heights are maintained         DEGENERATIVE CHANGES:    Moderate multilevel degenerative disc disease         PARASPINAL SOFT TISSUES: Bilateral lower lobe atelectasis          IMPRESSION:  Compression fracture T12 vertebral body, status post kyphoplasty  Although the overall loss in the axial height of the T12 vertebral body is unchanged from the most recent prior kyphoplasty, the degree of bony retropulsion of the T12   vertebral body has progressed from the prior CT scan of December 14, 2019  There is currently moderate to severe central stenosis posterior to the T12 vertebral body level  Incidental Findings:   As written above: Although the overall loss in the axial height of the T12 vertebral body is unchanged from the most recent prior kyphoplasty, the degree of bony retropulsion of the T12 vertebral body has progressed from the prior CT scan of December 14, 2019  There is currently moderate to severe central stenosis posterior to the T12 vertebral body level  Test Results Pending at Discharge (will require follow up): · None     Outpatient Tests Requested:  · None    Complications:  None    Reason for Admission: Metabolic encephalopathy    Hospital Course:     Samantha Vaughn is a 80 y o  female patient who originally presented to the hospital on 1/29/2020 due to Fall due to syncope  59-year-old female with atrial fibrillation on Pradaxa, controlled hypertension, history of stroke, Alzheimer's disease who was admitted due to syncope initially  On further clarification, patient has been having urgency and frequency  She also has several episodes of syncope in the past   CT head was negative for any acute intracranial abnormalities  During her presentation, suspect that this is orthostatic and vasovagal in etiology  On presentation, straight cath was performed which showed urinary tract infection which was positive for Citrobacter freundii  She received 2 days of cefepime after the cultures speciated  She will be discharged today with bactrim for 3 more days  Spoke to patient's daughter at length regarding the need encourage patient to take more time between positional changes      Of note, patient had mild troponin elevations which continued to remain relatively stable x3  ED spoke to Cardiology with team that these are likely non MI related troponin elevations especially since she is not short of breath nor does she have any chest pain  Patient agrees to follow-up with his providers as scheduled and to take his medications as prescribed  All questions were addressed  Patient also understood the need to seek immediate medical attention should any chest pain, shortness of breath, severe pain, fever, chills, or any other concerning symptoms develop  Please see above list of diagnoses and related plan for additional information  Condition at Discharge: fair     Discharge Day Visit / Exam:     Subjective:  Patient seen and examined at bedside  She became orthostatic again last night when she got up  Currently hemodynamically stable  Encouraged to slow down on positional changes  Vitals: Blood Pressure: 149/67 (02/01/20 0840)  Pulse: 71 (02/01/20 0840)  Temperature: (!) 97 3 °F (36 3 °C) (02/01/20 0840)  Temp Source: Temporal (02/01/20 0840)  Respirations: 17 (02/01/20 0840)  Height: 5' 2" (157 5 cm) (01/29/20 1849)  Weight - Scale: 63 5 kg (140 lb) (01/29/20 1849)  SpO2: 98 % (02/01/20 0840)  Exam:   Physical Exam   Constitutional: No distress  Elderly female   HENT:   Head: Normocephalic and atraumatic  Eyes: Pupils are equal, round, and reactive to light  EOM are normal    Neck: Neck supple  Cardiovascular: Normal rate and regular rhythm  Pulmonary/Chest: Effort normal and breath sounds normal    Abdominal: Soft  Bowel sounds are normal    Musculoskeletal: She exhibits no edema  Neurological: She is alert  Skin: Skin is warm and dry  Vitals reviewed  Discussion with Family: daughter    Discharge instructions/Information to patient and family:   See after visit summary for information provided to patient and family        Provisions for Follow-Up Care:  See after visit summary for information related to follow-up care and any pertinent home health orders  Disposition:     Home    For Discharges to [facility]F:   · Not Applicable to this Patient - Not Applicable to this Patient    Planned Readmission: No     Discharge Statement:  I spent 48 minutes discharging the patient  This time was spent on the day of discharge  I had direct contact with the patient on the day of discharge  Greater than 50% of the total time was spent examining patient, answering all patient questions, arranging and discussing plan of care with patient as well as directly providing post-discharge instructions  Additional time then spent on discharge activities  Discharge Medications:  See after visit summary for reconciled discharge medications provided to patient and family        ** Please Note: This note has been constructed using a voice recognition system **

## 2020-02-01 NOTE — ASSESSMENT & PLAN NOTE
One more dose of Cefepime  Discussed case with Infectious disease  May discharge patient with Bactrim given her sensitivities

## 2020-02-17 ENCOUNTER — HOSPITAL ENCOUNTER (INPATIENT)
Facility: HOSPITAL | Age: 85
LOS: 1 days | Discharge: HOME | DRG: 057 | End: 2020-02-18
Attending: EMERGENCY MEDICINE | Admitting: INTERNAL MEDICINE
Payer: COMMERCIAL

## 2020-02-17 DIAGNOSIS — I48.0 PAROXYSMAL ATRIAL FIBRILLATION (HCC): ICD-10-CM

## 2020-02-17 DIAGNOSIS — R55 SYNCOPE: ICD-10-CM

## 2020-02-17 DIAGNOSIS — N39.0 UTI (URINARY TRACT INFECTION): ICD-10-CM

## 2020-02-17 DIAGNOSIS — K59.01 SLOW TRANSIT CONSTIPATION: ICD-10-CM

## 2020-02-17 DIAGNOSIS — F02.81 LATE ONSET ALZHEIMER'S DISEASE WITH BEHAVIORAL DISTURBANCE (HCC): ICD-10-CM

## 2020-02-17 DIAGNOSIS — G30.1 LATE ONSET ALZHEIMER'S DISEASE WITH BEHAVIORAL DISTURBANCE (HCC): ICD-10-CM

## 2020-02-17 DIAGNOSIS — Z78.9 PATIENT IS JEHOVAH'S WITNESS: ICD-10-CM

## 2020-02-17 DIAGNOSIS — R33.9 URINARY RETENTION: Primary | ICD-10-CM

## 2020-02-17 DIAGNOSIS — N39.0 CHRONIC UTI: ICD-10-CM

## 2020-02-17 LAB
ALBUMIN SERPL BCP-MCNC: 3.7 G/DL (ref 3.5–5)
ALP SERPL-CCNC: 85 U/L (ref 46–116)
ALT SERPL W P-5'-P-CCNC: 16 U/L (ref 12–78)
ANION GAP SERPL CALCULATED.3IONS-SCNC: 14 MMOL/L (ref 4–13)
AST SERPL W P-5'-P-CCNC: 15 U/L (ref 5–45)
ATRIAL RATE: 234 BPM
BACTERIA UR QL AUTO: ABNORMAL /HPF
BASOPHILS # BLD AUTO: 0.04 THOUSANDS/ΜL (ref 0–0.1)
BASOPHILS NFR BLD AUTO: 0 % (ref 0–1)
BILIRUB SERPL-MCNC: 0.28 MG/DL (ref 0.2–1)
BILIRUB UR QL STRIP: NEGATIVE
BUN SERPL-MCNC: 20 MG/DL (ref 5–25)
CALCIUM SERPL-MCNC: 9.4 MG/DL (ref 8.3–10.1)
CHLORIDE SERPL-SCNC: 104 MMOL/L (ref 100–108)
CLARITY UR: CLEAR
CLARITY, POC: CLEAR
CO2 SERPL-SCNC: 23 MMOL/L (ref 21–32)
COLOR UR: YELLOW
COLOR, POC: YELLOW
CREAT SERPL-MCNC: 1.25 MG/DL (ref 0.6–1.3)
EOSINOPHIL # BLD AUTO: 0.16 THOUSAND/ΜL (ref 0–0.61)
EOSINOPHIL NFR BLD AUTO: 2 % (ref 0–6)
ERYTHROCYTE [DISTWIDTH] IN BLOOD BY AUTOMATED COUNT: 14 % (ref 11.6–15.1)
GFR SERPL CREATININE-BSD FRML MDRD: 39 ML/MIN/1.73SQ M
GLUCOSE SERPL-MCNC: 111 MG/DL (ref 65–140)
GLUCOSE UR STRIP-MCNC: NEGATIVE MG/DL
HCT VFR BLD AUTO: 33 % (ref 34.8–46.1)
HGB BLD-MCNC: 10.5 G/DL (ref 11.5–15.4)
HGB UR QL STRIP.AUTO: NEGATIVE
IMM GRANULOCYTES # BLD AUTO: 0.02 THOUSAND/UL (ref 0–0.2)
IMM GRANULOCYTES NFR BLD AUTO: 0 % (ref 0–2)
KETONES UR STRIP-MCNC: NEGATIVE MG/DL
LEUKOCYTE ESTERASE UR QL STRIP: ABNORMAL
LIPASE SERPL-CCNC: 255 U/L (ref 73–393)
LYMPHOCYTES # BLD AUTO: 3.97 THOUSANDS/ΜL (ref 0.6–4.47)
LYMPHOCYTES NFR BLD AUTO: 41 % (ref 14–44)
MCH RBC QN AUTO: 28.2 PG (ref 26.8–34.3)
MCHC RBC AUTO-ENTMCNC: 31.8 G/DL (ref 31.4–37.4)
MCV RBC AUTO: 89 FL (ref 82–98)
MONOCYTES # BLD AUTO: 0.89 THOUSAND/ΜL (ref 0.17–1.22)
MONOCYTES NFR BLD AUTO: 9 % (ref 4–12)
NEUTROPHILS # BLD AUTO: 4.67 THOUSANDS/ΜL (ref 1.85–7.62)
NEUTS SEG NFR BLD AUTO: 48 % (ref 43–75)
NITRITE UR QL STRIP: NEGATIVE
NON-SQ EPI CELLS URNS QL MICRO: ABNORMAL /HPF
NRBC BLD AUTO-RTO: 0 /100 WBCS
PH UR STRIP.AUTO: 7.5 [PH] (ref 4.5–8)
PLATELET # BLD AUTO: 372 THOUSANDS/UL (ref 149–390)
PMV BLD AUTO: 10.6 FL (ref 8.9–12.7)
POTASSIUM SERPL-SCNC: 4 MMOL/L (ref 3.5–5.3)
PROT SERPL-MCNC: 7.5 G/DL (ref 6.4–8.2)
PROT UR STRIP-MCNC: NEGATIVE MG/DL
QRS AXIS: 46 DEGREES
QRSD INTERVAL: 72 MS
QT INTERVAL: 374 MS
QTC INTERVAL: 431 MS
RBC # BLD AUTO: 3.72 MILLION/UL (ref 3.81–5.12)
RBC #/AREA URNS AUTO: ABNORMAL /HPF
SODIUM SERPL-SCNC: 141 MMOL/L (ref 136–145)
SP GR UR STRIP.AUTO: 1.01 (ref 1–1.03)
T WAVE AXIS: 262 DEGREES
UROBILINOGEN UR QL STRIP.AUTO: 0.2 E.U./DL
VENTRICULAR RATE: 80 BPM
WBC # BLD AUTO: 9.75 THOUSAND/UL (ref 4.31–10.16)
WBC #/AREA URNS AUTO: ABNORMAL /HPF

## 2020-02-17 PROCEDURE — 99222 1ST HOSP IP/OBS MODERATE 55: CPT | Performed by: NURSE PRACTITIONER

## 2020-02-17 PROCEDURE — 93005 ELECTROCARDIOGRAM TRACING: CPT

## 2020-02-17 PROCEDURE — 93010 ELECTROCARDIOGRAM REPORT: CPT | Performed by: INTERNAL MEDICINE

## 2020-02-17 PROCEDURE — 85025 COMPLETE CBC W/AUTO DIFF WBC: CPT | Performed by: EMERGENCY MEDICINE

## 2020-02-17 PROCEDURE — 99222 1ST HOSP IP/OBS MODERATE 55: CPT | Performed by: PHYSICIAN ASSISTANT

## 2020-02-17 PROCEDURE — 87086 URINE CULTURE/COLONY COUNT: CPT

## 2020-02-17 PROCEDURE — 81001 URINALYSIS AUTO W/SCOPE: CPT

## 2020-02-17 PROCEDURE — 83690 ASSAY OF LIPASE: CPT | Performed by: EMERGENCY MEDICINE

## 2020-02-17 PROCEDURE — 99223 1ST HOSP IP/OBS HIGH 75: CPT | Performed by: INTERNAL MEDICINE

## 2020-02-17 PROCEDURE — 99285 EMERGENCY DEPT VISIT HI MDM: CPT | Performed by: EMERGENCY MEDICINE

## 2020-02-17 PROCEDURE — 80053 COMPREHEN METABOLIC PANEL: CPT | Performed by: EMERGENCY MEDICINE

## 2020-02-17 PROCEDURE — 36415 COLL VENOUS BLD VENIPUNCTURE: CPT | Performed by: EMERGENCY MEDICINE

## 2020-02-17 PROCEDURE — 99284 EMERGENCY DEPT VISIT MOD MDM: CPT

## 2020-02-17 RX ORDER — MAGNESIUM HYDROXIDE/ALUMINUM HYDROXICE/SIMETHICONE 120; 1200; 1200 MG/30ML; MG/30ML; MG/30ML
30 SUSPENSION ORAL EVERY 6 HOURS PRN
Status: DISCONTINUED | OUTPATIENT
Start: 2020-02-17 | End: 2020-02-18 | Stop reason: HOSPADM

## 2020-02-17 RX ORDER — HALOPERIDOL 1 MG/1
2 TABLET ORAL
Status: DISCONTINUED | OUTPATIENT
Start: 2020-02-17 | End: 2020-02-17

## 2020-02-17 RX ORDER — MIRTAZAPINE 7.5 MG/1
7.5 TABLET, FILM COATED ORAL
COMMUNITY

## 2020-02-17 RX ORDER — MEMANTINE HYDROCHLORIDE 5 MG/1
10 TABLET ORAL 2 TIMES DAILY
Status: DISCONTINUED | OUTPATIENT
Start: 2020-02-17 | End: 2020-02-18 | Stop reason: HOSPADM

## 2020-02-17 RX ORDER — ONDANSETRON 2 MG/ML
4 INJECTION INTRAMUSCULAR; INTRAVENOUS EVERY 6 HOURS PRN
Status: DISCONTINUED | OUTPATIENT
Start: 2020-02-17 | End: 2020-02-18 | Stop reason: HOSPADM

## 2020-02-17 RX ORDER — ATORVASTATIN CALCIUM 20 MG/1
20 TABLET, FILM COATED ORAL
Status: DISCONTINUED | OUTPATIENT
Start: 2020-02-17 | End: 2020-02-18 | Stop reason: HOSPADM

## 2020-02-17 RX ORDER — MIRTAZAPINE 15 MG/1
7.5 TABLET, FILM COATED ORAL
Status: DISCONTINUED | OUTPATIENT
Start: 2020-02-17 | End: 2020-02-18 | Stop reason: HOSPADM

## 2020-02-17 RX ORDER — SULFAMETHOXAZOLE AND TRIMETHOPRIM 400; 80 MG/1; MG/1
TABLET ORAL
Status: ON HOLD | COMMUNITY
End: 2020-02-18 | Stop reason: CLARIF

## 2020-02-17 RX ORDER — PANTOPRAZOLE SODIUM 40 MG/1
40 TABLET, DELAYED RELEASE ORAL
Status: DISCONTINUED | OUTPATIENT
Start: 2020-02-17 | End: 2020-02-18 | Stop reason: HOSPADM

## 2020-02-17 RX ORDER — POLYETHYLENE GLYCOL 3350 17 G/17G
17 POWDER, FOR SOLUTION ORAL DAILY PRN
Status: DISCONTINUED | OUTPATIENT
Start: 2020-02-17 | End: 2020-02-18 | Stop reason: HOSPADM

## 2020-02-17 RX ORDER — DOCUSATE SODIUM 100 MG/1
100 CAPSULE, LIQUID FILLED ORAL 2 TIMES DAILY
Status: DISCONTINUED | OUTPATIENT
Start: 2020-02-17 | End: 2020-02-18 | Stop reason: HOSPADM

## 2020-02-17 RX ORDER — DIVALPROEX SODIUM 250 MG/1
250 TABLET, DELAYED RELEASE ORAL
COMMUNITY
End: 2020-02-18 | Stop reason: HOSPADM

## 2020-02-17 RX ORDER — SULFAMETHOXAZOLE AND TRIMETHOPRIM 800; 160 MG/1; MG/1
1 TABLET ORAL ONCE
Status: COMPLETED | OUTPATIENT
Start: 2020-02-17 | End: 2020-02-17

## 2020-02-17 RX ORDER — DABIGATRAN ETEXILATE 150 MG/1
150 CAPSULE, COATED PELLETS ORAL EVERY 12 HOURS
Status: DISCONTINUED | OUTPATIENT
Start: 2020-02-17 | End: 2020-02-18 | Stop reason: HOSPADM

## 2020-02-17 RX ORDER — DONEPEZIL HYDROCHLORIDE 10 MG/1
10 TABLET, FILM COATED ORAL
Status: DISCONTINUED | OUTPATIENT
Start: 2020-02-17 | End: 2020-02-18 | Stop reason: HOSPADM

## 2020-02-17 RX ORDER — QUETIAPINE FUMARATE 25 MG/1
25 TABLET, FILM COATED ORAL
Status: CANCELLED | OUTPATIENT
Start: 2020-02-17

## 2020-02-17 RX ORDER — SULFAMETHOXAZOLE AND TRIMETHOPRIM 800; 160 MG/1; MG/1
1 TABLET ORAL 2 TIMES DAILY
Qty: 14 TABLET | Refills: 0 | Status: SHIPPED | OUTPATIENT
Start: 2020-02-17 | End: 2020-02-18 | Stop reason: HOSPADM

## 2020-02-17 RX ORDER — ACETAMINOPHEN 325 MG/1
650 TABLET ORAL EVERY 6 HOURS PRN
Status: DISCONTINUED | OUTPATIENT
Start: 2020-02-17 | End: 2020-02-18 | Stop reason: HOSPADM

## 2020-02-17 RX ORDER — HALOPERIDOL 5 MG/ML
5 INJECTION INTRAMUSCULAR ONCE
Status: COMPLETED | OUTPATIENT
Start: 2020-02-17 | End: 2020-02-17

## 2020-02-17 RX ORDER — BUTALBITAL AND ACETAMINOPHEN 50; 300 MG/1; MG/1
CAPSULE ORAL
COMMUNITY
End: 2020-02-18 | Stop reason: HOSPADM

## 2020-02-17 RX ORDER — HALOPERIDOL 1 MG/1
2 TABLET ORAL
Status: DISCONTINUED | OUTPATIENT
Start: 2020-02-17 | End: 2020-02-18 | Stop reason: HOSPADM

## 2020-02-17 RX ORDER — AMLODIPINE BESYLATE 5 MG/1
5 TABLET ORAL DAILY
Status: DISCONTINUED | OUTPATIENT
Start: 2020-02-17 | End: 2020-02-18 | Stop reason: HOSPADM

## 2020-02-17 RX ADMIN — DABIGATRAN ETEXILATE MESYLATE 150 MG: 150 CAPSULE ORAL at 21:26

## 2020-02-17 RX ADMIN — MEMANTINE 10 MG: 5 TABLET ORAL at 08:47

## 2020-02-17 RX ADMIN — DOCUSATE SODIUM 100 MG: 100 CAPSULE, LIQUID FILLED ORAL at 17:22

## 2020-02-17 RX ADMIN — DOCUSATE SODIUM 100 MG: 100 CAPSULE, LIQUID FILLED ORAL at 08:47

## 2020-02-17 RX ADMIN — PANTOPRAZOLE SODIUM 40 MG: 40 TABLET, DELAYED RELEASE ORAL at 08:47

## 2020-02-17 RX ADMIN — AMLODIPINE BESYLATE 5 MG: 5 TABLET ORAL at 08:47

## 2020-02-17 RX ADMIN — DABIGATRAN ETEXILATE MESYLATE 150 MG: 150 CAPSULE ORAL at 08:47

## 2020-02-17 RX ADMIN — ATORVASTATIN CALCIUM 20 MG: 20 TABLET, FILM COATED ORAL at 17:22

## 2020-02-17 RX ADMIN — MEMANTINE 10 MG: 5 TABLET ORAL at 17:22

## 2020-02-17 RX ADMIN — SULFAMETHOXAZOLE AND TRIMETHOPRIM 1 TABLET: 800; 160 TABLET ORAL at 05:59

## 2020-02-17 RX ADMIN — DONEPEZIL HYDROCHLORIDE 10 MG: 10 TABLET, FILM COATED ORAL at 21:26

## 2020-02-17 RX ADMIN — HALOPERIDOL LACTATE 5 MG: 5 INJECTION INTRAMUSCULAR at 06:00

## 2020-02-17 RX ADMIN — MIRTAZAPINE 7.5 MG: 15 TABLET, FILM COATED ORAL at 21:26

## 2020-02-17 RX ADMIN — SODIUM CHLORIDE 250 ML: 0.9 INJECTION, SOLUTION INTRAVENOUS at 08:48

## 2020-02-17 NOTE — ASSESSMENT & PLAN NOTE
With daily Bactrim suppression  No evidence of active UTI this time  She does have some mental status changes  Urinalysis appears relatively bland  Continue Bactrim suppression at this time  Urine culture negative  I discussed with her daughter, possibly stopping the suppressive Bactrim  Daughter is not interested not or outpatient Urology follow-up

## 2020-02-17 NOTE — ED PROVIDER NOTES
History  Chief Complaint   Patient presents with    Possible UTI     [facility]  Patient has been acting altered since thursday per family  59-year-old female history of severe Alzheimer's dementia presenting for evaluation of urinary retention  Patient has a history of recurrent urinary tract infections and NSTEMI, patient has not urinated in the approximately 15 hours she has become more agitated and irritated with the pain in her abdomen due to bladder distention  She has been eating and drinking throughout the day however has not produced any urine she has not had any diarrhea constipation she has not had any chest pains over history is limited due to the patient's dementia  History provided by:  Patient   used: No    Urinary Frequency   Severity:  Moderate  Onset quality:  Gradual  Timing:  Constant  Progression:  Unchanged  Chronicity:  New  Associated symptoms: abdominal pain    Associated symptoms: no fever        Prior to Admission Medications   Prescriptions Last Dose Informant Patient Reported? Taking?    Butalbital-Acetaminophen  MG CAPS   Yes Yes   Sig: Take by mouth   QUEtiapine (SEROquel) 25 mg tablet Not Taking at Unknown time  No No   Sig: Take 1 tablet (25 mg total) by mouth daily at bedtime   Patient not taking: Reported on 2/17/2020   Sulfamethoxazole-Trimethoprim (BACTRIM PO)   Yes Yes   Sig: Take by mouth   acetaminophen (TYLENOL) 325 mg tablet   No No   Sig: Take 2 tablets (650 mg total) by mouth every 6 (six) hours as needed for mild pain   amLODIPine (NORVASC) 5 mg tablet   No No   Sig: Take 1 tablet (5 mg total) by mouth daily   atorvastatin (LIPITOR) 20 mg tablet  Self Yes No   Sig: Take 20 mg by mouth daily   dabigatran etexilate (PRADAXA) 150 mg capsu   No No   Sig: Take 1 capsule (150 mg total) by mouth every 12 (twelve) hours   divalproex sodium (DEPAKOTE) 250 mg EC tablet   Yes Yes   Sig: Take 250 mg by mouth   donepezil (ARICEPT) 10 mg tablet  Self Yes No   Sig: Take 10 mg by mouth daily at bedtime   lisinopril (ZESTRIL) 10 mg tablet   No No   Sig: Take 1 tablet (10 mg total) by mouth daily   memantine (NAMENDA) 10 mg tablet  Self Yes No   Sig: Take 10 mg by mouth 2 (two) times a day   mirtazapine (REMERON) 7 5 MG tablet   Yes Yes   Sig: Take 7 5 mg by mouth daily at bedtime   pantoprazole (PROTONIX) 40 mg tablet  Child Yes No   Sig: Take 40 mg by mouth daily      Facility-Administered Medications: None       Past Medical History:   Diagnosis Date    [facility] [address] [facility]     [facility]     Chronic UTI     Diverticulosis     Dyslipidemia     History of stroke     Hyperlipidemia     Hypertension     UTI (urinary tract infection)     Vasovagal syncope 2018       Past Surgical History:   Procedure Laterality Date    CHOLECYSTECTOMY      IR KYPHOPLASTY/VERTEBROPLASTY  2019       History reviewed  No pertinent family history  I have reviewed and agree with the history as documented  Social History     Tobacco Use    Smoking status: Former Smoker     Last attempt to quit: 2012     Years since quittin 1    Smokeless tobacco: Never Used   Substance Use Topics    Alcohol use: Never     Frequency: Never    Drug use: Never        Review of Systems   Unable to perform ROS: Dementia   Constitutional: Negative for fever  Gastrointestinal: Positive for abdominal pain  Genitourinary: Positive for difficulty urinating         Physical Exam  ED Triage Vitals   Temperature Pulse Respirations Blood Pressure SpO2   20 0343 20 0338 20 0338 20 0338 20 033   97 5 °F (36 4 °C) 78 22 141/68 100 %      Temp Source Heart Rate Source Patient Position - Orthostatic VS BP Location FiO2 (%)   20 0338 20 0338 20 0338 20 --   Temporal Monitor Lying Right arm       Pain Score       20       No Pain             Orthostatic Vital Signs  Vitals:    02/17/20 0338 02/17/20 0446 02/17/20 0606   BP: 141/68 134/99 145/67   Pulse: 78 83 74   Patient Position - Orthostatic VS: Lying Lying Lying       Physical Exam   Constitutional: She appears well-developed and well-nourished  She appears distressed  HENT:   Head: Normocephalic and atraumatic  Eyes: Conjunctivae and EOM are normal  No scleral icterus  Neck: Normal range of motion  Neck supple  Cardiovascular: Normal rate, regular rhythm and normal heart sounds  No murmur heard  Pulmonary/Chest: Effort normal and breath sounds normal  No respiratory distress  Abdominal: Soft  Bowel sounds are normal  There is tenderness  Musculoskeletal: Normal range of motion  Neurological: She is alert  Skin: Skin is warm and dry  Psychiatric: She has a normal mood and affect  Her behavior is normal    Nursing note and vitals reviewed  ED Medications  Medications   sulfamethoxazole-trimethoprim (BACTRIM DS) 800-160 mg per tablet 1 tablet (1 tablet Oral Given 2/17/20 0559)   haloperidol lactate (HALDOL) injection 5 mg (5 mg Intramuscular Given 2/17/20 0600)       Diagnostic Studies  Results Reviewed     Procedure Component Value Units Date/Time    Urine Microscopic [[id number]]  (Abnormal) Collected:  02/17/20 0435    Lab Status:  Final result Specimen:  Urine, Other Updated:  02/17/20 0507     RBC, UA 0-1 /hpf      WBC, UA 10-20 /hpf      Epithelial Cells Occasional /hpf      Bacteria, UA Occasional /hpf     Urine culture [[id number]] Collected:  02/17/20 0435    Lab Status:   In process Specimen:  Urine, Other Updated:  02/17/20 0507    POCT urinalysis dipstick [[id number]]  (Normal) Resulted:  02/17/20 0437    Lab Status:  Final result Specimen:  Urine Updated:  02/17/20 0437     Color, UA yellow     Clarity, UA clear    Urine Macroscopic, POC [[id number]]  (Abnormal) Collected:  02/17/20 0435    Lab Status:  Final result Specimen:  Urine Updated:  02/17/20 0437     Color, UA Yellow Clarity, UA Clear     pH, UA 7 5     Leukocytes, UA Moderate     Nitrite, UA Negative     Protein, UA Negative mg/dl      Glucose, UA Negative mg/dl      Ketones, UA Negative mg/dl      Urobilinogen, UA 0 2 E U /dl      Bilirubin, UA Negative     Blood, UA Negative     Specific Gravity, UA 1 010    Narrative:       CLINITEK RESULT    Comprehensive metabolic panel [[id number]]  (Abnormal) Collected:  02/17/20 0413    Lab Status:  Final result Specimen:  Blood from Arm, Left Updated:  02/17/20 0434     Sodium 141 mmol/L      Potassium 4 0 mmol/L      Chloride 104 mmol/L      CO2 23 mmol/L      ANION GAP 14 mmol/L      BUN 20 mg/dL      Creatinine 1 25 mg/dL      Glucose 111 mg/dL      Calcium 9 4 mg/dL      AST 15 U/L      ALT 16 U/L      Alkaline Phosphatase 85 U/L      Total Protein 7 5 g/dL      Albumin 3 7 g/dL      Total Bilirubin 0 28 mg/dL      eGFR 39 ml/min/1 73sq m     Narrative:       [facility] guidelines for Chronic Kidney Disease (CKD):     Stage 1 with normal or high GFR (GFR > 90 mL/min/1 73 square meters)    Stage 2 Mild CKD (GFR = 60-89 mL/min/1 73 square meters)    Stage 3A Moderate CKD (GFR = 45-59 mL/min/1 73 square meters)    Stage 3B Moderate CKD (GFR = 30-44 mL/min/1 73 square meters)    Stage 4 Severe CKD (GFR = 15-29 mL/min/1 73 square meters)    Stage 5 End Stage CKD (GFR <15 mL/min/1 73 square meters)  Note: GFR calculation is accurate only with a steady state creatinine    Lipase [[id number]]  (Normal) Collected:  02/17/20 0413    Lab Status:  Final result Specimen:  Blood from Arm, Left Updated:  02/17/20 0434     Lipase 255 u/L     CBC and differential [[id number]]  (Abnormal) Collected:  02/17/20 0413    Lab Status:  Final result Specimen:  Blood from Arm, Left Updated:  02/17/20 0419     WBC 9 75 Thousand/uL      RBC 3 72 Million/uL      Hemoglobin 10 5 g/dL      Hematocrit 33 0 %      MCV 89 fL      MCH 28 2 pg      MCHC 31 8 g/dL      RDW 14 0 %      MPV 10 6 fL      Platelets 588 Thousands/uL      nRBC 0 /100 WBCs      Neutrophils Relative 48 %      Immat GRANS % 0 %      Lymphocytes Relative 41 %      Monocytes Relative 9 %      Eosinophils Relative 2 %      Basophils Relative 0 %      Neutrophils Absolute 4 67 Thousands/µL      Immature Grans Absolute 0 02 Thousand/uL      Lymphocytes Absolute 3 97 Thousands/µL      Monocytes Absolute 0 89 Thousand/µL      Eosinophils Absolute 0 16 Thousand/µL      Basophils Absolute 0 04 Thousands/µL                  No orders to display         Procedures  Procedures      ED Course  ED Course as of Feb 17 0614   [facility] Feb 17, 2020   0420 Baseline   Hemoglobin(!): 10 5   0435 Baseline near 1   Creatinine: 1 25   0512 WBC, UA(!): 10-20   0512 Bacteria, UA: Occasional           MDM  Number of Diagnoses or Management Options  Urinary retention: new and requires workup  UTI (urinary tract infection): new and requires workup  Diagnosis management comments: 22-year-old female presenting with urinary retention and abdominal pain  Will order abdominal laboratories and urinalysis to check for infection as well as renal function and for a leukocytosis  Patient does not have white count she has a very mild RUBEN due to her urinary retention and has leukocytosis in her urine concerned that patient is colonized  Patient aggressive and angry screaming at family and staff discussed patient with medicine will be admitted to their service for psych eval as well as Urology         Amount and/or Complexity of Data Reviewed  Clinical lab tests: ordered and reviewed  Tests in the medicine section of CPT®: ordered and reviewed  Decide to obtain previous medical records or to obtain history from someone other than the patient: yes  Obtain history from someone other than the patient: yes  Review and summarize past medical records: yes          Disposition  Final diagnoses:   UTI (urinary tract infection)   Urinary retention     Time reflects when diagnosis was documented in both MDM as applicable and the Disposition within this note     Time User Action Codes Description Comment    2/17/2020  5:24 AM Tilmon Dani Add [N39 0] UTI (urinary tract infection)     2/17/2020  5:24 AM Tilmon Dani Add [R33 9] Urinary retention     2/17/2020  5:24 AM Tilmon Dani Modify [N39 0] UTI (urinary tract infection)     2/17/2020  5:24 AM Tilmon Dani Modify [R33 9] Urinary retention       ED Disposition     ED Disposition Condition Date/Time Comment    Admit Stable Mon Feb 17, 2020  5:44 AM Case was discussed with eugenio and the patient's admission status was agreed to be Admission Status: inpatient status to the service of Dr Itz Hartmann     Follow-up Information     Follow up With Specialties Details Why [facility]rology Schedule an appointment as soon as possible for a visit   [facility]  [address] [facility] [id number]  [address]  [facility], [address] [facility], [place], [place], [address]   [address] Emergency Department Emergency Medicine  If symptoms worsen [facility] [address] [phone] [place] ED, [address], [place], [address]          Patient's Medications   Discharge Prescriptions    SULFAMETHOXAZOLE-TRIMETHOPRIM (BACTRIM DS) 800-160 MG PER TABLET    Take 1 tablet by mouth 2 (two) times a day for 7 days smx-tmp DS (BACTRIM) 800-160 mg tabs (1tab q12 D10)       Start Date: 2/17/2020 End Date: 2/24/2020       Order Dose: 1 tablet       Quantity: 14 tablet    Refills: 0     No discharge procedures on file  ED Provider  Attending physically available and evaluated Kyle Mueller  I managed the patient along with the ED Attending      Electronically Signed by         Kalia Soto MD  02/17/20 9058

## 2020-02-17 NOTE — ED ATTENDING ATTESTATION
2/17/2020  IKike DO, saw and evaluated the patient  I have discussed the patient with the resident/non-physician practitioner and agree with the resident's/non-physician practitioner's findings, Plan of Care, and MDM as documented in the resident's/non-physician practitioner's note, except where noted  All available labs and Radiology studies were reviewed  I was present for key portions of any procedure(s) performed by the resident/non-physician practitioner and I was immediately available to provide assistance  At this point I agree with the current assessment done in the Emergency Department  I have conducted an independent evaluation of this patient a history and physical is as follows:    Patient is an 51-year-old female with a history of Alzheimer's that presents for urinary retention worsening agitation throughout the day  Patient is distressed and agitated on my exam   Does have suprapubic abdominal tenderness  Family states that her agitation is consistent with flares of her Alzheimer's disease  Patient has been on multiple medications in the past her agitation has been worsening  ED Course     Given patient's abdominal pain and distension and history of urinary retention a Heller was placed  This drained over 500 cc of urine and then the patient urinated about another 500 cc of urine spontaneously into the bed  Patient had improved discomfort but still had urinary frequency  Labs showed a slightly elevated creatinine from baseline  Long discussion with the patient's family  Given her worsening agitation, urinary retention and worsening creatinine will admit for urology consultation and Psychiatry consultation to discuss different medications and possible placement  I discussed short and long-term placement with the family  Family wishes to have the patient return home    I explained that if it is becoming dangerous that even a short-term admission to Geriatric Psychiatry might be very beneficial to stabilize her  They are willing to pursue this      Critical Care Time  Procedures

## 2020-02-17 NOTE — ASSESSMENT & PLAN NOTE
Patient initially brought to the ED for urinary retention of approximately 15 hours  Found to have greater than 500 cc in bladder, straight cath in ED  Family notes history of urinary retention, has not seen urology outpatient  Family notes patient will attempt to urinate multiple times per hour without success  Monitor on urinary retention protocol and consider need for urology consult if retention continues

## 2020-02-17 NOTE — ASSESSMENT & PLAN NOTE
Family notes chronic UTIs  Urine culture from 01/29/2020 growing Citrobacter freundii  Treated inpatient with IV cefepime, continue outpatient with p o  Bactrim  Given 1 dose of p o   Bactrim in ED  UA positive for 10-20 WBCs and occasional bacteria  Patient is otherwise afebrile without leukocytosis  Urine culture pending

## 2020-02-17 NOTE — PLAN OF CARE
Problem: Potential for Falls  Goal: Patient will remain free of falls  Description  INTERVENTIONS:  - Assess patient frequently for physical needs  -  Identify cognitive and physical deficits and behaviors that affect risk of falls    -  [place] fall precautions as indicated by assessment   - Educate patient/family on patient safety including physical limitations  - Instruct patient to call for assistance with activity based on assessment  - Modify environment to reduce risk of injury  - Consider OT/PT consult to assist with strengthening/mobility  Outcome: Progressing     Problem: Prexisting or High Potential for Compromised Skin Integrity  Goal: Skin integrity is maintained or improved  Description  INTERVENTIONS:  - Identify patients at risk for skin breakdown  - Assess and monitor skin integrity  - Assess and monitor nutrition and hydration status  - Monitor labs   - Assess for incontinence   - Turn and reposition patient  - Assist with mobility/ambulation  - Relieve pressure over bony prominences  - Avoid friction and shearing  - Provide appropriate hygiene as needed including keeping skin clean and dry  - Evaluate need for skin moisturizer/barrier cream  - Collaborate with interdisciplinary team   - Patient/family teaching  - Consider wound care consult   Outcome: Progressing     Problem: PAIN - ADULT  Goal: Verbalizes/displays adequate comfort level or baseline comfort level  Description  Interventions:  - Encourage patient to monitor pain and request assistance  - Assess pain using appropriate pain scale  - Administer analgesics based on type and severity of pain and evaluate response  - Implement non-pharmacological measures as appropriate and evaluate response  - Consider cultural and social influences on pain and pain management  - Notify physician/advanced practitioner if interventions unsuccessful or patient reports new pain  Outcome: Progressing     Problem: INFECTION - ADULT  Goal: Absence or prevention of progression during hospitalization  Description  INTERVENTIONS:  - Assess and monitor for signs and symptoms of infection  - Monitor lab/diagnostic results  - Monitor all insertion sites, i e  indwelling lines, tubes, and drains  - Monitor endotracheal if appropriate and nasal secretions for changes in amount and color  - [place] appropriate cooling/warming therapies per order  - Administer medications as ordered  - Instruct and encourage patient and family to use good hand hygiene technique  - Identify and instruct in appropriate isolation precautions for identified infection/condition  Outcome: Progressing     Problem: SAFETY ADULT  Goal: Maintain or return to baseline ADL function  Description  INTERVENTIONS:  -  Assess patient's ability to carry out ADLs; assess patient's baseline for ADL function and identify physical deficits which impact ability to perform ADLs (bathing, care of mouth/teeth, toileting, grooming, dressing, etc )  - Assess/evaluate cause of self-care deficits   - Assess range of motion  - Assess patient's mobility; develop plan if impaired  - Assess patient's need for assistive devices and provide as appropriate  - Encourage maximum independence but intervene and supervise when necessary  - Involve family in performance of ADLs  - Assess for home care needs following discharge   - Consider OT consult to assist with ADL evaluation and planning for discharge  - Provide patient education as appropriate  Outcome: Progressing  Goal: Maintain or return mobility status to optimal level  Description  INTERVENTIONS:  - Assess patient's baseline mobility status (ambulation, transfers, stairs, etc )    - Identify cognitive and physical deficits and behaviors that affect mobility  - Identify mobility aids required to assist with transfers and/or ambulation (gait belt, sit-to-stand, lift, walker, cane, etc )  - [place] fall precautions as indicated by assessment  - Record patient progress and toleration of activity level on Mobility SBAR; progress patient to next Phase/Stage  - Instruct patient to call for assistance with activity based on assessment  - Consider rehabilitation consult to assist with strengthening/weightbearing, etc   Outcome: Progressing     Problem: NEUROSENSORY - ADULT  Goal: Achieves stable or improved neurological status  Description  INTERVENTIONS  - Monitor and report changes in neurological status  - Monitor vital signs such as temperature, blood pressure, glucose, and any other labs ordered   - Initiate measures to prevent increased intracranial pressure  - Monitor for seizure activity and implement precautions if appropriate      Outcome: Progressing  Goal: Achieves maximal functionality and self care  Description  INTERVENTIONS  - Monitor swallowing and airway patency with patient fatigue and changes in neurological status  - Encourage and assist patient to increase activity and self care     - Encourage visually impaired, hearing impaired and aphasic patients to use assistive/communication devices  Outcome: Progressing     Problem: DISCHARGE PLANNING  Goal: Discharge to home or other facility with appropriate resources  Description  INTERVENTIONS:  - Identify barriers to discharge w/patient and caregiver  - Arrange for needed discharge resources and transportation as appropriate  - Identify discharge learning needs (meds, wound care, etc )  - Arrange for interpretive services to assist at discharge as needed  - Refer to Case Management Department for coordinating discharge planning if the patient needs post-hospital services based on physician/advanced practitioner order or complex needs related to functional status, cognitive ability, or social support system  Outcome: Progressing     Problem: NEUROSENSORY - ADULT  Goal: Achieves stable or improved neurological status  Description  INTERVENTIONS  - Monitor and report changes in neurological status  - Monitor vital signs such as temperature, blood pressure, glucose, and any other labs ordered   - Initiate measures to prevent increased intracranial pressure  - Monitor for seizure activity and implement precautions if appropriate      Outcome: Progressing  Goal: Achieves maximal functionality and self care  Description  INTERVENTIONS  - Monitor swallowing and airway patency with patient fatigue and changes in neurological status  - Encourage and assist patient to increase activity and self care     - Encourage visually impaired, hearing impaired and aphasic patients to use assistive/communication devices  Outcome: Progressing

## 2020-02-17 NOTE — CONSULTS
Consult - Urology   Mannie Duarte 1933, 80 y o  female MRN: [id number]    Unit/Bed#: E5 -01 Encounter: [id number]    Urinary retention  Assessment & Plan  With 1 episode of urinary retention with straight catheterization x1 in the ER for 500 cc  Has had this in the past with a history of a bladder sling for prolapse  After bowel movement, currently her PVR is 110 cc  Will continue the retention protocol with relax in the guidelines to 450 cc for the threshold for straight catheterization  Expected her moving her bowels will significantly improve her voiding  Chronic UTI  Assessment & Plan  With daily Bactrim suppression  No evidence of active UTI this time  She does have some mental status changes  Urinalysis appears relatively bland  Continue Bactrim suppression at this time  Await culture  Bedside rounds performed with Lizz Lowry RN  Discussed with Dr Margret Yadav and Dr Perea Medicbyron  Subjective/Objective     Subjective:   CC: by daughter - cannot pee/straight cath x 1 for 500cc in the ED  HPI:  Mcfaddinlord Augustine 80year-old female with Alzheimer's, cared for by her daughter at home  She has a history of recurrent UTIs in the past on suppressive Bactrim currently  She was last admitted in January and discharged on Bactrim for a UTI with a culture that grew Citrobacter   Once her course of Bactrim and did, she was decreased to a smaller dose  This is by her PCP  Her daughter reports that she does not often go 2 appointments, because this is very disruptive for her and she screams and kicks and they do not find the appointment to be terrible helpful  Outpatient urologist has been recommended in the past but secondary to the limitation described above she was not able to see a urologist     She reports a history of retention during previous hospitalizations requiring intermittent straight catheterization but never chronic Heller  Her last bowel movement was 2 days ago    However, when she arrived here at the hospital, she had a large bowel movement which appeared impacted was dislodged by nursing  At the time she did void  Prior to that she required straight catheterization x1 for 500 cc in the ER  It was believed that she voided at the same time as her bowel movement as above  Currently her bladder scanned rate 110 cc  Unfortunately, the patient has Alzheimer's and is alert and oriented to self only, intermittently confused, lashing out, with a change in mental status and aggression  She is sometimes aggressive of baseline  No fevers at home, just a change in mental status described above  White blood cell count is 9 75 here  Urinalysis with 0-1 RBCs, 10-20 wbc's  Current coverage with continuing her daily suppressive Bactrim  ROS:  Review of Systems   Unable to perform ROS: Dementia       Objective:  Vitals: Blood pressure 106/53, pulse 68, temperature (!) 97 2 °F (36 2 °C), temperature source Temporal, resp  rate 16, weight 68 1 kg (150 lb 2 1 oz), SpO2 98 %  ,Body mass index is 27 46 kg/m²  Intake/Output Summary (Last 24 hours) at 2/17/2020 1345  Last data filed at 2/17/2020 0431  Gross per 24 hour   Intake    Output 500 ml   Net -500 ml       Invasive Devices     Peripheral Intravenous Line            Peripheral IV 02/17/20 Left Antecubital less than 1 day                Physical Exam   Constitutional: She is oriented to person, place, and time  She appears well-developed and well-nourished  She is cooperative  She does not appear ill  No distress  Lakisha Rdz is resting comfortably in bed  No acute distress  Oriented to self  Dozing off,   HENT:   Head: Normocephalic and atraumatic  Moist mucous membranes  Eyes: Conjunctivae and EOM are normal    Neck: Normal range of motion  Neck supple  No tracheal deviation present  Cardiovascular: Normal rate, regular rhythm and normal heart sounds  No murmur heard    Pulmonary/Chest: Effort normal and breath sounds normal  No respiratory distress  She has no wheezes  Good airflow bilaterally on deep inspiration  Abdominal: Soft  Bowel sounds are normal  She exhibits no distension and no mass  There is no tenderness  Abdomen soft and benign without any palpable suprapubic fullness  Musculoskeletal: Normal range of motion  She exhibits no edema  Neurological: She is alert and oriented to person, place, and time  Skin: Skin is warm and dry  No rash noted  She is not diaphoretic  No erythema  No pallor  Psychiatric: She has a normal mood and affect  Her behavior is normal  Judgment and thought content normal    Nursing note and vitals reviewed  History:    Past Medical History:   Diagnosis Date    A-fib ([place] [address] )     Alzheimer disease ([facility] )     Chronic UTI     Diverticulosis     Dyslipidemia     History of stroke     Hyperlipidemia     Hypertension     UTI (urinary tract infection)     Vasovagal syncope 2018     Past Surgical History:   Procedure Laterality Date    CHOLECYSTECTOMY      IR KYPHOPLASTY/VERTEBROPLASTY  2019     History reviewed  No pertinent family history  Social History     Socioeconomic History    Marital status:       Spouse name: None    Number of children: None    Years of education: None    Highest education level: None   Occupational History    None   Social Needs    Financial resource strain: None    Food insecurity:     Worry: None     Inability: None    Transportation needs:     Medical: None     Non-medical: None   Tobacco Use    Smoking status: Former Smoker     Last attempt to quit: 2012     Years since quittin 1    Smokeless tobacco: Never Used   Substance and Sexual Activity    Alcohol use: Never     Frequency: Never    Drug use: Never    Sexual activity: Not Currently     Partners: Male     Birth control/protection: None   Lifestyle    Physical activity:     Days per week: None     Minutes per session: None    Stress: None   Relationships  Social connections:     Talks on phone: None     Gets together: None     Attends [employer] service: None     Active member of club or organization: None     Attends meetings of clubs or organizations: None     Relationship status: None    Intimate partner violence:     Fear of current or ex partner: None     Emotionally abused: None     Physically abused: None     Forced sexual activity: None   Other Topics Concern    None   Social History Narrative    None       Imaging:  None  Imaging reviewed - both report and images personally reviewed  Lab Results:  I have personally reviewed pertinent labs    Results from last 7 days   Lab Units 02/17/20  0413   WBC Thousand/uL 9 75   HEMOGLOBIN g/dL 10 5*   PLATELETS Thousands/uL 372     Results from last 7 days   Lab Units 02/17/20  0413   SODIUM mmol/L 141   POTASSIUM mmol/L 4 0   CHLORIDE mmol/L 104   CO2 mmol/L 23   BUN mg/dL 20   CREATININE mg/dL 1 25   EGFR ml/min/1 73sq m 39   CALCIUM mg/dL 9 4   AST U/L 15   ALT U/L 16   ALK PHOS U/L 85               Andry Knutson PA-C  Date: 2/17/2020 Time: 1:45 PM

## 2020-02-17 NOTE — CONSULTS
Consultation - Behavioral Health     Identification Data: Chris Moore 80 y o  female MRN: [id number]  Unit/Bed#: E5 -01 Encounter: [id number]    02/17/20  1:30 PM    Inpatient consult to Psychiatry  Consult performed by: CARMELLA Ovalle  Consult ordered by: Shashi Guillen PA-C      Physician Requesting Consult: Leslie Gonzalez MD  Principal Problem:Late onset Alzheimer's disease with behavioral disturbance [facility])    Reason for Consult:  aggressive behavior and agitation    History of Present Illness     Chris Moore is a 80 y o  female with a history of dementia who was admitted to the medical service on 2/17/2020 due to urinary retention  Psychiatric consultation was requested due to severe agitation and aggressive behavior  Psychiatric symptoms prior to admission included agitation and aggressive behavior  Onset of symptoms was abrupt starting a few days ago with progressively worsening course since that time  Stressors preceding admission included health issues  Assessment/Plan     Principal Problem:    Late onset Alzheimer's disease with behavioral disturbance ([facility] [address] )  Active Problems:    History of stroke    Mixed hyperlipidemia    Chronic UTI    Chronic atrial fibrillation    Urinary retention      Assessment:    Dementia (F03 90)    Ron Monreal is an 80year old female who presented to the ED with urinary retention for approximately 15 hours  Patient has a past medical history of dementia, chronic UTI, hyperlipidemia, vasovagal syncope, stroke and HTN  During assessment, daughter reported patient has become increasingly agitated and aggressive toward family members  Upon psychiatric assessment, patient presents with symptoms of dementia  Patient's adult children were present during encounter and provided much of the information provided  Daughter reports patient has been staying with them for the past 6-7 weeks and has noticed an increase in agitation over the past several days    Daughter reports patient becomes more aggressive and agitated during evening hours but has noticed an increase throughout the day  Daughter stated patient did not sleep for approximately 12 hours and was combative the entire time  Daughter reports patient has been experiencing decreased sleep and appetite for the past few weeks  Patient was diagnosed with dementia approximately 6 years ago  Daughter reports that at times, patient does have bouts of clarity but is mostly confused  Patient does appear to have a UTI and daughter reports it is chronic  Explained how this could be aiding in her agitation d/t being uncomfortable  Consult to gerontology pending  UA: +UTI, pH 8 0  QT/QTc: 378/419    Plan/Recommendation:   1  Start Haldol 2mg, HS, PRN for increased agitation/aggression, can make standing HS order if needed  2  Family is very adamant about not placing patient on an inpatient behavioral health unit  3  Decrease stimuli as much as possible to help decrease agitation  3   Re-consult psych if needed    Psychiatric Review Of Systems:    Sleep changes: yes, decreased  Appetite changes: fluctuating  Weight changes: no  Energy/anergy: decreased  Interest/pleasure/anhedonia: decreased  Somatic symptoms: no  Anxiety/panic: no  Dorene: no  Guilty/hopeless: no  Self injurious behavior/risky behavior: no  Suicidal ideation: no  Homicidal ideation: no  Auditory hallucinations: no  Visual hallucinations: no  Other hallucinations: no  Delusional thinking: no  Eating disorder history: no  Obsessive/compulsive symptoms: no    Historical Information       Mental Status Evaluation:    Appearance Appropriately dressed and Poor eye contact   Behavior calm   Mood Uncertain   Speech Sparse and Soft   Affect Flat   Thought Processes Unable to assess due to scant verbalization   Thought Content Cannot be assessed due to patient factors   Associations Tightly connected and Unable to assess due to scant verbalization   Perceptual Disturbances Cannot be assessed due to patient factors   Risk Potential Suicidal/Homicidal Ideation - No suicidal or homicidal ideation and No  Risk of Violence - No  Risk of Self Mutilation - No   Orientation disoriented to person, place, time/date and situation   Memory recent memory severely impaired, remote memory severely impaired   Consciousness alert and awake   Attention/Concentration attention span and concentration appear shorter than expected for age   Intellect appears to be of average intelligence and impaired   Insight impaired due to dementia   Judgement impaired due to dementia   Muscle Strength and Gait not examined   Motor Activity no abnormal movements   Language difficulty naming common objects, difficulty repeating a phrase   Fund of Knowledge impaired knowledge of current events   Pain none   Pain Scale 0       Past Psychiatric History:     Past Inpatient Psychiatric Treatment:   No history of past inpatient psychiatric admissions  Past Outpatient Psychiatric Treatment:    No history of past outpatient psychiatric treatment  Past Suicide Attempts: no  Past Violent Behavior: no  Past Psychiatric Medication Trials: none    Traumatic History:     Abuse: no history of physical or sexual abuse  Other Traumatic Events: none      Substance Abuse History:    Social History     Tobacco History     Smoking Status  Former Smoker Quit date  12/27/2012    Smokeless Tobacco Use  Never Used          Alcohol History     Alcohol Use Status  Never          Drug Use     Drug Use Status  Never          Sexual Activity     Sexually Active  Not Currently Partners  Male Birth Control/Protection  None          Activities of Daily Living    Not Asked                 I am unable to assess the patient for substance use within the past 12 months as they are unable or unwilling to answer      Family Psychiatric History:     Psychiatric Illness:  no family history of psychiatric illness  Substance Abuse:  no family history of psychiatric illness  Suicide Attempts:  no family history of psychiatric illness    Social History:    Education: high school diploma/GED  Learning Disabilities: none  Marital History:   Children: 2 adult children  Living Arrangement: The patient lives in home with adult children  Occupational History: retired  Functioning Relationships: good support system  Legal History: none   History: None    Past Medical History:    History of Seizures: no  History of Head injury with loss of consciousness: no    Past Medical History:   Diagnosis Date    A-fib ([facility] )     Alzheimer disease ([facility] )     Chronic UTI     Diverticulosis     Dyslipidemia     History of stroke     Hyperlipidemia     Hypertension     UTI (urinary tract infection)     Vasovagal syncope 2/13/2018     Past Surgical History:   Procedure Laterality Date    CHOLECYSTECTOMY      IR KYPHOPLASTY/VERTEBROPLASTY  12/26/2019         Medical Review Of Systems:      General sleep disturbances, appetite disturbances and decreased functioning   Personality no change in personality and change in personality, due to dementia - agitation and aggression   Constitutional negative   ENT negative   Cardiovascular negative   Respiratory negative   Gastrointestinal negative   Genitourinary negative   Musculoskeletal negative   Integumentary negative   Neurological negative   Endocrine negative   Other Symptoms all other systems are negative       Allergies: Allergies   Allergen Reactions    Ativan [Lorazepam]     Latuda [Lurasidone]        Medications: All current active medications have been reviewed    Current medications:   Current Facility-Administered Medications   Medication Dose Route Frequency    acetaminophen (TYLENOL) tablet 650 mg  650 mg Oral Q6H PRN    aluminum-magnesium hydroxide-simethicone (MYLANTA) 200-200-20 mg/5 mL oral suspension 30 mL  30 mL Oral Q6H PRN    amLODIPine (NORVASC) tablet 5 mg  5 mg Oral Daily    atorvastatin (LIPITOR) tablet 20 mg  20 mg Oral Daily With Dinner    dabigatran etexilate (PRADAXA) capsule 150 mg  150 mg Oral Q12H    docusate sodium (COLACE) capsule 100 mg  100 mg Oral BID    donepezil (ARICEPT) tablet 10 mg  10 mg Oral HS    memantine (NAMENDA) tablet 10 mg  10 mg Oral BID    mirtazapine (REMERON) tablet 7 5 mg  7 5 mg Oral HS    ondansetron (ZOFRAN) injection 4 mg  4 mg Intravenous Q6H PRN    pantoprazole (PROTONIX) EC tablet 40 mg  40 mg Oral Early Morning    polyethylene glycol (MIRALAX) packet 17 g  17 g Oral Daily PRN     Medication prior to admission:   Prior to Admission Medications   Prescriptions Last Dose Informant Patient Reported? Taking?    Butalbital-Acetaminophen  MG CAPS   Yes Yes   Sig: Take by mouth   QUEtiapine (SEROquel) 25 mg tablet Not Taking at Unknown time  No No   Sig: Take 1 tablet (25 mg total) by mouth daily at bedtime   Patient not taking: Reported on 2/17/2020   Sulfamethoxazole-Trimethoprim (BACTRIM PO)   Yes Yes   Sig: Take by mouth   acetaminophen (TYLENOL) 325 mg tablet   No No   Sig: Take 2 tablets (650 mg total) by mouth every 6 (six) hours as needed for mild pain   amLODIPine (NORVASC) 5 mg tablet   No No   Sig: Take 1 tablet (5 mg total) by mouth daily   atorvastatin (LIPITOR) 20 mg tablet  Self Yes No   Sig: Take 20 mg by mouth daily   dabigatran etexilate (PRADAXA) 150 mg capsu   No No   Sig: Take 1 capsule (150 mg total) by mouth every 12 (twelve) hours   divalproex sodium (DEPAKOTE) 250 mg EC tablet   Yes Yes   Sig: Take 250 mg by mouth   donepezil (ARICEPT) 10 mg tablet  Self Yes No   Sig: Take 10 mg by mouth daily at bedtime   lisinopril (ZESTRIL) 10 mg tablet   No No   Sig: Take 1 tablet (10 mg total) by mouth daily   memantine (NAMENDA) 10 mg tablet  Self Yes No   Sig: Take 10 mg by mouth 2 (two) times a day   mirtazapine (REMERON) 7 5 MG tablet   Yes Yes   Sig: Take 7 5 mg by mouth daily at bedtime   pantoprazole (PROTONIX) 40 mg tablet  Child Yes No   Sig: Take 40 mg by mouth daily      Facility-Administered Medications: None       Objective     Vital signs in last 24 hours:    Temp:  [97 2 °F (36 2 °C)-97 5 °F (36 4 °C)] 97 2 °F (36 2 °C)  HR:  [68-83] 68  Resp:  [16-22] 16  BP: (106-156)/(53-99) 106/53    Intake/Output Summary (Last 24 hours) at 2/17/2020 1330  Last data filed at 2/17/2020 0431  Gross per 24 hour   Intake    Output 500 ml   Net -500 ml         Laboratory Results:   I have personally reviewed all pertinent laboratory/tests results    Most Recent Labs:   Lab Results   Component Value Date    WBC 9 75 02/17/2020    RBC 3 72 (L) 02/17/2020    HGB 10 5 (L) 02/17/2020    HCT 33 0 (L) 02/17/2020     02/17/2020    RDW 14 0 02/17/2020    NEUTROABS 4 67 02/17/2020     10/23/2015    K 4 0 02/17/2020     02/17/2020    CO2 23 02/17/2020    BUN 20 02/17/2020    CREATININE 1 25 02/17/2020    GLUCOSE 131 12/14/2019    CALCIUM 9 4 02/17/2020    AST 15 02/17/2020    ALT 16 02/17/2020    ALKPHOS 85 02/17/2020    PROT 6 8 10/18/2015    BILITOT 0 25 10/18/2015    HDL 38 (L) 07/06/2019    TRIG 67 07/06/2019    LDLCALC 53 07/06/2019    VALPROICTOT 13 (L) 01/09/2020    CRC1UZZLWOPQ 1 446 10/27/2017     Labs in last 72 hours:   Recent Labs     02/17/20  0413   WBC 9 75   RBC 3 72*   HGB 10 5*   HCT 33 0*      RDW 14 0   NEUTROABS 4 67   K 4 0      CO2 23   BUN 20   CREATININE 1 25   CALCIUM 9 4   AST 15   ALT 16   ALKPHOS 85     CBC:   Lab Results   Component Value Date    WBC 9 75 02/17/2020    RBC 3 72 (L) 02/17/2020    HGB 10 5 (L) 02/17/2020    HCT 33 0 (L) 02/17/2020    MCV 89 02/17/2020     02/17/2020    MCH 28 2 02/17/2020    MCHC 31 8 02/17/2020    RDW 14 0 02/17/2020    MPV 10 6 02/17/2020    NRBC 0 02/17/2020    NEUTROABS 4 67 02/17/2020     BMP:   Lab Results   Component Value Date     10/23/2015    K 4 0 02/17/2020     02/17/2020    CO2 23 02/17/2020    ANIONGAP 7 10/23/2015    BUN 20 02/17/2020 CREATININE 1 25 02/17/2020    GLUCOSE 131 12/14/2019    CALCIUM 9 4 02/17/2020    EGFR 39 02/17/2020     CMP:   Lab Results   Component Value Date     10/23/2015    K 4 0 02/17/2020     02/17/2020    CO2 23 02/17/2020    ANIONGAP 7 10/23/2015    BUN 20 02/17/2020    CREATININE 1 25 02/17/2020    GLUCOSE 131 12/14/2019    CALCIUM 9 4 02/17/2020    AST 15 02/17/2020    ALT 16 02/17/2020    ALKPHOS 85 02/17/2020    PROT 6 8 10/18/2015    BILITOT 0 25 10/18/2015    EGFR 39 02/17/2020     Lipid Profile:   Lab Results   Component Value Date    HDL 38 (L) 07/06/2019    TRIG 67 07/06/2019    LDLCALC 53 07/06/2019     Liver Enzymes:   Lab Results   Component Value Date    AST 15 02/17/2020    ALT 16 02/17/2020    ALKPHOS 85 02/17/2020    PROT 6 8 10/18/2015    BILITOT 0 25 10/18/2015     Thyroid Studies:   Lab Results   Component Value Date    WSZ9QNACVMYX 1 446 10/27/2017     RPR: No results found for: RPR  Ammonia: No results found for: AMMONIA  Drug Screen: No results found for: AMPMETHUR, BARBTUR, BDZUR, THCUR, COCAINEUR, METHADONEUR, OPIATEUR, PCPUR, ECSTASYUR  Medication Drug Levels:   Lab Results   Component Value Date    VALPROICTOT 13 (L) 01/09/2020     Vitamin D Level No results found for: BFQQ41BROIIG, BVZP004KBQX  Vitamin B12   Lab Results   Component Value Date    YNRODNMV45 934 (H) 01/02/2020     Folate No results found for: FOLATE  Magnesium   Lab Results   Component Value Date    MG 2 0 01/29/2020     Phosphorus No results found for: PHOS  Potassium   Lab Results   Component Value Date    K 4 0 02/17/2020     Hemoglobin A1C/EST AVG Glucose   Lab Results   Component Value Date    HGBA1C 5 5 07/06/2019     07/06/2019     Urinalysis   Lab Results   Component Value Date    COLORU yellow 02/17/2020    CLARITYU clear 02/17/2020    SPECGRAV 1 010 02/17/2020    PHUR 7 5 02/17/2020    LEUKOCYTESUR Moderate (A) 02/17/2020    NITRITE Negative 02/17/2020    PROTEINUA Negative 10/23/2015    GLUCOSEU Negative 02/17/2020    KETONESU Negative 02/17/2020    UROBILINOGEN 0 2 02/17/2020    BILIRUBINUR Negative 02/17/2020    BLOODU Negative 02/17/2020    RBCUA 0-1 (A) 02/17/2020    WBCUA 10-20 (A) 02/17/2020    EPIS Occasional 02/17/2020    BACTERIA Occasional 02/17/2020    HYALINE 2-4 (A) 10/26/2017     Ext Breath Alcohol No components found for: EXTPOCBAC  EKG   Lab Results   Component Value Date    VENTRATE 74 01/29/2020    ATRIALRATE 100 01/29/2020    PRINT 200 01/29/2020    QRSDINT 80 01/29/2020    QTINT 378 01/29/2020    PAXIS 78 01/29/2020    QRSAXIS 62 01/29/2020    TWAVEAXIS -54 01/29/2020     Imaging Studies: Ct Head Without Contrast    Result Date: 1/29/2020  Narrative: CT BRAIN - WITHOUT CONTRAST INDICATION:   Syncope, recurrent  Syncope while going to the bathroom at 3:00 AM   Multiple additional syncopal events  History of chronic syncope  Chronic headaches  History of T12 compression fracture, status post kyphoplasty December 26, 2019  Denies back pain  No history of recent back trauma  Dementia  COMPARISON:  Multiple prior studies, most recent of which is a noncontrast CT brain December 14, 2019 and MR brain July 5, 2019  TECHNIQUE:  CT examination of the brain was performed  In addition to axial images, coronal 2D reformatted images were created and submitted for interpretation  Radiation dose length product (DLP) for this visit:  808 41 mGy/cm  This examination, like all CT scans performed in the [facility], was performed utilizing techniques to minimize radiation dose exposure, including the use of iterative reconstruction and automated exposure control  IMAGE QUALITY:  Diagnostic  FINDINGS: PARENCHYMA: Decreased attenuation is noted in periventricular and subcortical white matter demonstrating an appearance that is statistically most likely to represent advanced microangiopathic change    Chronic lacunar infarction(s) are noted in basal ganglia, unchanged from prior exam  No CT signs of acute infarction  No intracranial mass, mass effect or midline shift  No acute parenchymal hemorrhage  Bilateral internal carotid artery and vertebral artery calcifications  VENTRICLES AND EXTRA-AXIAL SPACES:  Enlargement of ventricles and extra-axial CSF spaces, out of proportion to the patient's age most consistent with cerebral and cerebellar atrophy  VISUALIZED ORBITS AND PARANASAL SINUSES:  No acute abnormality involving the orbits  Bilateral lens surgery  Mild scattered sinus mucosal thickening is noted  No fluid levels are seen  CALVARIUM AND EXTRACRANIAL SOFT TISSUES:  Minimal soft tissue swelling overlying the right frontal convexity  Underlying bony calvarium intact  Impression: Cerebral atrophy with chronic small vessel ischemic white matter disease  No acute intracranial abnormality  No significant change from priors  Workstation performed: [id number]     Ct Spine Thoracic Without Contrast    Result Date: 1/29/2020  Narrative: CT THORACIC SPINE INDICATION:   T-spine fusion, follow up T-spine fracture, pathological recent kyphoplasty - daughter concern for other fx and pain  Syncope while going to the bathroom at 3:00 AM   Multiple additional syncopal events  History of chronic syncope  Chronic headaches  History of T12 compression fracture, status post kyphoplasty December 26, 2019  Denies back pain  No history of recent back trauma  Dementia  COMPARISON:  CT chest abdomen pelvis December 14, 2019 TECHNIQUE:  Contiguous axial images were obtained  Sagittal and coronal reconstructions were performed  Radiation dose length product (DLP) for this visit:  751 88 mGy/cm  This examination, like all CT scans performed in the [facility], was performed utilizing techniques to minimize radiation dose exposure, including the use of iterative reconstruction and automated exposure control  IMAGE QUALITY:  Diagnostic   FINDINGS: ALIGNMENT:  Normal alignment of the thoracic spine  No subluxation  VERTEBRAL BODIES: Osseous structures demineralized  Again identified is a severe compression fracture of the T12 vertebral body, status post kyphoplasty  There is moderate bony retropulsion of the posterior aspect of the T12 vertebral body  The overall height of the vertebral body is unchanged from the most recent kyphoplasty  There has however been progressive posterior retropulsion of the posterior endplate of K05 when compared to the prior CT scan  There is moderate to severe central stenosis secondary to bony retropulsion  The remainder of the vertebral body heights are maintained  DEGENERATIVE CHANGES:  Moderate multilevel degenerative disc disease  PARASPINAL SOFT TISSUES: Bilateral lower lobe atelectasis  Impression: Compression fracture T12 vertebral body, status post kyphoplasty  Although the overall loss in the axial height of the T12 vertebral body is unchanged from the most recent prior kyphoplasty, the degree of bony retropulsion of the T12 vertebral body has progressed from the prior CT scan of December 14, 2019  There is currently moderate to severe central stenosis posterior to the T12 vertebral body level  The study was marked in [facility] for immediate notification  Workstation performed: [id number]   Recent Imaging Studies: No results found  Imaging Studies: Ct Head Without Contrast    Result Date: 1/29/2020  Narrative: CT BRAIN - WITHOUT CONTRAST INDICATION:   Syncope, recurrent  Syncope while going to the bathroom at 3:00 AM   Multiple additional syncopal events  History of chronic syncope  Chronic headaches  History of T12 compression fracture, status post kyphoplasty December 26, 2019  Denies back pain  No history of recent back trauma  Dementia  COMPARISON:  Multiple prior studies, most recent of which is a noncontrast CT brain December 14, 2019 and MR brain July 5, 2019  TECHNIQUE:  CT examination of the brain was performed    In addition to axial images, coronal 2D reformatted images were created and submitted for interpretation  Radiation dose length product (DLP) for this visit:  808 41 mGy/cm  This examination, like all CT scans performed in the [facility], was performed utilizing techniques to minimize radiation dose exposure, including the use of iterative reconstruction and automated exposure control  IMAGE QUALITY:  Diagnostic  FINDINGS: PARENCHYMA: Decreased attenuation is noted in periventricular and subcortical white matter demonstrating an appearance that is statistically most likely to represent advanced microangiopathic change  Chronic lacunar infarction(s) are noted in basal ganglia, unchanged from prior exam  No CT signs of acute infarction  No intracranial mass, mass effect or midline shift  No acute parenchymal hemorrhage  Bilateral internal carotid artery and vertebral artery calcifications  VENTRICLES AND EXTRA-AXIAL SPACES:  Enlargement of ventricles and extra-axial CSF spaces, out of proportion to the patient's age most consistent with cerebral and cerebellar atrophy  VISUALIZED ORBITS AND PARANASAL SINUSES:  No acute abnormality involving the orbits  Bilateral lens surgery  Mild scattered sinus mucosal thickening is noted  No fluid levels are seen  CALVARIUM AND EXTRACRANIAL SOFT TISSUES:  Minimal soft tissue swelling overlying the right frontal convexity  Underlying bony calvarium intact  Impression: Cerebral atrophy with chronic small vessel ischemic white matter disease  No acute intracranial abnormality  No significant change from priors  Workstation performed: [id number]     Ct Spine Thoracic Without Contrast    Result Date: 1/29/2020  Narrative: CT THORACIC SPINE INDICATION:   T-spine fusion, follow up T-spine fracture, pathological recent kyphoplasty - daughter concern for other fx and pain  Syncope while going to the bathroom at 3:00 AM   Multiple additional syncopal events    History of chronic syncope  Chronic headaches  History of T12 compression fracture, status post kyphoplasty December 26, 2019  Denies back pain  No history of recent back trauma  Dementia  COMPARISON:  CT chest abdomen pelvis December 14, 2019 TECHNIQUE:  Contiguous axial images were obtained  Sagittal and coronal reconstructions were performed  Radiation dose length product (DLP) for this visit:  751 88 mGy/cm  This examination, like all CT scans performed in the [facility], was performed utilizing techniques to minimize radiation dose exposure, including the use of iterative reconstruction and automated exposure control  IMAGE QUALITY:  Diagnostic  FINDINGS: ALIGNMENT:  Normal alignment of the thoracic spine  No subluxation  VERTEBRAL BODIES: Osseous structures demineralized  Again identified is a severe compression fracture of the T12 vertebral body, status post kyphoplasty  There is moderate bony retropulsion of the posterior aspect of the T12 vertebral body  The overall height of the vertebral body is unchanged from the most recent kyphoplasty  There has however been progressive posterior retropulsion of the posterior endplate of F10 when compared to the prior CT scan  There is moderate to severe central stenosis secondary to bony retropulsion  The remainder of the vertebral body heights are maintained  DEGENERATIVE CHANGES:  Moderate multilevel degenerative disc disease  PARASPINAL SOFT TISSUES: Bilateral lower lobe atelectasis  Impression: Compression fracture T12 vertebral body, status post kyphoplasty  Although the overall loss in the axial height of the T12 vertebral body is unchanged from the most recent prior kyphoplasty, the degree of bony retropulsion of the T12 vertebral body has progressed from the prior CT scan of December 14, 2019  There is currently moderate to severe central stenosis posterior to the T12 vertebral body level   The study was marked in [facility] for immediate notification  Workstation performed: [id number]       Code Status: Level 3 - DNAR and DNI  Advance Directive and Living Will:       Power of :      Treatment Plan:     Planned Medication Changes: All current active medications have been reviewed  Discharge planning  Start Haldol 2mg, PO, HS for increased agitation and aggression  Current Facility-Administered Medications:  acetaminophen 650 mg Oral Q6H PRN Monique Smudde, PA-C   aluminum-magnesium hydroxide-simethicone 30 mL Oral Q6H PRN Monique Smudde, PA-C   amLODIPine 5 mg Oral Daily Monique Smudde, PA-C   atorvastatin 20 mg Oral Daily With C AYDIN Rodriguez, PA-C   dabigatran etexilate 150 mg Oral Q12H Monique Smudde, PA-C   docusate sodium 100 mg Oral BID Gemma Rizvi MD   donepezil 10 mg Oral HS Monique Smudde, PA-C   memantine 10 mg Oral BID Monique Smudde, PA-C   mirtazapine 7 5 mg Oral HS Monique Smudde, PA-C   ondansetron 4 mg Intravenous Q6H PRN Monique Smudde, PA-C   pantoprazole 40 mg Oral Early Morning Monique Smudde, PA-C   polyethylene glycol 17 g Oral Daily PRN Gemma Rizvi MD       Risks / Benefits of Treatment:    Discussed risks and benefits of treatment with patient including risk of parkinsonian symptoms, Tardive Dyskinesia and metabolic syndrome related to treatment with antipsychotic medications and risk of cardiovascular events in elderly related to treatment with antipsychotic medications     Counseling / Coordination of Care:    Diagnosis, medication changes and treatment plan reviewed with patient  At this time patient has limited understanding of diagnosis, medication changes and treatment plan and will require further explanation  CARMELLA Garcia 02/17/20    Code Status: Level 3 - DNAR and DNI      Portions of the record may have been created with voice recognition software  Occasional wrong word or "sound a like" substitutions may have occurred due to the inherent limitations of voice recognition software   Read the chart carefully and recognize, using context, where substitutions have occurred

## 2020-02-17 NOTE — ASSESSMENT & PLAN NOTE
Patient noted to have many violent outbursts at home  Reportedly has not slept in approximately 48 hours  Maintained on Aricept and memantine, HS Remeron  Was initially placed on Seroquel, family reporting that patient not been taking this  Patient frequently refuses medications while inpatient  Responded well to IM Haldol in ED  Psychiatry consult  Geriatrics consult  One-to-one observation when family is not present for patient's safety

## 2020-02-17 NOTE — PROGRESS NOTES
Daughter had Meg Phlegm in the bathroom and while trying to pass a very large BM, patient momentarily passed out   Daughter reports mother has had this vagal response in the past

## 2020-02-17 NOTE — H&P
[facility] Internal Medicine  H&P- Michelle Mahoney 1933, 80 y o  female MRN: [id number]    Unit/Bed#: ED 16 Encounter: [id number]    Primary Care Provider: J Carlos Flanagan DO   Date and time admitted to hospital: 2/17/2020  3:30 AM        * Late onset Alzheimer's disease with behavioral disturbance [facility])  Assessment & Plan  Patient noted to have many violent outbursts at home  Reportedly has not slept in approximately 48 hours  Maintained on Aricept and memantine, HS Remeron  Was initially placed on Seroquel, family reporting that patient not been taking this  Patient frequently refuses medications while inpatient  Responded well to IM Haldol in ED  Psychiatry consult  Geriatrics consult  One-to-one observation when family is not present for patient's safety      Urinary retention  Assessment & Plan  Patient initially brought to the ED for urinary retention of approximately 15 hours  Found to have greater than 500 cc in bladder, straight cath in ED  Family notes history of urinary retention, has not seen urology outpatient  Family notes patient will attempt to urinate multiple times per hour without success  Monitor on urinary retention protocol and consider need for urology consult if retention continues    Chronic UTI  Assessment & Plan  Family notes chronic UTIs  Urine culture from 01/29/2020 growing Citrobacter freundii  Treated inpatient with IV cefepime, continue outpatient with p o  Bactrim  Given 1 dose of p o   Bactrim in ED  UA positive for 10-20 WBCs and occasional bacteria  Patient is otherwise afebrile without leukocytosis  Urine culture pending  Patient did present with urinary retention greater than 500 cc, straight cath in ED-monitor on urinary retention protocol, consider need for urology consult if retention continues     Chronic atrial fibrillation  Assessment & Plan  Continue daily Pradaxa    Essential hypertension  Assessment & Plan  BP appears controlled at this time  Continue home lisinopril and Norvasc    Mixed hyperlipidemia  Assessment & Plan  Continue daily statin    History of stroke  Assessment & Plan  Noted to have history of multiple strokes in the past  No focal neuro deficits at this time    VTE Prophylaxis: Dabigatran (Pradaxa)     Code Status:  DNR DNI  POLST: POLST form is not discussed and not completed at this time  Discussion with family:  Daughter at bedside    Anticipated Length of Stay:  Patient will be admitted on an Inpatient basis with an anticipated length of stay of  more than 2 midnights  Justification for Hospital Stay:  Alzheimer's with behavioral disturbance, increased aggression, need for psychiatric consult    Total Time for Visit, including Counseling / Coordination of Care: 1 hour  Greater than 50% of this total time spent on direct patient counseling and coordination of care  Chief Complaint:   Urinary retention    History of Present Illness:    Court Molina is a 80 y o  female with past medical history of dementia , hyperlipidemia , vasovagal syncope, chronic UTI , stroke who presents with urinary retention  Patient initially presented to ED after being unable to urinate for approximately 15 hours  Patient's family reports this has occurred in the past   Has not seen urology for this issue  Patient denies associated abdominal pain  Family notes patient has been attempting to urinate multiple times per hour without success  Family also note that beginning last Thursday patient has become more agitated and aggressive  Family notes this occurs intermittently for the past few months  Has not been placed on any medications for agitation  Patient's daughter reports patient has not slept since Saturday morning  Denies any specific complaints  Denies any abdominal pain, chest pain, shortness of breath    Patient only complaint is that she does not want to be in the hospital     Review of Systems:    Review of Systems   Unable to perform ROS: Dementia Genitourinary: Positive for difficulty urinating  Psychiatric/Behavioral: Positive for agitation, behavioral problems and sleep disturbance  Past Medical and Surgical History:     Past Medical History:   Diagnosis Date    [facility] )     Alzheimer disease ([facility] )     Chronic UTI     Diverticulosis     Dyslipidemia     History of stroke     Hyperlipidemia     Hypertension     UTI (urinary tract infection)     Vasovagal syncope 2/13/2018       Past Surgical History:   Procedure Laterality Date    CHOLECYSTECTOMY      IR KYPHOPLASTY/VERTEBROPLASTY  12/26/2019       Meds/Allergies:    Prior to Admission medications    Medication Sig Start Date End Date Taking?  Authorizing Provider   Butalbital-Acetaminophen  MG CAPS Take by mouth   Yes Historical Provider, MD   divalproex sodium (DEPAKOTE) 250 mg EC tablet Take 250 mg by mouth   Yes Historical Provider, MD   mirtazapine (REMERON) 7 5 MG tablet Take 7 5 mg by mouth daily at bedtime   Yes Historical Provider, MD   Sulfamethoxazole-Trimethoprim (BACTRIM PO) Take by mouth   Yes Historical Provider, MD   acetaminophen (TYLENOL) 325 mg tablet Take 2 tablets (650 mg total) by mouth every 6 (six) hours as needed for mild pain 1/3/20   Bobbi Watkins MD   amLODIPine (NORVASC) 5 mg tablet Take 1 tablet (5 mg total) by mouth daily 1/3/20   Bobbi Watkins MD   atorvastatin (LIPITOR) 20 mg tablet Take 20 mg by mouth daily    Historical Provider, MD   dabigatran etexilate (PRADAXA) 150 mg capsu Take 1 capsule (150 mg total) by mouth every 12 (twelve) hours 7/9/19   Bonnie Ang PA-C   donepezil (ARICEPT) 10 mg tablet Take 10 mg by mouth daily at bedtime    Historical Provider, MD   lisinopril (ZESTRIL) 10 mg tablet Take 1 tablet (10 mg total) by mouth daily 1/4/20 2/3/20  Bobbi Watkins MD   memantine (NAMENDA) 10 mg tablet Take 10 mg by mouth 2 (two) times a day    Historical Provider, MD   pantoprazole (PROTONIX) 40 mg tablet Take 40 mg by mouth daily Historical Provider, MD   QUEtiapine (SEROquel) 25 mg tablet Take 1 tablet (25 mg total) by mouth daily at bedtime  Patient not taking: Reported on 2020   Rogelio Gao MD   sulfamethoxazole-trimethoprim (BACTRIM DS) 800-160 mg per tablet Take 1 tablet by mouth 2 (two) times a day for 7 days smx-tmp DS (BACTRIM) 800-160 mg tabs (1tab q12 D10) 20  Wolfgang Hashimoto, MD     I have reviewed home medications with patient family member  Allergies: Allergies   Allergen Reactions    Ativan [Lorazepam]    Francine Nunnery [Lurasidone]        Social History:     Marital Status:    Occupation:  Unknown  Patient Pre-hospital Living Situation:  Home with daughter  Patient Pre-hospital Level of Mobility:  Ambulates with walker  Patient Pre-hospital Diet Restrictions:  None  Substance Use History:   Social History     Substance and Sexual Activity   Alcohol Use Never    Frequency: Never     Social History     Tobacco Use   Smoking Status Former Smoker    Last attempt to quit: 2012    Years since quittin 1   Smokeless Tobacco Never Used     Social History     Substance and Sexual Activity   Drug Use Never       Family History:    History reviewed  No pertinent family history  Physical Exam:     Vitals:   Blood Pressure: 145/67 (20 0606)  Pulse: 74 (20 06)  Temperature: 97 5 °F (36 4 °C) (20 0343)  Temp Source: Temporal (20 0343)  Respirations: 18 (20 06)  Weight - Scale: 68 1 kg (150 lb 2 1 oz) (20 0338)  SpO2: 97 % (20 06)    Physical Exam   HENT:   Head: Normocephalic and atraumatic  Eyes: Conjunctivae are normal  No scleral icterus  Neck: Neck supple  Cardiovascular: Normal rate, regular rhythm and normal heart sounds  No murmur heard  Pulmonary/Chest: Effort normal and breath sounds normal  No respiratory distress  She has no wheezes  Abdominal: Soft  Bowel sounds are normal  She exhibits no distension   There is no tenderness  Musculoskeletal: She exhibits no edema  Neurological: She is alert  Skin: Skin is warm and dry  She is not diaphoretic  Psychiatric: Her affect is angry  She is agitated and aggressive  Cognition and memory are impaired  Vitals reviewed  Additional Data:     Lab Results: I have personally reviewed pertinent reports  Results from last 7 days   Lab Units 02/17/20  0413   WBC Thousand/uL 9 75   HEMOGLOBIN g/dL 10 5*   HEMATOCRIT % 33 0*   PLATELETS Thousands/uL 372   NEUTROS PCT % 48   LYMPHS PCT % 41   MONOS PCT % 9   EOS PCT % 2     Results from last 7 days   Lab Units 02/17/20  0413   SODIUM mmol/L 141   POTASSIUM mmol/L 4 0   CHLORIDE mmol/L 104   CO2 mmol/L 23   BUN mg/dL 20   CREATININE mg/dL 1 25   ANION GAP mmol/L 14*   CALCIUM mg/dL 9 4   ALBUMIN g/dL 3 7   TOTAL BILIRUBIN mg/dL 0 28   ALK PHOS U/L 85   ALT U/L 16   AST U/L 15   GLUCOSE RANDOM mg/dL 111                       Imaging: I have personally reviewed pertinent reports  No orders to display       [employer] / Epic Records Reviewed: Yes     ** Please Note: This note has been constructed using a voice recognition system   **

## 2020-02-18 VITALS
RESPIRATION RATE: 18 BRPM | WEIGHT: 150.13 LBS | TEMPERATURE: 96.9 F | OXYGEN SATURATION: 98 % | HEART RATE: 84 BPM | DIASTOLIC BLOOD PRESSURE: 65 MMHG | SYSTOLIC BLOOD PRESSURE: 152 MMHG | BODY MASS INDEX: 27.46 KG/M2

## 2020-02-18 PROBLEM — R33.9 URINARY RETENTION: Status: RESOLVED | Noted: 2020-02-17 | Resolved: 2020-02-18

## 2020-02-18 LAB
ANION GAP SERPL CALCULATED.3IONS-SCNC: 11 MMOL/L (ref 4–13)
BACTERIA UR CULT: NORMAL
BUN SERPL-MCNC: 24 MG/DL (ref 5–25)
CALCIUM SERPL-MCNC: 8.9 MG/DL (ref 8.3–10.1)
CHLORIDE SERPL-SCNC: 106 MMOL/L (ref 100–108)
CO2 SERPL-SCNC: 24 MMOL/L (ref 21–32)
CREAT SERPL-MCNC: 1.05 MG/DL (ref 0.6–1.3)
GFR SERPL CREATININE-BSD FRML MDRD: 48 ML/MIN/1.73SQ M
GLUCOSE SERPL-MCNC: 101 MG/DL (ref 65–140)
POTASSIUM SERPL-SCNC: 4.1 MMOL/L (ref 3.5–5.3)
SODIUM SERPL-SCNC: 141 MMOL/L (ref 136–145)

## 2020-02-18 PROCEDURE — 99232 SBSQ HOSP IP/OBS MODERATE 35: CPT | Performed by: PHYSICIAN ASSISTANT

## 2020-02-18 PROCEDURE — 80048 BASIC METABOLIC PNL TOTAL CA: CPT | Performed by: INTERNAL MEDICINE

## 2020-02-18 PROCEDURE — 99222 1ST HOSP IP/OBS MODERATE 55: CPT | Performed by: INTERNAL MEDICINE

## 2020-02-18 PROCEDURE — 99239 HOSP IP/OBS DSCHRG MGMT >30: CPT | Performed by: INTERNAL MEDICINE

## 2020-02-18 RX ORDER — QUETIAPINE FUMARATE 25 MG/1
25 TABLET, FILM COATED ORAL DAILY
Qty: 30 TABLET | Refills: 0 | Status: SHIPPED | OUTPATIENT
Start: 2020-02-18

## 2020-02-18 RX ORDER — DOCUSATE SODIUM 100 MG/1
100 CAPSULE, LIQUID FILLED ORAL 2 TIMES DAILY
Qty: 60 CAPSULE | Refills: 0 | Status: SHIPPED | OUTPATIENT
Start: 2020-02-18 | End: 2020-03-19

## 2020-02-18 RX ORDER — POLYETHYLENE GLYCOL 3350 17 G/17G
17 POWDER, FOR SOLUTION ORAL DAILY PRN
Qty: 14 EACH | Refills: 0 | Status: SHIPPED | OUTPATIENT
Start: 2020-02-18

## 2020-02-18 RX ORDER — SULFAMETHOXAZOLE AND TRIMETHOPRIM 800; 160 MG/1; MG/1
1 TABLET ORAL DAILY
COMMUNITY

## 2020-02-18 RX ORDER — SULFAMETHOXAZOLE AND TRIMETHOPRIM 800; 160 MG/1; MG/1
1 TABLET ORAL DAILY
Status: DISCONTINUED | OUTPATIENT
Start: 2020-02-18 | End: 2020-02-18 | Stop reason: HOSPADM

## 2020-02-18 RX ADMIN — PANTOPRAZOLE SODIUM 40 MG: 40 TABLET, DELAYED RELEASE ORAL at 06:09

## 2020-02-18 RX ADMIN — AMLODIPINE BESYLATE 5 MG: 5 TABLET ORAL at 09:27

## 2020-02-18 RX ADMIN — DOCUSATE SODIUM 100 MG: 100 CAPSULE, LIQUID FILLED ORAL at 09:27

## 2020-02-18 RX ADMIN — MEMANTINE 10 MG: 5 TABLET ORAL at 09:27

## 2020-02-18 RX ADMIN — DABIGATRAN ETEXILATE MESYLATE 150 MG: 150 CAPSULE ORAL at 09:26

## 2020-02-18 RX ADMIN — SULFAMETHOXAZOLE AND TRIMETHOPRIM 1 TABLET: 800; 160 TABLET ORAL at 09:55

## 2020-02-18 NOTE — ASSESSMENT & PLAN NOTE
· Behavioral disturbance due to constipation, urinary retention, lack of sleep  · Improved (back to baseline) with correction of above problems  · Geriatric and Psychiatry evaluations done  · DC on colace bid and prn miralax for constipation  · Her transient urinary retention was due to this  · DC on low-dose Seroquel at 4:00 p m   To prevent agitation  · Outpatient follow-up with PCP and geriatric medicine as needed

## 2020-02-18 NOTE — CONSULTS
Consultation - Geriatric Medicine   Sourav Harper 80 y o  female MRN: [id number]  Unit/Bed#: E5 -01 Encounter: [id number]      Assessment/Plan     Alzheimer's dementia with behavioral disturbances  -symptoms initially started approx 2015 with slow progressive decline  -follows Dr Jackie Hartman (Neurology)   -FAST scale stage 5  -current exacerbation of agitation likely secondary to constipation and urinary retention now resolved with BM and passing urine  -currently on Namenda 10 mg twice daily, Remeron 7 5 at bedtime, and Aricept 10 mg daily  -patient's daughter inquired about medication for appetite as well as agitation on return home, discussed medication regimen options as well as non pharmacologic treatments for agitation and deescalation techniques which are always first-line although not always possible  -we discussed that she was previously on Seroquel in evenings for sleep but had been dc as o/p as was not sedating enough,recommend restarting at 4pm to help with prevention of sundowning symptoms and agitation episodes which have been increasingly worse in the past few week  -risks and benefits including black box warnings including increased risk of death and stroke were discussed and she feels that at this time the benefit outweighs the risk and she would like to restart Seroquel 25mg at 4pm  -we discussed that increasing Mirtazepine may increase her appetite but I would not recommend making more than one medication change at a time, she is in agreement and we will defer changes with Mirtazepine for now and could consider increase dose in the future if necessary, would recheck EKG to evaluate QTc prior to making additional increases  -continue to encourage reorientation and redirection as appropriate   -continue supportive cares  -discussed caregiver burden and burnout as well as caregiver resources with the patient's daughter and information regarding these will be placed in her discharge instructions  -the patient is encouraged to [facility] at [facility] with myself or call with further questions    Urinary retention  -likely 2/2 constipation, had large BM last evening following by large amt diuresis  -Urology following  -continue urinary retention protocol    Constipation  -encourage bowel regimen including senna and Colace  -continue to encourage hydration and consider continuation of stool softener and discharged prevent recurrence    Chronic UTI  -currently on Bactrim daily suppression  -continue close outpatient follow-up with urology and PCP    Impaired mastication requiring use of dentures  -encourage use of appropriate times  -ensure meal consistency is appropriate for abilities    Deconditioning/debility/frailty  -Frailty scale stage 6 moderately frail  -multifactorial including multiple chronic medical comorbidities such as advanced Alzheimer's dementia, history of CVA, paroxysmal AFib, and chronic UTI  -encourage nutritional support, consider boost or Ensure supplements between meals  -continue good control chronic medical conditions    Delirium precautions  -Patient is high risk of delirium due to age, Alzheimer's dementia, multiple chronic medical comorbidities, urinary retention, constipation  -Initiate delirium precautions  -maintain normal sleep/wake cycle  -minimize overnight interruptions, group overnight vitals/labs/nursing checks as possible  -dim lights, close blinds and turn off tv to minimize stimulation and encourage sleep environment in evenings  -ensure that pain is well controlled consider Tylenol 975mg at bedtime scheduled for baseline pain control  -monitor for fecal and urinary retention which may precipitate delirium  -encourage early mobilization and ambulation  -provide frequent reorientation and redirection  -encourage family and friends at the bedside to help help calm patient if anxious  -avoid medications which may precipitate or worsen delirium such as tramadol, benzodiazepine, anticholinergics, and benadryl  -encourage hydration and nutrition     Ambulatory Dysfunction  -multifactorial including acute and chronic medical conditions such as dementia, deconditioning and debility  -encourage use of assistant devices as directed by PT/OT  -encourage adequate lighting and assistance when out of bed  -encourage reduction of tethers to bed/IV to reduce trip hazards: consider hep lock IV when not actively infusing, ensure call bell is within reach  -encourage appropriate body mechanics and monitor for hypotension/orthostatic hypotension  -recommend appropriate footwear at all times   -PT/OT and early frequent mobilization    Home medication review  -agree with continuation of current dose Remeron 7 5 mg HS, Namenda 10 mg twice daily and Aricept 10 mg daily  -although Protonix may be contributing to change in taste beyond expected with normal aging would not recommend discontinuation at this time due to requirement of may remaining on Pradaxa for secondary prevention of CVA in setting of recent CVA    History of Present Illness   Physician Requesting Consult: Nika Rodriguez MD  Reason for Consult / Principal Problem: Dementia  Hx and PE limited by: N/A  Additional history obtained from:  Patient's daughter Evette Moralez (phone)    HPI: Ruthie Hartley is a 80y o  year old female with hypertension, paroxysmal AFib, chronic UTI, late onset Alzheimer's with behavioral disturbance, compression fracture of T12, stenosis of right vertebral artery, history of syncope, history of metabolic encephalopathy, prior CVA, and osteoporosis    Macho Torres presented to the emergency department yesterday afternoon accompanied by her family who reports that she has been having worsening agitation behaviors for the past few weeks worsening acutely on Sunday when she was noted that she had been eating and drinking like normal but had not passed any urine for over 15 hours despite eating and drinking normally throughout the day  On admission she was noted to have constipation and urinary retention requiring 1 time straight catheterization  Overnight she was noted to have a large bowel movement following which nursing reports she had a large amount of diuresis  Overnight she had reported episode of syncope following her bowel movement but no injuries reported  Since significant diuresis and resolution of her as the patient her mood and affect are significantly improved and she is no longer agitated or aggressive  This morning she is pleasantly confused  I spoke with her daughter via phone reports that for the past few months she has been developing worsening symptoms of agitation and aggression and she has been physically aggressive with family members and providers and is not easily redirected  She was recently started on mirtazapine for sleep, appetite, and help with some of the behaviors but she does not notice benefit just yet but she has only been taking this for about 2 weeks and she is currently on the starting dose  She does report that previously she was on Seroquel bedtime for sleep but she did not feel that this was sedating enough and this was discontinued (of note however she does note that it did help with behaviors in past and she tolerated it without notable adverse  At baseline lauren ambulates without assistive device, has no falls in the past 6 months, does not use glasses, requires use of dentures, does not use hearing aids  She is typically only oriented to self and has episodes of agitation in early afternoon starting between 4and 5pm typically lasting into the evening  Inpatient consult to Gerontology  Consult performed by: Guillermina Odell DO  Consult ordered by: Daisha Cristobal PA-C        Review of Systems   Reason unable to perform ROS: may not be reliable due to dementia  Constitutional: Negative for appetite change, chills and fever     HENT: Negative for dental problem and trouble swallowing  Eyes: Negative for visual disturbance  Respiratory: Negative for shortness of breath  Cardiovascular: Negative for chest pain  Gastrointestinal: Positive for constipation  Negative for nausea and vomiting  Endocrine: Negative  Genitourinary: Negative  Musculoskeletal: Negative  Allergic/Immunologic: Negative  Neurological: Negative for dizziness, speech difficulty and light-headedness  Psychiatric/Behavioral: Positive for agitation (when asked questions she doesnt know answer to) and confusion  All other systems reviewed and are negative      Historical Information   Past Medical History:   Diagnosis Date    [facility]fib ([facility] )     Alzheimer disease ([facility] )     Chronic UTI     Diverticulosis     Dyslipidemia     History of stroke     Hyperlipidemia     Hypertension     UTI (urinary tract infection)     Vasovagal syncope 2018     Past Surgical History:   Procedure Laterality Date    CHOLECYSTECTOMY      IR KYPHOPLASTY/VERTEBROPLASTY  2019     Social History   Social History     Substance and Sexual Activity   Alcohol Use Never    Frequency: Never     Social History     Substance and Sexual Activity   Drug Use Never     Social History     Tobacco Use   Smoking Status Former Smoker    Last attempt to quit: 2012    Years since quittin 1   Smokeless Tobacco Never Used     Family History:   Family History   Problem Relation Age of Onset    Heart disease Father      Meds/Allergies   all current active meds have been reviewed    Allergies   Allergen Reactions    Ativan [Lorazepam]     Latuda [Lurasidone]      Objective     Intake/Output Summary (Last 24 hours) at 2020 1033  Last data filed at 2020 0200  Gross per 24 hour   Intake 240 ml   Output 900 ml   Net -660 ml     Invasive Devices     Peripheral Intravenous Line            Peripheral IV 20 Left Antecubital 1 day              Physical Exam   Constitutional: She appears well-developed and well-nourished  No distress  HENT:   Head: Normocephalic and atraumatic  MMM  Poor dentition   Eyes: Pupils are equal, round, and reactive to light  Conjunctivae and EOM are normal  No scleral icterus  Neck: Normal range of motion  Neck supple  No tracheal deviation present  Cardiovascular: Normal rate  Murmur (systolic) heard  Pulmonary/Chest: Effort normal and breath sounds normal  No respiratory distress  She has no wheezes  Abdominal: Soft  Bowel sounds are normal  She exhibits no distension  There is no tenderness  Musculoskeletal: She exhibits no edema or tenderness  Lymphadenopathy:     She has no cervical adenopathy  Neurological: She exhibits normal muscle tone  A/O to self, gets upset when asked questions she does not know answer to    Gets upset with orientation questions and tells me she doesn't know and doesn't care but easily redirected    Tells me she is in the hospital on vacation    Skin: Skin is warm and dry  She is not diaphoretic  No pallor  Psychiatric:   Pleasantly confused     Nursing note and vitals reviewed      Lab Results:   I have personally reviewed pertinent lab results including the following:  -sodium 141, potassium 4 0, chloride 104, CO2 23, BUN 20, creatinine 1 25, glucose 111, calcium 9 4, AST 15, ALT 16, alk-phos 85, total protein 7 5, albumin 3 7, T bili 0 28, GFR 39, lipase 255  -hemoglobin 10 5, hematocrit 33, WBC 9 75, platelets 220  -TSH last drawn in 2017 was 1 446  -B12 934 (1/2020)    I have personally reviewed the following imaging study reports in PACS:  -CT head without contrast 01/29/2020 revealed cerebral atrophy with chronic small-vessel ischemic white matter disease, no acute intracranial abnormality, no significant change from prior, on personal review bilateral temporal parenchymal atrophy is appreciated, left worse than right  -CT spine thoracic without contrast 01/29/2020 revealed a compression fracture of T12 vertebral body, status post kyphoplasty    Therapies:   PT: N/A  OT: N/A    VTE Prophylaxis: Sequential compression device Cody Light     Code Status: Level 3 - DNAR and DNI  Advance Directive and Living Will:      Power of :    POLST:      Family and Social Support:   Living Arrangements: Children  Support Systems: Children  Type of Current Residence: Private residence  Current Home Care Services: No    Goals of Care: Enjoy this vacation

## 2020-02-18 NOTE — UTILIZATION REVIEW
Initial Clinical Review    Admission: Date/Time/Statement: Admission Orders (From admission, onward)     Ordered        02/17/20 0545  Inpatient Admission  Once                   Orders Placed This Encounter   Procedures    Inpatient Admission     Standing Status:   Standing     Number of Occurrences:   1     Order Specific Question:   Admitting Physician     Answer:   Jayce Martinez [[id number]]     Order Specific Question:   Level of Care     Answer:   Med Surg [[id number]]     Order Specific Question:   Estimated length of stay     Answer:   More than 2 Midnights     Order Specific Question:   Certification     Answer:   I certify that inpatient services are medically necessary for this patient for a duration of greater than two midnights  See H&P and MD Progress Notes for additional information about the patient's course of treatment  ED Arrival Information     Expected Arrival Acuity Means of Arrival Escorted By Service Admission Type    - 2/17/2020 03:27 Urgent Wheelchair Family Member Hospitalist Urgent    Arrival Complaint    possible uti; altered mental status        Chief Complaint   Patient presents with    Possible UTI     patient has not urinated in 15 hours per veronica  Patient has been acting altered since thursday per family  Assessment/Plan:    80  Y O female  Presents  To ED from home with  Inability  To urinate for  Approximately  15 hours  Family  States  Patient  Attempts to  Urinate multiple times  Hourly  Without success  Family  states  Patient  Has  Become   More  Aggressive and  Agitated over the  Past several days  This   Occurs  Intermittently   Over the  Past few  Months and  Has  Not  Been placed  On any  meds for same  Straight  cathed in ED  For 500cc  U/A   Positive  For 10-20  Wbc's  And  Occasional bacteria  PMH  Is stroke, dementia,  Chronic atrial fibrillation, essential hypertension,  vasovagal syncope and  Chronic  UTI    Admit  IP  With Urinary retention, chronic UTI and plan is   Po bactrim, urology consult, 1:1  Observation, and wait  Urine culture  Per  Urology  Consult:    Patient  Has  History of  Recurrent  UTI's and  Is currently  On  Suppressive bactrim  Had a  Recent  1/20 for UTI and  Urine culture  Showed citrobacter and discharged  On  Bactrim  Due to behavior  Issues,  It is  Very difficult for  Daughter  To follow  Up  With OP   MD  Visits  Follow   Guideline for  Urinary retention  And straight  Cath for  450 cc in  Bladder           ED Triage Vitals   Temperature Pulse Respirations Blood Pressure SpO2   02/17/20 0343 02/17/20 0338 02/17/20 0338 02/17/20 0338 02/17/20 0338   97 5 °F (36 4 °C) 78 22 141/68 100 %      Temp Source Heart Rate Source Patient Position - Orthostatic VS BP Location FiO2 (%)   02/17/20 0338 02/17/20 0338 02/17/20 0338 02/17/20 0338 --   Temporal Monitor Lying Right arm       Pain Score       02/17/20 0338       No Pain        Wt Readings from Last 1 Encounters:   02/17/20 68 1 kg (150 lb 2 1 oz)     Additional Vital Signs:   02/18/20 0736  96 9 °F (36 1 °C)Abnormal   84  18  152/65  93  98 %  None (Room air)  Lying   02/18/20 0000  97 5 °F (36 4 °C)  80  20  103/51    96 %  None (Room air)  Lying   02/17/20 1555  98 5 °F (36 9 °C)  61  16  115/70    97 %  None (Room air)  Lying   02/17/20 1201  97 2 °F (36 2 °C)Abnormal   68  16  106/53    98 %  None (Room air)  Lying   02/17/20 0900              None (Room air)     02/17/20 0810  97 5 °F (36 4 °C)  74  18  156/76    94 %  None (Room air)  Lying   02/17/20 0606    74  18  145/67    97 %  None (Room air)  Lying   02/17/20 0446    83  18  134/99    94 %  None (Room air)  Lying   02/17/20 0343  97 5 °F (36 4 °C)                 02/17/20 0338    78  22  141/68    100 %  None (Room air)  Lying         Pertinent Labs/Diagnostic Test Results:   Results from last 7 days   Lab Units 02/17/20  0413   WBC Thousand/uL 9 75   HEMOGLOBIN g/dL 10 5*   HEMATOCRIT % 33 0* PLATELETS Thousands/uL 372   NEUTROS ABS Thousands/µL 4 67         Results from last 7 days   Lab Units 02/17/20  0413   SODIUM mmol/L 141   POTASSIUM mmol/L 4 0   CHLORIDE mmol/L 104   CO2 mmol/L 23   ANION GAP mmol/L 14*   BUN mg/dL 20   CREATININE mg/dL 1 25   EGFR ml/min/1 73sq m 39   CALCIUM mg/dL 9 4     Results from last 7 days   Lab Units 02/17/20  0413   AST U/L 15   ALT U/L 16   ALK PHOS U/L 85   TOTAL PROTEIN g/dL 7 5   ALBUMIN g/dL 3 7   TOTAL BILIRUBIN mg/dL 0 28         Results from last 7 days   Lab Units 02/17/20  0413   GLUCOSE RANDOM mg/dL 111               Results from last 7 days   Lab Units 02/17/20  0413   LIPASE u/L 255             Results from last 7 days   Lab Units 02/17/20  0437 02/17/20  0435   CLARITY UA  clear Clear   COLOR UA  yellow Yellow   SPEC GRAV UA   --  1 010   PH UA   --  7 5   GLUCOSE UA mg/dl  --  Negative   KETONES UA mg/dl  --  Negative   BLOOD UA   --  Negative   PROTEIN UA mg/dl  --  Negative   NITRITE UA   --  Negative   BILIRUBIN UA   --  Negative   UROBILINOGEN UA E U /dl  --  0 2   LEUKOCYTES UA   --  Moderate*   WBC UA /hpf  --  10-20*   RBC UA /hpf  --  0-1*   BACTERIA UA /hpf  --  Occasional   EPITHELIAL CELLS WET PREP /hpf  --  Occasional           Results from last 7 days   Lab Units 02/17/20  0435   URINE CULTURE  No Growth <1000 cfu/mL               ED Treatment:   Medication Administration from 02/17/2020 0327 to 02/17/2020 0099       Date/Time Order Dose Route Action Comments     02/17/2020 0559 sulfamethoxazole-trimethoprim (BACTRIM DS) 800-160 mg per tablet 1 tablet 1 tablet Oral Given      02/17/2020 0600 haloperidol lactate (HALDOL) injection 5 mg 5 mg Intramuscular Given         Present on Admission:   Late onset Alzheimer's disease with behavioral disturbance (HCC)   Mixed hyperlipidemia   Chronic UTI   Paroxysmal atrial fibrillation (HCC)   Essential hypertension      Admitting Diagnosis: Urinary retention [R33 9]  UTI (urinary tract infection) [N39 0]  Late onset Alzheimer's disease with behavioral disturbance ([facility] [address] ) [G30 1, F02 81]  Age/Sex: 80 y o  female  Admission Orders:  Scheduled Medications:    Medications:  amLODIPine 5 mg Oral Daily   atorvastatin 20 mg Oral Daily With Dinner   dabigatran etexilate 150 mg Oral Q12H   docusate sodium 100 mg Oral BID   donepezil 10 mg Oral HS   memantine 10 mg Oral BID   mirtazapine 7 5 mg Oral HS   pantoprazole 40 mg Oral Early Morning   sulfamethoxazole-trimethoprim 1 tablet Oral Daily     Continuous IV Infusions:     PRN Meds:    acetaminophen 650 mg Oral Q6H PRN   aluminum-magnesium hydroxide-simethicone 30 mL Oral Q6H PRN   haloperidol 2 mg Oral HS PRN   ondansetron 4 mg Intravenous Q6H PRN   polyethylene glycol 17 g Oral Daily PRN       IP CONSULT TO PSYCHIATRY  IP CONSULT TO GERONTOLOGY  IP CONSULT TO UROLOGY    Network Utilization Review Department  [email or web address]  ATTENTION: Please call with any questions or concerns to [phone] and carefully listen to the prompts so that you are directed to the right person  All voicemails are confidential   Jessica Valdes all requests for admission clinical reviews, approved or denied determinations and any other requests to dedicated fax number below belonging to the campus where the patient is receiving treatment   List of dedicated fax numbers for the Facilities:  FACILITY NAME UR FAX NUMBER   ADMISSION DENIALS (Administrative/Medical Necessity) [phone]   [address] (Maternity/NICU/Pediatrics) [phone]   Rosa Ibarra [phone]   Liborio Borja [phone]   Linnette Case [phone]   [facility] [address] [facility] [address]  [address] [facility] [address] [facility] [phone]

## 2020-02-18 NOTE — PROGRESS NOTES
Progress Note - Urology  Cheryl Lopez 1933, 80 y o  female MRN: [id number]    Unit/Bed#: E5 -01 Encounter: [id number]    Chronic UTI  Assessment & Plan  With daily Bactrim suppression  No evidence of active UTI this time  She does have some mental status changes  Urinalysis appears relatively bland  Continue Bactrim suppression at this time  Urine culture negative  I discussed with her daughter, possibly stopping the suppressive Bactrim  Daughter is not interested not or outpatient Urology follow-up  Urinary retentionresolved as of 2/18/2020  Assessment & Plan  Now voiding after constipation was managed  Discussed with the daughter home bowel regimen  Urology will sign off but remain available for any further inpatient needs  Please feel free to call with questions or concerns  Subjective/Objective     Subjective:   CC: N/A - Dementia  HPI:  Voiding well without retention or requirement for straight cath overnight  Feeling well with improved mental status  No outburst     ROS:  Review of Systems   Unable to perform ROS: Dementia       Objective:  Vitals: Blood pressure 152/65, pulse 84, temperature (!) 96 9 °F (36 1 °C), temperature source Temporal, resp  rate 18, weight 68 1 kg (150 lb 2 1 oz), SpO2 98 %  ,Body mass index is 27 46 kg/m²  Intake/Output Summary (Last 24 hours) at 2/18/2020 1022  Last data filed at 2/18/2020 0200  Gross per 24 hour   Intake 240 ml   Output 900 ml   Net -660 ml     Invasive Devices     Peripheral Intravenous Line            Peripheral IV 02/17/20 Left Antecubital 1 day                Physical Exam   Constitutional: She is oriented to person, place, and time  She appears well-developed and well-nourished  She is cooperative  She does not appear ill  No distress  59-year-old female, alert in bed  No acute distress in   HENT:   Head: Normocephalic and atraumatic  Moist mucous membranes      Eyes: Conjunctivae and EOM are normal    Neck: Normal range of motion  Neck supple  No tracheal deviation present  Cardiovascular: Normal rate, regular rhythm and normal heart sounds  No murmur heard  Pulmonary/Chest: Effort normal and breath sounds normal  No respiratory distress  She has no wheezes  Good airflow bilaterally on deep inspiration  Abdominal: Soft  Bowel sounds are normal  She exhibits no distension and no mass  There is no tenderness  Abdomen soft and benign  Musculoskeletal: Normal range of motion  She exhibits no edema  Neurological: She is alert and oriented to person, place, and time  Skin: Skin is warm and dry  No rash noted  She is not diaphoretic  No erythema  No pallor  Psychiatric: She has a normal mood and affect  Her behavior is normal  Judgment and thought content normal    Nursing note and vitals reviewed  History:    Past Medical History:   Diagnosis Date    A-fib ([facility] )     Alzheimer disease ([facility] )     Chronic UTI     Diverticulosis     Dyslipidemia     History of stroke     Hyperlipidemia     Hypertension     UTI (urinary tract infection)     Vasovagal syncope 2018     Past Surgical History:   Procedure Laterality Date    CHOLECYSTECTOMY      IR KYPHOPLASTY/VERTEBROPLASTY  2019     Family History   Problem Relation Age of Onset    Heart disease Father      Social History     Socioeconomic History    Marital status:       Spouse name: None    Number of children: None    Years of education: None    Highest education level: None   Occupational History    None   Social Needs    Financial resource strain: None    Food insecurity:     Worry: None     Inability: None    Transportation needs:     Medical: None     Non-medical: None   Tobacco Use    Smoking status: Former Smoker     Last attempt to quit: 2012     Years since quittin 1    Smokeless tobacco: Never Used   Substance and Sexual Activity    Alcohol use: Never     Frequency: Never    Drug use: Never    Sexual activity: Not Currently     Partners: Male     Birth control/protection: None   Lifestyle    Physical activity:     Days per week: None     Minutes per session: None    Stress: None   Relationships    Social connections:     Talks on phone: None     Gets together: None     Attends Anabaptist service: None     Active member of club or organization: None     Attends meetings of clubs or organizations: None     Relationship status: None    Intimate partner violence:     Fear of current or ex partner: None     Emotionally abused: None     Physically abused: None     Forced sexual activity: None   Other Topics Concern    None   Social History Narrative    None       Labs:  Results from last 7 days   Lab Units 02/17/20  0413   WBC Thousand/uL 9 75   HEMOGLOBIN g/dL 10 5*   PLATELETS Thousands/uL 372     Results from last 7 days   Lab Units 02/17/20  0413   SODIUM mmol/L 141   POTASSIUM mmol/L 4 0   CHLORIDE mmol/L 104   CO2 mmol/L 23   BUN mg/dL 20   CREATININE mg/dL 1 25   EGFR ml/min/1 73sq m 39   CALCIUM mg/dL 9 4   AST U/L 15   ALT U/L 16   ALK PHOS U/L 85     Results from last 7 days   Lab Units 02/17/20  0435   URINE CULTURE  No Growth <1000 cfu/mL           Anthony Carpio PA-C  Date: 2/18/2020 Time: 10:22 AM

## 2020-02-18 NOTE — DISCHARGE INSTRUCTIONS
Follow-up with primary care regarding urinary tract infection, make an appointment with urology regarding acute urinary retention  Return to the emergency department with any worsening symptoms or concerns  Take antibiotics as prescribed for 1 week  Geriatrics discharge instructions:   1  Restart Seroquel 25mg daily ay 4pm as discussed to help reduce the agitation symptoms which occur in the early evenings  2   There is a free 24/7 helpline offered through the [employer] which can be reached at [phone], they can help with information/support/resources and in event of crisis they can also help to provide helpful techniques to deescalate agitation and help to provide families ways to ensure that patients and their families remain safe

## 2020-02-18 NOTE — PROGRESS NOTES
Progress Note - Millie Rodriguez 1933, 80 y o  female MRN: [id number]    Unit/Bed#: E5 -01 Encounter: [id number]    Primary Care Provider: Palma Chiu DO   Date and time admitted to hospital: 2/17/2020  3:30 AM        * Late onset Alzheimer's disease with behavioral disturbance ([address] )  Assessment & Plan  · Behavioral disturbance due to constipation, urinary retention, lack of sleep  · Improved (back to baseline) with correction of above problems  · Geriatric and Psychiatry evaluations done  · DC on colace bid and prn miralax for constipation  · Her transient urinary retention was due to this  · DC on low-dose Seroquel at 4:00 p m  To prevent agitation  · Outpatient follow-up with PCP and geriatric medicine as needed      Paroxysmal atrial fibrillation (HCC)  Assessment & Plan  · On chronic anticoagulation with Pradaxa  · Rate is controlled    Chronic UTI  Assessment & Plan  Patient was recently treated with Bactrim DS for 1 week    Her PCP decided to place her on prophylactic Bactrim DS once daily indefinitely  Urology evaluation was done  Plan is to continue Bactrim on discharge    Essential hypertension  Assessment & Plan  Continue lisinopril 10 mg daily and Norvasc 5 mg daily on discharge    Mixed hyperlipidemia  Assessment & Plan  Continue Lipitor for secondary stroke prevention      Discharging Physician / Practitioner: Elizabeth Coleman MD  PCP: Palma Chiu DO  Admission Date:   Admission Orders (From admission, onward)     Ordered        02/17/20 0545  Inpatient Admission  Once                   Discharge Date: 02/18/20    Resolved Problems  Date Reviewed: 2/18/2020          Resolved    Urinary retention 2/18/2020     Resolved by  Murphy Lucas PA-C          Consultations During Hospital Stay:  · Geriatric Medicine  · Psychiatry  · Urology    Procedures Performed:   · Straight catheterization x1    Significant Findings / Test Results:   · Large bowel movement after MiraLax administration    Incidental Findings:   · None     Test Results Pending at Discharge (will require follow up): · None     Outpatient Tests Requested:  · None    Complications:  None    Reason for Admission:  Agitation    Hospital Course:     Dion Nixon is a 80 y o  female patient with known history of Alzheimer's type dementia being cared for by family who originally presented to the hospital on 2/17/2020 due to persistent agitation, lack of sleep, urinary retention and constipation  According to the family she was unable to urinate for approximately 15 hours  In the ED, she required 1 straight catheterization and 1 dose of IM Haldol for agitation with good response  She was then admitted to the internal medicine service for further evaluation and treatment  During her brief hospitalization, she was given Colace and MiraLax for constipation  She responded well with a large bowel movement  With resolution of her constipation she was able to urinate normally  It was felt that her urinary retention was secondary to underlying constipation  A formal urology consultation was sought and no further intervention was required  She was seen by Psychiatry and geriatric Medicine regarding her dementia  She will be discharged on a bowel regimen as well as low-dose Seroquel 25 mg at 4:00 p m  For better sleep and to prevent sundowning     The patient improved faster than anticipated and will not require a 2nd midnight stay  Her family was updated constantly regarding her condition and plan of care  Please see above list of diagnoses and related plan for additional information  Condition at Discharge: good     Discharge Day Visit / Exam:     Subjective: Had good sleep last night  No urinary retention  Had large bowel movement yesterday    Feels back to normal   Looks back to normal according to daughter  Vitals: Blood Pressure: 152/65 (02/18/20 0736)  Pulse: 84 (02/18/20 0736)  Temperature: (!) 96 9 °F (36 1 °C) (02/18/20 0736)  Temp Source: Temporal (02/18/20 0736)  Respirations: 18 (02/18/20 0736)  Weight - Scale: 68 1 kg (150 lb 2 1 oz) (02/17/20 0338)  SpO2: 98 % (02/18/20 0736)  Exam:   Physical Exam   Constitutional: She appears well-developed  No distress  HENT:   Head: Normocephalic and atraumatic  Eyes: No scleral icterus  Cardiovascular: Regular rhythm  Murmur heard  Pulmonary/Chest: Effort normal  No stridor  No respiratory distress  She has no wheezes  Abdominal: Soft  She exhibits no distension  There is no tenderness  Musculoskeletal: She exhibits no edema  Neurological: She is alert  Skin: Skin is warm and dry  She is not diaphoretic  Psychiatric: She has a normal mood and affect  Her behavior is normal        Discussion with Family:  Daughter    Discharge instructions/Information to patient and family:   See after visit summary for information provided to patient and family  Provisions for Follow-Up Care:  See after visit summary for information related to follow-up care and any pertinent home health orders  Disposition:     Home      Planned Readmission: no     Discharge Statement:  I spent >30 minutes discharging the patient  This time was spent on the day of discharge  I had direct contact with the patient on the day of discharge  Greater than 50% of the total time was spent examining patient, answering all patient questions, arranging and discussing plan of care with patient as well as directly providing post-discharge instructions  Additional time then spent on discharge activities  Discharge Medications:  See after visit summary for reconciled discharge medications provided to patient and family        ** Please Note: This note has been constructed using a voice recognition system **

## 2020-02-18 NOTE — ASSESSMENT & PLAN NOTE
Patient was recently treated with Bactrim DS for 1 week    Her PCP decided to place her on prophylactic Bactrim DS once daily indefinitely  Urology evaluation was done  Plan is to continue Bactrim on discharge

## 2020-02-18 NOTE — DISCHARGE SUMMARY
Discharge- Dion Nixon 1933, 80 y o  female MRN: [id number]    Unit/Bed#: E5 -01 Encounter: [id number]    Primary Care Provider: Melissa Parra DO   Date and time admitted to hospital: 2/17/2020  3:30 AM        * Late onset Alzheimer's disease with behavioral disturbance ([facility] [address] )  Assessment & Plan  · Behavioral disturbance due to constipation, urinary retention, lack of sleep  · Improved (back to baseline) with correction of above problems  · Geriatric and Psychiatry evaluations done  · DC on colace bid and prn miralax for constipation  · Her transient urinary retention was due to this  · DC on low-dose Seroquel at 4:00 p m  To prevent agitation  · Outpatient follow-up with PCP and geriatric medicine as needed      Paroxysmal atrial fibrillation (HCC)  Assessment & Plan  · On chronic anticoagulation with Pradaxa  · Rate is controlled    Chronic UTI  Assessment & Plan  Patient was recently treated with Bactrim DS for 1 week    Her PCP decided to place her on prophylactic Bactrim DS once daily indefinitely  Urology evaluation was done  Plan is to continue Bactrim on discharge    Essential hypertension  Assessment & Plan  Continue lisinopril 10 mg daily and Norvasc 5 mg daily on discharge    Mixed hyperlipidemia  Assessment & Plan  Continue Lipitor for secondary stroke prevention            Discharging Physician / Practitioner: Pedro Dooley MD  PCP: Melissa Parra DO  Admission Date:       Admission Orders (From admission, onward)              Ordered          02/17/20 0545   Inpatient Admission  Once                       Discharge Date: 02/18/20              Resolved Problems  Date Reviewed: 2/18/2020           Resolved     Urinary retention 2/18/2020       Resolved by  Trae Bajwa PA-C             Consultations During Hospital Stay:  · Geriatric Medicine  · Psychiatry  · Urology     Procedures Performed:   · Straight catheterization x1     Significant Findings / Test Results:   · Large bowel movement after MiraLax administration     Incidental Findings:   · None      Test Results Pending at Discharge (will require follow up): · None     Outpatient Tests Requested:  · None     Complications:  None     Reason for Admission:  Agitation     Hospital Course:      Ryan Mathew is a 80 y o  female patient with known history of Alzheimer's type dementia being cared for by family who originally presented to the hospital on 2/17/2020 due to persistent agitation, lack of sleep, urinary retention and constipation  According to the family she was unable to urinate for approximately 15 hours  In the ED, she required 1 straight catheterization and 1 dose of IM Haldol for agitation with good response  She was then admitted to the internal medicine service for further evaluation and treatment  During her brief hospitalization, she was given Colace and MiraLax for constipation  She responded well with a large bowel movement  With resolution of her constipation she was able to urinate normally  It was felt that her urinary retention was secondary to underlying constipation  A formal urology consultation was sought and no further intervention was required  She was seen by Psychiatry and geriatric Medicine regarding her dementia  She will be discharged on a bowel regimen as well as low-dose Seroquel 25 mg at 4:00 p m  For better sleep and to prevent sundowning     The patient improved faster than anticipated and will not require a 2nd midnight stay  Her family was updated constantly regarding her condition and plan of care            Please see above list of diagnoses and related plan for additional information       Condition at Discharge: good      Discharge Day Visit / Exam:      Subjective: Had good sleep last night  No urinary retention  Had large bowel movement yesterday    Feels back to normal   Looks back to normal according to daughter  Vitals: Blood Pressure: 152/65 (02/18/20 0736)  Pulse: 84 (02/18/20 0736)  Temperature: (!) 96 9 °F (36 1 °C) (02/18/20 0736)  Temp Source: Temporal (02/18/20 0736)  Respirations: 18 (02/18/20 0736)  Weight - Scale: 68 1 kg (150 lb 2 1 oz) (02/17/20 0338)  SpO2: 98 % (02/18/20 0736)  Exam:   Physical Exam   Constitutional: She appears well-developed  No distress  HENT:   Head: Normocephalic and atraumatic  Eyes: No scleral icterus  Cardiovascular: Regular rhythm  Murmur heard  Pulmonary/Chest: Effort normal  No stridor  No respiratory distress  She has no wheezes  Abdominal: Soft  She exhibits no distension  There is no tenderness  Musculoskeletal: She exhibits no edema  Neurological: She is alert  Skin: Skin is warm and dry  She is not diaphoretic  Psychiatric: She has a normal mood and affect  Her behavior is normal          Discussion with Family:  Daughter     Discharge instructions/Information to patient and family:   See after visit summary for information provided to patient and family        Provisions for Follow-Up Care:  See after visit summary for information related to follow-up care and any pertinent home health orders        Disposition:      Home        Planned Readmission: no     Discharge Statement:  I spent >30 minutes discharging the patient  This time was spent on the day of discharge  I had direct contact with the patient on the day of discharge  Greater than 50% of the total time was spent examining patient, answering all patient questions, arranging and discussing plan of care with patient as well as directly providing post-discharge instructions    Additional time then spent on discharge activities      Discharge Medications:  See after visit summary for reconciled discharge medications provided to patient and family        ** Please Note: This note has been constructed using a voice recognition system **

## 2020-03-26 ENCOUNTER — TELEPHONE (OUTPATIENT)
Dept: OBGYN CLINIC | Facility: HOSPITAL | Age: 85
End: 2020-03-26

## 2021-01-15 ENCOUNTER — IMMUNIZATIONS (OUTPATIENT)
Dept: FAMILY MEDICINE CLINIC | Facility: HOSPITAL | Age: 86
End: 2021-01-15

## 2021-01-15 DIAGNOSIS — Z23 ENCOUNTER FOR IMMUNIZATION: Primary | ICD-10-CM

## 2021-01-15 PROCEDURE — 0001A SARS-COV-2 / COVID-19 MRNA VACCINE (PFIZER-BIONTECH) 30 MCG: CPT

## 2021-01-15 PROCEDURE — 91300 SARS-COV-2 / COVID-19 MRNA VACCINE (PFIZER-BIONTECH) 30 MCG: CPT

## 2021-02-03 ENCOUNTER — IMMUNIZATIONS (OUTPATIENT)
Dept: FAMILY MEDICINE CLINIC | Facility: HOSPITAL | Age: 86
End: 2021-02-03

## 2021-02-03 DIAGNOSIS — Z23 ENCOUNTER FOR IMMUNIZATION: Primary | ICD-10-CM

## 2021-02-03 PROCEDURE — 91300 SARS-COV-2 / COVID-19 MRNA VACCINE (PFIZER-BIONTECH) 30 MCG: CPT

## 2021-02-03 PROCEDURE — 0002A SARS-COV-2 / COVID-19 MRNA VACCINE (PFIZER-BIONTECH) 30 MCG: CPT

## 2025-02-03 NOTE — NURSING NOTE
Problem: Chronic Conditions and Co-morbidities  Goal: Patient's chronic conditions and co-morbidity symptoms are monitored and maintained or improved  Outcome: Adequate for Discharge     Problem: Pain  Goal: Verbalizes/displays adequate comfort level or baseline comfort level  Outcome: Adequate for Discharge     Problem: Discharge Planning  Goal: Discharge to home or other facility with appropriate resources  Outcome: Adequate for Discharge     Problem: Safety - Adult  Goal: Free from fall injury  Outcome: Adequate for Discharge     Problem: Skin/Tissue Integrity  Goal: Skin integrity remains intact  Description: 1.  Monitor for areas of redness and/or skin breakdown  2.  Assess vascular access sites hourly  3.  Every 4-6 hours minimum:  Change oxygen saturation probe site  4.  Every 4-6 hours:  If on nasal continuous positive airway pressure, respiratory therapy assess nares and determine need for appliance change or resting period  Outcome: Adequate for Discharge     Problem: ABCDS Injury Assessment  Goal: Absence of physical injury  Outcome: Adequate for Discharge     Problem: Nutrition Deficit:  Goal: Optimize nutritional status  Outcome: Adequate for Discharge      Pt family refusing therapy today , because of what they believe is a UTI  Urine not even collected yet as patient missed the clean catch   Complaints of same back pain and headache , only difference is increased fatique  No temperature and WBC is WNL